# Patient Record
Sex: FEMALE | Race: WHITE | NOT HISPANIC OR LATINO | Employment: FULL TIME | URBAN - METROPOLITAN AREA
[De-identification: names, ages, dates, MRNs, and addresses within clinical notes are randomized per-mention and may not be internally consistent; named-entity substitution may affect disease eponyms.]

---

## 2017-02-15 ENCOUNTER — TELEPHONE (OUTPATIENT)
Dept: FAMILY MEDICINE | Facility: CLINIC | Age: 47
End: 2017-02-15

## 2017-02-15 NOTE — TELEPHONE ENCOUNTER
Panel Management Review      Patient has the following on her problem list:     Depression / Dysthymia review  PHQ-9 SCORE 12/22/2015 1/5/2016 12/8/2016   Total Score - - -   Total Score 12 6 4      Patient is due for:  None      Composite cancer screening  Chart review shows that this patient is due/due soon for the following Pap Smear  Summary:    Patient is due/failing the following:   PAP    Action needed:   Patient needs office visit for pap and physical.    Type of outreach:    Sent Tailored message.    Questions for provider review:    None                                                                                                                                    Isabel Sheldon MA       Chart routed to Care Team .

## 2017-04-24 ENCOUNTER — TELEPHONE (OUTPATIENT)
Dept: FAMILY MEDICINE | Facility: CLINIC | Age: 47
End: 2017-04-24

## 2017-04-24 DIAGNOSIS — M54.10 ACUTE LOW BACK PAIN WITH RADICULAR SYMPTOMS, DURATION LESS THAN 6 WEEKS: ICD-10-CM

## 2017-04-24 RX ORDER — BACLOFEN 10 MG/1
5-10 TABLET ORAL 3 TIMES DAILY PRN
Qty: 30 TABLET | Refills: 0 | Status: SHIPPED | OUTPATIENT
Start: 2017-04-24 | End: 2017-04-26

## 2017-04-24 NOTE — TELEPHONE ENCOUNTER
Pt states that she has an appt with provider on Thursday (4/27/17), she will wait to provider then.   Pt would like a refill of her baclofen - writer will refill for 30 days and ask provider to refill during appt time.   Pt verbalized understanding and had no further questions at this time.   Encounter closed.

## 2017-04-24 NOTE — TELEPHONE ENCOUNTER
No narcotics without a clinic visit as they often are not indicated for acute low back pain.     Can use ibuprofen 800 mg three times daily, we can refill baclofen (the last muscle relaxer she tried).      If she is having that much pain she should be seen.    Glory Singleton M.D.

## 2017-04-24 NOTE — TELEPHONE ENCOUNTER
Pt can get out of bed but hurts terrible. She has tried to get up 2 times today and both times she made it across the room and had to return to bed because of the pain.   This has happen in the past and was resolved with pain medication   Pt doesn't want a muscle relaxer because it made her sick she would like something for the pain.   She does state that she tried a flexeril about 1 week ago and it didn't help, but claims it didn't make her sick.    Pain is rated at 9 out of 10.   Located: lower back on right hand side.   Urination: no complaints   BM: no complaints - last BM was yesterday   Using heat and ice with no relief along with tylenol and ibuprofen.   Please advise   Thank you  Anne MACK RN

## 2017-04-24 NOTE — TELEPHONE ENCOUNTER
Reason for call:  Patient reporting a symptom    Symptom or request: Pt calling stating she cannot get out of bed today because of her back, does not know why, no known injury, please advise     Duration (how long have symptoms been present): today    Phone Number patient can be reached at:  Home number on file [phone] (home)    Best Time:  any    Can we leave a detailed message on this number:  YES    Call taken on 4/24/2017 at 10:02 AM by Isabel Oswald

## 2017-04-25 DIAGNOSIS — M54.40 ACUTE LOW BACK PAIN WITH SCIATICA, SCIATICA LATERALITY UNSPECIFIED, UNSPECIFIED BACK PAIN LATERALITY: Primary | ICD-10-CM

## 2017-04-25 NOTE — LETTER
[address]  [address]  Phone: [phone]  Fax: [phone]            4/26/17            To whom it may concern:            Bibi Song reports missing work on 4/24/17 and 4/25/17 due to back pain.        Glory Singleton M.D.

## 2017-04-25 NOTE — TELEPHONE ENCOUNTER
Reason for Call:  Other call back and Letter    Detailed comments: She has been off work Monday and Tuesday for sore back and upset stomach.  Also she was given  Baclofen.  this is up-setting her stomach.  She is wondering if she can get something else?  Please advise.    Phone Number Patient can be reached at: Home number on file [phone] (home)    Best Time: any    Can we leave a detailed message on this number? YES    Call taken on 4/25/2017 at 12:11 PM by Nicole Coelho

## 2017-04-25 NOTE — TELEPHONE ENCOUNTER
Baclofen giving her an upset stomach.  Requesting Flexeril for her back pain/spasms.  Asking for off work letter for 4/24,4/25, letter pended.  Advise.  Sierra

## 2017-04-26 RX ORDER — CYCLOBENZAPRINE HCL 10 MG
10 TABLET ORAL 3 TIMES DAILY PRN
Qty: 90 TABLET | Refills: 0 | Status: SHIPPED | OUTPATIENT
Start: 2017-04-26 | End: 2017-07-31

## 2017-04-27 ENCOUNTER — OFFICE VISIT (OUTPATIENT)
Dept: FAMILY MEDICINE | Facility: CLINIC | Age: 47
End: 2017-04-27
Payer: COMMERCIAL

## 2017-04-27 VITALS
HEART RATE: 82 BPM | RESPIRATION RATE: 18 BRPM | SYSTOLIC BLOOD PRESSURE: 107 MMHG | BODY MASS INDEX: 41.32 KG/M2 | HEIGHT: 65 IN | WEIGHT: 248 LBS | DIASTOLIC BLOOD PRESSURE: 71 MMHG

## 2017-04-27 DIAGNOSIS — R73.09 ELEVATED GLUCOSE: ICD-10-CM

## 2017-04-27 DIAGNOSIS — F41.8 OTHER SPECIFIED ANXIETY DISORDERS: ICD-10-CM

## 2017-04-27 DIAGNOSIS — F33.1 MAJOR DEPRESSIVE DISORDER, RECURRENT EPISODE, MODERATE (H): ICD-10-CM

## 2017-04-27 DIAGNOSIS — Z00.00 ROUTINE GENERAL MEDICAL EXAMINATION AT A HEALTH CARE FACILITY: Primary | ICD-10-CM

## 2017-04-27 DIAGNOSIS — E78.5 HYPERLIPIDEMIA LDL GOAL <160: ICD-10-CM

## 2017-04-27 DIAGNOSIS — K52.9 NON-SPECIFIC COLITIS: ICD-10-CM

## 2017-04-27 DIAGNOSIS — E03.9 HYPOTHYROIDISM, UNSPECIFIED TYPE: ICD-10-CM

## 2017-04-27 DIAGNOSIS — F42.9 OCD (OBSESSIVE COMPULSIVE DISORDER): ICD-10-CM

## 2017-04-27 PROBLEM — K51.919 ULCERATIVE COLITIS WITH COMPLICATION, UNSPECIFIED LOCATION (H): Status: ACTIVE | Noted: 2017-04-27

## 2017-04-27 LAB
ANION GAP SERPL CALCULATED.3IONS-SCNC: 11 MMOL/L (ref 3–14)
BUN SERPL-MCNC: 12 MG/DL (ref 7–30)
CALCIUM SERPL-MCNC: 9 MG/DL (ref 8.5–10.1)
CHLORIDE SERPL-SCNC: 107 MMOL/L (ref 94–109)
CHOLEST SERPL-MCNC: 256 MG/DL
CO2 SERPL-SCNC: 24 MMOL/L (ref 20–32)
CREAT SERPL-MCNC: 0.93 MG/DL (ref 0.52–1.04)
GFR SERPL CREATININE-BSD FRML MDRD: 64 ML/MIN/1.7M2
GLUCOSE SERPL-MCNC: 87 MG/DL (ref 70–99)
HBA1C MFR BLD: 5.5 % (ref 4.3–6)
HDLC SERPL-MCNC: 49 MG/DL
LDLC SERPL CALC-MCNC: 157 MG/DL
NONHDLC SERPL-MCNC: 207 MG/DL
POTASSIUM SERPL-SCNC: 3.6 MMOL/L (ref 3.4–5.3)
SODIUM SERPL-SCNC: 142 MMOL/L (ref 133–144)
T4 FREE SERPL-MCNC: 0.83 NG/DL (ref 0.76–1.46)
TRIGL SERPL-MCNC: 252 MG/DL
TSH SERPL DL<=0.005 MIU/L-ACNC: 6.16 MU/L (ref 0.4–4)

## 2017-04-27 PROCEDURE — 83036 HEMOGLOBIN GLYCOSYLATED A1C: CPT | Performed by: FAMILY MEDICINE

## 2017-04-27 PROCEDURE — 99396 PREV VISIT EST AGE 40-64: CPT | Performed by: FAMILY MEDICINE

## 2017-04-27 PROCEDURE — 36415 COLL VENOUS BLD VENIPUNCTURE: CPT | Performed by: FAMILY MEDICINE

## 2017-04-27 PROCEDURE — 84439 ASSAY OF FREE THYROXINE: CPT | Performed by: FAMILY MEDICINE

## 2017-04-27 PROCEDURE — 80048 BASIC METABOLIC PNL TOTAL CA: CPT | Performed by: FAMILY MEDICINE

## 2017-04-27 PROCEDURE — G0145 SCR C/V CYTO,THINLAYER,RESCR: HCPCS | Performed by: FAMILY MEDICINE

## 2017-04-27 PROCEDURE — 84443 ASSAY THYROID STIM HORMONE: CPT | Performed by: FAMILY MEDICINE

## 2017-04-27 PROCEDURE — 87624 HPV HI-RISK TYP POOLED RSLT: CPT | Performed by: FAMILY MEDICINE

## 2017-04-27 PROCEDURE — 80061 LIPID PANEL: CPT | Performed by: FAMILY MEDICINE

## 2017-04-27 RX ORDER — MESALAMINE 1000 MG/1
1000 SUPPOSITORY RECTAL AT BEDTIME
Qty: 90 SUPPOSITORY | Refills: 3 | Status: CANCELLED | OUTPATIENT
Start: 2017-04-27

## 2017-04-27 RX ORDER — SERTRALINE HYDROCHLORIDE 100 MG/1
100 TABLET, FILM COATED ORAL 3 TIMES DAILY
Qty: 1 TABLET | Refills: 0 | COMMUNITY
Start: 2017-04-27 | End: 2017-07-31

## 2017-04-27 RX ORDER — ZIPRASIDONE HYDROCHLORIDE 40 MG/1
40 CAPSULE ORAL 2 TIMES DAILY WITH MEALS
Qty: 1 CAPSULE | COMMUNITY
Start: 2017-04-27 | End: 2018-10-05

## 2017-04-27 ASSESSMENT — PATIENT HEALTH QUESTIONNAIRE - PHQ9: 5. POOR APPETITE OR OVEREATING: SEVERAL DAYS

## 2017-04-27 ASSESSMENT — ANXIETY QUESTIONNAIRES
GAD7 TOTAL SCORE: 8
1. FEELING NERVOUS, ANXIOUS, OR ON EDGE: SEVERAL DAYS
5. BEING SO RESTLESS THAT IT IS HARD TO SIT STILL: NOT AT ALL
2. NOT BEING ABLE TO STOP OR CONTROL WORRYING: MORE THAN HALF THE DAYS
6. BECOMING EASILY ANNOYED OR IRRITABLE: NOT AT ALL
7. FEELING AFRAID AS IF SOMETHING AWFUL MIGHT HAPPEN: MORE THAN HALF THE DAYS
3. WORRYING TOO MUCH ABOUT DIFFERENT THINGS: MORE THAN HALF THE DAYS

## 2017-04-27 NOTE — MR AVS SNAPSHOT
After Visit Summary   4/27/2017    Bibi Song    MRN: [id number]           Patient Information     Date Of Birth          1970        Visit Information        Provider Department      4/27/2017 9:20 AM Glory Singleton MD [facility]        Today's Diagnoses     Routine general medical examination at a health care facility    -  1    OCD (obsessive compulsive disorder)        Other specified anxiety disorders        Major depressive disorder, recurrent episode, moderate (H)        Hypothyroidism, unspecified type        Hyperlipidemia LDL goal <160        Non-specific colitis        Elevated glucose          Care Instructions      Make appointment with [facility] - see referral      Preventive Health Recommendations  Female Ages 40 to 49    Yearly exam:     See your health care provider every year in order to  1. Review health changes.   2. Discuss preventive care.    3. Review your medicines if your doctor prescribed any.      Get a Pap test every three years (unless you have an abnormal result and your provider advises testing more often).      If you get Pap tests with HPV test, you only need to test every 5 years, unless you have an abnormal result. You do not need a Pap test if your uterus was removed (hysterectomy) and you have not had cancer.      You should be tested each year for STDs (sexually transmitted diseases), if you're at risk.       Ask your doctor if you should have a mammogram.      Have a colonoscopy (test for colon cancer) if someone in your family has had colon cancer or polyps before age 50.       Have a cholesterol test every 5 years.       Have a diabetes test (fasting glucose) after age 45. If you are at risk for diabetes, you should have this test every 3 years.    Shots: Get a flu shot each year. Get a tetanus shot every 10 years.     Nutrition:     Eat at least 5 servings of fruits and vegetables each day.    Eat whole-grain bread, whole-wheat pasta  and brown rice instead of white grains and rice.    Talk to your provider about Calcium and Vitamin D.     Lifestyle    Exercise at least 150 minutes a week (an average of 30 minutes a day, 5 days a week). This will help you control your weight and prevent disease.    Limit alcohol to one drink per day.    No smoking.     Wear sunscreen to prevent skin cancer.    See your dentist every six months for an exam and cleaning.        Follow-ups after your visit        Additional Services     GASTROENTEROLOGY ADULT REF CONSULT ONLY       Preferred Location: [facility] [phone]      Please be aware that coverage of these services is subject to the terms and limitations of your health insurance plan.  Call member services at your health plan with any benefit or coverage questions.  Any procedures must be performed at a [facility] facility OR coordinated by your clinic's referral office.    Please bring the following with you to your appointment:    (1) Any X-Rays, CTs or MRIs which have been performed.  Contact the facility where they were done to arrange for  prior to your scheduled appointment.    (2) List of current medications   (3) This referral request   (4) Any documents/labs given to you for this referral                  Who to contact     If you have questions or need follow up information about today's clinic visit or your schedule please contact [facility] directly at [phone].  Normal or non-critical lab and imaging results will be communicated to you by MyChart, letter or phone within 4 business days after the clinic has received the results. If you do not hear from us within 7 days, please contact the clinic through MyChart or phone. If you have a critical or abnormal lab result, we will notify you by phone as soon as possible.  Submit refill requests through [employer] or call your pharmacy and they will forward the refill request to us. Please allow 3 business days for your refill  "to be completed.          Additional Information About Your Visit        [employer] Information     The [facility] gives you secure access to your electronic health record. If you see a primary care provider, you can also send messages to your care team and make appointments. If you have questions, please call your primary care clinic.  If you do not have a primary care provider, please call [phone] and they will assist you.        Care EveryWhere ID     This is your Care EveryWhere ID. This could be used by other organizations to access your [facility] medical records  [id number]        Your Vitals Were     Pulse Respirations Height Last Period Breastfeeding? BMI (Body Mass Index)    82 18 5' 5\" (1.651 m) 04/20/2017 No 41.27 kg/m2       Blood Pressure from Last 3 Encounters:   04/27/17 107/71   12/08/16 103/71   07/06/16 115/76    Weight from Last 3 Encounters:   04/27/17 248 lb (112.5 kg)   01/05/16 244 lb 9.6 oz (110.9 kg)   12/22/15 241 lb (109.3 kg)              We Performed the Following     Basic metabolic panel     GASTROENTEROLOGY ADULT REF CONSULT ONLY     Hemoglobin A1c     HPV High Risk Types DNA Cervical     Lipid panel reflex to direct LDL     Pap imaged thin layer screen with HPV - recommended age 30 - 65     TSH with free T4 reflex          Today's Medication Changes          These changes are accurate as of: 4/27/17  9:49 AM.  If you have any questions, ask your nurse or doctor.               These medicines have changed or have updated prescriptions.        Dose/Directions    GEODON 40 MG capsule   This may have changed:  how much to take   Generic drug:  ziprasidone   Changed by:  Glory Singleton MD        Dose:  80 mg   Take 2 capsules (80 mg) by mouth daily   Quantity:  1 capsule   Refills:  0       ZOLOFT 100 MG tablet   This may have changed:    - how much to take  - how to take this  - when to take this   Used for:  OCD (obsessive compulsive disorder), Other specified anxiety disorders, Major " depressive disorder, recurrent episode, moderate (H)   Generic drug:  sertraline   Changed by:  Glory Singleton MD        Dose:  100 mg   Take 1 tablet (100 mg) by mouth 3 times daily 2 and 1/2 tablets daily   Quantity:  1 tablet   Refills:  0                Primary Care Provider Office Phone # Fax #    Glory Singleton [phone]       [facility] [address]  [facility] [address]        Thank you!     Thank you for choosing [facility]  for your care. Our goal is always to provide you with excellent care. Hearing back from our patients is one way we can continue to improve our services. Please take a few minutes to complete the written survey that you may receive in the mail after your visit with us. Thank you!             Your Updated Medication List - Protect others around you: Learn how to safely use, store and throw away your medicines at www.disposemymeds.org.          This list is accurate as of: 4/27/17  9:49 AM.  Always use your most recent med list.                   Brand Name Dispense Instructions for use    CANASA 1000 MG Suppository   Generic drug:  mesalamine     90 suppository    Place 1 suppository rectally At Bedtime.       cyclobenzaprine 10 MG tablet    FLEXERIL    90 tablet    Take 1 tablet (10 mg) by mouth 3 times daily as needed for muscle spasms       GEODON 40 MG capsule   Generic drug:  ziprasidone     1 capsule    Take 2 capsules (80 mg) by mouth daily       levothyroxine 50 MCG tablet    SYNTHROID/LEVOTHROID    90 tablet    Take 1 tablet (50 mcg) by mouth daily       melatonin 3 MG tablet     30 tablet    Take 9 mg by mouth nightly as needed.       WELLBUTRIN  MG 24 hr tablet   Generic drug:  buPROPion      Take 300 mg by mouth every morning       ZOLOFT 100 MG tablet   Generic drug:  sertraline     1 tablet    Take 1 tablet (100 mg) by mouth 3 times daily 2 and 1/2 tablets daily

## 2017-04-27 NOTE — LETTER
[facility]  [address]  [facility] [address]  Phone: [phone]    April 27, 2017    Bibi Song  [address] [facility] [address]            To Whom It May Concern:      Bibi Song may return to work without restrictions on 4/28/2017.                Sincerely,          Glory Singleton MD

## 2017-04-27 NOTE — PROGRESS NOTES
SUBJECTIVE:     CC: Bibi Song is an 47 year old woman who presents for preventive health visit.     Healthy Habits:    Do you get at least three servings of calcium containing foods daily (dairy, green leafy vegetables, etc.)? yes    Amount of exercise or daily activities, outside of work: limited    Problems taking medications regularly No    Medication side effects: No    Have you had an eye exam in the past two years? yes    Do you see a dentist twice per year? yes    Do you have sleep apnea, excessive snoring or daytime drowsiness?no          Depression and Anxiety Follow-Up    Status since last visit: No change    Other associated symptoms:None    Complicating factors:     Significant life event: No     Current substance abuse: None  Sees a NP through [facility] for mental health    PHQ-9 SCORE 1/5/2016 12/8/2016 4/27/2017   Total Score - - -   Total Score 6 4 9     NARCISA-7 SCORE 1/5/2016 12/8/2016 4/27/2017   Total Score - - -   Total Score 8 6 8        PHQ-9  English      PHQ-9   Any Language     GAD7       Today's PHQ-2 Score:   PHQ-2 ( 1999 [employer]) 1/5/2016 3/26/2015   Q1: Little interest or pleasure in doing things 1 1   Q2: Feeling down, depressed or hopeless 1 1   PHQ-2 Score 2 2       Abuse: Current or Past(Physical, Sexual or Emotional)- No  Do you feel safe in your environment - Yes    Social History   Substance Use Topics     Smoking status: Former Smoker     Quit date: 1/1/1994     Smokeless tobacco: Never Used     Alcohol use No      Comment: Treatment in 2007     The patient does not drink >3 drinks per day nor >7 drinks per week.    Recent Labs   Lab Test  01/04/16   1033  01/23/14   0800  10/12/11   0904   CHOL  214*  222*  261*   HDL  39*  44*  44*   LDL  151*  160*  183*   TRIG  118  89  171*   CHOLHDLRATIO   --   5.0  6.0*   NHDL  175*   --    --        Reviewed orders with patient.  Reviewed health maintenance and updated orders accordingly - Yes    Mammo Decision Support:  Patient under  "age 50, mutual decision reflected in health maintenance.      Pertinent mammograms are reviewed under the imaging tab.  History of abnormal Pap smear: NO - age 30-65 PAP every 5 years with negative HPV co-testing recommended    Reviewed and updated as needed this visit by clinical staff  Tobacco  Allergies  Med Hx  Surg Hx  Fam Hx  Soc Hx        Reviewed and updated as needed this visit by Provider            ROS:  C: NEGATIVE for fever, chills, change in weight  I: NEGATIVE for worrisome rashes, moles or lesions  E: NEGATIVE for vision changes or irritation  ENT: NEGATIVE for ear, mouth and throat problems  R: NEGATIVE for significant cough or SOB  B: NEGATIVE for masses, tenderness or discharge - chronic left sided pain that has been evaluated and noted to be \"fatty\" in the past  CV: NEGATIVE for chest pain, palpitations or peripheral edema  GI: NEGATIVE for nausea, abdominal pain, heartburn, or change in bowel habits  : NEGATIVE for unusual urinary or vaginal symptoms. Periods are regular.  M: NEGATIVE for significant arthralgias or myalgia  N: NEGATIVE for weakness, dizziness or paresthesias  P: NEGATIVE for changes in mood or affect    Problem list, Medication list, Allergies, and Medical/Social/Surgical histories reviewed in [facility] and updated as appropriate.  Labs reviewed in EPIC  BP Readings from Last 3 Encounters:   04/27/17 107/71   12/08/16 103/71   07/06/16 115/76    Wt Readings from Last 3 Encounters:   04/27/17 248 lb (112.5 kg)   01/05/16 244 lb 9.6 oz (110.9 kg)   12/22/15 241 lb (109.3 kg)                  OBJECTIVE:     /71  Pulse 82  Resp 18  Ht 5' 5\" (1.651 m)  Wt 248 lb (112.5 kg)  LMP 04/20/2017  Breastfeeding? No  BMI 41.27 kg/m2  EXAM:  GENERAL: healthy, alert and no distress  EYES: Eyes grossly normal to inspection, PERRL and conjunctivae and sclerae normal  HENT: ear canals and TM's normal, nose and mouth without ulcers or lesions  NECK: no adenopathy, no asymmetry, " masses, or scars and thyroid normal to palpation  RESP: lungs clear to auscultation - no rales, rhonchi or wheezes  BREAST: normal without masses, tenderness or nipple discharge and no palpable axillary masses or adenopathy  CV: regular rate and rhythm, normal S1 S2, no S3 or S4, no murmur, click or rub, no peripheral edema and peripheral pulses strong  ABDOMEN: soft, nontender, no hepatosplenomegaly, no masses and bowel sounds normal   (female): normal female external genitalia, normal urethral meatus , vaginal mucosa pink, moist, well rugated, normal cervix, adnexae, and uterus without masses. and pap obtained  Skin around rectum is red/irritated without breakdown  Small hemorrhoidal skin tags present  MS: no gross musculoskeletal defects noted, no edema  SKIN: no suspicious lesions or rashes  NEURO: Normal strength and tone, mentation intact and speech normal  PSYCH: mentation appears normal, affect normal/bright    ASSESSMENT/PLAN:     1. Routine general medical examination at a health care facility     - Pap imaged thin layer screen with HPV - recommended age 30 - 65  - HPV High Risk Types DNA Cervical    2. OCD (obsessive compulsive disorder)   follow with her NP  - sertraline (ZOLOFT) 100 MG tablet; Take 1 tablet (100 mg) by mouth 3 times daily 2 and 1/2 tablets daily  Dispense: 1 tablet; Refill: 0    3. Other specified anxiety disorders   followed by her NP  - sertraline (ZOLOFT) 100 MG tablet; Take 1 tablet (100 mg) by mouth 3 times daily 2 and 1/2 tablets daily  Dispense: 1 tablet; Refill: 0    4. Major depressive disorder, recurrent episode, moderate (H)     - sertraline (ZOLOFT) 100 MG tablet; Take 1 tablet (100 mg) by mouth 3 times daily 2 and 1/2 tablets daily  Dispense: 1 tablet; Refill: 0    5. Hypothyroidism, unspecified type     - TSH with free T4 reflex    6. Hyperlipidemia LDL goal <160     - Lipid panel reflex to direct LDL  - Basic metabolic panel    7. Non-specific colitis   patient  "wondering if she needed follow up with GI for colitis seen on colonoscopy in 2010 - biopsy was non-specific for cause.  She denies current active symptoms  - GASTROENTEROLOGY ADULT REF CONSULT ONLY    8. Elevated glucose     - Hemoglobin A1c    COUNSELING:   Reviewed preventive health counseling, as reflected in patient instructions       Regular exercise       Healthy diet/nutrition       Colon cancer screening         reports that she quit smoking about 23 years ago. She has never used smokeless tobacco.    Estimated body mass index is 41.27 kg/(m^2) as calculated from the following:    Height as of this encounter: 5' 5\" (1.651 m).    Weight as of this encounter: 248 lb (112.5 kg).   Weight management plan: Discussed healthy diet and exercise guidelines and patient will follow up in 12 months in clinic to re-evaluate.    Counseling Resources:  ATP IV Guidelines  Pooled Cohorts Equation Calculator  Breast Cancer Risk Calculator  FRAX Risk Assessment  ICSI Preventive Guidelines  Dietary Guidelines for Americans, 2010  USDA's MyPlate  ASA Prophylaxis  Lung CA Screening    Glory Singleton MD  [facility]  "

## 2017-04-27 NOTE — PATIENT INSTRUCTIONS
Make appointment with [facility] - see referral      Preventive Health Recommendations  Female Ages 40 to 49    Yearly exam:     See your health care provider every year in order to  1. Review health changes.   2. Discuss preventive care.    3. Review your medicines if your doctor prescribed any.      Get a Pap test every three years (unless you have an abnormal result and your provider advises testing more often).      If you get Pap tests with HPV test, you only need to test every 5 years, unless you have an abnormal result. You do not need a Pap test if your uterus was removed (hysterectomy) and you have not had cancer.      You should be tested each year for STDs (sexually transmitted diseases), if you're at risk.       Ask your doctor if you should have a mammogram.      Have a colonoscopy (test for colon cancer) if someone in your family has had colon cancer or polyps before age 50.       Have a cholesterol test every 5 years.       Have a diabetes test (fasting glucose) after age 45. If you are at risk for diabetes, you should have this test every 3 years.    Shots: Get a flu shot each year. Get a tetanus shot every 10 years.     Nutrition:     Eat at least 5 servings of fruits and vegetables each day.    Eat whole-grain bread, whole-wheat pasta and brown rice instead of white grains and rice.    Talk to your provider about Calcium and Vitamin D.     Lifestyle    Exercise at least 150 minutes a week (an average of 30 minutes a day, 5 days a week). This will help you control your weight and prevent disease.    Limit alcohol to one drink per day.    No smoking.     Wear sunscreen to prevent skin cancer.    See your dentist every six months for an exam and cleaning.

## 2017-04-27 NOTE — NURSING NOTE
"Chief Complaint   Patient presents with     Physical       Initial /71  Pulse 82  Resp 18  Ht 5' 5\" (1.651 m)  Wt 248 lb (112.5 kg)  LMP 04/20/2017  Breastfeeding? No  BMI 41.27 kg/m2 Estimated body mass index is 41.27 kg/(m^2) as calculated from the following:    Height as of this encounter: 5' 5\" (1.651 m).    Weight as of this encounter: 248 lb (112.5 kg).  Medication Reconciliation: complete    "

## 2017-04-28 LAB
COPATH REPORT: NORMAL
PAP: NORMAL

## 2017-04-28 RX ORDER — LEVOTHYROXINE SODIUM 75 UG/1
75 TABLET ORAL DAILY
Qty: 90 TABLET | Refills: 1 | Status: SHIPPED | OUTPATIENT
Start: 2017-04-28 | End: 2017-08-14

## 2017-04-28 ASSESSMENT — ANXIETY QUESTIONNAIRES: GAD7 TOTAL SCORE: 8

## 2017-04-28 ASSESSMENT — PATIENT HEALTH QUESTIONNAIRE - PHQ9: SUM OF ALL RESPONSES TO PHQ QUESTIONS 1-9: 9

## 2017-05-02 LAB
FINAL DIAGNOSIS: NORMAL
HPV HR 12 DNA CVX QL NAA+PROBE: NEGATIVE
HPV16 DNA SPEC QL NAA+PROBE: NEGATIVE
HPV18 DNA SPEC QL NAA+PROBE: NEGATIVE
SPECIMEN DESCRIPTION: NORMAL

## 2017-05-18 ENCOUNTER — TELEPHONE (OUTPATIENT)
Dept: NURSING | Facility: CLINIC | Age: 47
End: 2017-05-18

## 2017-05-19 NOTE — TELEPHONE ENCOUNTER
"Call Type: Triage Call    Presenting Problem: \"I'm having anxiety and feel panicky and my  medications aren't working.\"  Triage Note:  Guideline Title: Anxiety: Panic ; Anxiety: Panic  Recommended Disposition: See Provider within 4 hours  Original Inclination: Wanted to speak with a nurse  Override Disposition: Activate   Intended Action: Patient does not know  Physician Contacted: No  History of panic attacks AND current episode NOT resolved ?  YES  Unable to stand due to faintness, dizziness, or lightheadedness ? NO  Describing symptoms of depression ? NO  Severe breathing problems ? NO  Hearing voices that no one else hears or seeing things that no one else sees ? NO  Dizziness, faintness, or lightheadedness lasting more than 15 minutes ? NO  New or worsening confusion, disorientation, or agitation ? NO  Any other cardiac signs/symptoms for more than 5 minutes, now or within last hour.  Pain is NOT associated with taking a deep breath or a productive cough, movement,  or touch to a localized area on the chest or upper body. ? NO  Any homicidal/destructive ideation, any suicidal ideation, any history of suicide  attempts, and/or any history of self destructive behavior ? NO  Any suicidal or homicidal attempt(s) or gesture(s) in progress. ? NO  Physician Instructions:  Care Advice: Should not be alone, arrange for support (family member,  friend, etc.).  Continue to follow treatment plan, including medications, until evaluated  by provider.  SYMPTOM / CONDITION MANAGEMENT  "

## 2017-06-23 ENCOUNTER — TELEPHONE (OUTPATIENT)
Dept: FAMILY MEDICINE | Facility: CLINIC | Age: 47
End: 2017-06-23

## 2017-06-23 NOTE — TELEPHONE ENCOUNTER
S-(situation): left knee pain    B-(background): cleaned the floor on hands and knees about 1 week ago.  Has been applying ice.  No redness, swelling, is able to bear wt on the left.  No numbness.    A-(assessment): knee pain    R-(recommendations): to ice and alternate with heat.  Elevate.  Can use an ace wrap to knee for added support.  No prolong standing.  Ibuprofen or tylenol for pain.  S & S of infection are gone over.  Fever, increased swelling to  Be seen. Kayla MORALES RN

## 2017-06-23 NOTE — TELEPHONE ENCOUNTER
Reason for call:  Patient reporting a symptom    Symptom or request: Pt thinks she may have pulled something in her left knee, it has been sore on the back for about a week, she did help someone with floor cleaning before it started so she thinks it could be from that, please advise.     Duration (how long have symptoms been present): one week    Have you been treated for this before? No    Additional comments:     Phone Number patient can be reached at:  Home number on file [phone] (home)    Best Time:  any    Can we leave a detailed message on this number:  YES    Call taken on 6/23/2017 at 10:36 AM by Isabel Oswald

## 2017-07-31 ENCOUNTER — OFFICE VISIT (OUTPATIENT)
Dept: FAMILY MEDICINE | Facility: CLINIC | Age: 47
End: 2017-07-31
Payer: COMMERCIAL

## 2017-07-31 VITALS
DIASTOLIC BLOOD PRESSURE: 81 MMHG | HEART RATE: 82 BPM | SYSTOLIC BLOOD PRESSURE: 118 MMHG | HEIGHT: 65 IN | BODY MASS INDEX: 41.32 KG/M2 | RESPIRATION RATE: 18 BRPM | WEIGHT: 248 LBS

## 2017-07-31 DIAGNOSIS — F42.9 OBSESSIVE-COMPULSIVE DISORDER, UNSPECIFIED TYPE: ICD-10-CM

## 2017-07-31 DIAGNOSIS — F41.8 OTHER SPECIFIED ANXIETY DISORDERS: ICD-10-CM

## 2017-07-31 DIAGNOSIS — E03.9 HYPOTHYROIDISM, UNSPECIFIED TYPE: ICD-10-CM

## 2017-07-31 DIAGNOSIS — R61 GENERALIZED HYPERHIDROSIS: ICD-10-CM

## 2017-07-31 DIAGNOSIS — F33.1 MAJOR DEPRESSIVE DISORDER, RECURRENT EPISODE, MODERATE (H): Primary | ICD-10-CM

## 2017-07-31 LAB
BASOPHILS # BLD AUTO: 0 10E9/L (ref 0–0.2)
BASOPHILS NFR BLD AUTO: 0.4 %
DIFFERENTIAL METHOD BLD: NORMAL
EOSINOPHIL # BLD AUTO: 0.5 10E9/L (ref 0–0.7)
EOSINOPHIL NFR BLD AUTO: 4.4 %
ERYTHROCYTE [DISTWIDTH] IN BLOOD BY AUTOMATED COUNT: 14.1 % (ref 10–15)
HCT VFR BLD AUTO: 39.9 % (ref 35–47)
HGB BLD-MCNC: 13.1 G/DL (ref 11.7–15.7)
LYMPHOCYTES # BLD AUTO: 2.7 10E9/L (ref 0.8–5.3)
LYMPHOCYTES NFR BLD AUTO: 25.5 %
MCH RBC QN AUTO: 29.4 PG (ref 26.5–33)
MCHC RBC AUTO-ENTMCNC: 32.8 G/DL (ref 31.5–36.5)
MCV RBC AUTO: 90 FL (ref 78–100)
MONOCYTES # BLD AUTO: 0.5 10E9/L (ref 0–1.3)
MONOCYTES NFR BLD AUTO: 4.3 %
NEUTROPHILS # BLD AUTO: 6.8 10E9/L (ref 1.6–8.3)
NEUTROPHILS NFR BLD AUTO: 65.4 %
PLATELET # BLD AUTO: 334 10E9/L (ref 150–450)
RBC # BLD AUTO: 4.46 10E12/L (ref 3.8–5.2)
TSH SERPL DL<=0.005 MIU/L-ACNC: 2.36 MU/L (ref 0.4–4)
WBC # BLD AUTO: 10.4 10E9/L (ref 4–11)

## 2017-07-31 PROCEDURE — 36415 COLL VENOUS BLD VENIPUNCTURE: CPT | Performed by: FAMILY MEDICINE

## 2017-07-31 PROCEDURE — 84443 ASSAY THYROID STIM HORMONE: CPT | Performed by: FAMILY MEDICINE

## 2017-07-31 PROCEDURE — 85025 COMPLETE CBC W/AUTO DIFF WBC: CPT | Performed by: FAMILY MEDICINE

## 2017-07-31 PROCEDURE — 99213 OFFICE O/P EST LOW 20 MIN: CPT | Performed by: FAMILY MEDICINE

## 2017-07-31 RX ORDER — SERTRALINE HYDROCHLORIDE 100 MG/1
200 TABLET, FILM COATED ORAL DAILY
Qty: 1 TABLET | Refills: 0 | COMMUNITY
Start: 2017-07-31 | End: 2018-10-05

## 2017-07-31 NOTE — NURSING NOTE
"Chief Complaint   Patient presents with     Excessive Sweating     Patient has been dealing with sweating for several years and over the summer it has worsen. Patient is questioning a medication. Patient just finished menstrual cycle.      Forms     forgot at home will drop off.       Initial /81  Pulse 82  Resp 18  Ht 5' 5\" (1.651 m)  Wt 248 lb (112.5 kg)  LMP 07/26/2017  Breastfeeding? No  BMI 41.27 kg/m2 Estimated body mass index is 41.27 kg/(m^2) as calculated from the following:    Height as of this encounter: 5' 5\" (1.651 m).    Weight as of this encounter: 248 lb (112.5 kg).  Medication Reconciliation: complete    "

## 2017-07-31 NOTE — PROGRESS NOTES
Bibi,    All of the labs were normal or acceptable.    Please contact my office if you have questions.    Glory Singleton M.D.

## 2017-07-31 NOTE — PATIENT INSTRUCTIONS
Thank you for choosing [facility].  You may be receiving a survey in the mail from [facility] regarding your visit today.  Please take a few minutes to complete and return the survey to let us know how we are doing.      Our Clinic hours are:  Mondays    7:20 am - 7 pm  Tues -  Fri  7:20 am - 5 pm    Clinic Phone: [phone]    The clinic lab opens at 7:30 am Mon - Fri and appointments are required.    [facility]. [phone]  Monday-Thursday 8 am - 7pm  Tues/Wed/Fri 8 am - 5:30 pm

## 2017-07-31 NOTE — MR AVS SNAPSHOT
After Visit Summary   7/31/2017    Bibi Song    MRN: [id number]           Patient Information     Date Of Birth          1970        Visit Information        Provider Department      7/31/2017 10:20 AM Glory Singleton MD [facility]        Today's Diagnoses     Major depressive disorder, recurrent episode, moderate (H)    -  1    Hypothyroidism, unspecified type        Obsessive-compulsive disorder, unspecified type        Other specified anxiety disorders        Generalized hyperhidrosis          Care Instructions          Thank you for choosing [facility].  You may be receiving a survey in the mail from [employer] regarding your visit today.  Please take a few minutes to complete and return the survey to let us know how we are doing.      Our Clinic hours are:  Mondays    7:20 am - 7 pm  Tues -  Fri  7:20 am - 5 pm    Clinic Phone: [phone]    The clinic lab opens at 7:30 am Mon - Fri and appointments are required.    [facility]  Ph. [phone]  Monday-Thursday 8 am - 7pm  Tues/Wed/Fri 8 am - 5:30 pm                 Follow-ups after your visit        Your next 10 appointments already scheduled     Aug 01, 2017 10:00 AM CDT   SHORT with JOBCARE NURSE NB   [facility] ([facility]    [address] [place] [id number] [phone]              Who to contact     If you have questions or need follow up information about today's clinic visit or your schedule please contact [facility] directly at [phone].  Normal or non-critical lab and imaging results will be communicated to you by MyChart, letter or phone within 4 business days after the clinic has received the results. If you do not hear from us within 7 days, please contact the clinic through MyChart or phone. If you have a critical or abnormal lab result, we will notify you by phone as soon as possible.  Submit  "refill requests through [employer] or call your pharmacy and they will forward the refill request to us. Please allow 3 business days for your refill to be completed.          Additional Information About Your Visit        [employer] Information     [employer] gives you secure access to your electronic health record. If you see a primary care provider, you can also send messages to your care team and make appointments. If you have questions, please call your primary care clinic.  If you do not have a primary care provider, please call [phone] and they will assist you.        Care EveryWhere ID     This is your Care EveryWhere ID. This could be used by other organizations to access your [facility] medical records  [id number]        Your Vitals Were     Pulse Respirations Height Last Period Breastfeeding? BMI (Body Mass Index)    82 18 5' 5\" (1.651 m) 07/26/2017 No 41.27 kg/m2       Blood Pressure from Last 3 Encounters:   07/31/17 118/81   04/27/17 107/71   12/08/16 103/71    Weight from Last 3 Encounters:   07/31/17 248 lb (112.5 kg)   04/27/17 248 lb (112.5 kg)   01/05/16 244 lb 9.6 oz (110.9 kg)              We Performed the Following     CBC with platelets differential     TSH with free T4 reflex          Today's Medication Changes          These changes are accurate as of: 7/31/17 10:54 AM.  If you have any questions, ask your nurse or doctor.               These medicines have changed or have updated prescriptions.        Dose/Directions    ZOLOFT 100 MG tablet   This may have changed:    - how much to take  - when to take this  - additional instructions   Used for:  Obsessive-compulsive disorder, unspecified type, Other specified anxiety disorders, Major depressive disorder, recurrent episode, moderate (H)   Generic drug:  sertraline   Changed by:  Glory Singleton MD        Dose:  300 mg   Take 3 tablets (300 mg) by mouth daily   Quantity:  1 tablet   Refills:  0                Primary Care Provider Office Phone " # Fax #    Glory Singleton [phone]       [facility] [address]  [facility] [address]        Equal Access to Services     DANIELLESULEMAN CYN : Hadroberto yeison choe treva Wright, waaxda luqadaha, qaybta kaalmada cara, varun herndon laYenihoa flowers. So [facility] [phone].    ATENCIÓN: Si habla español, tiene a velazquez disposición servicios gratuitos de asistencia lingüística. Llame al [phone].    We comply with applicable federal civil rights laws and [place] laws. We do not discriminate on the basis of race, color, national origin, age, disability sex, sexual orientation or gender identity.            Thank you!     Thank you for choosing [facility]  for your care. Our goal is always to provide you with excellent care. Hearing back from our patients is one way we can continue to improve our services. Please take a few minutes to complete the written survey that you may receive in the mail after your visit with us. Thank you!             Your Updated Medication List - Protect others around you: Learn how to safely use, store and throw away your medicines at www.disposemymeds.org.          This list is accurate as of: 7/31/17 10:54 AM.  Always use your most recent med list.                   Brand Name Dispense Instructions for use Diagnosis    GEODON 40 MG capsule   Generic drug:  ziprasidone     1 capsule    Take 2 capsules (80 mg) by mouth daily        levothyroxine 75 MCG tablet    SYNTHROID/LEVOTHROID    90 tablet    Take 1 tablet (75 mcg) by mouth daily    Hypothyroidism, unspecified type       melatonin 3 MG tablet     30 tablet    Take 9 mg by mouth nightly as needed.        WELLBUTRIN  MG 24 hr tablet   Generic drug:  buPROPion      Take 300 mg by mouth every morning        ZOLOFT 100 MG tablet   Generic drug:  sertraline     1 tablet    Take 3 tablets (300 mg) by mouth daily    Obsessive-compulsive disorder, unspecified type, Other specified  anxiety disorders, Major depressive disorder, recurrent episode, moderate (H)

## 2017-07-31 NOTE — PROGRESS NOTES
"  SUBJECTIVE:                                                    Bibi Song is a 47 year old female who presents to clinic today for the following health issues:      Chief Complaint   Patient presents with     Excessive Sweating     Patient has been dealing with sweating for several years and over the summer it has worsen. Patient is questioning a medication. Patient just finished menstrual cycle.      Forms     forgot at home will drop off.     Patient has noted a marked increase in general warmth/sweating this summer.  Is on high doses of Sertraline at 300 mg daily and started wellbutrin within the last year.  She sees a psych NP in [place].  Wanted me to do disability paperwork that she forgot at home, however if her disability is due to the psychiatric condition, I feel it's most appropriate for that specialist to do her paperwork as they would know the specifics of this.    She denies fever/chills.     ROS: General: No change in weight, sleep or appetite.  Daytime hyperhydrosis.    EYES: Negative for vision changes or eye problems  ENT: No problems with ears, nose or throat.  No difficulty swallowing.  RESP: No coughing, wheezing or shortness of breath  CV: No chest pains or palpitations  GI: No nausea, vomiting,  heartburn, abdominal pain, diarrhea, constipation or change in bowel habits  : No urinary frequency or dysuria, bladder or kidney problems  MUSCULOSKELETAL: No significant muscle or joint pains  NEUROLOGIC: No headaches, numbness, tingling, weakness, problems with balance or coordination  PSYCHIATRIC: No problems with anxiety, depression or mental health  HEME/IMMUNE/ALLERGY: No history of bleeding or clotting problems or anemia.  No allergies or immune system problems  ENDOCRINE: No history of thyroid disease, diabetes or other endocrine disorders  SKIN: No rashes,worrisome lesions or skin problems      /81  Pulse 82  Resp 18  Ht 5' 5\" (1.651 m)  Wt 248 lb (112.5 kg)  LMP " 07/26/2017  Breastfeeding? No  BMI 41.27 kg/m2  EXAM: GENERAL APPEARANCE: Alert, no acute distress  RESP: lungs clear to auscultation   CV: normal rate, regular rhythm, no murmur or gallop  ABDOMEN: soft, no organomegaly, masses or tenderness  LYMPHATICS: No cervical, supraclavicular or inguinal adenopathy  PSYCH: mentation appears normal., affect and mood normal    ASSESSMENT/PLAN:      ICD-10-CM    1. Major depressive disorder, recurrent episode, moderate (H) F33.1 sertraline (ZOLOFT) 100 MG tablet   2. Hypothyroidism, unspecified type E03.9 TSH with free T4 reflex   3. Obsessive-compulsive disorder, unspecified type F42.9 sertraline (ZOLOFT) 100 MG tablet   4. Other specified anxiety disorders F41.8 sertraline (ZOLOFT) 100 MG tablet   5. Generalized hyperhidrosis R61 CBC with platelets differential     Likely the hyperhydrosis relates to her SSRI and Buproprion.  15% of patients will get this on these medications.      Will r/o infection with CBC -  Normal today.    Recheck TSH.    Glory Singleton M.D.      Patient Instructions         Thank you for choosing [facility].  You may be receiving a survey in the mail from [employer] regarding your visit today.  Please take a few minutes to complete and return the survey to let us know how we are doing.      Our Clinic hours are:  Mondays    7:20 am - 7 pm  Tues -  Fri  7:20 am - 5 pm    Clinic Phone: [phone]    The clinic lab opens at 7:30 am Mon - Fri and appointments are required.    [facility]. [phone]  Monday-Thursday 8 am - 7pm  Tues/Wed/Fri 8 am - 5:30 pm

## 2017-08-01 ENCOUNTER — APPOINTMENT (OUTPATIENT)
Dept: OCCUPATIONAL MEDICINE | Facility: CLINIC | Age: 47
End: 2017-08-01

## 2017-08-01 PROCEDURE — 86580 TB INTRADERMAL TEST: CPT | Performed by: PHYSICIAN ASSISTANT

## 2017-08-01 ASSESSMENT — PATIENT HEALTH QUESTIONNAIRE - PHQ9: SUM OF ALL RESPONSES TO PHQ QUESTIONS 1-9: 16

## 2017-08-14 ENCOUNTER — APPOINTMENT (OUTPATIENT)
Dept: OCCUPATIONAL MEDICINE | Facility: CLINIC | Age: 47
End: 2017-08-14

## 2017-08-14 DIAGNOSIS — E03.9 HYPOTHYROIDISM, UNSPECIFIED TYPE: ICD-10-CM

## 2017-08-14 PROCEDURE — 86580 TB INTRADERMAL TEST: CPT | Performed by: PHYSICIAN ASSISTANT

## 2017-08-14 NOTE — TELEPHONE ENCOUNTER
Levothyroxine     Last Written Prescription Date: 04/28/2017  Last Quantity: 90, # refills: 1  Last Office Visit with G, P or [facility] prescribing provider: 07/31/2017        TSH   Date Value Ref Range Status   07/31/2017 2.36 0.40 - 4.00 mU/L Final     Jose MONTALVO (R)

## 2017-08-15 RX ORDER — LEVOTHYROXINE SODIUM 75 UG/1
75 TABLET ORAL DAILY
Qty: 90 TABLET | Refills: 1 | Status: SHIPPED | OUTPATIENT
Start: 2017-08-15 | End: 2018-05-21

## 2017-08-19 ENCOUNTER — NURSE TRIAGE (OUTPATIENT)
Dept: NURSING | Facility: CLINIC | Age: 47
End: 2017-08-19

## 2017-08-22 ENCOUNTER — APPOINTMENT (OUTPATIENT)
Dept: OCCUPATIONAL MEDICINE | Facility: CLINIC | Age: 47
End: 2017-08-22

## 2017-08-22 PROCEDURE — 86580 TB INTRADERMAL TEST: CPT | Performed by: PHYSICIAN ASSISTANT

## 2017-09-05 ENCOUNTER — TELEPHONE (OUTPATIENT)
Dept: FAMILY MEDICINE | Facility: CLINIC | Age: 47
End: 2017-09-05

## 2017-09-05 DIAGNOSIS — M54.10 ACUTE LOW BACK PAIN WITH RADICULAR SYMPTOMS, DURATION LESS THAN 6 WEEKS: ICD-10-CM

## 2017-09-05 NOTE — TELEPHONE ENCOUNTER
Reason for Call:  Medication or medication refill:    Do you use a [facility]?  Name of the pharmacy and phone number for the current request:  [facility] [phone]    Name of the medication requested: Baclofen    Other request: She is asking for a refill.  She went back to work and now her back is having spasms.  Please advise.     Can we leave a detailed message on this number? YES    Phone number patient can be reached at: Home number on file [phone] (home)    Best Time: any    Call taken on 9/5/2017 at 10:45 AM by Nicole Coelho

## 2017-09-05 NOTE — TELEPHONE ENCOUNTER
Routing refill request to provider for review/approval because:  Drug not active on patient's medication list    Last OV back pain discussed 12/2016 - she was given #30 4-24-17 and was going to discuss this at her OV 4-27-17 - do not see back pain listed at that OV.  This med was d/c'd April 2017.    Would you like her to schedule OV for refill?    baclofen      Last Written Prescription Date: 4-24-17  Last Fill Quantity: 30,  # refills: 0   Last Office Visit with [facility], [facility] or [facility] prescribing provider: 7-31-17                                Priscilla BAXTER RN

## 2017-09-06 RX ORDER — BACLOFEN 10 MG/1
5-10 TABLET ORAL 3 TIMES DAILY PRN
Qty: 30 TABLET | Refills: 0 | Status: SHIPPED | OUTPATIENT
Start: 2017-09-06 | End: 2017-10-18

## 2017-09-11 ENCOUNTER — TELEPHONE (OUTPATIENT)
Dept: FAMILY MEDICINE | Facility: CLINIC | Age: 47
End: 2017-09-11

## 2017-09-11 NOTE — LETTER
September 11, 2017      Bibi Song  [address] [address]        To Whom It May Concern,      Patient reports she was unable to work due to back pain on 9/10/17 and 9/11/17.            Sincerely,        Glory Singleton MD

## 2017-09-11 NOTE — TELEPHONE ENCOUNTER
Patient was advised she should have an appointment to discuss her symptoms.  Patient can not afford to come into clinic.  Patient reports this job more demanding and requires more lifting.  Patient is considering  quitting this job.  Patient would like provider to write letter for work today and yesterday.    Please review and advise.    Thank you  Ольга NAPOLES RN

## 2017-09-11 NOTE — TELEPHONE ENCOUNTER
Reason for Call:  Other Letter    Detailed comments: Pt calling to ask that Dr. Singleton write a note excusing her from work 09/10 and 09/11 due to back pain.  She would like to  the letter at the clinic .  I informed pt that she needs an appt with a provider so that they can examine her and she declined to make an appt, stating that she cannot afford it.      Phone Number Patient can be reached at: Home number on file [phone] (home)    Best Time: any    Can we leave a detailed message on this number? YES    Call taken on 9/11/2017 at 10:51 AM by Velma Apodaca

## 2017-09-11 NOTE — TELEPHONE ENCOUNTER
We can write a generic letter stating that patient reports she was unable to work due to back pain on the stated dates, but I cannot do lifting restrictions, or a more specific note without a clinic visit.    Glory Singleton M.D.

## 2017-09-11 NOTE — TELEPHONE ENCOUNTER
Patient would like the generic letter.  Letter pended.  Patient will  letter at the .    Ольга NAPOLES RN

## 2017-10-18 ENCOUNTER — TELEPHONE (OUTPATIENT)
Dept: FAMILY MEDICINE | Facility: CLINIC | Age: 47
End: 2017-10-18

## 2017-10-18 DIAGNOSIS — M54.10 ACUTE LOW BACK PAIN WITH RADICULAR SYMPTOMS, DURATION LESS THAN 6 WEEKS: ICD-10-CM

## 2017-10-18 RX ORDER — BACLOFEN 10 MG/1
5-10 TABLET ORAL 3 TIMES DAILY PRN
Qty: 30 TABLET | Refills: 0 | Status: SHIPPED | OUTPATIENT
Start: 2017-10-18 | End: 2017-11-16

## 2017-10-18 NOTE — TELEPHONE ENCOUNTER
Reason for Call:  Medication or medication refill:    Do you use a [facility]?  Name of the pharmacy and phone number for the current request:  [facility] [phone]    Name of the medication requested: Baclofen  Last Written Prescription Date: 9/6/2017  Last Fill Quantity: 30,  # refills: 0   Last Office Visit with [facility] or [facility] prescribing provider: 7/3/2017                                             Can we leave a detailed message on this number? YES    Phone number patient can be reached at: Home number on file [phone] (home)    Best Time: any    Call taken on 10/18/2017 at 2:27 PM by Nicole Coelho

## 2017-10-18 NOTE — TELEPHONE ENCOUNTER
Routing refill request to provider for review/approval because:  Drug not on the [facility] refill protocol     Thank you  Ольга NAPOLES RN

## 2017-11-16 ENCOUNTER — TELEPHONE (OUTPATIENT)
Dept: FAMILY MEDICINE | Facility: CLINIC | Age: 47
End: 2017-11-16

## 2017-11-16 DIAGNOSIS — M54.10 ACUTE LOW BACK PAIN WITH RADICULAR SYMPTOMS, DURATION LESS THAN 6 WEEKS: ICD-10-CM

## 2017-11-16 RX ORDER — BACLOFEN 10 MG/1
5-10 TABLET ORAL 3 TIMES DAILY PRN
Qty: 30 TABLET | Refills: 0 | Status: ON HOLD | OUTPATIENT
Start: 2017-11-16 | End: 2018-07-18

## 2017-11-16 NOTE — TELEPHONE ENCOUNTER
This message was not routed to PCP previously - RN error.    Routing to provider.  Priscilla BAXTER RN

## 2017-11-16 NOTE — TELEPHONE ENCOUNTER
Routing refill request to provider for review/approval because:  Drug not on the [facility] refill protocol     Due for OV to discuss back pain?    Priscilla BAXTER RN

## 2017-11-16 NOTE — TELEPHONE ENCOUNTER
Message left for patient to return call to clinic.  CSS - ok to deliver message below.    Priscilla BAXTER RN

## 2017-11-16 NOTE — TELEPHONE ENCOUNTER
Patient is calling wondering if this can be expedited today, as she is out of this medication.  Rachael Mercado  [facility] [place] [facility]

## 2017-11-16 NOTE — TELEPHONE ENCOUNTER
Reason for Call:  Medication or medication refill:    Do you use a [facility]?  Name of the pharmacy and phone number for the current request:  [facility] [phone]    Name of the medication requested: Baclofen  Last Written Prescription Date: 10/18/2017  Last Fill Quantity: 30,  # refills: 0   Last Office Visit with [facility], [facility] or [facility] prescribing provider: 7/31/2017                                             Other request: Pt is asking for a refill.    Can we leave a detailed message on this number? YES    Phone number patient can be reached at: Home number on file [phone] (home)    Best Time: any    Call taken on 11/16/2017 at 10:41 AM by Nicole Coelho

## 2017-11-16 NOTE — TELEPHONE ENCOUNTER
Pt called back.  She was notified of her Rx being filled and she is going to make an appt before more refills.  Nicole Coelho

## 2017-12-11 ENCOUNTER — MYC MEDICAL ADVICE (OUTPATIENT)
Dept: FAMILY MEDICINE | Facility: CLINIC | Age: 47
End: 2017-12-11

## 2018-01-08 ASSESSMENT — PATIENT HEALTH QUESTIONNAIRE - PHQ9: SUM OF ALL RESPONSES TO PHQ QUESTIONS 1-9: 0

## 2018-01-08 NOTE — TELEPHONE ENCOUNTER
Panel Management Review      Patient has the following on her problem list:     Depression / Dysthymia review    Measure:  Needs PHQ-9 score of 4 or less during index window.  Administer PHQ-9 and if score is 5 or more, send encounter to provider for next steps.    5   7 month window range: DUE    PHQ-9 SCORE 12/8/2016 4/27/2017 7/31/2017   Total Score - - -   Total Score 4 9 16       If PHQ-9 recheck is 5 or more, route to provider for next steps.    Patient is due for:  PHQ9 and DAP        Composite cancer screening  Chart review shows that this patient is due/due soon for the following None  Summary:    Patient is due/failing the following:   PHQ9    Action needed:   Patient needs to do PHQ9.    Type of outreach:    Phone, spoke to patient.  patient is feeling tired but due to thyroid and sleep concerns not depression related.     Questions for provider review:    None                                                                                                                                    Isabel Sheldon MA       Chart routed to Care Team .

## 2018-01-16 ENCOUNTER — TELEPHONE (OUTPATIENT)
Dept: FAMILY MEDICINE | Facility: CLINIC | Age: 48
End: 2018-01-16

## 2018-01-16 ENCOUNTER — TRANSFERRED RECORDS (OUTPATIENT)
Dept: HEALTH INFORMATION MANAGEMENT | Facility: CLINIC | Age: 48
End: 2018-01-16

## 2018-01-16 NOTE — TELEPHONE ENCOUNTER
Please review message below.  Fax placed on provider.s desk to review. Writer contacted the LPN and she discussed with CNP, ok to wait for Dr. Singleton to review EKG for further instructions.    Thank you  Ольга NAPOLES RN

## 2018-01-16 NOTE — TELEPHONE ENCOUNTER
"Reason for Call:  Other FYI    Detailed comments: Please review the \"abnormal EKG\"  that was faxed to you and give your recommendations for Amitriptyline for sleep. Per Jody Schoenecker CNP at [facility].    Phone Number Maegan can be reached at: Other phone number:  [phone] Pauly's nurse.    Best Time: asap    Can we leave a detailed message on this number? YES    Call taken on 1/16/2018 at 2:00 PM by Luci Arboleda      "

## 2018-01-17 NOTE — TELEPHONE ENCOUNTER
EKG compared to previous is relatively unchanged.  If provider has questions regarding med management as it pertains to an EKG, she should discuss with her supervising MD/Psychiatrist.      Glory Singleton M.D.

## 2018-05-09 ENCOUNTER — TELEPHONE (OUTPATIENT)
Dept: FAMILY MEDICINE | Facility: CLINIC | Age: 48
End: 2018-05-09

## 2018-05-09 NOTE — TELEPHONE ENCOUNTER
Panel Management Review      Patient has the following on her problem list:     Depression / Dysthymia review    Measure:  Needs PHQ-9 score of 4 or less during index window.  Administer PHQ-9 and if score is 5 or more, send encounter to provider for next steps.    5 - 7 month window range: Due in June    PHQ-9 SCORE 4/27/2017 7/31/2017 1/8/2018   Total Score - - -   Total Score 9 16 0       If PHQ-9 recheck is 5 or more, route to provider for next steps.    Patient is due for:  PHQ9      Composite cancer screening  Chart review shows that this patient is due/due soon for the following Mammogram  Summary:    Patient is due/failing the following:   MAMMOGRAM    Action needed:   Patient needs to do PHQ9. and Patient needs referral/order: mammogram    Type of outreach:    Phone, left message for patient to call back.     Questions for provider review:    None                                                                                                                                    Isabel Sheldon MA       Chart routed to Care Team .

## 2018-05-15 ENCOUNTER — DOCUMENTATION ONLY (OUTPATIENT)
Dept: LAB | Facility: CLINIC | Age: 48
End: 2018-05-15

## 2018-05-15 DIAGNOSIS — E03.9 HYPOTHYROIDISM, UNSPECIFIED TYPE: Primary | ICD-10-CM

## 2018-05-15 DIAGNOSIS — E78.5 HYPERLIPIDEMIA LDL GOAL <160: ICD-10-CM

## 2018-05-15 DIAGNOSIS — Z83.3 FAMILY HISTORY OF DIABETES MELLITUS: ICD-10-CM

## 2018-05-15 NOTE — PROGRESS NOTES
This patient has a lab only appointment for Wednesday May 16, 2018. There are no lab orders in place. Please place any lab orders she may need. Thank you.

## 2018-05-16 ENCOUNTER — RADIANT APPOINTMENT (OUTPATIENT)
Dept: MAMMOGRAPHY | Facility: CLINIC | Age: 48
End: 2018-05-16
Attending: FAMILY MEDICINE
Payer: COMMERCIAL

## 2018-05-16 DIAGNOSIS — E03.9 HYPOTHYROIDISM, UNSPECIFIED TYPE: ICD-10-CM

## 2018-05-16 DIAGNOSIS — Z83.3 FAMILY HISTORY OF DIABETES MELLITUS: ICD-10-CM

## 2018-05-16 DIAGNOSIS — E78.5 HYPERLIPIDEMIA LDL GOAL <160: ICD-10-CM

## 2018-05-16 DIAGNOSIS — Z12.31 VISIT FOR SCREENING MAMMOGRAM: ICD-10-CM

## 2018-05-16 LAB
ANION GAP SERPL CALCULATED.3IONS-SCNC: 8 MMOL/L (ref 3–14)
BUN SERPL-MCNC: 13 MG/DL (ref 7–30)
CALCIUM SERPL-MCNC: 8.9 MG/DL (ref 8.5–10.1)
CHLORIDE SERPL-SCNC: 107 MMOL/L (ref 94–109)
CHOLEST SERPL-MCNC: 233 MG/DL
CO2 SERPL-SCNC: 25 MMOL/L (ref 20–32)
CREAT SERPL-MCNC: 0.74 MG/DL (ref 0.52–1.04)
GFR SERPL CREATININE-BSD FRML MDRD: 83 ML/MIN/1.7M2
GLUCOSE SERPL-MCNC: 94 MG/DL (ref 70–99)
HDLC SERPL-MCNC: 49 MG/DL
LDLC SERPL CALC-MCNC: 162 MG/DL
NONHDLC SERPL-MCNC: 184 MG/DL
POTASSIUM SERPL-SCNC: 4.1 MMOL/L (ref 3.4–5.3)
SODIUM SERPL-SCNC: 140 MMOL/L (ref 133–144)
TRIGL SERPL-MCNC: 110 MG/DL
TSH SERPL DL<=0.005 MIU/L-ACNC: 1.76 MU/L (ref 0.4–4)

## 2018-05-16 PROCEDURE — 77067 SCR MAMMO BI INCL CAD: CPT | Mod: TC

## 2018-05-16 PROCEDURE — 80061 LIPID PANEL: CPT | Performed by: FAMILY MEDICINE

## 2018-05-16 PROCEDURE — 80048 BASIC METABOLIC PNL TOTAL CA: CPT | Performed by: FAMILY MEDICINE

## 2018-05-16 PROCEDURE — 84443 ASSAY THYROID STIM HORMONE: CPT | Performed by: FAMILY MEDICINE

## 2018-05-16 PROCEDURE — 36415 COLL VENOUS BLD VENIPUNCTURE: CPT | Performed by: FAMILY MEDICINE

## 2018-05-21 ENCOUNTER — TELEPHONE (OUTPATIENT)
Dept: FAMILY MEDICINE | Facility: CLINIC | Age: 48
End: 2018-05-21

## 2018-05-21 DIAGNOSIS — E03.9 HYPOTHYROIDISM, UNSPECIFIED TYPE: ICD-10-CM

## 2018-05-21 RX ORDER — LEVOTHYROXINE SODIUM 75 UG/1
75 TABLET ORAL DAILY
Qty: 90 TABLET | Refills: 0 | Status: ON HOLD | OUTPATIENT
Start: 2018-05-21 | End: 2018-07-18

## 2018-05-21 NOTE — TELEPHONE ENCOUNTER
Reason for Call:  Medication or medication refill:    Do you use a [facility]?  Name of the pharmacy and phone number for the current request:  [facility] [place] [phone]    Name of the medication requested: Levothyroxine - Pt called for refill to last her until she is due for yearly appt 7/31/18.  Last labs was 5/16/18.  No need to call patient back, unless there are questions or problems.    Component Results   Component Value Flag Ref Range Units Status Collected Lab   TSH 1.76  0.40 - 4.00 mU/L Sue     Levothyroxine      Last Written Prescription Date:  8/15/17  Last Fill Quantity: 90,   # refills: 1  Last Office Visit: 7/31/17  Future Office visit:       Other request:     Can we leave a detailed message on this number? YES    Phone number patient can be reached at: Cell number on file:    Telephone Information:   Mobile [phone]       Best Time: any    Call taken on 5/21/2018 at 8:09 AM by Velma Apodaca

## 2018-07-10 ENCOUNTER — OFFICE VISIT (OUTPATIENT)
Dept: FAMILY MEDICINE | Facility: CLINIC | Age: 48
End: 2018-07-10
Payer: COMMERCIAL

## 2018-07-10 VITALS
TEMPERATURE: 96.9 F | HEIGHT: 65 IN | OXYGEN SATURATION: 93 % | HEART RATE: 79 BPM | SYSTOLIC BLOOD PRESSURE: 102 MMHG | RESPIRATION RATE: 12 BRPM | WEIGHT: 236 LBS | DIASTOLIC BLOOD PRESSURE: 71 MMHG | BODY MASS INDEX: 39.32 KG/M2

## 2018-07-10 DIAGNOSIS — F32.1 MODERATE MAJOR DEPRESSION (H): ICD-10-CM

## 2018-07-10 DIAGNOSIS — K51.911 ULCERATIVE COLITIS WITH RECTAL BLEEDING, UNSPECIFIED LOCATION (H): Primary | ICD-10-CM

## 2018-07-10 PROCEDURE — 99214 OFFICE O/P EST MOD 30 MIN: CPT | Performed by: NURSE PRACTITIONER

## 2018-07-10 RX ORDER — MESALAMINE 1000 MG/1
1000 SUPPOSITORY RECTAL AT BEDTIME
Qty: 42 SUPPOSITORY | Refills: 0 | Status: SHIPPED | OUTPATIENT
Start: 2018-07-10 | End: 2018-08-29

## 2018-07-10 ASSESSMENT — PAIN SCALES - GENERAL: PAINLEVEL: MODERATE PAIN (4)

## 2018-07-10 NOTE — LETTER
My Depression Action Plan  Name: Bibi Song   Date of Birth 1970  Date: 7/10/2018    My doctor: Glory Singleton   My clinic: Formerly named [facility]  [address]  [facility] [id number] [phone]          GREEN    ZONE   Good Control    What it looks like:     Things are going generally well. You have normal up s and down s. You may even feel depressed from time to time, but bad moods usually last less than a day.   What you need to do:  1. Continue to care for yourself (see self care plan)  2. Check your depression survival kit and update it as needed  3. Follow your physician s recommendations including any medication.  4. Do not stop taking medication unless you consult with your physician first.           YELLOW         ZONE Getting Worse    What it looks like:     Depression is starting to interfere with your life.     It may be hard to get out of bed; you may be starting to isolate yourself from others.    Symptoms of depression are starting to last most all day and this has happened for several days.     You may have suicidal thoughts but they are not constant.   What you need to do:     1. Call your care team, your response to treatment will improve if you keep your care team informed of your progress. Yellow periods are signs an adjustment may need to be made.     2. Continue your self-care, even if you have to fake it!    3. Talk to someone in your support network    4. Open up your depression survival kit           RED    ZONE Medical Alert - Get Help    What it looks like:     Depression is seriously interfering with your life.     You may experience these or other symptoms: You can t get out of bed most days, can t work or engage in other necessary activities, you have trouble taking care of basic hygiene, or basic responsibilities, thoughts of suicide or death that will not go away, self-injurious behavior.     What you need to do:  1. Call your care team and  request a same-day appointment. If they are not available (weekends or after hours) call your local crisis line, emergency room or 911.            Depression Self Care Plan / Survival Kit    Self-Care for Depression  Here s the deal. Your body and mind are really not as separate as most people think.  What you do and think affects how you feel and how you feel influences what you do and think. This means if you do things that people who feel good do, it will help you feel better.  Sometimes this is all it takes.  There is also a place for medication and therapy depending on how severe your depression is, so be sure to consult with your medical provider and/ or Behavioral Health Consultant if your symptoms are worsening or not improving.     In order to better manage my stress, I will:    Exercise  Get some form of exercise, every day. This will help reduce pain and release endorphins, the  feel good  chemicals in your brain. This is almost as good as taking antidepressants!  This is not the same as joining a gym and then never going! (they count on that by the way ) It can be as simple as just going for a walk or doing some gardening, anything that will get you moving.      Hygiene   Maintain good hygiene (Get out of bed in the morning, Make your bed, Brush your teeth, Take a shower, and Get dressed like you were going to work, even if you are unemployed).  If your clothes don't fit try to get ones that do.    Diet  I will strive to eat foods that are good for me, drink plenty of water, and avoid excessive sugar, caffeine, alcohol, and other mood-altering substances.  Some foods that are helpful in depression are: complex carbohydrates, B vitamins, flaxseed, fish or fish oil, fresh fruits and vegetables.    Psychotherapy  I agree to participate in Individual Therapy (if recommended).    Medication  If prescribed medications, I agree to take them.  Missing doses can result in serious side effects.  I understand that  drinking alcohol, or other illicit drug use, may cause potential side effects.  I will not stop my medication abruptly without first discussing it with my provider.    Staying Connected With Others  I will stay in touch with my friends, family members, and my primary care provider/team.    Use your imagination  Be creative.  We all have a creative side; it doesn t matter if it s oil painting, sand castles, or mud pies! This will also kick up the endorphins.    Witness Beauty  (AKA stop and smell the roses) Take a look outside, even in mid-winter. Notice colors, textures. Watch the squirrels and birds.     Service to others  Be of service to others.  There is always someone else in need.  By helping others we can  get out of ourselves  and remember the really important things.  This also provides opportunities for practicing all the other parts of the program.    Humor  Laugh and be silly!  Adjust your TV habits for less news and crime-drama and more comedy.    Control your stress  Try breathing deep, massage therapy, biofeedback, and meditation. Find time to relax each day.     My support system    Clinic Contact:  Phone number:    Contact 1:  Phone number:    Contact 2:  Phone number:    [employer]/:  Phone number:    Therapist:  Phone number:    Local crisis center:    Phone number:    Other community support:  Phone number:

## 2018-07-10 NOTE — MR AVS SNAPSHOT
After Visit Summary   7/10/2018    Bibi Song    MRN: [id number]           Patient Information     Date Of Birth          1970        Visit Information        Provider Department      7/10/2018 1:20 PM Jacinta Nagel APRN CNP [facility]        Today's Diagnoses     Moderate major depression (H)    -  1    Ulcerative colitis with rectal bleeding, unspecified location (H)          Care Instructions    Use the suppository at bedtime for 6 weeks    If no improvement, call the clinic    Schedule the colonoscopy          Follow-ups after your visit        Additional Services     GASTROENTEROLOGY ADULT REF PROCEDURE ONLY [facility] [phone]       Last Lab Result: Creatinine (mg/dL)       Date                     Value                 05/16/2018               0.74             ----------  Body mass index is 39.27 kg/(m^2).     Needed:  No  Language:  English    Patient will be contacted to schedule procedure.     Please be aware that coverage of these services is subject to the terms and limitations of your health insurance plan.  Call member services at your health plan with any benefit or coverage questions.  Any procedures must be performed at a [facility] facility OR coordinated by your clinic's referral office.    Please bring the following with you to your appointment:    (1) Any X-Rays, CTs or MRIs which have been performed.  Contact the facility where they were done to arrange for  prior to your scheduled appointment.    (2) List of current medications   (3) This referral request   (4) Any documents/labs given to you for this referral                  Who to contact     If you have questions or need follow up information about today's clinic visit or your schedule please contact [facility] directly at [phone].  Normal or non-critical lab and imaging results will be communicated to you by MyChart, letter or phone within  "4 business days after the clinic has received the results. If you do not hear from us within 7 days, please contact the clinic through [employer] or phone. If you have a critical or abnormal lab result, we will notify you by phone as soon as possible.  Submit refill requests through [employer] or call your pharmacy and they will forward the refill request to us. Please allow 3 business days for your refill to be completed.          Additional Information About Your Visit        [employer] Information     [employer] gives you secure access to your electronic health record. If you see a primary care provider, you can also send messages to your care team and make appointments. If you have questions, please call your primary care clinic.  If you do not have a primary care provider, please call [phone] and they will assist you.        Care EveryWhere ID     This is your Care EveryWhere ID. This could be used by other organizations to access your [facility] medical records  [id number]        Your Vitals Were     Pulse Temperature Respirations Height Last Period Pulse Oximetry    79 96.9  F (36.1  C) (Tympanic) 12 5' 5\" (1.651 m) 06/18/2018 (Approximate) 93%    Breastfeeding? BMI (Body Mass Index)                No 39.27 kg/m2           Blood Pressure from Last 3 Encounters:   07/10/18 102/71   07/31/17 118/81   04/27/17 107/71    Weight from Last 3 Encounters:   07/10/18 236 lb (107 kg)   07/31/17 248 lb (112.5 kg)   04/27/17 248 lb (112.5 kg)              We Performed the Following     DEPRESSION ACTION PLAN (DAP)     GASTROENTEROLOGY ADULT REF PROCEDURE ONLY [facility] [phone]          Today's Medication Changes          These changes are accurate as of 7/10/18  1:54 PM.  If you have any questions, ask your nurse or doctor.               Start taking these medicines.        Dose/Directions    mesalamine 1000 MG Suppository   Commonly known as:  CANASA   Used for:  Ulcerative colitis with rectal bleeding, unspecified " location (H)   Started by:  Jacinta Nagel APRN CNP        Dose:  1000 mg   Place 1 suppository (1,000 mg) rectally At Bedtime   Quantity:  42 suppository   Refills:  0            Where to get your medicines      These medications were sent to [facility] - - DIANN Quiñones  [id number] [facility]  [id number] [place] [address], [place] [address]    H[facility]:  [facility] Phone:  [phone]     mesalamine 1000 MG Suppository                Primary Care Provider Office Phone # Fax #    Glory Singleton [phone] [address] [address]        Equal Access to Services     JUANCARLOS ADDISON : Hadii yeison daviso Soginette, waaxda luqadaha, qaybta kaalmada adeegyada, varun ferrera . So [facility] [phone].    ATENCIÓN: Si habla español, tiene a velazquez disposición servicios gratuitos de asistencia lingüística. [facility] [phone].    We comply with applicable federal civil rights laws and [place] laws. We do not discriminate on the basis of race, color, national origin, age, disability, sex, sexual orientation, or gender identity.            Thank you!     Thank you for choosing [facility]  for your care. Our goal is always to provide you with excellent care. Hearing back from our patients is one way we can continue to improve our services. Please take a few minutes to complete the written survey that you may receive in the mail after your visit with us. Thank you!             Your Updated Medication List - Protect others around you: Learn how to safely use, store and throw away your medicines at www.disposemymeds.org.          This list is accurate as of 7/10/18  1:54 PM.  Always use your most recent med list.                   Brand Name Dispense Instructions for use Diagnosis    baclofen 10 MG tablet    LIORESAL    30 tablet    Take 0.5-1 tablets (5-10 mg) by mouth 3 times daily as needed for muscle  spasms Due for visit    Acute low back pain with radicular symptoms, duration less than 6 weeks       GEODON 40 MG capsule   Generic drug:  ziprasidone     1 capsule    Take 2 capsules (80 mg) by mouth daily        levothyroxine 75 MCG tablet    SYNTHROID/LEVOTHROID    90 tablet    Take 1 tablet (75 mcg) by mouth daily    Hypothyroidism, unspecified type       melatonin 3 MG tablet     30 tablet    Take 9 mg by mouth nightly as needed.        mesalamine 1000 MG Suppository    CANASA    42 suppository    Place 1 suppository (1,000 mg) rectally At Bedtime    Ulcerative colitis with rectal bleeding, unspecified location (H)       WELLBUTRIN  MG 24 hr tablet   Generic drug:  buPROPion      Take 300 mg by mouth every morning        ZOLOFT 100 MG tablet   Generic drug:  sertraline     1 tablet    Take 3 tablets (300 mg) by mouth daily    Obsessive-compulsive disorder, unspecified type, Other specified anxiety disorders, Major depressive disorder, recurrent episode, moderate (H)

## 2018-07-10 NOTE — PATIENT INSTRUCTIONS
Use the suppository at bedtime for 6 weeks    If no improvement, call the clinic    Schedule the colonoscopy

## 2018-07-11 ASSESSMENT — PATIENT HEALTH QUESTIONNAIRE - PHQ9: SUM OF ALL RESPONSES TO PHQ QUESTIONS 1-9: 1

## 2018-07-17 ENCOUNTER — HOSPITAL ENCOUNTER (EMERGENCY)
Facility: CLINIC | Age: 48
Discharge: ANOTHER HOSPITAL | End: 2018-07-18
Attending: FAMILY MEDICINE | Admitting: FAMILY MEDICINE
Payer: COMMERCIAL

## 2018-07-17 ENCOUNTER — NURSE TRIAGE (OUTPATIENT)
Dept: NURSING | Facility: CLINIC | Age: 48
End: 2018-07-17

## 2018-07-17 ENCOUNTER — APPOINTMENT (OUTPATIENT)
Dept: GENERAL RADIOLOGY | Facility: CLINIC | Age: 48
End: 2018-07-17
Attending: FAMILY MEDICINE
Payer: COMMERCIAL

## 2018-07-17 ENCOUNTER — APPOINTMENT (OUTPATIENT)
Dept: CT IMAGING | Facility: CLINIC | Age: 48
End: 2018-07-17
Attending: FAMILY MEDICINE
Payer: COMMERCIAL

## 2018-07-17 DIAGNOSIS — R40.2431 GLASGOW COMA SCALE TOTAL SCORE 3-8, IN THE FIELD (EMT OR AMBULANCE) (H): ICD-10-CM

## 2018-07-17 LAB
ALBUMIN SERPL-MCNC: 3.2 G/DL (ref 3.4–5)
ALBUMIN UR-MCNC: NEGATIVE MG/DL
ALP SERPL-CCNC: 71 U/L (ref 40–150)
ALT SERPL W P-5'-P-CCNC: 24 U/L (ref 0–50)
AMPHETAMINES UR QL SCN: NEGATIVE
ANION GAP SERPL CALCULATED.3IONS-SCNC: 9 MMOL/L (ref 3–14)
APAP SERPL-MCNC: <2 MG/L (ref 10–20)
APPEARANCE UR: ABNORMAL
AST SERPL W P-5'-P-CCNC: 18 U/L (ref 0–45)
BARBITURATES UR QL: NEGATIVE
BASE DEFICIT BLDV-SCNC: 2.7 MMOL/L
BASE DEFICIT BLDV-SCNC: 4.6 MMOL/L
BASOPHILS # BLD AUTO: 0 10E9/L (ref 0–0.2)
BASOPHILS NFR BLD AUTO: 0.3 %
BENZODIAZ UR QL: POSITIVE
BILIRUB SERPL-MCNC: 0.2 MG/DL (ref 0.2–1.3)
BILIRUB UR QL STRIP: NEGATIVE
BUN SERPL-MCNC: 6 MG/DL (ref 7–30)
CALCIUM SERPL-MCNC: 7.8 MG/DL (ref 8.5–10.1)
CANNABINOIDS UR QL SCN: NEGATIVE
CHLORIDE SERPL-SCNC: 109 MMOL/L (ref 94–109)
CK SERPL-CCNC: 155 U/L (ref 30–225)
CO2 SERPL-SCNC: 22 MMOL/L (ref 20–32)
COCAINE UR QL: NEGATIVE
COLOR UR AUTO: YELLOW
CREAT SERPL-MCNC: 0.94 MG/DL (ref 0.52–1.04)
DIFFERENTIAL METHOD BLD: ABNORMAL
EOSINOPHIL # BLD AUTO: 0.7 10E9/L (ref 0–0.7)
EOSINOPHIL NFR BLD AUTO: 6.5 %
ERYTHROCYTE [DISTWIDTH] IN BLOOD BY AUTOMATED COUNT: 13.8 % (ref 10–15)
ETHANOL SERPL-MCNC: <0.01 G/DL
ETHANOL SERPL-MCNC: NORMAL G/DL
GFR SERPL CREATININE-BSD FRML MDRD: 63 ML/MIN/1.7M2
GLUCOSE SERPL-MCNC: 92 MG/DL (ref 70–99)
GLUCOSE UR STRIP-MCNC: NEGATIVE MG/DL
HCG SERPL QL: NEGATIVE
HCO3 BLDV-SCNC: 22 MMOL/L (ref 21–28)
HCO3 BLDV-SCNC: 24 MMOL/L (ref 21–28)
HCT VFR BLD AUTO: 35.2 % (ref 35–47)
HGB BLD-MCNC: 11.2 G/DL (ref 11.7–15.7)
HGB UR QL STRIP: NEGATIVE
HYALINE CASTS #/AREA URNS LPF: 1 /LPF (ref 0–2)
IMM GRANULOCYTES # BLD: 0.1 10E9/L (ref 0–0.4)
IMM GRANULOCYTES NFR BLD: 0.5 %
KETONES UR STRIP-MCNC: 5 MG/DL
LACTATE BLD-SCNC: 0.9 MMOL/L (ref 0.7–2)
LEUKOCYTE ESTERASE UR QL STRIP: NEGATIVE
LYMPHOCYTES # BLD AUTO: 2.4 10E9/L (ref 0.8–5.3)
LYMPHOCYTES NFR BLD AUTO: 21.1 %
MAGNESIUM SERPL-MCNC: 1.8 MG/DL (ref 1.6–2.3)
MCH RBC QN AUTO: 29.1 PG (ref 26.5–33)
MCHC RBC AUTO-ENTMCNC: 31.8 G/DL (ref 31.5–36.5)
MCV RBC AUTO: 91 FL (ref 78–100)
MONOCYTES # BLD AUTO: 0.5 10E9/L (ref 0–1.3)
MONOCYTES NFR BLD AUTO: 4.6 %
MUCOUS THREADS #/AREA URNS LPF: PRESENT /LPF
NEUTROPHILS # BLD AUTO: 7.5 10E9/L (ref 1.6–8.3)
NEUTROPHILS NFR BLD AUTO: 67 %
NITRATE UR QL: NEGATIVE
NRBC # BLD AUTO: 0 10*3/UL
NRBC BLD AUTO-RTO: 0 /100
OPIATES UR QL SCN: NEGATIVE
PCO2 BLDV: 49 MM HG (ref 40–50)
PCO2 BLDV: 49 MM HG (ref 40–50)
PCP UR QL SCN: NEGATIVE
PH BLDV: 7.27 PH (ref 7.32–7.43)
PH BLDV: 7.3 PH (ref 7.32–7.43)
PH UR STRIP: 5 PH (ref 5–7)
PLATELET # BLD AUTO: 320 10E9/L (ref 150–450)
PO2 BLDV: 37 MM HG (ref 25–47)
PO2 BLDV: 48 MM HG (ref 25–47)
POTASSIUM SERPL-SCNC: 3.3 MMOL/L (ref 3.4–5.3)
PROT SERPL-MCNC: 6.6 G/DL (ref 6.8–8.8)
RBC # BLD AUTO: 3.85 10E12/L (ref 3.8–5.2)
RBC #/AREA URNS AUTO: <1 /HPF (ref 0–2)
SALICYLATES SERPL-MCNC: <2 MG/DL
SODIUM SERPL-SCNC: 140 MMOL/L (ref 133–144)
SOURCE: ABNORMAL
SP GR UR STRIP: 1.01 (ref 1–1.03)
SQUAMOUS #/AREA URNS AUTO: <1 /HPF (ref 0–1)
TROPONIN I SERPL-MCNC: <0.015 UG/L (ref 0–0.04)
UROBILINOGEN UR STRIP-MCNC: 0 MG/DL (ref 0–2)
WBC # BLD AUTO: 11.2 10E9/L (ref 4–11)
WBC #/AREA URNS AUTO: 1 /HPF (ref 0–5)

## 2018-07-17 PROCEDURE — 25000128 H RX IP 250 OP 636: Performed by: FAMILY MEDICINE

## 2018-07-17 PROCEDURE — 96367 TX/PROPH/DG ADDL SEQ IV INF: CPT | Performed by: FAMILY MEDICINE

## 2018-07-17 PROCEDURE — 82550 ASSAY OF CK (CPK): CPT | Performed by: FAMILY MEDICINE

## 2018-07-17 PROCEDURE — 93005 ELECTROCARDIOGRAM TRACING: CPT | Performed by: FAMILY MEDICINE

## 2018-07-17 PROCEDURE — 31500 INSERT EMERGENCY AIRWAY: CPT | Performed by: FAMILY MEDICINE

## 2018-07-17 PROCEDURE — 82803 BLOOD GASES ANY COMBINATION: CPT | Performed by: FAMILY MEDICINE

## 2018-07-17 PROCEDURE — 25000125 ZZHC RX 250: Performed by: FAMILY MEDICINE

## 2018-07-17 PROCEDURE — 96361 HYDRATE IV INFUSION ADD-ON: CPT | Performed by: FAMILY MEDICINE

## 2018-07-17 PROCEDURE — 81001 URINALYSIS AUTO W/SCOPE: CPT | Performed by: FAMILY MEDICINE

## 2018-07-17 PROCEDURE — 80329 ANALGESICS NON-OPIOID 1 OR 2: CPT | Performed by: FAMILY MEDICINE

## 2018-07-17 PROCEDURE — 83605 ASSAY OF LACTIC ACID: CPT | Performed by: FAMILY MEDICINE

## 2018-07-17 PROCEDURE — 80320 DRUG SCREEN QUANTALCOHOLS: CPT | Performed by: FAMILY MEDICINE

## 2018-07-17 PROCEDURE — 70450 CT HEAD/BRAIN W/O DYE: CPT

## 2018-07-17 PROCEDURE — 31500 INSERT EMERGENCY AIRWAY: CPT | Mod: Z6 | Performed by: FAMILY MEDICINE

## 2018-07-17 PROCEDURE — 93005 ELECTROCARDIOGRAM TRACING: CPT | Mod: 76 | Performed by: FAMILY MEDICINE

## 2018-07-17 PROCEDURE — 40000986 XR CHEST PORT 1 VW

## 2018-07-17 PROCEDURE — 83735 ASSAY OF MAGNESIUM: CPT | Performed by: FAMILY MEDICINE

## 2018-07-17 PROCEDURE — 84703 CHORIONIC GONADOTROPIN ASSAY: CPT | Performed by: FAMILY MEDICINE

## 2018-07-17 PROCEDURE — 71260 CT THORAX DX C+: CPT

## 2018-07-17 PROCEDURE — 96366 THER/PROPH/DIAG IV INF ADDON: CPT | Performed by: FAMILY MEDICINE

## 2018-07-17 PROCEDURE — 80053 COMPREHEN METABOLIC PANEL: CPT | Performed by: FAMILY MEDICINE

## 2018-07-17 PROCEDURE — 99291 CRITICAL CARE FIRST HOUR: CPT | Mod: 25 | Performed by: FAMILY MEDICINE

## 2018-07-17 PROCEDURE — 93010 ELECTROCARDIOGRAM REPORT: CPT | Mod: 76 | Performed by: FAMILY MEDICINE

## 2018-07-17 PROCEDURE — 99285 EMERGENCY DEPT VISIT HI MDM: CPT | Mod: 25 | Performed by: FAMILY MEDICINE

## 2018-07-17 PROCEDURE — 85025 COMPLETE CBC W/AUTO DIFF WBC: CPT | Performed by: FAMILY MEDICINE

## 2018-07-17 PROCEDURE — 80307 DRUG TEST PRSMV CHEM ANLYZR: CPT | Performed by: FAMILY MEDICINE

## 2018-07-17 PROCEDURE — 84484 ASSAY OF TROPONIN QUANT: CPT | Performed by: FAMILY MEDICINE

## 2018-07-17 PROCEDURE — 93010 ELECTROCARDIOGRAM REPORT: CPT | Mod: Z6 | Performed by: FAMILY MEDICINE

## 2018-07-17 PROCEDURE — 25000132 ZZH RX MED GY IP 250 OP 250 PS 637: Performed by: FAMILY MEDICINE

## 2018-07-17 PROCEDURE — 96365 THER/PROPH/DIAG IV INF INIT: CPT | Performed by: FAMILY MEDICINE

## 2018-07-17 RX ORDER — IOPAMIDOL 755 MG/ML
91 INJECTION, SOLUTION INTRAVASCULAR ONCE
Status: COMPLETED | OUTPATIENT
Start: 2018-07-17 | End: 2018-07-17

## 2018-07-17 RX ORDER — SODIUM CHLORIDE 9 MG/ML
1000 INJECTION, SOLUTION INTRAVENOUS CONTINUOUS
Status: DISCONTINUED | OUTPATIENT
Start: 2018-07-17 | End: 2018-07-18 | Stop reason: HOSPADM

## 2018-07-17 RX ORDER — PROPOFOL 10 MG/ML
5-75 INJECTION, EMULSION INTRAVENOUS CONTINUOUS
Status: DISCONTINUED | OUTPATIENT
Start: 2018-07-17 | End: 2018-07-18 | Stop reason: HOSPADM

## 2018-07-17 RX ORDER — POTASSIUM CHLORIDE 1.5 G/1.58G
20-40 POWDER, FOR SOLUTION ORAL
Status: DISCONTINUED | OUTPATIENT
Start: 2018-07-17 | End: 2018-07-18 | Stop reason: HOSPADM

## 2018-07-17 RX ADMIN — Medication 2 G: at 23:48

## 2018-07-17 RX ADMIN — PROPOFOL 40 MCG/KG/MIN: 10 INJECTION, EMULSION INTRAVENOUS at 22:50

## 2018-07-17 RX ADMIN — PROPOFOL 60 MCG/KG/MIN: 10 INJECTION, EMULSION INTRAVENOUS at 23:20

## 2018-07-17 RX ADMIN — ACTIVATED CHARCOAL 50 G: 208 SUSPENSION ORAL at 21:59

## 2018-07-17 RX ADMIN — SODIUM CHLORIDE 100 ML: 9 INJECTION, SOLUTION INTRAVENOUS at 22:56

## 2018-07-17 RX ADMIN — SODIUM CHLORIDE 1000 ML: 9 INJECTION, SOLUTION INTRAVENOUS at 20:41

## 2018-07-17 RX ADMIN — POTASSIUM CHLORIDE 40 MEQ: 1.5 POWDER, FOR SOLUTION ORAL at 23:59

## 2018-07-17 RX ADMIN — PROPOFOL 30 MCG/KG/MIN: 10 INJECTION, EMULSION INTRAVENOUS at 22:40

## 2018-07-17 RX ADMIN — IOPAMIDOL 91 ML: 755 INJECTION, SOLUTION INTRAVENOUS at 22:56

## 2018-07-17 RX ADMIN — PROPOFOL 20 MCG/KG/MIN: 10 INJECTION, EMULSION INTRAVENOUS at 21:22

## 2018-07-17 RX ADMIN — SODIUM CHLORIDE 1000 ML: 9 INJECTION, SOLUTION INTRAVENOUS at 21:59

## 2018-07-18 ENCOUNTER — HOSPITAL ENCOUNTER (INPATIENT)
Facility: CLINIC | Age: 48
LOS: 3 days | Discharge: HOME | End: 2018-07-21
Attending: ANESTHESIOLOGY | Admitting: INTERNAL MEDICINE
Payer: COMMERCIAL

## 2018-07-18 ENCOUNTER — RESULTS ONLY (OUTPATIENT)
Dept: INTENSIVE CARE | Facility: CLINIC | Age: 48
End: 2018-07-18

## 2018-07-18 VITALS
DIASTOLIC BLOOD PRESSURE: 74 MMHG | OXYGEN SATURATION: 98 % | TEMPERATURE: 98.3 F | SYSTOLIC BLOOD PRESSURE: 109 MMHG | RESPIRATION RATE: 14 BRPM

## 2018-07-18 PROBLEM — T43.014A TCA (TRICYCLIC ANTIDEPRESSANT) OVERDOSE OF UNDETERMINED INTENT: Status: ACTIVE | Noted: 2018-07-18

## 2018-07-18 LAB
ALBUMIN SERPL-MCNC: 3 G/DL (ref 3.4–5)
ALBUMIN SERPL-MCNC: 3 G/DL (ref 3.4–5)
ALP SERPL-CCNC: 63 U/L (ref 40–150)
ALP SERPL-CCNC: 68 U/L (ref 40–150)
ALT SERPL W P-5'-P-CCNC: 20 U/L (ref 0–50)
ALT SERPL W P-5'-P-CCNC: 21 U/L (ref 0–50)
ANION GAP SERPL CALCULATED.3IONS-SCNC: 7 MMOL/L (ref 3–14)
ANION GAP SERPL CALCULATED.3IONS-SCNC: 9 MMOL/L (ref 3–14)
AST SERPL W P-5'-P-CCNC: 19 U/L (ref 0–45)
AST SERPL W P-5'-P-CCNC: 22 U/L (ref 0–45)
BASE DEFICIT BLDA-SCNC: 3.6 MMOL/L
BASE DEFICIT BLDA-SCNC: 3.6 MMOL/L
BASE DEFICIT BLDA-SCNC: 3.8 MMOL/L
BILIRUB SERPL-MCNC: 0.2 MG/DL (ref 0.2–1.3)
BILIRUB SERPL-MCNC: 0.2 MG/DL (ref 0.2–1.3)
BUN SERPL-MCNC: 3 MG/DL (ref 7–30)
BUN SERPL-MCNC: 4 MG/DL (ref 7–30)
CA-I BLD-MCNC: 4.5 MG/DL (ref 4.4–5.2)
CA-I BLD-MCNC: NORMAL MG/DL (ref 4.4–5.2)
CALCIUM SERPL-MCNC: 7.6 MG/DL (ref 8.5–10.1)
CALCIUM SERPL-MCNC: 7.6 MG/DL (ref 8.5–10.1)
CHLORIDE SERPL-SCNC: 112 MMOL/L (ref 94–109)
CHLORIDE SERPL-SCNC: 113 MMOL/L (ref 94–109)
CO2 SERPL-SCNC: 21 MMOL/L (ref 20–32)
CO2 SERPL-SCNC: 22 MMOL/L (ref 20–32)
CREAT SERPL-MCNC: 0.75 MG/DL (ref 0.52–1.04)
CREAT SERPL-MCNC: 0.78 MG/DL (ref 0.52–1.04)
ERYTHROCYTE [DISTWIDTH] IN BLOOD BY AUTOMATED COUNT: 14.4 % (ref 10–15)
ERYTHROCYTE [DISTWIDTH] IN BLOOD BY AUTOMATED COUNT: 14.4 % (ref 10–15)
GFR SERPL CREATININE-BSD FRML MDRD: 78 ML/MIN/1.7M2
GFR SERPL CREATININE-BSD FRML MDRD: 82 ML/MIN/1.7M2
GLUCOSE BLDC GLUCOMTR-MCNC: 104 MG/DL (ref 70–99)
GLUCOSE BLDC GLUCOMTR-MCNC: 107 MG/DL (ref 70–99)
GLUCOSE BLDC GLUCOMTR-MCNC: 108 MG/DL (ref 70–99)
GLUCOSE BLDC GLUCOMTR-MCNC: 122 MG/DL (ref 70–99)
GLUCOSE SERPL-MCNC: 114 MG/DL (ref 70–99)
GLUCOSE SERPL-MCNC: 114 MG/DL (ref 70–99)
GRAM STN SPEC: ABNORMAL
GRAM STN SPEC: ABNORMAL
HCO3 BLD-SCNC: 20 MMOL/L (ref 21–28)
HCO3 BLD-SCNC: 21 MMOL/L (ref 21–28)
HCO3 BLD-SCNC: 21 MMOL/L (ref 21–28)
HCT VFR BLD AUTO: 34.1 % (ref 35–47)
HCT VFR BLD AUTO: 34.1 % (ref 35–47)
HGB BLD-MCNC: 11.5 G/DL (ref 11.7–15.7)
HGB BLD-MCNC: 11.5 G/DL (ref 11.7–15.7)
INTERPRETATION ECG - MUSE: NORMAL
LACTATE BLD-SCNC: 1.5 MMOL/L (ref 0.7–2)
LACTATE BLD-SCNC: NORMAL MMOL/L (ref 0.7–2)
MAGNESIUM SERPL-MCNC: 2.2 MG/DL (ref 1.6–2.3)
MAGNESIUM SERPL-MCNC: 2.3 MG/DL (ref 1.6–2.3)
MAGNESIUM SERPL-MCNC: 2.3 MG/DL (ref 1.6–2.3)
MAGNESIUM SERPL-MCNC: 2.4 MG/DL (ref 1.6–2.3)
MAGNESIUM SERPL-MCNC: 2.5 MG/DL (ref 1.6–2.3)
MCH RBC QN AUTO: 29.9 PG (ref 26.5–33)
MCH RBC QN AUTO: 29.9 PG (ref 26.5–33)
MCHC RBC AUTO-ENTMCNC: 33.7 G/DL (ref 31.5–36.5)
MCHC RBC AUTO-ENTMCNC: 33.7 G/DL (ref 31.5–36.5)
MCV RBC AUTO: 89 FL (ref 78–100)
MCV RBC AUTO: 89 FL (ref 78–100)
MRSA DNA SPEC QL NAA+PROBE: NEGATIVE
O2/TOTAL GAS SETTING VFR VENT: 40 %
O2/TOTAL GAS SETTING VFR VENT: NORMAL %
OXYHGB MFR BLD: 97 % (ref 92–100)
OXYHGB MFR BLD: 97 % (ref 92–100)
PCO2 BLD: 32 MM HG (ref 35–45)
PCO2 BLD: 37 MM HG (ref 35–45)
PCO2 BLD: 37 MM HG (ref 35–45)
PH BLD: 7.37 PH (ref 7.35–7.45)
PH BLD: 7.37 PH (ref 7.35–7.45)
PH BLD: 7.4 PH (ref 7.35–7.45)
PHOSPHATE SERPL-MCNC: 2.2 MG/DL (ref 2.5–4.5)
PHOSPHATE SERPL-MCNC: 2.4 MG/DL (ref 2.5–4.5)
PHOSPHATE SERPL-MCNC: 2.8 MG/DL (ref 2.5–4.5)
PLATELET # BLD AUTO: 292 10E9/L (ref 150–450)
PLATELET # BLD AUTO: 292 10E9/L (ref 150–450)
PO2 BLD: 86 MM HG (ref 80–105)
PO2 BLD: 95 MM HG (ref 80–105)
PO2 BLD: 95 MM HG (ref 80–105)
POTASSIUM SERPL-SCNC: 3.8 MMOL/L (ref 3.4–5.3)
POTASSIUM SERPL-SCNC: 3.8 MMOL/L (ref 3.4–5.3)
POTASSIUM SERPL-SCNC: 3.9 MMOL/L (ref 3.4–5.3)
PROT SERPL-MCNC: 6.1 G/DL (ref 6.8–8.8)
PROT SERPL-MCNC: 6.4 G/DL (ref 6.8–8.8)
RBC # BLD AUTO: 3.85 10E12/L (ref 3.8–5.2)
RBC # BLD AUTO: 3.85 10E12/L (ref 3.8–5.2)
SODIUM SERPL-SCNC: 142 MMOL/L (ref 133–144)
SODIUM SERPL-SCNC: 142 MMOL/L (ref 133–144)
SPECIMEN SOURCE: ABNORMAL
SPECIMEN SOURCE: NORMAL
WBC # BLD AUTO: 9.2 10E9/L (ref 4–11)
WBC # BLD AUTO: 9.2 10E9/L (ref 4–11)

## 2018-07-18 PROCEDURE — 5A1945Z RESPIRATORY VENTILATION, 24-96 CONSECUTIVE HOURS: ICD-10-PCS | Performed by: INTERNAL MEDICINE

## 2018-07-18 PROCEDURE — 40000275 ZZH STATISTIC RCP TIME EA 10 MIN

## 2018-07-18 PROCEDURE — 25000128 H RX IP 250 OP 636: Performed by: STUDENT IN AN ORGANIZED HEALTH CARE EDUCATION/TRAINING PROGRAM

## 2018-07-18 PROCEDURE — 36600 WITHDRAWAL OF ARTERIAL BLOOD: CPT

## 2018-07-18 PROCEDURE — 25000132 ZZH RX MED GY IP 250 OP 250 PS 637: Performed by: ANESTHESIOLOGY

## 2018-07-18 PROCEDURE — 25000128 H RX IP 250 OP 636

## 2018-07-18 PROCEDURE — 85027 COMPLETE CBC AUTOMATED: CPT | Performed by: ANESTHESIOLOGY

## 2018-07-18 PROCEDURE — 87077 CULTURE AEROBIC IDENTIFY: CPT | Performed by: STUDENT IN AN ORGANIZED HEALTH CARE EDUCATION/TRAINING PROGRAM

## 2018-07-18 PROCEDURE — 83605 ASSAY OF LACTIC ACID: CPT | Performed by: ANESTHESIOLOGY

## 2018-07-18 PROCEDURE — 84100 ASSAY OF PHOSPHORUS: CPT | Performed by: STUDENT IN AN ORGANIZED HEALTH CARE EDUCATION/TRAINING PROGRAM

## 2018-07-18 PROCEDURE — 40000274 ZZH STATISTIC RCP CONSULT EA 30 MIN

## 2018-07-18 PROCEDURE — 87186 SC STD MICRODIL/AGAR DIL: CPT | Performed by: STUDENT IN AN ORGANIZED HEALTH CARE EDUCATION/TRAINING PROGRAM

## 2018-07-18 PROCEDURE — 82330 ASSAY OF CALCIUM: CPT | Performed by: ANESTHESIOLOGY

## 2018-07-18 PROCEDURE — 84132 ASSAY OF SERUM POTASSIUM: CPT | Performed by: STUDENT IN AN ORGANIZED HEALTH CARE EDUCATION/TRAINING PROGRAM

## 2018-07-18 PROCEDURE — 40000276 ZZH STATISTIC RCP TIME ED VENT EA 10 MIN

## 2018-07-18 PROCEDURE — 82803 BLOOD GASES ANY COMBINATION: CPT | Performed by: FAMILY MEDICINE

## 2018-07-18 PROCEDURE — 36415 COLL VENOUS BLD VENIPUNCTURE: CPT | Performed by: STUDENT IN AN ORGANIZED HEALTH CARE EDUCATION/TRAINING PROGRAM

## 2018-07-18 PROCEDURE — 87070 CULTURE OTHR SPECIMN AEROBIC: CPT | Performed by: STUDENT IN AN ORGANIZED HEALTH CARE EDUCATION/TRAINING PROGRAM

## 2018-07-18 PROCEDURE — 94002 VENT MGMT INPAT INIT DAY: CPT

## 2018-07-18 PROCEDURE — 99291 CRITICAL CARE FIRST HOUR: CPT | Mod: GC | Performed by: INTERNAL MEDICINE

## 2018-07-18 PROCEDURE — 93010 ELECTROCARDIOGRAM REPORT: CPT | Mod: 76 | Performed by: INTERNAL MEDICINE

## 2018-07-18 PROCEDURE — 40000270 ZZH STATISTIC OXYGEN  O2DAILY TECH TIME

## 2018-07-18 PROCEDURE — 40000313 ZZH STATISTIC SUCTION SPUTUM

## 2018-07-18 PROCEDURE — 83735 ASSAY OF MAGNESIUM: CPT | Performed by: ANESTHESIOLOGY

## 2018-07-18 PROCEDURE — 20000004 ZZH R&B ICU UMMC

## 2018-07-18 PROCEDURE — 84100 ASSAY OF PHOSPHORUS: CPT | Performed by: ANESTHESIOLOGY

## 2018-07-18 PROCEDURE — 83735 ASSAY OF MAGNESIUM: CPT | Performed by: STUDENT IN AN ORGANIZED HEALTH CARE EDUCATION/TRAINING PROGRAM

## 2018-07-18 PROCEDURE — 25000125 ZZHC RX 250: Performed by: STUDENT IN AN ORGANIZED HEALTH CARE EDUCATION/TRAINING PROGRAM

## 2018-07-18 PROCEDURE — 93005 ELECTROCARDIOGRAM TRACING: CPT

## 2018-07-18 PROCEDURE — 00000146 ZZHCL STATISTIC GLUCOSE BY METER IP

## 2018-07-18 PROCEDURE — 40000809 ZZH STATISTIC NO DOCUMENTATION TO SUPPORT CHARGE

## 2018-07-18 PROCEDURE — 87640 STAPH A DNA AMP PROBE: CPT | Performed by: STUDENT IN AN ORGANIZED HEALTH CARE EDUCATION/TRAINING PROGRAM

## 2018-07-18 PROCEDURE — 25000132 ZZH RX MED GY IP 250 OP 250 PS 637: Performed by: STUDENT IN AN ORGANIZED HEALTH CARE EDUCATION/TRAINING PROGRAM

## 2018-07-18 PROCEDURE — 80053 COMPREHEN METABOLIC PANEL: CPT | Performed by: ANESTHESIOLOGY

## 2018-07-18 PROCEDURE — 93010 ELECTROCARDIOGRAM REPORT: CPT | Mod: 77 | Performed by: INTERNAL MEDICINE

## 2018-07-18 PROCEDURE — 82805 BLOOD GASES W/O2 SATURATION: CPT | Performed by: ANESTHESIOLOGY

## 2018-07-18 PROCEDURE — 87205 SMEAR GRAM STAIN: CPT | Performed by: STUDENT IN AN ORGANIZED HEALTH CARE EDUCATION/TRAINING PROGRAM

## 2018-07-18 PROCEDURE — 87641 MR-STAPH DNA AMP PROBE: CPT | Performed by: STUDENT IN AN ORGANIZED HEALTH CARE EDUCATION/TRAINING PROGRAM

## 2018-07-18 PROCEDURE — 40000281 ZZH STATISTIC TRANSPORT TIME EA 15 MIN

## 2018-07-18 RX ORDER — PROPOFOL 10 MG/ML
5-75 INJECTION, EMULSION INTRAVENOUS CONTINUOUS
Status: DISCONTINUED | OUTPATIENT
Start: 2018-07-18 | End: 2018-07-19

## 2018-07-18 RX ORDER — FENTANYL CITRATE 50 UG/ML
25-50 INJECTION, SOLUTION INTRAMUSCULAR; INTRAVENOUS
Status: DISCONTINUED | OUTPATIENT
Start: 2018-07-18 | End: 2018-07-19

## 2018-07-18 RX ORDER — NALOXONE HYDROCHLORIDE 0.4 MG/ML
.1-.4 INJECTION, SOLUTION INTRAMUSCULAR; INTRAVENOUS; SUBCUTANEOUS
Status: DISCONTINUED | OUTPATIENT
Start: 2018-07-18 | End: 2018-07-21 | Stop reason: HOSPADM

## 2018-07-18 RX ORDER — POTASSIUM CHLORIDE 7.45 MG/ML
10 INJECTION INTRAVENOUS
Status: DISCONTINUED | OUTPATIENT
Start: 2018-07-18 | End: 2018-07-21 | Stop reason: HOSPADM

## 2018-07-18 RX ORDER — LEVOTHYROXINE SODIUM 75 UG/1
75 TABLET ORAL DAILY
Status: DISCONTINUED | OUTPATIENT
Start: 2018-07-18 | End: 2018-07-21 | Stop reason: HOSPADM

## 2018-07-18 RX ORDER — SODIUM CHLORIDE 9 MG/ML
INJECTION, SOLUTION INTRAVENOUS
Status: DISCONTINUED
Start: 2018-07-18 | End: 2018-07-20 | Stop reason: HOSPADM

## 2018-07-18 RX ORDER — MAGNESIUM SULFATE HEPTAHYDRATE 40 MG/ML
2 INJECTION, SOLUTION INTRAVENOUS DAILY PRN
Status: DISCONTINUED | OUTPATIENT
Start: 2018-07-18 | End: 2018-07-21 | Stop reason: HOSPADM

## 2018-07-18 RX ORDER — POTASSIUM CHLORIDE 750 MG/1
20-40 TABLET, EXTENDED RELEASE ORAL
Status: DISCONTINUED | OUTPATIENT
Start: 2018-07-18 | End: 2018-07-21 | Stop reason: HOSPADM

## 2018-07-18 RX ORDER — NYSTATIN 100000 U/G
CREAM TOPICAL 2 TIMES DAILY
Status: CANCELLED | OUTPATIENT
Start: 2018-07-18

## 2018-07-18 RX ORDER — FAMOTIDINE 40 MG/5ML
20 POWDER, FOR SUSPENSION ORAL 2 TIMES DAILY
Status: DISCONTINUED | OUTPATIENT
Start: 2018-07-18 | End: 2018-07-19

## 2018-07-18 RX ORDER — PROPOFOL 10 MG/ML
5-75 INJECTION, EMULSION INTRAVENOUS CONTINUOUS
Status: DISCONTINUED | OUTPATIENT
Start: 2018-07-18 | End: 2018-07-18

## 2018-07-18 RX ORDER — POTASSIUM CL/LIDO/0.9 % NACL 10MEQ/0.1L
10 INTRAVENOUS SOLUTION, PIGGYBACK (ML) INTRAVENOUS
Status: DISCONTINUED | OUTPATIENT
Start: 2018-07-18 | End: 2018-07-21 | Stop reason: HOSPADM

## 2018-07-18 RX ORDER — POTASSIUM CHLORIDE 1.5 G/1.58G
20-40 POWDER, FOR SOLUTION ORAL
Status: DISCONTINUED | OUTPATIENT
Start: 2018-07-18 | End: 2018-07-21 | Stop reason: HOSPADM

## 2018-07-18 RX ORDER — ACETAMINOPHEN 325 MG/1
325-650 TABLET ORAL EVERY 4 HOURS PRN
Status: DISCONTINUED | OUTPATIENT
Start: 2018-07-18 | End: 2018-07-18

## 2018-07-18 RX ORDER — MAGNESIUM SULFATE HEPTAHYDRATE 40 MG/ML
4 INJECTION, SOLUTION INTRAVENOUS EVERY 4 HOURS PRN
Status: DISCONTINUED | OUTPATIENT
Start: 2018-07-18 | End: 2018-07-21 | Stop reason: HOSPADM

## 2018-07-18 RX ORDER — PROPOFOL 10 MG/ML
INJECTION, EMULSION INTRAVENOUS
Status: COMPLETED
Start: 2018-07-18 | End: 2018-07-18

## 2018-07-18 RX ORDER — PROPOFOL 10 MG/ML
10-20 INJECTION, EMULSION INTRAVENOUS EVERY 30 MIN PRN
Status: DISCONTINUED | OUTPATIENT
Start: 2018-07-18 | End: 2018-07-19

## 2018-07-18 RX ORDER — POTASSIUM CHLORIDE 29.8 MG/ML
20 INJECTION INTRAVENOUS
Status: DISCONTINUED | OUTPATIENT
Start: 2018-07-18 | End: 2018-07-21 | Stop reason: HOSPADM

## 2018-07-18 RX ADMIN — PROPOFOL 60 MCG/KG/MIN: 10 INJECTION, EMULSION INTRAVENOUS at 03:30

## 2018-07-18 RX ADMIN — POTASSIUM CHLORIDE 20 MEQ: 1.5 POWDER, FOR SOLUTION ORAL at 06:49

## 2018-07-18 RX ADMIN — PROPOFOL 55 MCG/KG/MIN: 10 INJECTION, EMULSION INTRAVENOUS at 18:32

## 2018-07-18 RX ADMIN — POTASSIUM CHLORIDE 20 MEQ: 1.5 POWDER, FOR SOLUTION ORAL at 15:36

## 2018-07-18 RX ADMIN — SODIUM CHLORIDE, POTASSIUM CHLORIDE, SODIUM LACTATE AND CALCIUM CHLORIDE 1000 ML: 600; 310; 30; 20 INJECTION, SOLUTION INTRAVENOUS at 23:11

## 2018-07-18 RX ADMIN — PROPOFOL 10 MG: 10 INJECTION, EMULSION INTRAVENOUS at 20:14

## 2018-07-18 RX ADMIN — FAMOTIDINE 20 MG: 40 POWDER, FOR SUSPENSION ORAL at 09:38

## 2018-07-18 RX ADMIN — FAMOTIDINE 20 MG: 40 POWDER, FOR SUSPENSION ORAL at 19:54

## 2018-07-18 RX ADMIN — LEVOTHYROXINE SODIUM 75 MCG: 75 TABLET ORAL at 08:08

## 2018-07-18 RX ADMIN — ENOXAPARIN SODIUM 40 MG: 100 INJECTION SUBCUTANEOUS at 06:49

## 2018-07-18 RX ADMIN — PROPOFOL 60 MCG/KG/MIN: 10 INJECTION, EMULSION INTRAVENOUS at 23:58

## 2018-07-18 RX ADMIN — PROPOFOL 10 MG: 10 INJECTION, EMULSION INTRAVENOUS at 22:09

## 2018-07-18 RX ADMIN — MICONAZOLE NITRATE: 2 POWDER TOPICAL at 19:53

## 2018-07-18 RX ADMIN — PROPOFOL 50 MCG/KG/MIN: 10 INJECTION, EMULSION INTRAVENOUS at 09:38

## 2018-07-18 RX ADMIN — PROPOFOL 10 MG: 10 INJECTION, EMULSION INTRAVENOUS at 20:08

## 2018-07-18 RX ADMIN — PROPOFOL 60 MCG/KG/MIN: 10 INJECTION, EMULSION INTRAVENOUS at 21:21

## 2018-07-18 RX ADMIN — PROPOFOL 30 MCG/KG/MIN: 10 INJECTION, EMULSION INTRAVENOUS at 06:28

## 2018-07-18 RX ADMIN — PROPOFOL 20 MG: 10 INJECTION, EMULSION INTRAVENOUS at 23:10

## 2018-07-18 RX ADMIN — POTASSIUM PHOSPHATE, MONOBASIC AND POTASSIUM PHOSPHATE, DIBASIC 15 MMOL: 224; 236 INJECTION, SOLUTION INTRAVENOUS at 08:08

## 2018-07-18 ASSESSMENT — ACTIVITIES OF DAILY LIVING (ADL)
SWALLOWING: 0-->SWALLOWS FOODS/LIQUIDS WITHOUT DIFFICULTY
COGNITION: 0 - NO COGNITION ISSUES REPORTED
ADLS_ACUITY_SCORE: 12
BATHING: 0-->INDEPENDENT
AMBULATION: 0-->INDEPENDENT
RETIRED_EATING: 0-->INDEPENDENT
TOILETING: 0-->INDEPENDENT
ADLS_ACUITY_SCORE: 11
FALL_HISTORY_WITHIN_LAST_SIX_MONTHS: NO
DRESS: 0-->INDEPENDENT
TRANSFERRING: 0-->INDEPENDENT
ADLS_ACUITY_SCORE: 11
ADLS_ACUITY_SCORE: 11
RETIRED_COMMUNICATION: 0-->UNDERSTANDS/COMMUNICATES WITHOUT DIFFICULTY

## 2018-07-18 NOTE — H&P
Critical Care  Note      07/18/2018    Name: Bibi Song MRN#: [id number]   Age: 48 year old YOB: 1970     Hsptl Day# 0  ICU DAY #    MV DAY #             Problem List:   Active Problems:    TCA (tricyclic antidepressant) overdose of undetermined intent           Summary/Hospital Course:     47 YO female with prior psych history  Presented to [facility]  with TCA ingestion.  Per  - patient recntly had change in meds (chronic amitriptyline, clonazepam Dr Naima Castellanos) - days prior noted possible increase depression, no SI. 7pm last evening,  found her down with pills  2 separate bottles of amitriptyline with ~ 44 pills missing per ED count as well missing clonazepam.     In ED patient minimally responsive t 120 119/77  88% on RA GCS 8 but ultimately required intubation 2/2 encephalopathy and airway protection.  Work up in ED notable for utox + benzo, Cr baseline, normal CBC, EKG with qtc prolongation 487 . Etoh neg, CXR with opacities in LL most consistent with atelectasis. CT Head negative. CT PE negative for clot.  She was dosed charcoal x 1. Poison control contacted.   Patient transferred to [facility] for further cares.     In ICU patient hemodynamically appropriate on minimal vent settings. Repeat EKG with stable intervals.         Assessment and plan :     Bibi Song IS a 48 year old female admitted on 7/18/2018 for polypharmacy ingestion ? SA versus ideation.   I have personally reviewed the daily labs, imaging studies, cultures and discussed the case with referring physician and consulting physicians.     My assessment and plan by system for this patient is as follows:    Neurology/Psychiatry:   1. Toxic encephalopathy 2/2 TCA dn benzo ingestion - requiring intubation. Of unclear intent - ? SA, - patient somewhat self treated with concurrent OD of benzos   2. MDD, NARCISA   Plan  - serial EKG, no indication for bicarb at present   - follow electrolytes   - sedation to facilities  ventilation , wean propofol , goal RASS 0 to -1   - psych consult   - send extended tox - check mass spect     Cardiovascular:   1.Hemodynamics - appropriate , good markers of perfusion  2.Rhythm - NSR , intervals stable,   3. Ischemia - trop neg , no ekg changes  Plan  - follow hemodynamics  - tele  - serial ekgs     Pulmonary/Ventilator Management:   1. Acute hypoxemic Respiratory failure 2/2 encephalopathy - on vent current oxygenation/ventilation/mechanics appropriate   Plan  - continue AC, 14, 500 5 50 %, wean FIO2 , drop  (8ml/kg)  - serial CXR, ABG     GI and Nutrition :   1. No acute issues   Plan  -NPO   - continue OG tube to suction   - PPI    Renal/Fluids/Electrolytes:   1. Cr, UOP   2. Electrolyte abnormality - related to ingestion, stable   3. Acid/base status - LA flat ,   4. Volume status  Plan  -  Supportive cares   - monitor function and electrolytes as needed with replacement per ICU protocols.   - generally avoid nephrotoxic agents such as NSAID, IV contrast unless specifically required  - adjust medications as needed for renal clearance  - follow I/O's as appropriate.    Infectious Disease:   1. No acute issues   Plan  - serial monitoring    Endocrine:   1. Stress induced hyperglycemia  Plan  - ICU insulin protocol, goal sugar <180    Hematology/Oncology:   1. No acute issues   2. Mild Anemia, no signs, symptoms of active blood loss  Plan  - serial CBC as indicated      IV/Access:   1. Venous access - PIV   2. Arterial access - none   Plan  -  No cental access needed       ICU Prophylaxis:   1. DVT: LMWH /mechanical  2. VAP: HOB 30 degrees, chlorhexidine rinse  3. Stress Ulcer: PPI  4. Restraints: Nonviolent soft two point restraints required and necessary for patient safety and continued cares and good effect as patient continues to pull at necessary lines, tubes despite education and distraction. Will readdress daily.   5. Wound care  - per unit   6. Feeding - NPO   7. Family Update: at  bedside   8. Disposition - ICU        Key goals for next 24 hours:   1. Wean propofol   2. Serial monitoring - re contact poison control   3. Psych consult            Medical History:     No past medical history on file.  No past surgical history on file.  Social History     Social History     Marital status:      Spouse name: N/A     Number of children: N/A     Years of education: N/A     Occupational History     Not on file.     Social History Main Topics     Smoking status: Former Smoker     Quit date: 1/1/1994     Smokeless tobacco: Never Used     Alcohol use No      Comment: Treatment in 2007     Drug use: No     Sexual activity: Yes     Partners: Male     Birth control/ protection: Condom     Other Topics Concern     Parent/Sibling W/ Cabg, Mi Or Angioplasty Before 65f 55m? No     Social History Narrative      No Known Allergies           Key Medications:       enoxaparin  40 mg Subcutaneous Q24H     levothyroxine  75 mcg Oral Daily       propofol (DIPRIVAN) infusion 30 mcg/kg/min (07/18/18 0628)        Home Meds    Current Facility-Administered Medications on File Prior to Encounter:  [COMPLETED] 0.9% sodium chloride BOLUS   [COMPLETED] charcoal activated in water (ACTIDOSE AQUA) liquid 50 g   [COMPLETED] iopamidol (ISOVUE-370) solution 91 mL   [COMPLETED] Saline Flush   [DISCONTINUED] 0.9% sodium chloride BOLUS   [DISCONTINUED] activated charcoal-sorbitol (ACTIDOSE-SORBITOL) suspension 50 g   [DISCONTINUED] magnesium sulfate 2 g in NS intermittent infusion ([facility] or [facility] C[facility])   [DISCONTINUED] potassium chloride (KLOR-CON) Packet 20-40 mEq   [DISCONTINUED] propofol (DIPRIVAN) infusion   [DISCONTINUED] sodium chloride 0.9% infusion   [DISCONTINUED] sodium chloride 0.9% infusion     Current Outpatient Prescriptions on File Prior to Encounter:  baclofen (LIORESAL) 10 MG tablet Take 0.5-1 tablets (5-10 mg) by mouth 3 times daily as needed for muscle spasms Due for visit (Patient not taking: Reported on  7/10/2018)   buPROPion (WELLBUTRIN XL) 300 MG 24 hr tablet Take 300 mg by mouth every morning   levothyroxine (SYNTHROID/LEVOTHROID) 75 MCG tablet Take 1 tablet (75 mcg) by mouth daily   melatonin 3 MG tablet Take 9 mg by mouth nightly as needed.   mesalamine (CANASA) 1000 MG Suppository Place 1 suppository (1,000 mg) rectally At Bedtime   sertraline (ZOLOFT) 100 MG tablet Take 200 mg by mouth daily    ziprasidone (GEODON) 40 MG capsule Take 2 capsules (80 mg) by mouth daily              Physical Examination:   Temp:  [97  F (36.1  C)-98.3  F (36.8  C)] 97.1  F (36.2  C)  Heart Rate:  [] 105  Resp:  [8-24] 14  BP: (101-176)/() 103/67  FiO2 (%):  [40 %-50 %] 40 %  SpO2:  [95 %-100 %] 97 %    Intake/Output Summary (Last 24 hours) at 07/18/18 0745  Last data filed at 07/18/18 0600   Gross per 24 hour   Intake            81.25 ml   Output              750 ml   Net          -668.75 ml     Wt Readings from Last 4 Encounters:   07/18/18 104.3 kg (229 lb 15 oz)   07/10/18 107 kg (236 lb)   07/31/17 112.5 kg (248 lb)   04/27/17 112.5 kg (248 lb)     BP - Mean:  [] 78  Ventilation Mode: CMV/AC  (Continuous Mandatory Ventilation/ Assist Control)  FiO2 (%): 40 %  Rate Set (breaths/minute): 14 breaths/min  Tidal Volume Set (mL): 500 mL  PEEP (cm H2O): 5 cmH2O  Oxygen Concentration (%): 50 %  Resp: 14    Recent Labs  Lab 07/18/18  0505 07/18/18  0025   PH 7.37  7.37 7.40   PCO2 37  37 32*   PO2 95  95 86   HCO3 21  21 20*   O2PER Canceled, Test credited  40.0  --        GEN: intubated sedate    HEENT: head ncat, sclera anicteric, OP patent, trachea midline   PULM: unlabored synchronous with vent, clear anteriorly    CV/COR: RRR S1S2 no gallop,  No rub, no murmur  ABD: obese soft nontender, hypoactive bowel sounds, no mass  EXT: no edema  warm  NEURO: limited by sedation, reflexes brisk symmetric PERRL  SKIN: no obvious rash  LINES: clean, dry intact         Data:   All data and imaging reviewed     ROUTINE  ICU LABS (Last four results)  CMP  Recent Labs  Lab 07/18/18  0430 07/17/18 2030     142 140   POTASSIUM 3.9  3.9 3.3*   CHLORIDE 113*  112* 109   CO2 21  22 22   ANIONGAP 9  7 9   *  114* 92   BUN 3*  4* 6*   CR 0.78  0.75 0.94   GFRESTIMATED 78  82 63   GFRESTBLACK >90  >90 77   JANICE 7.6*  7.6* 7.8*   MAG 2.5*  2.4* 1.8   PHOS 2.4*  2.2*  --    PROTTOTAL 6.4*  6.1* 6.6*   ALBUMIN 3.0*  3.0* 3.2*   BILITOTAL 0.2  0.2 0.2   ALKPHOS 68  63 71   AST 22  19 18   ALT 21  20 24     CBC  Recent Labs  Lab 07/18/18 0430 07/17/18 2030   WBC 9.2  9.2 11.2*   RBC 3.85  3.85 3.85   HGB 11.5*  11.5* 11.2*   HCT 34.1*  34.1* 35.2   MCV 89  89 91   MCH 29.9  29.9 29.1   MCHC 33.7  33.7 31.8   RDW 14.4  14.4 13.8     292 320     INRNo lab results found in last 7 days.  Arterial Blood Gas  Recent Labs  Lab 07/18/18  0505 07/18/18  0025   PH 7.37  7.37 7.40   PCO2 37  37 32*   PO2 95  95 86   HCO3 21  21 20*   O2PER Canceled, Test credited  40.0  --        All cultures:  No results for input(s): CULT in the last 168 hours.  Recent Results (from the past 24 hour(s))   Chest  XR, 1 view portable    Narrative    CHEST ONE VIEW PORTABLE   7/17/2018 9:41 PM     HISTORY: intubation;     COMPARISON: 10/13/2010 chest x-ray      Impression    IMPRESSION: ET tube midline within 1 cm of the darshana. Right lung  clear. Multiple opacities in the left lung including discoid  atelectasis in the mid and lower left lung, medial retrocardiac  opacity consistent with additional atelectasis or infiltrate and a  medial opacity adjacent to the mediastinum likely hilar vessels. No  evidence for pleural effusion. Heart size mildly enlarged. Normal  caliber pulmonary vessels.    NATHANAEL LAROY, MD   CT Head w/o Contrast    Narrative    CT HEAD W/O CONTRAST  7/17/2018 10:53 PM     HISTORY: Stupor.     TECHNIQUE: Axial images of the head and coronal reformations without  IV contrast material. Radiation dose  for this scan was reduced using  automated exposure control, adjustment of the mA and/or kV according  to patient size, or iterative reconstruction technique.    COMPARISON: None.    FINDINGS: No intracranial hemorrhage, mass or mass effect. No acute  infarct identified. No shift of midline structures. The ventricles are  symmetric. No skull fractures.      Impression    IMPRESSION: No acute intracranial findings.    IBETH GUSMAN MD   CT Chest Pulmonary Embolism w Contrast    Narrative    CT CHEST PULMONARY EMBOLISM W CONTRAST  7/17/2018 11:08 PM     HISTORY: Dyspnea.    TECHNIQUE: Volumetric acquisition of the chest after the  administration of 91 mL Isovue -370 IV contrast. Radiation dose for  this scan was reduced using automated exposure control, adjustment of  the mA and/or kV according to patient size, or iterative  reconstruction technique.     COMPARISON: 4/26/2006.    FINDINGS: No visualized pulmonary embolism. Examination is limited by  suboptimal pulmonary artery opacification and some motion artifact.  Main pulmonary artery is slightly prominent which could be due to  pulmonary artery hypertension. Moderate-sized areas of infiltrate  and/or atelectasis with consolidation in both lower lobes, greater on  the left. ETT tip just above the darshana. This could be pulled back  slightly. Enteric tube in place. No pleural effusions. Borderline  cardiomegaly. 2.8 cm left adrenal nodule is relatively low density  measuring approximately 20 Hounsfield units. This may be a benign  adenoma but it was not present previously. Follow-up focused CT of the  adrenal glands would be helpful to reevaluate this, if there are no  other interval studies for comparison.      Impression    IMPRESSION:  1. No visualized pulmonary embolism. Examination is limited by  suboptimal opacification and some motion.  2. Infiltrate/Atelectasis with consolidation in both lower lungs.  3. Possible pulmonary artery hypertension.  4.  Indeterminate left adrenal nodule. Recommend follow-up outpatient  focused adrenal CT without and with contrast if necessary.    IBETH GUSMAN MD         Billing: This patient is critically ill: Yes. Total critical care time today 35min.

## 2018-07-18 NOTE — PLAN OF CARE
Problem: Patient Care Overview  Goal: Plan of Care/Patient Progress Review  Pt arrived via EMS from [facility] at 0320.  Came on ventilator and fully sedated.  Report from EMS stated that pt did have propofol stopped for short amount of time and was agitated, sitting straight up, thrashing.  Pt remains in SWR x 2.  VSS.  Charcoal from OGT, minimal vent settings, VSS, afebrile.  Cut propofol in half.   at bedside and updated.  Will continue to monitor/assess.

## 2018-07-18 NOTE — PLAN OF CARE
Problem: Patient Care Overview  Goal: Plan of Care/Patient Progress Review  Outcome: No Change  Neuro- Sedated. Localizes pain. Pupils 4-5, brisk. Propofol titrated according to agitation level, which increased with repositioning and cares.  Suicide precautions maintained.  CV- Sinus tachycardia throughout shift. No increase in QRS complex or elongation of QT interval. EKG's to be done [id number]. SBP's stable, some mild hypotension observed s/t Propofol bolus administration.  Pulm- Lungs increasingly congested, relieved with suction. Increased amount of thick, yellow-white sputum observed and cultured. CMV-AC Vt 450, R14, FiO2 40%, PEEP 5. Plan to extubate tomorrow per team.  GI- Hypoactive (minimal) bowel sounds heard, mucous-like pink to brown bowel movements x3 this shift. MD aware. NPO. OG for meds.   - Adequate urine output, carlin patent.   Skin- Diaphoretic, pale, intact.  Gtts-  Propofol at 55/hr  Lines- RPIV, Carlin catheter   Electrolytes- replaced per protocol.  Social- Spouse at bedside and updated.  See flow sheets for further interventions and assessments.  P: Continue to monitor pt closely, Notify MD of changes/concerns    Problem: Overdose, Ingestion/Inhalants (Adult)  Goal: Signs and Symptoms of Listed Potential Problems Will be Absent, Minimized or Managed (Overdose, Ingestion/Inhalants)  Signs and symptoms of listed potential problems will be absent, minimized or managed by discharge/transition of care (reference Overdose, Ingestion/Inhalants (Adult) CPG).   Outcome: No Change  Pt remains intubated for airway protection, sedated with propofol. Some diaphoresis, signs of medication withdrawal.

## 2018-07-18 NOTE — ED NOTES
Late Entry: Report given to [facility] unit 4C, Karla RN at 0140, Ambulance called for transfer at 0145, arrived at 0200, patient moved was ambulance cot with care transfer at 0228.

## 2018-07-18 NOTE — PROGRESS NOTES
Admitted/transferred from: [facility], [place]  Reason for admission/transfer: OD  Patient status upon admission/transfer: Intubated and sedated  Interventions:   Plan: wean sedation, EKG q 2 hours  2 RN skin assessment: completed by Karla BARRIENTOS RN and Sergio Sethi RN  Height, weight, drug calc weight: done  Patient belongings: medication bottles put in safe in room, clothing in personal belonging bag in pt room closet  MDRO education (if applicable):

## 2018-07-18 NOTE — TELEPHONE ENCOUNTER
" is not sure if patient took took too many meds of if something else is going on.    Reason for Disposition    Drug overdose suspected    Additional Information    Negative: Followed a head injury    Negative: Seeing, hearing, or feeling things that are not there (i.e., visual, auditory, or tactile hallucinations)    Negative: Shock suspected (e.g., cold/pale/clammy skin, too weak to stand, low BP, rapid pulse)    Negative: Difficulty breathing or bluish lips    Negative: [1] Loss of speech or garbled speech AND [2] new onset    Negative: [1] Numbness of the face, arm, or leg on one side of the body AND [2] new onset    Negative: [1] Weakness of the face, arm, or leg on one side of the body AND [2] new onset    Negative: [1] Difficult to awaken or acting confused (disoriented, slurred speech) AND [2] present now AND [3] new onset    Negative: [1] Difficult to awaken or acting confused (disoriented, slurred speech) AND [2] present now AND [3] diabetic    Answer Assessment - Initial Assessment Questions  1. LEVEL OF CONSCIOUSNESS: \"How is he (she, the patient) acting right now?\" (e.g., alert-oriented, confused, lethargic, stuporous, comatose)      Confused, slurred speech, pills all over the floor  2. ONSET: \"When did the confusion start?\"  (minutes, hours, days)      ?this evening  3. PATTERN \"Does this come and go, or has it been constant since it started?\"  \"Is it present now?\"      constant  4. ALCOHOL or DRUGS: \"Has he been drinking alcohol or taking any drugs?\"       ?  5. NARCOTIC MEDICATIONS: \"Has he been receiving any narcotic medications?\" (e.g., morphine, Vicodin)      ?  not sure what she has, meds not in containers  6. CAUSE: \"What do you think is causing the confusion?\"       ?OD  7. OTHER SYMPTOMS: \"Are there any other symptoms?\" (e.g., difficulty breathing, headache, fever, weakness)      Falling, weakness    Protocols used: CONFUSION - DELIRIUM-ADULT-AH      "

## 2018-07-18 NOTE — PROGRESS NOTES
"2018:      Social Work: Assessment with Discharge Plan    Patient Name:  Bibi Song  :  1970  Age:  48 year old  MRN:  [id number]  Risk/Complexity Score:     Completed assessment with:   in family longue    Presenting Information   Reason for Referral:  Discharge plan, Substance abuse concerns, Potential need for community services upon discharge, Financial issues and Mental illness  Date of Intake:  2018  Referral Source:  Nurse  Decision Maker:  Patient  Alternate Decision Maker:  Patient's spouse, Sergio Song  [facility] Directive:  Not on file, patient unconscious and unable to complete. , per  policy.  Living Situation:  House  Previous Functional Status:  Independent  Patient and family understanding of hospitalization:   tearful and has difficulty digesting possible overdose attempt  Cultural/Language/Spiritual Considerations:  White, English speaking, Methodist  Adjustment to Illness:   and children are aware of situation, patient's family such as parents do not yet know about hospitalization as  would like to wait for patient's permission to provide information. He does plan to let them know she is in the hospital should she need to stay longer and \"adverse reaction\" to medication will be only information provided. Nurses aware that only information to be given to .     Physical Health  Reason for Admission:  Medication overdose  Services Needed/Recommended:  TBD, likely CD treatment will be recommended    Mental Health/Chemical Dependency  Diagnosis:  Major Depressive Disorder, OCD, chronic back pain  Support/Services in Place:  Primary care and psychiatry- new psychiatrist at [facility]   Services Needed/Recommended:  May be interested in mental health services, already receiving care from psychiatry and closely following with PCP    Support System  Significant relationship at present time:  , children  Family " of origin is available for support:  Yes  Other support available:  Extended family and friends  Gaps in support system:  Mental health services  Patient is caregiver to:  None     Provider Information   Primary Care Physician:  Glory Singleton   [phone]   Clinic:  [address] [place] [id number]      :  n/a    Financial   Income Source:  Employed,  working 3 jobs  Financial Concerns:  Yes  Insurance:    Payor/Plan Subscriber Name Rel Member # Group #   BCBS - [employer] [place] SHEMAR GARCIA  [id number]       [id number]       Discharge Plan   Patient and family discharge goal:  Hope to go home  Provided education on discharge plan:  YES  Patient agreeable to discharge plan:  TBD- not medically stable for discharge discussion, did talk about various services such as TCU for possible discharge options  A list of Medicare Certified Facilities was provided to the patient and/or family to encourage patient choice. Patient's choices for facility are:  N/a- not medicare, not yet medically stable for discharge assessment  Will NH provide Skilled rehabilitation or complex medical:  NO  General information regarding anticipated insurance coverage and possible out of pocket cost was discussed. Patient and patient's family are aware patient may incur the cost of transportation to the facility, pending insurance payment: YES,  hopes to be able to transport home in personal car  Barriers to discharge:  Medical clearance    Discharge Recommendations   Anticipated Disposition:  TBD  Transportation Needs:  Family:    Name of Transportation Company and Phone:  TBD, dependent upon discharge status    Additional comments     Provided with food cards, parking pass, and gift certificates as  is on leave from 3 jobs to be at hospital and unable to afford food.    Bridgette Fallon, MSW, LGSW  ICU 4A, 4C, 4E   Ph: [phone]

## 2018-07-18 NOTE — IP AVS SNAPSHOT
[address] [address]    Phone:  [phone]                                       After Visit Summary   7/18/2018    Bibi Song    MRN: [id number]           After Visit Summary Signature Page     I have received my discharge instructions, and my questions have been answered. I have discussed any challenges I see with this plan with the nurse or doctor.    ..........................................................................................................................................  Patient/Patient Representative Signature      ..........................................................................................................................................  Patient Representative Print Name and Relationship to Patient    ..................................................               ................................................  Date                                            Time    ..........................................................................................................................................  Reviewed by Signature/Title    ...................................................              ..............................................  Date                                                            Time

## 2018-07-18 NOTE — PLAN OF CARE
Problem: Patient Care Overview  Goal: Plan of Care/Patient Progress Review  PT 4C: PT orders received. Per RN, pt with possible plans to extubate today so PT eval held. PT will reschedule to tomorrow, 7/19.

## 2018-07-18 NOTE — ED PROVIDER NOTES
History     Chief Complaint   Patient presents with     Drug Overdose     amitriptyline  overdose     HPI  Bibi Song is a 48 year old female who was brought in by paramedics for altered mental status.  Her  had called the clinic concerned that his wife was oversedated from her medications because this evening he noted that she was slurring her speech and had a depressed level of consciousness.  The patient arrived stuporous and was arousable to speech but could only moan and not provide any meaningful responses to assist with history taking.  Her  stated that he does not believe she would intentionally try to overdose on her medication.  However she was found at home with her pill bottles amongst her including 2 bottles of amitriptyline 25 mg.  These were dated July 16 and July 17, 2018.  When I asked him about this he said that they were not able to find the initial prescription bottle and so they got a second 1 the next day.  1 of the bottles is empty.  The other one had 16 pills in it.   says there may have been some at home on the floor that he did not bring in.  Originally there were 30 pills in each bottle and so 44 currently unaccounted for.  He denies that she has been using any alcohol and has no concerns that she may have been taking additional medications.  In the past she was taking sertraline but is no longer taking it.  He says that the only thing she is currently taking his Geodon.  He indicates she has been taking the bupropion or sertraline or baclofen.  He is not aware of any opiate use and she has not had any access to opiates that he is aware of.  She does not use alcohol.  He has not noticed that she has been more depressed recently nor has she had any known stressors.    Problem List:    Patient Active Problem List    Diagnosis Date Noted     Ulcerative colitis with complication, unspecified location (H) 04/27/2017     Priority: Medium     Hypothyroidism,  unspecified type 12/08/2016     Priority: Medium     Cervicalgia 10/10/2012     Priority: Medium     Hyperlipidemia LDL goal <160 10/31/2010     Priority: Medium     Moderate major depression (H) 08/06/2010     Priority: Medium     OCD (obsessive compulsive disorder) 08/06/2010     Priority: Medium     [facility] NP. Schoenecker, NP       Anxiety disorder 08/06/2010     Priority: Medium     Family history of diabetes mellitus 08/06/2010     Priority: Medium     Family history of thyroid disease 08/06/2010     Priority: Medium     ALCOHOL ABUSE, IN REMISSION 08/06/2010     Priority: Medium        Past Medical History:    No past medical history on file.    Past Surgical History:    No past surgical history on file.    Family History:    Family History   Problem Relation Age of Onset     Diabetes Mother      Depression Mother      OCD     Neurologic Disorder Mother      Psychotic Disorder Mother      Respiratory Mother      Thyroid Disease Mother      Gynecology Mother      hysterectomy     Cerebrovascular Disease Mother      pacemaker     HEART DISEASE Father      MI,   LATE 60'S     Hypertension Father      Prostate Cancer Father      Obesity Father      Diabetes Father      Cerebrovascular Disease Father      Depression Brother      Thyroid Disease Brother      Depression Brother      Thyroid Disease Brother      Allergies Son      Asthma Son      Thyroid Disease Maternal Grandmother      Cancer Maternal Grandfather      Psychotic Disorder Paternal Grandmother      Thyroid Disease Paternal Grandfather      Diabetes Brother      Breast Cancer Maternal Aunt        Social History:  Marital Status:   [2]  Social History   Substance Use Topics     Smoking status: Former Smoker     Quit date: 1/1/1994     Smokeless tobacco: Never Used     Alcohol use No      Comment: Treatment in 2007        Medications:      buPROPion (WELLBUTRIN XL) 300 MG 24 hr tablet   levothyroxine (SYNTHROID/LEVOTHROID) 75 MCG  tablet   sertraline (ZOLOFT) 100 MG tablet   ziprasidone (GEODON) 40 MG capsule   baclofen (LIORESAL) 10 MG tablet   melatonin 3 MG tablet   mesalamine (CANASA) 1000 MG Suppository         Review of Systems  Unobtainable from the patient.  Negative according to the .  Physical Exam   BP: 119/77  Heart Rate: 120  Temp: 98.3  F (36.8  C)  Resp: 18  SpO2: 95 %      Physical Exam  Nursing note and vitals were reviewed.  Constitutional: Stuporous, morbidly obese appearing 48-year-old exhibiting snoring respirations and labored breathing because of upper airway partial obstruction, only partially was relieved with a nasal trumpet and airway positioning.  Initial O2 sats 88% on room air.  This improved to 98% on supplemental O2 by nasal cannula and nonrebreather mask.  GCS 8.  See subscale below.  HEENT: Atraumatic and normocephalic.  Pupils dilated and reactive.  EOM could not be tested due to her stupor.  EACs clear.  TMs normal.  Oropharynx shows dry mucous membranes and is otherwise unremarkable  Neck: No signs of trauma..  Cardiovascular: Cardiac examination reveals tachycardic heart rate and regular rhythm without murmur.  Pulmonary/Chest: Breathing is labored due to partial upper airway obstruction..  Breath sounds are otherwise clear and equal bilaterally.  There are inspiratory retractions.  There is no tachypnea, rales, wheezes, or rhonchi.  Abdomen: Soft, nondistended, morbidly obese, nontender, no HSM or masses rebound or guarding.  Musculoskeletal: Extremities are warm and well-perfused and without edema, bruising, deformity.  She was examined from head to toe and there were no signs of trauma externally.  Neurological: Stuporous, GCS 8, no focal neurologic findings  Skin: Warm, dry, no rashes.  Psychiatric: Stuporous.     GCS:   Motor 4=Withdraws from pain   Verbal 1=None   Eye Opening 3=To speech   Total: 8       ED Course     ED Course     Procedures               EKG Interpretation: 1      Interpreted by Richardson Lam  Time reviewed: 20:24  Symptoms at time of EKG: none   Rhythm: sinus tachycardia  Rate: 127  Axis: normal  Ectopy: none  Conduction: normal, QRS 98 msec  ST Segments/ T Waves: diffuse T wave flattening  Q Waves: none  Comparison to prior: Compared to the EKG from January 15, 2018, sinus tachycardia and diffuse T-wave abnormalities replace normal sinus rhythm and normal T waves.    Clinical Impression: Sinus tachycardia with nonspecific T-wave abnormalities and normal QRS duration and QTC.           EKG Interpretation: 2     Interpreted by Richardson Lam  Time reviewed: 21:49  Symptoms at time of EKG: none   Rhythm: sinus tachycardia  Rate: 126  Axis: normal  Ectopy: none  Conduction: normal, QRS 96 msec. QTc 453  ST Segments/ T Waves: diffuse T wave flattening/inversion  Q Waves: none  Comparison to prior: Compared to the EKG 1 above, no change    Clinical Impression: Sinus tachycardia with nonspecific T-wave abnormalities and normal QRS duration and QTC.      Critical Care time:  was 60 minutes for this patient excluding procedures.            Results for orders placed or performed during the hospital encounter of 07/17/18 (from the past 24 hour(s))   CBC with platelets differential   Result Value Ref Range    WBC 11.2 (H) 4.0 - 11.0 10e9/L    RBC Count 3.85 3.8 - 5.2 10e12/L    Hemoglobin 11.2 (L) 11.7 - 15.7 g/dL    Hematocrit 35.2 35.0 - 47.0 %    MCV 91 78 - 100 fl    MCH 29.1 26.5 - 33.0 pg    MCHC 31.8 31.5 - 36.5 g/dL    RDW 13.8 10.0 - 15.0 %    Platelet Count 320 150 - 450 10e9/L    Diff Method Automated Method     % Neutrophils 67.0 %    % Lymphocytes 21.1 %    % Monocytes 4.6 %    % Eosinophils 6.5 %    % Basophils 0.3 %    % Immature Granulocytes 0.5 %    Nucleated RBCs 0 0 /100    Absolute Neutrophil 7.5 1.6 - 8.3 10e9/L    Absolute Lymphocytes 2.4 0.8 - 5.3 10e9/L    Absolute Monocytes 0.5 0.0 - 1.3 10e9/L    Absolute Eosinophils 0.7 0.0 - 0.7 10e9/L    Absolute  Basophils 0.0 0.0 - 0.2 10e9/L    Abs Immature Granulocytes 0.1 0 - 0.4 10e9/L    Absolute Nucleated RBC 0.0    Comprehensive metabolic panel   Result Value Ref Range    Sodium 140 133 - 144 mmol/L    Potassium 3.3 (L) 3.4 - 5.3 mmol/L    Chloride 109 94 - 109 mmol/L    Carbon Dioxide 22 20 - 32 mmol/L    Anion Gap 9 3 - 14 mmol/L    Glucose 92 70 - 99 mg/dL    Urea Nitrogen 6 (L) 7 - 30 mg/dL    Creatinine 0.94 0.52 - 1.04 mg/dL    GFR Estimate 63 >60 mL/min/1.7m2    GFR Estimate If Black 77 >60 mL/min/1.7m2    Calcium 7.8 (L) 8.5 - 10.1 mg/dL    Bilirubin Total 0.2 0.2 - 1.3 mg/dL    Albumin 3.2 (L) 3.4 - 5.0 g/dL    Protein Total 6.6 (L) 6.8 - 8.8 g/dL    Alkaline Phosphatase 71 40 - 150 U/L    ALT 24 0 - 50 U/L    AST 18 0 - 45 U/L   Alcohol ethyl   Result Value Ref Range    Ethanol g/dL <0.01 <0.01 g/dL   HCG qualitative Blood   Result Value Ref Range    HCG Qualitative Serum Negative NEG^Negative   Salicylate level   Result Value Ref Range    Salicylate Level <2 mg/dL   Acetaminophen level   Result Value Ref Range    Acetaminophen Level <2 mg/L   Alcohol ethyl   Result Value Ref Range    Ethanol g/dL Canceled, Test credited <0.01 g/dL   CK total   Result Value Ref Range    CK Total 155 30 - 225 U/L   Magnesium   Result Value Ref Range    Magnesium 1.8 1.6 - 2.3 mg/dL   Troponin I   Result Value Ref Range    Troponin I ES <0.015 0.000 - 0.045 ug/L   Blood gas venous   Result Value Ref Range    Ph Venous 7.30 (L) 7.32 - 7.43 pH    PCO2 Venous 49 40 - 50 mm Hg    PO2 Venous 37 25 - 47 mm Hg    Bicarbonate Venous 24 21 - 28 mmol/L    Base Deficit Venous 2.7 mmol/L   UA reflex to Microscopic and Culture   Result Value Ref Range    Color Urine Yellow     Appearance Urine Slightly Cloudy     Glucose Urine Negative NEG^Negative mg/dL    Bilirubin Urine Negative NEG^Negative    Ketones Urine 5 (A) NEG^Negative mg/dL    Specific Gravity Urine 1.010 1.003 - 1.035    Blood Urine Negative NEG^Negative    pH Urine 5.0 5.0 -  7.0 pH    Protein Albumin Urine Negative NEG^Negative mg/dL    Urobilinogen mg/dL 0.0 0.0 - 2.0 mg/dL    Nitrite Urine Negative NEG^Negative    Leukocyte Esterase Urine Negative NEG^Negative    Source Midstream Urine     RBC Urine <1 0 - 2 /HPF    WBC Urine 1 0 - 5 /HPF    Squamous Epithelial /HPF Urine <1 0 - 1 /HPF    Mucous Urine Present (A) NEG^Negative /LPF    Hyaline Casts 1 0 - 2 /LPF   Drug abuse screen 77 urine ([place])   Result Value Ref Range    Amphetamine Qual Urine Negative NEG^Negative    Barbiturates Qual Urine Negative NEG^Negative    Benzodiazepine Qual Urine Positive (A) NEG^Negative    Cannabinoids Qual Urine Negative NEG^Negative    Cocaine Qual Urine Negative NEG^Negative    Opiates Qualitative Urine Negative NEG^Negative    PCP Qual Urine Negative NEG^Negative   Chest  XR, 1 view portable    Narrative    CHEST ONE VIEW PORTABLE   7/17/2018 9:41 PM     HISTORY: intubation;     COMPARISON: 10/13/2010 chest x-ray      Impression    IMPRESSION: ET tube midline within 1 cm of the darshana. Right lung  clear. Multiple opacities in the left lung including discoid  atelectasis in the mid and lower left lung, medial retrocardiac  opacity consistent with additional atelectasis or infiltrate and a  medial opacity adjacent to the mediastinum likely hilar vessels. No  evidence for pleural effusion. Heart size mildly enlarged. Normal  caliber pulmonary vessels.    NATHANAEL GOMEZ MD   Lactic acid whole blood   Result Value Ref Range    Lactic Acid 0.9 0.7 - 2.0 mmol/L   Blood gas venous   Result Value Ref Range    Ph Venous 7.27 (L) 7.32 - 7.43 pH    PCO2 Venous 49 40 - 50 mm Hg    PO2 Venous 48 (H) 25 - 47 mm Hg    Bicarbonate Venous 22 21 - 28 mmol/L    Base Deficit Venous 4.6 mmol/L   CT Head w/o Contrast    Narrative    CT HEAD W/O CONTRAST  7/17/2018 10:53 PM     HISTORY: Stupor.     TECHNIQUE: Axial images of the head and coronal reformations without  IV contrast material. Radiation dose for this scan  was reduced using  automated exposure control, adjustment of the mA and/or kV according  to patient size, or iterative reconstruction technique.    COMPARISON: None.    FINDINGS: No intracranial hemorrhage, mass or mass effect. No acute  infarct identified. No shift of midline structures. The ventricles are  symmetric. No skull fractures.      Impression    IMPRESSION: No acute intracranial findings.    IBETH GUSMAN MD   CT Chest Pulmonary Embolism w Contrast    Narrative    CT CHEST PULMONARY EMBOLISM W CONTRAST  7/17/2018 11:08 PM     HISTORY: Dyspnea.    TECHNIQUE: Volumetric acquisition of the chest after the  administration of 91 mL Isovue -370 IV contrast. Radiation dose for  this scan was reduced using automated exposure control, adjustment of  the mA and/or kV according to patient size, or iterative  reconstruction technique.     COMPARISON: 4/26/2006.    FINDINGS: No visualized pulmonary embolism. Examination is limited by  suboptimal pulmonary artery opacification and some motion artifact.  Main pulmonary artery is slightly prominent which could be due to  pulmonary artery hypertension. Moderate-sized areas of infiltrate  and/or atelectasis with consolidation in both lower lobes, greater on  the left. ETT tip just above the darshana. This could be pulled back  slightly. Enteric tube in place. No pleural effusions. Borderline  cardiomegaly. 2.8 cm left adrenal nodule is relatively low density  measuring approximately 20 Hounsfield units. This may be a benign  adenoma but it was not present previously. Follow-up focused CT of the  adrenal glands would be helpful to reevaluate this, if there are no  other interval studies for comparison.      Impression    IMPRESSION:  1. No visualized pulmonary embolism. Examination is limited by  suboptimal opacification and some motion.  2. Infiltrate/Atelectasis with consolidation in both lower lungs.  3. Possible pulmonary artery hypertension.  4. Indeterminate left  "adrenal nodule. Recommend follow-up outpatient  focused adrenal CT without and with contrast if necessary.    IBETH GUSMAN MD   Blood gas arterial   Result Value Ref Range    pH Arterial 7.40 7.35 - 7.45 pH    pCO2 Arterial 32 (L) 35 - 45 mm Hg    pO2 Arterial 86 80 - 105 mm Hg    Bicarbonate Arterial 20 (L) 21 - 28 mmol/L    Base Deficit Art 3.8 mmol/L       Medications   0.9% sodium chloride BOLUS (0 mLs Intravenous Stopped 7/17/18 2156)     Followed by   sodium chloride 0.9% infusion (1,000 mLs Intravenous New Bag 7/17/18 2159)   propofol (DIPRIVAN) infusion (60 mcg/kg/min × 107 kg Intravenous New Bag 7/17/18 2320)   potassium chloride (KLOR-CON) Packet 20-40 mEq (40 mEq Oral or Feeding Tube Given 7/17/18 2359)   magnesium sulfate 2 g in NS intermittent infusion ([facility]ar[facility] or [facility] Cmpd) (0 g Intravenous Stopped 7/18/18 0050)   0.9% sodium chloride BOLUS (0 mLs Intravenous Stopped 7/17/18 2321)     Followed by   sodium chloride 0.9% infusion (not administered)   charcoal activated in water (ACTIDOSE AQUA) liquid 50 g (50 g Oral Given 7/17/18 2159)   iopamidol (ISOVUE-370) solution 91 mL (91 mLs Intravenous Given 7/17/18 2256)   Saline Flush (100 mLs Intravenous Given 7/17/18 2256)       The patient was seen on arrival with depressed level of consciousness.  I did not feel she was adequately protecting her airway with her inspiratory retractions and snoring respirations.  Given the unknown ingestion, intubation was necessary for airway protection and airway support.    [facility] Procedure Note          Intubation:     Performed by: Richardson Lam    The procedure was performed in an emergent situation.  Risks and benefits: risks, benefits and alternatives were discussed.  Patient identity confirmed: verbally with patient and arm band  Time out: Immediately prior to procedure a \"time out\" was called to verify the correct patient, procedure, equipment, support staff and site/side marked as " required.    Indication: Acute respiratory failure. and Airway protection.    Intubation method: Glidescope  Patient status: RSI  Preoxygenation: Mask, Nasal cannula  Pretreatment medications: None  Sedatives: Etomidate  Paralytic: rocuronium  Laryngoscope size: Glidescope S4  Tube size: 7.5 cuffed with cuff inflated after placement  Number of attempts: 1  Cords visualized: yes  Post-procedure assessment: Breath sounds equal bilaterally with chest rise and absent over the epigastrium, Chest x-ray interpreted by me demonstrating endotracheal tube in appropriate position and CO2 detector.  ETT to teeth: 23 cm  Tube secured with: ETT nava    Patient tolerated the procedure well with no immediate complications.  Complications:  None    Postintubation, the patient was sedated with propofol she had persistent sinus tach tachycardia in the 120s but normal blood pressure and normal oxygenation following intubation.  Chest x-ray showed the endotracheal tube 1 cm above the darshana.  This was pulled back by 1 cm after the chest CT was performed.    Assessments & Plan (with Medical Decision Making)     48-year-old female presents stuporous/comatose of uncertain etiology.  She was found in the setting of multiple pill bottles.  Her  insisted she would not of taken an overdose but there are at least 40 missing pills of 25 mg amitriptyline, potentially vagal overdose if she took this.  During her time in the emergency department her EKGs remained stable with sinus tachycardia but no QRS widening and no QT prolongation or arrhythmias.  Her blood pressures also remained stable.  She was intubated for respiratory failure and airway protection and placed on a propofol drip following intubation.  She remained stable during her time in the emergency department.  I reviewed her case with Dr. Maher, intensive care medicine at [facility] who agreed to accept her in transfer.  I kept her  updated on her clinical status.  He is  agreeable to the plan for transfer.  I think it is most likely that the patient took a little bit too much of her Klonopin and her amitriptyline, but at this point we do not know and she will require ongoing monitoring in the ICU.  The patient had atelectasis versus infiltrate in the lungs on chest x-ray and on CT.  She has no fever, normal lactate, normal white count.  I have low suspicion for pneumonia.  Given her morbid obesity and hypoventilation on arrival, I suspect this is atelectasis and I chose not to treat her with antibiotics.  I discussed this with the intensivist who is in agreement with this decision.  I did discuss her case with poison control.  They recommended initiating bicarb if QRS was greater than 120 ms.  It never reached this level.  They also recommended getting her potassium above 4 and magnesium above 2 in case there was a significant tricyclic overdose.  She was given oral potassium and IV magnesium for this purpose.  Given the unknown time of ingestion she also received 1 dose of activated charcoal via the orogastric tube on arrival.  Oral potassium was given 2 hours later when pharmacy felt it would not be interfered with by the charcoal.    I have reviewed the nursing notes.    I have reviewed the findings, diagnosis, plan and need for follow up with the patient.       New Prescriptions    No medications on file       Final diagnoses:   Francisco coma scale total score 3-8, in the field (EMT or ambulance) (H)       7/17/2018   [facility] EMERGENCY DEPARTMENT     Richardson Lam MD  07/18/18 0054       Richardson Lam MD  07/18/18 0055

## 2018-07-18 NOTE — ED NOTES
Spouse (Sergio) is emergency contact.  He will be back around 2330.  Call if anything is needed.

## 2018-07-18 NOTE — PHARMACY-ADMISSION MEDICATION HISTORY
Admission medication history interview status for the 7/18/2018 admission is complete. See Epic admission navigator for allergy information, pharmacy, prior to admission medications and immunization status.     Medication history interview sources:  , Pharmacy ([facility]) [[phone]], Bottles    Changes made to PTA medication list (reason)  Added: amitriptyline. clonazepam  Deleted: baclofen (one time fill in 11/2017), levothyroxine (d/c this spring 2018), bupropion (taper started and appears to be finished in 2/2018)  Changed: none    Additional medication history information (including reliability of information, actions taken by pharmacist):    The patient is intubated and the  provided me with baseline information    It is unclear the amount and last doses of all medications    Sertraline: 30 day supply filled on 6/13/18 with pills left in bottle    Amitriptyline: 30 day supply (#30) filled 7/16/18 and 30 day supply (#30) filled 7/17/18 due to lost prescription. There is one empty bottle and one bottle that has 16/30 pills left. The  said that their couch is green and the pills are green, therefore he does not know how many she actually took. The total could be up to 44 pills.     Ziprasidone: Filled for a 90 day supply (#180) capsules on 5/8/18 and there seems to be an appropriate amount of capsules still left in the bottle     Clonazepam:  Filled for #45 on 7/16/18 and there were no pills left in the bottle    The  endorsed that levothyroxine was discontinued this spring - last fill was 5/21/18 and there are pills left over in the bottle     Unclear if the patient still had baclofen left from the fill on 11/2017 --> the bottle was empty and the  had also brought this in.     The  speaks about many changes that have occurred since a change in psychiatrist     The prescription for the Canasa suppositories was filled 7/11/18 but unclear if the patient had started  these    The  thought the amitriptyline was to replace the melatonin, but unclear if the patient still taking this medication as it was not a formal prescription.      Prior to Admission medications    Medication Sig Last Dose Taking? Auth Provider   AMITRIPTYLINE HCL PO Take 25 mg by mouth At Bedtime 7/17/2018 at Unknown time Yes Unknown, Entered By History   CLONAZEPAM PO Take 1 mg by mouth 2 times daily as needed for anxiety Unknown at Unknown time Yes Unknown, Entered By History   melatonin 3 MG tablet Take 9 mg by mouth nightly as needed. Unknown at Unknown time  Glory Singleton MD   mesalamine (CANASA) 1000 MG Suppository Place 1 suppository (1,000 mg) rectally At Bedtime Unknown at Unknown time Yes Jacinta Nagel APRN CNP   sertraline (ZOLOFT) 100 MG tablet Take 200 mg by mouth daily  Unknown at Unknown time Yes Glory Singleton MD   ziprasidone (GEODON) 40 MG capsule Take 2 capsules (80 mg) by mouth daily Unknown at Unknown time Yes Glory Singleton MD       Medication history completed by: Paty Aburto, PharmD Student

## 2018-07-18 NOTE — IP AVS SNAPSHOT
MRN:[id number]                      After Visit Summary   7/18/2018    Bibi Song    MRN: [id number]           Thank you!     Thank you for choosing [facility] for your care. Our goal is always to provide you with excellent care. Hearing back from our patients is one way we can continue to improve our services. Please take a few minutes to complete the written survey that you may receive in the mail after you visit with us. Thank you!        Patient Information     Date Of Birth          1970        Designated Caregiver       Most Recent Value    Caregiver    Will someone help with your care after discharge? yes    Name of designated caregiver Sergio     Phone number of caregiver [phone]    Caregiver address same as pt      About your hospital stay     You were admitted on:  July 18, 2018 You last received care in the:  [address] [facility]    You were discharged on:  July 21, 2018        Reason for your hospital stay       Medical stabilization status post unintentional overdose.                  Who to Call     For medical emergencies, please call 911.  For non-urgent questions about your medical care, please call your primary care provider or clinic, [phone]          Attending Provider     Provider Specialty    Erica Maher MD Anesthesiology    Critical access hospital, Arun Bridges MD Internal Medicine       Primary Care Provider Office Phone # Fax #    Glory Singleton [phone]      After Care Instructions     Activity       Your activity upon discharge: activity as tolerated            Diet       Follow this diet upon discharge: Orders Placed This Encounter      Regular Diet Adult            Discharge Instructions       After discharge, do NOT further take any amitriptyline or klonopin until seen by your family physician or new psychiatrist.                  Follow-up Appointments     Follow Up and recommended labs and tests       Please follow up  with your family physician next week for hospital follow up and to get referral to new psychiatrist.                  Pending Results     Date and Time Order Name Status Description    7/21/2018 0121 Blood culture Preliminary     7/21/2018 0121 Blood culture Preliminary     7/20/2018 1614 EKG 12-lead, tracing only Preliminary     7/19/2018 0047 Blood culture Preliminary     7/19/2018 0047 Blood culture Preliminary     7/18/2018 1535 EKG 12-lead, complete Preliminary             Statement of Approval     Ordered          07/21/18 1449  I have reviewed and agree with all the recommendations and orders detailed in this document.  EFFECTIVE NOW     Approved and electronically signed by:  Steve Jackson PA-C             Admission Information     Date & Time Provider Department Dept. Phone    7/18/2018 Arun Stephenson MD [address] [facility] [phone]      Your Vitals Were     Blood Pressure Temperature Respirations Weight Pulse Oximetry BMI (Body Mass Index)    106/58 (BP Location: Left arm) 98.2  F (36.8  C) (Oral) 16 107 kg (235 lb 14.3 oz) 96% 39.25 kg/m2      [employer] - [email or web address] Information     [employer] gives you secure access to your electronic health record. If you see a primary care provider, you can also send messages to your care team and make appointments. If you have questions, please call your primary care clinic.  If you do not have a primary care provider, please call [phone] and they will assist you.        Care EveryWhere ID     This is your Care EveryWhere ID. This could be used by other organizations to access your [facility] medical records  [id number]        Equal Access to Services     [facility] CYN : Hadii yeison daviso Soginette, waaxda luqadaha, qaybta kaalmada suryayada, varun flowers. So [facility] [phone].    ATENCIÓN: Si habla español, tiene a velazquez disposición servicios gratuitos de asistencia lingüística. Llame al [phone].    We comply with applicable  federal civil rights laws and [place] laws. We do not discriminate on the basis of race, color, national origin, age, disability, sex, sexual orientation, or gender identity.               Review of your medicines      CONTINUE these medicines which have NOT CHANGED        Dose / Directions    GEODON 40 MG capsule   Generic drug:  ziprasidone        Dose:  80 mg   Take 2 capsules (80 mg) by mouth daily   Quantity:  1 capsule   Refills:  0       melatonin 3 MG tablet        Dose:  9 mg   Take 9 mg by mouth nightly as needed.   Quantity:  30 tablet   Refills:  0       mesalamine 1000 MG Suppository   Commonly known as:  CANASA   Used for:  Ulcerative colitis with rectal bleeding, unspecified location (H)        Dose:  1000 mg   Place 1 suppository (1,000 mg) rectally At Bedtime   Quantity:  42 suppository   Refills:  0       ZOLOFT 100 MG tablet   Used for:  Obsessive-compulsive disorder, unspecified type, Other specified anxiety disorders, Major depressive disorder, recurrent episode, moderate (H)   Generic drug:  sertraline        Dose:  200 mg   Take 200 mg by mouth daily   Quantity:  1 tablet   Refills:  0         STOP taking     AMITRIPTYLINE HCL PO           CLONAZEPAM PO                    Protect others around you: Learn how to safely use, store and throw away your medicines at www.disposemymeds.org.             Medication List: This is a list of all your medications and when to take them. Check marks below indicate your daily home schedule. Keep this list as a reference.      Medications           Morning Afternoon Evening Bedtime As Needed    GEODON 40 MG capsule   Take 2 capsules (80 mg) by mouth daily   Last time this was given:  80 mg on 7/21/2018  8:31 AM   Generic drug:  ziprasidone                                melatonin 3 MG tablet   Take 9 mg by mouth nightly as needed.                                mesalamine 1000 MG Suppository   Commonly known as:  CANASA   Place 1 suppository (1,000 mg)  rectally At Bedtime                                ZOLOFT 100 MG tablet   Take 200 mg by mouth daily   Last time this was given:  200 mg on 7/21/2018  8:30 AM   Generic drug:  sertraline

## 2018-07-19 ENCOUNTER — APPOINTMENT (OUTPATIENT)
Dept: GENERAL RADIOLOGY | Facility: CLINIC | Age: 48
End: 2018-07-19
Attending: ANESTHESIOLOGY
Payer: COMMERCIAL

## 2018-07-19 LAB
ANION GAP SERPL CALCULATED.3IONS-SCNC: 10 MMOL/L (ref 3–14)
BASE DEFICIT BLDA-SCNC: 1 MMOL/L
BASE DEFICIT BLDV-SCNC: 0.3 MMOL/L
BASE DEFICIT BLDV-SCNC: 0.9 MMOL/L
BUN SERPL-MCNC: 3 MG/DL (ref 7–30)
CALCIUM SERPL-MCNC: 7.6 MG/DL (ref 8.5–10.1)
CHLORIDE SERPL-SCNC: 111 MMOL/L (ref 94–109)
CO2 SERPL-SCNC: 21 MMOL/L (ref 20–32)
CREAT SERPL-MCNC: 0.79 MG/DL (ref 0.52–1.04)
ERYTHROCYTE [DISTWIDTH] IN BLOOD BY AUTOMATED COUNT: 15.2 % (ref 10–15)
GFR SERPL CREATININE-BSD FRML MDRD: 77 ML/MIN/1.7M2
GLUCOSE BLDC GLUCOMTR-MCNC: 111 MG/DL (ref 70–99)
GLUCOSE BLDC GLUCOMTR-MCNC: 86 MG/DL (ref 70–99)
GLUCOSE SERPL-MCNC: 102 MG/DL (ref 70–99)
HCO3 BLD-SCNC: 23 MMOL/L (ref 21–28)
HCO3 BLDV-SCNC: 24 MMOL/L (ref 21–28)
HCO3 BLDV-SCNC: 25 MMOL/L (ref 21–28)
HCT VFR BLD AUTO: 33.2 % (ref 35–47)
HGB BLD-MCNC: 10.9 G/DL (ref 11.7–15.7)
INTERPRETATION ECG - MUSE: NORMAL
LACTATE BLD-SCNC: 1.2 MMOL/L (ref 0.7–2)
MAGNESIUM SERPL-MCNC: 2.1 MG/DL (ref 1.6–2.3)
MCH RBC QN AUTO: 29.5 PG (ref 26.5–33)
MCHC RBC AUTO-ENTMCNC: 32.8 G/DL (ref 31.5–36.5)
MCV RBC AUTO: 90 FL (ref 78–100)
O2/TOTAL GAS SETTING VFR VENT: 35 %
O2/TOTAL GAS SETTING VFR VENT: 35 %
O2/TOTAL GAS SETTING VFR VENT: NORMAL %
PCO2 BLD: 35 MM HG (ref 35–45)
PCO2 BLDV: 40 MM HG (ref 40–50)
PCO2 BLDV: 42 MM HG (ref 40–50)
PH BLD: 7.43 PH (ref 7.35–7.45)
PH BLDV: 7.38 PH (ref 7.32–7.43)
PH BLDV: 7.39 PH (ref 7.32–7.43)
PHOSPHATE SERPL-MCNC: 2.9 MG/DL (ref 2.5–4.5)
PLATELET # BLD AUTO: 271 10E9/L (ref 150–450)
PO2 BLD: 68 MM HG (ref 80–105)
PO2 BLDV: 38 MM HG (ref 25–47)
PO2 BLDV: 39 MM HG (ref 25–47)
POTASSIUM SERPL-SCNC: 4.3 MMOL/L (ref 3.4–5.3)
RBC # BLD AUTO: 3.7 10E12/L (ref 3.8–5.2)
SODIUM SERPL-SCNC: 142 MMOL/L (ref 133–144)
SPECIMEN SOURCE: NORMAL
WBC # BLD AUTO: 12.3 10E9/L (ref 4–11)
WET PREP SPEC: NORMAL

## 2018-07-19 PROCEDURE — 87210 SMEAR WET MOUNT SALINE/INK: CPT | Performed by: STUDENT IN AN ORGANIZED HEALTH CARE EDUCATION/TRAINING PROGRAM

## 2018-07-19 PROCEDURE — 25000125 ZZHC RX 250: Performed by: STUDENT IN AN ORGANIZED HEALTH CARE EDUCATION/TRAINING PROGRAM

## 2018-07-19 PROCEDURE — 00000146 ZZHCL STATISTIC GLUCOSE BY METER IP

## 2018-07-19 PROCEDURE — 71045 X-RAY EXAM CHEST 1 VIEW: CPT

## 2018-07-19 PROCEDURE — 83605 ASSAY OF LACTIC ACID: CPT | Performed by: STUDENT IN AN ORGANIZED HEALTH CARE EDUCATION/TRAINING PROGRAM

## 2018-07-19 PROCEDURE — 87040 BLOOD CULTURE FOR BACTERIA: CPT | Performed by: ANESTHESIOLOGY

## 2018-07-19 PROCEDURE — 94003 VENT MGMT INPAT SUBQ DAY: CPT

## 2018-07-19 PROCEDURE — 25000128 H RX IP 250 OP 636: Performed by: STUDENT IN AN ORGANIZED HEALTH CARE EDUCATION/TRAINING PROGRAM

## 2018-07-19 PROCEDURE — 40000275 ZZH STATISTIC RCP TIME EA 10 MIN

## 2018-07-19 PROCEDURE — 36415 COLL VENOUS BLD VENIPUNCTURE: CPT | Performed by: STUDENT IN AN ORGANIZED HEALTH CARE EDUCATION/TRAINING PROGRAM

## 2018-07-19 PROCEDURE — 93005 ELECTROCARDIOGRAM TRACING: CPT

## 2018-07-19 PROCEDURE — 87493 C DIFF AMPLIFIED PROBE: CPT | Performed by: STUDENT IN AN ORGANIZED HEALTH CARE EDUCATION/TRAINING PROGRAM

## 2018-07-19 PROCEDURE — 83735 ASSAY OF MAGNESIUM: CPT | Performed by: STUDENT IN AN ORGANIZED HEALTH CARE EDUCATION/TRAINING PROGRAM

## 2018-07-19 PROCEDURE — 84100 ASSAY OF PHOSPHORUS: CPT | Performed by: STUDENT IN AN ORGANIZED HEALTH CARE EDUCATION/TRAINING PROGRAM

## 2018-07-19 PROCEDURE — 25000132 ZZH RX MED GY IP 250 OP 250 PS 637: Performed by: ANESTHESIOLOGY

## 2018-07-19 PROCEDURE — 82803 BLOOD GASES ANY COMBINATION: CPT | Performed by: STUDENT IN AN ORGANIZED HEALTH CARE EDUCATION/TRAINING PROGRAM

## 2018-07-19 PROCEDURE — 20000004 ZZH R&B ICU UMMC

## 2018-07-19 PROCEDURE — 99233 SBSQ HOSP IP/OBS HIGH 50: CPT | Mod: GC | Performed by: INTERNAL MEDICINE

## 2018-07-19 PROCEDURE — 25000132 ZZH RX MED GY IP 250 OP 250 PS 637: Performed by: STUDENT IN AN ORGANIZED HEALTH CARE EDUCATION/TRAINING PROGRAM

## 2018-07-19 PROCEDURE — 87491 CHLMYD TRACH DNA AMP PROBE: CPT | Performed by: STUDENT IN AN ORGANIZED HEALTH CARE EDUCATION/TRAINING PROGRAM

## 2018-07-19 PROCEDURE — 85027 COMPLETE CBC AUTOMATED: CPT | Performed by: STUDENT IN AN ORGANIZED HEALTH CARE EDUCATION/TRAINING PROGRAM

## 2018-07-19 PROCEDURE — 80048 BASIC METABOLIC PNL TOTAL CA: CPT | Performed by: STUDENT IN AN ORGANIZED HEALTH CARE EDUCATION/TRAINING PROGRAM

## 2018-07-19 PROCEDURE — 25000128 H RX IP 250 OP 636: Performed by: ANESTHESIOLOGY

## 2018-07-19 PROCEDURE — 36600 WITHDRAWAL OF ARTERIAL BLOOD: CPT

## 2018-07-19 PROCEDURE — 36415 COLL VENOUS BLD VENIPUNCTURE: CPT | Performed by: ANESTHESIOLOGY

## 2018-07-19 PROCEDURE — 40000556 ZZH STATISTIC PERIPHERAL IV START W US GUIDANCE

## 2018-07-19 PROCEDURE — 87591 N.GONORRHOEAE DNA AMP PROB: CPT | Performed by: STUDENT IN AN ORGANIZED HEALTH CARE EDUCATION/TRAINING PROGRAM

## 2018-07-19 PROCEDURE — 93010 ELECTROCARDIOGRAM REPORT: CPT | Performed by: INTERNAL MEDICINE

## 2018-07-19 PROCEDURE — 87040 BLOOD CULTURE FOR BACTERIA: CPT | Performed by: STUDENT IN AN ORGANIZED HEALTH CARE EDUCATION/TRAINING PROGRAM

## 2018-07-19 RX ORDER — ONDANSETRON 2 MG/ML
4 INJECTION INTRAMUSCULAR; INTRAVENOUS EVERY 6 HOURS PRN
Status: DISCONTINUED | OUTPATIENT
Start: 2018-07-19 | End: 2018-07-20

## 2018-07-19 RX ORDER — ACETAMINOPHEN 325 MG/1
325-650 TABLET ORAL EVERY 4 HOURS PRN
Status: DISCONTINUED | OUTPATIENT
Start: 2018-07-19 | End: 2018-07-21 | Stop reason: HOSPADM

## 2018-07-19 RX ADMIN — PROPOFOL 20 MG: 10 INJECTION, EMULSION INTRAVENOUS at 11:15

## 2018-07-19 RX ADMIN — PROPOFOL 40 MCG/KG/MIN: 10 INJECTION, EMULSION INTRAVENOUS at 09:48

## 2018-07-19 RX ADMIN — Medication 10 MEQ: at 01:33

## 2018-07-19 RX ADMIN — PROPOFOL 15 MG: 10 INJECTION, EMULSION INTRAVENOUS at 07:45

## 2018-07-19 RX ADMIN — ACETAMINOPHEN 650 MG: 325 TABLET, FILM COATED ORAL at 01:01

## 2018-07-19 RX ADMIN — MICONAZOLE NITRATE: 2 POWDER TOPICAL at 20:04

## 2018-07-19 RX ADMIN — ONDANSETRON 4 MG: 2 INJECTION INTRAMUSCULAR; INTRAVENOUS at 21:44

## 2018-07-19 RX ADMIN — SODIUM CHLORIDE, POTASSIUM CHLORIDE, SODIUM LACTATE AND CALCIUM CHLORIDE 1000 ML: 600; 310; 30; 20 INJECTION, SOLUTION INTRAVENOUS at 05:20

## 2018-07-19 RX ADMIN — DEXMEDETOMIDINE HYDROCHLORIDE 0.2 MCG/KG/HR: 100 INJECTION, SOLUTION INTRAVENOUS at 14:29

## 2018-07-19 RX ADMIN — FAMOTIDINE 20 MG: 40 POWDER, FOR SUSPENSION ORAL at 07:58

## 2018-07-19 RX ADMIN — PROPOFOL 60 MCG/KG/MIN: 10 INJECTION, EMULSION INTRAVENOUS at 02:46

## 2018-07-19 RX ADMIN — VANCOMYCIN HYDROCHLORIDE 2000 MG: 1 INJECTION, POWDER, LYOPHILIZED, FOR SOLUTION INTRAVENOUS at 11:33

## 2018-07-19 RX ADMIN — ENOXAPARIN SODIUM 40 MG: 100 INJECTION SUBCUTANEOUS at 05:22

## 2018-07-19 RX ADMIN — PROPOFOL 15 MG: 10 INJECTION, EMULSION INTRAVENOUS at 08:50

## 2018-07-19 RX ADMIN — PROPOFOL 15 MG: 10 INJECTION, EMULSION INTRAVENOUS at 11:57

## 2018-07-19 RX ADMIN — PROPOFOL 50 MCG/KG/MIN: 10 INJECTION, EMULSION INTRAVENOUS at 13:03

## 2018-07-19 RX ADMIN — VANCOMYCIN HYDROCHLORIDE 2500 MG: 10 INJECTION, POWDER, LYOPHILIZED, FOR SOLUTION INTRAVENOUS at 01:33

## 2018-07-19 RX ADMIN — LEVOTHYROXINE SODIUM 75 MCG: 75 TABLET ORAL at 07:57

## 2018-07-19 RX ADMIN — PROPOFOL 20 MG: 10 INJECTION, EMULSION INTRAVENOUS at 02:52

## 2018-07-19 RX ADMIN — PROPOFOL 45 MCG/KG/MIN: 10 INJECTION, EMULSION INTRAVENOUS at 05:20

## 2018-07-19 RX ADMIN — Medication 10 MEQ: at 00:11

## 2018-07-19 RX ADMIN — MICONAZOLE NITRATE: 2 POWDER TOPICAL at 07:58

## 2018-07-19 ASSESSMENT — ACTIVITIES OF DAILY LIVING (ADL)
ADLS_ACUITY_SCORE: 11
ADLS_ACUITY_SCORE: 13
ADLS_ACUITY_SCORE: 11
ADLS_ACUITY_SCORE: 11
ADLS_ACUITY_SCORE: 13
ADLS_ACUITY_SCORE: 13

## 2018-07-19 NOTE — PROGRESS NOTES
Pt extubated at 1445 and placed on a 8 liter oxy plus mask. BS: good aeration bilateral. Strong productive cough, moderate amount of clear blood tinged secretions and a small amount of black charcoal. Please see flowsheets for further information.

## 2018-07-19 NOTE — PLAN OF CARE
Problem: Patient Care Overview  Goal: Plan of Care/Patient Progress Review  Patient intubated and sedated on propofol at beginning of shift. Propofol gtt discontinued and patient extubated to oxymask at 1445. Patient sounded very congested with weak cough and very lethargic post extubation; MD notified. Patient remains on oxymask at 6LPM. Patient continues to be lethargic, making incomprehensible words. Patient able to follow commands. HRs 90s-110s and SBPs 100-140s. Tmax today was 100.8F. Patient continues to be NPO; swallow evaluation unable to be performed due to mentation status. Patient had 1 gray stool today. Patient having good urine output today. Patient having a moderated amount of vaginal discharge with orders for wet prep/chlamydia/gonorrhea test once available from lab.   Plan: Continue to monitor respiratory/neurologic status and follow plan of care.

## 2018-07-19 NOTE — PHARMACY-VANCOMYCIN DOSING SERVICE
Pharmacy Vancomycin Initial Note  Date of Service 2018  Patient's  1970  48 year old, female    Indication: GPCs in sputum    Current estimated CrCl = Estimated Creatinine Clearance: 107.5 mL/min (based on Cr of 0.75).    Creatinine for last 3 days  2018:  8:30 PM Creatinine 0.94 mg/dL  2018:  4:30 AM Creatinine 0.75 mg/dL;  4:30 AM Creatinine 0.78 mg/dL    Recent Vancomycin Level(s) for last 3 days  No results found for requested labs within last 72 hours.      Vancomycin IV Administrations (past 72 hours)      No vancomycin orders with administrations in past 72 hours.                Nephrotoxins and other renal medications (Future)    Start     Dose/Rate Route Frequency Ordered Stop    18 1200  vancomycin (VANCOCIN) 2,000 mg in sodium chloride 0.9 % 500 mL intermittent infusion      20 mg/kg × 104.3 kg  over 2 Hours Intravenous EVERY 12 HOURS 18 2333      18 2345  vancomycin (VANCOCIN) 2,500 mg in sodium chloride 0.9 % 500 mL intermittent infusion      25 mg/kg × 104.3 kg  over 2 Hours Intravenous ONCE 18 2333            Contrast Orders - past 72 hours     None                Plan:  1.  Start vancomycin  2500 mg IV x1 then vancomycin 2000mg IV q12h  2.  Goal Trough Level: 15-20 mg/L   3.  Pharmacy will check trough levels as appropriate in 1-3 Days.    4. Serum creatinine levels will be ordered daily for the first week of therapy and at least twice weekly for subsequent weeks.    5. [place] method utilized to dose vancomycin therapy: Method 2    Hortensia Chino, FarooqD

## 2018-07-19 NOTE — PLAN OF CARE
Problem: Patient Care Overview  Goal: Plan of Care/Patient Progress Review  Outcome: No Change  D:TCA (tricyclic antidepressant) overdose of undetermined intent    I/A: Neuros unchanged, Propofol weaned as tolerated with PRN doses administered for breakthrough agitation/restlesness. BUE restraints continued d/2 pulling at ETT and lines. Agitation worse with repositioning/suctioning. T-max 101.6 Axillary and WBC trending up. Sputum cultures + for gm + cocci; Vanco started and LR bolus x2L administered. BC and lactic collected; pending. K replaced; recheck 4.3. Mag/Phos WDL. EKGs improving overnight; tele started at ST and now SR w/ HR 80-90. OG clamped; NPO. Pederson with low UOP (see I/O- LR bolus x2). Creamy vaginal discharge noted. LS clear/dim; moderate amount of white/yellow sputum. T/R q2hrs; pt frequently moving around in bed.     P: Continue to wean sedation, pressure support at 0800 (5/5), and probable extubation later this morning. Continue to monitor temperature, UOP, and VS. Notifiy MD with any pertinent changes.         Problem: Overdose, Ingestion/Inhalants (Adult)  Intervention: Promote Normothermia/Aggressively Treat Hyperthermia  Tmax 101.6 (axillary); Tylenol administered and cold packs applied.       Problem: Mood Alteration Comorbidity  Intervention: Monitor/Manage Signs of Depression   Sergio updated on plan of care.

## 2018-07-19 NOTE — PLAN OF CARE
Problem: Patient Care Overview  Goal: Plan of Care/Patient Progress Review  OT-4C: Cancel. Pt in process of extubating this PM. Will reschedule.

## 2018-07-19 NOTE — PROGRESS NOTES
Critical Care Attending Note    The patient was seen and examined by me with .   The case was discussed at length. Pertinent vitals, lab results and imaging were reviewed by me. Agree with the resident's note from today as reflects our joint assessment and plan. 55 yo prior psych history admit with TCA OD requiring intubation for airway protection.  No events overnight, agitated with lightening of sedation. Failed SBT yesterday, this AM improved. Following commands with lightening of sedation. Good cough. Low grade temp overnight, vanco started as GPC in sputum.  Will work towards extubation if able to handle secretions.  New vaginal discharge - will send micro.      Donald Rod MD  30 min CCT

## 2018-07-19 NOTE — PROGRESS NOTES
MICU Progress Note  Bibi Song MRN: [id number]  1970  Date of Admission:7/18/2018     Assessment/Plan   Changes for 7/19:  - Extubate  - Stop propofol  - Start precedex  - Start vancomycin    Neuro/Psych:  #Toxic encephalopathy 2/2 TCA and benzodiazepine ingestion of unknown intent: Stable. Encephalopathy could be associated with medication overdose or from withdrawal from psych meds.  #MDD, NARCISA: Unsure effects of immediately stopping psych meds. Could be contributing to encephalopathy.   - Stop propofol gtt  - Start precedex gtt  - Consider re-starting psych meds  - Psych consult  - EKG qday  - Monitor electrolytes  - suicidal risk assessment when oriented    Resp:  #Acute Hypoxemic Respiratory Failure 2/2 toxic encephalopathy vs aspiration pneumonia: Pressure support trial with favorable outcome (saturating well, ABG with good oxygenation and normal CO2, non-labored breathing). Possible aspiration pneumonia, but has strong cough reflex.  #c/f step pneumonia: sputum production, gram stain with gram pos cocci, culture pending. CXR 7/19 with slightly increased left bibasilar/retrocardiac and medial right basilar opacities; likely atelectasis, but possibly could represent pneumonia.  - Extubate today 7/19  - Start Vancomycin (7/18- )    CV: Hemodynamically stable, sinus rhythm with stable EKGs, no signs of cardiac ischemia (trop neg, no EKG changes)  - EKG qday    GI:  #Ulcerative colitis: colonoscopy in 2010 with signs of ulcerative colitis. Per chart review, seen by primary care provider and was prescribed mesalamine suppositories 7/10. Discussed hx with her  who reports that patient has been experiencing constipation and bloody stools.   - Hold mesalamine supp    :   #c/f vaginal infection: has been having profuse vaginal secretions.   - wet prep, GC, chlamydia     Renal: Improved UOP after administration of LR, creatinine and electrolytes wnl.     ID: Fevers overnight, elevated WBC 7/19 to 12.3  (from 9.2). Sputum production, gram pos cocci, cultures pending. Vaginal secretions concerning for infection. No erythema or edema at access sites.  - Sputum culture pending  - Blood cultures pending  - Vancomycin    Endo: Glucoses range:  over past 24h.   - q4 glucose checks    Heme/Onc:  #Normocytic amenia: Hgb stable from previous. No signs of active bleeding.  - Monitor CBC    FEN: NPO  Ppx:  - DVT: enoxaparin, SCDs  - Ulcer: H2 block stopped after extubation   Code: Full    Dispo: Monitor in ICU overnight.     Patient seen and plan above discussed with attending physician, Dr. Rod.    Maryse Baker, MS4    Resident/Fellow Attestation   I, Shaila Sears, was present with the medical student who participated in the service and in the documentation of the note.  I have verified the history and personally performed the physical exam and medical decision making.  I agree with the assessment and plan of care as documented in the note.      Shaila Sears MD  PGY3  Date of Service (when I saw the patient): 07/19/18     Subjective/Interval Events   Spiked fevers overnight, blood cx sent.  Low UOP, 2L LR given with appropriate response.  Profuse vaginal discharge, white/pink      Objective   Vital Signs: Reviewed    Physical Exam:  0700  Neuro: Intubated, sedated, does not open eyes to voice or noxious stimuli, does not follow commands. Withdraws the pain in 4 extremities. Pupils 3-4mm, round, reactive bilaterally. Cough reflex in tact. Biceps, patellar, achilles reflexes 2+.  CV: RRR, normal S1, S2. No murmurs.  Resp: ETT in place. Coarse breath sounds, expiratory rhonchi.  Abd: Soft, non-distended, does not grimace to pain.   Skin: cool, not diaphoretic.  Extremities: Radial and pedal pulses palpable. No LE edema.    1400 after extubation  Neuro: Unable to identify location, month, year. Attempts to vocalize, slurred speech, tearful, depressed affect. Takes deep breaths. Tires with continuous  coughing, but able to produce strong cough.    Labs/Imaging/Meds: Reviewed

## 2018-07-19 NOTE — PLAN OF CARE
Problem: Patient Care Overview  Goal: Plan of Care/Patient Progress Review  PT 4C: Pt with plans to extubate so PT eval held this morning, unable to return after pt extubated, PT will be rescheduled for tomorrow 7/20

## 2018-07-20 ENCOUNTER — APPOINTMENT (OUTPATIENT)
Dept: SPEECH THERAPY | Facility: CLINIC | Age: 48
End: 2018-07-20
Attending: ANESTHESIOLOGY
Payer: COMMERCIAL

## 2018-07-20 ENCOUNTER — APPOINTMENT (OUTPATIENT)
Dept: OCCUPATIONAL THERAPY | Facility: CLINIC | Age: 48
End: 2018-07-20
Attending: ANESTHESIOLOGY
Payer: COMMERCIAL

## 2018-07-20 LAB
ANION GAP SERPL CALCULATED.3IONS-SCNC: 10 MMOL/L (ref 3–14)
BUN SERPL-MCNC: 5 MG/DL (ref 7–30)
C DIFF TOX B STL QL: NEGATIVE
C TRACH DNA SPEC QL NAA+PROBE: NEGATIVE
CALCIUM SERPL-MCNC: 7.9 MG/DL (ref 8.5–10.1)
CHLORIDE SERPL-SCNC: 109 MMOL/L (ref 94–109)
CO2 SERPL-SCNC: 21 MMOL/L (ref 20–32)
CREAT SERPL-MCNC: 0.68 MG/DL (ref 0.52–1.04)
ERYTHROCYTE [DISTWIDTH] IN BLOOD BY AUTOMATED COUNT: 14.8 % (ref 10–15)
GFR SERPL CREATININE-BSD FRML MDRD: >90 ML/MIN/1.7M2
GLUCOSE BLDC GLUCOMTR-MCNC: 81 MG/DL (ref 70–99)
GLUCOSE SERPL-MCNC: 98 MG/DL (ref 70–99)
HCT VFR BLD AUTO: 31.3 % (ref 35–47)
HGB BLD-MCNC: 10.3 G/DL (ref 11.7–15.7)
LACTATE BLD-SCNC: 1 MMOL/L (ref 0.4–1.9)
MAGNESIUM SERPL-MCNC: 2 MG/DL (ref 1.6–2.3)
MCH RBC QN AUTO: 29.6 PG (ref 26.5–33)
MCHC RBC AUTO-ENTMCNC: 32.9 G/DL (ref 31.5–36.5)
MCV RBC AUTO: 90 FL (ref 78–100)
N GONORRHOEA DNA SPEC QL NAA+PROBE: NEGATIVE
PHOSPHATE SERPL-MCNC: 2.9 MG/DL (ref 2.5–4.5)
PLATELET # BLD AUTO: 233 10E9/L (ref 150–450)
POTASSIUM SERPL-SCNC: 3.6 MMOL/L (ref 3.4–5.3)
RBC # BLD AUTO: 3.48 10E12/L (ref 3.8–5.2)
SODIUM SERPL-SCNC: 140 MMOL/L (ref 133–144)
SPECIMEN SOURCE: NORMAL
WBC # BLD AUTO: 13.6 10E9/L (ref 4–11)

## 2018-07-20 PROCEDURE — 25000128 H RX IP 250 OP 636: Performed by: ANESTHESIOLOGY

## 2018-07-20 PROCEDURE — 25000128 H RX IP 250 OP 636: Performed by: PHYSICIAN ASSISTANT

## 2018-07-20 PROCEDURE — 80048 BASIC METABOLIC PNL TOTAL CA: CPT | Performed by: STUDENT IN AN ORGANIZED HEALTH CARE EDUCATION/TRAINING PROGRAM

## 2018-07-20 PROCEDURE — 25000125 ZZHC RX 250: Performed by: PHYSICIAN ASSISTANT

## 2018-07-20 PROCEDURE — 85027 COMPLETE CBC AUTOMATED: CPT | Performed by: STUDENT IN AN ORGANIZED HEALTH CARE EDUCATION/TRAINING PROGRAM

## 2018-07-20 PROCEDURE — 99233 SBSQ HOSP IP/OBS HIGH 50: CPT | Mod: GC | Performed by: INTERNAL MEDICINE

## 2018-07-20 PROCEDURE — 83735 ASSAY OF MAGNESIUM: CPT | Performed by: ANESTHESIOLOGY

## 2018-07-20 PROCEDURE — 25000132 ZZH RX MED GY IP 250 OP 250 PS 637: Performed by: PHYSICIAN ASSISTANT

## 2018-07-20 PROCEDURE — 00000146 ZZHCL STATISTIC GLUCOSE BY METER IP

## 2018-07-20 PROCEDURE — 36415 COLL VENOUS BLD VENIPUNCTURE: CPT | Performed by: STUDENT IN AN ORGANIZED HEALTH CARE EDUCATION/TRAINING PROGRAM

## 2018-07-20 PROCEDURE — 93010 ELECTROCARDIOGRAM REPORT: CPT | Performed by: INTERNAL MEDICINE

## 2018-07-20 PROCEDURE — 97535 SELF CARE MNGMENT TRAINING: CPT | Mod: GO

## 2018-07-20 PROCEDURE — 99222 1ST HOSP IP/OBS MODERATE 55: CPT | Performed by: PSYCHIATRY & NEUROLOGY

## 2018-07-20 PROCEDURE — 25000128 H RX IP 250 OP 636: Performed by: STUDENT IN AN ORGANIZED HEALTH CARE EDUCATION/TRAINING PROGRAM

## 2018-07-20 PROCEDURE — 40000225 ZZH STATISTIC SLP WARD VISIT: Performed by: SPEECH-LANGUAGE PATHOLOGIST

## 2018-07-20 PROCEDURE — 83735 ASSAY OF MAGNESIUM: CPT | Performed by: STUDENT IN AN ORGANIZED HEALTH CARE EDUCATION/TRAINING PROGRAM

## 2018-07-20 PROCEDURE — 83605 ASSAY OF LACTIC ACID: CPT | Performed by: ANESTHESIOLOGY

## 2018-07-20 PROCEDURE — 84100 ASSAY OF PHOSPHORUS: CPT | Performed by: STUDENT IN AN ORGANIZED HEALTH CARE EDUCATION/TRAINING PROGRAM

## 2018-07-20 PROCEDURE — 92610 EVALUATE SWALLOWING FUNCTION: CPT | Mod: GN | Performed by: SPEECH-LANGUAGE PATHOLOGIST

## 2018-07-20 PROCEDURE — 12000006 ZZH R&B IMCU INTERMEDIATE UMMC

## 2018-07-20 PROCEDURE — 40000133 ZZH STATISTIC OT WARD VISIT

## 2018-07-20 PROCEDURE — 93005 ELECTROCARDIOGRAM TRACING: CPT

## 2018-07-20 PROCEDURE — 97165 OT EVAL LOW COMPLEX 30 MIN: CPT | Mod: GO

## 2018-07-20 RX ORDER — SERTRALINE HYDROCHLORIDE 100 MG/1
200 TABLET, FILM COATED ORAL DAILY
Status: DISCONTINUED | OUTPATIENT
Start: 2018-07-20 | End: 2018-07-21 | Stop reason: HOSPADM

## 2018-07-20 RX ORDER — ONDANSETRON 4 MG/1
4 TABLET, ORALLY DISINTEGRATING ORAL EVERY 6 HOURS PRN
Status: DISCONTINUED | OUTPATIENT
Start: 2018-07-20 | End: 2018-07-21 | Stop reason: HOSPADM

## 2018-07-20 RX ORDER — SODIUM CHLORIDE, SODIUM LACTATE, POTASSIUM CHLORIDE, CALCIUM CHLORIDE 600; 310; 30; 20 MG/100ML; MG/100ML; MG/100ML; MG/100ML
INJECTION, SOLUTION INTRAVENOUS CONTINUOUS
Status: DISCONTINUED | OUTPATIENT
Start: 2018-07-20 | End: 2018-07-20

## 2018-07-20 RX ORDER — LORAZEPAM 0.5 MG/1
0.5 TABLET ORAL EVERY 4 HOURS PRN
Status: DISCONTINUED | OUTPATIENT
Start: 2018-07-20 | End: 2018-07-21 | Stop reason: HOSPADM

## 2018-07-20 RX ORDER — ZIPRASIDONE HYDROCHLORIDE 40 MG/1
80 CAPSULE ORAL DAILY
Status: DISCONTINUED | OUTPATIENT
Start: 2018-07-20 | End: 2018-07-21 | Stop reason: HOSPADM

## 2018-07-20 RX ADMIN — VANCOMYCIN HYDROCHLORIDE 2000 MG: 1 INJECTION, POWDER, LYOPHILIZED, FOR SOLUTION INTRAVENOUS at 01:19

## 2018-07-20 RX ADMIN — SERTRALINE HYDROCHLORIDE 200 MG: 100 TABLET ORAL at 17:18

## 2018-07-20 RX ADMIN — SODIUM CHLORIDE 1000 ML: 9 INJECTION, SOLUTION INTRAVENOUS at 08:27

## 2018-07-20 RX ADMIN — SODIUM CHLORIDE, POTASSIUM CHLORIDE, SODIUM LACTATE AND CALCIUM CHLORIDE: 600; 310; 30; 20 INJECTION, SOLUTION INTRAVENOUS at 15:44

## 2018-07-20 RX ADMIN — LORAZEPAM 0.5 MG: 0.5 TABLET ORAL at 17:18

## 2018-07-20 RX ADMIN — MICONAZOLE NITRATE: 2 POWDER TOPICAL at 08:00

## 2018-07-20 RX ADMIN — DEXTROSE AND SODIUM CHLORIDE: 5; 900 INJECTION, SOLUTION INTRAVENOUS at 23:56

## 2018-07-20 RX ADMIN — ENOXAPARIN SODIUM 40 MG: 100 INJECTION SUBCUTANEOUS at 06:26

## 2018-07-20 RX ADMIN — ZIPRASIDONE HCL 80 MG: 40 CAPSULE ORAL at 17:18

## 2018-07-20 RX ADMIN — Medication 10 MEQ: at 06:26

## 2018-07-20 RX ADMIN — ONDANSETRON 4 MG: 4 TABLET, ORALLY DISINTEGRATING ORAL at 15:45

## 2018-07-20 RX ADMIN — PANTOPRAZOLE SODIUM 40 MG: 40 INJECTION, POWDER, FOR SOLUTION INTRAVENOUS at 23:57

## 2018-07-20 RX ADMIN — MICONAZOLE NITRATE: 2 POWDER TOPICAL at 21:41

## 2018-07-20 ASSESSMENT — ACTIVITIES OF DAILY LIVING (ADL)
ADLS_ACUITY_SCORE: 13

## 2018-07-20 NOTE — PLAN OF CARE
Problem: Patient Care Overview  Goal: Plan of Care/Patient Progress Review  Outcome: Improving  D:  Hx depression/anxiety, admitted for amitriptyline/clonazepam overdose    I/A:   Neuro:  Alert but confused overnight.  At times disoriented to person/time/situation.  Pupils equal/reactive.  No sedation.     Resp:  Sats >92 on 6-8 L oxyplus.  Lung sounds clear/diminished.  Good productive cough.   Cardio: Sinus tach in evening, sinus rhythm in 90s this morning.  Blood pressures stable, MAPs high 60s-80s.  Tmax 101 overnight.   GI:  Numerous loose stools overnight, c diff culture negative.  Pt has history of ulcerative colitis.     : Good urine output via carlin catheter, /hr.  Bloody vaginal discharge, pt states she does still menstruate.   Skin:  Bottom reddened from frequent bowel movements.  Pt independently positioning.   Notes:  First 10 mEq of 20 mEq K+ replacement hung.  Home meds in med safe sent to security.    P:  Consider transfer to floor, hang second bag of K+        Problem: Mood Alteration Comorbidity  Goal: Mood Alteration Comorbidity  Patient comorbidity will be monitored for signs and symptoms of Mood Alteration condition.  Problems will be absent, minimized or managed by discharge/transition of care.   Outcome: No Change  Pt tearful at times overnight, stated she was hospitalized for dehydration, denied intentional overdose.  Calm environment promoted.

## 2018-07-20 NOTE — PROGRESS NOTES
Critical Care Attending Note     The patient was seen and examined by me with Dr. Sears. The case was discussed at length. Pertinent vitals, lab results and imaging were reviewed by me. Agree with the resident's note from today as reflects our joint assessment and plan. 57 yo F ulcerative colitis, prior psych history admit with TCA OD requiring intubation for airway protection.  No events overnight, agitated with lightening of sedation. Failed SBT initially but able to extubated 7/19.  GPC in sputum cx with normal sylvia. Exam:  Still confused overnight,mild nausea. Resp status now weaned from 8L to 4L oxyplus. Noted vaginal discharge - micro, seems to be menstruating. . PLAN: continue orient, follow mental status, swallow eval, Psych eval . DC abx as normal sylvia on cultures. Ok to transfer to floor. Holding mesalamine for time being. Will obtain sitter given unknown context though  adamantly denies.     Donald Rod MD  L3

## 2018-07-20 NOTE — CONSULTS
"Consult Date:  07/20/2018      PSYCHIATRIC CONSULTATION       IDENTIFICATION:  Ms. Bibi Song is a 48-year-old  white female who was admitted to our Intensive Care Unit after an overdose on Klonopin and amitriptyline.  There is a possibility that she also developed an aspiration pneumonia.  I am asked to evaluate her medication management by Dr. Maher.      Prior to interviewing this patient, I had an opportunity to discuss the case with the Medicine and Intensive Care Unit treatment team and also review the electronic medical record, in particular a psychological evaluation completed by Dr. Rouse in 2011.  Although that evaluation was completed several years ago, it is still very helpful.  It suggests that this patient has had obsessive-compulsive disorder since childhood.  She also has suffered from depression and alcohol use disorder.  The patient has had 1 brief experience at [facility] and has not completed a CD program.  Throughout her life, her biggest issue seems to be her obsessive-compulsive disorder which has been treated with Zoloft 200 and various doses of Geodon.  The patient was on Wellbutrin augmentation.  This was recently discontinued, and she was started on low-dose amitriptyline.  The psychological evaluation also suggests that the patient's  has had some anger issues and that the patient's family has a good deal of problems with OCD and chemical dependency.  The patient's mother and brother apparently had depression.  There were multiple family members with alcoholism.      On my interview, the patient reports that she did not attempt to kill herself but rather, \"When I get started, I just can't stop.  I'm an addictive personality, and I was obsessive.\"  This explanation is somewhat difficult to understand given the fact that she has been on Klonopin for a very long time and this is her first overdose.  When I asked the patient what was going on in her life around the time of " this overdose, she replies nothing special.  She suggests that she is getting along well with her  and family, yet when I talked to her about symptoms of depression, she denies them while she is crying and appearing extremely dysphoric.  I also note that she was quite confused on bedside mental status testing.  She did not know what hospital we were in.  She did not know the month.  She could not name the President or do the Presidents prior to Obama.  She was unable to spell the word world backwards given 2 attempts.  So she apparently continues to have quite a bit of encephalopathy.      According to the treatment team, the patient's  is minimizing this issue.  He reports that she just got confused and took too many pills.  Once again, this seems somewhat unlikely, few people would overdose on a tricyclic antidepressant simply because they were confused or obsessive.      Her treatment regimen is somewhat unusual.  The patient tells me she has never been treated with fluoxetine, fluvoxamine, or clomipramine.  She reports that Geodon is for her OCD, which may well be the case.  She has been on Klonopin for a long period of time and tells me that she just cannot stop taking them because she is addicted to them, implying that she often abuses them, though this is somewhat unclear.  Given her tendency to take too much medication, it seems a tricyclic antidepressant is a poor choice and, in the future, I would recommend she not be on a benzodiazepine or a tricyclic antidepressant.  I talked to the patient about going to Psychiatry for medication adjustment.  She reports that would probably be a good idea.  Later on in the interview, she suddenly tells me she just wants to go home.  When I discussed going to psych a second time for medication adjustment, she again agreed with that idea.  I also note that this patient has been drinking alcohol and using variable doses of Klonopin, so she continues to be at  risk for both potential withdrawal syndromes.  I did discuss this with the treatment team, who will give her thiamine and start an alcohol withdrawal protocol.  At the present time, it is difficult to know if her confusion is secondary to some form of withdrawal versus having taken a large number of long-acting benzodiazepine.  She may well still simply be under the influence of Klonopin.  It appears she took 45 Klonopin and 45 of the amitriptyline.  That would give her a total amitriptyline dose of 1125.  Although that is a potentially fatal dose, generally people do relatively well with 1000 mg and, in this case, the patient did not develop any cardiac abnormalities.  The concern with tricyclic overdose is for heart block which did not develop in this patient.      PAST MEDICAL HISTORY:  The patient reports she has thyroid disease and a history of chronic back pain.      ALLERGIES:  SHE HAS NO KNOWN ALLERGIES.      FAMILY HISTORY:  According to the records, the mother and brother have depression.  Mother and 3 brothers have anxiety.  There is a cousin with an eating disorder.  Father and mother and 2 brothers struggle with alcohol dependency.      SOCIAL HISTORY:  The patient is  and has a son and daughter.  She grew up in [place].  Her parents got  when she was in her 20s.  She had 4 brothers and reports her childhood as chaotic.  She has 1 DWI at age 19.  She tends to drink beer and reports that when she starts drinking, she simply cannot stop.      REVIEW OF SYSTEMS:  The patient reports she does have a headache.  She denied problems with vision or hearing.  She denied chest pain or shortness of breath, abdominal pain, diarrhea or constipation.  She does have a urinary catheter in place.  She denied problems with muscles, skin or joints.  She reports thyroid disease.      MENTAL STATUS EXAMINATION:  On my interview, the patient was pleasant and cooperative, but tended to minimize symptoms.   Her mood was depressed.  Her affect was often very tearful.  Her speech was generally coherent and goal oriented with associations tight.  Content of thought was reportedly without psychosis or suicidal ideation.  However, I was not sure that she was a very reliable historian.  Recent memory was quite impaired.  She answered I do not know or cannot remember to multiple questions.  Her long-term memory was more difficult to assess, but likely somewhat impaired, as was fund of knowledge and concentration.  Use of language was within normal limits.  On basic bedside testing, she knew she was in a hospital, but thought it was [facility].  She did not know the month.  She could not name the President.  When I reminded her that it was Trump, she was unable to name Presidents prior to Obama.  She was completely unable to spell the word world backwards given 2 attempts.  Insight and judgment are quite guarded.  Muscle strength and tone appear to be at baseline.  Recent vital signs include a temperature of 98.7, heart rate of 90, respirations of 22 with 95% oxygen saturation, and a blood pressure of 107/74.      ASSESSMENT:  Currently, delirium likely secondary to benzodiazepine overdose.  Also, obsessive-compulsive disorder and major depressive disorder.      RECOMMENDATIONS:  For now, I would not restart medications.  Continue to monitor for clearing of mental status.  When the patient is medically stable, I would recommend transfer to Psychiatry for medication adjustment and safety.  I would not put this patient back on a benzodiazepine or a tricyclic antidepressant and would recommend a chemical dependency evaluation.  When an inpatient, one could consider treating OCD with more standard treatment such as high-dose fluoxetine.         AMANDA DIAZ MD             D: 2018   T: 2018   MT: DT      Name:     ANA GARCIA   MRN:      [id number]        Account:       [id number]   :      1970            Consult Date:  07/20/2018      Document: [id number]

## 2018-07-20 NOTE — PROGRESS NOTES
MICU Progress Note  Bibi Song MRN: [id number]  1970  Date of Admission:7/18/2018     Assessment/Plan   Bibi Song is a 49yo female with history of MDD, NARCISA, and OCD who presented from [facility] ED after amitriptyline and clonazepam overdose of unknown intent with slurred speech and depressed level of consciousness. She came to MICU sedated and intubated. She was extubated on 7/20 and overall is improving.    Changes for 7/20:  - transfer to medicine, Gold 9   - MEd team to consider Psych consult, discuss resuming PTA meds  - dc vancomycin  - Speech eval    Neuro/Psych:  #Toxic encephalopathy 2/2 TCA and benzodiazepine ingestion of unknown intent: Improved. Encephalopathy could be associated with medication overdose or from withdrawal from psych meds. Potential concern for withdrawal (namely benzos and TCA).  #MDD, NARCISA: Unsure effects of immediately stopping psych meds. Could be contributing to encephalopathy.   - Precedex gtt off  - Med team to consider Psych consult; appreciate recs on resuming PTA meds  - Bedside attendant placed   - Monitor electrolytes  - Suicidal risk assessment when oriented    Resp:  #Acute Hypoxemic Respiratory Failure 2/2 toxic encephalopathy vs aspiration pneumonia, resolved: Pressure support trial with favorable outcome (saturating well, ABG with good oxygenation and normal CO2, non-labored breathing), extubated 7/19. Possible aspiration pneumonia, but has strong cough reflex.  #c/f step pneumonia: sputum production, gram stain with gram pos cocci, culture pending. CXR 7/19 with slightly increased left bibasilar/retrocardiac and medial right basilar opacities; likely atelectasis, but possibly could represent pneumonia. However, pneumonia unlikely as sputum gram stain represented normal oral sylvia.  - SLP bedside swallow study  - discontinue Vancomycin (7/18- )    CV: Hemodynamically stable, sinus rhythm with stable EKGs, no signs of cardiac ischemia (trop neg, no EKG  "changes)    GI:  #Ulcerative colitis: colonoscopy in 2010 with signs of ulcerative colitis. Per chart review, seen by primary care provider and was prescribed mesalamine suppositories 7/10. Discussed hx with her  who reports that patient has been experiencing constipation and bloody stools. Most recently with watery stools, C.diff 7/19 negative.  - Hold mesalamine supp  - Speech eval    :   #c/f vaginal infection: has been having profuse vaginal secretions. Wet prep 7/19 negative.  - GC, chlamydia pending    Renal: Improved UOP after administration of LR, creatinine and electrolytes wnl.     ID: Fevers overnight, elevated WBC 7/19 to 12.3 (from 9.2). Sputum production, gram pos cocci, cultures pending. Vaginal secretions concerning for infection. No erythema or edema at access sites.  - Sputum culture pending  - Blood cultures pending  - discontinue Vancomycin    Endo: Glucoses <120 over past 24h.   - q4 glucose checks    Heme/Onc:  #Normocytic amenia: Hgb stable from previous. No signs of active bleeding.  - Monitor CBC    Ppx:  - DVT: discontinue enoxaparin, SCDs  - Ulcer: H2 block stopped after extubation   Code: Full  Diet: SLP evaluation, currently NPO  Dispo: Stable to transfer to [facility]    Patient seen and plan above discussed with attending physician, Dr. Rod.    Maryse Baker, MS4    Resident/Fellow Attestation   I, Shaila Sears, was present with the medical student who participated in the service and in the documentation of the note.  I have verified the history and personally performed the physical exam and medical decision making.  I agree with the assessment and plan of care as documented in the note.      Shaila Sears MD  PGY3  Date of Service (when I saw the patient): 07/20/18       Subjective/Interval Events   Nausea overnight, given zofran.  Loose stools, c.diff negative.     Objective   Vital Signs: Reviewed    Physical Exam:  Neuro: Tired, mood is \"okay\", " "flattened affect. Opens eyes to verbal stimuli and participates in conversation. Identifies location as \"[place]\", year as \"1986\" and unable to identify month. When asked about reason for hospitalization, she says \"I'm sick\". Follows commands (squeeze fingers, wiggles toes). Pupils 3-4mm, round, reactive. Biceps, brachioradialis, patellar, achilles reflexes 2+.   CV: RRR, normal S1, S2. No murmurs.  Resp: Oxymask in place. Bilaterally clear, no wheezes, rhonchi.  Abd: Soft, non-distended. Tenderness with deep palpation in LLQ.  Skin: cool, not diaphoretic. Ecchymoses on extremities.   Extremities: Radial and pedal pulses palpable. No LE edema.    Labs/Imaging/Meds:   BMP 7/20: stable  CBC 7/20: WBC increasing to 13.6 (prev 12.3), normocytic anemia stable, platelets stable.    "

## 2018-07-20 NOTE — PLAN OF CARE
Problem: Patient Care Overview  Goal: Plan of Care/Patient Progress Review  Outcome: Improving  D: No acute events during shift.  I/A: A&Ox2-3. Lethargic most of shift. Pt. Knows self, situation, but gets the year wrong. When asked if patient tried to kill herself, she stated no. She states that she took the pills because she is compulsive and then she started to cry. When asked if she felt safe at home, she said yes. Speech cleared pt. for clear liquids. Starting maintenance because patient has no appetite, has had no fluid intake (pt. Refused). She c/o some nausea (Zofran ordered). Sitter in room for suicide precautions. Patient menstruating.   P: Ween off oxygen, transfer to floor when bed available.

## 2018-07-20 NOTE — PLAN OF CARE
Problem: Patient Care Overview  Goal: Plan of Care/Patient Progress Review  Discharge Planner OT   Patient plan for discharge: Pt would like to return home.   Current status: Evaluation completed and treatment initiated. Pt supine inclined in bed upon arrival. Therapist educated pt/ on OT role and discussed POC/Goals for therapy. Pt required Min A and vc's for transfer supine<->seated EOB. Pt required Min-Mod A and vc's for transfer sit<->standing pivot transfer to bedside recliner chair. Pt engaged in self cares with setup, vc's and Min A. Pt slightly confused. Pt oriented to self and and year. Pt tolerated activity with VSS.   Barriers to return to prior living situation: Decreased strength/endurance, Decreased independence with functional transfers/ADLs, Impaired cognition.   Recommendations for discharge: TCU   Rationale for recommendations: Pt would benefit from continued therapy to address barriers above and to maximize functional independence and safety.        Entered by: Gamaliel Jeronimo 07/20/2018 3:49 PM

## 2018-07-20 NOTE — PLAN OF CARE
Problem: Patient Care Overview  Goal: Plan of Care/Patient Progress Review  Discharge Planner SLP   Patient plan for discharge: Unknown   Current status: Pt seen bedside for swallow evaluation per MD orders.  Pt presents with mild oropharyngeal dysphagia characterized by weakness likely related to recent intubation.  Pt tolerated trials of thin liquids and purees without overt s/s of aspiration.  Laryngeal elevation reduced with pt c/o items of increased viscosity remaining in throat with pt noted to take multiple swallows to clear.  Solid texture trials deferred due to weakness.  Recommend advance diet to clear liquids when alert and upright.  Pt will need to be upright and should take small, single sips.  ST to follow to assess diet tolerance, educate pt/caregivers, and trial advanced textures.    Barriers to return to prior living situation: Below baseline   Recommendations for discharge: TBD   Rationale for recommendations: Pending progress       Entered by: Sana Young 07/20/2018 11:10 AM

## 2018-07-20 NOTE — PROGRESS NOTES
07/20/18 1048   General Information   Onset Date 07/18/18   Start of Care Date 07/20/18   Referring Physician Erica Maher MD    Patient Profile Review/OT: Additional Occupational Profile Info See Profile for full history and prior level of function   Patient/Family Goals Statement Goals not stated at the time of evaluation.     Swallowing Evaluation Bedside swallow evaluation   Behaviorial Observations Confused   Mode of current nutrition NPO   Respiratory Status Intubated on (date);Extubated on (date);O2 Supply  (7/18/2018-7/19/2018)   Type of O2 supply High flow face mask   Comments 49 YO female with prior psych history  Presented to [facility]  with TCA ingestion.  Per  - patient recntly had change in meds (chronic amitriptyline, clonazepam Dr Naima Castellanos) - days prior noted possible increase depression, no SI. 7pm last evening,  found her down with pills  2 separate bottles of amitriptyline with ~ 44 pills missing per ED count as well missing clonazepam   Clinical Swallow Evaluation   Oral Musculature generally intact   Structural Abnormalities none present   Dentition present and adequate   Mucosal Quality adequate;dry   Mandibular Strength and Mobility intact   Oral Labial Strength and Mobility WFL   Lingual Strength and Mobility WFL   Laryngeal Function Cough;Throat clear;Swallow;Voicing initiated;Dry swallow palpated   Oral Musculature Comments WFL   Additional Documentation Yes   Clinical Swallow Eval: Thin Liquid Texture Trial   Mode of Presentation, Thin Liquids spoon;straw;fed by clinician   Volume of Liquid or Food Presented ice chips x3, 2 oz water    Oral Phase of Swallow WFL   Pharyngeal Phase of Swallow reduction in laryngeal movement   Diagnostic Statement No overt s/s of aspiration.  Pt reported persistent throat pain.     Clinical Swallow Eval: Puree Solid Texture Trial   Mode of Presentation, Puree spoon;fed by clinician   Volume of Puree Presented 1 tsp bites x2   Oral  Phase, Puree WFL   Pharyngeal Phase, Puree feeling of something stuck in throat;reduction in laryngeal movement;repeated swallows   Diagnostic Statement No overt s/s of aspiration.     Swallow Compensations   Swallow Compensations Pacing;Reduce amounts   Esophageal Phase of Swallow   Patient reports or presents with symptoms of esophageal dysphagia No   General Therapy Interventions   Planned Therapy Interventions Dysphagia Treatment   Dysphagia treatment Modified diet education;Instruction of safe swallow strategies;Compensatory strategies for swallowing   Swallow Eval: Clinical Impressions   Skilled Criteria for Therapy Intervention Skilled criteria met.  Treatment indicated.   Functional Assessment Scale (FAS) 5   Treatment Diagnosis Mild oropharyngeal dysphagia    Diet texture recommendations Clear liquid;Thin liquids   Recommended Feeding/Eating Techniques small sips/bites;maintain upright posture during/after eating for 30 mins   Demonstrates Need for Referral to Another Service dietitian;occupational therapy;physical therapy   Therapy Frequency 5 times/wk   Predicted Duration of Therapy Intervention (days/wks) 2 weeks   Anticipated Discharge Disposition inpatient rehabilitation facility   Risks and Benefits of Treatment have been explained. Yes   Patient, family and/or staff in agreement with Plan of Care Yes   Clinical Impression Comments Pt seen bedside for swallow evaluation per MD orders.  Pt presents with mild oropharyngeal dysphagia characterized by weakness likely related to recent intubation.  Pt tolerated trials of thin liquids and purees without overt s/s of aspiration.  Laryngeal elevation reduced with pt c/o items of increased viscosity remaining in throat with pt noted to take multiple swallows to clear.  Solid texture trials deferred due to weakness.  Recommend advance diet to clear liquids when alert and upright.  Pt will need to be upright and should take small, single sips.  ST to follow to  assess diet tolerance, educate pt/caregivers, and trial advanced textures.     Total Evaluation Time   Total Evaluation Time (Minutes) 13

## 2018-07-20 NOTE — PROGRESS NOTES
" 07/20/18 1400   Quick Adds   Type of Visit Initial Occupational Therapy Evaluation   Living Environment   Lives With child(shan), adult;spouse  (2 dogs )   Living Arrangements house   Home Accessibility bed and bath are not on the first floor;stairs (2 railings present);stairs within home;tub/shower is not walk in   Number of Stairs to Enter Home 1   Number of Stairs Within Home 8  (8 x 2. split level home.)   Stair Railings at Home inside, present at both sides   Transportation Available car   Self-Care   Dominant Hand left   Usual Activity Tolerance good   Current Activity Tolerance fair   Regular Exercise yes   Activity/Exercise Type walking  (4000 steps)   Exercise Amount/Frequency 20 mins;daily   Equipment Currently Used at Home none   Functional Level Prior   Ambulation 0-->independent   Transferring 0-->independent   Toileting 0-->independent   Bathing 0-->independent   Dressing 0-->independent   Eating 0-->independent   Communication 0-->understands/communicates without difficulty   Swallowing 0-->swallows foods/liquids without difficulty   Cognition 0 - no cognition issues reported   Fall history within last six months no   General Information   Onset of Illness/Injury or Date of Surgery - Date 07/18/18   Referring Physician Craig Landers MD   Patient/Family Goals Statement Pt would like to return home independently.    Additional Occupational Profile Info/Pertinent History of Current Problem Per chart, \"Bibi Song is a 47yo female with history of MDD, NARCISA, and OCD who presented from [facility] ED after amitriptyline and clonazepam overdose of unknown intent with slurred speech and depressed level of consciousness. She came to MICU sedated and intubated. She was extubated on 7/20 and overall is improving.\"   Precautions/Limitations fall precautions   Weight-Bearing Status - LUE full weight-bearing   Weight-Bearing Status - RUE full weight-bearing   Weight-Bearing Status - LLE full weight-bearing "   Weight-Bearing Status - RLE full weight-bearing   Cognitive Status Examination   Orientation person   Level of Consciousness alert;confused   Able to Follow Commands moderate impairment   Visual Perception   Visual Perception No deficits were identified;Wears glasses   Visual Perception Comments Pt able to read clock at 10ft without difficulty. Pt able to read paper at 1 ft.    Sensory Examination   Sensory Comments Pt able to feel light touch and pressure throughout BUE's and BLE's. Pt reports no changes in sensation.    Pain Assessment   Patient Currently in Pain No   Integumentary/Edema   Integumentary/Edema no deficits were identifed   Range of Motion (ROM)   ROM Comment BUE AROM WFL.    Strength   Strength Comments BUE strength grossly 4/5 MMT. Pt demonstates a mild deficit in  strength.    Mobility   Bed Mobility Comments Pt required Min A and vc's for transfer supine<->seated EOB.    Transfer Skills   Transfer Comments Min-Mod A and vc's for transfer sit<->standing pivot transfer to bedside chair.    Activities of Daily Living Analysis   Impairments Contributing to Impaired Activities of Daily Living balance impaired;cognition impaired;fear and anxiety;flexibility decreased;strength decreased   General Therapy Interventions   Planned Therapy Interventions ADL retraining;IADL retraining;bed mobility training;cognition;ROM;strengthening;stretching;transfer training;home program guidelines;progressive activity/exercise   Clinical Impression   Criteria for Skilled Therapeutic Interventions Met yes, treatment indicated   OT Diagnosis Decreased independence with functional transfers and ADLs.    Influenced by the following impairments Decreased strength, and impaired cognition.    Assessment of Occupational Performance 3-5 Performance Deficits   Identified Performance Deficits Decreased independence with functional transfers and ADLS.    Clinical Decision Making (Complexity) Low complexity   Therapy Frequency  "5 times/wk   Predicted Duration of Therapy Intervention (days/wks) 7/26/2018   Anticipated Discharge Disposition Transitional Care Facility   Risks and Benefits of Treatment have been explained. Yes   Patient, Family & other staff in agreement with plan of care Yes   [facility] TM \"6 Clicks\"   2016, Trustees of [facility], under license to [employer].  All rights reserved.   6 Clicks Short Forms Daily Activity Inpatient Short Form   [facility]  \"6 Clicks\" Daily Activity Inpatient Short Form   1. Putting on and taking off regular lower body clothing? 2 - A Lot   2. Bathing (including washing, rinsing, drying)? 2 - A Lot   3. Toileting, which includes using toilet, bedpan or urinal? 2 - A Lot   4. Putting on and taking off regular upper body clothing? 3 - A Little   5. Taking care of personal grooming such as brushing teeth? 3 - A Little   6. Eating meals? 3 - A Little   Daily Activity Raw Score (Score out of 24.Lower scores equate to lower levels of function) 15   Total Evaluation Time   Total Evaluation Time (Minutes) 10     "

## 2018-07-20 NOTE — PLAN OF CARE
Brief medicine note:  - Circumstances of admission noted after reviewing multiple notes including H&P, initial psych consult note and last progress note by Dr. Meier-Pass today. Gold 9 now primary team and sticky note updated. Currently pt adamantly does not want to be admitted to inpatient psychiatric unit when medically stabilized. Desires to go home as she denies overdose was a SA and currently denies SI. In interim, will reconsult psychiatry for second opinion now that pt awake and alert. Continue suicide precautions and bedside attendant in interim. Also d/w pt's RN and will start maintenance IVFs, prn Zofran and clear diet (based on today's speech eval). Next formal medicine progress note tomorrow. D/w attending Dr. Stephenson. Please feel free to call with questions.     ADDENDUM:  D/w psychiatrist after writing above note and pt does not need to go to inpatient psychiatric unit since she denies overdose was a SA and currently not suicidal. Per psych rec's will restart PTA Geodon and sertraline and if medically stable into tomorrow am, can most likely be discharged.       Sebastian Jackson PA-C  Internal Medicine Hospitalist   [facility]  [phone]

## 2018-07-21 ENCOUNTER — APPOINTMENT (OUTPATIENT)
Dept: SPEECH THERAPY | Facility: CLINIC | Age: 48
End: 2018-07-21
Attending: ANESTHESIOLOGY
Payer: COMMERCIAL

## 2018-07-21 ENCOUNTER — APPOINTMENT (OUTPATIENT)
Dept: PHYSICAL THERAPY | Facility: CLINIC | Age: 48
End: 2018-07-21
Attending: ANESTHESIOLOGY
Payer: COMMERCIAL

## 2018-07-21 ENCOUNTER — APPOINTMENT (OUTPATIENT)
Dept: OCCUPATIONAL THERAPY | Facility: CLINIC | Age: 48
End: 2018-07-21
Attending: ANESTHESIOLOGY
Payer: COMMERCIAL

## 2018-07-21 VITALS
RESPIRATION RATE: 16 BRPM | DIASTOLIC BLOOD PRESSURE: 58 MMHG | BODY MASS INDEX: 39.25 KG/M2 | WEIGHT: 235.89 LBS | OXYGEN SATURATION: 96 % | TEMPERATURE: 98.2 F | SYSTOLIC BLOOD PRESSURE: 106 MMHG

## 2018-07-21 LAB
ANION GAP SERPL CALCULATED.3IONS-SCNC: 10 MMOL/L (ref 3–14)
BACTERIA SPEC CULT: ABNORMAL
BACTERIA SPEC CULT: ABNORMAL
BUN SERPL-MCNC: 3 MG/DL (ref 7–30)
CALCIUM SERPL-MCNC: 7.9 MG/DL (ref 8.5–10.1)
CHLORIDE SERPL-SCNC: 108 MMOL/L (ref 94–109)
CO2 SERPL-SCNC: 21 MMOL/L (ref 20–32)
CREAT SERPL-MCNC: 0.6 MG/DL (ref 0.52–1.04)
ERYTHROCYTE [DISTWIDTH] IN BLOOD BY AUTOMATED COUNT: 14.6 % (ref 10–15)
GFR SERPL CREATININE-BSD FRML MDRD: >90 ML/MIN/1.7M2
GLUCOSE BLDC GLUCOMTR-MCNC: 112 MG/DL (ref 70–99)
GLUCOSE BLDC GLUCOMTR-MCNC: 81 MG/DL (ref 70–99)
GLUCOSE BLDC GLUCOMTR-MCNC: 92 MG/DL (ref 70–99)
GLUCOSE SERPL-MCNC: 112 MG/DL (ref 70–99)
HCT VFR BLD AUTO: 31.5 % (ref 35–47)
HGB BLD-MCNC: 10.2 G/DL (ref 11.7–15.7)
HGB BLD-MCNC: 10.5 G/DL (ref 11.7–15.7)
MCH RBC QN AUTO: 29.2 PG (ref 26.5–33)
MCHC RBC AUTO-ENTMCNC: 32.4 G/DL (ref 31.5–36.5)
MCV RBC AUTO: 90 FL (ref 78–100)
PLATELET # BLD AUTO: 260 10E9/L (ref 150–450)
POTASSIUM SERPL-SCNC: 3.4 MMOL/L (ref 3.4–5.3)
RBC # BLD AUTO: 3.49 10E12/L (ref 3.8–5.2)
SODIUM SERPL-SCNC: 139 MMOL/L (ref 133–144)
SPECIMEN SOURCE: ABNORMAL
WBC # BLD AUTO: 10.2 10E9/L (ref 4–11)

## 2018-07-21 PROCEDURE — 40000556 ZZH STATISTIC PERIPHERAL IV START W US GUIDANCE

## 2018-07-21 PROCEDURE — 25000125 ZZHC RX 250: Performed by: PHYSICIAN ASSISTANT

## 2018-07-21 PROCEDURE — 99207 ZZC APP CREDIT; MD BILLING SHARED VISIT: CPT | Performed by: PHYSICIAN ASSISTANT

## 2018-07-21 PROCEDURE — 36415 COLL VENOUS BLD VENIPUNCTURE: CPT | Performed by: HOSPITALIST

## 2018-07-21 PROCEDURE — 97535 SELF CARE MNGMENT TRAINING: CPT | Mod: GO

## 2018-07-21 PROCEDURE — 80048 BASIC METABOLIC PNL TOTAL CA: CPT | Performed by: STUDENT IN AN ORGANIZED HEALTH CARE EDUCATION/TRAINING PROGRAM

## 2018-07-21 PROCEDURE — 25000132 ZZH RX MED GY IP 250 OP 250 PS 637: Performed by: STUDENT IN AN ORGANIZED HEALTH CARE EDUCATION/TRAINING PROGRAM

## 2018-07-21 PROCEDURE — 99239 HOSP IP/OBS DSCHRG MGMT >30: CPT | Performed by: INTERNAL MEDICINE

## 2018-07-21 PROCEDURE — 92526 ORAL FUNCTION THERAPY: CPT | Mod: GN

## 2018-07-21 PROCEDURE — 36415 COLL VENOUS BLD VENIPUNCTURE: CPT | Performed by: PHYSICIAN ASSISTANT

## 2018-07-21 PROCEDURE — 85018 HEMOGLOBIN: CPT | Performed by: PHYSICIAN ASSISTANT

## 2018-07-21 PROCEDURE — 00000146 ZZHCL STATISTIC GLUCOSE BY METER IP

## 2018-07-21 PROCEDURE — 40000133 ZZH STATISTIC OT WARD VISIT

## 2018-07-21 PROCEDURE — 40000225 ZZH STATISTIC SLP WARD VISIT

## 2018-07-21 PROCEDURE — 25000132 ZZH RX MED GY IP 250 OP 250 PS 637: Performed by: HOSPITALIST

## 2018-07-21 PROCEDURE — 25000128 H RX IP 250 OP 636: Performed by: PHYSICIAN ASSISTANT

## 2018-07-21 PROCEDURE — 25000132 ZZH RX MED GY IP 250 OP 250 PS 637: Performed by: PHYSICIAN ASSISTANT

## 2018-07-21 PROCEDURE — 97161 PT EVAL LOW COMPLEX 20 MIN: CPT | Mod: GP

## 2018-07-21 PROCEDURE — 36415 COLL VENOUS BLD VENIPUNCTURE: CPT | Performed by: STUDENT IN AN ORGANIZED HEALTH CARE EDUCATION/TRAINING PROGRAM

## 2018-07-21 PROCEDURE — 87040 BLOOD CULTURE FOR BACTERIA: CPT | Performed by: HOSPITALIST

## 2018-07-21 PROCEDURE — 85027 COMPLETE CBC AUTOMATED: CPT | Performed by: STUDENT IN AN ORGANIZED HEALTH CARE EDUCATION/TRAINING PROGRAM

## 2018-07-21 PROCEDURE — 40000193 ZZH STATISTIC PT WARD VISIT

## 2018-07-21 PROCEDURE — 25000128 H RX IP 250 OP 636: Performed by: HOSPITALIST

## 2018-07-21 PROCEDURE — 97530 THERAPEUTIC ACTIVITIES: CPT | Mod: GP

## 2018-07-21 RX ORDER — LOPERAMIDE HCL 2 MG
2 CAPSULE ORAL 4 TIMES DAILY PRN
Status: DISCONTINUED | OUTPATIENT
Start: 2018-07-21 | End: 2018-07-21 | Stop reason: HOSPADM

## 2018-07-21 RX ORDER — BENZONATATE 100 MG/1
100 CAPSULE ORAL 3 TIMES DAILY PRN
Status: DISCONTINUED | OUTPATIENT
Start: 2018-07-21 | End: 2018-07-21 | Stop reason: HOSPADM

## 2018-07-21 RX ADMIN — THIAMINE HYDROCHLORIDE 500 MG: 100 INJECTION, SOLUTION INTRAMUSCULAR; INTRAVENOUS at 00:58

## 2018-07-21 RX ADMIN — BENZONATATE 100 MG: 100 CAPSULE, LIQUID FILLED ORAL at 02:14

## 2018-07-21 RX ADMIN — ACETAMINOPHEN 650 MG: 325 TABLET, FILM COATED ORAL at 08:31

## 2018-07-21 RX ADMIN — BENZONATATE 100 MG: 100 CAPSULE, LIQUID FILLED ORAL at 08:30

## 2018-07-21 RX ADMIN — LORAZEPAM 0.5 MG: 0.5 TABLET ORAL at 06:11

## 2018-07-21 RX ADMIN — LEVOTHYROXINE SODIUM 75 MCG: 75 TABLET ORAL at 08:32

## 2018-07-21 RX ADMIN — SERTRALINE HYDROCHLORIDE 200 MG: 100 TABLET ORAL at 08:30

## 2018-07-21 RX ADMIN — THIAMINE HYDROCHLORIDE 500 MG: 100 INJECTION, SOLUTION INTRAMUSCULAR; INTRAVENOUS at 08:30

## 2018-07-21 RX ADMIN — ZIPRASIDONE HCL 80 MG: 40 CAPSULE ORAL at 08:31

## 2018-07-21 RX ADMIN — MICONAZOLE NITRATE: 2 POWDER TOPICAL at 10:23

## 2018-07-21 RX ADMIN — PANTOPRAZOLE SODIUM 40 MG: 40 INJECTION, POWDER, FOR SOLUTION INTRAVENOUS at 08:32

## 2018-07-21 RX ADMIN — ONDANSETRON 4 MG: 4 TABLET, ORALLY DISINTEGRATING ORAL at 00:17

## 2018-07-21 RX ADMIN — PROCHLORPERAZINE EDISYLATE 5 MG: 5 INJECTION INTRAMUSCULAR; INTRAVENOUS at 02:14

## 2018-07-21 RX ADMIN — POTASSIUM CHLORIDE 20 MEQ: 750 TABLET, EXTENDED RELEASE ORAL at 11:52

## 2018-07-21 ASSESSMENT — PAIN DESCRIPTION - DESCRIPTORS: DESCRIPTORS: SORE

## 2018-07-21 ASSESSMENT — ACTIVITIES OF DAILY LIVING (ADL)
ADLS_ACUITY_SCORE: 13
ADLS_ACUITY_SCORE: 14

## 2018-07-21 NOTE — PLAN OF CARE
"Problem: Overdose, Ingestion/Inhalants (Adult)  Goal: Signs and Symptoms of Listed Potential Problems Will be Absent, Minimized or Managed (Overdose, Ingestion/Inhalants)  Signs and symptoms of listed potential problems will be absent, minimized or managed by discharge/transition of care (reference Overdose, Ingestion/Inhalants (Adult) CPG).   Outcome: No Change  No acute changes from 3850-5619. Patient transferred to  with sitter for suicide precautions. Neurologically patient is lethargic, slowly starting to reorient to surroundings, oriented x4 except unable to state accurate name of hospital- just knows \"hospital\" and unable to state accurate date- but knows month and year. Slow to respond. No other focal neuro deficits. PERRLA. Resting between cares. Satting 88-93% on RA to 2LPM with sleep prn. Appears to have underlying RACIEL? LS CTA b/l. CV- one episode of tachycardia to 140's- MD aware and EKG ordered- down to ST by the time the 12-lead was completed. Ativan for anxiety- probably panic attack. Afebrile. +2 pulses. GI/- carlin removed, voided at 2000. [place]op. Loose stools. Cdiff negative. Clear liquid diet. No concerns at this time. Discharge within 1-2 days dispo pending.       "

## 2018-07-21 NOTE — PROGRESS NOTES
Brief Medicine Note    Contacted by RN regarding patient with loose black stool. No BRB noted.  VSS. Hx of UC- currently holding mesalamine. Patient received Charcoal at OSH 7/18. Will place patient on BID PPI, continue NPO for now, ensure 2 large bore IV's. Continue to closely monitor. Will check Hgb.   - Discussed with bedside RN  - Check hgb  - 2 large bore IV's  - BG 81. Change to D5 0.45% NS. Give thiamine before Dextrose containing fluids.     /62 (BP Location: Right arm)  Temp 99.1  F (37.3  C) (Oral)  Resp 22  Wt 105.1 kg (231 lb 11.3 oz)  SpO2 91%  BMI 38.56 kg/m2    Tsering Roberts PA-C  Internal Medicine Hospitalist Service  [facility]  Pager: [phone]

## 2018-07-21 NOTE — PLAN OF CARE
"Problem: Patient Care Overview  Goal: Plan of Care/Patient Progress Review  Outcome: No Change  Temp:  [98.3  F (36.8  C)-100.1  F (37.8  C)] 98.5  F (36.9  C)  Heart Rate:  [] 99  Resp:  [10-34] 22  BP: (105-126)/(52-90) 123/76  SpO2:  [88 %-96 %] 93 %  Shift 6893-0080    Neuro: Disoriented to place (knew it was [facility] but not that it is a hospital), time, and situation intermittently. Oriented to self and that she \"took too many pills\" that brought her here. Arouses to voice. Slept on/off after 2am.   Cardiac: SR/ST. 100.1 temp overnight.  Respiratory:  2L, desaturates to 87-88% on RA.   GI/: several watery/black stools, hgb check 10.5. Due to urine and stool combining in BSC, unable to get accurate measurement of urine, bladder scan for 75cc.  Diet/appetite: NPO. MIV D5NS@100/hr. BG increased from 81 to 92 with change of MIV. Nauseated, zofran and compazine given with some relief per pt.   Activity: assist x1 to BSC.   Pain: denies.   Skin: bruising to inner thighs and hip.       Continue with POC. Notify primary team with changes.      "

## 2018-07-21 NOTE — PROVIDER NOTIFICATION
Notified cross cover:    Pt restless/anxious this morning. Wants to leave room and does not know where she is. RN inquired about plan for this pt regarding delirium vs. mssa protocol.     Prn ativan given and day team to discuss plan with day RN regarding any changes.

## 2018-07-21 NOTE — PROVIDER NOTIFICATION
Discussed with cross cover [place], PA:    -black watery stool. PPI ordered, remain NPO.   -BG 81, NPO status, cleared by speech. IV fluids changed, remain NPO.   -PRN ativan for anxiety, inquired about MSSA. None ordered at this time continue with ativan for anxiety,watch for signs of withdrawal.

## 2018-07-21 NOTE — PROGRESS NOTES
DISCHARGE                         No discharge date for patient encounter.  ----------------------------------------------------------------------------  Discharged to: Home  Via: private transportation  Accompanied by: Family  Discharge Instructions: diet, activity, medications, follow up appointments, when to call the MD, aftercare instructions.  Prescriptions: None  Follow Up Appointments: arranged; information given  Belongings: All sent with pt  IV: d/c'd  Telemetry: d/c'd  Pt exhibits understanding of above discharge instructions; all questions answered.    Discharge Paperwork: Signed, copied, and sent home with patient.

## 2018-07-21 NOTE — PROGRESS NOTES
07/21/18 1100   Quick Adds   Type of Visit Initial PT Evaluation   Living Environment   Lives With child(shan), adult;spouse   Living Arrangements house   Home Accessibility stairs to enter home;stairs within home   Number of Stairs to Enter Home 1   Number of Stairs Within Home 8  (split level home, 8 steps up or down)   Stair Railings at Home inside, present at both sides   Transportation Available car;family or friend will provide   Living Environment Comment Patient lives with her  and 20 y/o son. Patient works full-time at [employer],  also works full-time.   Self-Care   Dominant Hand left   Usual Activity Tolerance good   Current Activity Tolerance moderate   Regular Exercise yes   Activity/Exercise Type walking   Exercise Amount/Frequency 20 mins;daily   Equipment Currently Used at Home none   Functional Level Prior   Ambulation 0-->independent   Transferring 0-->independent   Toileting 0-->independent   Bathing 0-->independent   Dressing 0-->independent   Eating 0-->independent   Communication 0-->understands/communicates without difficulty   Swallowing 0-->swallows foods/liquids without difficulty   Cognition 0 - no cognition issues reported   Fall history within last six months no   Which of the above functional risks had a recent onset or change? ambulation;transferring   Prior Functional Level Comment IND with all functional mobility and ADLs   General Information   Onset of Illness/Injury or Date of Surgery - Date 07/18/18   Referring Physician Craig Landers MD   Patient/Family Goals Statement to return home   Pertinent History of Current Problem (include personal factors and/or comorbidities that impact the POC) Bibi Song is a 47yo female with history of MDD, NARCISA, and OCD who presented from [facility] ED after amitriptyline and clonazepam overdose of unknown intent with slurred speech and depressed level of consciousness. She came to MICU sedated and intubated. She was extubated on  "7/20 and overall is improving.   Precautions/Limitations fall precautions   General Observations Patient greeted sitting up in chair, agreeable to PT, RN ok'd session.   General Info Comments Activity: up with assist   Cognitive Status Examination   Orientation orientation to person, place and time   Level of Consciousness alert;confused   Follows Commands and Answers Questions 100% of the time   Pain Assessment   Patient Currently in Pain No   Posture    Posture Protracted shoulders   Range of Motion (ROM)   ROM Comment BLE WFL   Strength   Strength Comments BLE WFL   Bed Mobility   Bed Mobility Comments N/A   Transfer Skills   Transfer Comments sit<>stand SBA   Gait   Gait Comments ambulates with SBA, no AD, demonstrates mild path deviations and occasional uneven gait pattern   Balance   Balance Comments mild balance impairments during gait   Clinical Impression   Criteria for Skilled Therapeutic Intervention yes, treatment indicated   PT Diagnosis impaired functional mobility   Influenced by the following impairments deconditioning, balance impairments, cognition   Functional limitations due to impairments safe and independent gait, transfers and stair navigation   Clinical Presentation Stable/Uncomplicated   Clinical Presentation Rationale clinical judgement   Clinical Decision Making (Complexity) Low complexity   Therapy Frequency` (1x treatment)   Predicted Duration of Therapy Intervention (days/wks) 1x treatment   Anticipated Discharge Disposition Home with Assist  (vs inpatient psych)   Risk & Benefits of therapy have been explained Yes   Patient, Family & other staff in agreement with plan of care Yes   [facility] TM \"6 Clicks\"   2016, Trustees of [facility], under license to [employer].  All rights reserved.   6 Clicks Short Forms Basic Mobility Inpatient Short Form   [facility]  \"6 Clicks\" V.2 Basic Mobility Inpatient Short Form   1. Turning from your back to your side " while in a flat bed without using bedrails? 4 - None   2. Moving from lying on your back to sitting on the side of a flat bed without using bedrails? 4 - None   3. Moving to and from a bed to a chair (including a wheelchair)? 4 - None   4. Standing up from a chair using your arms (e.g., wheelchair, or bedside chair)? 4 - None   5. To walk in hospital room? 4 - None   6. Climbing 3-5 steps with a railing? 4 - None   Basic Mobility Raw Score (Score out of 24.Lower scores equate to lower levels of function) 24   Total Evaluation Time   Total Evaluation Time (Minutes) 5

## 2018-07-21 NOTE — PROGRESS NOTES
7/21/2018: Social Work Services Progress Note    Hospital Day: 4  Date of Initial Social Work Evaluation:  7/18/18  Collaborated with:  Patient's     Data:  Patient moved last night from ICU to 6th floor with some confusion. Nursing note 7/20 shows possible discharge this weekend.    Patient would like to discharge home.  OT recommends TCU. No TCU referrals have been made.    Intervention:    Provided  with additional meal cards. Assessment complete 7/18, and  available to take patient home in his vehicle.  wanting what is best for wife and willing to discuss additional options.    Assessment:  D/C Planning Home vs. TCU    Plan:    Anticipated Disposition:  Home or TCU    Barriers to d/c plan:  Patient/ decision on discharge    Follow Up:  SW following    PADMA Ponce, LAUREEN  ICU 4A, 4C, 4E   Ph: [phone]

## 2018-07-21 NOTE — PLAN OF CARE
Problem: Patient Care Overview  Goal: Plan of Care/Patient Progress Review  Discharge Planner SLP   Patient plan for discharge: not discussed  Current status: The patient was seen for dysphagia f/u this AM. She is appropriate for advancement to a regular texture diet and thin liquids. Due to AMS, recommend caregivers ensure she sit fully upright, take small bites/sips, and alternate consistencies to promote safe swallowing. SLP to f/u for diet tolerance with anticipation that she will reach goals in the next session.  Barriers to return to prior living situation: AMS, no swallowing barriers anticipated  Recommendations for discharge: defer to OT/PT, medical team  Rationale for recommendations: expect patient to reach SLP goals prior to hospital discharge       Entered by: Juany Guzman 07/21/2018 11:06 AM

## 2018-07-21 NOTE — PLAN OF CARE
Problem: Patient Care Overview  Goal: Plan of Care/Patient Progress Review    6B / Discharge Planner PT   Patient plan for discharge: Hopeful to discharge home  Current status: PT evaluation completed, patient ambulated hallways and navigated steps with SBA, mild balance impairments but likely due to first time walking in a few days. Patient is alert &oriented x4 during session, however seems to have some cognition impairments and/or delayed processing, did not recall working with occupational therapist yesterday. Notified OT who plans to see patient to further evaluate cognitive status.  Barriers to return to prior living situation: medical clearance, potential inpatient psych needs, cognition  Recommendations for discharge: From a functional mobility standpoint patient would be safe to d/c home with assist from family, may need inpatient psych per medical team.  Rationale for recommendations: Patient is mobilizing well, anticipate activity tolerance and functional mobility will continue to improve as medical status/AMS improves. Patient has support at home from family.       Entered by: Kendrick Hale 07/21/2018 12:41 PM       Physical Therapy Discharge Summary    Reason for therapy discharge:    Discharge to home    Progress towards therapy goal(s). See goals on Care Plan in [facility] electronic health record for goal details.  Goals partially met, but adequate for discharge. Barriers to achieving goals: discharge from facility    Therapy recommendation(s):    No further physical therapy recommended at this time. Anticipate patient's safety and functional independence will progress with increased mobility

## 2018-07-21 NOTE — DISCHARGE SUMMARY
[facility]    Internal Medicine Discharge Summary- Gold Service    Date of Admission:  7/18/2018  Date of Discharge:  7/21/2018  Discharging Provider: Sebastian Jackson PA-C; Dr. Arun Stephenson MD  Discharge Team: Gold 9    Discharge Diagnoses   S/p unintentional overdose  MDD  NARCISA  Chronic ulcerative colitis      Follow-ups Needed After Discharge   Follow up with your family physician next week for hospital follow up and to get referral to new psychiatrist.     Hospital Course   Bibi Song is a 47yo female with history of UC, MDD, and OCD admitted to [facility] from OSH ED after unintentional amitriptyline and clonazepam overdose with slurred speech and depressed level of consciousness necessitating intubation.     S/p unintentional overdose:  Pt states that due to her OCD she unintentially and impulsively took a large amount of amitriptyline and clonazepam. Found by  down and pt was brought initially to OSH ED where she intubated 2/2 encephalopathy and airway protection. Given one dose of charcoal and transferred to [facility] for further cares. After transfer, pt's clinical course was relatively unremarkable. She was successfully extubated and her encephalopathy resolved. Psychiatry consulted and felt pt was not holdable as pt denied any SI or HI. Psychiatry recommended to restart both Geodon and sertraline but to indefinitely hold amitriptyline and clonazapam, and to follow up with her PCP for referral to another psychiatrist.     MDD, OCD: Per psych consult, continue PTA Geodon and sertraline, but hold indefinitely amitriptyline and Klonopin until seen by new psychiatrist via new referral by your PCP. At time of discharge pt was oriented in all spheres, denied SI, HI, and contracted for safety.     Chronic ulcerative colitis: Pt had blackish stools after over dose, however, these were deemed 2/2 receiving charcoal at OSH as her Hgb remained stable. Resume PTA sulfasalazine  suppositories as prescribed after discharge.       # Discharge Pain Plan:   - Patient currently has NO PAIN and is not being prescribed pain medications on discharge.    Consultations This Hospital Stay   PHYSICAL THERAPY ADULT IP CONSULT  OCCUPATIONAL THERAPY ADULT IP CONSULT  MEDICATION HISTORY IP PHARMACY CONSULT  PHARMACY TO DOSE VANCO  VASCULAR ACCESS CARE ADULT IP CONSULT  SWALLOW EVAL SPEECH PATH AT BEDSIDE IP CONSULT  PSYCHIATRY IP CONSULT  PSYCHIATRY IP CONSULT  VASCULAR ACCESS CARE ADULT IP CONSULT     Code Status   Full Code    Time Spent on this Encounter   I, Steve Jackson, personally saw the patient today and spent greater than 30 minutes discharging this patient.     Sebastian Jackson PA-C  Internal Medicine Hospitalist   [facility]  [phone]   ______________________________________________________________________    Physical Exam   Vital Signs: Temp: 98.2  F (36.8  C) Temp src: Oral BP: 106/58 (sitting up in a chair)   Heart Rate: 97 Resp: 16 SpO2: 96 % O2 Device: None (Room air) Oxygen Delivery: 2 LPM  Weight: 235 lbs 14.28 oz  GEN: In NAD  HEENT: NCAT; PERRL; sclerae non-icteric  LUNGS: CTAB  CV: RRR  ABD: +BSs; SNTND  EXT: No BLE edema  SKIN: No acute rashes noted on exposed areas.  NEURO: AAOx3; CNs grossly intact; No acute focal deficits noted.      Significant Results and Procedures     CMP  Recent Labs  Lab 07/21/18  0658 07/20/18  0426 07/19/18  0427 07/18/18  2152 07/18/18  1534  07/18/18  0430 07/17/18  2030    140 142  --   --   --  142  142 140   POTASSIUM 3.4 3.6 4.3 3.9 3.8  < > 3.9  3.9 3.3*   CHLORIDE 108 109 111*  --   --   --  113*  112* 109   CO2 21 21 21  --   --   --  21  22 22   ANIONGAP 10 10 10  --   --   --  9  7 9   * 98 102*  --   --   --  114*  114* 92   BUN 3* 5* 3*  --   --   --  3*  4* 6*   CR 0.60 0.68 0.79  --   --   --  0.78  0.75 0.94   GFRESTIMATED >90 >90 77  --   --   --  78  82 63   GFRESTBLACK >90 >90 >90  --   --    --  >90  >90 77   JANICE 7.9* 7.9* 7.6*  --   --   --  7.6*  7.6* 7.8*   MAG  --  2.0 2.1 2.2 2.3  < > 2.5*  2.4* 1.8   PHOS  --  2.9 2.9 2.8  --   --  2.4*  2.2*  --    PROTTOTAL  --   --   --   --   --   --  6.4*  6.1* 6.6*   ALBUMIN  --   --   --   --   --   --  3.0*  3.0* 3.2*   BILITOTAL  --   --   --   --   --   --  0.2  0.2 0.2   ALKPHOS  --   --   --   --   --   --  68  63 71   AST  --   --   --   --   --   --  22  19 18   ALT  --   --   --   --   --   --  21  20 24   < > = values in this interval not displayed.     CBC  Recent Labs  Lab 07/21/18  0658 07/21/18  0048 07/20/18  0426 07/19/18  0427 07/18/18  0430   WBC 10.2  --  13.6* 12.3* 9.2  9.2   RBC 3.49*  --  3.48* 3.70* 3.85  3.85   HGB 10.2* 10.5* 10.3* 10.9* 11.5*  11.5*   HCT 31.5*  --  31.3* 33.2* 34.1*  34.1*   MCV 90  --  90 90 89  89   MCH 29.2  --  29.6 29.5 29.9  29.9   MCHC 32.4  --  32.9 32.8 33.7  33.7   RDW 14.6  --  14.8 15.2* 14.4  14.4     --  233 271 292  292       Arterial Blood Gas  Recent Labs  Lab 07/19/18  1625 07/19/18  0952 07/19/18  0920 07/18/18  0505 07/18/18  0025   PH  --   --  7.43 7.37  7.37 7.40   PCO2  --   --  35 37  37 32*   PO2  --   --  68* 95  95 86   HCO3  --   --  23 21  21 20*   O2PER 8L 35 35 Canceled, Test credited  40.0  --         Pending Results   These results will be followed up by PCP  Unresulted Labs Ordered in the Past 30 Days of this Admission     Date and Time Order Name Status Description    7/21/2018 0121 Blood culture Preliminary     7/21/2018 0121 Blood culture Preliminary     7/19/2018 0047 Blood culture Preliminary     7/19/2018 0047 Blood culture Preliminary              Primary Care Physician   Glory Singleton    Discharge Disposition   Discharged to home  Condition at discharge: Stable    Discharge Orders     Reason for your hospital stay   Medical stabilization status post unintentional overdose.     Follow Up and recommended labs and tests   Please follow up  with your family physician next week for hospital follow up and to get referral to new psychiatrist.     Activity   Your activity upon discharge: activity as tolerated     Discharge Instructions   After discharge, do NOT further take any amitriptyline or klonopin until seen by your family physician or new psychiatrist.     Full Code     Diet   Follow this diet upon discharge: Orders Placed This Encounter     Regular Diet Adult       Discharge Medications   Current Discharge Medication List      CONTINUE these medications which have NOT CHANGED    Details   melatonin 3 MG tablet Take 9 mg by mouth nightly as needed.  Qty: 30 tablet, Refills: 0      mesalamine (CANASA) 1000 MG Suppository Place 1 suppository (1,000 mg) rectally At Bedtime  Qty: 42 suppository, Refills: 0    Associated Diagnoses: Ulcerative colitis with rectal bleeding, unspecified location (H)      sertraline (ZOLOFT) 100 MG tablet Take 200 mg by mouth daily   Qty: 1 tablet, Refills: 0    Associated Diagnoses: Obsessive-compulsive disorder, unspecified type; Other specified anxiety disorders; Major depressive disorder, recurrent episode, moderate (H)      ziprasidone (GEODON) 40 MG capsule Take 2 capsules (80 mg) by mouth daily  Qty: 1 capsule         STOP taking these medications       AMITRIPTYLINE HCL PO Comments:   Reason for Stopping:         CLONAZEPAM PO Comments:   Reason for Stopping:         levothyroxine (SYNTHROID/LEVOTHROID) 75 MCG tablet Comments:   Reason for Stopping:             Allergies   No Known Allergies

## 2018-07-21 NOTE — PROVIDER NOTIFICATION
Pt having frequent cough and feeling nauseated (not due for prn zofran). Pt also has 100.1 temp.    -tessalon and compazine to be ordered.  -blood cultures for the morning.

## 2018-07-21 NOTE — PROVIDER NOTIFICATION
Notified TYREE Jackson regarding pt's frequent watery black BMs this morning. Obtained order for imodium.

## 2018-07-21 NOTE — PLAN OF CARE
Problem: Patient Care Overview  Goal: Plan of Care/Patient Progress Review  Discharge Planner OT   Patient plan for discharge: pt and  want to go home   Current status: Pt SBA for transfers and mobility in room. Pt scored 17/30 on MoCA indicating moderate cognitive impairments. Pt scored with deficits in executive function, attention, language, and delayed recall. Pt likely to have safety concerns with medication management, managing finances, driving, etc. Pt and  perseverating on discharging directly home and both appear to have decreased insight to deficits.   Barriers to return to prior living situation: cognitive impairment, mental health   Recommendations for discharge: IP Psych per medical team; follow-up with OP OT and supervision for med management, cooking, finances. Pt would benefit from further follow-up before returning to drive.   Rationale for recommendations: OP OT to address moderate cognitive impairments leading to decreased independence/safety with ADLs.        Entered by: Nerissa Coelho 07/21/2018 1:47 PM

## 2018-07-21 NOTE — PROGRESS NOTES
Transfer  Transferred from: 4C  Via:bed  Reason for transfer:Pt appropriate for 6B- improved/worsened patient condition  Family: Aware of transfer,  Sergio at bedside  Belongings: Received with pt, set of heart earrings, necklace, and fit bit given to Sergio to take home.   Chart: Received with pt  Medications: Meds received from old unit with pt  2 RN Skin Assessment Completed By: Yvonne RN and Autumn Rn  Report received from: Melani MCKEE  Pt status: disoriented x3, lethargic. VSS on 2L.

## 2018-07-22 NOTE — PLAN OF CARE
Problem: Patient Care Overview  Goal: Plan of Care/Patient Progress Review  Speech Language Therapy Discharge Summary    Reason for therapy discharge:    Discharged to home.    Progress towards therapy goal(s). See goals on Care Plan in [facility] electronic health record for goal details.  Goals partially met.  Barriers to achieving goals:   discharge from facility.    Therapy recommendation(s):    The patient was on a regular texture diet and thin liquids at the time of discharge. Do not anticipate ongoing SLP needs for swallowing safety.

## 2018-07-23 ENCOUNTER — CARE COORDINATION (OUTPATIENT)
Dept: CARE COORDINATION | Facility: CLINIC | Age: 48
End: 2018-07-23

## 2018-07-23 LAB — INTERPRETATION ECG - MUSE: NORMAL

## 2018-07-24 NOTE — PROGRESS NOTES
Patient was called three times and no answer so post 24 hr DC follow up calls will be closed out, message was left with contact number for department seen by or following up with.

## 2018-07-25 ENCOUNTER — TELEPHONE (OUTPATIENT)
Dept: FAMILY MEDICINE | Facility: CLINIC | Age: 48
End: 2018-07-25

## 2018-07-25 DIAGNOSIS — F42.9 OBSESSIVE-COMPULSIVE DISORDER, UNSPECIFIED TYPE: ICD-10-CM

## 2018-07-25 DIAGNOSIS — F32.1 MODERATE MAJOR DEPRESSION (H): ICD-10-CM

## 2018-07-25 DIAGNOSIS — F41.1 GENERALIZED ANXIETY DISORDER: Primary | ICD-10-CM

## 2018-07-25 LAB
BACTERIA SPEC CULT: NO GROWTH
BACTERIA SPEC CULT: NO GROWTH
Lab: NORMAL
Lab: NORMAL
SPECIMEN SOURCE: NORMAL
SPECIMEN SOURCE: NORMAL

## 2018-07-25 NOTE — TELEPHONE ENCOUNTER
Reason for Call: Request for an order or referral:    Order or referral being requested: referral    Date needed: as soon as possible    Has the patient been seen by the PCP for this problem? YES    Additional comments: pt calling wondering if she can get a referral and recommendations for psychiatry. She wanted to see Esme, but she is no longer with [facility]. She would like to be called when this is done.    Phone number Patient can be reached at:  Home number on file [phone] (home)    Best Time:  any    Can we leave a detailed message on this number?  YES    Call taken on 7/25/2018 at 10:26 AM by Maryjo Huggins

## 2018-07-25 NOTE — TELEPHONE ENCOUNTER
Notify patient that referral is placed. Someone should contact her with further information.    Glory Singleton M.D.

## 2018-07-26 ENCOUNTER — NURSE TRIAGE (OUTPATIENT)
Dept: NURSING | Facility: CLINIC | Age: 48
End: 2018-07-26

## 2018-07-27 NOTE — TELEPHONE ENCOUNTER
Per , patient was hospitalized and intubated for 2-3 days.  She was discharged on 7-21-18.    She is still complaining of a sore throat.  They are looking for suggestions for comfort measures.    She is coughing a little, not hard    Protocol and care advice reviewed  Caller states understanding of the recommended home care.  Advised to call back if further questions or concerns      Additional Information    [1] Sore throat is the only symptom AND [2] sore throat present < 48 hours  (all triage questions negative)    Protocols used: SORE THROAT-ADULT-

## 2018-07-30 NOTE — TELEPHONE ENCOUNTER
"Per patient ok below to leave a detailed message, I did leave her a message saying\" the referral you requested has been done and Dr Singleton referred you to :   Psychiatry       Adult Psychiatry Locations Other: [facility] [phone]     Left her the phone number to call if she hasn't heard from them , and also our number to call back if questions,   Marily Castro RNC    "

## 2018-08-06 ENCOUNTER — TELEPHONE (OUTPATIENT)
Dept: FAMILY MEDICINE | Facility: CLINIC | Age: 48
End: 2018-08-06

## 2018-08-06 NOTE — TELEPHONE ENCOUNTER
Patient needs to be reevaluated in the clinic.  She has been hospitalized since past visit with me.  Would like her to be seen by her primary provider. Jacinta Nagel RNC, NP

## 2018-08-06 NOTE — TELEPHONE ENCOUNTER
Reason for Call:  Irritable bowel issue    Detailed comments: patient is calling and was seen approx a month ago for IBS. She was given a suppository for this, but was also told their was an oral pill. Patient would like to try the oral pill now. Please call. She uses [facility] in [place].    Phone Number Patient can be reached at: Home number on file [phone] (home)    Best Time: any    Can we leave a detailed message on this number? YES   Rachael Mercado  Clinic Station  [facility]      Call taken on 8/6/2018 at 10:57 AM by Rachael Mercado

## 2018-08-06 NOTE — TELEPHONE ENCOUNTER
S-(situation): patient reports she continues to have symptoms.      B-(background): Patient has IBS    A-(assessment): patient reports she continue to have diarrhea. Patient she tried the suppository and that did not help. Patient would like to try the oral medication that was discussed at the last visit.    R-(recommendations): Will send to provider to review and advise.    Thank you  Ольга NAPOLES RN

## 2018-08-07 NOTE — TELEPHONE ENCOUNTER
Patient called back and scheduled appointment for 8/30 which is patients earliest convince  Lakesha Walters on 8/7/2018 at 4:48 PM

## 2018-08-07 NOTE — TELEPHONE ENCOUNTER
Left message for patient to call back at [phone].     Needs OV with Dr. Singleton.    Aubrie FISHER RN

## 2018-08-09 ENCOUNTER — TELEPHONE (OUTPATIENT)
Dept: FAMILY MEDICINE | Facility: CLINIC | Age: 48
End: 2018-08-09

## 2018-08-09 NOTE — TELEPHONE ENCOUNTER
Reason for Call:  questions    Detailed comments: patients  is calling and stating he needs to talk to Any F his wifes counselor. He would not given me a reason, except to say it is personal.    Phone Number Patient can be reached at: Other phone number:  Work number [phone] or cell is [phone].    Best Time: any    Can we leave a detailed message on this number? YES   Rachael Mercado  Clinic Station  [facility]      Call taken on 8/9/2018 at 12:01 PM by Rachael Mercado

## 2018-08-15 DIAGNOSIS — E03.9 HYPOTHYROIDISM, UNSPECIFIED TYPE: ICD-10-CM

## 2018-08-15 RX ORDER — LEVOTHYROXINE SODIUM 75 UG/1
75 TABLET ORAL DAILY
Qty: 90 TABLET | Refills: 1 | Status: SHIPPED | OUTPATIENT
Start: 2018-08-15 | End: 2019-01-28

## 2018-08-15 NOTE — TELEPHONE ENCOUNTER
Reason for Call:  Medication or medication refill:    Do you use a [facility]?  Name of the pharmacy and phone number for the current request:  [facility] [place] [phone]    Name of the medication requested: Levothyroxine - Pt asking for refill to get her through until her appt with MD on 8/30.  No need to call patient back, unless there are questions or problems.      Levothyroxine  Last Written Prescription Date:  5/21/18  Last Fill Quantity: 90,  # refills: 0   Last office visit: 7/10/2018 with prescribing provider:  7/10/18   Future Office Visit:   Next 5 appointments (look out 90 days)     Aug 30, 2018 10:40 AM CDT   SHORT with Glory Singleton MD   [facility] (Formerly [facility])    [id number] [facility] [address] [facility] [id number]   [phone]                 Other request:     Can we leave a detailed message on this number? YES    Phone number patient can be reached at: Home number on file [phone] (home)    Best Time: any    Call taken on 8/15/2018 at 9:55 AM by Velma Apodaca

## 2018-08-15 NOTE — TELEPHONE ENCOUNTER
"Requested Prescriptions   Pending Prescriptions Disp Refills     levothyroxine (SYNTHROID/LEVOTHROID) 75 MCG tablet 30 tablet 0     Sig: Take 1 tablet (75 mcg) by mouth daily    Thyroid Protocol Passed    8/15/2018 10:12 AM       Passed - Patient is 12 years or older       Passed - Recent (12 mo) or future (30 days) visit within the authorizing provider's specialty    Patient had office visit in the last 12 months or has a visit in the next 30 days with authorizing provider or within the authorizing provider's specialty.  See \"Patient Info\" tab in inbasket, or \"Choose Columns\" in Meds & Orders section of the refill encounter.           Passed - Normal TSH on file in past 12 months    Recent Labs   Lab Test  05/16/18   0948   TSH  1.76             Passed - No active pregnancy on record    If patient is pregnant or has had a positive pregnancy test, please check TSH.         Passed - No positive pregnancy test in past 12 months    If patient is pregnant or has had a positive pregnancy test, please check TSH.          Routing refill request to provider for review/approval because:  Drug not active on patient's medication list- for some reason it was discontinued when she had been in the hospital.   Marily Castro RNC          "

## 2018-08-21 ENCOUNTER — TELEPHONE (OUTPATIENT)
Dept: FAMILY MEDICINE | Facility: CLINIC | Age: 48
End: 2018-08-21

## 2018-08-21 NOTE — TELEPHONE ENCOUNTER
Reason for call:  Patient reporting a symptom    Symptom or request: chest congestion that is making her winded, cough    Duration (how long have symptoms been present): after hospital discharge patient got a cold and then got better and now turned in last week    Have you been treated for this before? No    Additional comments: use to get chronic bronchitis. Please advise what she can take over the counter to help until she see ariana on the 30th    Phone Number patient can be reached at:  Home number on file [phone] (home)    Best Time:  any    Can we leave a detailed message on this number:  YES    Call taken on 8/21/2018 at 3:42 PM by Lakesha Walters

## 2018-08-21 NOTE — TELEPHONE ENCOUNTER
"S-(situation): Chest congestion    B-(background): symptoms have been progressively getting worse since hospital stay.    A-(assessment): 7/17/2018 patient was in hospital on a ventilator. She reports since then she has developed a sore throat and a cold. Now, within the past week it has been progressively getting worse to the point where she is getting winded even walking up the stairs. She states \"it takes awhile to catch my breath\". She is reporting \"a tight feeling in her lungs and when she drinks a lot of water it seems to loosen up\". Patient reports the cough is here and there but not productive, \"not a bad cough\". Patient denies feeling dizzy, weak, n/v, palpitations.  Mucinex, did not help. Robitussin seemed to help.  Warm shower makes it harder to breathe, no asthma known.     R-(recommendations): OV tomorrow with Dorothy Nagel.    Aubrie FISHER RN    "

## 2018-08-22 ENCOUNTER — OFFICE VISIT (OUTPATIENT)
Dept: FAMILY MEDICINE | Facility: CLINIC | Age: 48
End: 2018-08-22
Payer: COMMERCIAL

## 2018-08-22 VITALS
RESPIRATION RATE: 24 BRPM | DIASTOLIC BLOOD PRESSURE: 70 MMHG | HEART RATE: 89 BPM | BODY MASS INDEX: 36.49 KG/M2 | TEMPERATURE: 98.6 F | HEIGHT: 65 IN | OXYGEN SATURATION: 95 % | WEIGHT: 219 LBS | SYSTOLIC BLOOD PRESSURE: 112 MMHG

## 2018-08-22 DIAGNOSIS — J20.9 ACUTE BRONCHITIS WITH SYMPTOMS > 10 DAYS: Primary | ICD-10-CM

## 2018-08-22 PROCEDURE — 99213 OFFICE O/P EST LOW 20 MIN: CPT | Performed by: NURSE PRACTITIONER

## 2018-08-22 RX ORDER — PREDNISONE 20 MG/1
40 TABLET ORAL DAILY
Qty: 10 TABLET | Refills: 0 | Status: SHIPPED | OUTPATIENT
Start: 2018-08-22 | End: 2018-08-25

## 2018-08-22 RX ORDER — AZITHROMYCIN 250 MG/1
TABLET, FILM COATED ORAL
Qty: 6 TABLET | Refills: 0 | Status: SHIPPED | OUTPATIENT
Start: 2018-08-22 | End: 2018-08-25

## 2018-08-22 NOTE — MR AVS SNAPSHOT
After Visit Summary   8/22/2018    Bibi Song    MRN: [id number]           Patient Information     Date Of Birth          1970        Visit Information        Provider Department      8/22/2018 12:00 PM Janice Storey APRN [facility]        Today's Diagnoses     Acute bronchitis with symptoms > 10 days    -  1      Care Instructions                       Acute Bronchitis                  What is acute bronchitis?   Bronchitis is an infection of the air passages--that is, the tubes that connect the windpipe to the lungs. It causes swelling and irritation of the airways. With acute bronchitis you usually have a cough that produces phlegm and pain behind the breastbone when you breathe deeply or cough.   How does it occur?   Bronchitis often occurs with viral infections of the respiratory tract, such as colds and flu. Bronchitis may also be caused by bacterial infections. It may occur with childhood illnesses such as measles and whooping cough.   Attacks are most frequent during the winter or when the level of air pollution is high.   Infants, young children, older adults, smokers, and people with heart disease or lung disease (including asthma and allergies) are most likely to get acute bronchitis.   What are the symptoms?   Symptoms may include:   a deep cough that produces yellowish or greenish phlegm   pain behind the breastbone when you breathe deeply or cough   wheezing   feeling short of breath   fever   chills   headache   sore muscles.   How is it diagnosed?   Your healthcare provider will ask about your symptoms and examine you. You may have tests, such as:   a test of phlegm to look for bacteria   chest X-ray   blood tests.   How is it treated?   Acute bronchitis often does not require medical treatment. Resting at home and drinking plenty of fluids to keep the mucus loose may be all you need to do to get better in a few days. If your symptoms are severe  or you have other health problems (such as heart or lung disease or diabetes), you may need to take antibiotics.   How long will the effects last?   Most of the time acute bronchitis clears up in a few days. Your cough may slowly get better in 1 to 2 weeks.   It may take you longer to recover if:   You are a smoker.   You live in an area where air pollution is a problem.   You have a heart or lung disease.   You have any other continuing health problems.   How can I take care of myself?   You can help yourself by:   following the full treatment your healthcare provider recommends   using a vaporizer, humidifier, or steam from hot water to add moisture to the air   drinking plenty of liquids   taking cough medicine if recommended by your healthcare provider   resting in bed   taking aspirin or acetaminophen to reduce fever and relieve headache and muscle pain (Check with your healthcare provider before you give any medicine that contains aspirin or salicylates to a child or teen. This includes medicines like baby aspirin, some cold medicines, and Pepto Bismol. Children and teens who take aspirin are at risk for a serious illness called Reye's syndrome.)   eating healthy meals.   Call your healthcare provider if:   You have trouble breathing.   You have a fever of 101.5?F (38.6?C) or higher.   You cough up blood.   Your symptoms are getting worse instead of better.   You don't start to feel better after 3 days of treatment.   You have any symptoms that concern you.   How can I help prevent acute bronchitis?   To reduce your risk of getting a respiratory infection:   Do not smoke.   Wash your hands often, especially when you are around people with colds (upper respiratory infections).   If you have asthma or allergies, keep your symptoms under good control.   Get regular exercise.   Eat healthy foods.       Published by [employer].  This content is reviewed periodically and is subject to change as new health  information becomes available. The information is intended to inform and educate and is not a replacement for medical evaluation, advice, diagnosis or treatment by a healthcare professional.   Developed by [employer].   ? 2010 [facility] and/or its affiliates. All Rights Reserved.   Copyright   Clinical Reference Systems 2011              Thank you for choosing [facility].  You may be receiving a survey in the mail from Dil[employer] Aval[employer] regarding your visit today.  Please take a few minutes to complete and return the survey to let us know how we are doing.      Our Clinic hours are:  Mondays    7:20 am - 7 pm  Tues - Fri  7:20 am - 5 pm    Clinic Phone: [phone]    The clinic lab opens at 7:30 am Mon - Fri and appointments are required.    [facility]  Ph. [phone]  Monday  8 am - 7pm  Tues - Fri 8 am - 5:30 pm                 Follow-ups after your visit        Follow-up notes from your care team     Return if symptoms worsen or fail to improve.      Who to contact     If you have questions or need follow up information about today's clinic visit or your schedule please contact [facility] directly at [phone].  Normal or non-critical lab and imaging results will be communicated to you by MyChart, letter or phone within 4 business days after the clinic has received the results. If you do not hear from us within 7 days, please contact the clinic through [employer] or phone. If you have a critical or abnormal lab result, we will notify you by phone as soon as possible.  Submit refill requests through [employer] or call your pharmacy and they will forward the refill request to us. Please allow 3 business days for your refill to be completed.          Additional Information About Your Visit        [employer] Information     [employer] gives you secure access to your electronic health record. If you see a primary care provider, you can also send messages to your care team and make  "appointments. If you have questions, please call your primary care clinic.  If you do not have a primary care provider, please call [phone] and they will assist you.        Care EveryWhere ID     This is your Care EveryWhere ID. This could be used by other organizations to access your [facility] medical records  [id number]        Your Vitals Were     Pulse Temperature Respirations Height Last Period Pulse Oximetry    89 98.6  F (37  C) (Tympanic) 24 5' 5\" (1.651 m) 08/18/2018 (Approximate) 95%    BMI (Body Mass Index)                   36.44 kg/m2            Blood Pressure from Last 3 Encounters:   08/22/18 112/70   07/21/18 106/58   07/18/18 109/74    Weight from Last 3 Encounters:   08/22/18 219 lb (99.3 kg)   07/21/18 235 lb 14.3 oz (107 kg)   07/10/18 236 lb (107 kg)              Today, you had the following     No orders found for display         Today's Medication Changes          These changes are accurate as of 8/22/18 12:04 PM.  If you have any questions, ask your nurse or doctor.               Start taking these medicines.        Dose/Directions    azithromycin 250 MG tablet   Commonly known as:  ZITHROMAX   Used for:  Acute bronchitis with symptoms > 10 days   Started by:  Janice Storey APRN CNP        Two tablets first day, then one tablet daily for four days.   Quantity:  6 tablet   Refills:  0       predniSONE 20 MG tablet   Commonly known as:  DELTASONE   Used for:  Acute bronchitis with symptoms > 10 days   Started by:  Janice Storey APRN CNP        Dose:  40 mg   Take 2 tablets (40 mg) by mouth daily for 5 days   Quantity:  10 tablet   Refills:  0            Where to get your medicines      These medications were sent to [facility] - - [facility] [id number] [facility]  [id number] [place] [id number]    Hours:  [facility] Phone:  [phone]     azithromycin 250 MG tablet    predniSONE 20 MG tablet                Primary Care " Provider Office Phone # Fax #    Glory Singleton [phone] [address] [facility] [address]        Equal Access to Services     JUANCARLOS ADDISON : Brittany yeison choe treva Wright, wajorgeda luqdirk, qaybta karossanada cara, varun cedenomaegan flowers. So [facility] [phone].    ATENCIÓN: Si habla español, tiene a velazquez disposición servicios gratuitos de asistencia lingüística. Llame al [phone].    We comply with applicable federal civil rights laws and [place] laws. We do not discriminate on the basis of race, color, national origin, age, disability, sex, sexual orientation, or gender identity.            Thank you!     Thank you for choosing [facility]  for your care. Our goal is always to provide you with excellent care. Hearing back from our patients is one way we can continue to improve our services. Please take a few minutes to complete the written survey that you may receive in the mail after your visit with us. Thank you!             Your Updated Medication List - Protect others around you: Learn how to safely use, store and throw away your medicines at www.disposemymeds.org.          This list is accurate as of 8/22/18 12:04 PM.  Always use your most recent med list.                   Brand Name Dispense Instructions for use Diagnosis    azithromycin 250 MG tablet    ZITHROMAX    6 tablet    Two tablets first day, then one tablet daily for four days.    Acute bronchitis with symptoms > 10 days       CALCIUM 1200 PO           GEODON 40 MG capsule   Generic drug:  ziprasidone     1 capsule    Take 2 capsules (80 mg) by mouth daily        IBUPROFEN PO      Take 600 mg by mouth every 6 hours as needed for moderate pain        levothyroxine 75 MCG tablet    SYNTHROID/LEVOTHROID    90 tablet    Take 1 tablet (75 mcg) by mouth daily    Hypothyroidism, unspecified type       MAGNESIUM PO           melatonin 3 MG tablet     30 tablet    Take 9 mg by mouth  nightly as needed.        mesalamine 1000 MG Suppository    CANASA    42 suppository    Place 1 suppository (1,000 mg) rectally At Bedtime    Ulcerative colitis with rectal bleeding, unspecified location (H)       predniSONE 20 MG tablet    DELTASONE    10 tablet    Take 2 tablets (40 mg) by mouth daily for 5 days    Acute bronchitis with symptoms > 10 days       vitamin B complex with vitamin C Tabs tablet      Take 1 tablet by mouth daily        ZOLOFT 100 MG tablet   Generic drug:  sertraline     1 tablet    Take 200 mg by mouth daily    Obsessive-compulsive disorder, unspecified type, Other specified anxiety disorders, Major depressive disorder, recurrent episode, moderate (H)

## 2018-08-22 NOTE — PROGRESS NOTES
SUBJECTIVE:   Bibi Song is a 48 year old female who presents to clinic today for the following health issues:      ENT Symptoms             Symptoms: cc Present Absent Comment   Fever/Chills   x    Fatigue   x    Muscle Aches   x    Eye Irritation   x    Sneezing   x    Nasal Beau/Drg   x    Sinus Pressure/Pain   x    Loss of smell   x    Dental pain  x     Sore Throat   x    Swollen Glands   x    Ear Pain/Fullness   x    Cough  x     Wheeze x x     Chest Pain   x    Shortness of breath  x     Rash   x    Other         Symptom duration:  was in the hospital and had a tube down her throat the end of July it was sore for awhile then it turned into a cough and the wheezing started 1.5 weeks ago.   Symptom severity:  mod   Treatments tried:  Mucinex   Contacts:  son.             Problem list and histories reviewed & adjusted, as indicated.  Additional history: states she's gotten bronchitis a lot in the past and this is very similar to that.  She feels wheezy and short of breath on occasion. No chest pain or difficulty breathing.    Patient Active Problem List   Diagnosis     Moderate major depression (H)     OCD (obsessive compulsive disorder)     Anxiety disorder     Family history of diabetes mellitus     Family history of thyroid disease     ALCOHOL ABUSE, IN REMISSION     Hyperlipidemia LDL goal <160     Cervicalgia     Hypothyroidism, unspecified type     Ulcerative colitis with complication, unspecified location (H)     TCA (tricyclic antidepressant) overdose of undetermined intent     History reviewed. No pertinent surgical history.    Social History   Substance Use Topics     Smoking status: Former Smoker     Quit date: 1/1/1994     Smokeless tobacco: Never Used     Alcohol use No      Comment: Treatment in 2007     Family History   Problem Relation Age of Onset     Diabetes Mother      Depression Mother      OCD     Neurologic Disorder Mother      Psychotic Disorder Mother      Respiratory Mother       Thyroid Disease Mother      Gynecology Mother      hysterectomy     Cerebrovascular Disease Mother      pacemaker     HEART DISEASE Father      MI,   LATE 60'S     Hypertension Father      Prostate Cancer Father      Obesity Father      Diabetes Father      Cerebrovascular Disease Father      Depression Brother      Thyroid Disease Brother      Depression Brother      Thyroid Disease Brother      Allergies Son      Asthma Son      Thyroid Disease Maternal Grandmother      Cancer Maternal Grandfather      Psychotic Disorder Paternal Grandmother      Thyroid Disease Paternal Grandfather      Diabetes Brother      Breast Cancer Maternal Aunt          Current Outpatient Prescriptions   Medication Sig Dispense Refill     azithromycin (ZITHROMAX) 250 MG tablet Two tablets first day, then one tablet daily for four days. 6 tablet 0     Calcium Carbonate-Vit D-Min (CALCIUM 1200 PO)        IBUPROFEN PO Take 600 mg by mouth every 6 hours as needed for moderate pain       levothyroxine (SYNTHROID/LEVOTHROID) 75 MCG tablet Take 1 tablet (75 mcg) by mouth daily 90 tablet 1     MAGNESIUM PO        melatonin 3 MG tablet Take 9 mg by mouth nightly as needed. 30 tablet 0     mesalamine (CANASA) 1000 MG Suppository Place 1 suppository (1,000 mg) rectally At Bedtime 42 suppository 0     predniSONE (DELTASONE) 20 MG tablet Take 2 tablets (40 mg) by mouth daily for 5 days 10 tablet 0     sertraline (ZOLOFT) 100 MG tablet Take 200 mg by mouth daily  1 tablet 0     vitamin B complex with vitamin C (VITAMIN  B COMPLEX) TABS tablet Take 1 tablet by mouth daily       ziprasidone (GEODON) 40 MG capsule Take 2 capsules (80 mg) by mouth daily 1 capsule      No Known Allergies    Reviewed and updated as needed this visit by clinical staff  Tobacco  Allergies  Meds  Med Hx  Surg Hx  Fam Hx  Soc Hx      Reviewed and updated as needed this visit by Provider          ROS: 10 point ROS neg other than the symptoms noted above in the  "HPI.    OBJECTIVE:     /70 (BP Location: Right arm, Patient Position: Chair, Cuff Size: Adult Regular)  Pulse 89  Temp 98.6  F (37  C) (Tympanic)  Resp 24  Ht 5' 5\" (1.651 m)  Wt 219 lb (99.3 kg)  LMP 08/18/2018 (Approximate)  SpO2 95%  BMI 36.44 kg/m2  Body mass index is 36.44 kg/(m^2).  GENERAL: healthy, alert and no distress  HENT: ear canals and TM's normal, pharynx with mild erythema  NECK: no adenopathy, no asymmetry  RESP: lungs clear to auscultation - prolonged expiratory phase, occasional wheeze  CV: regular rate and rhythm, normal S1 S2, no S3 or S4, no murmur  MS: no gross musculoskeletal defects noted      Diagnostic Test Results:  No results found for this or any previous visit (from the past 24 hour(s)).    ASSESSMENT/PLAN:             1. Acute bronchitis with symptoms > 10 days    - azithromycin (ZITHROMAX) 250 MG tablet; Two tablets first day, then one tablet daily for four days.  Dispense: 6 tablet; Refill: 0  - predniSONE (DELTASONE) 20 MG tablet; Take 2 tablets (40 mg) by mouth daily for 5 days  Dispense: 10 tablet; Refill: 0  Discussed how to take the medication(s), expected outcomes, potential side effects.    See Patient Instructions  Follow up if symptoms persist or worsen and as needed.    Patient Instructions                      Acute Bronchitis                  What is acute bronchitis?   Bronchitis is an infection of the air passages--that is, the tubes that connect the windpipe to the lungs. It causes swelling and irritation of the airways. With acute bronchitis you usually have a cough that produces phlegm and pain behind the breastbone when you breathe deeply or cough.   How does it occur?   Bronchitis often occurs with viral infections of the respiratory tract, such as colds and flu. Bronchitis may also be caused by bacterial infections. It may occur with childhood illnesses such as measles and whooping cough.   Attacks are most frequent during the winter or when the " level of air pollution is high.   Infants, young children, older adults, smokers, and people with heart disease or lung disease (including asthma and allergies) are most likely to get acute bronchitis.   What are the symptoms?   Symptoms may include:   a deep cough that produces yellowish or greenish phlegm   pain behind the breastbone when you breathe deeply or cough   wheezing   feeling short of breath   fever   chills   headache   sore muscles.   How is it diagnosed?   Your healthcare provider will ask about your symptoms and examine you. You may have tests, such as:   a test of phlegm to look for bacteria   chest X-ray   blood tests.   How is it treated?   Acute bronchitis often does not require medical treatment. Resting at home and drinking plenty of fluids to keep the mucus loose may be all you need to do to get better in a few days. If your symptoms are severe or you have other health problems (such as heart or lung disease or diabetes), you may need to take antibiotics.   How long will the effects last?   Most of the time acute bronchitis clears up in a few days. Your cough may slowly get better in 1 to 2 weeks.   It may take you longer to recover if:   You are a smoker.   You live in an area where air pollution is a problem.   You have a heart or lung disease.   You have any other continuing health problems.   How can I take care of myself?   You can help yourself by:   following the full treatment your healthcare provider recommends   using a vaporizer, humidifier, or steam from hot water to add moisture to the air   drinking plenty of liquids   taking cough medicine if recommended by your healthcare provider   resting in bed   taking aspirin or acetaminophen to reduce fever and relieve headache and muscle pain (Check with your healthcare provider before you give any medicine that contains aspirin or salicylates to a child or teen. This includes medicines like baby aspirin, some cold medicines, and Pepto  Bismol. Children and teens who take aspirin are at risk for a serious illness called Reye's syndrome.)   eating healthy meals.   Call your healthcare provider if:   You have trouble breathing.   You have a fever of 101.5?F (38.6?C) or higher.   You cough up blood.   Your symptoms are getting worse instead of better.   You don't start to feel better after 3 days of treatment.   You have any symptoms that concern you.   How can I help prevent acute bronchitis?   To reduce your risk of getting a respiratory infection:   Do not smoke.   Wash your hands often, especially when you are around people with colds (upper respiratory infections).   If you have asthma or allergies, keep your symptoms under good control.   Get regular exercise.   Eat healthy foods.       Published by [employer].  This content is reviewed periodically and is subject to change as new health information becomes available. The information is intended to inform and educate and is not a replacement for medical evaluation, advice, diagnosis or treatment by a healthcare professional.   Developed by [employer].   ? 2010 [employer] and/or its affiliates. All Rights Reserved.   Copyright   Clinical Reference Systems 2011              Thank you for choosing [facility].  You may be receiving a survey in the mail from [employer] regarding your visit today.  Please take a few minutes to complete and return the survey to let us know how we are doing.      Our Clinic hours are:  Mondays    7:20 am - 7 pm  Tues -  Fri  7:20 am - 5 pm    Clinic Phone: [phone]    The clinic lab opens at 7:30 am Mon - Fri and appointments are required.    [facility]  Ph. [phone]  Monday  8 am - 7pm  Tues - Fri 8 am - 5:30 pm             SUSANNAH Cintron CNP  [facility]

## 2018-08-22 NOTE — PATIENT INSTRUCTIONS
Acute Bronchitis                  What is acute bronchitis?   Bronchitis is an infection of the air passages--that is, the tubes that connect the windpipe to the lungs. It causes swelling and irritation of the airways. With acute bronchitis you usually have a cough that produces phlegm and pain behind the breastbone when you breathe deeply or cough.   How does it occur?   Bronchitis often occurs with viral infections of the respiratory tract, such as colds and flu. Bronchitis may also be caused by bacterial infections. It may occur with childhood illnesses such as measles and whooping cough.   Attacks are most frequent during the winter or when the level of air pollution is high.   Infants, young children, older adults, smokers, and people with heart disease or lung disease (including asthma and allergies) are most likely to get acute bronchitis.   What are the symptoms?   Symptoms may include:   a deep cough that produces yellowish or greenish phlegm   pain behind the breastbone when you breathe deeply or cough   wheezing   feeling short of breath   fever   chills   headache   sore muscles.   How is it diagnosed?   Your healthcare provider will ask about your symptoms and examine you. You may have tests, such as:   a test of phlegm to look for bacteria   chest X-ray   blood tests.   How is it treated?   Acute bronchitis often does not require medical treatment. Resting at home and drinking plenty of fluids to keep the mucus loose may be all you need to do to get better in a few days. If your symptoms are severe or you have other health problems (such as heart or lung disease or diabetes), you may need to take antibiotics.   How long will the effects last?   Most of the time acute bronchitis clears up in a few days. Your cough may slowly get better in 1 to 2 weeks.   It may take you longer to recover if:   You are a smoker.   You live in an area where air pollution is a problem.   You have a heart  or lung disease.   You have any other continuing health problems.   How can I take care of myself?   You can help yourself by:   following the full treatment your healthcare provider recommends   using a vaporizer, humidifier, or steam from hot water to add moisture to the air   drinking plenty of liquids   taking cough medicine if recommended by your healthcare provider   resting in bed   taking aspirin or acetaminophen to reduce fever and relieve headache and muscle pain (Check with your healthcare provider before you give any medicine that contains aspirin or salicylates to a child or teen. This includes medicines like baby aspirin, some cold medicines, and Pepto Bismol. Children and teens who take aspirin are at risk for a serious illness called Reye's syndrome.)   eating healthy meals.   Call your healthcare provider if:   You have trouble breathing.   You have a fever of 101.5?F (38.6?C) or higher.   You cough up blood.   Your symptoms are getting worse instead of better.   You don't start to feel better after 3 days of treatment.   You have any symptoms that concern you.   How can I help prevent acute bronchitis?   To reduce your risk of getting a respiratory infection:   Do not smoke.   Wash your hands often, especially when you are around people with colds (upper respiratory infections).   If you have asthma or allergies, keep your symptoms under good control.   Get regular exercise.   Eat healthy foods.       Published by [employer].  This content is reviewed periodically and is subject to change as new health information becomes available. The information is intended to inform and educate and is not a replacement for medical evaluation, advice, diagnosis or treatment by a healthcare professional.   Developed by [employer].   ? 2010 [facility] and/or its affiliates. All Rights Reserved.   Copyright   [employer] 2011              Thank you for choosing [facility].  You may be  receiving a survey in the mail from [employer] regarding your visit today.  Please take a few minutes to complete and return the survey to let us know how we are doing.      Our Clinic hours are:  Mondays    7:20 am - 7 pm  Tues -  Fri  7:20 am - 5 pm    Clinic Phone: [phone]    The clinic lab opens at 7:30 am Mon - Fri and appointments are required.    [facility]  Ph. [phone]  Monday  8 am - 7pm  Tues - Fri 8 am - 5:30 pm

## 2018-08-23 ENCOUNTER — TELEPHONE (OUTPATIENT)
Dept: FAMILY MEDICINE | Facility: CLINIC | Age: 48
End: 2018-08-23

## 2018-08-23 DIAGNOSIS — J20.9 ACUTE BRONCHITIS WITH SYMPTOMS > 10 DAYS: Primary | ICD-10-CM

## 2018-08-23 RX ORDER — ALBUTEROL SULFATE 90 UG/1
2 AEROSOL, METERED RESPIRATORY (INHALATION) 4 TIMES DAILY
Qty: 1 INHALER | Refills: 1 | Status: SHIPPED | OUTPATIENT
Start: 2018-08-23 | End: 2019-01-31

## 2018-08-23 NOTE — TELEPHONE ENCOUNTER
Dr. Singleton    Patient saw Janice Storey yesterday and dx with bronchitis. Started on Prednisone and Abx. Non productive cough.  No fever. Non smoker. Patient is able to complete sentences. But when asked if she can climb a flight of stairs patient replies yes but I am alittle short of breath.  Patient would like MDI.  I have pended albuterol for your approval. Kayla MORALES RN

## 2018-08-23 NOTE — TELEPHONE ENCOUNTER
Albuterol ordered - should use 4x/day until feeling better and then can wean.    Glory Singleton M.D.

## 2018-08-23 NOTE — TELEPHONE ENCOUNTER
Reason for Call:  Medication or medication refill:    Do you use a [facility]?  Name of the pharmacy and phone number for the current request:  [facility]    Name of the medication requested: Inhaler    Other request: Pt was seen yesterday by Janice Storey and given prednisone and antibiotic for bronchitis. She is still having a hard time breathing when exerting herself and would like to try an inhaler.She is still feeling about the same. Please let her know if this can be ordered.    Can we leave a detailed message on this number?     Phone number patient can be reached at: Home number on file [phone] (home)    Best Time: any    Call taken on 8/23/2018 at 8:07 AM by Janice Silva

## 2018-08-25 ENCOUNTER — NURSE TRIAGE (OUTPATIENT)
Dept: NURSING | Facility: CLINIC | Age: 48
End: 2018-08-25

## 2018-08-25 ENCOUNTER — HOSPITAL ENCOUNTER (EMERGENCY)
Facility: CLINIC | Age: 48
Discharge: HOME | End: 2018-08-25
Attending: EMERGENCY MEDICINE | Admitting: EMERGENCY MEDICINE
Payer: COMMERCIAL

## 2018-08-25 ENCOUNTER — APPOINTMENT (OUTPATIENT)
Dept: GENERAL RADIOLOGY | Facility: CLINIC | Age: 48
End: 2018-08-25
Attending: EMERGENCY MEDICINE
Payer: COMMERCIAL

## 2018-08-25 VITALS
TEMPERATURE: 97.8 F | OXYGEN SATURATION: 90 % | RESPIRATION RATE: 18 BRPM | WEIGHT: 217 LBS | BODY MASS INDEX: 36.11 KG/M2 | SYSTOLIC BLOOD PRESSURE: 130 MMHG | HEART RATE: 72 BPM | DIASTOLIC BLOOD PRESSURE: 72 MMHG

## 2018-08-25 DIAGNOSIS — R06.2 WHEEZING: ICD-10-CM

## 2018-08-25 DIAGNOSIS — R05.9 COUGH: ICD-10-CM

## 2018-08-25 PROCEDURE — 25000125 ZZHC RX 250: Performed by: EMERGENCY MEDICINE

## 2018-08-25 PROCEDURE — 71046 X-RAY EXAM CHEST 2 VIEWS: CPT

## 2018-08-25 PROCEDURE — 99284 EMERGENCY DEPT VISIT MOD MDM: CPT | Mod: Z6 | Performed by: EMERGENCY MEDICINE

## 2018-08-25 PROCEDURE — 99284 EMERGENCY DEPT VISIT MOD MDM: CPT | Mod: 25

## 2018-08-25 PROCEDURE — 94640 AIRWAY INHALATION TREATMENT: CPT

## 2018-08-25 RX ORDER — CLONAZEPAM 1 MG/1
1 TABLET ORAL
COMMUNITY
Start: 2018-06-12 | End: 2018-08-27

## 2018-08-25 RX ORDER — DOXYCYCLINE 100 MG/1
100 CAPSULE ORAL 2 TIMES DAILY
Qty: 14 CAPSULE | Refills: 0 | Status: SHIPPED | OUTPATIENT
Start: 2018-08-25 | End: 2019-01-31

## 2018-08-25 RX ORDER — IPRATROPIUM BROMIDE AND ALBUTEROL SULFATE 2.5; .5 MG/3ML; MG/3ML
3 SOLUTION RESPIRATORY (INHALATION) ONCE
Status: COMPLETED | OUTPATIENT
Start: 2018-08-25 | End: 2018-08-25

## 2018-08-25 RX ORDER — PREDNISONE 20 MG/1
40 TABLET ORAL DAILY
Qty: 10 TABLET | Refills: 0 | Status: SHIPPED | OUTPATIENT
Start: 2018-08-25 | End: 2018-08-29

## 2018-08-25 RX ADMIN — IPRATROPIUM BROMIDE AND ALBUTEROL SULFATE 3 ML: .5; 2.5 SOLUTION RESPIRATORY (INHALATION) at 13:13

## 2018-08-25 ASSESSMENT — ENCOUNTER SYMPTOMS
DIFFICULTY URINATING: 0
WHEEZING: 1
HEADACHES: 0
FEVER: 0
ARTHRALGIAS: 0
SHORTNESS OF BREATH: 1
COUGH: 1
COLOR CHANGE: 0
NECK STIFFNESS: 0
ABDOMINAL PAIN: 0
EYE REDNESS: 0
CONFUSION: 0

## 2018-08-25 NOTE — ED PROVIDER NOTES
History     Chief Complaint   Patient presents with     Wheezing     was intubated 1 month ago after accidental overdose, cold and sore throat after discharge     HPI  Bibi Song is a 48 year old female significant past medical history for TCA overdose requiring intubation July 2018, OCD, anxiety disorder, depression, alcohol abuse in remission-presents with recent complaint of cough and audible wheezing.  Has been using albuterol 2 puffs 4 times daily.  Denies fever or chills.  No hemoptysis.  Intubation was uncomplicated with no indication for aspiration.  Patient did have some lingering throat irritation for a few weeks after intubation.  At the time of discharge she was advised to stop amitriptyline and clonazepam.  Patient states that she had some leftover at home so she resumed them.  Denies taking any excessive amount of medication beyond what is prescribed.  She has had no recent vomiting or concerns for aspiration.  Patient is a non-smoker.  Does not live with a smoker have secondhand smoke exposure.  Employment does not put her on harm's way for airway pollutants.    Problem List:    Patient Active Problem List    Diagnosis Date Noted     TCA (tricyclic antidepressant) overdose of undetermined intent 07/18/2018     Priority: Medium     Ulcerative colitis with complication, unspecified location (H) 04/27/2017     Priority: Medium     Hypothyroidism, unspecified type 12/08/2016     Priority: Medium     Cervicalgia 10/10/2012     Priority: Medium     Hyperlipidemia LDL goal <160 10/31/2010     Priority: Medium     Moderate major depression (H) 08/06/2010     Priority: Medium     OCD (obsessive compulsive disorder) 08/06/2010     Priority: Medium     [facility] IVONNE. Schoenecker, NP       Anxiety disorder 08/06/2010     Priority: Medium     Family history of diabetes mellitus 08/06/2010     Priority: Medium     Family history of thyroid disease 08/06/2010     Priority: Medium     ALCOHOL  ABUSE, IN REMISSION 08/06/2010     Priority: Medium        Past Medical History:    No past medical history on file.    Past Surgical History:    No past surgical history on file.    Family History:    Family History   Problem Relation Age of Onset     Diabetes Mother      Depression Mother      OCD     Neurologic Disorder Mother      Psychotic Disorder Mother      Respiratory Mother      Thyroid Disease Mother      Gynecology Mother      hysterectomy     Cerebrovascular Disease Mother      pacemaker     HEART DISEASE Father      MI,   LATE 60'S     Hypertension Father      Prostate Cancer Father      Obesity Father      Diabetes Father      Cerebrovascular Disease Father      Depression Brother      Thyroid Disease Brother      Depression Brother      Thyroid Disease Brother      Allergies Son      Asthma Son      Thyroid Disease Maternal Grandmother      Cancer Maternal Grandfather      Psychotic Disorder Paternal Grandmother      Thyroid Disease Paternal Grandfather      Diabetes Brother      Breast Cancer Maternal Aunt        Social History:  Marital Status:   [2]  Social History   Substance Use Topics     Smoking status: Former Smoker     Quit date: 1/1/1994     Smokeless tobacco: Never Used     Alcohol use No      Comment: Treatment in 2007        Medications:      amitriptyline (ELAVIL) 25 MG tablet   Calcium Carbonate-Vit D-Min (CALCIUM 1200 PO)   clonazePAM (KLONOPIN) 1 MG tablet   IBUPROFEN PO   levothyroxine (SYNTHROID/LEVOTHROID) 75 MCG tablet   MAGNESIUM PO   melatonin 3 MG tablet   mesalamine (CANASA) 1000 MG Suppository   sertraline (ZOLOFT) 100 MG tablet   vitamin B complex with vitamin C (VITAMIN  B COMPLEX) TABS tablet   ziprasidone (GEODON) 40 MG capsule   albuterol (PROAIR HFA/PROVENTIL HFA/VENTOLIN HFA) 108 (90 Base) MCG/ACT inhaler         Review of Systems   Constitutional: Negative for fever.   HENT: Negative for congestion.    Eyes: Negative for redness.   Respiratory: Positive for  cough, shortness of breath and wheezing.    Cardiovascular: Negative for chest pain.   Gastrointestinal: Negative for abdominal pain.   Genitourinary: Negative for difficulty urinating.   Musculoskeletal: Negative for arthralgias and neck stiffness.   Skin: Negative for color change.   Neurological: Negative for headaches.   Psychiatric/Behavioral: Negative for confusion.       Physical Exam   BP: 150/82  Pulse: 72  Temp: 97.8  F (36.6  C)  Resp: 18  Weight: 98.4 kg (217 lb)  SpO2: 95 %      Physical Exam   Constitutional: No distress.   HENT:   Head: Atraumatic.   Mouth/Throat: Oropharynx is clear and moist. No oropharyngeal exudate.   Eyes: Conjunctivae are normal. Pupils are equal, round, and reactive to light. No scleral icterus.   Neck: Neck supple. No JVD present.   Cardiovascular: Normal rate, normal heart sounds and intact distal pulses.    No murmur heard.  Pulmonary/Chest: Effort normal. No respiratory distress. She has wheezes. She has no rales. She exhibits no tenderness.   Abdominal: Soft. Bowel sounds are normal. There is no tenderness.   Musculoskeletal: She exhibits no edema or tenderness.   Lymphadenopathy:     She has no cervical adenopathy.   Skin: Skin is warm. No rash noted. She is not diaphoretic.   Psychiatric: She has a normal mood and affect. Thought content normal.   Nursing note and vitals reviewed.      ED Course     ED Course     Procedures              Medications   ipratropium - albuterol 0.5 mg/2.5 mg/3 mL (DUONEB) neb solution 3 mL (not administered)       Assessments & Plan (with Medical Decision Making)  1:15 PM  48-year-old female presents with a one-week history for cough and audible wheezing.  No associated fever or chills.  Non-smoker.  No secondhand smoke exposure.  No history of reactive airway disease.  Patient was intubated mid July after accidental overdose on tricyclic antidepressants with required airway protection.  Required short course of ventilatory support when she  was transferred to the [facility].  Intubation was uncomplicated and there was no concern for possible aspiration.  Since discharge home patient has had intermittent sore throat complaints from tube irritation.  Symptom is improving.  She has had no recent emesis.  Has not had any severe GERD symptoms.  Examination noted vital signs stable with patient currently afebrile, normal pulse normal respiratory rate and oxygen saturation 95%.  Her lungs revealed audible expiratory wheezing which was present in all lung fields on auscultation.  She had occasional cough with few scattered rhonchi.  Did not hear any consolidation.  Plan: DuoNeb treatment and two-view chest x-ray..  1:37 PM  Chest x-ray personally view shows no infiltrate or acute process.  Significant improvement after a DuoNeb treatment.  Patient was educated by RT on proper use of spacing device for her MDI.  Discharge home on doxycycline 100 mg twice daily ×7 days, prednisone 40 g daily ×5 days and albuterol MDI with spacer 2 puffs 4 times daily.     I have reviewed the nursing notes.    I have reviewed the findings, diagnosis, plan and need for follow up with the patient.      New Prescriptions    No medications on file       Final diagnoses:   Wheezing   Cough-recent intubation and mechanical ventilation       8/25/2018   [facility] EMERGENCY DEPARTMENT     Edwardo Paz,   08/25/18 [id number]

## 2018-08-25 NOTE — DISCHARGE INSTRUCTIONS
Please stop use of all amitriptyline and clonazepam as recommended by physicians at care if you when you are hospitalized at the [facility] in July  Doxycycline 100 mg twice daily for 7 days  Prednisone 40 mg daily ×5 days  Albuterol with spacing device 2 puffs 4 times daily

## 2018-08-25 NOTE — ED AVS SNAPSHOT
[facility] Emergency Department    [address] [address]    Phone:  [phone]    Fax:  [phone]                                       Bibi Song   MRN: [id number]    Department:  [facility] Emergency Department   Date of Visit:  8/25/2018           Patient Information     Date Of Birth          1970        Your diagnoses for this visit were:     Wheezing     Cough-recent intubation and mechanical ventilation        You were seen by Edwardo Paz DO.      Follow-up Information     Follow up with Glory Singleton MD.    Specialty:  Family Practice    Why:  As needed, If symptoms worsen    Contact information:    [address]  [facility] [address]  [phone]          Discharge Instructions       Please stop use of all amitriptyline and clonazepam as recommended by physicians at care if you when you are hospitalized at the [facility] in July  Doxycycline 100 mg twice daily for 7 days  Prednisone 40 mg daily ×5 days  Albuterol with spacing device 2 puffs 4 times daily    24 Hour Appointment Hotline       To make an appointment at any [facility] clinic, call [phone] ([phone]). If you don't have a family doctor or clinic, we will help you find one. [facility] clinics are conveniently located to serve the needs of you and your family.             Review of your medicines      START taking        Dose / Directions Last dose taken    doxycycline 100 MG capsule   Commonly known as:  VIBRAMYCIN   Dose:  100 mg   Quantity:  14 capsule        Take 1 capsule (100 mg) by mouth 2 times daily for 7 days   Refills:  0        predniSONE 20 MG tablet   Commonly known as:  DELTASONE   Dose:  40 mg   Quantity:  10 tablet        Take 2 tablets (40 mg) by mouth daily for 5 days   Refills:  0          Our records show that you are taking the medicines listed below. If these are incorrect, please call your family doctor or clinic.        Dose /  Directions Last dose taken    albuterol 108 (90 Base) MCG/ACT inhaler   Commonly known as:  PROAIR HFA/PROVENTIL HFA/VENTOLIN HFA   Dose:  2 puff   Quantity:  1 Inhaler        Inhale 2 puffs into the lungs 4 times daily   Refills:  1        amitriptyline 25 MG tablet   Commonly known as:  ELAVIL   Dose:  25 mg        Take 25 mg by mouth   Refills:  0        CALCIUM 1200 PO        Refills:  0        clonazePAM 1 MG tablet   Commonly known as:  klonoPIN   Dose:  1 mg        Take 1 mg by mouth   Refills:  0        GEODON 40 MG capsule   Dose:  40 mg   Quantity:  1 capsule   Generic drug:  ziprasidone        Take 40 mg by mouth 2 times daily (with meals)   Refills:  0        IBUPROFEN PO   Dose:  600 mg        Take 600 mg by mouth every 6 hours as needed for moderate pain   Refills:  0        levothyroxine 75 MCG tablet   Commonly known as:  SYNTHROID/LEVOTHROID   Dose:  75 mcg   Quantity:  90 tablet        Take 1 tablet (75 mcg) by mouth daily   Refills:  1        MAGNESIUM PO        Refills:  0        melatonin 3 MG tablet   Dose:  9 mg   Quantity:  30 tablet        Take 9 mg by mouth nightly as needed.   Refills:  0        mesalamine 1000 MG Suppository   Commonly known as:  CANASA   Dose:  1000 mg   Quantity:  42 suppository        Place 1 suppository (1,000 mg) rectally At Bedtime   Refills:  0        vitamin B complex with vitamin C Tabs tablet   Dose:  1 tablet        Take 1 tablet by mouth daily   Refills:  0        ZOLOFT 100 MG tablet   Dose:  200 mg   Quantity:  1 tablet   Generic drug:  sertraline        Take 200 mg by mouth daily   Refills:  0                Prescriptions were sent or printed at these locations (2 Prescriptions)                   [facility] [address] [address]    Telephone:  [phone]   Fax:  [phone]   Hours:                  E-Prescribed (2 of 2)         doxycycline (VIBRAMYCIN) 100 MG capsule                predniSONE (DELTASONE) 20 MG tablet                Procedures and tests performed during your visit     XR Chest 2 Views      Orders Needing Specimen Collection     None      Pending Results     No orders found from 8/23/2018 to 8/26/2018.            Pending Culture Results     No orders found from 8/23/2018 to 8/26/2018.            Pending Results Instructions     If you had any lab results that were not finalized at the time of your Discharge, you can call the ED Lab Result RN at [phone]. You will be contacted by this team for any positive Lab results or changes in treatment. The nurses are available 7 days a week from 10A to 6:30P.  You can leave a message 24 hours per day and they will return your call.        Test Results From Your Hospital Stay        8/25/2018  1:39 PM      Narrative     CHEST TWO VIEWS  8/25/2018 1:29 PM     COMPARISON: Frontal chest x-ray 7/19/2018.    HISTORY: Recent intubation/ventilatory support after dental ingestion  with Francisco Coma Scale.  Now experiencing some expiratory wheezing  and cough; no fever.    FINDINGS: The cardiac silhouette, pulmonary vasculature, lungs and  pleural spaces are within normal limits.        Impression     IMPRESSION: Clear lungs.    BERNICE MELENDEZ MD                Thank you for choosing [facility]       Thank you for choosing [facility] for your care. Our goal is always to provide you with excellent care. Hearing back from our patients is one way we can continue to improve our services. Please take a few minutes to complete the written survey that you may receive in the mail after you visit with us. Thank you!        [employer] Information     [facility] gives you secure access to your electronic health record. If you see a primary care provider, you can also send messages to your care team and make appointments. If you have questions, please call your primary care clinic.  If you do not have a primary care provider, please call [phone] and they will  assist you.        Care EveryWhere ID     This is your Care EveryWhere ID. This could be used by other organizations to access your [facility] medical records  [id number]        Equal Access to Services     JUANCARLOS ADDISON : Brittany Wright, reena kumar, silvia marroquin, varun flowers. So [facility] [phone].    ATENCIÓN: Si habla español, tiene a velazquez disposición servicios gratuitos de asistencia lingüística. Llame al [phone].    We comply with applicable federal civil rights laws and [place] laws. We do not discriminate on the basis of race, color, national origin, age, disability, sex, sexual orientation, or gender identity.            After Visit Summary       This is your record. Keep this with you and show to your community pharmacist(s) and doctor(s) at your next visit.

## 2018-08-25 NOTE — TELEPHONE ENCOUNTER
Bibi called to say she is supposed to work later today.  She has been on prednisone and azithromycin since Wednesday, 8/22/2018 and and albuterol inhaler was prescribed on 8/23/2018. She said her breathing is labored and she is wheezing.  She said her symptoms are worse than they were when she was seen on 8/22/2018. She can't walk and talk at the same time. She can speak in full sentences at rest though she gets short of breath. I instructed she be seen in and ER.  She will go to [place] [facility].    Reason for Disposition    [1] MODERATE difficulty breathing (e.g., speaks in phrases, SOB even at rest, pulse 100-120) AND [2] NEW-onset or WORSE than normal    Additional Information    Negative: [1] Breathing stopped AND [2] hasn't returned    Negative: Choking on something    Negative: Severe difficulty breathing (e.g., struggling for each breath, speaks in single words)    Negative: Bluish lips, tongue, or face now    Negative: Difficult to awaken or acting confused  (e.g., disoriented, slurred speech)    Negative: Passed out (i.e., lost consciousness, collapsed and was not responding)    Negative: Wheezing started suddenly after medicine, an allergic food or bee sting    Negative: Stridor    Negative: Slow, shallow and weak breathing    Negative: Sounds like a life-threatening emergency to the triager    Protocols used: BREATHING DIFFICULTY-ADULT-  Nicole HUGO RN [facility]

## 2018-08-25 NOTE — ED AVS SNAPSHOT
[facility] Emergency Department    [address] [address]    Phone:  [phone]    Fax:  [phone]                                       Bibi Song   MRN: [id number]    Department:  [facility] Emergency Department   Date of Visit:  8/25/2018           After Visit Summary Signature Page     I have received my discharge instructions, and my questions have been answered. I have discussed any challenges I see with this plan with the nurse or doctor.    ..........................................................................................................................................  Patient/Patient Representative Signature      ..........................................................................................................................................  Patient Representative Print Name and Relationship to Patient    ..................................................               ................................................  Date                                            Time    ..........................................................................................................................................  Reviewed by Signature/Title    ...................................................              ..............................................  Date                                                            Time          22EPIC Rev 08/18

## 2018-08-26 ENCOUNTER — NURSE TRIAGE (OUTPATIENT)
Dept: NURSING | Facility: CLINIC | Age: 48
End: 2018-08-26

## 2018-08-26 NOTE — TELEPHONE ENCOUNTER
"Patient was in the ER yesterday due to wheezing and difficulty breathing.  They gave her an antibiotic, prednisone, and did a neb treatment there, \"the neb worked right away.\"  She has an inhaler, but none of this is helping.  She still feels short of breath, especially when walking.  She is wondering if she can get nebs so that she can do them at home?    Per FNA Protocol, advised patient to go to the ER.  She states she \"can't afford to go back.\"  Offered to transfer her to scheduling to make a clinic appointment.  She is agreeable to this.  Advised patient to rest today.  Strongly advised her to go to the ER if it becomes more difficult to breathe.      Protocol and care advice reviewed  Caller states understanding of the recommended disposition  Advised to call back if further questions or concerns      Reason for Disposition    [1] Severe wheezing or coughing AND [2] doesn't have neb or inhaler available    Additional Information    Negative: [1] Breathing stopped AND [2] hasn't returned    Negative: Choking on something    Negative: Severe difficulty breathing (e.g., struggling for each breath, speaks in single words)    Negative: Bluish lips, tongue, or face now    Negative: Difficult to awaken or acting confused  (e.g., disoriented, slurred speech)    Negative: Passed out (i.e., lost consciousness, collapsed and was not responding)    Negative: Wheezing started suddenly after medicine, an allergic food or bee sting    Negative: Stridor    Negative: Slow, shallow and weak breathing    Negative: Sounds like a life-threatening emergency to the triager    [1] Wheezing (high pitched whistling sound) AND [2] previous asthma attacks or use of asthma medicines    Negative: Severe difficulty breathing (e.g., struggling for each breath, speak in single words, pulse > 120)    Negative: Bluish lips, tongue, or face now    Negative: Difficult to awaken or acting confused  (e.g., disoriented, slurred speech)    Negative: " Passed out (i.e., lost consciousness, collapsed and was not responding)    Negative: Wheezing started suddenly after medicine, an allergic food, or bee sting    Negative: Sounds like a life-threatening emergency to the triager    Negative: [1] SEVERE asthma attack (e.g., very SOB at rest, speaks in single words, loud wheezes) AND [2] not resolved after 2 nebulizer or inhaler treatments    Negative: Peak flow rate less than 50% of baseline level (RED zone)    Protocols used: ASTHMA ATTACK-ADULT-AH, BREATHING DIFFICULTY-ADULT-AH

## 2018-08-27 ENCOUNTER — TELEPHONE (OUTPATIENT)
Dept: FAMILY MEDICINE | Facility: CLINIC | Age: 48
End: 2018-08-27

## 2018-08-27 ENCOUNTER — OFFICE VISIT (OUTPATIENT)
Dept: FAMILY MEDICINE | Facility: CLINIC | Age: 48
End: 2018-08-27
Payer: COMMERCIAL

## 2018-08-27 VITALS
HEART RATE: 86 BPM | HEIGHT: 65 IN | OXYGEN SATURATION: 95 % | TEMPERATURE: 98.1 F | BODY MASS INDEX: 36.32 KG/M2 | WEIGHT: 218 LBS | RESPIRATION RATE: 24 BRPM | SYSTOLIC BLOOD PRESSURE: 144 MMHG | DIASTOLIC BLOOD PRESSURE: 82 MMHG

## 2018-08-27 DIAGNOSIS — F42.9 OBSESSIVE-COMPULSIVE DISORDER, UNSPECIFIED TYPE: ICD-10-CM

## 2018-08-27 DIAGNOSIS — F32.1 MODERATE MAJOR DEPRESSION (H): ICD-10-CM

## 2018-08-27 DIAGNOSIS — J98.01 ACUTE BRONCHOSPASM: Primary | ICD-10-CM

## 2018-08-27 DIAGNOSIS — T43.014D: ICD-10-CM

## 2018-08-27 PROCEDURE — 99214 OFFICE O/P EST MOD 30 MIN: CPT | Performed by: FAMILY MEDICINE

## 2018-08-27 RX ORDER — ALBUTEROL SULFATE 0.83 MG/ML
2.5 SOLUTION RESPIRATORY (INHALATION) EVERY 4 HOURS PRN
Qty: 25 VIAL | Refills: 0 | Status: SHIPPED | OUTPATIENT
Start: 2018-08-27 | End: 2018-09-26

## 2018-08-27 ASSESSMENT — PAIN SCALES - GENERAL: PAINLEVEL: NO PAIN (0)

## 2018-08-27 NOTE — MR AVS SNAPSHOT
After Visit Summary   8/27/2018    Bibi Song    MRN: [id number]           Patient Information     Date Of Birth          1970        Visit Information        Provider Department      8/27/2018 2:40 PM Glory Singleton MD [facility]        Today's Diagnoses     Acute bronchospasm    -  1      Care Instructions          Thank you for choosing [facility].  You may be receiving a survey in the mail from [facility] regarding your visit today.  Please take a few minutes to complete and return the survey to let us know how we are doing.      Our Clinic hours are:  Mondays    7:20 am - 7 pm  Tues -  Fri  7:20 am - 5 pm    Clinic Phone: [phone]    The clinic lab opens at 7:30 am Mon - Fri and appointments are required.    [facility]  Ph. [phone]  Monday  8 am - 7pm  Tues - Fri 8 am - 5:30 pm                 Follow-ups after your visit        Your next 10 appointments already scheduled     Aug 30, 2018  9:00 AM CDT   SHORT with Glory Singleton MD   [facility]    [address] [facility] [address]   [phone]              Who to contact     If you have questions or need follow up information about today's clinic visit or your schedule please contact [facility] directly at [phone].  Normal or non-critical lab and imaging results will be communicated to you by MyChart, letter or phone within 4 business days after the clinic has received the results. If you do not hear from us within 7 days, please contact the clinic through MyChart or phone. If you have a critical or abnormal lab result, we will notify you by phone as soon as possible.  Submit refill requests through [employer] or call your pharmacy and they will forward the refill request to us. Please allow 3 business days for your refill to be completed.          Additional Information About Your Visit       "  [employer] Information     [employer] gives you secure access to your electronic health record. If you see a primary care provider, you can also send messages to your care team and make appointments. If you have questions, please call your primary care clinic.  If you do not have a primary care provider, please call [phone] and they will assist you.        Care EveryWhere ID     This is your Care EveryWhere ID. This could be used by other organizations to access your [place] medical records  [id number]        Your Vitals Were     Pulse Temperature Respirations Height Last Period Pulse Oximetry    86 98.1  F (36.7  C) (Tympanic) 24 5' 5\" (1.651 m) 08/18/2018 (Approximate) 95%    BMI (Body Mass Index)                   36.28 kg/m2            Blood Pressure from Last 3 Encounters:   08/27/18 144/82   08/25/18 130/72   08/22/18 112/70    Weight from Last 3 Encounters:   08/27/18 218 lb (98.9 kg)   08/25/18 217 lb (98.4 kg)   08/22/18 219 lb (99.3 kg)              Today, you had the following     No orders found for display         Today's Medication Changes          These changes are accurate as of 8/27/18  3:11 PM.  If you have any questions, ask your nurse or doctor.               These medicines have changed or have updated prescriptions.        Dose/Directions    * albuterol 108 (90 Base) MCG/ACT inhaler   Commonly known as:  PROAIR HFA/PROVENTIL HFA/VENTOLIN HFA   This may have changed:  Another medication with the same name was added. Make sure you understand how and when to take each.   Used for:  Acute bronchitis with symptoms > 10 days   Changed by:  Glory Singleton MD        Dose:  2 puff   Inhale 2 puffs into the lungs 4 times daily   Quantity:  1 Inhaler   Refills:  1       * albuterol (2.5 MG/3ML) 0.083% neb solution   This may have changed:  You were already taking a medication with the same name, and this prescription was added. Make sure you understand how and when to take each.   Used for:  Acute " bronchospasm   Changed by:  Glory Singleton MD        Dose:  2.5 mg   Take 1 vial (2.5 mg) by nebulization every 4 hours as needed for shortness of breath / dyspnea or wheezing   Quantity:  25 vial   Refills:  0       * Notice:  This list has 2 medication(s) that are the same as other medications prescribed for you. Read the directions carefully, and ask your doctor or other care provider to review them with you.      Stop taking these medicines if you haven't already. Please contact your care team if you have questions.     amitriptyline 25 MG tablet   Commonly known as:  ELAVIL   Stopped by:  Glory Singleton MD           clonazePAM 1 MG tablet   Commonly known as:  klonoPIN   Stopped by:  Glory Singleton MD                Where to get your medicines      These medications were sent to [facility] [id number] [place] [id number]    Hours:  [facility] Phone:  [phone]     albuterol (2.5 MG/3ML) 0.083% neb solution                Primary Care Provider Office Phone # Fax #    Glory Singleton [phone] [address] [facility] [address]        Equal Access to Services     SULEMAN ADDISON AH: Hadii yeison choe hadasho Soomaali, waaxda luqadaha, qaybta kaalmada adeegyada, varun flowers. So [facility] [phone].    ATENCIÓN: Si habla español, tiene a velazquez disposición servicios gratuitos de asistencia lingüística. Llame al [phone].    We comply with applicable federal civil rights laws and [place] laws. We do not discriminate on the basis of race, color, national origin, age, disability, sex, sexual orientation, or gender identity.            Thank you!     Thank you for choosing [facility]  for your care. Our goal is always to provide you with excellent care. Hearing back from our patients is one way we can continue to improve our services. Please take a  few minutes to complete the written survey that you may receive in the mail after your visit with us. Thank you!             Your Updated Medication List - Protect others around you: Learn how to safely use, store and throw away your medicines at www.disposemymeds.org.          This list is accurate as of 8/27/18  3:11 PM.  Always use your most recent med list.                   Brand Name Dispense Instructions for use Diagnosis    * albuterol 108 (90 Base) MCG/ACT inhaler    PROAIR HFA/PROVENTIL HFA/VENTOLIN HFA    1 Inhaler    Inhale 2 puffs into the lungs 4 times daily    Acute bronchitis with symptoms > 10 days       * albuterol (2.5 MG/3ML) 0.083% neb solution     25 vial    Take 1 vial (2.5 mg) by nebulization every 4 hours as needed for shortness of breath / dyspnea or wheezing    Acute bronchospasm       CALCIUM 1200 PO           doxycycline 100 MG capsule    VIBRAMYCIN    14 capsule    Take 1 capsule (100 mg) by mouth 2 times daily for 7 days        GEODON 40 MG capsule   Generic drug:  ziprasidone     1 capsule    Take 40 mg by mouth 2 times daily (with meals)        IBUPROFEN PO      Take 600 mg by mouth every 6 hours as needed for moderate pain        levothyroxine 75 MCG tablet    SYNTHROID/LEVOTHROID    90 tablet    Take 1 tablet (75 mcg) by mouth daily    Hypothyroidism, unspecified type       MAGNESIUM PO           melatonin 3 MG tablet     30 tablet    Take 9 mg by mouth nightly as needed.        mesalamine 1000 MG Suppository    CANASA    42 suppository    Place 1 suppository (1,000 mg) rectally At Bedtime    Ulcerative colitis with rectal bleeding, unspecified location (H)       predniSONE 20 MG tablet    DELTASONE    10 tablet    Take 2 tablets (40 mg) by mouth daily for 5 days        vitamin B complex with vitamin C Tabs tablet      Take 1 tablet by mouth daily        ZOLOFT 100 MG tablet   Generic drug:  sertraline     1 tablet    Take 200 mg by mouth daily    Obsessive-compulsive disorder,  unspecified type, Other specified anxiety disorders, Major depressive disorder, recurrent episode, moderate (H)       * Notice:  This list has 2 medication(s) that are the same as other medications prescribed for you. Read the directions carefully, and ask your doctor or other care provider to review them with you.

## 2018-08-27 NOTE — TELEPHONE ENCOUNTER
Reason for Call:  Other call back    Detailed comments: Pt was in the hospital the end of July and she had tube put down her throat. Pt got a cold afterwards but it seemed to clear up. Now a week and a half ago she started having trouble breathing. She went to the emergency room Saturday and they gave her a nebulizer which helped a lot. She left with an inhaler prednisone and antibiotics. She doesn't feel that they are helping. Pt gets winded easily.    Phone Number Patient can be reached at: Home number on file [phone] (home)    Best Time: any    Can we leave a detailed message on this number?     Call taken on 8/27/2018 at 9:02 AM by Janice Silva

## 2018-08-27 NOTE — TELEPHONE ENCOUNTER
SOB with activity.  Was in the ER on 8/25/18 and given Prednisone, ABX, and inhaler.  Scheduled appt this afternoon with .  Breathing ok if not active.  KpavelRN

## 2018-08-27 NOTE — PATIENT INSTRUCTIONS
Thank you for choosing [facility].  You may be receiving a survey in the mail from [facility] regarding your visit today.  Please take a few minutes to complete and return the survey to let us know how we are doing.      Our Clinic hours are:  Mondays    7:20 am - 7 pm  Tues - Fri  7:20 am - 5 pm    Clinic Phone: [phone]    The clinic lab opens at 7:30 am Mon - Fri and appointments are required.    [facility]. [phone]  Monday  8 am - 7pm  Tues - Fri 8 am - 5:30 pm

## 2018-08-27 NOTE — PROGRESS NOTES
"  SUBJECTIVE:   Bibi Song is a 48 year old female who presents to clinic today for the following health issues:      ED/UC Followup:    Facility:  [facility]  Date of visit: 8/25/18  Reason for visit: HPI  Bibi Song is a 48 year old female significant past medical history for TCA overdose requiring intubation July 2018, OCD, anxiety disorder, depression, alcohol abuse in remission-presents with recent complaint of cough and audible wheezing.  Has been using albuterol 2 puffs 4 times daily.  Denies fever or chills.  No hemoptysis.  Intubation was uncomplicated with no indication for aspiration.  Patient did have some lingering throat irritation for a few weeks after intubation.  At the time of discharge she was advised to stop amitriptyline and clonazepam.  Patient states that she had some leftover at home so she resumed them.  Denies taking any excessive amount of medication beyond what is prescribed.  She has had no recent vomiting or concerns for aspiration.  Patient is a non-smoker.  Does not live with a smoker have secondhand smoke exposure.  Employment does not put her on harm's way for airway pollutants.  Current Status: Still having a hard time catching her breath. Patient states \"it feels tight in there\" Inhaler is not helping.      Patient had no problems with asthma or breathing problems prior to her hospitalization.  Patient had an accidental overdose (she denies suicide attempt) and was intubated and in the ICU. She doesn't really recall much of the situation.  She was taking amitriptyline, geodon, clonazepam and overdosed.  She has not yet found a new psychiatric provider.  She is only on geodon and sertraline at this point.  The amitriptyline and clonazepam were discontinued.    Was in the ER 2 days ago with the shortness of breath.  CHEST X RAY normal.  She was given prednisone, doxycycline and albuterol. She found that the nebulizer worked well for her but she doesn't get relief from the " "albuterol MDI.      /82  Pulse 86  Temp 98.1  F (36.7  C) (Tympanic)  Resp 24  Ht 5' 5\" (1.651 m)  Wt 218 lb (98.9 kg)  LMP 08/18/2018 (Approximate)  SpO2 95%  BMI 36.28 kg/m2  EXAM: GENERAL APPEARANCE: Alert, no acute distress.  Smells strongly of perfume.    HENT: Ears and TMs normal, oral mucosa and posterior oropharynx normal  RESP: lungs clear to auscultation   CV: normal rate, regular rhythm, no murmur or gallop  ABDOMEN: soft, no organomegaly, masses or tenderness  PSYCH: mentation appears normal., affect and mood normal    ASSESSMENT/PLAN:      ICD-10-CM    1. Acute bronchospasm J98.01 albuterol (2.5 MG/3ML) 0.083% neb solution   2. Tricyclic antidepressant overdose of undetermined intent, subsequent encounter T43.014D    3. Obsessive-compulsive disorder, unspecified type F42.9    4. Moderate major depression (H) F32.1      Patient is instructed to not use perfume (smells heavily of this today).    Continue the doxycycline and prednisone.  Nebulizer given to use prn for the wheezing.   With the recent normal chest x ray I will not repeat this. We are over a month out from intubation, so doubt this is residual trauma from that.     Given the number for the mental health referral again, she is going to call for an appointment.  Denied needing any refills at this time.    Glory Singleton M.D.      Patient Instructions         Thank you for choosing [facility].  You may be receiving a survey in the mail from [facility] regarding your visit today.  Please take a few minutes to complete and return the survey to let us know how we are doing.      Our Clinic hours are:  Mondays    7:20 am - 7 pm  Tues -  Fri  7:20 am - 5 pm    Clinic Phone: [phone]    The clinic lab opens at 7:30 am Mon - Fri and appointments are required.    [facility]. [phone]  Monday  8 am - 7pm  Tues - Fri 8 am - 5:30 pm                   "

## 2018-08-28 ENCOUNTER — TELEPHONE (OUTPATIENT)
Dept: FAMILY MEDICINE | Facility: CLINIC | Age: 48
End: 2018-08-28

## 2018-08-28 NOTE — TELEPHONE ENCOUNTER
"I took the call, patient is wheezing audibly and gasping for breath, She says she has used nebs 4 times today and inhaler , with no relief.   She says her adult son is there with her.     Advised her to present to the ED immed.   \"ok, but what will they do? \"  Advised they need to assess the situation today, and if needed help her with her breathing.   She was recently intubated in the hospital.     \"oh, ok, \"  Says she will have her son bring her to the ER now.   Marily Castro RNC      "

## 2018-08-28 NOTE — TELEPHONE ENCOUNTER
Reason for call:  Patient reporting a symptom    Symptom or request: Pt was hospitalized 3 weeks ago and saw Dr. Singleton yesterday and pt is wheezing and very SOB.  Transferred to Clinic RN.      Duration (how long have symptoms been present): ongoing    Have you been treated for this before? Yes    Additional comments:     Phone Number patient can be reached at:  Cell number on file:    Telephone Information:   Mobile [phone]       Best Time:  any    Can we leave a detailed message on this number:  YES    Call taken on 8/28/2018 at 4:04 PM by Velma Apodaca

## 2018-08-29 ENCOUNTER — HOSPITAL ENCOUNTER (EMERGENCY)
Facility: CLINIC | Age: 48
Discharge: HOME | End: 2018-08-29
Attending: EMERGENCY MEDICINE | Admitting: EMERGENCY MEDICINE
Payer: COMMERCIAL

## 2018-08-29 ENCOUNTER — APPOINTMENT (OUTPATIENT)
Dept: CT IMAGING | Facility: CLINIC | Age: 48
End: 2018-08-29
Attending: EMERGENCY MEDICINE
Payer: COMMERCIAL

## 2018-08-29 VITALS
TEMPERATURE: 98.1 F | SYSTOLIC BLOOD PRESSURE: 131 MMHG | WEIGHT: 215 LBS | DIASTOLIC BLOOD PRESSURE: 79 MMHG | HEIGHT: 66 IN | RESPIRATION RATE: 20 BRPM | BODY MASS INDEX: 34.55 KG/M2 | OXYGEN SATURATION: 91 %

## 2018-08-29 DIAGNOSIS — J38.6 SUBGLOTTIC STENOSIS: ICD-10-CM

## 2018-08-29 LAB
ANION GAP SERPL CALCULATED.3IONS-SCNC: 9 MMOL/L (ref 3–14)
BASOPHILS # BLD AUTO: 0 10E9/L (ref 0–0.2)
BASOPHILS NFR BLD AUTO: 0.1 %
BUN SERPL-MCNC: 11 MG/DL (ref 7–30)
CALCIUM SERPL-MCNC: 8.8 MG/DL (ref 8.5–10.1)
CHLORIDE SERPL-SCNC: 105 MMOL/L (ref 94–109)
CO2 SERPL-SCNC: 27 MMOL/L (ref 20–32)
CREAT SERPL-MCNC: 0.71 MG/DL (ref 0.52–1.04)
DIFFERENTIAL METHOD BLD: ABNORMAL
EOSINOPHIL # BLD AUTO: 0 10E9/L (ref 0–0.7)
EOSINOPHIL NFR BLD AUTO: 0.1 %
ERYTHROCYTE [DISTWIDTH] IN BLOOD BY AUTOMATED COUNT: 14.6 % (ref 10–15)
GFR SERPL CREATININE-BSD FRML MDRD: 87 ML/MIN/1.7M2
GLUCOSE SERPL-MCNC: 145 MG/DL (ref 70–99)
HCT VFR BLD AUTO: 35.4 % (ref 35–47)
HGB BLD-MCNC: 11.4 G/DL (ref 11.7–15.7)
IMM GRANULOCYTES # BLD: 0.4 10E9/L (ref 0–0.4)
IMM GRANULOCYTES NFR BLD: 2.4 %
LYMPHOCYTES # BLD AUTO: 1.7 10E9/L (ref 0.8–5.3)
LYMPHOCYTES NFR BLD AUTO: 11.5 %
MCH RBC QN AUTO: 29.2 PG (ref 26.5–33)
MCHC RBC AUTO-ENTMCNC: 32.2 G/DL (ref 31.5–36.5)
MCV RBC AUTO: 91 FL (ref 78–100)
MONOCYTES # BLD AUTO: 0.2 10E9/L (ref 0–1.3)
MONOCYTES NFR BLD AUTO: 1.3 %
NEUTROPHILS # BLD AUTO: 12.5 10E9/L (ref 1.6–8.3)
NEUTROPHILS NFR BLD AUTO: 84.6 %
NRBC # BLD AUTO: 0 10*3/UL
NRBC BLD AUTO-RTO: 0 /100
PLATELET # BLD AUTO: 420 10E9/L (ref 150–450)
POTASSIUM SERPL-SCNC: 3.5 MMOL/L (ref 3.4–5.3)
RADIOLOGIST FLAGS: ABNORMAL
RBC # BLD AUTO: 3.9 10E12/L (ref 3.8–5.2)
SODIUM SERPL-SCNC: 141 MMOL/L (ref 133–144)
WBC # BLD AUTO: 14.7 10E9/L (ref 4–11)

## 2018-08-29 PROCEDURE — 70491 CT SOFT TISSUE NECK W/DYE: CPT

## 2018-08-29 PROCEDURE — 99284 EMERGENCY DEPT VISIT MOD MDM: CPT | Mod: 25

## 2018-08-29 PROCEDURE — 80048 BASIC METABOLIC PNL TOTAL CA: CPT | Performed by: EMERGENCY MEDICINE

## 2018-08-29 PROCEDURE — 85025 COMPLETE CBC W/AUTO DIFF WBC: CPT | Performed by: EMERGENCY MEDICINE

## 2018-08-29 PROCEDURE — 25000128 H RX IP 250 OP 636: Performed by: EMERGENCY MEDICINE

## 2018-08-29 PROCEDURE — 25000125 ZZHC RX 250: Performed by: EMERGENCY MEDICINE

## 2018-08-29 PROCEDURE — 99284 EMERGENCY DEPT VISIT MOD MDM: CPT | Mod: Z6 | Performed by: EMERGENCY MEDICINE

## 2018-08-29 RX ORDER — IOPAMIDOL 755 MG/ML
80 INJECTION, SOLUTION INTRAVASCULAR ONCE
Status: COMPLETED | OUTPATIENT
Start: 2018-08-29 | End: 2018-08-29

## 2018-08-29 RX ORDER — PREDNISONE 20 MG/1
TABLET ORAL
Qty: 20 TABLET | Refills: 0 | Status: SHIPPED | OUTPATIENT
Start: 2018-08-29 | End: 2018-09-06

## 2018-08-29 RX ORDER — PREDNISONE 20 MG/1
60 TABLET ORAL ONCE
Status: DISCONTINUED | OUTPATIENT
Start: 2018-08-29 | End: 2018-08-29

## 2018-08-29 RX ORDER — PREDNISONE 20 MG/1
20 TABLET ORAL ONCE
Status: COMPLETED | OUTPATIENT
Start: 2018-08-29 | End: 2018-08-29

## 2018-08-29 RX ADMIN — IOPAMIDOL 160 ML: 755 INJECTION, SOLUTION INTRAVENOUS at 11:15

## 2018-08-29 RX ADMIN — SODIUM CHLORIDE 80 ML: 9 INJECTION, SOLUTION INTRAVENOUS at 11:16

## 2018-08-29 RX ADMIN — PREDNISONE 20 MG: 20 TABLET ORAL at 12:34

## 2018-08-29 NOTE — ED AVS SNAPSHOT
[facility] Emergency Department    [address]    [facility] [address]    Phone:  [phone]    Fax:  [phone]                                       Bibi Song   MRN: [id number]    Department:  [facility] Emergency Department   Date of Visit:  8/29/2018           Patient Information     Date Of Birth          1970        Your diagnoses for this visit were:     Subglottic stenosis        You were seen by Jose Weinstein MD.      Follow-up Information     Follow up with Dusty Pearson MD.    Specialty:  Otolaryngology    Contact information:    [facility] [address]  [phone]          Discharge Instructions       Return to the emergency department if your symptoms are getting worse.  Try taking a higher dose of prednisone to see if this helps.  He will be contacted by Vielka, who is a nurses at the ENT clinic later today to schedule a follow-up appointment tomorrow to be rechecked by an ENT surgeon.  If you do not hear from Vielka over the next 2 hours her phone number is [phone].    24 Hour Appointment Hotline       To make an appointment at any [facility], call [phone] ([phone]). If you don't have a family doctor or clinic, we will help you find one. [place] clinics are conveniently located to serve the needs of you and your family.             Review of your medicines      CONTINUE these medicines which may have CHANGED, or have new prescriptions. If we are uncertain of the size of tablets/capsules you have at home, strength may be listed as something that might have changed.        Dose / Directions Last dose taken    predniSONE 20 MG tablet   Commonly known as:  DELTASONE   What changed:    - how much to take  - how to take this  - when to take this  - additional instructions   Quantity:  20 tablet        Take 3 tabs (60 mg) by mouth daily x 3 days, 2 tabs (40 mg) daily x 3 days, 1 tab (20 mg) daily x 3 days, then 1/2  tab (10 mg) x 3 days.   Refills:  0          Our records show that you are taking the medicines listed below. If these are incorrect, please call your family doctor or clinic.        Dose / Directions Last dose taken    * albuterol 108 (90 Base) MCG/ACT inhaler   Commonly known as:  PROAIR HFA/PROVENTIL HFA/VENTOLIN HFA   Dose:  2 puff   Quantity:  1 Inhaler        Inhale 2 puffs into the lungs 4 times daily   Refills:  1        * albuterol (2.5 MG/3ML) 0.083% neb solution   Dose:  2.5 mg   Quantity:  25 vial        Take 1 vial (2.5 mg) by nebulization every 4 hours as needed for shortness of breath / dyspnea or wheezing   Refills:  0        CALCIUM 1200 PO        Refills:  0        doxycycline 100 MG capsule   Commonly known as:  VIBRAMYCIN   Dose:  100 mg   Quantity:  14 capsule        Take 1 capsule (100 mg) by mouth 2 times daily for 7 days   Refills:  0        GEODON 40 MG capsule   Dose:  40 mg   Quantity:  1 capsule   Generic drug:  ziprasidone        Take 40 mg by mouth 2 times daily (with meals)   Refills:  0        IBUPROFEN PO   Dose:  600 mg        Take 600 mg by mouth every 6 hours as needed for moderate pain   Refills:  0        levothyroxine 75 MCG tablet   Commonly known as:  SYNTHROID/LEVOTHROID   Dose:  75 mcg   Quantity:  90 tablet        Take 1 tablet (75 mcg) by mouth daily   Refills:  1        MAGNESIUM PO   Dose:  1 tablet        Take 1 tablet by mouth daily   Refills:  0        melatonin 3 MG tablet   Dose:  9 mg   Quantity:  30 tablet        Take 9 mg by mouth nightly as needed.   Refills:  0        vitamin B complex with vitamin C Tabs tablet   Dose:  1 tablet        Take 1 tablet by mouth daily   Refills:  0        ZOLOFT 100 MG tablet   Dose:  200 mg   Quantity:  1 tablet   Generic drug:  sertraline        Take 200 mg by mouth daily   Refills:  0        * Notice:  This list has 2 medication(s) that are the same as other medications prescribed for you. Read the directions carefully, and  ask your doctor or other care provider to review them with you.            Prescriptions were sent or printed at these locations (1 Prescription)                   [facility] - - [place]ones - [id number]   [address] [place] [address]    Telephone:  [phone]   Fax:  [phone]   Hours:  CLARA Watts Est[place]                E-Prescribed (1 of 1)         predniSONE (DELTASONE) 20 MG tablet                Procedures and tests performed during your visit     Basic metabolic panel    CBC with platelets differential    Peripheral IV catheter    Soft tissue neck CT w contrast      Orders Needing Specimen Collection     None      Pending Results     Date and Time Order Name Status Description    8/29/2018 1057 Soft tissue neck CT w contrast Preliminary             Pending Culture Results     No orders found from 8/27/2018 to 8/30/2018.            Pending Results Instructions     If you had any lab results that were not finalized at the time of your Discharge, you can call the ED Lab Result RN at [phone]. You will be contacted by this team for any positive Lab results or changes in treatment. The nurses are available 7 days a week from 10A to 6:30P.  You can leave a message 24 hours per day and they will return your call.        Test Results From Your Hospital Stay        8/29/2018 11:48 AM      Component Results     Component Value Ref Range & Units Status    Sodium 141 133 - 144 mmol/L Final    Potassium 3.5 3.4 - 5.3 mmol/L Final    Chloride 105 94 - 109 mmol/L Final    Carbon Dioxide 27 20 - 32 mmol/L Final    Anion Gap 9 3 - 14 mmol/L Final    Glucose 145 (H) 70 - 99 mg/dL Final    Urea Nitrogen 11 7 - 30 mg/dL Final    Creatinine 0.71 0.52 - 1.04 mg/dL Final    GFR Estimate 87 >60 mL/min/1.7m2 Final    Non  GFR Calc    GFR Estimate If Black >90 >60 mL/min/1.7m2 Final    African American GFR Calc    Calcium 8.8 8.5 - 10.1 mg/dL Final          8/29/2018 11:25 AM      Component Results     Component Value Ref Range & Units Status    WBC 14.7 (H) 4.0 - 11.0 10e9/L Final    RBC Count 3.90 3.8 - 5.2 10e12/L Final    Hemoglobin 11.4 (L) 11.7 - 15.7 g/dL Final    Hematocrit 35.4 35.0 - 47.0 % Final    MCV 91 78 - 100 fl Final    MCH 29.2 26.5 - 33.0 pg Final    MCHC 32.2 31.5 - 36.5 g/dL Final    RDW 14.6 10.0 - 15.0 % Final    Platelet Count 420 150 - 450 10e9/L Final    Diff Method Automated Method  Final    % Neutrophils 84.6 % Final    % Lymphocytes 11.5 % Final    % Monocytes 1.3 % Final    % Eosinophils 0.1 % Final    % Basophils 0.1 % Final    % Immature Granulocytes 2.4 % Final    Nucleated RBCs 0 0 /100 Final    Absolute Neutrophil 12.5 (H) 1.6 - 8.3 10e9/L Final    Absolute Lymphocytes 1.7 0.8 - 5.3 10e9/L Final    Absolute Monocytes 0.2 0.0 - 1.3 10e9/L Final    Absolute Eosinophils 0.0 0.0 - 0.7 10e9/L Final    Absolute Basophils 0.0 0.0 - 0.2 10e9/L Final    Abs Immature Granulocytes 0.4 0 - 0.4 10e9/L Final    Absolute Nucleated RBC 0.0  Final         8/29/2018 12:02 PM      Component Results     Component    Radiologist flags (Panic)    Airway narrowing      Narrative     CT SCAN OF THE NECK WITH CONTRAST August 29, 2018 11:42 AM     HISTORY: Inspiratory stridor, recently intubated.     TECHNIQUE: Axial CT images of the neck following administration of  intravenous contrast with reformations. Radiation dose for this scan  was reduced using automated exposure control, adjustment of the mA  and/or kV according to patient size, or iterative reconstruction  technique.     COMPARISON: None.     FINDINGS:   Visualized sinuses, nasopharynx and orbits: Normal.     Tongue, oral cavity and oropharynx: Normal.     Hypopharynx: Normal.     Larynx and trachea: There is moderate narrowing of the subglottic  trachea over a 2.5 cm segment. The immediately subglottic trachea  measures approximately 14 x 15 mm (AP by TR), the stenotic trachea  measures 9 x 9 mm,  and the upper thoracic trachea inferior to the  level of stenosis measures 16 x 18 mm. There is circumferential soft  tissue thickening of the subglottic trachea at the area of stenosis  (sagittal series 4 image 36).    Thyroid: Normal.     Submandibular glands: Normal.     Parotid glands: Normal.       Lymph nodes: Normal.     Vasculature: Normal.     Upper mediastinum and lungs: Normal     Bones: Normal.         Impression     IMPRESSION: Circumferential thickening of the subglottic trachea with  moderate stenosis, likely acquired related to recent intubation.     [Critical Result: Airway narrowing]    Finding was identified on 8/29/2018 11:47 AM.     Dr. Weinstein was contacted by me on 8/29/2018 11:59 AM and verbalized  understanding of the critical result.                 Thank you for choosing [facility]       Thank you for choosing [facility] for your care. Our goal is always to provide you with excellent care. Hearing back from our patients is one way we can continue to improve our services. Please take a few minutes to complete the written survey that you may receive in the mail after you visit with us. Thank you!        [employer] Information     [employer] gives you secure access to your electronic health record. If you see a primary care provider, you can also send messages to your care team and make appointments. If you have questions, please call your primary care clinic.  If you do not have a primary care provider, please call [phone] and they will assist you.        Care EveryWhere ID     This is your Care EveryWhere ID. This could be used by other organizations to access your [facility] medical records  [id number]        Equal Access to Services     JUANCARLOS ADDISON : Hadii yeison Wright, wajorgeda stacy, qaybta janinaalmada varun marroquin. So [facility] [phone].    ATENCIÓN: Si habla español, tiene a velazquez disposición servicios gratuitos de asistencia lingüística. Llame  al [phone].    We comply with applicable federal civil rights laws and [place] laws. We do not discriminate on the basis of race, color, national origin, age, disability, sex, sexual orientation, or gender identity.            After Visit Summary       This is your record. Keep this with you and show to your community pharmacist(s) and doctor(s) at your next visit.

## 2018-08-29 NOTE — TELEPHONE ENCOUNTER
Patient is calling again with the same issue today. Please advise.  Rachael Mercado  [facility]  Flex

## 2018-08-29 NOTE — DISCHARGE INSTRUCTIONS
Return to the emergency department if your symptoms are getting worse.  Try taking a higher dose of prednisone to see if this helps.  He will be contacted by Vielka, who is a nurses at the ENT clinic later today to schedule a follow-up appointment tomorrow to be rechecked by an ENT surgeon.  If you do not hear from Vielka over the next 2 hours her phone number is [phone].

## 2018-08-29 NOTE — ED NOTES
Pt was intubated recently and was seen here Saturday for increased shortness of breath - patient today c/o continued shortness of breath and noticeable stridor with breathing. LS are clear, denies any chest pain or injury. No overt distress - states she has OCD and the sound of her breathing is 'driving me crazy' .

## 2018-08-29 NOTE — ED AVS SNAPSHOT
[facility] Emergency Department    [address] [address]    Phone:  [phone]    Fax:  [phone]                                       Bibi Song   MRN: [id number]    Department:  [facility] Emergency Department   Date of Visit:  8/29/2018           After Visit Summary Signature Page     I have received my discharge instructions, and my questions have been answered. I have discussed any challenges I see with this plan with the nurse or doctor.    ..........................................................................................................................................  Patient/Patient Representative Signature      ..........................................................................................................................................  Patient Representative Print Name and Relationship to Patient    ..................................................               ................................................  Date                                            Time    ..........................................................................................................................................  Reviewed by Signature/Title    ...................................................              ..............................................  Date                                                            Time          22EPIC Rev 08/18

## 2018-08-29 NOTE — TELEPHONE ENCOUNTER
"Patient calling back, \"I called yesterday. I am having financial difficulties, so I didn't go in \"   Patient is gasping and wheezing, sounds worse than yesterday.   Just did her neb, with no relief at all.   Recently had been in the hospital and was intubated.     Reassured her that the ER will see her, and she will be cared for and to ask to speak with a  about help for medical care finances when she is there,   Advised she can make a payment plan or may even qualify for some assistance with her care,   Encouraged her to be seen and take care of herslf.   She is speaking in shorter sentences, and gasping for air at times,   Advised her to call 911 as she says she doesn't have another adult there right now, \"oh, I can call my son. \"    Encouraged her to seek emergency care immed.   Marily Castro RNC    "

## 2018-08-29 NOTE — ED PROVIDER NOTES
History     Chief Complaint   Patient presents with     Shortness of Breath     labored breathing, soa     HPI  Bibi Song is a 48 year old female who presents for shortness of breath and increased respiratory sounds.  Patient says that she has had about a week of increased noisy breathing, feel short of breath with activity, especially with going up stairs.  No fevers, chills, chest pain, cough.  Denies sore throat or runny nose.  No abdominal pain, nausea, vomiting.  She was recently seen for an accidental overdose of amitriptyline and was intubated approximately 1 month ago, those records are reviewed.  She was seen in the emergency department for this 5 days ago and was discharged with prescriptions for 40 mg of prednisone daily and a seven-day course of doxycycline.  She says this has not helped    Problem List:    Patient Active Problem List    Diagnosis Date Noted     TCA (tricyclic antidepressant) overdose of undetermined intent 07/18/2018     Priority: Medium     Ulcerative colitis with complication, unspecified location (H) 04/27/2017     Priority: Medium     Hypothyroidism, unspecified type 12/08/2016     Priority: Medium     Cervicalgia 10/10/2012     Priority: Medium     Hyperlipidemia LDL goal <160 10/31/2010     Priority: Medium     Moderate major depression (H) 08/06/2010     Priority: Medium     OCD (obsessive compulsive disorder) 08/06/2010     Priority: Medium     [facility] - Psych NP. Schoenecker, NP       Anxiety disorder 08/06/2010     Priority: Medium     Family history of diabetes mellitus 08/06/2010     Priority: Medium     Family history of thyroid disease 08/06/2010     Priority: Medium     ALCOHOL ABUSE, IN REMISSION 08/06/2010     Priority: Medium        Past Medical History:    No past medical history on file.    Past Surgical History:    No past surgical history on file.    Family History:    Family History   Problem Relation Age of Onset     Diabetes Mother       "Depression Mother      OCD     Neurologic Disorder Mother      Psychotic Disorder Mother      Respiratory Mother      Thyroid Disease Mother      Gynecology Mother      hysterectomy     Cerebrovascular Disease Mother      pacemaker     HEART DISEASE Father      MI,   LATE 60'S     Hypertension Father      Prostate Cancer Father      Obesity Father      Diabetes Father      Cerebrovascular Disease Father      Depression Brother      Thyroid Disease Brother      Depression Brother      Thyroid Disease Brother      Allergies Son      Asthma Son      Thyroid Disease Maternal Grandmother      Cancer Maternal Grandfather      Psychotic Disorder Paternal Grandmother      Thyroid Disease Paternal Grandfather      Diabetes Brother      Breast Cancer Maternal Aunt        Social History:  Marital Status:   [2]  Social History   Substance Use Topics     Smoking status: Former Smoker     Quit date: 1/1/1994     Smokeless tobacco: Never Used     Alcohol use No      Comment: Treatment in 2007        Medications:      albuterol (2.5 MG/3ML) 0.083% neb solution   albuterol (PROAIR HFA/PROVENTIL HFA/VENTOLIN HFA) 108 (90 Base) MCG/ACT inhaler   Calcium Carbonate-Vit D-Min (CALCIUM 1200 PO)   doxycycline (VIBRAMYCIN) 100 MG capsule   IBUPROFEN PO   levothyroxine (SYNTHROID/LEVOTHROID) 75 MCG tablet   MAGNESIUM PO   melatonin 3 MG tablet   predniSONE (DELTASONE) 20 MG tablet   sertraline (ZOLOFT) 100 MG tablet   vitamin B complex with vitamin C (VITAMIN  B COMPLEX) TABS tablet   ziprasidone (GEODON) 40 MG capsule         Review of Systems  Pertinent positives and negatives listed in the HPI, all other systems reviewed and are negative.    Physical Exam   BP: 131/79  Heart Rate: 97  Temp: 98.1  F (36.7  C)  Resp: 20  Height: 166.4 cm (5' 5.5\")  Weight: 97.5 kg (215 lb)  SpO2: 97 %      Physical Exam   Constitutional: She is oriented to person, place, and time. She appears well-developed and well-nourished. She appears distressed. "   HENT:   Head: Normocephalic and atraumatic.   Right Ear: External ear normal.   Left Ear: External ear normal.   Nose: Nose normal.   Eyes: Conjunctivae are normal. No scleral icterus.   Neck: Normal range of motion.   Cardiovascular: Normal rate and regular rhythm.    Pulmonary/Chest: Effort normal. Stridor (inspiratory) present. No respiratory distress.   Abdominal: Soft. She exhibits no distension.   Neurological: She is alert and oriented to person, place, and time.   Skin: Skin is warm and dry. She is not diaphoretic.   Psychiatric: She has a normal mood and affect. Her behavior is normal.   Nursing note and vitals reviewed.      ED Course     ED Course     Procedures               Critical Care time:  none               Results for orders placed or performed during the hospital encounter of 08/29/18 (from the past 24 hour(s))   Basic metabolic panel   Result Value Ref Range    Sodium 141 133 - 144 mmol/L    Potassium 3.5 3.4 - 5.3 mmol/L    Chloride 105 94 - 109 mmol/L    Carbon Dioxide 27 20 - 32 mmol/L    Anion Gap 9 3 - 14 mmol/L    Glucose 145 (H) 70 - 99 mg/dL    Urea Nitrogen 11 7 - 30 mg/dL    Creatinine 0.71 0.52 - 1.04 mg/dL    GFR Estimate 87 >60 mL/min/1.7m2    GFR Estimate If Black >90 >60 mL/min/1.7m2    Calcium 8.8 8.5 - 10.1 mg/dL   CBC with platelets differential   Result Value Ref Range    WBC 14.7 (H) 4.0 - 11.0 10e9/L    RBC Count 3.90 3.8 - 5.2 10e12/L    Hemoglobin 11.4 (L) 11.7 - 15.7 g/dL    Hematocrit 35.4 35.0 - 47.0 %    MCV 91 78 - 100 fl    MCH 29.2 26.5 - 33.0 pg    MCHC 32.2 31.5 - 36.5 g/dL    RDW 14.6 10.0 - 15.0 %    Platelet Count 420 150 - 450 10e9/L    Diff Method Automated Method     % Neutrophils 84.6 %    % Lymphocytes 11.5 %    % Monocytes 1.3 %    % Eosinophils 0.1 %    % Basophils 0.1 %    % Immature Granulocytes 2.4 %    Nucleated RBCs 0 0 /100    Absolute Neutrophil 12.5 (H) 1.6 - 8.3 10e9/L    Absolute Lymphocytes 1.7 0.8 - 5.3 10e9/L    Absolute Monocytes 0.2 0.0  - 1.3 10e9/L    Absolute Eosinophils 0.0 0.0 - 0.7 10e9/L    Absolute Basophils 0.0 0.0 - 0.2 10e9/L    Abs Immature Granulocytes 0.4 0 - 0.4 10e9/L    Absolute Nucleated RBC 0.0    Soft tissue neck CT w contrast   Result Value Ref Range    Radiologist flags Airway narrowing (AA)     Narrative    CT SCAN OF THE NECK WITH CONTRAST August 29, 2018 11:42 AM     HISTORY: Inspiratory stridor, recently intubated.     TECHNIQUE: Axial CT images of the neck following administration of  intravenous contrast with reformations. Radiation dose for this scan  was reduced using automated exposure control, adjustment of the mA  and/or kV according to patient size, or iterative reconstruction  technique.     COMPARISON: None.     FINDINGS:   Visualized sinuses, nasopharynx and orbits: Normal.     Tongue, oral cavity and oropharynx: Normal.     Hypopharynx: Normal.     Larynx and trachea: There is moderate narrowing of the subglottic  trachea over a 2.5 cm segment. The immediately subglottic trachea  measures approximately 14 x 15 mm (AP by TR), the stenotic trachea  measures 9 x 9 mm, and the upper thoracic trachea inferior to the  level of stenosis measures 16 x 18 mm. There is circumferential soft  tissue thickening of the subglottic trachea at the area of stenosis  (sagittal series 4 image 36).    Thyroid: Normal.     Submandibular glands: Normal.     Parotid glands: Normal.       Lymph nodes: Normal.     Vasculature: Normal.     Upper mediastinum and lungs: Normal     Bones: Normal.       Impression    IMPRESSION: Circumferential thickening of the subglottic trachea with  moderate stenosis, likely acquired related to recent intubation.     [Critical Result: Airway narrowing]    Finding was identified on 8/29/2018 11:47 AM.     Dr. Weinstein was contacted by me on 8/29/2018 11:59 AM and verbalized  understanding of the critical result.        Medications   iopamidol (ISOVUE-370) solution 80 mL (160 mLs Intravenous Given 8/29/18  1115)   sodium chloride 0.9 % bag 500mL for CT scan flush use (80 mLs Intravenous Given 8/29/18 1116)   predniSONE (DELTASONE) tablet 20 mg (20 mg Oral Given 8/29/18 1234)       Assessments & Plan (with Medical Decision Making)   48-year-old female who presents for difficulty breathing and noisy breathing.  Temperature is 36.7 C, heart rate 97, SPO2 is 97% on room air.  She has inspiratory stridor on exam, is breathing comfortably and is able to speak in full sentences.  Lungs are clear to auscultation without signs of wheezing.  This is concerning for stenosis secondary to the recent intubation.  There is no sign infectious symptoms less likely epiglottitis.  CT of the neck is obtained, images reviewed independently, I also discussed the images with the reading radiologist.  She does have narrowing of the subglottic trachea concerning for acquired stenosis secondary to the recent intubation.  I did discuss the case with the on-call ENT surgeon at [facility].  She recommended increasing the prednisone since that has not helped yet to 60 mg daily and they have arranged for her to follow-up in clinic tomorrow for recheck.  She is safe to discharge home and is comfortable with this plan.  She is told to return if worse.  The patient is in agreement.    I have reviewed the nursing notes.    I have reviewed the findings, diagnosis, plan and need for follow up with the patient.       New Prescriptions    PREDNISONE (DELTASONE) 20 MG TABLET    Take 3 tabs (60 mg) by mouth daily x 3 days, 2 tabs (40 mg) daily x 3 days, 1 tab (20 mg) daily x 3 days, then 1/2 tab (10 mg) x 3 days.       Final diagnoses:   Subglottic stenosis       8/29/2018   [facility] [place] EMERGENCY DEPARTMENT     Jose Weinstein MD  08/29/18 1115

## 2018-08-30 ENCOUNTER — OFFICE VISIT (OUTPATIENT)
Dept: OTOLARYNGOLOGY | Facility: CLINIC | Age: 48
End: 2018-08-30
Payer: COMMERCIAL

## 2018-08-30 ENCOUNTER — TELEPHONE (OUTPATIENT)
Dept: OTOLARYNGOLOGY | Facility: CLINIC | Age: 48
End: 2018-08-30

## 2018-08-30 ENCOUNTER — PRE VISIT (OUTPATIENT)
Dept: OTOLARYNGOLOGY | Facility: CLINIC | Age: 48
End: 2018-08-30

## 2018-08-30 VITALS — BODY MASS INDEX: 34.55 KG/M2 | HEIGHT: 66 IN | WEIGHT: 215 LBS

## 2018-08-30 DIAGNOSIS — R49.0 DYSPHONIA: Primary | ICD-10-CM

## 2018-08-30 DIAGNOSIS — J39.8 TRACHEAL STENOSIS: ICD-10-CM

## 2018-08-30 NOTE — PROGRESS NOTES
HISTORY OF PRESENT ILLNESS: Bibi Song is a 48 year old female with a history of airway difficulties.  She had an accidental overdose of medications in mid July.  She was admitted from 7/18/ 2018-7/21/2018.  During that time she had intubation for at least 2-3 days.  On discharge she was feeling well.  In mid August she developed some shortness of breath.  She was started on albuterol.  She had no problems with asthma or breathing prior to her hospitalization.  She was seen in the emergency room in mid August, she had shortness of breath and was given prednisone. doxycycline and albuterol.  The nebulizer worked well for her but she did get relief from the albuterol.  She was recently placed on prednisone 60 mg a day which  and this has helped her breathe comfortably.  She still has audible inspiratory stridor but there is good airflow.  There is a concern about possible stenosis versus paradoxic vocal fold motion and she is here for this today.  She has no difficulty swallowing, no difficulty talking and she feels well in general.  She does have exercise limitation but is comfortable sleeping.    Last 2 Scores for Patient-Answered VHI Questionnaire  VHI Total Score 8/30/2018   VHI Total Score 13       Last 2 Scores for Patient-Answered CSI Questionnaire  CSI Total Score 8/30/2018   CSI Total Score 16         Last 2 Scores for Patient-Answered EAT Questionnaire  EAT Total Score 8/30/2018   EAT Total Score 2           PAST MEDICAL HISTORY:   Past Medical History:   Diagnosis Date     PONV (postoperative nausea and vomiting)        PAST SURGICAL HISTORY: No past surgical history on file.    FAMILY HISTORY:   Family History   Problem Relation Age of Onset     Diabetes Mother      Depression Mother      OCD     Neurologic Disorder Mother      Psychotic Disorder Mother      Respiratory Mother      Thyroid Disease Mother      Gynecology Mother      hysterectomy     Cerebrovascular Disease Mother      pacemaker      HEART DISEASE Father      MI,   LATE 60'S     Hypertension Father      Prostate Cancer Father      Obesity Father      Diabetes Father      Cerebrovascular Disease Father      Depression Brother      Thyroid Disease Brother      Depression Brother      Thyroid Disease Brother      Allergies Son      Asthma Son      Thyroid Disease Maternal Grandmother      Cancer Maternal Grandfather      Psychotic Disorder Paternal Grandmother      Thyroid Disease Paternal Grandfather      Diabetes Brother      Breast Cancer Maternal Aunt        SOCIAL HISTORY:   Social History   Substance Use Topics     Smoking status: Former Smoker     Quit date: 1/1/1994     Smokeless tobacco: Never Used     Alcohol use No      Comment: Treatment in 2007       REVIEW OF SYSTEMS: Ten point review of systems was performed and is negative except for:   [facility] ROS 8/30/2018   Constitutional Weight gain, Unexplained fatigue, Problems with sleep   Neurology Dizzy spells   Psychology Frequently feeling anxious   Cardiopulmonary Breathing problems, Wheezing   Musculoskeletal Sore or stiff joints, Neck pain   Hematologic Easy bruising   Endocrine Thirst   Other Problems with anesthesia in surgery        ALLERGIES: Review of patient's allergies indicates no known allergies.    MEDICATIONS:   No current outpatient prescriptions on file.         PHYSICAL EXAMINATION:  She  is awake, alert and in no apparent distress.    Her tympanic membranes are clear and intact bilaterally. External auditory canals are clear.  Nasal exam shows a mild septal deviation without obstruction.  Examination of the oral cavity shows no suspicious lesions.  There is good mouth opening.  There is symmetric movement of the tongue and soft palate.    The oropharynx is clear.  Her neck is supple without significant adenopathy.  Pulse is regular.  Upper airway is clear.  Cranial nerves II-XII are grossly intact.       PROCEDURE: A flexible laryngoscopy  was performed.  Informed consent  was obtained and a time out was performed. 3% lidocaine and 0.25% phenylephrine was sprayed into the nasal cavity and allowed 3 to 5 minutes for effect. The scope was passed through the right sided nostril. Examination showed the vocal folds to be mobile and meet in the midline.  No nodules, polyps or ulcerations are seen.  There is mild inflammation or erythema of the supraglottic or glottic larynx.  With phonation there is mildcontraction of the supraglottic larynx.  The immediate subglottic area is clear.  I did have her tilt forward and I was able to see further down the trachea.  Approximately 3-4 rings down there was  a ring of granulation tissue with some mucus around the trachea at the likely spot of where the endotracheal tube balloon was.  There appeared to be an adequate airway and there is good airflow although airway turbulence due to the acute inflammation is seen.  The hypopharynx is otherwise clear as is the subglottis.     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  I am going to have her continue her prednisone, change her antibiotic to Augmentin and I will schedule her for next Friday for a direct laryngoscopy with balloon dilatation, mitomycin application and steroid injection.  She is comfortable with this plan as she is comfortable with light activity and at rest.  She also notes that she is better today than she was the day before.  I did advise her to call us should her symptoms deteriorate and we could do her on a more emergent basis.  I will get a flow volume loop as she leaves today.

## 2018-08-30 NOTE — NURSING NOTE
"Chief Complaint   Patient presents with     Consult     subglottic stenosis     Height 1.664 m (5' 5.5\"), weight 97.5 kg (215 lb), last menstrual period 08/18/2018, not currently breastfeeding.    Bacilio Hernandez    "

## 2018-08-30 NOTE — LETTER
8/30/2018       RE: Bibi Song  [address] [address]     Dear Colleague,    Thank you for referring your patient, Bibi Song, to the [facility] EAR NOSE AND THROAT at [facility]. Please see a copy of my visit note below.    HISTORY OF PRESENT ILLNESS: Bibi Song is a 48 year old female with a history of airway difficulties.  She had an accidental overdose of medications in mid July.  She was admitted from 7172.  There are that during that time she had intubation for at least 2-3 days.  On discharge she was feeling well.  In mid August she developed some shortness of breath.  She was was started on albuterol.  She had no problems with asthma or breathing prior to her hospitalization.  She was seen in the emergency room in mid August she had shortness of breath and was given prednisone doxycycline and albuterol.  The nebulizer worked well for her but she did get relief from the albuterol.  She was recently placed on prednisone 60 mg a day which is started and this has helped her breathe comfortably.  She still has audible inspiratory stridor but there is good airflow.  There is a concern about possible stenosis versus paradoxic vocal fold motion and she is here for this today.  She has no difficulty swallowing no difficulty talking and she feels well in general.  She does have exercise limitation but is comfortable sleeping.    Last 2 Scores for Patient-Answered VHI Questionnaire  VHI Total Score 8/30/2018   VHI Total Score 13       Last 2 Scores for Patient-Answered CSI Questionnaire  CSI Total Score 8/30/2018   CSI Total Score 16         Last 2 Scores for Patient-Answered EAT Questionnaire  EAT Total Score 8/30/2018   EAT Total Score 2           PAST MEDICAL HISTORY: No past medical history on file.    PAST SURGICAL HISTORY: No past surgical history on file.    FAMILY HISTORY:   Family History   Problem Relation Age of Onset     Diabetes Mother       Depression Mother      OCD     Neurologic Disorder Mother      Psychotic Disorder Mother      Respiratory Mother      Thyroid Disease Mother      Gynecology Mother      hysterectomy     Cerebrovascular Disease Mother      pacemaker     HEART DISEASE Father      MI,   LATE 60'S     Hypertension Father      Prostate Cancer Father      Obesity Father      Diabetes Father      Cerebrovascular Disease Father      Depression Brother      Thyroid Disease Brother      Depression Brother      Thyroid Disease Brother      Allergies Son      Asthma Son      Thyroid Disease Maternal Grandmother      Cancer Maternal Grandfather      Psychotic Disorder Paternal Grandmother      Thyroid Disease Paternal Grandfather      Diabetes Brother      Breast Cancer Maternal Aunt        SOCIAL HISTORY:   Social History   Substance Use Topics     Smoking status: Former Smoker     Quit date: 1/1/1994     Smokeless tobacco: Never Used     Alcohol use No      Comment: Treatment in 2007       REVIEW OF SYSTEMS: Ten point review of systems was performed and is negative except for:   [facility] ROS 8/30/2018   Constitutional Weight gain, Unexplained fatigue, Problems with sleep   Neurology Dizzy spells   Psychology Frequently feeling anxious   Cardiopulmonary Breathing problems, Wheezing   Musculoskeletal Sore or stiff joints, Neck pain   Hematologic Easy bruising   Endocrine Thirst   Other Problems with anesthesia in surgery        ALLERGIES: Review of patient's allergies indicates no known allergies.    MEDICATIONS:   Current Outpatient Prescriptions   Medication Sig Dispense Refill     albuterol (2.5 MG/3ML) 0.083% neb solution Take 1 vial (2.5 mg) by nebulization every 4 hours as needed for shortness of breath / dyspnea or wheezing 25 vial 0     albuterol (PROAIR HFA/PROVENTIL HFA/VENTOLIN HFA) 108 (90 Base) MCG/ACT inhaler Inhale 2 puffs into the lungs 4 times daily 1 Inhaler 1     amoxicillin-clavulanate (AUGMENTIN) 875-125 MG per tablet  Take 1 tablet by mouth every 12 hours for 14 days 28 tablet 0     Calcium Carbonate-Vit D-Min (CALCIUM 1200 PO)        doxycycline (VIBRAMYCIN) 100 MG capsule Take 1 capsule (100 mg) by mouth 2 times daily for 7 days 14 capsule 0     IBUPROFEN PO Take 600 mg by mouth every 6 hours as needed for moderate pain       levothyroxine (SYNTHROID/LEVOTHROID) 75 MCG tablet Take 1 tablet (75 mcg) by mouth daily 90 tablet 1     MAGNESIUM PO Take 1 tablet by mouth daily        melatonin 3 MG tablet Take 9 mg by mouth nightly as needed. 30 tablet 0     predniSONE (DELTASONE) 20 MG tablet Take 3 tabs (60 mg) by mouth daily x 3 days, 2 tabs (40 mg) daily x 3 days, 1 tab (20 mg) daily x 3 days, then 1/2 tab (10 mg) x 3 days. 20 tablet 0     sertraline (ZOLOFT) 100 MG tablet Take 200 mg by mouth daily  1 tablet 0     vitamin B complex with vitamin C (VITAMIN  B COMPLEX) TABS tablet Take 1 tablet by mouth daily       ziprasidone (GEODON) 40 MG capsule Take 40 mg by mouth 2 times daily (with meals)  1 capsule          PHYSICAL EXAMINATION:  She  is awake, alert and in no apparent distress.    Her tympanic membranes are clear and intact bilaterally. External auditory canals are clear.  Nasal exam shows a mild septal deviation without obstruction.  Examination of the oral cavity shows no suspicious lesions.  There is good mouth opening.  There is symmetric movement of the tongue and soft palate.    The oropharynx is clear.  Her neck is supple without significant adenopathy.  Pulse is regular.  Upper airway is clear.  Cranial nerves II-XII are grossly intact.       PROCEDURE: A flexible laryngoscopy  was performed.  Informed consent was obtained and a time out was performed. 3% lidocaine and 0.25% phenylephrine was sprayed into the nasal cavity and allowed 3 to 5 minutes for effect. The scope was passed through the right sided nostril. Examination showed the vocal folds to be mobile and meet in the midline.  No nodules, polyps or  ulcerations are seen.  There is mild inflammation or erythema of the supraglottic or glottic larynx.  With phonation there is mildcontraction of the supraglottic larynx.  The immediate subglottic area is clear.  I did have her tilt forward and I was able to see further down the trachea.  Approximately 3-4 rings down there was granulation a ring of granulation tissue with some mucus around the trachea at the likely spot of where the endotracheal tube balloon was.  There appeared to be an adequate airway and there is good airflow although airway turbulence due to the acute inflammation is seen.  The hypopharynx is otherwise clear as is the subglottis.     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  I am going to have her continue her prednisone to change her antibiotic to Augmentin and I will schedule her for next Friday for a direct laryngoscopy with balloon dilatation mitomycin application and steroid injection.  She is comfortable with this plan as she is comfortable with light activity and at rest.  She also notes that she is better today than she was the day before.  I did advise her to call us should her symptoms deteriorate and we could do her on a more emergent basis.  I will get a flow volume loop as she leaves today.              Again, thank you for allowing me to participate in the care of your patient.      Sincerely,    Dusty Pearson MD

## 2018-08-30 NOTE — TELEPHONE ENCOUNTER
[facility] Call Center    Phone Message    May a detailed message be left on voicemail: yes    Reason for Call: Other: Patients  Sergio is calling to ask questions about what today's visit will be about?  They are concerned the hospital gave them no information and mentioned surgery and they are worried about surprise surgery and work schedules.  They would like to talk to someone today about the visit prior to coming in, please call Sergio to follow up.      Action Taken: Message routed to:  [facility] ([facility] ENT

## 2018-08-30 NOTE — TELEPHONE ENCOUNTER
FUTURE VISIT INFORMATION      FUTURE VISIT INFORMATION:    Date: 08/30/2018    Time: 2:30    Location: [facility]  REFERRAL INFORMATION:    Referring provider:  HORACIO ALVARADO    Referring providers clinic:  [facility]    Reason for visit/diagnosis  subglottic stenosis/PVM     RECORDS REQUESTED FROM:       Clinic name Comments Records Status Imaging Status   [facility] OFFICE VISIT: 08/29/2018  IMAGE: CT NECK 08/29/2018 INTERNAL YES                                   RECORDS STATUS

## 2018-08-30 NOTE — MR AVS SNAPSHOT
After Visit Summary   2018    Bibi Song    MRN: [id number]           Patient Information     Date Of Birth          1970        Visit Information        Provider Department      2018 2:30 PM Dusty Pearson MD [facility] Ear Nose and Throat        Today's Diagnoses     Dysphonia    -  1    Tracheal stenosis          Care Instructions    Plan of care:  Schedule pulmonary function test  Your surgery is scheduled next Friday, : you will be called with a time  Pre-op reminders:  1. Complete pre-op H+P within 30 days of surgery (recommend pre-op appointment 2 weeks prior to surgery date).   2.  needed on day of surgery.   3.Discuss all medications, including blood-thinning medications with PCP at pre-op appointment (Coumadin, Plavix, Aspirin, Ibuprofen/Motrin, Vitamin E and Fish Oil), which may need to be discontinued 7 days prior to surgery date.   4. Nothing to eat or drink after midnight the night before surgery.   5. Take shower or bath the morning of surgery and remove deodorant, cologne, scented lotion, makeup, nail polish and jewelry.     Clinic contact information:  1. To schedule an appointment or to speak to someone regarding your medical care, please call [phone], option #1  2. VielkaRN: [phone]  3. Surgery scheduling:      Lizette Grider: [phone]      Vanitayobany Kurtzter: [phone]  4. Fax: [phone]  5. [phone]            Follow-ups after your visit        Your next 10 appointments already scheduled     Sep 07, 2018   Procedure with Dusty Pearson MD   [facility] [place], Same Day Surgery (--)    [address] [id number] [phone]              Future tests that were ordered for you today     Open Future Orders        Priority Expected Expires Ordered    General PFT Lab (Please always keep checked) Routine  2019    Pulmonary Function Test Routine  2019            Who to contact   "   Please call your clinic at [phone] to:    Ask questions about your health    Make or cancel appointments    Discuss your medicines    Learn about your test results    Speak to your doctor            Additional Information About Your Visit        [employer] Information     [employer] gives you secure access to your electronic health record. If you see a primary care provider, you can also send messages to your care team and make appointments. If you have questions, please call your primary care clinic.  If you do not have a primary care provider, please call [phone] and they will assist you.      [employer] is an electronic gateway that provides easy, online access to your medical records. With [employer], you can request a clinic appointment, read your test results, renew a prescription or communicate with your care team.     To access your existing account, please contact your [facility] or call [phone] for assistance.        Care EveryWhere ID     This is your Care EveryWhere ID. This could be used by other organizations to access your [facility] medical records  [id number]        Your Vitals Were     Height Last Period BMI (Body Mass Index)             1.664 m (5' 5.5\") 08/18/2018 (Approximate) 35.23 kg/m2          Blood Pressure from Last 3 Encounters:   08/29/18 131/79   08/27/18 144/82   08/25/18 130/72    Weight from Last 3 Encounters:   08/30/18 97.5 kg (215 lb)   08/29/18 97.5 kg (215 lb)   08/27/18 98.9 kg (218 lb)              We Performed the Following     IMAGESTREAM RECORDING ORDER     Zandra-Operative Worksheet (ENT Adult Default Surgery Request)          Today's Medication Changes          These changes are accurate as of 8/30/18  4:39 PM.  If you have any questions, ask your nurse or doctor.               Start taking these medicines.        Dose/Directions    amoxicillin-clavulanate 875-125 MG per tablet   Commonly known as:  AUGMENTIN   Used for:  Tracheal " stenosis   Started by:  Dusty Pearson MD        Dose:  1 tablet   Take 1 tablet by mouth every 12 hours for 14 days   Quantity:  28 tablet   Refills:  0            Where to get your medicines      These medications were sent to [facility] - - DIANN Quiñones - [id number]  [address] [place] [address]    Hours:  CLARA Quiñones [facility] Phone:  [phone]     amoxicillin-clavulanate 875-125 MG per tablet                Primary Care Provider Office Phone # Fax #    Glory Singleton [phone] [address]  [facility] [address]        Equal Access to Services     SULEMAN ADDISON : Hadii yeison choe hadasho Soomaali, waaxda luqadaha, qaybta kaalmada adeegyada, varun ferrera . So [facility] [phone].    ATENCIÓN: Si habla español, tiene a velazquez disposición servicios gratuitos de asistencia lingüística. Llame al [phone].    We comply with applicable federal civil rights laws and [place] laws. We do not discriminate on the basis of race, color, national origin, age, disability, sex, sexual orientation, or gender identity.            Thank you!     Thank you for choosing [facility] EAR NOSE AND THROAT  for your care. Our goal is always to provide you with excellent care. Hearing back from our patients is one way we can continue to improve our services. Please take a few minutes to complete the written survey that you may receive in the mail after your visit with us. Thank you!             Your Updated Medication List - Protect others around you: Learn how to safely use, store and throw away your medicines at www.disposemymeds.org.          This list is accurate as of 8/30/18  4:39 PM.  Always use your most recent med list.                   Brand Name Dispense Instructions for use Diagnosis    * albuterol 108 (90 Base) MCG/ACT inhaler    PROAIR HFA/PROVENTIL HFA/VENTOLIN HFA    1 Inhaler    Inhale 2 puffs into the  lungs 4 times daily    Acute bronchitis with symptoms > 10 days       * albuterol (2.5 MG/3ML) 0.083% neb solution     25 vial    Take 1 vial (2.5 mg) by nebulization every 4 hours as needed for shortness of breath / dyspnea or wheezing    Acute bronchospasm       amoxicillin-clavulanate 875-125 MG per tablet    AUGMENTIN    28 tablet    Take 1 tablet by mouth every 12 hours for 14 days    Tracheal stenosis       CALCIUM 1200 PO           doxycycline 100 MG capsule    VIBRAMYCIN    14 capsule    Take 1 capsule (100 mg) by mouth 2 times daily for 7 days        GEODON 40 MG capsule   Generic drug:  ziprasidone     1 capsule    Take 40 mg by mouth 2 times daily (with meals)        IBUPROFEN PO      Take 600 mg by mouth every 6 hours as needed for moderate pain        levothyroxine 75 MCG tablet    SYNTHROID/LEVOTHROID    90 tablet    Take 1 tablet (75 mcg) by mouth daily    Hypothyroidism, unspecified type       MAGNESIUM PO      Take 1 tablet by mouth daily        melatonin 3 MG tablet     30 tablet    Take 9 mg by mouth nightly as needed.        predniSONE 20 MG tablet    DELTASONE    20 tablet    Take 3 tabs (60 mg) by mouth daily x 3 days, 2 tabs (40 mg) daily x 3 days, 1 tab (20 mg) daily x 3 days, then 1/2 tab (10 mg) x 3 days.        vitamin B complex with vitamin C Tabs tablet      Take 1 tablet by mouth daily        ZOLOFT 100 MG tablet   Generic drug:  sertraline     1 tablet    Take 200 mg by mouth daily    Obsessive-compulsive disorder, unspecified type, Other specified anxiety disorders, Major depressive disorder, recurrent episode, moderate (H)       * Notice:  This list has 2 medication(s) that are the same as other medications prescribed for you. Read the directions carefully, and ask your doctor or other care provider to review them with you.

## 2018-08-30 NOTE — PATIENT INSTRUCTIONS
Plan of care:  Schedule pulmonary function test  Your surgery is scheduled next Friday, : you will be called with a time  Pre-op reminders:  1. Complete pre-op H+P within 30 days of surgery (recommend pre-op appointment 2 weeks prior to surgery date).   2.  needed on day of surgery.   3.Discuss all medications, including blood-thinning medications with PCP at pre-op appointment (Coumadin, Plavix, Aspirin, Ibuprofen/Motrin, Vitamin E and Fish Oil), which may need to be discontinued 7 days prior to surgery date.   4. Nothing to eat or drink after midnight the night before surgery.   5. Take shower or bath the morning of surgery and remove deodorant, cologne, scented lotion, makeup, nail polish and jewelry.     Clinic contact information:  1. To schedule an appointment or to speak to someone regarding your medical care, please call [phone], option #1  2. RYLEE Vora: [phone]  3. Surgery scheduling:      Lizette Grider: [phone]      Vanita Rome: [phone]  4. Fax: [phone]  5. [phone]

## 2018-08-30 NOTE — NURSING NOTE
Relevant Diagnosis: Subglottic stenosis  Teaching Topic: Surgery: balloon dilation  Person(s) involved in teaching:  Patient and spouse     Teaching Concerns Addressed:  Pre op teaching included the need for an H&P, NPO status pre op, hospital routines, expected recovery, activity  restrictions, antimicrobial scrub, s/s of infection, pain control methods and the importance of follow up appointments.  The patient voiced an understanding of all instructions and will call with questions.     Motivation Level:  Asks Questions:   Yes  Eager to Learn:   Yes  Cooperative:   Yes  Receptive (willing/able to accept information):   Yes     Patient  demonstrates understanding of the following:  Reason for the appointment, diagnosis and treatment plan:   Yes  Knowledge of proper use of medications and conditions for which they are ordered (with special attention to potential side effects or drug interactions):   Yes  Which situations necessitate calling provider and whom to contact:   Yes        Proper use and care of  (medical equip, care aids, etc.):   NA  Nutritional needs and diet plan:   Yes  Pain management techniques:   Yes  Patient instructed on hand hygiene:  Yes  How and/when to access community resources:   NA     Infection Prevention:  Patient   demonstrates understanding of the following:  Surgical procedure site care taught   Signs and symptoms of infection taught Yes  Wound care taught NA     Instructional Materials Used/Given: Pre op booklet.

## 2018-08-30 NOTE — LETTER
[facility] EAR NOSE AND THROAT  [address] [facility] [address]          August 30, 2018    RE:  Bibi Song                                                                                                                                                       [address] [facility] [address]            To whom it may concern:    Please excuse Bibi from work until 9/10/2018.       Sincerely,        Dusty Pearson MD

## 2018-08-30 NOTE — LETTER
8/30/2018      RE: Bibi Song  [address] [facility] [facility] [address]       HISTORY OF PRESENT ILLNESS: Bibi Song is a 48 year old female with a history of airway difficulties.  She had an accidental overdose of medications in mid July.  She was admitted from [id number].  There are that during that time she had intubation for at least 2-3 days.  On discharge she was feeling well.  In mid August she developed some shortness of breath.  She was was started on albuterol.  She had no problems with asthma or breathing prior to her hospitalization.  She was seen in the emergency room in mid August she had shortness of breath and was given prednisone doxycycline and albuterol.  The nebulizer worked well for her but she did get relief from the albuterol.  She was recently placed on prednisone 60 mg a day which is started and this has helped her breathe comfortably.  She still has audible inspiratory stridor but there is good airflow.  There is a concern about possible stenosis versus paradoxic vocal fold motion and she is here for this today.  She has no difficulty swallowing no difficulty talking and she feels well in general.  She does have exercise limitation but is comfortable sleeping.    Last 2 Scores for Patient-Answered VHI Questionnaire  VHI Total Score 8/30/2018   VHI Total Score 13       Last 2 Scores for Patient-Answered CSI Questionnaire  CSI Total Score 8/30/2018   CSI Total Score 16         Last 2 Scores for Patient-Answered EAT Questionnaire  EAT Total Score 8/30/2018   EAT Total Score 2           PAST MEDICAL HISTORY: No past medical history on file.    PAST SURGICAL HISTORY: No past surgical history on file.    FAMILY HISTORY:   Family History   Problem Relation Age of Onset     Diabetes Mother      Depression Mother      OCD     Neurologic Disorder Mother      Psychotic Disorder Mother      Respiratory Mother      Thyroid Disease Mother      Gynecology Mother      hysterectomy      Cerebrovascular Disease Mother      pacemaker     HEART DISEASE Father      MI,   LATE 60'S     Hypertension Father      Prostate Cancer Father      Obesity Father      Diabetes Father      Cerebrovascular Disease Father      Depression Brother      Thyroid Disease Brother      Depression Brother      Thyroid Disease Brother      Allergies Son      Asthma Son      Thyroid Disease Maternal Grandmother      Cancer Maternal Grandfather      Psychotic Disorder Paternal Grandmother      Thyroid Disease Paternal Grandfather      Diabetes Brother      Breast Cancer Maternal Aunt        SOCIAL HISTORY:   Social History   Substance Use Topics     Smoking status: Former Smoker     Quit date: 1/1/1994     Smokeless tobacco: Never Used     Alcohol use No      Comment: Treatment in 2007       REVIEW OF SYSTEMS: Ten point review of systems was performed and is negative except for:   [facility] ROS 8/30/2018   Constitutional Weight gain, Unexplained fatigue, Problems with sleep   Neurology Dizzy spells   Psychology Frequently feeling anxious   Cardiopulmonary Breathing problems, Wheezing   Musculoskeletal Sore or stiff joints, Neck pain   Hematologic Easy bruising   Endocrine Thirst   Other Problems with anesthesia in surgery        ALLERGIES: Review of patient's allergies indicates no known allergies.    MEDICATIONS:   Current Outpatient Prescriptions   Medication Sig Dispense Refill     albuterol (2.5 MG/3ML) 0.083% neb solution Take 1 vial (2.5 mg) by nebulization every 4 hours as needed for shortness of breath / dyspnea or wheezing 25 vial 0     albuterol (PROAIR HFA/PROVENTIL HFA/VENTOLIN HFA) 108 (90 Base) MCG/ACT inhaler Inhale 2 puffs into the lungs 4 times daily 1 Inhaler 1     amoxicillin-clavulanate (AUGMENTIN) 875-125 MG per tablet Take 1 tablet by mouth every 12 hours for 14 days 28 tablet 0     Calcium Carbonate-Vit D-Min (CALCIUM 1200 PO)        doxycycline (VIBRAMYCIN) 100 MG capsule Take 1 capsule (100 mg) by mouth 2  times daily for 7 days 14 capsule 0     IBUPROFEN PO Take 600 mg by mouth every 6 hours as needed for moderate pain       levothyroxine (SYNTHROID/LEVOTHROID) 75 MCG tablet Take 1 tablet (75 mcg) by mouth daily 90 tablet 1     MAGNESIUM PO Take 1 tablet by mouth daily        melatonin 3 MG tablet Take 9 mg by mouth nightly as needed. 30 tablet 0     predniSONE (DELTASONE) 20 MG tablet Take 3 tabs (60 mg) by mouth daily x 3 days, 2 tabs (40 mg) daily x 3 days, 1 tab (20 mg) daily x 3 days, then 1/2 tab (10 mg) x 3 days. 20 tablet 0     sertraline (ZOLOFT) 100 MG tablet Take 200 mg by mouth daily  1 tablet 0     vitamin B complex with vitamin C (VITAMIN  B COMPLEX) TABS tablet Take 1 tablet by mouth daily       ziprasidone (GEODON) 40 MG capsule Take 40 mg by mouth 2 times daily (with meals)  1 capsule          PHYSICAL EXAMINATION:  She  is awake, alert and in no apparent distress.    Her tympanic membranes are clear and intact bilaterally. External auditory canals are clear.  Nasal exam shows a mild septal deviation without obstruction.  Examination of the oral cavity shows no suspicious lesions.  There is good mouth opening.  There is symmetric movement of the tongue and soft palate.    The oropharynx is clear.  Her neck is supple without significant adenopathy.  Pulse is regular.  Upper airway is clear.  Cranial nerves II-XII are grossly intact.       PROCEDURE: A flexible laryngoscopy  was performed.  Informed consent was obtained and a time out was performed. 3% lidocaine and 0.25% phenylephrine was sprayed into the nasal cavity and allowed 3 to 5 minutes for effect. The scope was passed through the right sided nostril. Examination showed the vocal folds to be mobile and meet in the midline.  No nodules, polyps or ulcerations are seen.  There is mild inflammation or erythema of the supraglottic or glottic larynx.  With phonation there is mildcontraction of the supraglottic larynx.  The immediate subglottic area  is clear.  I did have her tilt forward and I was able to see further down the trachea.  Approximately 3-4 rings down there was granulation a ring of granulation tissue with some mucus around the trachea at the likely spot of where the endotracheal tube balloon was.  There appeared to be an adequate airway and there is good airflow although airway turbulence due to the acute inflammation is seen.  The hypopharynx is otherwise clear as is the subglottis.     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  I am going to have her continue her prednisone to change her antibiotic to Augmentin and I will schedule her for next Friday for a direct laryngoscopy with balloon dilatation mitomycin application and steroid injection.  She is comfortable with this plan as she is comfortable with light activity and at rest.  She also notes that she is better today than she was the day before.  I did advise her to call us should her symptoms deteriorate and we could do her on a more emergent basis.  I will get a flow volume loop as she leaves today.            Dusty Pearson MD

## 2018-09-05 ENCOUNTER — HOSPITAL ENCOUNTER (OUTPATIENT)
Dept: RESPIRATORY THERAPY | Facility: CLINIC | Age: 48
Discharge: HOME | End: 2018-09-05
Attending: INTERNAL MEDICINE | Admitting: INTERNAL MEDICINE
Payer: COMMERCIAL

## 2018-09-05 DIAGNOSIS — J39.8 TRACHEAL STENOSIS: ICD-10-CM

## 2018-09-05 PROCEDURE — 94726 PLETHYSMOGRAPHY LUNG VOLUMES: CPT | Mod: 26 | Performed by: INTERNAL MEDICINE

## 2018-09-05 PROCEDURE — 94060 EVALUATION OF WHEEZING: CPT

## 2018-09-05 PROCEDURE — 94729 DIFFUSING CAPACITY: CPT | Mod: 26 | Performed by: INTERNAL MEDICINE

## 2018-09-05 PROCEDURE — 94726 PLETHYSMOGRAPHY LUNG VOLUMES: CPT

## 2018-09-05 PROCEDURE — 94060 EVALUATION OF WHEEZING: CPT | Mod: 26 | Performed by: INTERNAL MEDICINE

## 2018-09-05 PROCEDURE — 94729 DIFFUSING CAPACITY: CPT

## 2018-09-05 PROCEDURE — 25000125 ZZHC RX 250: Performed by: OTOLARYNGOLOGY

## 2018-09-05 RX ORDER — ALBUTEROL SULFATE 0.83 MG/ML
2.5 SOLUTION RESPIRATORY (INHALATION) ONCE
Status: COMPLETED | OUTPATIENT
Start: 2018-09-05 | End: 2018-09-05

## 2018-09-05 RX ADMIN — ALBUTEROL SULFATE 2.5 MG: 2.5 SOLUTION RESPIRATORY (INHALATION) at 12:35

## 2018-09-06 ENCOUNTER — ANESTHESIA EVENT (OUTPATIENT)
Dept: SURGERY | Facility: CLINIC | Age: 48
End: 2018-09-06
Payer: COMMERCIAL

## 2018-09-06 ENCOUNTER — OFFICE VISIT (OUTPATIENT)
Dept: FAMILY MEDICINE | Facility: CLINIC | Age: 48
End: 2018-09-06
Payer: COMMERCIAL

## 2018-09-06 VITALS
RESPIRATION RATE: 28 BRPM | HEART RATE: 115 BPM | OXYGEN SATURATION: 93 % | DIASTOLIC BLOOD PRESSURE: 69 MMHG | HEIGHT: 66 IN | SYSTOLIC BLOOD PRESSURE: 130 MMHG | TEMPERATURE: 96.4 F

## 2018-09-06 DIAGNOSIS — F32.1 MODERATE MAJOR DEPRESSION (H): ICD-10-CM

## 2018-09-06 DIAGNOSIS — E03.9 HYPOTHYROIDISM, UNSPECIFIED TYPE: ICD-10-CM

## 2018-09-06 DIAGNOSIS — J39.8 TRACHEAL STENOSIS: ICD-10-CM

## 2018-09-06 DIAGNOSIS — Z01.818 PREOP GENERAL PHYSICAL EXAM: Primary | ICD-10-CM

## 2018-09-06 LAB
DLCOCOR-%PRED-PRE: 105 %
DLCOCOR-PRE: 24.25 ML/MIN/MMHG
DLCOUNC-%PRED-PRE: 98 %
DLCOUNC-PRE: 22.62 ML/MIN/MMHG
DLCOUNC-PRED: 23.02 ML/MIN/MMHG
ERV-%PRED-PRE: 172 %
ERV-PRE: 0.95 L
ERV-PRED: 0.55 L
EXPTIME-PRE: 7.43 SEC
FEF2575-%PRED-POST: 61 %
FEF2575-%PRED-PRE: 55 %
FEF2575-POST: 1.71 L/SEC
FEF2575-PRE: 1.52 L/SEC
FEF2575-PRED: 2.76 L/SEC
FEFMAX-%PRED-PRE: 30 %
FEFMAX-PRE: 2.08 L/SEC
FEFMAX-PRED: 6.85 L/SEC
FEV1-%PRED-PRE: 64 %
FEV1-PRE: 1.83 L
FEV1FEV6-PRE: 47 %
FEV1FEV6-PRED: 82 %
FEV1FVC-PRE: 46 %
FEV1FVC-PRED: 80 %
FEV1SVC-PRE: 44 %
FEV1SVC-PRED: 79 %
FIFMAX-PRE: 1.04 L/SEC
FRCPLETH-%PRED-PRE: 125 %
FRCPLETH-PRE: 3.45 L
FRCPLETH-PRED: 2.74 L
FVC-%PRED-PRE: 112 %
FVC-PRE: 3.99 L
FVC-PRED: 3.54 L
IC-%PRED-PRE: 104 %
IC-PRE: 3.15 L
IC-PRED: 3.02 L
RVPLETH-%PRED-PRE: 137 %
RVPLETH-PRE: 2.47 L
RVPLETH-PRED: 1.79 L
TLCPLETH-%PRED-PRE: 130 %
TLCPLETH-PRE: 6.6 L
TLCPLETH-PRED: 5.07 L
VA-%PRED-PRE: 100 %
VA-PRE: 5.22 L
VC-%PRED-PRE: 115 %
VC-PRE: 4.13 L
VC-PRED: 3.57 L

## 2018-09-06 PROCEDURE — 99214 OFFICE O/P EST MOD 30 MIN: CPT | Performed by: FAMILY MEDICINE

## 2018-09-06 ASSESSMENT — PAIN SCALES - GENERAL: PAINLEVEL: NO PAIN (0)

## 2018-09-06 NOTE — PATIENT INSTRUCTIONS
Thank you for choosing [facility].  You may be receiving a survey in the mail from [facility] regarding your visit today.  Please take a few minutes to complete and return the survey to let us know how we are doing.      Our Clinic hours are:  Mondays    7:20 am - 7 pm  Tues - Fri  7:20 am - 5 pm    Clinic Phone: [phone]    The clinic lab opens at 7:30 am Mon - Fri and appointments are required.    [facility]. [phone]  Monday  8 am - 7pm  Tues - Fri 8 am - 5:30 pm         Before Your Surgery      Call your surgeon if there is any change in your health. This includes signs of a cold or flu (such as a sore throat, runny nose, cough, rash or fever).    Do not smoke, drink alcohol or take over the counter medicine (unless your surgeon or primary care doctor tells you to) for the 24 hours before and after surgery.    If you take prescribed drugs: Follow your doctor s orders about which medicines to take and which to stop until after surgery.    Eating and drinking prior to surgery: follow the instructions from your surgeon    Take a shower or bath the night before surgery. Use the soap your surgeon gave you to gently clean your skin. If you do not have soap from your surgeon, use your regular soap. Do not shave or scrub the surgery site.  Wear clean pajamas and have clean sheets on your bed.

## 2018-09-06 NOTE — MR AVS SNAPSHOT
After Visit Summary   9/6/2018    Bibi Song    MRN: [id number]           Patient Information     Date Of Birth          1970        Visit Information        Provider Department      9/6/2018 1:40 PM Glory Singleton MD [facility]        Today's Diagnoses     Preop general physical exam    -  1    Tracheal stenosis        Hypothyroidism, unspecified type        Moderate major depression (H)          Care Instructions          Thank you for choosing [facility].  You may be receiving a survey in the mail from [facility] [employer] regarding your visit today.  Please take a few minutes to complete and return the survey to let us know how we are doing.      Our Clinic hours are:  Mondays    7:20 am - 7 pm  Tues -  Fri  7:20 am - 5 pm    Clinic Phone: [phone]    The clinic lab opens at 7:30 am Mon - Fri and appointments are required.    [facility]. [phone]  Monday  8 am - 7pm  Tues - Fri 8 am - 5:30 pm         Before Your Surgery      Call your surgeon if there is any change in your health. This includes signs of a cold or flu (such as a sore throat, runny nose, cough, rash or fever).    Do not smoke, drink alcohol or take over the counter medicine (unless your surgeon or primary care doctor tells you to) for the 24 hours before and after surgery.    If you take prescribed drugs: Follow your doctor s orders about which medicines to take and which to stop until after surgery.    Eating and drinking prior to surgery: follow the instructions from your surgeon    Take a shower or bath the night before surgery. Use the soap your surgeon gave you to gently clean your skin. If you do not have soap from your surgeon, use your regular soap. Do not shave or scrub the surgery site.  Wear clean pajamas and have clean sheets on your bed.           Follow-ups after your visit        Your next 10 appointments already scheduled     Sep 07, 2018   Procedure with  "Dusty Pearson MD   [facility], [place], Same Day Surgery (--)    [address]  [place] [address] [phone]              Who to contact     If you have questions or need follow up information about today's clinic visit or your schedule please contact [facility] directly at [phone].  Normal or non-critical lab and imaging results will be communicated to you by [employer], letter or phone within 4 business days after the clinic has received the results. If you do not hear from us within 7 days, please contact the clinic through [employer] or phone. If you have a critical or abnormal lab result, we will notify you by phone as soon as possible.  Submit refill requests through [employer] or call your pharmacy and they will forward the refill request to us. Please allow 3 business days for your refill to be completed.          Additional Information About Your Visit        PaySimple[employer] Information     [employer] gives you secure access to your electronic health record. If you see a primary care provider, you can also send messages to your care team and make appointments. If you have questions, please call your primary care clinic.  If you do not have a primary care provider, please call [phone] and they will assist you.        Care EveryWhere ID     This is your Care EveryWhere ID. This could be used by other organizations to access your [place] medical records  [id number]        Your Vitals Were     Pulse Temperature Respirations Height Last Period Pulse Oximetry    115 96.4  F (35.8  C) (Tympanic) 28 5' 5.5\" (1.664 m) 08/18/2018 (Approximate) 93%    Breastfeeding?                   No            Blood Pressure from Last 3 Encounters:   09/06/18 130/69   08/29/18 131/79   08/27/18 144/82    Weight from Last 3 Encounters:   08/30/18 215 lb (97.5 kg)   08/29/18 215 lb (97.5 kg)   08/27/18 218 lb (98.9 kg)              Today, you had the following     No orders found for display       Primary " Care Provider Office Phone # Fax #    Glory Singleton [phone]       [address] [address] [facility] [address]        Equal Access to Services     DANIELLESULEMAN CYN : Hadroberto yeison choe ryano Soginette, waaxda luqadaha, qaybta kaalmada agada, varun cedenomaegan kristie. So [facility] [phone].    ATENCIÓN: Si habla español, tiene a velazquez disposición servicios gratuitos de asistencia lingüística. Llame al [phone].    We comply with applicable federal civil rights laws and [place] laws. We do not discriminate on the basis of race, color, national origin, age, disability, sex, sexual orientation, or gender identity.            Thank you!     Thank you for choosing [facility]  for your care. Our goal is always to provide you with excellent care. Hearing back from our patients is one way we can continue to improve our services. Please take a few minutes to complete the written survey that you may receive in the mail after your visit with us. Thank you!             Your Updated Medication List - Protect others around you: Learn how to safely use, store and throw away your medicines at www.disposemymeds.org.          This list is accurate as of 9/6/18  2:12 PM.  Always use your most recent med list.                   Brand Name Dispense Instructions for use Diagnosis    * albuterol 108 (90 Base) MCG/ACT inhaler    PROAIR HFA/PROVENTIL HFA/VENTOLIN HFA    1 Inhaler    Inhale 2 puffs into the lungs 4 times daily    Acute bronchitis with symptoms > 10 days       * albuterol (2.5 MG/3ML) 0.083% neb solution     25 vial    Take 1 vial (2.5 mg) by nebulization every 4 hours as needed for shortness of breath / dyspnea or wheezing    Acute bronchospasm       amoxicillin-clavulanate 875-125 MG per tablet    AUGMENTIN    28 tablet    Take 1 tablet by mouth every 12 hours for 14 days    Tracheal stenosis       CALCIUM 1200 PO           GEODON 40 MG capsule   Generic drug:  ziprasidone      1 capsule    Take 40 mg by mouth 2 times daily (with meals)        IBUPROFEN PO      Take 600 mg by mouth every 6 hours as needed for moderate pain        levothyroxine 75 MCG tablet    SYNTHROID/LEVOTHROID    90 tablet    Take 1 tablet (75 mcg) by mouth daily    Hypothyroidism, unspecified type       MAGNESIUM PO      Take 1 tablet by mouth daily        melatonin 3 MG tablet     30 tablet    Take 9 mg by mouth nightly as needed.        vitamin B complex with vitamin C Tabs tablet      Take 1 tablet by mouth daily        ZOLOFT 100 MG tablet   Generic drug:  sertraline     1 tablet    Take 200 mg by mouth daily    Obsessive-compulsive disorder, unspecified type, Other specified anxiety disorders, Major depressive disorder, recurrent episode, moderate (H)       * Notice:  This list has 2 medication(s) that are the same as other medications prescribed for you. Read the directions carefully, and ask your doctor or other care provider to review them with you.

## 2018-09-06 NOTE — PROGRESS NOTES
[facility]  [address] [facility] [address]  [phone]  Dept: [phone]    PRE-OP EVALUATION:  Today's date: 2018    Bibi Song (: 1970) presents for pre-operative evaluation assessment as requested by Dr. Pearson.  She requires evaluation and anesthesia risk assessment prior to undergoing surgery/procedure for treatment of LARYNGOSCOPY .    Proposed Surgery/ Procedure: Teleoscopic Direct Laryngoscopy with Balloon Dilation, Mitomycin Application and Steriod Injection. INJECT STEROID   Date of Surgery/ Procedure: 18  Time of Surgery/ Procedure: 10:05 AM  Hospital/Surgical Facility: [facility] [phone]  Primary Physician: Glory Singleton  Type of Anesthesia Anticipated: General    Patient has a Health Care Directive or Living Will:  YES living will    1. NO - Do you have a history of heart attack, stroke, stent, bypass or surgery on an artery in the head, neck, heart or legs?  2. NO - Do you ever have any pain or discomfort in your chest?  3. NO - Do you have a history of  Heart Failure?  4. YES - Are you troubled by shortness of breath when: walking on the level, up a slight hill or at night?  5. NO - Do you currently have a cold, bronchitis or other respiratory infection?  6. YES - Do you have a cough, shortness of breath or wheezing?  7. NO - Do you sometimes get pains in the calves of your legs when you walk?  8. YES - Do you or anyone in your family have previous history of blood clots?mother  9. NO - Do you or does anyone in your family have a serious bleeding problem such as prolonged bleeding following surgeries or cuts?  10. NO - Have you ever had problems with anemia or been told to take iron pills?  11. NO - Have you had any abnormal blood loss such as black, tarry or bloody stools, or abnormal vaginal bleeding?  12. NO - Have you ever had a blood transfusion?  13. YES - Have you or any of your relatives ever had problems with  anesthesia? Will get nauseated  14. NO - Do you have sleep apnea, excessive snoring or daytime drowsiness?  15. NO - Do you have any prosthetic heart valves?  16. NO - Do you have prosthetic joints?  17. NO - Is there any chance that you may be pregnant?      HPI:     HPI related to upcoming procedure: patient had an accidental overdose and was hospitalized 7/18/2018-7/21/2018 and intubated 2-3 days at that time.  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  She is going to have a balloon dilatation, mitomycin application and steroid injection tomorrow.        See problem list for active medical problems.  Problems all longstanding and stable, except as noted/documented.  See ROS for pertinent symptoms related to these conditions.                                                                                                                                                          .    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 Schoenecker, NP       Anxiety disorder 08/06/2010     Priority: Medium     Family history of diabetes mellitus 08/06/2010     Priority: Medium     Family history of thyroid disease 08/06/2010     Priority: Medium     ALCOHOL ABUSE, IN REMISSION 08/06/2010     Priority: Medium      History  reviewed. No pertinent past medical history.  History reviewed. No pertinent surgical history.  Current Outpatient Prescriptions   Medication Sig Dispense Refill     albuterol (2.5 MG/3ML) 0.083% neb solution Take 1 vial (2.5 mg) by nebulization every 4 hours as needed for shortness of breath / dyspnea or wheezing 25 vial 0     albuterol (PROAIR HFA/PROVENTIL HFA/VENTOLIN HFA) 108 (90 Base) MCG/ACT inhaler Inhale 2 puffs into the lungs 4 times daily 1 Inhaler 1     amoxicillin-clavulanate (AUGMENTIN) 875-125 MG per tablet Take 1 tablet by mouth every 12 hours for 14 days 28 tablet 0     Calcium Carbonate-Vit D-Min (CALCIUM 1200 PO)        IBUPROFEN PO Take 600 mg by mouth every 6 hours as needed for moderate pain       levothyroxine (SYNTHROID/LEVOTHROID) 75 MCG tablet Take 1 tablet (75 mcg) by mouth daily 90 tablet 1     MAGNESIUM PO Take 1 tablet by mouth daily        melatonin 3 MG tablet Take 9 mg by mouth nightly as needed. 30 tablet 0     sertraline (ZOLOFT) 100 MG tablet Take 200 mg by mouth daily  1 tablet 0     vitamin B complex with vitamin C (VITAMIN  B COMPLEX) TABS tablet Take 1 tablet by mouth daily       ziprasidone (GEODON) 40 MG capsule Take 40 mg by mouth 2 times daily (with meals)  1 capsule      OTC products: None, except as noted above    No Known Allergies   Latex Allergy: NO    Social History   Substance Use Topics     Smoking status: Former Smoker     Quit date: 1/1/1994     Smokeless tobacco: Never Used     Alcohol use No      Comment: Treatment in 2007     History   Drug Use No       REVIEW OF SYSTEMS:   CONSTITUTIONAL: NEGATIVE for fever, chills, change in weight  INTEGUMENTARY/SKIN: no rashes  ENT/MOUTH: NEGATIVE for ear, mouth and throat problems  RESP:short of breath with activity due to the stenosis.  Okay as long as she's not active.   CV: NEGATIVE for chest pain, palpitations or peripheral edema    EXAM:   /69  Pulse 115  Temp 96.4  F (35.8  C) (Tympanic)  Resp 28  Ht 5'  "5.5\" (1.664 m)  LMP 08/18/2018 (Approximate)  SpO2 93%  Breastfeeding? No  GENERAL APPEARANCE: healthy, alert and no distress  HENT: ear canals and TM's normal and nose and mouth without ulcers or lesions  RESP: lungs clear to auscultation - no rales, rhonchi or wheezes and transmitted sounds throughout due to stridor of the upper airway  CV: regular rate and rhythm, normal S1 S2, no S3 or S4 and no murmur, click or rub   ABDOMEN: soft, nontender, no HSM or masses and bowel sounds normal  NEURO: Normal strength and tone, sensory exam grossly normal, mentation intact and speech normal    DIAGNOSTICS:   No labs or EKG required for low risk surgery (cataract, skin procedure, breast biopsy, etc)  Recent EKGs on file.     Recent labs available in [employer].   Recent Labs   Lab Test  08/29/18   1110  07/21/18   0658   04/27/17   0955   HGB  11.4*  10.2*   < >   --    PLT  420  260   < >   --    NA  141  139   < >  142   POTASSIUM  3.5  3.4   < >  3.6   CR  0.71  0.60   < >  0.93   A1C   --    --    --   5.5    < > = values in this interval not displayed.        IMPRESSION:   Reason for surgery/procedure: tracheal stenosis after intubation  Diagnosis/reason for consult: preoperative evaluation and medical risk assessment    The proposed surgical procedure is considered LOW risk.    REVISED CARDIAC RISK INDEX  The patient has the following serious cardiovascular risks for perioperative complications such as (MI, PE, VFib and 3  AV Block):  No serious cardiac risks  INTERPRETATION: 0 risks: Class I (very low risk - 0.4% complication rate)    The patient has the following additional risks for perioperative complications:  No identified additional risks      ICD-10-CM    1. Preop general physical exam Z01.818    2. Tracheal stenosis J39.8    3. Hypothyroidism, unspecified type E03.9    4. Moderate major depression (H) F32.1        RECOMMENDATIONS:         --Patient is to take all scheduled medications on the day of surgery " EXCEPT for modifications listed below.    APPROVAL GIVEN to proceed with proposed procedure, without further diagnostic evaluation       Signed Electronically by: Glory Singleton MD    Copy of this evaluation report is provided to requesting physician.    [facility] Preop Guidelines    Revised Cardiac Risk Index

## 2018-09-07 ENCOUNTER — HOSPITAL ENCOUNTER (OUTPATIENT)
Facility: CLINIC | Age: 48
Discharge: HOME | End: 2018-09-07
Attending: OTOLARYNGOLOGY | Admitting: OTOLARYNGOLOGY
Payer: COMMERCIAL

## 2018-09-07 ENCOUNTER — ANESTHESIA (OUTPATIENT)
Dept: SURGERY | Facility: CLINIC | Age: 48
End: 2018-09-07
Payer: COMMERCIAL

## 2018-09-07 ENCOUNTER — SURGERY (OUTPATIENT)
Age: 48
End: 2018-09-07

## 2018-09-07 VITALS
HEIGHT: 66 IN | TEMPERATURE: 98.4 F | DIASTOLIC BLOOD PRESSURE: 79 MMHG | RESPIRATION RATE: 16 BRPM | HEART RATE: 82 BPM | SYSTOLIC BLOOD PRESSURE: 125 MMHG | BODY MASS INDEX: 35.11 KG/M2 | WEIGHT: 218.48 LBS | OXYGEN SATURATION: 95 %

## 2018-09-07 DIAGNOSIS — J39.8 TRACHEAL STENOSIS: Primary | ICD-10-CM

## 2018-09-07 LAB
GLUCOSE BLDC GLUCOMTR-MCNC: 79 MG/DL (ref 70–99)
HCG UR QL: NEGATIVE

## 2018-09-07 PROCEDURE — 40000170 ZZH STATISTIC PRE-PROCEDURE ASSESSMENT II: Performed by: OTOLARYNGOLOGY

## 2018-09-07 PROCEDURE — 27210794 ZZH OR GENERAL SUPPLY STERILE: Performed by: OTOLARYNGOLOGY

## 2018-09-07 PROCEDURE — 71000014 ZZH RECOVERY PHASE 1 LEVEL 2 FIRST HR: Performed by: OTOLARYNGOLOGY

## 2018-09-07 PROCEDURE — 25000125 ZZHC RX 250: Performed by: NURSE ANESTHETIST, CERTIFIED REGISTERED

## 2018-09-07 PROCEDURE — 25000128 H RX IP 250 OP 636: Performed by: NURSE ANESTHETIST, CERTIFIED REGISTERED

## 2018-09-07 PROCEDURE — 71000027 ZZH RECOVERY PHASE 2 EACH 15 MINS: Performed by: OTOLARYNGOLOGY

## 2018-09-07 PROCEDURE — 25000125 ZZHC RX 250: Performed by: ANESTHESIOLOGY

## 2018-09-07 PROCEDURE — 25000128 H RX IP 250 OP 636: Performed by: ANESTHESIOLOGY

## 2018-09-07 PROCEDURE — 36000057 ZZH SURGERY LEVEL 3 1ST 30 MIN - UMMC: Performed by: OTOLARYNGOLOGY

## 2018-09-07 PROCEDURE — 25000128 H RX IP 250 OP 636: Performed by: OTOLARYNGOLOGY

## 2018-09-07 PROCEDURE — C1725 CATH, TRANSLUMIN NON-LASER: HCPCS | Performed by: OTOLARYNGOLOGY

## 2018-09-07 PROCEDURE — 36000059 ZZH SURGERY LEVEL 3 EA 15 ADDTL MIN UMMC: Performed by: OTOLARYNGOLOGY

## 2018-09-07 PROCEDURE — 37000009 ZZH ANESTHESIA TECHNICAL FEE, EACH ADDTL 15 MIN: Performed by: OTOLARYNGOLOGY

## 2018-09-07 PROCEDURE — 81025 URINE PREGNANCY TEST: CPT | Performed by: OTOLARYNGOLOGY

## 2018-09-07 PROCEDURE — 37000008 ZZH ANESTHESIA TECHNICAL FEE, 1ST 30 MIN: Performed by: OTOLARYNGOLOGY

## 2018-09-07 PROCEDURE — 25000125 ZZHC RX 250: Performed by: OTOLARYNGOLOGY

## 2018-09-07 PROCEDURE — 82962 GLUCOSE BLOOD TEST: CPT

## 2018-09-07 RX ORDER — FENTANYL CITRATE 50 UG/ML
INJECTION, SOLUTION INTRAMUSCULAR; INTRAVENOUS PRN
Status: DISCONTINUED | OUTPATIENT
Start: 2018-09-07 | End: 2018-09-07

## 2018-09-07 RX ORDER — PREDNISONE 20 MG/1
TABLET ORAL
Refills: 0 | COMMUNITY
Start: 2018-08-29 | End: 2018-12-05

## 2018-09-07 RX ORDER — NALOXONE HYDROCHLORIDE 0.4 MG/ML
.1-.4 INJECTION, SOLUTION INTRAMUSCULAR; INTRAVENOUS; SUBCUTANEOUS
Status: DISCONTINUED | OUTPATIENT
Start: 2018-09-07 | End: 2018-09-07 | Stop reason: HOSPADM

## 2018-09-07 RX ORDER — FENTANYL CITRATE 50 UG/ML
25-50 INJECTION, SOLUTION INTRAMUSCULAR; INTRAVENOUS
Status: DISCONTINUED | OUTPATIENT
Start: 2018-09-07 | End: 2018-09-07 | Stop reason: HOSPADM

## 2018-09-07 RX ORDER — ALBUTEROL SULFATE 0.83 MG/ML
2.5 SOLUTION RESPIRATORY (INHALATION)
Status: DISCONTINUED | OUTPATIENT
Start: 2018-09-07 | End: 2018-09-07 | Stop reason: HOSPADM

## 2018-09-07 RX ORDER — SODIUM CHLORIDE, SODIUM LACTATE, POTASSIUM CHLORIDE, CALCIUM CHLORIDE 600; 310; 30; 20 MG/100ML; MG/100ML; MG/100ML; MG/100ML
INJECTION, SOLUTION INTRAVENOUS CONTINUOUS
Status: DISCONTINUED | OUTPATIENT
Start: 2018-09-07 | End: 2018-09-07 | Stop reason: HOSPADM

## 2018-09-07 RX ORDER — HYDROMORPHONE HYDROCHLORIDE 1 MG/ML
.3-.5 INJECTION, SOLUTION INTRAMUSCULAR; INTRAVENOUS; SUBCUTANEOUS EVERY 10 MIN PRN
Status: DISCONTINUED | OUTPATIENT
Start: 2018-09-07 | End: 2018-09-07 | Stop reason: HOSPADM

## 2018-09-07 RX ORDER — PROPOFOL 10 MG/ML
INJECTION, EMULSION INTRAVENOUS CONTINUOUS PRN
Status: DISCONTINUED | OUTPATIENT
Start: 2018-09-07 | End: 2018-09-07

## 2018-09-07 RX ORDER — PREDNISONE 10 MG/1
TABLET ORAL
Qty: 7 TABLET | Refills: 0 | Status: SHIPPED | OUTPATIENT
Start: 2018-09-07 | End: 2018-12-05

## 2018-09-07 RX ORDER — LIDOCAINE 40 MG/G
CREAM TOPICAL
Status: DISCONTINUED | OUTPATIENT
Start: 2018-09-07 | End: 2018-09-07 | Stop reason: HOSPADM

## 2018-09-07 RX ORDER — ONDANSETRON 2 MG/ML
INJECTION INTRAMUSCULAR; INTRAVENOUS PRN
Status: DISCONTINUED | OUTPATIENT
Start: 2018-09-07 | End: 2018-09-07

## 2018-09-07 RX ORDER — DEXAMETHASONE SODIUM PHOSPHATE 4 MG/ML
INJECTION, SOLUTION INTRA-ARTICULAR; INTRALESIONAL; INTRAMUSCULAR; INTRAVENOUS; SOFT TISSUE PRN
Status: DISCONTINUED | OUTPATIENT
Start: 2018-09-07 | End: 2018-09-07

## 2018-09-07 RX ORDER — FENTANYL CITRATE 50 UG/ML
25-50 INJECTION, SOLUTION INTRAMUSCULAR; INTRAVENOUS EVERY 5 MIN PRN
Status: DISCONTINUED | OUTPATIENT
Start: 2018-09-07 | End: 2018-09-07 | Stop reason: HOSPADM

## 2018-09-07 RX ORDER — TRIAMCINOLONE ACETONIDE 40 MG/ML
INJECTION, SUSPENSION INTRA-ARTICULAR; INTRAMUSCULAR PRN
Status: DISCONTINUED | OUTPATIENT
Start: 2018-09-07 | End: 2018-09-07 | Stop reason: HOSPADM

## 2018-09-07 RX ORDER — PROPOFOL 10 MG/ML
INJECTION, EMULSION INTRAVENOUS PRN
Status: DISCONTINUED | OUTPATIENT
Start: 2018-09-07 | End: 2018-09-07

## 2018-09-07 RX ORDER — CEFAZOLIN SODIUM 2 G/100ML
2 INJECTION, SOLUTION INTRAVENOUS
Status: COMPLETED | OUTPATIENT
Start: 2018-09-07 | End: 2018-09-07

## 2018-09-07 RX ORDER — ONDANSETRON 4 MG/1
4 TABLET, ORALLY DISINTEGRATING ORAL EVERY 30 MIN PRN
Status: DISCONTINUED | OUTPATIENT
Start: 2018-09-07 | End: 2018-09-07 | Stop reason: HOSPADM

## 2018-09-07 RX ORDER — LIDOCAINE HYDROCHLORIDE 20 MG/ML
INJECTION, SOLUTION INFILTRATION; PERINEURAL PRN
Status: DISCONTINUED | OUTPATIENT
Start: 2018-09-07 | End: 2018-09-07

## 2018-09-07 RX ORDER — OXYCODONE HYDROCHLORIDE 5 MG/1
5-10 TABLET ORAL
Qty: 8 TABLET | Refills: 0 | Status: ON HOLD | OUTPATIENT
Start: 2018-09-07 | End: 2018-10-05

## 2018-09-07 RX ORDER — ONDANSETRON 2 MG/ML
4 INJECTION INTRAMUSCULAR; INTRAVENOUS EVERY 30 MIN PRN
Status: DISCONTINUED | OUTPATIENT
Start: 2018-09-07 | End: 2018-09-07 | Stop reason: HOSPADM

## 2018-09-07 RX ADMIN — PROPOFOL 50 MG: 10 INJECTION, EMULSION INTRAVENOUS at 08:41

## 2018-09-07 RX ADMIN — MIDAZOLAM 1 MG: 1 INJECTION INTRAMUSCULAR; INTRAVENOUS at 07:44

## 2018-09-07 RX ADMIN — CEFAZOLIN SODIUM 2 G: 2 INJECTION, SOLUTION INTRAVENOUS at 07:45

## 2018-09-07 RX ADMIN — FENTANYL CITRATE 50 MCG: 50 INJECTION, SOLUTION INTRAMUSCULAR; INTRAVENOUS at 08:14

## 2018-09-07 RX ADMIN — FENTANYL CITRATE 100 MCG: 50 INJECTION, SOLUTION INTRAMUSCULAR; INTRAVENOUS at 07:53

## 2018-09-07 RX ADMIN — PROPOFOL 150 MG: 10 INJECTION, EMULSION INTRAVENOUS at 07:53

## 2018-09-07 RX ADMIN — ROCURONIUM BROMIDE 10 MG: 10 INJECTION INTRAVENOUS at 08:48

## 2018-09-07 RX ADMIN — ROCURONIUM BROMIDE 50 MG: 10 INJECTION INTRAVENOUS at 07:53

## 2018-09-07 RX ADMIN — PROPOFOL 150 MCG/KG/MIN: 10 INJECTION, EMULSION INTRAVENOUS at 07:53

## 2018-09-07 RX ADMIN — DEXAMETHASONE SODIUM PHOSPHATE 10 MG: 4 INJECTION, SOLUTION INTRA-ARTICULAR; INTRALESIONAL; INTRAMUSCULAR; INTRAVENOUS; SOFT TISSUE at 08:11

## 2018-09-07 RX ADMIN — ALBUTEROL SULFATE 2.5 MG: 2.5 SOLUTION RESPIRATORY (INHALATION) at 11:37

## 2018-09-07 RX ADMIN — ROCURONIUM BROMIDE 10 MG: 10 INJECTION INTRAVENOUS at 08:25

## 2018-09-07 RX ADMIN — PROPOFOL 50 MG: 10 INJECTION, EMULSION INTRAVENOUS at 08:20

## 2018-09-07 RX ADMIN — PROPOFOL 50 MG: 10 INJECTION, EMULSION INTRAVENOUS at 08:38

## 2018-09-07 RX ADMIN — PROPOFOL 30 MG: 10 INJECTION, EMULSION INTRAVENOUS at 08:45

## 2018-09-07 RX ADMIN — COCAINE HYDROCHLORIDE 4 ML: 40 SOLUTION TOPICAL at 08:22

## 2018-09-07 RX ADMIN — MITOMYCIN 2 ML: 40 INJECTION, POWDER, LYOPHILIZED, FOR SOLUTION INTRAVENOUS at 08:51

## 2018-09-07 RX ADMIN — ONDANSETRON 4 MG: 2 INJECTION INTRAMUSCULAR; INTRAVENOUS at 09:00

## 2018-09-07 RX ADMIN — SODIUM CHLORIDE, POTASSIUM CHLORIDE, SODIUM LACTATE AND CALCIUM CHLORIDE: 600; 310; 30; 20 INJECTION, SOLUTION INTRAVENOUS at 07:39

## 2018-09-07 RX ADMIN — PROPOFOL 50 MG: 10 INJECTION, EMULSION INTRAVENOUS at 08:03

## 2018-09-07 RX ADMIN — LIDOCAINE HYDROCHLORIDE 100 MG: 20 INJECTION, SOLUTION INFILTRATION; PERINEURAL at 07:53

## 2018-09-07 RX ADMIN — SUGAMMADEX 200 MG: 100 INJECTION, SOLUTION INTRAVENOUS at 08:52

## 2018-09-07 RX ADMIN — MIDAZOLAM 1 MG: 1 INJECTION INTRAMUSCULAR; INTRAVENOUS at 07:39

## 2018-09-07 RX ADMIN — Medication: at 11:04

## 2018-09-07 RX ADMIN — TRIAMCINOLONE ACETONIDE 40 MG: 40 INJECTION, SUSPENSION INTRA-ARTICULAR; INTRAMUSCULAR at 08:50

## 2018-09-07 RX ADMIN — REMIFENTANIL HYDROCHLORIDE 0.1 MCG/KG/MIN: 1 INJECTION, POWDER, LYOPHILIZED, FOR SOLUTION INTRAVENOUS at 08:43

## 2018-09-07 RX ADMIN — FENTANYL CITRATE 50 MCG: 50 INJECTION, SOLUTION INTRAMUSCULAR; INTRAVENOUS at 08:29

## 2018-09-07 RX ADMIN — FENTANYL CITRATE 50 MCG: 50 INJECTION, SOLUTION INTRAMUSCULAR; INTRAVENOUS at 08:11

## 2018-09-07 ASSESSMENT — PAIN DESCRIPTION - DESCRIPTORS: DESCRIPTORS: ACHING

## 2018-09-07 NOTE — BRIEF OP NOTE
[facility], [place]    Brief Operative Note    Pre-operative diagnosis: Subglottic Stenosis   Post-operative diagnosis * No post-op diagnosis entered *  Procedure: Procedure(s):  Teleoscopic Direct Laryngoscopy with Balloon Dilation, Mitomycin Application and Steriod Injection  - Wound Class: II-Clean Contaminated   - Wound Class: I-Clean  Surgeon: Surgeon(s) and Role:     * Dusty Pearson MD - Primary     * Valentin Day MD - Resident - Assisting  Anesthesia: General   Estimated blood loss: 5 cc  Drains: None  Specimens: * No specimens in log *  Findings:   Tracheal stenosis, anterior thick 13 mm, posterior thick 6mm, anterior below 36 mm, posterior below 32 mm..  Complications: None.  Implants: None.

## 2018-09-07 NOTE — OR NURSING
Patient has a ring on his 4th finger Left hand that is yellow metal with clear stones. It is too tight, and pt states that she gained a lot of weight over the years and it has not been removable. Dr. Winters is aware and will attempt to remove it in the OR. Pt promises to get is resized in the future.

## 2018-09-07 NOTE — OR NURSING
Dr. Winters of anesthesia at bedside in PACU to see patient. Patient okayed to transfer to phase 2 per Dr. Winters. He will place sign out when able.

## 2018-09-07 NOTE — ANESTHESIA CARE TRANSFER NOTE
Patient: Bibi Song    Procedure(s):  Teleoscopic Direct Laryngoscopy with Balloon Dilation, Mitomycin Application and Steriod Injection  - Wound Class: II-Clean Contaminated   - Wound Class: I-Clean    Diagnosis: Subglottic Stenosis   Diagnosis Additional Information: No value filed.    Anesthesia Type:   General     Note:  Airway :Face Mask  Patient transferred to:PACU  Comments: Pt recovering from her general anesthetic in the PACU. Pt VSS upon arrival to the PACU. Pt has no c/o pain/N/V. Pt care report given to receiving RN.       Vitals: (Last set prior to Anesthesia Care Transfer)    CRNA VITALS  9/7/2018 0837 - 9/7/2018 0911      9/7/2018             Pulse: 86    SpO2: 100 %                Electronically Signed By: SUSANNAH Muñoz CRNA  September 7, 2018  9:11 AM

## 2018-09-07 NOTE — DISCHARGE INSTRUCTIONS
[facility]  Same-Day Surgery   Adult Discharge Orders & Instructions     For 24 hours after surgery    1. Get plenty of rest.  A responsible adult must stay with you for at least 24 hours after you leave the hospital.   2. Do not drive or use heavy equipment.  If you have weakness or tingling, don't drive or use heavy equipment until this feeling goes away.  3. Do not drink alcohol.  4. Avoid strenuous or risky activities.  Ask for help when climbing stairs.   5. You may feel lightheaded.  IF so, sit for a few minutes before standing.  Have someone help you get up.   6. If you have nausea (feel sick to your stomach): Drink only clear liquids such as apple juice, ginger ale, broth or 7-Up.  Rest may also help.  Be sure to drink enough fluids.  Move to a regular diet as you feel able.  7. You may have a slight fever. Call the doctor if your fever is over 100 F (37.7 C) (taken under the tongue) or lasts longer than 24 hours.  8. You may have a dry mouth, a sore throat, muscle aches or trouble sleeping.  These should go away after 24 hours.  9. Do not make important or legal decisions.   Call your doctor for any of the followin.  Signs of infection (fever, growing tenderness at the surgery site, a large amount of drainage or bleeding, severe pain, foul-smelling drainage, redness, swelling).    2. It has been over 8 to 10 hours since surgery and you are still not able to urinate (pass water).    3.  Headache for over 24 hours.      To contact a doctor, call Dr. Pearson's office at [phone]--ENT/Otolaryngology surgery or:    x   [phone] and ask for the resident on call for   ENT/Otolaryngology (answered 24 hours a day)  x   Emergency Department:    [facility]: [phone]       (TTY for hearing impaired: [phone])

## 2018-09-07 NOTE — OP NOTE
DATE OF SERVICE September 7, 2018        STAFF SURGEON:  Dusty Pearson MD       RESIDENT SURGEON: Valentin Day MD      PREOPERATIVE DIAGNOSES:  Subglottic stenosis.       POSTOPERATIVE DIAGNOSIS:  Subglottic stenosis.       PROCEDURES PERFORMED:   1.  Micro direct, Telescopic Laryngoscopy and Bronchoscopy.   2.  Endoscopic-guided dilation of the subglottis.   3.  Mitomycin application.   4.  Endoscopic steroid injection into the subglottis.       COMPLICATIONS:  None.       ESTIMATED BLOOD LOSS:  5 mL.       SPECIMENS: Subglottic stenosis - None.       ANESTHESIA:  Jet ventilation.     OPERATIVE FINDINGS:  Subglottic stenosis.  Anteriorly it was 13 mm thick and was situated 36 mm below the vocal cords.  Posteriorly it was 6 mm thick and 32 mm below the vocal cords.  There was a Good view with the Dedo-Hermelinda laryngoscope.  There was no difficulty with mask ventilation. A 18 mm x 2 cm balloon was utilized to dilate the stenotic segment after mucosal incisions were made. The remainder of the trachea appeared normal. The patient required a fairly high dose of propofol and was hypertensive at times during the case according to the anesthesia team. Once during the procedure a #6 endotracheal tube was used to supplement the jet ventilation. She has no early post operative complications.             INDICATIONS FOR PROCEDURE: Bibi Song is a 48 year old female with a history of  subglottic stenosis. She developed airway symptoms after a recent intubation.  On a recent clinic visit a recurrence of the stenosis was noted and She  returns to the operating room for a telescopic laryngoscopy and dilation of the stenosis.        DESCRIPTION OF PROCEDURE: After properly identifying the patient and obtaining signed informed consent, the patient was brought to the operating room and laid in supine position on the operating table.  General anesthesia was induced and the patient was ventilated through masking.  The  bed was rotated 90 degrees. A shoulder roll and folded blanket were placed to properly position the patient. Two thermoplastic  tooth guards were placed over the upper dentition and a single thermoplastic  tooth guard over the lower dentition. The patient was draped in the usual clean fashion.  The  jet ventilation system was set up and titrated to an adequate pressure.  A timeout was carried out immediately prior to the procedure to confirm the identity of the patient and correctness of the procedure.      The Dedo- Hermelinda laryngoscope was then used to gain access to the larynx. The laryngoscope  was put into suspension with the Soceaniq suspension device and jet ventilation begun. Jet ventilation was confirmed to show an adequate chest rise. Topical lidocaine was applied to the area. The larynx was topically anesthetized with 4% lidocaine. The larynx and trachea  twere examined with a rigid endoscope with the magnified image displayed on a video monitor. The telescope was used for the remainer of the procedure. The circumferential scar was measured and findings are noted above. Pictures were taken of the stenosis and distal trachea.       Incisions were then made in the  scar band  at the 12, 9, and 3 o'clock  positions.  The balloon was then utilized to dilate the stenotic segment. After 2 dilations, the balloon was removed and the patient resumed jet ventilation. Mitomycin, at a concentration of 1 mg / cc, was applied to the  incisional areas for a total of 4 minutes exposure  while the patient was being ventilated.  The mitomycin was then removed and 3 sequential swabs of saline were used to cleanse the area.  An injection of 0.75 mL of 40 mg/mL of Kenalog was then injected into the area around the tracheal stenosis for a total dose of 30 mg. Post dilation photodocumentation was obtained. The laryngoscope and tooth guards were removed.  Mask ventilation was established. The patient was then turned over to  Anesthesia for emergence and was masked until awake.       Dr. Dusty Pearson was present for the entire case.      Valentin Day MD  Otolaryngology-Head & Neck Surgery PGY-1    ADDENDUM:  I was present with the resident during the entire operation from opening to closure.  I have edited Dr. Day's note to reflect my perspective on operative findings and flow.

## 2018-09-07 NOTE — ANESTHESIA PREPROCEDURE EVALUATION
Anesthesia Evaluation     .             ROS/MED HX    ENT/Pulmonary: Comment: Tracheal stenosis      Neurologic:  - neg neurologic ROS     Cardiovascular:  - neg cardiovascular ROS       METS/Exercise Tolerance:     Hematologic:  - neg hematologic  ROS       Musculoskeletal:  - neg musculoskeletal ROS       GI/Hepatic: Comment: Ulcerative colitis         Renal/Genitourinary:         Endo:     (+) thyroid problem .      Psychiatric:         Infectious Disease:         Malignancy:         Other:                     Physical Exam  Normal systems: pulmonary and dental    Airway   Mallampati: II  TM distance: >3 FB  Neck ROM: full    Dental     Cardiovascular   Rhythm and rate: regular and normal      Pulmonary                     Anesthesia Plan      History & Physical Review  History and physical reviewed and following examination; no interval change.    ASA Status:  3 .        Plan for General with Intravenous induction. Maintenance will be TIVA.    PONV prophylaxis:  Ondansetron (or other 5HT-3) and Dexamethasone or Solumedrol       Postoperative Care      Consents  Anesthetic plan, risks, benefits and alternatives discussed with:  Patient..                          .

## 2018-09-07 NOTE — IP AVS SNAPSHOT
MRN:[id number]                      After Visit Summary   9/7/2018    Bibi Song    MRN: [id number]           Thank you!     Thank you for choosing [facility] for your care. Our goal is always to provide you with excellent care. Hearing back from our patients is one way we can continue to improve our services. Please take a few minutes to complete the written survey that you may receive in the mail after you visit with us. Thank you!        Patient Information     Date Of Birth          1970        About your hospital stay     You were admitted on:  September 7, 2018 You last received care in the:  Post Anesthesia Care Unit [facility]    You were discharged on:  September 7, 2018       Who to Call     For medical emergencies, please call 911.  For non-urgent questions about your medical care, please call your primary care provider or clinic, [phone]  For questions related to your surgery, please call your surgery clinic        Attending Provider     Provider Dusty Mehta MD Otolaryngology       Primary Care Provider Office Phone # Fax #    Glory Singleton [phone]      After Care Instructions     Diet Instructions       Resume pre procedure diet            Discharge Instructions       Follow up in ENT clinic with Dr. Pearson as previously scheduled. Please call the clinic with questions/concerns: [phone].    Otolaryngology/ENT Clinic:  [facility]  [address] [place] [address]                  Further instructions from your care team       [facility]  Same-Day Surgery   Adult Discharge Orders & Instructions     For 24 hours after surgery    1. Get plenty of rest.  A responsible adult must stay with you for at least 24 hours after you leave the hospital.   2. Do not drive or use heavy equipment.  If you have weakness or  tingling, don't drive or use heavy equipment until this feeling goes away.  3. Do not drink alcohol.  4. Avoid strenuous or risky activities.  Ask for help when climbing stairs.   5. You may feel lightheaded.  IF so, sit for a few minutes before standing.  Have someone help you get up.   6. If you have nausea (feel sick to your stomach): Drink only clear liquids such as apple juice, ginger ale, broth or 7-Up.  Rest may also help.  Be sure to drink enough fluids.  Move to a regular diet as you feel able.  7. You may have a slight fever. Call the doctor if your fever is over 100 F (37.7 C) (taken under the tongue) or lasts longer than 24 hours.  8. You may have a dry mouth, a sore throat, muscle aches or trouble sleeping.  These should go away after 24 hours.  9. Do not make important or legal decisions.   Call your doctor for any of the followin.  Signs of infection (fever, growing tenderness at the surgery site, a large amount of drainage or bleeding, severe pain, foul-smelling drainage, redness, swelling).    2. It has been over 8 to 10 hours since surgery and you are still not able to urinate (pass water).    3.  Headache for over 24 hours.      To contact a doctor, call Dr. Pearson's office at [phone]--ENT/Otolaryngology surgery or:    x   [phone] and ask for the resident on call for   ENT/Otolaryngology (answered 24 hours a day)  x   Emergency Department:    [facility]: [phone]       (TTY for hearing impaired: [phone])              Additional Information     If you use hormonal birth control (such as the pill, patch, ring or implants): You'll need a second form of birth control for 7 days (condoms, a diaphragm or contraceptive foam). While in the hospital, you received a medicine called Bridion. Your normal birth control will not work as well for a week after taking this medicine.          Pending Results     No orders found from 2018 to 2018.            Admission  "Information     Date & Time Provider Department Dept. Phone    9/7/2018 Dusty Pearson MD Post Anesthesia Care Unit [facility] [phone]      Your Vitals Were     Blood Pressure Temperature Respirations Height Weight Last Period    125/76 97.9  F (36.6  C) (Oral) 18 1.664 m (5' 5.5\") 99.1 kg (218 lb 7.6 oz) 08/18/2018 (Approximate)    Pulse Oximetry BMI (Body Mass Index)                93% 35.8 kg/m2          MyChart Information     [employer] gives you secure access to your electronic health record. If you see a primary care provider, you can also send messages to your care team and make appointments. If you have questions, please call your primary care clinic.  If you do not have a primary care provider, please call [phone] and they will assist you.        Care EveryWhere ID     This is your Care EveryWhere ID. This could be used by other organizations to access your [facility] medical records  [id number]        Equal Access to Services     JUANCARLOS ADDISON AH: Hadii yeison daviso Soginette, waaxda luqadaha, qaybta kaalmada adeegyacatalina, varun flowers. So [facility] [phone].    ATENCIÓN: Si habla español, tiene a velazquez disposición servicios gratuitos de asistencia lingüística. Llame al [phone].    We comply with applicable federal civil rights laws and [place] laws. We do not discriminate on the basis of race, color, national origin, age, disability, sex, sexual orientation, or gender identity.               Review of your medicines      START taking        Dose / Directions    omeprazole 20 MG CR capsule   Commonly known as:  priLOSEC   Used for:  Tracheal stenosis        Dose:  20 mg   Take 1 capsule (20 mg) by mouth daily   Quantity:  30 capsule   Refills:  0       oxyCODONE IR 5 MG tablet   Commonly known as:  ROXICODONE   Used for:  Tracheal stenosis        Dose:  5-10 mg   Take 1-2 tablets (5-10 mg) by mouth every 3 hours as needed for pain or other (Moderate to " Severe)   Quantity:  8 tablet   Refills:  0         CONTINUE these medicines which may have CHANGED, or have new prescriptions. If we are uncertain of the size of tablets/capsules you have at home, strength may be listed as something that might have changed.        Dose / Directions    * predniSONE 20 MG tablet   Commonly known as:  DELTASONE   This may have changed:  Another medication with the same name was added. Make sure you understand how and when to take each.        Take 3 tabs (60 mg) by mouth daily x 3 days, 2 tabs (40 mg) daily x 3 days, 1 tab (20 mg) daily x 3 days, then 1/2 tab (10 mg) x 3 days.   Refills:  0       * predniSONE 10 MG tablet   Commonly known as:  DELTASONE   This may have changed:  You were already taking a medication with the same name, and this prescription was added. Make sure you understand how and when to take each.   Used for:  Tracheal stenosis        Take 20 mg (2 pills) every day for 2 days, then 10 mg (1 pill) every day for 3 days.   Quantity:  7 tablet   Refills:  0       * Notice:  This list has 2 medication(s) that are the same as other medications prescribed for you. Read the directions carefully, and ask your doctor or other care provider to review them with you.      CONTINUE these medicines which have NOT CHANGED        Dose / Directions    * albuterol 108 (90 Base) MCG/ACT inhaler   Commonly known as:  PROAIR HFA/PROVENTIL HFA/VENTOLIN HFA   Used for:  Acute bronchitis with symptoms > 10 days        Dose:  2 puff   Inhale 2 puffs into the lungs 4 times daily   Quantity:  1 Inhaler   Refills:  1       * albuterol (2.5 MG/3ML) 0.083% neb solution   Used for:  Acute bronchospasm        Dose:  2.5 mg   Take 1 vial (2.5 mg) by nebulization every 4 hours as needed for shortness of breath / dyspnea or wheezing   Quantity:  25 vial   Refills:  0       amoxicillin-clavulanate 875-125 MG per tablet   Commonly known as:  AUGMENTIN   Used for:  Tracheal stenosis        Dose:  1  tablet   Take 1 tablet by mouth every 12 hours for 14 days   Quantity:  28 tablet   Refills:  0       CALCIUM 1200 PO        Refills:  0       GEODON 40 MG capsule   Generic drug:  ziprasidone        Dose:  40 mg   Take 40 mg by mouth 2 times daily (with meals)   Quantity:  1 capsule   Refills:  0       IBUPROFEN PO        Dose:  600 mg   Take 600 mg by mouth every 6 hours as needed for moderate pain   Refills:  0       levothyroxine 75 MCG tablet   Commonly known as:  SYNTHROID/LEVOTHROID   Used for:  Hypothyroidism, unspecified type        Dose:  75 mcg   Take 1 tablet (75 mcg) by mouth daily   Quantity:  90 tablet   Refills:  1       MAGNESIUM PO        Dose:  1 tablet   Take 1 tablet by mouth daily   Refills:  0       melatonin 3 MG tablet        Dose:  9 mg   Take 9 mg by mouth nightly as needed.   Quantity:  30 tablet   Refills:  0       vitamin B complex with vitamin C Tabs tablet        Dose:  1 tablet   Take 1 tablet by mouth daily   Refills:  0       ZOLOFT 100 MG tablet   Used for:  Obsessive-compulsive disorder, unspecified type, Other specified anxiety disorders, Major depressive disorder, recurrent episode, moderate (H)   Generic drug:  sertraline        Dose:  200 mg   Take 200 mg by mouth daily   Quantity:  1 tablet   Refills:  0       * Notice:  This list has 2 medication(s) that are the same as other medications prescribed for you. Read the directions carefully, and ask your doctor or other care provider to review them with you.         Where to get your medicines      These medications were sent to [facility] [place], [place] - [address] [address]     Phone:  [phone]     omeprazole 20 MG CR capsule    predniSONE 10 MG tablet         Some of these will need a paper prescription and others can be bought over the counter. Ask your nurse if you have questions.     Bring a paper prescription for each of these medications     oxyCODONE  IR 5 MG tablet                Protect others around you: Learn how to safely use, store and throw away your medicines at www.disposemymeds.org.        Information about OPIOIDS     PRESCRIPTION OPIOIDS: WHAT YOU NEED TO KNOW   We gave you an opioid (narcotic) pain medicine. It is important to manage your pain, but opioids are not always the best choice. You should first try all the other options your care team gave you. Take this medicine for as short a time (and as few doses) as possible.    Some activities can increase your pain, such as bandage changes or therapy sessions. It may help to take your pain medicine 30 to 60 minutes before these activities. Reduce your stress by getting enough sleep, working on hobbies you enjoy and practicing relaxation or meditation. Talk to your care team about ways to manage your pain beyond prescription opioids.    These medicines have risks:    DO NOT drive when on new or higher doses of pain medicine. These medicines can affect your alertness and reaction times, and you could be arrested for driving under the influence (DUI). If you need to use opioids long-term, talk to your care team about driving.    DO NOT operate heavy machinery    DO NOT do any other dangerous activities while taking these medicines.    DO NOT drink any alcohol while taking these medicines.     If the opioid prescribed includes acetaminophen, DO NOT take with any other medicines that contain acetaminophen. Read all labels carefully. Look for the word  acetaminophen  or  Tylenol.  Ask your pharmacist if you have questions or are unsure.    You can get addicted to pain medicines, especially if you have a history of addiction (chemical, alcohol or substance dependence). Talk to your care team about ways to reduce this risk.    All opioids tend to cause constipation. Drink plenty of water and eat foods that have a lot of fiber, such as fruits, vegetables, prune juice, apple juice and high-fiber cereal. Take a  laxative (Miralax, milk of magnesia, Colace, Senna) if you don t move your bowels at least every other day. Other side effects include upset stomach, sleepiness, dizziness, throwing up, tolerance (needing more of the medicine to have the same effect), physical dependence and slowed breathing.    Store your pills in a secure place, locked if possible. We will not replace any lost or stolen medicine. If you don t finish your medicine, please throw away (dispose) as directed by your pharmacist. The [place] Pollution Control Agency has more information about safe disposal: https://www.pca.[facility].[place].us/living-green/managing-unwanted-medications             Medication List: This is a list of all your medications and when to take them. Check marks below indicate your daily home schedule. Keep this list as a reference.      Medications           Morning Afternoon Evening Bedtime As Needed    * albuterol 108 (90 Base) MCG/ACT inhaler   Commonly known as:  PROAIR HFA/PROVENTIL HFA/VENTOLIN HFA   Inhale 2 puffs into the lungs 4 times daily                                * albuterol (2.5 MG/3ML) 0.083% neb solution   Take 1 vial (2.5 mg) by nebulization every 4 hours as needed for shortness of breath / dyspnea or wheezing                                amoxicillin-clavulanate 875-125 MG per tablet   Commonly known as:  AUGMENTIN   Take 1 tablet by mouth every 12 hours for 14 days                                CALCIUM 1200 PO                                GEODON 40 MG capsule   Take 40 mg by mouth 2 times daily (with meals)   Generic drug:  ziprasidone                                IBUPROFEN PO   Take 600 mg by mouth every 6 hours as needed for moderate pain                                levothyroxine 75 MCG tablet   Commonly known as:  SYNTHROID/LEVOTHROID   Take 1 tablet (75 mcg) by mouth daily                                MAGNESIUM PO   Take 1 tablet by mouth daily                                melatonin 3 MG tablet    Take 9 mg by mouth nightly as needed.                                omeprazole 20 MG CR capsule   Commonly known as:  priLOSEC   Take 1 capsule (20 mg) by mouth daily                                oxyCODONE IR 5 MG tablet   Commonly known as:  ROXICODONE   Take 1-2 tablets (5-10 mg) by mouth every 3 hours as needed for pain or other (Moderate to Severe)                                * predniSONE 20 MG tablet   Commonly known as:  DELTASONE   Take 3 tabs (60 mg) by mouth daily x 3 days, 2 tabs (40 mg) daily x 3 days, 1 tab (20 mg) daily x 3 days, then 1/2 tab (10 mg) x 3 days.                                * predniSONE 10 MG tablet   Commonly known as:  DELTASONE   Take 20 mg (2 pills) every day for 2 days, then 10 mg (1 pill) every day for 3 days.                                vitamin B complex with vitamin C Tabs tablet   Take 1 tablet by mouth daily                                ZOLOFT 100 MG tablet   Take 200 mg by mouth daily   Generic drug:  sertraline                                * Notice:  This list has 4 medication(s) that are the same as other medications prescribed for you. Read the directions carefully, and ask your doctor or other care provider to review them with you.

## 2018-09-07 NOTE — ANESTHESIA POSTPROCEDURE EVALUATION
Patient: Bibi Song    Procedure(s):  Teleoscopic Direct Laryngoscopy with Balloon Dilation, Mitomycin Application and Steriod Injection  - Wound Class: II-Clean Contaminated   - Wound Class: I-Clean    Diagnosis:Subglottic Stenosis   Diagnosis Additional Information: No value filed.    Anesthesia Type:  General    Note:  Anesthesia Post Evaluation    Patient location during evaluation: PACU  Patient participation: Able to fully participate in evaluation  Level of consciousness: awake and alert  Pain management: adequate  Airway patency: patent  Cardiovascular status: blood pressure returned to baseline and hemodynamically stable  Respiratory status: room air  Hydration status: acceptable  PONV: none     Anesthetic complications: None    Comments: Maintaining saturations at greater than 91% on room air. When she uses the IS she improves to 97% on RA. Bilateral breath sounds.         Last vitals:  Vitals:    09/07/18 1145 09/07/18 1200 09/07/18 1215   BP: 114/82 118/74 123/73   Pulse:      Resp: 16 16 16   Temp:  36.7  C (98  F)    SpO2: 100% 93% 96%         Electronically Signed By: Randy Winters MD  September 7, 2018  12:37 PM

## 2018-09-07 NOTE — IP AVS SNAPSHOT
Post Anesthesia Care Unit [address] [address]    Phone:  [phone]                                       After Visit Summary   9/7/2018    Bibi Song    MRN: [id number]           After Visit Summary Signature Page     I have received my discharge instructions, and my questions have been answered. I have discussed any challenges I see with this plan with the nurse or doctor.    ..........................................................................................................................................  Patient/Patient Representative Signature      ..........................................................................................................................................  Patient Representative Print Name and Relationship to Patient    ..................................................               ................................................  Date                                            Time    ..........................................................................................................................................  Reviewed by Signature/Title    ...................................................              ..............................................  Date                                                            Time          22EPIC Rev 08/18

## 2018-09-23 ENCOUNTER — NURSE TRIAGE (OUTPATIENT)
Dept: NURSING | Facility: CLINIC | Age: 48
End: 2018-09-23

## 2018-09-23 NOTE — TELEPHONE ENCOUNTER
I told her she should talk with her primary HCP about whether or not it's hormone related or if there are treatments available for heavy sweating.  She is experiencing that while at work.  She will call the clinic when she can, tomorrow. She works tomorrow.  Shira Galvez RN-[facility]

## 2018-09-25 ENCOUNTER — TELEPHONE (OUTPATIENT)
Dept: FAMILY MEDICINE | Facility: CLINIC | Age: 48
End: 2018-09-25

## 2018-09-25 NOTE — TELEPHONE ENCOUNTER
Pt having hot flashes and feels it is due to menopause.  Offered appt to discuss plan.  Pt has a lot of medical bills and asking for alternatives.  Recommended trying OTC med.  Pt will try and check with her Pharmacist if ok to take with her her current meds.  No appt @ this time.KPaShukri

## 2018-09-25 NOTE — TELEPHONE ENCOUNTER
Reason for call:  Patient reporting a symptom    Symptom or request: sweating comes on all of a sudden 24/7, pt is asking if there is something she can take to stop these    Duration (how long have symptoms been present): couple of months but seems to be getting worse    Have you been treated for this before? No    Additional comments: pt suspects it menopause    Phone Number patient can be reached at:  Home number on file [phone] (home)    Best Time:  any    Can we leave a detailed message on this number:  YES    Call taken on 9/25/2018 at 9:38 AM by Lakesha Walters

## 2018-09-26 ENCOUNTER — TELEPHONE (OUTPATIENT)
Dept: FAMILY MEDICINE | Facility: CLINIC | Age: 48
End: 2018-09-26

## 2018-09-26 DIAGNOSIS — J98.01 ACUTE BRONCHOSPASM: ICD-10-CM

## 2018-09-26 RX ORDER — ALBUTEROL SULFATE 0.83 MG/ML
2.5 SOLUTION RESPIRATORY (INHALATION) EVERY 4 HOURS PRN
Qty: 1 BOX | Refills: 0 | Status: SHIPPED | OUTPATIENT
Start: 2018-09-26 | End: 2019-01-31

## 2018-09-26 NOTE — TELEPHONE ENCOUNTER
Last Written Prescription Date:  Albuterol Neb Solution  8/27/2018  Last Fill Quantity: 25,  # refills: 0   Last office visit: 9/6/2018 with prescribing provider:  Mani   Future Office Visit:      Patient is down to 4 vials.  Rachael Mercado  Clinic [facility]  [facility]

## 2018-09-26 NOTE — TELEPHONE ENCOUNTER
LM to cll clinic/RN with update on breathing.  Albuterol neb refill.  Med given for Bronchospasms on 8/27/18.  Pt had Laryngoscopy done on 9/7/18.  Is pt getting better?  Dylan

## 2018-09-27 ENCOUNTER — HOSPITAL ENCOUNTER (EMERGENCY)
Facility: CLINIC | Age: 48
Discharge: HOME | End: 2018-09-27
Attending: FAMILY MEDICINE | Admitting: FAMILY MEDICINE
Payer: COMMERCIAL

## 2018-09-27 VITALS
OXYGEN SATURATION: 94 % | TEMPERATURE: 98.1 F | HEART RATE: 79 BPM | SYSTOLIC BLOOD PRESSURE: 111 MMHG | DIASTOLIC BLOOD PRESSURE: 62 MMHG | RESPIRATION RATE: 33 BRPM

## 2018-09-27 DIAGNOSIS — J38.6 SUBGLOTTIC STENOSIS: ICD-10-CM

## 2018-09-27 PROCEDURE — 99283 EMERGENCY DEPT VISIT LOW MDM: CPT | Mod: Z6 | Performed by: FAMILY MEDICINE

## 2018-09-27 PROCEDURE — 99283 EMERGENCY DEPT VISIT LOW MDM: CPT

## 2018-09-27 PROCEDURE — 25000131 ZZH RX MED GY IP 250 OP 636 PS 637: Performed by: FAMILY MEDICINE

## 2018-09-27 RX ORDER — DEXAMETHASONE 4 MG/1
6 TABLET ORAL EVERY 6 HOURS
Qty: 5 TABLET | Refills: 0 | Status: ON HOLD | OUTPATIENT
Start: 2018-09-27 | End: 2018-10-05

## 2018-09-27 RX ADMIN — DEXAMETHASONE 6 MG: 2 TABLET ORAL at 11:08

## 2018-09-27 ASSESSMENT — ENCOUNTER SYMPTOMS
DIAPHORESIS: 0
CONSTIPATION: 0
VOMITING: 0
WHEEZING: 0
CHILLS: 0
COUGH: 0
PALPITATIONS: 0
SHORTNESS OF BREATH: 1
HEADACHES: 0
DYSURIA: 0
FREQUENCY: 0
SORE THROAT: 0
NAUSEA: 0
ABDOMINAL PAIN: 0
STRIDOR: 1
FEVER: 0
SINUS PRESSURE: 0
DIARRHEA: 0
BLOOD IN STOOL: 0

## 2018-09-27 NOTE — ED PROVIDER NOTES
History     Chief Complaint   Patient presents with     Shortness of Breath     recently intubated and has a history of stridor. had larynx dialated on 09/07/2018     HPI  Bibi Song is a 48 year old female who presents with a history of intubation for respiratory failure after accidental sleeping pill overdose, and secondary subglottic stenosis, s/p endoscopic dilation of the subglottic region 9/7/2018 - for history of stridor and progressive dyspnea.  Now with stridor on exertion recurrent. increased this am with exertion, but not occurring at rest.     Recent mild URI symptoms for the last 2 weeks with minimal cough and congestion.  She attributes this to some allergy-like symptoms.  No history of asthma.    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 Schoenecker, NP       Anxiety disorder 08/06/2010     Priority: Medium     Family history of diabetes mellitus 08/06/2010     Priority: Medium     Family history of thyroid disease 08/06/2010     Priority: Medium     ALCOHOL ABUSE, IN REMISSION 08/06/2010     Priority: Medium        Past Medical History:    Past Medical History:   Diagnosis Date     PONV (postoperative nausea and vomiting)        Past Surgical History:    Past Surgical History:   Procedure Laterality Date     INJECT STEROID (LOCATION) N/A 9/7/2018    Procedure: INJECT  STEROID (LOCATION);;  Surgeon: Dusty Pearson MD;  Location: [facility] OR     LARYNGOSCOPY N/A 9/7/2018    Procedure: LARYNGOSCOPY;  Teleoscopic Direct Laryngoscopy with Balloon Dilation, Mitomycin Application and Steriod Injection ;  Surgeon: Dusty Pearson MD;  Location: [facility] OR       Family History:    Family History   Problem Relation Age of Onset     Diabetes Mother      Depression Mother      OCD     Neurologic Disorder Mother      Psychotic Disorder Mother      Respiratory Mother      Thyroid Disease Mother      Gynecology Mother      hysterectomy     Cerebrovascular Disease Mother      pacemaker     HEART DISEASE Father      MI,   LATE 60'S     Hypertension Father      Prostate Cancer Father      Obesity Father      Diabetes Father      Cerebrovascular Disease Father      Depression Brother      Thyroid Disease Brother      Depression Brother      Thyroid Disease Brother      Allergies Son      Asthma Son      Thyroid Disease Maternal Grandmother      Cancer Maternal Grandfather      Psychotic Disorder Paternal Grandmother      Thyroid Disease Paternal Grandfather      Diabetes Brother      Breast Cancer Maternal Aunt        Social History:  Marital Status:   [2]  Social History   Substance Use Topics     Smoking status: Former Smoker     Quit date: 1/1/1994     Smokeless tobacco: Never Used     Alcohol use No      Comment: Treatment in 2007        Medications:      albuterol (2.5 MG/3ML) 0.083% neb solution   albuterol (PROAIR HFA/PROVENTIL HFA/VENTOLIN HFA) 108 (90 Base) MCG/ACT inhaler   Calcium Carbonate-Vit D-Min (CALCIUM 1200 PO)   IBUPROFEN PO   levothyroxine (SYNTHROID/LEVOTHROID) 75 MCG tablet   MAGNESIUM PO   melatonin 3 MG tablet   omeprazole (PRILOSEC) 20 MG CR capsule   oxyCODONE IR (ROXICODONE) 5 MG tablet   predniSONE (DELTASONE) 10 MG tablet   predniSONE (DELTASONE) 20 MG tablet   sertraline (ZOLOFT) 100 MG tablet   vitamin B complex with vitamin C (VITAMIN  B COMPLEX)  TABS tablet   ziprasidone (GEODON) 40 MG capsule         Review of Systems   Constitutional: Negative for chills, diaphoresis and fever.   HENT: Negative for ear pain, sinus pressure and sore throat.    Eyes: Negative for visual disturbance.   Respiratory: Positive for shortness of breath and stridor. Negative for cough and wheezing.    Cardiovascular: Negative for chest pain and palpitations.   Gastrointestinal: Negative for abdominal pain, blood in stool, constipation, diarrhea, nausea and vomiting.   Genitourinary: Negative for dysuria, frequency and urgency.   Skin: Negative for rash.   Neurological: Negative for headaches.   All other systems reviewed and are negative.         Physical Exam   BP: 120/82  Pulse: 79  Temp: 98.1  F (36.7  C)  Resp: 24  SpO2: 96 %      Physical Exam   Constitutional: No distress.   HENT:   Mouth/Throat: Oropharynx is clear and moist.   Eyes: Conjunctivae are normal.   Neck: Neck supple.   Cardiovascular: Normal rate and regular rhythm.  Exam reveals no gallop and no friction rub.    No murmur heard.  Pulmonary/Chest: Effort normal and breath sounds normal. No respiratory distress. She has no wheezes. She has no rales.   Abdominal: She exhibits no distension. There is no tenderness. There is no rebound and no guarding.   Musculoskeletal: She exhibits no edema.   Neurological: She is alert. She exhibits normal muscle tone.   Skin: No rash noted. She is not diaphoretic.       ED Course     ED Course     Procedures               Critical Care time:  none               No results found for this or any previous visit (from the past 24 hour(s)).    Medications - No data to display    Assessments & Plan (with Medical Decision Making)     MDM: Bibi Song is a 48 year old female who is been referred to the emergency department by her ENT clinic when she called them and told them that her subglottic stenosis stridor had returned despite recent dilation over the last several days and it  occurred with exertion.  She presented here and there was no stridor or wheezing at rest.  She is in no respiratory distress.  Her airway appears to be clear by exam and she is not hypoxic with normal vital signs.  I did discuss with Dr. Karimi on-call for ENT and agrees that outpatient management and initiating Decadron at 6 mg every 6 hours x4 doses would be appropriate right now especially given that her recent URI symptoms related likely to allergies have possibly exacerbated her stenosis.   The nasal laryngoscope I would normally used to evaluate her cords was not available.  Alternatively she could be transferred to the [facility] emergency department for scoping there by ENT but this point she appears stable.  She is given precautions that if her symptoms worsen then would recommend that she present to the emergency department for reexam by ENT.  However at this point outpatient management until she follows up on Tuesday with them.  I have asked her to set up that appointment.  Dr. Karimi said that she could be worked in on that day.  Until that time avoid overexertion.    I have reviewed the nursing notes.    I have reviewed the findings, diagnosis, plan and need for follow up with the patient.       New Prescriptions    No medications on file       Final diagnoses:   Subglottic stenosis - You were given decadron 6 mg orally here. take 6 mg every 6 hours for 3 doses.  start flonase nasal spray.  I spoke to Dr. Karimi in ENT - see DR. Pearson or Dr. Karimi in ENT clinic tuesday - call to schedule.  if worse, go to ED at [facility] and they will scope there.       9/27/2018   [facility] EMERGENCY DEPARTMENT     Brown Zimmerman MD  09/27/18 1460

## 2018-09-27 NOTE — ED AVS SNAPSHOT
[facility] Emergency Department    [address] [facility] [address]    Phone:  [phone]    Fax:  [phone]                                       Bibi Song   MRN: [id number]    Department:  [facility] Emergency Department   Date of Visit:  9/27/2018           After Visit Summary Signature Page     I have received my discharge instructions, and my questions have been answered. I have discussed any challenges I see with this plan with the nurse or doctor.    ..........................................................................................................................................  Patient/Patient Representative Signature      ..........................................................................................................................................  Patient Representative Print Name and Relationship to Patient    ..................................................               ................................................  Date                                   Time    ..........................................................................................................................................  Reviewed by Signature/Title    ...................................................              ..............................................  Date                                               Time          22EPIC Rev 08/18

## 2018-09-27 NOTE — ED AVS SNAPSHOT
[facility] Emergency Department    [facility] [address]    Phone:  [phone]    Fax:  [phone]                                       Bibi Song   MRN: [id number]    Department:  [facility] Emergency Department   Date of Visit:  9/27/2018           Patient Information     Date Of Birth          1970        Your diagnoses for this visit were:     Subglottic stenosis You were given decadron 6 mg orally here. take 6 mg every 6 hours for 3 doses.  start flonase nasal spray.  I spoke to Dr. Karimi in ENT - see DR. Pearson or Dr. Karimi in ENT clinic tuesday - call to schedule.  if worse, go to ED at [facility] and they will scope there.       You were seen by Brown Zimmerman MD.      Follow-up Information     Follow up with Glory Singleton MD.    Specialty:  Family Practice    Why:  As needed    Contact information:    [address] [facility] [address]  [phone]          Follow up with [facility] Emergency Department.    Specialty:  EMERGENCY MEDICINE    Why:  As needed, If symptoms worsen    Contact information:    [address] [place] [id number] [phone]    Additional information:    The medical center is located at   [address] [place] [address]. (between [address] in [place], [place] miles north   of [place]).        Follow up with ent, [facility] In 5 days.        Discharge Instructions         ICD-10-CM    1. Subglottic stenosis J38.6     You were given decadron 6 mg orally here. take 6 mg every 6 hours for 3 doses.  start flonase nasal spray.  I spoke to Dr. Karimi in ENT - see DR. Pearson or Dr. Karimi in ENT clinic tuesday - call to schedule.  if worse, go to ED at [facility] and they will scope there.         Your next 10 appointments already scheduled     Oct 16, 2018  8:15 AM CDT   (Arrive by 8:00 AM)   Post-Op with Dusty Pearson MD   [facility] ([facility])    [address] [place] [address]  [facility] [address] [phone]              24 Hour Appointment Hotline       To make an appointment at any [facility]), call [phone]). If you don't have a family doctor or clinic, we will help you find one. [facility] clinics are conveniently located to serve the needs of you and your family.             Review of your medicines      START taking        Dose / Directions Last dose taken    dexamethasone 4 MG tablet   Commonly known as:  DECADRON   Dose:  6 mg   Quantity:  5 tablet        Take 1.5 tablets (6 mg) by mouth every 6 hours for 3 doses   Refills:  0          Our records show that you are taking the medicines listed below. If these are incorrect, please call your family doctor or clinic.        Dose / Directions Last dose taken    * albuterol 108 (90 Base) MCG/ACT inhaler   Commonly known as:  PROAIR HFA/PROVENTIL HFA/VENTOLIN HFA   Dose:  2 puff   Quantity:  1 Inhaler        Inhale 2 puffs into the lungs 4 times daily   Refills:  1        * albuterol (2.5 MG/3ML) 0.083% neb solution   Dose:  2.5 mg   Quantity:  1 Box        Take 1 vial (2.5 mg) by nebulization every 4 hours as needed for shortness of breath / dyspnea or wheezing   Refills:  0        CALCIUM 1200 PO        Refills:  0        GEODON 40 MG capsule   Dose:  40 mg   Quantity:  1 capsule   Generic drug:  ziprasidone        Take 40 mg by mouth 2 times daily (with meals)   Refills:  0        IBUPROFEN PO   Dose:  600 mg        Take 600 mg by mouth every 6 hours as needed for moderate pain   Refills:  0        levothyroxine 75 MCG tablet   Commonly known as:  SYNTHROID/LEVOTHROID   Dose:  75 mcg   Quantity:  90 tablet        Take 1 tablet (75 mcg) by mouth daily   Refills:  1        MAGNESIUM PO   Dose:  1 tablet        Take 1 tablet by mouth daily   Refills:  0        melatonin 3 MG tablet   Dose:  9 mg   Quantity:  30 tablet        Take 9 mg by mouth nightly as needed.   Refills:  0        omeprazole 20  MG CR capsule   Commonly known as:  priLOSEC   Dose:  20 mg   Quantity:  30 capsule        Take 1 capsule (20 mg) by mouth daily   Refills:  0        oxyCODONE IR 5 MG tablet   Commonly known as:  ROXICODONE   Dose:  5-10 mg   Quantity:  8 tablet        Take 1-2 tablets (5-10 mg) by mouth every 3 hours as needed for pain or other (Moderate to Severe)   Refills:  0        * predniSONE 20 MG tablet   Commonly known as:  DELTASONE        Take 3 tabs (60 mg) by mouth daily x 3 days, 2 tabs (40 mg) daily x 3 days, 1 tab (20 mg) daily x 3 days, then 1/2 tab (10 mg) x 3 days.   Refills:  0        * predniSONE 10 MG tablet   Commonly known as:  DELTASONE   Quantity:  7 tablet        Take 20 mg (2 pills) every day for 2 days, then 10 mg (1 pill) every day for 3 days.   Refills:  0        vitamin B complex with vitamin C Tabs tablet   Dose:  1 tablet        Take 1 tablet by mouth daily   Refills:  0        ZOLOFT 100 MG tablet   Dose:  200 mg   Quantity:  1 tablet   Generic drug:  sertraline        Take 200 mg by mouth daily   Refills:  0        * Notice:  This list has 4 medication(s) that are the same as other medications prescribed for you. Read the directions carefully, and ask your doctor or other care provider to review them with you.            Prescriptions were sent or printed at these locations (1 Prescription)                   [facility] - [facility] [id number] [address]    Telephone:  [phone]   Fax:  [phone]   Hours:  CLARA Quiñones [facility]                E-Prescribed (1 of 1)         dexamethasone (DECADRON) 4 MG tablet                Orders Needing Specimen Collection     None      Pending Results     No orders found from 9/25/2018 to 9/28/2018.            Pending Culture Results     No orders found from 9/25/2018 to 9/28/2018.            Pending Results Instructions     If you had any lab results that were not finalized  at the time of your Discharge, you can call the ED Lab Result RN at [phone]. You will be contacted by this team for any positive Lab results or changes in treatment. The nurses are available 7 days a week from 10A to 6:30P.  You can leave a message 24 hours per day and they will return your call.        Test Results From Your Hospital Stay               Thank you for choosing [facility]       Thank you for choosing [facility] for your care. Our goal is always to provide you with excellent care. Hearing back from our patients is one way we can continue to improve our services. Please take a few minutes to complete the written survey that you may receive in the mail after you visit with us. Thank you!        [facility] Information     [facility] gives you secure access to your electronic health record. If you see a primary care provider, you can also send messages to your care team and make appointments. If you have questions, please call your primary care clinic.  If you do not have a primary care provider, please call [phone] and they will assist you.        Care EveryWhere ID     This is your Care EveryWhere ID. This could be used by other organizations to access your [facility] medical records  [id number]        Equal Access to Services     JUANCARLOS ADDISON : Brittany Wright, reena kumar, varun bartholomew . So [facility] [phone].    ATENCIÓN: Si habla español, tiene a velazquez disposición servicios gratuitos de asistencia lingüística. Drew al [phone].    We comply with applicable federal civil rights laws and [place] laws. We do not discriminate on the basis of race, color, national origin, age, disability, sex, sexual orientation, or gender identity.            After Visit Summary       This is your record. Keep this with you and show to your community pharmacist(s) and doctor(s) at your next visit.

## 2018-09-27 NOTE — DISCHARGE INSTRUCTIONS
ICD-10-CM    1. Subglottic stenosis J38.6     You were given decadron 6 mg orally here. take 6 mg every 6 hours for 3 doses.  start flonase nasal spray.  I spoke to Dr. Karimi in ENT - see DR. Pearson or Dr. Karimi in ENT clinic tuesday - call to schedule.  if worse, go to ED at [facility] and they will scope there.

## 2018-09-27 NOTE — ED NOTES
Pt was intubated in July for accidental overdose of sleeping medications.   Pt developed stridor shortly after being exubated.  Pt had procedure on 9/7/18 to dilate trachea due to continued stridor sounds.   Pt presents today with stridor,  denies difficulty swallowing, eating, drinking and talking.  Pt drive self to ER.   Pt called Dr. Pearson's office at the [facility] and was told to come to Er

## 2018-10-02 ENCOUNTER — OFFICE VISIT (OUTPATIENT)
Dept: OTOLARYNGOLOGY | Facility: CLINIC | Age: 48
End: 2018-10-02
Payer: COMMERCIAL

## 2018-10-02 ENCOUNTER — TELEPHONE (OUTPATIENT)
Dept: OTOLARYNGOLOGY | Facility: CLINIC | Age: 48
End: 2018-10-02

## 2018-10-02 VITALS — HEIGHT: 66 IN | WEIGHT: 218 LBS | BODY MASS INDEX: 35.03 KG/M2

## 2018-10-02 DIAGNOSIS — J39.8 TRACHEAL STENOSIS: ICD-10-CM

## 2018-10-02 DIAGNOSIS — R06.89 TROUBLE BREATHING: Primary | ICD-10-CM

## 2018-10-02 RX ORDER — PREDNISONE 20 MG/1
TABLET ORAL
Qty: 10 TABLET | Refills: 0 | Status: SHIPPED | OUTPATIENT
Start: 2018-10-02 | End: 2018-12-03

## 2018-10-02 NOTE — NURSING NOTE
I provided patient the annabel-op worksheet  ENT Adult Default Surgery Request.   I encouraged patient to review the check list and highlighted the following points:  1) Complete History and Physical within 30 days of surgery, either with PAC clinic or family clinic.   If completed outside of  Physicians, please have provider send all of your results to the hospital before the surgery.  Patient will schedule H&P with her primary care provider.  2) Same-day surgery, must arrange for an adult to take you home and stay with you for the first 24 hours after surgery.  3) Discuss with primary care provider what medicines are safe before surgery.   Example, Coumadin, Plavix, Aspirin, Ibuprofen/Motrin, herbal products, Vitamin E and Fish oil may possibly be discontinued 7 days prior to surgery date.  4) Stop drinking alcohol at least 24 hours before surgery.  5) Quit or at least cut down smoking as it higher your risks for infection after surgery. No chewing tobacco for at least 8 hours before surgery.  6) Take a bath or shower the night before and morning of surgery.   Use antiseptic soap.  Patient declined antiseptic soap as she stated she has a bottle at home.  7) No food or drink after midnight. Follow your surgeon s orders for eating and drinking.    Please following surgeon s instructions as if you don t, your surgery could be canceled.    I provided patient my contact information and encouraged patient to call with any questions or concerns.       Radiology Follow-up

## 2018-10-02 NOTE — LETTER
10/2/2018       RE: Bibi Song  [address] [facility] [id number]     Dear Colleague,    Thank you for referring your patient, Bibi Song, to the [facility] EAR NOSE AND THROAT at [facility]. Please see a copy of my visit note below.      HISTORY OF PRESENT ILLNESS: Bibi Song is a 48 year old female with a history of   airway difficulties.  She had an accidental overdose of medications in mid July.  She was admitted from 7/18/ 2018-7/21/2018.  During that time she had intubation for at least 2-3 days.  On discharge she was feeling well.  In mid August she developed some shortness of breath.  She was started on albuterol.  She had no problems with asthma or breathing prior to her hospitalization.  She was seen in the emergency room in mid August, she had shortness of breath and was given prednisone. doxycycline and albuterol.  The nebulizer worked well for her but she did get relief from the albuterol.  I met her for the first time on 8/30/2018.  At that time     she had been recently placed on prednisone 60 mg a day which  and this had helped her breathe comfortably.  She still had audible inspiratory stridor but there was good airflow.  There  was a concern about possible stenosis versus paradoxic vocal fold motion.  On exam she had a tracheal stenosis which was symptomatic.  On 9/7/2018 she underwent a direct microlaryngoscopy with dilatation of the tracheal stenosis, mitomycin application and steroid injection.      Findings at the time of surgery included: Subglottic stenosis.  Anteriorly it was 13 mm thick and was situated 36 mm below the vocal cords.  Posteriorly it was 6 mm thick and 32 mm below the vocal cords.  There was a Good view with the Dedo-Hermelinda laryngoscope.  There was no difficulty with mask ventilation. A 18 mm x 2 cm balloon was utilized to dilate the stenotic segment after mucosal incisions were made. The remainder of the trachea  appeared normal. The patient required a fairly high dose of propofol and was hypertensive at times during the case according to the anesthesia team. Once during the procedure a #6 endotracheal tube was used to supplement the jet ventilation. She has no early post operative complications.     She did well early after surgery but over the last week was seen in the emergency room with difficulty breathing there is a concern about a return of the upper tracheal stenosis she is given Decadron for a short period of time which did improve her breathing.  She notes good airflow but still has airway turbulence.  She comes back today with airway turbulence but able to get enough air with light activity.  She has been off the deck Decadron for a few days now    Last 2 Scores for Patient-Answered VHI Questionnaire  VHI Total Score 8/30/2018   VHI Total Score 13       Last 2 Scores for Patient-Answered CSI Questionnaire  CSI Total Score 8/30/2018 10/2/2018   CSI Total Score 16 10         Last 2 Scores for Patient-Answered EAT Questionnaire  EAT Total Score 8/30/2018   EAT Total Score 2           PAST MEDICAL HISTORY:   Past Medical History:   Diagnosis Date     Anxiety      Chemical dependency (H)      Depressive disorder      Hoarseness      PONV (postoperative nausea and vomiting)      Problem, psychiatric      Sleep apnea        PAST SURGICAL HISTORY:   Past Surgical History:   Procedure Laterality Date     INJECT STEROID (LOCATION) N/A 9/7/2018    Procedure: INJECT STEROID (LOCATION);;  Surgeon: Dusty Pearson MD;  Location: [facility] OR     LARYNGOSCOPY N/A 9/7/2018    Procedure: LARYNGOSCOPY;  Teleoscopic Direct Laryngoscopy with Balloon Dilation, Mitomycin Application and Steriod Injection ;  Surgeon: Dsuty Pearson MD;  Location: [facility] OR       FAMILY HISTORY:   Family History   Problem Relation Age of Onset     Diabetes Mother      Depression Mother      OCD     Neurologic Disorder Mother      Psychotic  Disorder Mother      Respiratory Mother      Thyroid Disease Mother      Gynecology Mother      hysterectomy     Cerebrovascular Disease Mother      pacemaker     Substance Abuse Mother      Mental Illness Mother      HEART DISEASE Mother      HEART DISEASE Father      MI,   LATE 60'S     Hypertension Father      Prostate Cancer Father      Obesity Father      Diabetes Father      Cerebrovascular Disease Father      Substance Abuse Father      Depression Father      Depression Brother      Thyroid Disease Brother      Depression Brother      Thyroid Disease Brother      Allergies Son      Asthma Son      Thyroid Disease Maternal Grandmother      Cancer Maternal Grandfather      Psychotic Disorder Paternal Grandmother      Thyroid Disease Paternal Grandfather      Diabetes Brother      Breast Cancer Maternal Aunt      Substance Abuse Brother      Mental Illness Brother      Depression Brother      Diabetes Brother      Mental Illness Son      Asthma Son      Depression Son      Stomach Problem Son        SOCIAL HISTORY:   Social History   Substance Use Topics     Smoking status: Former Smoker     Years: 5.00     Start date: 11/11/1988     Quit date: 1/1/1994     Smokeless tobacco: Never Used     Alcohol use No      Comment: Treatment in 2007       REVIEW OF SYSTEMS: Ten point review of systems was performed and is negative except for:   [facility] ROS 10/2/2018   Constitutional Weight gain   Neurology -   Psychology Frequently feeling anxious   Ears, Nose, Throat Nasal congestion or drainage, Hoarseness   Cardiopulmonary Cough, Breathing problems   Gastrointestinal/Genitourinary Heartburn/indigestion   Musculoskeletal -   Hematologic -   Endocrine Frequent urination   Other Problems with anesthesia in surgery        ALLERGIES: Review of patient's allergies indicates no known allergies.    MEDICATIONS:   Current Outpatient Prescriptions   Medication Sig Dispense Refill     albuterol (2.5 MG/3ML) 0.083% neb solution Take 1  vial (2.5 mg) by nebulization every 4 hours as needed for shortness of breath / dyspnea or wheezing 1 Box 0     albuterol (PROAIR HFA/PROVENTIL HFA/VENTOLIN HFA) 108 (90 Base) MCG/ACT inhaler Inhale 2 puffs into the lungs 4 times daily 1 Inhaler 1     Calcium Carbonate-Vit D-Min (CALCIUM 1200 PO)        IBUPROFEN PO Take 600 mg by mouth every 6 hours as needed for moderate pain       levothyroxine (SYNTHROID/LEVOTHROID) 75 MCG tablet Take 1 tablet (75 mcg) by mouth daily 90 tablet 1     MAGNESIUM PO Take 1 tablet by mouth daily        melatonin 3 MG tablet Take 9 mg by mouth nightly as needed. 30 tablet 0     omeprazole (PRILOSEC) 20 MG CR capsule Take 1 capsule (20 mg) by mouth daily 30 capsule 0     oxyCODONE IR (ROXICODONE) 5 MG tablet Take 1-2 tablets (5-10 mg) by mouth every 3 hours as needed for pain or other (Moderate to Severe) 8 tablet 0     predniSONE (DELTASONE) 10 MG tablet Take 20 mg (2 pills) every day for 2 days, then 10 mg (1 pill) every day for 3 days. 7 tablet 0     predniSONE (DELTASONE) 20 MG tablet Take 3 tabs (60 mg) by mouth daily x 3 days, 2 tabs (40 mg) daily x 3 days, 1 tab (20 mg) daily x 3 days, then 1/2 tab (10 mg) x 3 days.  0     sertraline (ZOLOFT) 100 MG tablet Take 200 mg by mouth daily  1 tablet 0     vitamin B complex with vitamin C (VITAMIN  B COMPLEX) TABS tablet Take 1 tablet by mouth daily       ziprasidone (GEODON) 40 MG capsule Take 40 mg by mouth 2 times daily (with meals)  1 capsule      dexamethasone (DECADRON) 4 MG tablet Take 1.5 tablets (6 mg) by mouth every 6 hours for 3 doses 5 tablet 0         PHYSICAL EXAMINATION:  She  is awake, alert and in no apparent distress.    Her tympanic membranes are clear and intact bilaterally. External auditory canals are clear.  Nasal exam shows a mild septal deviation without obstruction.  Examination of the oral cavity shows no suspicious lesions.  There is good mouth opening.  There is symmetric movement of the tongue and soft  palate.    The oropharynx is clear.  Her neck is supple without significant adenopathy.  Pulse is regular.  Upper airway is clear.  Cranial nerves II-XII are grossly intact.        PROCEDURE: A flexible laryngoscopy  was performed.  Informed consent was obtained and a time out was performed. 3% lidocaine and 0.25% phenylephrine was sprayed into the nasal cavity and allowed 3 to 5 minutes for effect. The scope was passed through the right sided nostril. Examination showed the vocal folds to be mobile and meet in the midline.  No nodules, polyps or ulcerations are seen.  There is mild inflammation or erythema of the supraglottic or glottic larynx.  With phonation there is mildcontraction of the supraglottic larynx.  The immediate subglottic area is clear.  I did have her tilt forward and I was able to see further down the trachea.  Approximately 3-4 rings down there was  a ring of granulation tissue with some mucus around the trachea at the likely spot of where the endotracheal tube balloon was.  There appeared to be an adequate airway and there is good airflow although airway turbulence due to the acute inflammation is seen.  The hypopharynx is otherwise clear as is the subglottis.           IMPRESSION/PLAN: Recurrence of tracheal stenosis.  It is less inflamed today and she is able to move air but there is upper airway turbulence.  We will schedule her for direct laryngoscopy with balloon dilatation, mitomycin application and steroid injection.  She is comfortable with waiting till Friday.  I advised her if we need to repeat this frequently we may consider referral for tracheal resection.              Again, thank you for allowing me to participate in the care of your patient.      Sincerely,    Dusty Pearson MD

## 2018-10-02 NOTE — LETTER
Re:Bibi Song  1970    Provider: Dr. Pearson    To whom it may concern:  Please excuse Bibi from work on the following days related to upcoming surgery:  October 04, 2018 and October 05, 2018.    Dusty Pearson MD

## 2018-10-02 NOTE — PROGRESS NOTES
HISTORY OF PRESENT ILLNESS: Bibi Song is a 48 year old female with a history of   airway difficulties.  She had an accidental overdose of medications in mid July.  She was admitted from 7/18/ 2018-7/21/2018.  During that time she had intubation for at least 2-3 days.  On discharge she was feeling well.  In mid August she developed some shortness of breath.  She was started on albuterol.  She had no problems with asthma or breathing prior to her hospitalization.  She was seen in the emergency room in mid August, she had shortness of breath and was given prednisone. doxycycline and albuterol.  The nebulizer worked well for her but she did get relief from the albuterol.  I met her for the first time on 8/30/2018.  At that time    she had been recently placed on prednisone 60 mg a day which  and this had helped her breathe comfortably.  She still had audible inspiratory stridor but there was good airflow.  There was a concern about possible stenosis versus paradoxic vocal fold motion.  On exam she had a tracheal stenosis which was symptomatic.  On 9/7/2018 she underwent a direct microlaryngoscopy with dilatation of the tracheal stenosis, mitomycin application and steroid injection.      Findings at the time of surgery included: Subglottic stenosis.  Anteriorly it was 13 mm thick and was situated 36 mm below the vocal cords.  Posteriorly it was 6 mm thick and 32 mm below the vocal cords.  There was a Good view with the Dedo-Hermelinda laryngoscope.  There was no difficulty with mask ventilation. A 18 mm x 2 cm balloon was utilized to dilate the stenotic segment after mucosal incisions were made. The remainder of the trachea appeared normal. The patient required a fairly high dose of propofol and was hypertensive at times during the case according to the anesthesia team. Once during the procedure a #6 endotracheal tube was used to supplement the jet ventilation. She has no early post operative complications.     She  did well early after surgery but over the last week was seen in the emergency room with difficulty breathing there is a concern about a return of the upper tracheal stenosis she is given Decadron for a short period of time which did improve her breathing.  She notes good airflow but still has airway turbulence.  She comes back today with airway turbulence but able to get enough air with light activity.  She has been off the deck Decadron for a few days now    Last 2 Scores for Patient-Answered VHI Questionnaire  VHI Total Score 8/30/2018   VHI Total Score 13       Last 2 Scores for Patient-Answered CSI Questionnaire  CSI Total Score 8/30/2018 10/2/2018   CSI Total Score 16 10         Last 2 Scores for Patient-Answered EAT Questionnaire  EAT Total Score 8/30/2018   EAT Total Score 2           PAST MEDICAL HISTORY:   Past Medical History:   Diagnosis Date     Anxiety      Chemical dependency (H)      Depressive disorder      Hoarseness      PONV (postoperative nausea and vomiting)      Problem, psychiatric      Sleep apnea        PAST SURGICAL HISTORY:   Past Surgical History:   Procedure Laterality Date     INJECT STEROID (LOCATION) N/A 9/7/2018    Procedure: INJECT STEROID (LOCATION);;  Surgeon: Dusty Pearson MD;  Location: [facility] OR     LARYNGOSCOPY N/A 9/7/2018    Procedure: LARYNGOSCOPY;  Teleoscopic Direct Laryngoscopy with Balloon Dilation, Mitomycin Application and Steriod Injection ;  Surgeon: Dusty Pearson MD;  Location: [facility] OR       FAMILY HISTORY:   Family History   Problem Relation Age of Onset     Diabetes Mother      Depression Mother      OCD     Neurologic Disorder Mother      Psychotic Disorder Mother      Respiratory Mother      Thyroid Disease Mother      Gynecology Mother      hysterectomy     Cerebrovascular Disease Mother      pacemaker     Substance Abuse Mother      Mental Illness Mother      HEART DISEASE Mother      HEART DISEASE Father      MI,   LATE 60'S      Hypertension Father      Prostate Cancer Father      Obesity Father      Diabetes Father      Cerebrovascular Disease Father      Substance Abuse Father      Depression Father      Depression Brother      Thyroid Disease Brother      Depression Brother      Thyroid Disease Brother      Allergies Son      Asthma Son      Thyroid Disease Maternal Grandmother      Cancer Maternal Grandfather      Psychotic Disorder Paternal Grandmother      Thyroid Disease Paternal Grandfather      Diabetes Brother      Breast Cancer Maternal Aunt      Substance Abuse Brother      Mental Illness Brother      Depression Brother      Diabetes Brother      Mental Illness Son      Asthma Son      Depression Son      Stomach Problem Son        SOCIAL HISTORY:   Social History   Substance Use Topics     Smoking status: Former Smoker     Years: 5.00     Start date: 11/11/1988     Quit date: 1/1/1994     Smokeless tobacco: Never Used     Alcohol use No      Comment: Treatment in 2007       REVIEW OF SYSTEMS: Ten point review of systems was performed and is negative except for:   [facility] ROS 10/2/2018   Constitutional Weight gain   Neurology -   Psychology Frequently feeling anxious   Ears, Nose, Throat Nasal congestion or drainage, Hoarseness   Cardiopulmonary Cough, Breathing problems   Gastrointestinal/Genitourinary Heartburn/indigestion   Musculoskeletal -   Hematologic -   Endocrine Frequent urination   Other Problems with anesthesia in surgery        ALLERGIES: Review of patient's allergies indicates no known allergies.    MEDICATIONS:   Current Outpatient Prescriptions   Medication Sig Dispense Refill     albuterol (2.5 MG/3ML) 0.083% neb solution Take 1 vial (2.5 mg) by nebulization every 4 hours as needed for shortness of breath / dyspnea or wheezing 1 Box 0     albuterol (PROAIR HFA/PROVENTIL HFA/VENTOLIN HFA) 108 (90 Base) MCG/ACT inhaler Inhale 2 puffs into the lungs 4 times daily 1 Inhaler 1     Calcium Carbonate-Vit D-Min (CALCIUM  1200 PO)        IBUPROFEN PO Take 600 mg by mouth every 6 hours as needed for moderate pain       levothyroxine (SYNTHROID/LEVOTHROID) 75 MCG tablet Take 1 tablet (75 mcg) by mouth daily 90 tablet 1     MAGNESIUM PO Take 1 tablet by mouth daily        melatonin 3 MG tablet Take 9 mg by mouth nightly as needed. 30 tablet 0     omeprazole (PRILOSEC) 20 MG CR capsule Take 1 capsule (20 mg) by mouth daily 30 capsule 0     oxyCODONE IR (ROXICODONE) 5 MG tablet Take 1-2 tablets (5-10 mg) by mouth every 3 hours as needed for pain or other (Moderate to Severe) 8 tablet 0     predniSONE (DELTASONE) 10 MG tablet Take 20 mg (2 pills) every day for 2 days, then 10 mg (1 pill) every day for 3 days. 7 tablet 0     predniSONE (DELTASONE) 20 MG tablet Take 3 tabs (60 mg) by mouth daily x 3 days, 2 tabs (40 mg) daily x 3 days, 1 tab (20 mg) daily x 3 days, then 1/2 tab (10 mg) x 3 days.  0     sertraline (ZOLOFT) 100 MG tablet Take 200 mg by mouth daily  1 tablet 0     vitamin B complex with vitamin C (VITAMIN  B COMPLEX) TABS tablet Take 1 tablet by mouth daily       ziprasidone (GEODON) 40 MG capsule Take 40 mg by mouth 2 times daily (with meals)  1 capsule      dexamethasone (DECADRON) 4 MG tablet Take 1.5 tablets (6 mg) by mouth every 6 hours for 3 doses 5 tablet 0         PHYSICAL EXAMINATION:  She  is awake, alert and in no apparent distress.    Her tympanic membranes are clear and intact bilaterally. External auditory canals are clear.  Nasal exam shows a mild septal deviation without obstruction.  Examination of the oral cavity shows no suspicious lesions.  There is good mouth opening.  There is symmetric movement of the tongue and soft palate.    The oropharynx is clear.  Her neck is supple without significant adenopathy.  Pulse is regular.  Upper airway is clear.  Cranial nerves II-XII are grossly intact.        PROCEDURE: A flexible laryngoscopy  was performed.  Informed consent was obtained and a time out was performed.  3% lidocaine and 0.25% phenylephrine was sprayed into the nasal cavity and allowed 3 to 5 minutes for effect. The scope was passed through the right sided nostril. Examination showed the vocal folds to be mobile and meet in the midline.  No nodules, polyps or ulcerations are seen.  There is mild inflammation or erythema of the supraglottic or glottic larynx.  With phonation there is mildcontraction of the supraglottic larynx.  The immediate subglottic area is clear.  I did have her tilt forward and I was able to see further down the trachea.  Approximately 3-4 rings down there was  a ring of granulation tissue with some mucus around the trachea at the likely spot of where the endotracheal tube balloon was.  There appeared to be an adequate airway and there is good airflow although airway turbulence due to the acute inflammation is seen.  The hypopharynx is otherwise clear as is the subglottis.           IMPRESSION/PLAN: Recurrence of tracheal stenosis.  It is less inflamed today and she is able to move air but there is upper airway turbulence.  We will schedule her for direct laryngoscopy with balloon dilatation, mitomycin application and steroid injection.  She is comfortable with waiting till Friday.  I advised her if we need to repeat this frequently we may consider referral for tracheal resection.

## 2018-10-02 NOTE — TELEPHONE ENCOUNTER
Patient is scheduled for surgery with Dr. Pearson       Spoke or left message with: Patient    Date of Surgery: 10/5/18    [facility]     Informed patient they will need an adult  Yes    Pre-op with surgeon (if applicable): N/A    H&P: Scheduled: Previous H&P 9/6/18    Additional imaging/appointments: N/A    Surgery packet: Given to patient while in ENT Clinic.    Additional comments: N/A

## 2018-10-02 NOTE — PATIENT INSTRUCTIONS
I provided patient the annabel-op worksheet  ENT Adult Default Surgery Request.     I encouraged patient to review the check list and highlighted the following points:  1) Complete History and Physical within 30 days of surgery, either with PAC clinic or family clinic.   If completed outside of  [facility], please have provider send all of your results to the hospital before the surgery.  Please schedule an H&P with primary care provider.  2) Same-day surgery, must arrange for an adult to take you home and stay with you for the first 24 hours after surgery.  3) Discuss with primary care provider what medicines are safe before surgery.   Example, Coumadin, Plavix, Aspirin, Ibuprofen/Motrin, herbal products, Vitamin E and Fish oil may possibly be discontinued 7 days prior to surgery date.  4) Stop drinking alcohol at least 24 hours before surgery.  5) Quit or at least cut down smoking as it higher your risks for infection after surgery. No chewing tobacco for at least 8 hours before surgery.  6) Take a bath or shower the night before and morning of surgery.   Use antiseptic soap.  7) No food or drink after midnight. Follow your surgeon s orders for eating and drinking.    Please following surgeon s instructions as if you don t, your surgery could be canceled.    Please take prednisone 20 mg as directed by Dr. Pearson.    Thank you for choosing [facility].  If you have any questions or concerns after your appointment, please call [phone].    Press option #1 for scheduling related needs.    Press option #3 for Nurse advice.

## 2018-10-02 NOTE — MR AVS SNAPSHOT
After Visit Summary   10/2/2018    Bibi Song    MRN: [id number]           Patient Information     Date Of Birth          1970        Visit Information        Provider Department      10/2/2018 11:30 AM Dusty Pearson MD [facility] Ear Nose and Throat        Today's Diagnoses     Trouble breathing    -  1    Subglottic stenosis          Care Instructions    I provided patient the annabel-op worksheet  ENT Adult Default Surgery Request.   I encouraged patient to review the check list and highlighted the following points:  1) Complete History and Physical within 30 days of surgery, either with PAC clinic or family clinic.   If completed outside of  Physicians, please have provider send all of your results to the hospital before the surgery.  Please schedule an H&P with primary care provider.  2) Same-day surgery, must arrange for an adult to take you home and stay with you for the first 24 hours after surgery.  3) Discuss with primary care provider what medicines are safe before surgery.   Example, Coumadin, Plavix, Aspirin, Ibuprofen/Motrin, herbal products, Vitamin E and Fish oil may possibly be discontinued 7 days prior to surgery date.  4) Stop drinking alcohol at least 24 hours before surgery.  5) Quit or at least cut down smoking as it higher your risks for infection after surgery. No chewing tobacco for at least 8 hours before surgery.  6) Take a bath or shower the night before and morning of surgery.   Use antiseptic soap.  7) No food or drink after midnight. Follow your surgeon s orders for eating and drinking.    Please following surgeon s instructions as if you don t, your surgery could be canceled.            Follow-ups after your visit        Your next 10 appointments already scheduled     Oct 05, 2018   Procedure with Dusty Pearson MD   [facility], [place], Same Day Surgery (--)    [address] [address]   [phone]            Oct 16, 2018  8:15 AM  "CDT   (Arrive by 8:00 AM)   Post-Op with Dusty Pearson MD   [facility] Ear Nose and Throat ([facility] [place])    [address] [address] [phone]              Who to contact     Please call your clinic at [phone] to:    Ask questions about your health    Make or cancel appointments    Discuss your medicines    Learn about your test results    Speak to your doctor            Additional Information About Your Visit        [employer] Information     [employer] gives you secure access to your electronic health record. If you see a primary care provider, you can also send messages to your care team and make appointments. If you have questions, please call your primary care clinic.  If you do not have a primary care provider, please call [phone] and they will assist you.      [employer] is an electronic gateway that provides easy, online access to your medical records. With [employer], you can request a clinic appointment, read your test results, renew a prescription or communicate with your care team.     To access your existing account, please contact your [facility] or call [phone] for assistance.        Care EveryWhere ID     This is your Care EveryWhere ID. This could be used by other organizations to access your [facility] medical records  [id number]        Your Vitals Were     Height BMI (Body Mass Index)                1.664 m (5' 5.5\") 35.73 kg/m2           Blood Pressure from Last 3 Encounters:   09/27/18 111/62   09/07/18 125/79   09/06/18 130/69    Weight from Last 3 Encounters:   10/02/18 98.9 kg (218 lb)   09/07/18 99.1 kg (218 lb 7.6 oz)   08/30/18 97.5 kg (215 lb)              We Performed the Following     IMAGESTREAM RECORDING ORDER     Zandra-Operative Worksheet (ENT Adult Default Surgery Request)          Today's Medication Changes          These changes are accurate as of 10/2/18 11:41 AM.  If you " have any questions, ask your nurse or doctor.               These medicines have changed or have updated prescriptions.        Dose/Directions    * predniSONE 20 MG tablet   Commonly known as:  DELTASONE   This may have changed:  Another medication with the same name was added. Make sure you understand how and when to take each.   Changed by:  Dusty Pearson MD        Take 3 tabs (60 mg) by mouth daily x 3 days, 2 tabs (40 mg) daily x 3 days, 1 tab (20 mg) daily x 3 days, then 1/2 tab (10 mg) x 3 days.   Refills:  0       * predniSONE 10 MG tablet   Commonly known as:  DELTASONE   This may have changed:  Another medication with the same name was added. Make sure you understand how and when to take each.   Used for:  Tracheal stenosis   Changed by:  Dusty Pearson MD        Take 20 mg (2 pills) every day for 2 days, then 10 mg (1 pill) every day for 3 days.   Quantity:  7 tablet   Refills:  0       * predniSONE 20 MG tablet   Commonly known as:  DELTASONE   This may have changed:  You were already taking a medication with the same name, and this prescription was added. Make sure you understand how and when to take each.   Used for:  Subglottic stenosis   Changed by:  Dusty Pearson MD        Take 1 to 2 tablets per day as needed for breathing.   Quantity:  10 tablet   Refills:  0       * Notice:  This list has 3 medication(s) that are the same as other medications prescribed for you. Read the directions carefully, and ask your doctor or other care provider to review them with you.         Where to get your medicines      These medications were sent to [facility] - - [facility] [id number] [place] [address]    Hours:  [facility] Phone:  [phone]     predniSONE 20 MG tablet                Primary Care Provider Office Phone # Fax #    Glory Singleton [phone] [address]  [facility] [address]        Equal Access to Services     JUANCARLOS ADDISON : Hadii yeison choe treva Wright, waaxda luqadaha, qaybta janinarossnaacatalina londonximenacatalina, varun tellez hayhoa proctormanishahosea flowers. So [facility] [phone].    ATENCIÓN: Si habla español, tiene a velazquez disposición servicios gratuitos de asistencia lingüística. Llame al [phone].    We comply with applicable federal civil rights laws and [place] laws. We do not discriminate on the basis of race, color, national origin, age, disability, sex, sexual orientation, or gender identity.            Thank you!     Thank you for choosing [facility] EAR NOSE AND THROAT  for your care. Our goal is always to provide you with excellent care. Hearing back from our patients is one way we can continue to improve our services. Please take a few minutes to complete the written survey that you may receive in the mail after your visit with us. Thank you!             Your Updated Medication List - Protect others around you: Learn how to safely use, store and throw away your medicines at www.disposemymeds.org.          This list is accurate as of 10/2/18 11:41 AM.  Always use your most recent med list.                   Brand Name Dispense Instructions for use Diagnosis    * albuterol 108 (90 Base) MCG/ACT inhaler    PROAIR HFA/PROVENTIL HFA/VENTOLIN HFA    1 Inhaler    Inhale 2 puffs into the lungs 4 times daily    Acute bronchitis with symptoms > 10 days       * albuterol (2.5 MG/3ML) 0.083% neb solution     1 Box    Take 1 vial (2.5 mg) by nebulization every 4 hours as needed for shortness of breath / dyspnea or wheezing    Acute bronchospasm       CALCIUM 1200 PO           dexamethasone 4 MG tablet    DECADRON    5 tablet    Take 1.5 tablets (6 mg) by mouth every 6 hours for 3 doses        GEODON 40 MG capsule   Generic drug:  ziprasidone     1 capsule    Take 40 mg by mouth 2 times daily (with meals)        IBUPROFEN PO      Take 600 mg by mouth every 6 hours as needed for  moderate pain        levothyroxine 75 MCG tablet    SYNTHROID/LEVOTHROID    90 tablet    Take 1 tablet (75 mcg) by mouth daily    Hypothyroidism, unspecified type       MAGNESIUM PO      Take 1 tablet by mouth daily        melatonin 3 MG tablet     30 tablet    Take 9 mg by mouth nightly as needed.        omeprazole 20 MG CR capsule    priLOSEC    30 capsule    Take 1 capsule (20 mg) by mouth daily    Tracheal stenosis       oxyCODONE IR 5 MG tablet    ROXICODONE    8 tablet    Take 1-2 tablets (5-10 mg) by mouth every 3 hours as needed for pain or other (Moderate to Severe)    Tracheal stenosis       * predniSONE 20 MG tablet    DELTASONE     Take 3 tabs (60 mg) by mouth daily x 3 days, 2 tabs (40 mg) daily x 3 days, 1 tab (20 mg) daily x 3 days, then 1/2 tab (10 mg) x 3 days.        * predniSONE 10 MG tablet    DELTASONE    7 tablet    Take 20 mg (2 pills) every day for 2 days, then 10 mg (1 pill) every day for 3 days.    Tracheal stenosis       * predniSONE 20 MG tablet    DELTASONE    10 tablet    Take 1 to 2 tablets per day as needed for breathing.    Subglottic stenosis       vitamin B complex with vitamin C Tabs tablet      Take 1 tablet by mouth daily        ZOLOFT 100 MG tablet   Generic drug:  sertraline     1 tablet    Take 200 mg by mouth daily    Obsessive-compulsive disorder, unspecified type, Other specified anxiety disorders, Major depressive disorder, recurrent episode, moderate (H)       * Notice:  This list has 5 medication(s) that are the same as other medications prescribed for you. Read the directions carefully, and ask your doctor or other care provider to review them with you.

## 2018-10-02 NOTE — NURSING NOTE
"Chief Complaint   Patient presents with     RECHECK     breathing troubles     Height 1.664 m (5' 5.5\"), weight 98.9 kg (218 lb), not currently breastfeeding.    Bacilio Hernandez    "

## 2018-10-03 ENCOUNTER — OFFICE VISIT (OUTPATIENT)
Dept: FAMILY MEDICINE | Facility: CLINIC | Age: 48
End: 2018-10-03
Payer: COMMERCIAL

## 2018-10-03 VITALS
HEIGHT: 66 IN | SYSTOLIC BLOOD PRESSURE: 122 MMHG | TEMPERATURE: 98.3 F | HEART RATE: 80 BPM | DIASTOLIC BLOOD PRESSURE: 82 MMHG | OXYGEN SATURATION: 96 % | RESPIRATION RATE: 18 BRPM | BODY MASS INDEX: 35.36 KG/M2 | WEIGHT: 220 LBS

## 2018-10-03 DIAGNOSIS — J39.8 TRACHEAL STENOSIS: ICD-10-CM

## 2018-10-03 DIAGNOSIS — E78.5 HYPERLIPIDEMIA LDL GOAL <160: ICD-10-CM

## 2018-10-03 DIAGNOSIS — E03.9 HYPOTHYROIDISM, UNSPECIFIED TYPE: ICD-10-CM

## 2018-10-03 DIAGNOSIS — F42.9 OBSESSIVE-COMPULSIVE DISORDER, UNSPECIFIED TYPE: ICD-10-CM

## 2018-10-03 DIAGNOSIS — F41.1 GENERALIZED ANXIETY DISORDER: ICD-10-CM

## 2018-10-03 DIAGNOSIS — F32.1 MODERATE MAJOR DEPRESSION (H): ICD-10-CM

## 2018-10-03 DIAGNOSIS — K51.919 ULCERATIVE COLITIS WITH COMPLICATION, UNSPECIFIED LOCATION (H): ICD-10-CM

## 2018-10-03 DIAGNOSIS — M54.2 CERVICALGIA: ICD-10-CM

## 2018-10-03 DIAGNOSIS — T43.014D: ICD-10-CM

## 2018-10-03 DIAGNOSIS — Z83.3 FAMILY HISTORY OF DIABETES MELLITUS: ICD-10-CM

## 2018-10-03 DIAGNOSIS — Z83.49 FAMILY HISTORY OF THYROID DISEASE: ICD-10-CM

## 2018-10-03 DIAGNOSIS — Z01.818 PREOP GENERAL PHYSICAL EXAM: Primary | ICD-10-CM

## 2018-10-03 PROCEDURE — 99214 OFFICE O/P EST MOD 30 MIN: CPT | Performed by: FAMILY MEDICINE

## 2018-10-03 NOTE — PROGRESS NOTES
[facility]  [address]  [facility] [address]  [phone]  Dept: [phone]    PRE-OP EVALUATION:  Today's date: 10/3/2018    Bibi Song (: 1970) presents for pre-operative evaluation assessment as requested by Dusty Barclay MD .  She requires evaluation and anesthesia risk assessment prior to undergoing surgery/procedure for treatment of Direct Laryngoscopy with Teleoscope, Balloon Dilation, Application of Mitomycin and Steroid Injection.    Proposed Surgery/ Procedure: Direct Laryngoscopy with Teleoscope, Balloon Dilation, Application of Mitomycin and Steroid Injection  Date of Surgery/ Procedure: 10/5/18  Time of Surgery/ Procedure: 7:30 am   Hospital/Surgical Facility: [facility]     Primary Physician: Glory Singleton  Type of Anesthesia Anticipated: General    Patient has a Health Care Directive or Living Will:  NO    1. NO - Do you have a history of heart attack, stroke, stent, bypass or surgery on an artery in the head, neck, heart or legs?  2. NO - Do you ever have any pain or discomfort in your chest?  3. NO - Do you have a history of  Heart Failure?  4. NO - Are you troubled by shortness of breath when: walking on the level, up a slight hill or at night?  5. NO - Do you currently have a cold, bronchitis or other respiratory infection?  6. NO - Do you have a cough, shortness of breath or wheezing?  7. NO - Do you sometimes get pains in the calves of your legs when you walk?  8. NO - Do you or anyone in your family have previous history of blood clots?  9. NO - Do you or does anyone in your family have a serious bleeding problem such as prolonged bleeding following surgeries or cuts?  10. NO - Have you ever had problems with anemia or been told to take iron pills?  11. NO - Have you had any abnormal blood loss such as black, tarry or bloody stools, or abnormal vaginal bleeding?  12. NO - Have you ever had a blood transfusion?  13. NO - Have you or  any of your relatives ever had problems with anesthesia?  14. NO - Do you have sleep apnea, excessive snoring or daytime drowsiness?  15. NO - Do you have any prosthetic heart valves?  16. NO - Do you have prosthetic joints?  17. NO - Is there any chance that you may be pregnant?      HPI:     HPI related to upcoming procedure:       She is scheduled for tracheal dilatation.  She has had this done once prior without complication. The surgeon is requesting consultation regarding anesthesia and surgical risk for this patient with respect to current and past medical conditions.      MEDICAL HISTORY:     Patient Active Problem List    Diagnosis Date Noted     Tracheal stenosis 08/31/2018     Priority: Medium     TCA (tricyclic antidepressant) overdose of undetermined intent 07/18/2018     Priority: Medium     Ulcerative colitis with complication, unspecified location (H) 04/27/2017     Priority: Medium     Hypothyroidism, unspecified type 12/08/2016     Priority: Medium     Cervicalgia 10/10/2012     Priority: Medium     Hyperlipidemia LDL goal <160 10/31/2010     Priority: Medium     Moderate major depression (H) 08/06/2010     Priority: Medium     OCD (obsessive compulsive disorder) 08/06/2010     Priority: Medium     [place] [facility] - Psych NP. Schoenecker, NP       Anxiety disorder 08/06/2010     Priority: Medium     Family history of diabetes mellitus 08/06/2010     Priority: Medium     Family history of thyroid disease 08/06/2010     Priority: Medium     ALCOHOL ABUSE, IN REMISSION 08/06/2010     Priority: Medium      Past Medical History:   Diagnosis Date     Anxiety      Chemical dependency (H)      Depressive disorder      Hoarseness      PONV (postoperative nausea and vomiting)      Problem, psychiatric      Sleep apnea      Past Surgical History:   Procedure Laterality Date     INJECT STEROID (LOCATION) N/A 9/7/2018    Procedure: INJECT STEROID (LOCATION);;  Surgeon: Dusty Pearson MD;  Location:  [facility] OR     LARYNGOSCOPY N/A 9/7/2018    Procedure: LARYNGOSCOPY;  Teleoscopic Direct Laryngoscopy with Balloon Dilation, Mitomycin Application and Steriod Injection ;  Surgeon: Dusty Pearson MD;  Location: [facility] OR     Current Outpatient Prescriptions   Medication Sig Dispense Refill     albuterol (2.5 MG/3ML) 0.083% neb solution Take 1 vial (2.5 mg) by nebulization every 4 hours as needed for shortness of breath / dyspnea or wheezing 1 Box 0     albuterol (PROAIR HFA/PROVENTIL HFA/VENTOLIN HFA) 108 (90 Base) MCG/ACT inhaler Inhale 2 puffs into the lungs 4 times daily 1 Inhaler 1     Black Cohosh-SoyIsoflav-Magnol (ESTROVEN MENOPAUSE RELIEF PO)        Calcium Carbonate-Vit D-Min (CALCIUM 1200 PO)        levothyroxine (SYNTHROID/LEVOTHROID) 75 MCG tablet Take 1 tablet (75 mcg) by mouth daily 90 tablet 1     melatonin 3 MG tablet Take 9 mg by mouth nightly as needed. 30 tablet 0     predniSONE (DELTASONE) 10 MG tablet Take 20 mg (2 pills) every day for 2 days, then 10 mg (1 pill) every day for 3 days. 7 tablet 0     sertraline (ZOLOFT) 100 MG tablet Take 200 mg by mouth daily  1 tablet 0     vitamin B complex with vitamin C (VITAMIN  B COMPLEX) TABS tablet Take 1 tablet by mouth daily       ziprasidone (GEODON) 40 MG capsule Take 40 mg by mouth 2 times daily (with meals)  1 capsule      dexamethasone (DECADRON) 4 MG tablet Take 1.5 tablets (6 mg) by mouth every 6 hours for 3 doses 5 tablet 0     IBUPROFEN PO Take 600 mg by mouth every 6 hours as needed for moderate pain       MAGNESIUM PO Take 1 tablet by mouth daily        omeprazole (PRILOSEC) 20 MG CR capsule Take 1 capsule (20 mg) by mouth daily (Patient not taking: Reported on 10/3/2018) 30 capsule 0     oxyCODONE IR (ROXICODONE) 5 MG tablet Take 1-2 tablets (5-10 mg) by mouth every 3 hours as needed for pain or other (Moderate to Severe) (Patient not taking: Reported on 10/3/2018) 8 tablet 0     predniSONE (DELTASONE) 20 MG tablet Take 1 to 2 tablets  "per day as needed for breathing. (Patient not taking: Reported on 10/3/2018) 10 tablet 0     predniSONE (DELTASONE) 20 MG tablet Take 3 tabs (60 mg) by mouth daily x 3 days, 2 tabs (40 mg) daily x 3 days, 1 tab (20 mg) daily x 3 days, then 1/2 tab (10 mg) x 3 days.  0     OTC products: None, except as noted above    No Known Allergies   Latex Allergy: NO    Social History   Substance Use Topics     Smoking status: Former Smoker     Years: 5.00     Start date: 11/11/1988     Quit date: 1/1/1994     Smokeless tobacco: Never Used     Alcohol use No      Comment: Treatment in 2007     History   Drug Use No       REVIEW OF SYSTEMS:   Constitutional, neuro, ENT, endocrine, pulmonary, cardiac, gastrointestinal, genitourinary, musculoskeletal, integument and psychiatric systems are negative, except as otherwise noted.    EXAM:   /82  Pulse 80  Temp 98.3  F (36.8  C)  Resp 18  Ht 5' 5.5\" (1.664 m)  Wt 220 lb (99.8 kg)  LMP 09/12/2018  SpO2 96%  BMI 36.05 kg/m2    GENERAL APPEARANCE: healthy, alert and no distress     EYES: EOMI, PERRL     HENT: ear canals and TM's normal and nose and mouth without ulcers or lesions     NECK: no adenopathy, no asymmetry, masses, or scars and thyroid normal to palpation     RESP: Marked inspiratory stridor, lungs clear to auscultation - no rales, rhonchi or wheezes     CV: regular rates and rhythm, normal S1 S2, no S3 or S4 and no murmur, click or rub     ABDOMEN:  soft, nontender, no HSM or masses and bowel sounds normal     MS: extremities normal- no gross deformities noted, no evidence of inflammation in joints, FROM in all extremities.     SKIN: no suspicious lesions or rashes     NEURO: Normal strength and tone, sensory exam grossly normal, mentation intact and speech normal     PSYCH: mentation appears normal. and affect normal/bright     LYMPHATICS: No cervical adenopathy    DIAGNOSTICS:   No labs or EKG required for low risk surgery (cataract, skin procedure, breast " biopsy, etc)    Recent Labs   Lab Test  08/29/18   1110  07/21/18   0658   04/27/17   0955   HGB  11.4*  10.2*   < >   --    PLT  420  260   < >   --    NA  141  139   < >  142   POTASSIUM  3.5  3.4   < >  3.6   CR  0.71  0.60   < >  0.93   A1C   --    --    --   5.5    < > = values in this interval not displayed.        IMPRESSION:   Reason for surgery/procedure: tracheal stenosis  Diagnosis/reason for consult:    Preop general physical exam  Tracheal stenosis  Moderate major depression (H)  Obsessive-compulsive disorder, unspecified type  Generalized anxiety disorder  Family history of diabetes mellitus  Family history of thyroid disease  Hyperlipidemia LDL goal <160  Cervicalgia  Hypothyroidism, unspecified type  Ulcerative colitis with complication, unspecified location (H)  Tricyclic antidepressant overdose of undetermined intent, subsequent encounter      The proposed surgical procedure is considered LOW risk.    REVISED CARDIAC RISK INDEX  The patient has the following serious cardiovascular risks for perioperative complications such as (MI, PE, VFib and 3  AV Block):  No serious cardiac risks  INTERPRETATION: 0 risks: Class I (very low risk - 0.4% complication rate)    The patient has the following additional risks for perioperative complications:  No identified additional risks      ICD-10-CM    1. Preop general physical exam Z01.818        RECOMMENDATIONS:         --Patient is to take all scheduled medications on the day of surgery EXCEPT for modifications listed below.    APPROVAL GIVEN to proceed with proposed procedure, without further diagnostic evaluation       Signed Electronically by: Sergio Chung MD    Copy of this evaluation report is provided to requesting physician.    [facility] Preop Guidelines    Revised Cardiac Risk Index

## 2018-10-03 NOTE — MR AVS SNAPSHOT
After Visit Summary   10/3/2018    Bibi Song    MRN: [id number]           Patient Information     Date Of Birth          1970        Visit Information        Provider Department      10/3/2018 10:40 AM Sergio Chung MD [facility]        Today's Diagnoses     Preop general physical exam    -  1    Tracheal stenosis        Moderate major depression (H)        Obsessive-compulsive disorder, unspecified type        Generalized anxiety disorder        Family history of diabetes mellitus        Family history of thyroid disease        Hyperlipidemia LDL goal <160        Cervicalgia        Hypothyroidism, unspecified type        Ulcerative colitis with complication, unspecified location (H)        Tricyclic antidepressant overdose of undetermined intent, subsequent encounter          Care Instructions      Before Your Surgery      Call your surgeon if there is any change in your health. This includes signs of a cold or flu (such as a sore throat, runny nose, cough, rash or fever).    Do not smoke, drink alcohol or take over the counter medicine (unless your surgeon or primary care doctor tells you to) for the 24 hours before and after surgery.    If you take prescribed drugs: Follow your doctor s orders about which medicines to take and which to stop until after surgery.    Eating and drinking prior to surgery: follow the instructions from your surgeon    Take a shower or bath the night before surgery. Use the soap your surgeon gave you to gently clean your skin. If you do not have soap from your surgeon, use your regular soap. Do not shave or scrub the surgery site.  Wear clean pajamas and have clean sheets on your bed.           Follow-ups after your visit        Your next 10 appointments already scheduled     Oct 05, 2018   Procedure with Dusty Pearson MD   [facility] [place], Same Day Surgery (--)    [address] [address] [phone]            Oct  "16, 2018  8:15 AM CDT   (Arrive by 8:00 AM)   Post-Op with Dusty Pearson MD   [facility] ([facility])    [address] [address] [phone]              Who to contact     If you have questions or need follow up information about today's clinic visit or your schedule please contact [facility] directly at [phone].  Normal or non-critical lab and imaging results will be communicated to you by [employer], letter or phone within 4 business days after the clinic has received the results. If you do not hear from us within 7 days, please contact the clinic through [employer] or phone. If you have a critical or abnormal lab result, we will notify you by phone as soon as possible.  Submit refill requests through [employer] or call your pharmacy and they will forward the refill request to us. Please allow 3 business days for your refill to be completed.          Additional Information About Your Visit        [employer] Information     [employer] gives you secure access to your electronic health record. If you see a primary care provider, you can also send messages to your care team and make appointments. If you have questions, please call your primary care clinic.  If you do not have a primary care provider, please call [phone] and they will assist you.        Care EveryWhere ID     This is your Care EveryWhere ID. This could be used by other organizations to access your [place] medical records  [id number]        Your Vitals Were     Pulse Temperature Respirations Height Last Period Pulse Oximetry    80 98.3  F (36.8  C) 18 5' 5.5\" (1.664 m) 09/12/2018 96%    BMI (Body Mass Index)                   36.05 kg/m2            Blood Pressure from Last 3 Encounters:   10/03/18 122/82   09/27/18 111/62   09/07/18 125/79    Weight from Last 3 Encounters:   10/03/18 220 lb (99.8 kg)   10/02/18 218 lb (98.9 kg)   09/07/18 218 " lb 7.6 oz (99.1 kg)              Today, you had the following     No orders found for display       Primary Care Provider Office Phone # Fax #    Glory Singleton [phone] [id number]  [facility] [address]        Equal Access to Services     JUANCARLOS ADDISON : Hadii aad ku hadasho Soomaali, waaxda luqadaha, qaybta kaalmada adeegyada, waxay idiin hayaan adeeg katrina laYenihennyn ah. So [facility] [phone].    ATENCIÓN: Si habla español, tiene a velazquez disposición servicios gratuitos de asistencia lingüística. Llame al [phone].    We comply with applicable federal civil rights laws and [place] laws. We do not discriminate on the basis of race, color, national origin, age, disability, sex, sexual orientation, or gender identity.            Thank you!     Thank you for choosing [facility][note 3]  for your care. Our goal is always to provide you with excellent care. Hearing back from our patients is one way we can continue to improve our services. Please take a few minutes to complete the written survey that you may receive in the mail after your visit with us. Thank you!             Your Updated Medication List - Protect others around you: Learn how to safely use, store and throw away your medicines at www.disposemymeds.org.          This list is accurate as of 10/3/18 11:01 AM.  Always use your most recent med list.                   Brand Name Dispense Instructions for use Diagnosis    * albuterol 108 (90 Base) MCG/ACT inhaler    PROAIR HFA/PROVENTIL HFA/VENTOLIN HFA    1 Inhaler    Inhale 2 puffs into the lungs 4 times daily    Acute bronchitis with symptoms > 10 days       * albuterol (2.5 MG/3ML) 0.083% neb solution     1 Box    Take 1 vial (2.5 mg) by nebulization every 4 hours as needed for shortness of breath / dyspnea or wheezing    Acute bronchospasm       CALCIUM 1200 PO           dexamethasone 4 MG tablet    DECADRON    5 tablet    Take 1.5 tablets (6 mg) by mouth every 6  hours for 3 doses        ESTROVEN MENOPAUSE RELIEF PO           GEODON 40 MG capsule   Generic drug:  ziprasidone     1 capsule    Take 40 mg by mouth 2 times daily (with meals)        IBUPROFEN PO      Take 600 mg by mouth every 6 hours as needed for moderate pain        levothyroxine 75 MCG tablet    SYNTHROID/LEVOTHROID    90 tablet    Take 1 tablet (75 mcg) by mouth daily    Hypothyroidism, unspecified type       MAGNESIUM PO      Take 1 tablet by mouth daily        melatonin 3 MG tablet     30 tablet    Take 9 mg by mouth nightly as needed.        omeprazole 20 MG CR capsule    priLOSEC    30 capsule    Take 1 capsule (20 mg) by mouth daily    Tracheal stenosis       oxyCODONE IR 5 MG tablet    ROXICODONE    8 tablet    Take 1-2 tablets (5-10 mg) by mouth every 3 hours as needed for pain or other (Moderate to Severe)    Tracheal stenosis       * predniSONE 20 MG tablet    DELTASONE     Take 3 tabs (60 mg) by mouth daily x 3 days, 2 tabs (40 mg) daily x 3 days, 1 tab (20 mg) daily x 3 days, then 1/2 tab (10 mg) x 3 days.        * predniSONE 10 MG tablet    DELTASONE    7 tablet    Take 20 mg (2 pills) every day for 2 days, then 10 mg (1 pill) every day for 3 days.    Tracheal stenosis       * predniSONE 20 MG tablet    DELTASONE    10 tablet    Take 1 to 2 tablets per day as needed for breathing.    Tracheal stenosis       vitamin B complex with vitamin C Tabs tablet      Take 1 tablet by mouth daily        ZOLOFT 100 MG tablet   Generic drug:  sertraline     1 tablet    Take 200 mg by mouth daily    Obsessive-compulsive disorder, unspecified type, Other specified anxiety disorders, Major depressive disorder, recurrent episode, moderate (H)       * Notice:  This list has 5 medication(s) that are the same as other medications prescribed for you. Read the directions carefully, and ask your doctor or other care provider to review them with you.

## 2018-10-04 ENCOUNTER — ANESTHESIA EVENT (OUTPATIENT)
Dept: SURGERY | Facility: CLINIC | Age: 48
End: 2018-10-04
Payer: COMMERCIAL

## 2018-10-04 NOTE — ANESTHESIA PREPROCEDURE EVALUATION
Anesthesia Evaluation     . Pt has had prior anesthetic.     History of anesthetic complications   - PONV        ROS/MED HX    ENT/Pulmonary: Comment: Tracheal stenosis      Neurologic:  - neg neurologic ROS     Cardiovascular:  - neg cardiovascular ROS       METS/Exercise Tolerance:     Hematologic:  - neg hematologic  ROS       Musculoskeletal:  - neg musculoskeletal ROS       GI/Hepatic: Comment: Ulcerative colitis     (+) Inflammatory bowel disease,       Renal/Genitourinary:         Endo:     (+) thyroid problem hypothyroidism, .      Psychiatric:     (+) psychiatric history depression and anxiety      Infectious Disease:         Malignancy:         Other:                   Procedure: Procedure(s):  Direct Laryngoscopy with Teleoscope, Balloon Dilation, Application of Mitomycin and Steroid Injection  - Wound Class:    - Wound Class:     HPI: Bibi Song is a 48 year old female who presents for the above procedure.     PMHx/PSHx:  Past Medical History:   Diagnosis Date     Anxiety      Chemical dependency (H)      Depressive disorder      Hoarseness      PONV (postoperative nausea and vomiting)      Problem, psychiatric      Sleep apnea        Past Surgical History:   Procedure Laterality Date     INJECT STEROID (LOCATION) N/A 9/7/2018    Procedure: INJECT STEROID (LOCATION);;  Surgeon: Dusty Pearson MD;  Location: [facility] OR     LARYNGOSCOPY N/A 9/7/2018    Procedure: LARYNGOSCOPY;  Teleoscopic Direct Laryngoscopy with Balloon Dilation, Mitomycin Application and Steriod Injection ;  Surgeon: Dusty Pearson MD;  Location: [facility] OR         No current facility-administered medications on file prior to encounter.   Current Outpatient Prescriptions on File Prior to Encounter:  albuterol (2.5 MG/3ML) 0.083% neb solution Take 1 vial (2.5 mg) by nebulization every 4 hours as needed for shortness of breath / dyspnea or wheezing   albuterol (PROAIR HFA/PROVENTIL HFA/VENTOLIN HFA) 108 (90 Base)  MCG/ACT inhaler Inhale 2 puffs into the lungs 4 times daily   Calcium Carbonate-Vit D-Min (CALCIUM 1200 PO)    dexamethasone (DECADRON) 4 MG tablet Take 1.5 tablets (6 mg) by mouth every 6 hours for 3 doses   IBUPROFEN PO Take 600 mg by mouth every 6 hours as needed for moderate pain   levothyroxine (SYNTHROID/LEVOTHROID) 75 MCG tablet Take 1 tablet (75 mcg) by mouth daily   MAGNESIUM PO Take 1 tablet by mouth daily    melatonin 3 MG tablet Take 9 mg by mouth nightly as needed.   omeprazole (PRILOSEC) 20 MG CR capsule Take 1 capsule (20 mg) by mouth daily (Patient not taking: Reported on 10/3/2018)   oxyCODONE IR (ROXICODONE) 5 MG tablet Take 1-2 tablets (5-10 mg) by mouth every 3 hours as needed for pain or other (Moderate to Severe) (Patient not taking: Reported on 10/3/2018)   predniSONE (DELTASONE) 10 MG tablet Take 20 mg (2 pills) every day for 2 days, then 10 mg (1 pill) every day for 3 days.   predniSONE (DELTASONE) 20 MG tablet Take 1 to 2 tablets per day as needed for breathing. (Patient not taking: Reported on 10/3/2018)   predniSONE (DELTASONE) 20 MG tablet Take 3 tabs (60 mg) by mouth daily x 3 days, 2 tabs (40 mg) daily x 3 days, 1 tab (20 mg) daily x 3 days, then 1/2 tab (10 mg) x 3 days.   sertraline (ZOLOFT) 100 MG tablet Take 200 mg by mouth daily    vitamin B complex with vitamin C (VITAMIN  B COMPLEX) TABS tablet Take 1 tablet by mouth daily   ziprasidone (GEODON) 40 MG capsule Take 40 mg by mouth 2 times daily (with meals)        Social Hx:   Social History   Substance Use Topics     Smoking status: Former Smoker     Years: 5.00     Start date: 11/11/1988     Quit date: 1/1/1994     Smokeless tobacco: Never Used     Alcohol use No      Comment: Treatment in 2007       Allergies: No Known Allergies      NPO Status: Per ASA Guidelines    Labs:    Blood Bank:  No results found for: ABO, RH, AS  BMP:  Recent Labs   Lab Test  08/29/18   1110   NA  141   POTASSIUM  3.5   CHLORIDE  105   CO2  27   BUN   11   CR  0.71   GLC  145*   JANICE  8.8     CBC:   Recent Labs   Lab Test  08/29/18   1110   WBC  14.7*   RBC  3.90   HGB  11.4*   HCT  35.4   MCV  91   MCH  29.2   MCHC  32.2   RDW  14.6   PLT  420     Coags:  No results for input(s): INR, PTT, FIBR in the last 57766 hours.        Physical Exam  Normal systems: pulmonary and dental    Airway   Mallampati: II  TM distance: >3 FB  Neck ROM: full    Dental     Cardiovascular   Rhythm and rate: regular and normal      Pulmonary                         Anesthesia Plan      History & Physical Review  History and physical reviewed and following examination; no interval change.    ASA Status:  3 .    NPO Status:  > 8 hours    Plan for General (Jet ventilation vs ETT) with Intravenous induction. Maintenance will be TIVA.    PONV prophylaxis:  Ondansetron (or other 5HT-3) and Dexamethasone or Solumedrol  Additional equipment: 2nd IV      Postoperative Care  Postoperative pain management:  IV analgesics.      Consents  Anesthetic plan, risks, benefits and alternatives discussed with:  Patient..          Radha Rosado MD CA-2              Agree with anesthetic plan as outlined above    Chapincito Cano MD  Staff Anesthesiologist  [phone]

## 2018-10-05 ENCOUNTER — HOSPITAL ENCOUNTER (OUTPATIENT)
Facility: CLINIC | Age: 48
Discharge: HOME | End: 2018-10-05
Attending: OTOLARYNGOLOGY | Admitting: OTOLARYNGOLOGY
Payer: COMMERCIAL

## 2018-10-05 ENCOUNTER — SURGERY (OUTPATIENT)
Age: 48
End: 2018-10-05

## 2018-10-05 ENCOUNTER — TELEPHONE (OUTPATIENT)
Dept: FAMILY MEDICINE | Facility: CLINIC | Age: 48
End: 2018-10-05

## 2018-10-05 ENCOUNTER — ANESTHESIA (OUTPATIENT)
Dept: SURGERY | Facility: CLINIC | Age: 48
End: 2018-10-05
Payer: COMMERCIAL

## 2018-10-05 VITALS
SYSTOLIC BLOOD PRESSURE: 140 MMHG | BODY MASS INDEX: 36.95 KG/M2 | WEIGHT: 221.78 LBS | OXYGEN SATURATION: 94 % | DIASTOLIC BLOOD PRESSURE: 82 MMHG | TEMPERATURE: 98.4 F | HEIGHT: 65 IN | RESPIRATION RATE: 20 BRPM

## 2018-10-05 DIAGNOSIS — J39.8 TRACHEAL STENOSIS: Primary | ICD-10-CM

## 2018-10-05 DIAGNOSIS — F33.1 MAJOR DEPRESSIVE DISORDER, RECURRENT EPISODE, MODERATE (H): ICD-10-CM

## 2018-10-05 DIAGNOSIS — F41.8 OTHER SPECIFIED ANXIETY DISORDERS: ICD-10-CM

## 2018-10-05 DIAGNOSIS — F42.9 OBSESSIVE-COMPULSIVE DISORDER, UNSPECIFIED TYPE: ICD-10-CM

## 2018-10-05 LAB
GLUCOSE BLDC GLUCOMTR-MCNC: 86 MG/DL (ref 70–99)
HCG UR QL: NEGATIVE

## 2018-10-05 PROCEDURE — C1725 CATH, TRANSLUMIN NON-LASER: HCPCS | Performed by: OTOLARYNGOLOGY

## 2018-10-05 PROCEDURE — 25000125 ZZHC RX 250: Performed by: STUDENT IN AN ORGANIZED HEALTH CARE EDUCATION/TRAINING PROGRAM

## 2018-10-05 PROCEDURE — 25000128 H RX IP 250 OP 636: Performed by: OTOLARYNGOLOGY

## 2018-10-05 PROCEDURE — 71000014 ZZH RECOVERY PHASE 1 LEVEL 2 FIRST HR: Performed by: OTOLARYNGOLOGY

## 2018-10-05 PROCEDURE — 71000027 ZZH RECOVERY PHASE 2 EACH 15 MINS: Performed by: OTOLARYNGOLOGY

## 2018-10-05 PROCEDURE — 25000128 H RX IP 250 OP 636: Performed by: STUDENT IN AN ORGANIZED HEALTH CARE EDUCATION/TRAINING PROGRAM

## 2018-10-05 PROCEDURE — 37000009 ZZH ANESTHESIA TECHNICAL FEE, EACH ADDTL 15 MIN: Performed by: OTOLARYNGOLOGY

## 2018-10-05 PROCEDURE — 36000057 ZZH SURGERY LEVEL 3 1ST 30 MIN - UMMC: Performed by: OTOLARYNGOLOGY

## 2018-10-05 PROCEDURE — 82962 GLUCOSE BLOOD TEST: CPT

## 2018-10-05 PROCEDURE — 36000059 ZZH SURGERY LEVEL 3 EA 15 ADDTL MIN UMMC: Performed by: OTOLARYNGOLOGY

## 2018-10-05 PROCEDURE — 40000170 ZZH STATISTIC PRE-PROCEDURE ASSESSMENT II: Performed by: OTOLARYNGOLOGY

## 2018-10-05 PROCEDURE — 81025 URINE PREGNANCY TEST: CPT | Performed by: ANESTHESIOLOGY

## 2018-10-05 PROCEDURE — 25000125 ZZHC RX 250: Performed by: OTOLARYNGOLOGY

## 2018-10-05 PROCEDURE — 25000128 H RX IP 250 OP 636: Performed by: ANESTHESIOLOGY

## 2018-10-05 PROCEDURE — 27210794 ZZH OR GENERAL SUPPLY STERILE: Performed by: OTOLARYNGOLOGY

## 2018-10-05 PROCEDURE — 37000008 ZZH ANESTHESIA TECHNICAL FEE, 1ST 30 MIN: Performed by: OTOLARYNGOLOGY

## 2018-10-05 RX ORDER — FENTANYL CITRATE 50 UG/ML
25-50 INJECTION, SOLUTION INTRAMUSCULAR; INTRAVENOUS
Status: DISCONTINUED | OUTPATIENT
Start: 2018-10-05 | End: 2018-10-05 | Stop reason: HOSPADM

## 2018-10-05 RX ORDER — ONDANSETRON 4 MG/1
4 TABLET, ORALLY DISINTEGRATING ORAL EVERY 30 MIN PRN
Status: DISCONTINUED | OUTPATIENT
Start: 2018-10-05 | End: 2018-10-05 | Stop reason: HOSPADM

## 2018-10-05 RX ORDER — DEXAMETHASONE SODIUM PHOSPHATE 4 MG/ML
INJECTION, SOLUTION INTRA-ARTICULAR; INTRALESIONAL; INTRAMUSCULAR; INTRAVENOUS; SOFT TISSUE PRN
Status: DISCONTINUED | OUTPATIENT
Start: 2018-10-05 | End: 2018-10-05

## 2018-10-05 RX ORDER — SODIUM CHLORIDE, SODIUM LACTATE, POTASSIUM CHLORIDE, CALCIUM CHLORIDE 600; 310; 30; 20 MG/100ML; MG/100ML; MG/100ML; MG/100ML
INJECTION, SOLUTION INTRAVENOUS CONTINUOUS
Status: DISCONTINUED | OUTPATIENT
Start: 2018-10-05 | End: 2018-10-05 | Stop reason: HOSPADM

## 2018-10-05 RX ORDER — FENTANYL CITRATE 50 UG/ML
INJECTION, SOLUTION INTRAMUSCULAR; INTRAVENOUS PRN
Status: DISCONTINUED | OUTPATIENT
Start: 2018-10-05 | End: 2018-10-05

## 2018-10-05 RX ORDER — SERTRALINE HYDROCHLORIDE 100 MG/1
200 TABLET, FILM COATED ORAL DAILY
Qty: 60 TABLET | Refills: 0 | Status: SHIPPED | OUTPATIENT
Start: 2018-10-05

## 2018-10-05 RX ORDER — NALOXONE HYDROCHLORIDE 0.4 MG/ML
.1-.4 INJECTION, SOLUTION INTRAMUSCULAR; INTRAVENOUS; SUBCUTANEOUS
Status: DISCONTINUED | OUTPATIENT
Start: 2018-10-05 | End: 2018-10-05 | Stop reason: HOSPADM

## 2018-10-05 RX ORDER — SODIUM CHLORIDE, SODIUM LACTATE, POTASSIUM CHLORIDE, CALCIUM CHLORIDE 600; 310; 30; 20 MG/100ML; MG/100ML; MG/100ML; MG/100ML
INJECTION, SOLUTION INTRAVENOUS CONTINUOUS PRN
Status: DISCONTINUED | OUTPATIENT
Start: 2018-10-05 | End: 2018-10-05

## 2018-10-05 RX ORDER — OXYMETAZOLINE HYDROCHLORIDE 0.05 G/100ML
SPRAY NASAL PRN
Status: DISCONTINUED | OUTPATIENT
Start: 2018-10-05 | End: 2018-10-05 | Stop reason: HOSPADM

## 2018-10-05 RX ORDER — TRIAMCINOLONE ACETONIDE 40 MG/ML
INJECTION, SUSPENSION INTRA-ARTICULAR; INTRAMUSCULAR PRN
Status: DISCONTINUED | OUTPATIENT
Start: 2018-10-05 | End: 2018-10-05 | Stop reason: HOSPADM

## 2018-10-05 RX ORDER — ONDANSETRON 2 MG/ML
INJECTION INTRAMUSCULAR; INTRAVENOUS PRN
Status: DISCONTINUED | OUTPATIENT
Start: 2018-10-05 | End: 2018-10-05

## 2018-10-05 RX ORDER — LIDOCAINE HYDROCHLORIDE 40 MG/ML
SOLUTION TOPICAL PRN
Status: DISCONTINUED | OUTPATIENT
Start: 2018-10-05 | End: 2018-10-05 | Stop reason: HOSPADM

## 2018-10-05 RX ORDER — PROPOFOL 10 MG/ML
INJECTION, EMULSION INTRAVENOUS PRN
Status: DISCONTINUED | OUTPATIENT
Start: 2018-10-05 | End: 2018-10-05

## 2018-10-05 RX ORDER — ESMOLOL HYDROCHLORIDE 10 MG/ML
INJECTION INTRAVENOUS PRN
Status: DISCONTINUED | OUTPATIENT
Start: 2018-10-05 | End: 2018-10-05

## 2018-10-05 RX ORDER — ONDANSETRON 2 MG/ML
4 INJECTION INTRAMUSCULAR; INTRAVENOUS EVERY 30 MIN PRN
Status: DISCONTINUED | OUTPATIENT
Start: 2018-10-05 | End: 2018-10-05 | Stop reason: HOSPADM

## 2018-10-05 RX ORDER — LIDOCAINE HYDROCHLORIDE 20 MG/ML
INJECTION, SOLUTION INFILTRATION; PERINEURAL PRN
Status: DISCONTINUED | OUTPATIENT
Start: 2018-10-05 | End: 2018-10-05

## 2018-10-05 RX ORDER — ZIPRASIDONE HYDROCHLORIDE 40 MG/1
40 CAPSULE ORAL 2 TIMES DAILY WITH MEALS
Qty: 60 CAPSULE | Refills: 0 | Status: SHIPPED | OUTPATIENT
Start: 2018-10-05 | End: 2020-06-03 | Stop reason: DRUGHIGH

## 2018-10-05 RX ORDER — LIDOCAINE 40 MG/G
CREAM TOPICAL
Status: DISCONTINUED | OUTPATIENT
Start: 2018-10-05 | End: 2018-10-05 | Stop reason: HOSPADM

## 2018-10-05 RX ORDER — CEFAZOLIN SODIUM 2 G/100ML
2 INJECTION, SOLUTION INTRAVENOUS
Status: COMPLETED | OUTPATIENT
Start: 2018-10-05 | End: 2018-10-05

## 2018-10-05 RX ORDER — HYDROCODONE BITARTRATE AND ACETAMINOPHEN 5; 325 MG/1; MG/1
1 TABLET ORAL EVERY 4 HOURS PRN
Qty: 5 TABLET | Refills: 0 | Status: SHIPPED | OUTPATIENT
Start: 2018-10-05 | End: 2018-12-06

## 2018-10-05 RX ORDER — PROPOFOL 10 MG/ML
INJECTION, EMULSION INTRAVENOUS CONTINUOUS PRN
Status: DISCONTINUED | OUTPATIENT
Start: 2018-10-05 | End: 2018-10-05

## 2018-10-05 RX ADMIN — FENTANYL CITRATE 25 MCG: 50 INJECTION INTRAMUSCULAR; INTRAVENOUS at 08:43

## 2018-10-05 RX ADMIN — DEXAMETHASONE SODIUM PHOSPHATE 10 MG: 4 INJECTION, SOLUTION INTRA-ARTICULAR; INTRALESIONAL; INTRAMUSCULAR; INTRAVENOUS; SOFT TISSUE at 07:45

## 2018-10-05 RX ADMIN — SODIUM CHLORIDE, POTASSIUM CHLORIDE, SODIUM LACTATE AND CALCIUM CHLORIDE: 600; 310; 30; 20 INJECTION, SOLUTION INTRAVENOUS at 07:23

## 2018-10-05 RX ADMIN — MITOMYCIN 0.1 ML: 40 INJECTION, POWDER, LYOPHILIZED, FOR SOLUTION INTRAVENOUS at 08:09

## 2018-10-05 RX ADMIN — LIDOCAINE HYDROCHLORIDE 10 ML: 40 SOLUTION TOPICAL at 08:18

## 2018-10-05 RX ADMIN — ROCURONIUM BROMIDE 60 MG: 10 INJECTION INTRAVENOUS at 07:30

## 2018-10-05 RX ADMIN — FENTANYL CITRATE 50 MCG: 50 INJECTION, SOLUTION INTRAMUSCULAR; INTRAVENOUS at 07:30

## 2018-10-05 RX ADMIN — PROPOFOL 50 MG: 10 INJECTION, EMULSION INTRAVENOUS at 07:48

## 2018-10-05 RX ADMIN — ONDANSETRON 4 MG: 2 INJECTION INTRAMUSCULAR; INTRAVENOUS at 08:06

## 2018-10-05 RX ADMIN — CEFAZOLIN SODIUM 2 G: 2 INJECTION, SOLUTION INTRAVENOUS at 07:44

## 2018-10-05 RX ADMIN — SUGAMMADEX 200 MG: 100 INJECTION, SOLUTION INTRAVENOUS at 08:15

## 2018-10-05 RX ADMIN — PROPOFOL 200 MG: 10 INJECTION, EMULSION INTRAVENOUS at 07:30

## 2018-10-05 RX ADMIN — MIDAZOLAM 1 MG: 1 INJECTION INTRAMUSCULAR; INTRAVENOUS at 07:23

## 2018-10-05 RX ADMIN — PROPOFOL 200 MCG/KG/MIN: 10 INJECTION, EMULSION INTRAVENOUS at 07:35

## 2018-10-05 RX ADMIN — TRIAMCINOLONE ACETONIDE 0.6 ML: 40 INJECTION, SUSPENSION INTRA-ARTICULAR; INTRAMUSCULAR at 08:14

## 2018-10-05 RX ADMIN — FENTANYL CITRATE 50 MCG: 50 INJECTION, SOLUTION INTRAMUSCULAR; INTRAVENOUS at 07:57

## 2018-10-05 RX ADMIN — OXYMETAZOLINE HYDROCHLORIDE 15 ML: 5 SPRAY NASAL at 07:55

## 2018-10-05 RX ADMIN — REMIFENTANIL HYDROCHLORIDE 0.1 MCG/KG/MIN: 1 INJECTION, POWDER, LYOPHILIZED, FOR SOLUTION INTRAVENOUS at 07:38

## 2018-10-05 RX ADMIN — LIDOCAINE HYDROCHLORIDE 100 MG: 20 INJECTION, SOLUTION INFILTRATION; PERINEURAL at 07:30

## 2018-10-05 RX ADMIN — ESMOLOL HYDROCHLORIDE 10 MG: 10 INJECTION, SOLUTION INTRAVENOUS at 08:00

## 2018-10-05 RX ADMIN — PROPOFOL 50 MG: 10 INJECTION, EMULSION INTRAVENOUS at 07:47

## 2018-10-05 NOTE — IP AVS SNAPSHOT
Post Anesthesia Care Unit [address] [address]    Phone:  [phone]                                       After Visit Summary   10/5/2018    Bibi Song    MRN: [id number]           After Visit Summary Signature Page     I have received my discharge instructions, and my questions have been answered. I have discussed any challenges I see with this plan with the nurse or doctor.    ..........................................................................................................................................  Patient/Patient Representative Signature      ..........................................................................................................................................  Patient Representative Print Name and Relationship to Patient    ..................................................               ................................................  Date                                   Time    ..........................................................................................................................................  Reviewed by Signature/Title    ...................................................              ..............................................  Date                                               Time          22EPIC Rev 08/18

## 2018-10-05 NOTE — TELEPHONE ENCOUNTER
"Requested Prescriptions   Pending Prescriptions Disp Refills     sertraline (ZOLOFT) 100 MG tablet 1 tablet 0     Sig: Take 2 tablets (200 mg) by mouth daily    SSRIs Protocol Passed    10/5/2018 10:55 AM       Passed - PHQ-9 score less than 5 in past 6 months    Please review last PHQ-9 score.          Passed - Patient is age 18 or older       Passed - No active pregnancy on record       Passed - No positive pregnancy test in last 12 months       Passed - Recent (6 mo) or future (30 days) visit within the authorizing provider's specialty    Patient had office visit in the last 6 months or has a visit in the next 30 days with authorizing provider or within the authorizing provider's specialty.  See \"Patient Info\" tab in inbasket, or \"Choose Columns\" in Meds & Orders section of the refill encounter.            ziprasidone (GEODON) 40 MG capsule 1 capsule      Sig: Take 1 capsule (40 mg) by mouth 2 times daily (with meals)    Antipsychotic Medications Failed    10/5/2018 10:55 AM       Failed - Blood pressure under 140/90 in past 12 months    BP Readings from Last 3 Encounters:   10/05/18 140/82   10/03/18 122/82   09/27/18 111/62                Passed - Patient is 12 years of age or older       Passed - Lipid panel on file within the past 12 months    Recent Labs   Lab Test  05/16/18   0948   01/23/14   0800   CHOL  233*   < >  222*   TRIG  110   < >  89   HDL  49*   < >  44*   LDL  162*   < >  160*   NHDL  184*   < >   --    VLDL   --    --   18   CHOLHDLRATIO   --    --   5.0    < > = values in this interval not displayed.              Passed - CBC on file in past 12 months    Recent Labs   Lab Test  08/29/18   1110   WBC  14.7*   RBC  3.90   HGB  11.4*   HCT  35.4   PLT  420       For G[facility] ONLY: [id number] = WBC, [id number] = RBC         Passed - Heart Rate on file within past 12 months    Pulse Readings from Last 3 Encounters:   10/03/18 80   09/27/18 79   09/07/18 82              Passed - A1c or Glucose on file in " "past 12 months    Recent Labs   Lab Test  08/29/18   1110   04/27/17   0955   GLC  145*   < >  87   A1C   --    --   5.5    < > = values in this interval not displayed.       Please review patients last 3 weights. If a weight gain of >10 lbs exists, you may refill the prescription once after instructing the patient to schedule an appointment within the next 30 days.    Wt Readings from Last 3 Encounters:   10/05/18 221 lb 12.5 oz (100.6 kg)   10/03/18 220 lb (99.8 kg)   10/02/18 218 lb (98.9 kg)            Passed - Patient is not pregnant       Passed - No positve pregnancy test on file in past 12 months       Passed - Recent (6 mo) or future (30 days) visit within the authorizing provider's specialty    Patient had office visit in the last 6 months or has a visit in the next 30 days with authorizing provider or within the authorizing provider's specialty.  See \"Patient Info\" tab in inbasket, or \"Choose Columns\" in Meds & Orders section of the refill encounter.              "

## 2018-10-05 NOTE — IP AVS SNAPSHOT
MRN:[id number]                      After Visit Summary   10/5/2018    Bibi Song    MRN: [id number]           Thank you!     Thank you for choosing [facility] for your care. Our goal is always to provide you with excellent care. Hearing back from our patients is one way we can continue to improve our services. Please take a few minutes to complete the written survey that you may receive in the mail after you visit with us. Thank you!        Patient Information     Date Of Birth          1970        About your hospital stay     You were admitted on:  October 5, 2018 You last received care in the:  Post Anesthesia Care Unit [facility]    You were discharged on:  October 5, 2018       Who to Call     For medical emergencies, please call 911.  For non-urgent questions about your medical care, please call your primary care provider or clinic, [phone]  For questions related to your surgery, please call your surgery clinic        Attending Provider     Provider Specialty    Dusty Pearson MD Otolaryngology       Primary Care Provider Office Phone # Fax #    Glory Singleton [phone]      After Care Instructions     Diet Instructions       Resume pre procedure diet            Discharge Instructions       Patient to follow up with surgeon in 7-14 days            Discharge Instructions - Lifting restrictions       Lifting Restrictions 10 pounds for 2 weeks            No Alcohol       For 24 hours following procedure            No driving or operating machinery       until the day after procedure                  Your next 10 appointments already scheduled     Oct 16, 2018  8:15 AM CDT   (Arrive by 8:00 AM)   Post-Op with Dusty Pearson MD   [facility] ([facility])    [address] [address] [phone]              Additional Information     If you use hormonal birth  "control (such as the pill, patch, ring or implants): You'll need a second form of birth control for 7 days (condoms, a diaphragm or contraceptive foam). While in the hospital, you received a medicine called Bridion. Your normal birth control will not work as well for a week after taking this medicine.          Pending Results     No orders found from 10/3/2018 to 10/6/2018.            Admission Information     Date & Time Provider Department Dept. Phone    10/5/2018 Dusty Pearson MD Post Anesthesia Care Unit [facility] [phone]      Your Vitals Were     Blood Pressure Temperature Respirations Height Weight Last Period    148/84 98.8  F (37.1  C) (Oral) 22 1.651 m (5' 5\") 100.6 kg (221 lb 12.5 oz) 09/12/2018    Pulse Oximetry BMI (Body Mass Index)                94% 36.91 kg/m2          MyChart Information     [facility] gives you secure access to your electronic health record. If you see a primary care provider, you can also send messages to your care team and make appointments. If you have questions, please call your primary care clinic.  If you do not have a primary care provider, please call [phone] and they will assist you.        Care EveryWhere ID     This is your Care EveryWhere ID. This could be used by other organizations to access your [place] medical records  [id number]        Equal Access to Services     JUANCARLOS ADDISON : Brittany Wright, wajorgeda stacy, qaybta kaalvarun norton. So [facility] [phone].    ATENCIÓN: Si habla español, tiene a velazquez disposición servicios gratuitos de asistencia lingüística. Llame al [phone].    We comply with applicable federal civil rights laws and [place] laws. We do not discriminate on the basis of race, color, national origin, age, disability, sex, sexual orientation, or gender identity.               Review of your medicines      START taking        Dose / Directions    " HYDROcodone-acetaminophen 5-325 MG per tablet   Commonly known as:  NORCO   Used for:  Tracheal stenosis        Dose:  1 tablet   Take 1 tablet by mouth every 4 hours as needed (Moderate to Severe Pain)   Quantity:  5 tablet   Refills:  0       ranitidine 150 MG tablet   Commonly known as:  ZANTAC   Used for:  Tracheal stenosis        Dose:  150 mg   Take 1 tablet (150 mg) by mouth 2 times daily   Quantity:  60 tablet   Refills:  1         CONTINUE these medicines which have NOT CHANGED        Dose / Directions    * albuterol 108 (90 Base) MCG/ACT inhaler   Commonly known as:  PROAIR HFA/PROVENTIL HFA/VENTOLIN HFA   Used for:  Acute bronchitis with symptoms > 10 days        Dose:  2 puff   Inhale 2 puffs into the lungs 4 times daily   Quantity:  1 Inhaler   Refills:  1       * albuterol (2.5 MG/3ML) 0.083% neb solution   Used for:  Acute bronchospasm        Dose:  2.5 mg   Take 1 vial (2.5 mg) by nebulization every 4 hours as needed for shortness of breath / dyspnea or wheezing   Quantity:  1 Box   Refills:  0       CALCIUM 1200 PO        Refills:  0       ESTROVEN MENOPAUSE RELIEF PO        Refills:  0       GEODON 40 MG capsule   Generic drug:  ziprasidone        Dose:  40 mg   Take 40 mg by mouth 2 times daily (with meals)   Quantity:  1 capsule   Refills:  0       IBUPROFEN PO        Dose:  600 mg   Take 600 mg by mouth every 6 hours as needed for moderate pain   Refills:  0       levothyroxine 75 MCG tablet   Commonly known as:  SYNTHROID/LEVOTHROID   Used for:  Hypothyroidism, unspecified type        Dose:  75 mcg   Take 1 tablet (75 mcg) by mouth daily   Quantity:  90 tablet   Refills:  1       MAGNESIUM PO        Dose:  1 tablet   Take 1 tablet by mouth daily   Refills:  0       melatonin 3 MG tablet        Dose:  9 mg   Take 9 mg by mouth nightly as needed.   Quantity:  30 tablet   Refills:  0       omeprazole 20 MG CR capsule   Commonly known as:  priLOSEC   Used for:  Tracheal stenosis        Dose:  20 mg    Take 1 capsule (20 mg) by mouth daily   Quantity:  30 capsule   Refills:  0       * predniSONE 20 MG tablet   Commonly known as:  DELTASONE        Take 3 tabs (60 mg) by mouth daily x 3 days, 2 tabs (40 mg) daily x 3 days, 1 tab (20 mg) daily x 3 days, then 1/2 tab (10 mg) x 3 days.   Refills:  0       * predniSONE 10 MG tablet   Commonly known as:  DELTASONE   Used for:  Tracheal stenosis        Take 20 mg (2 pills) every day for 2 days, then 10 mg (1 pill) every day for 3 days.   Quantity:  7 tablet   Refills:  0       * predniSONE 20 MG tablet   Commonly known as:  DELTASONE   Used for:  Tracheal stenosis        Take 1 to 2 tablets per day as needed for breathing.   Quantity:  10 tablet   Refills:  0       vitamin B complex with vitamin C Tabs tablet        Dose:  1 tablet   Take 1 tablet by mouth daily   Refills:  0       ZOLOFT 100 MG tablet   Used for:  Obsessive-compulsive disorder, unspecified type, Other specified anxiety disorders, Major depressive disorder, recurrent episode, moderate (H)   Generic drug:  sertraline        Dose:  200 mg   Take 200 mg by mouth daily   Quantity:  1 tablet   Refills:  0       * Notice:  This list has 5 medication(s) that are the same as other medications prescribed for you. Read the directions carefully, and ask your doctor or other care provider to review them with you.         Where to get your medicines      These medications were sent to [facility] [address] [address]     Phone:  [phone]     ranitidine 150 MG tablet         Some of these will need a paper prescription and others can be bought over the counter. Ask your nurse if you have questions.     Bring a paper prescription for each of these medications     HYDROcodone-acetaminophen 5-325 MG per tablet                Protect others around you: Learn how to safely use, store and throw away your medicines at www.disposemymeds.org.         Information about OPIOIDS     PRESCRIPTION OPIOIDS: WHAT YOU NEED TO KNOW   We gave you an opioid (narcotic) pain medicine. It is important to manage your pain, but opioids are not always the best choice. You should first try all the other options your care team gave you. Take this medicine for as short a time (and as few doses) as possible.    Some activities can increase your pain, such as bandage changes or therapy sessions. It may help to take your pain medicine 30 to 60 minutes before these activities. Reduce your stress by getting enough sleep, working on hobbies you enjoy and practicing relaxation or meditation. Talk to your care team about ways to manage your pain beyond prescription opioids.    These medicines have risks:    DO NOT drive when on new or higher doses of pain medicine. These medicines can affect your alertness and reaction times, and you could be arrested for driving under the influence (DUI). If you need to use opioids long-term, talk to your care team about driving.    DO NOT operate heavy machinery    DO NOT do any other dangerous activities while taking these medicines.    DO NOT drink any alcohol while taking these medicines.     If the opioid prescribed includes acetaminophen, DO NOT take with any other medicines that contain acetaminophen. Read all labels carefully. Look for the word  acetaminophen  or  Tylenol.  Ask your pharmacist if you have questions or are unsure.    You can get addicted to pain medicines, especially if you have a history of addiction (chemical, alcohol or substance dependence). Talk to your care team about ways to reduce this risk.    All opioids tend to cause constipation. Drink plenty of water and eat foods that have a lot of fiber, such as fruits, vegetables, prune juice, apple juice and high-fiber cereal. Take a laxative (Miralax, milk of magnesia, Colace, Senna) if you don t move your bowels at least every other day. Other side effects include upset  stomach, sleepiness, dizziness, throwing up, tolerance (needing more of the medicine to have the same effect), physical dependence and slowed breathing.    Store your pills in a secure place, locked if possible. We will not replace any lost or stolen medicine. If you don t finish your medicine, please throw away (dispose) as directed by your pharmacist. The [place] Pollution [employer] has more information about safe disposal: https://www.pca.[facility].[place].us/living-green/managing-unwanted-medications             Medication List: This is a list of all your medications and when to take them. Check marks below indicate your daily home schedule. Keep this list as a reference.      Medications           Morning Afternoon Evening Bedtime As Needed    * albuterol 108 (90 Base) MCG/ACT inhaler   Commonly known as:  PROAIR HFA/PROVENTIL HFA/VENTOLIN HFA   Inhale 2 puffs into the lungs 4 times daily                                * albuterol (2.5 MG/3ML) 0.083% neb solution   Take 1 vial (2.5 mg) by nebulization every 4 hours as needed for shortness of breath / dyspnea or wheezing                                CALCIUM 1200 PO                                ESTROVEN MENOPAUSE RELIEF PO                                GEODON 40 MG capsule   Take 40 mg by mouth 2 times daily (with meals)   Generic drug:  ziprasidone                                HYDROcodone-acetaminophen 5-325 MG per tablet   Commonly known as:  NORCO   Take 1 tablet by mouth every 4 hours as needed (Moderate to Severe Pain)                                IBUPROFEN PO   Take 600 mg by mouth every 6 hours as needed for moderate pain                                levothyroxine 75 MCG tablet   Commonly known as:  SYNTHROID/LEVOTHROID   Take 1 tablet (75 mcg) by mouth daily                                MAGNESIUM PO   Take 1 tablet by mouth daily                                melatonin 3 MG tablet   Take 9 mg by mouth nightly as needed.                                 omeprazole 20 MG CR capsule   Commonly known as:  priLOSEC   Take 1 capsule (20 mg) by mouth daily                                * predniSONE 20 MG tablet   Commonly known as:  DELTASONE   Take 3 tabs (60 mg) by mouth daily x 3 days, 2 tabs (40 mg) daily x 3 days, 1 tab (20 mg) daily x 3 days, then 1/2 tab (10 mg) x 3 days.                                * predniSONE 10 MG tablet   Commonly known as:  DELTASONE   Take 20 mg (2 pills) every day for 2 days, then 10 mg (1 pill) every day for 3 days.                                * predniSONE 20 MG tablet   Commonly known as:  DELTASONE   Take 1 to 2 tablets per day as needed for breathing.                                ranitidine 150 MG tablet   Commonly known as:  ZANTAC   Take 1 tablet (150 mg) by mouth 2 times daily                                vitamin B complex with vitamin C Tabs tablet   Take 1 tablet by mouth daily                                ZOLOFT 100 MG tablet   Take 200 mg by mouth daily   Generic drug:  sertraline                                * Notice:  This list has 5 medication(s) that are the same as other medications prescribed for you. Read the directions carefully, and ask your doctor or other care provider to review them with you.

## 2018-10-05 NOTE — TELEPHONE ENCOUNTER
Reason for Call:  Medication or medication refill:    Do you use a [facility]?  Name of the pharmacy and phone number for the current request:  [facility] [phone]    Name of the medication requested: ziprasidone (GEODON) 40 MG capsule   sertraline (ZOLOFT) 100 MG tablet    Other request: pt calling asking if you can give her a refill of these two medications. She isn't able to get in with the psychiatrist until 11-8-18 and she won't have enough medication.    Can we leave a detailed message on this number? YES    Phone number patient can be reached at: Home number on file [phone] (home)    Best Time: any    Call taken on 10/5/2018 at 10:53 AM by Maryjo Huggins

## 2018-10-05 NOTE — ANESTHESIA CARE TRANSFER NOTE
Patient: Bibi Song    Procedure(s):  Direct Laryngoscopy with Teleoscope, Balloon Dilation, Application of Mitomycin and Steroid Injection  - Wound Class: II-Clean Contaminated   - Wound Class: I-Clean    Diagnosis: Tracheal Stenosis   Diagnosis Additional Information: No value filed.    Anesthesia Type:   General     Note:  Airway :Room Air  Patient transferred to:PACU  Comments: Vss, sats 100% on RM, No adverse anesthesia events.    Handoff Report: Identifed the Patient, Identified the Reponsible Provider, Reviewed the pertinent medical history, Discussed the surgical course, Reviewed Intra-OP anesthesia mangement and issues during anesthesia, Set expectations for post-procedure period and Allowed opportunity for questions and acknowledgement of understanding      Vitals: (Last set prior to Anesthesia Care Transfer)    CRNA VITALS  10/5/2018 0754 - 10/5/2018 0833      10/5/2018             Resp Rate (observed): 14                Electronically Signed By: Radha Rosado MD  October 5, 2018  8:33 AM

## 2018-10-05 NOTE — OP NOTE
DATE OF SERVICE: October 5, 2018        STAFF SURGEON:  Dusty Pearson MD       RESIDENT SURGEON: Kishore Posada MD      PREOPERATIVE DIAGNOSES: Tracheal stenosis.       POSTOPERATIVE DIAGNOSIS: Tracheal stenosis.       PROCEDURES PERFORMED:   1.  Microdirect telescopic laryngoscopy and bronchoscopy.   2.  Endoscopic-guided dilation of the trachea.   3.  Mitomycin application.   4.  Endoscopic steroid injection into the trachea.       COMPLICATIONS:  None.       ESTIMATED BLOOD LOSS:  5 mL.       SPECIMENS: None.       ANESTHESIA:  Jet ventilation.      OPERATIVE FINDINGS: Tracheal stenosis.  Anteriorly it was 16 mm thick and was situated 32 mm below the vocal cords.  Posteriorly it was 16 mm thick and 20 mm below the vocal cords.  There was a Good view with the Dedo-Hermelinda laryngoscope.  There was no difficulty with mask ventilation. A 18 mm x 2 cm balloon was utilized to dilate the stenotic segment after mucosal incisions were made. The remainder of the trachea appeared normal. The patient required a high dose of propofol according to the anesthesia team. She tolerated the procedure well.         INDICATIONS FOR PROCEDURE: Bibi Song is a 48 year old female with a history of tracheal stenosis. She developed airway symptoms after a recent intubation.  She was last dilated on September 7, 2018. On a recent clinic visit a recurrence of the stenosis was noted and she reported increased difficulty breathing with airway turbulence.  She returns to the operating room for a telescopic laryngoscopy and dilation of the stenosis.         DESCRIPTION OF PROCEDURE: After properly identifying the patient and obtaining signed informed consent, the patient was brought to the operating room and laid in supine position on the operating table.  General anesthesia was induced and the patient was ventilated through masking.  The bed was rotated 90 degrees. A shoulder roll and folded blanket were placed to properly  position the patient. Two thermoplastic tooth guards were placed over the upper dentition and a single thermoplastic  tooth guard over the lower dentition. The patient was draped in the usual clean fashion.  The  jet ventilation system was set up and titrated to an adequate pressure.  A timeout was carried out immediately prior to the procedure to confirm the identity of the patient and correctness of the procedure.       The Dedo- Hermelinda laryngoscope was then used to gain access to the larynx. The laryngoscope  was put into suspension with the Lewy suspension device and jet ventilation begun. Jet ventilation was confirmed to show an adequate chest rise. Topical lidocaine was applied to the area. The larynx was topically anesthetized with 4% lidocaine. The larynx and trachea were examined with a rigid endoscope with the magnified image displayed on a video monitor. The telescope was used for the remainer of the procedure. The circumferential scar was measured and findings are noted above. Pictures were taken of the stenosis and distal trachea.         Incisions were then made in the  scar band  at the 12, 9, and 3 o'clock  positions.  An additional incision was made posteriorly at the 6 o'clock position. The balloon was then utilized to dilate the stenotic segment. After 2 dilations, the balloon was removed and the patient resumed jet ventilation. Mitomycin, at a concentration of 1 mg / cc, was applied to the incisional areas for a total of 4 minutes exposure  while the patient was being ventilated.  The mitomycin was then removed and 3 sequential swabs of saline were used to cleanse the area.  An injection of 0.75 mL of 40 mg/mL of Kenalog was then injected into the area around the tracheal stenosis for a total dose of 30 mg. Post dilation photodocumentation was obtained. The laryngoscope and tooth guards were removed.  Mask ventilation was established. The patient was then turned over to Anesthesia for emergence  and was masked until awake.       Dr. Dusty Pearson was present for the entire case.    Kishore Posada MD  PGY-5, Otolaryngology

## 2018-10-05 NOTE — BRIEF OP NOTE
[facility], [place]    Brief Operative Note    Pre-operative diagnosis: Tracheal Stenosis   Post-operative diagnosis * No post-op diagnosis entered *  Procedure: Procedure(s):  Direct Laryngoscopy with Teleoscope, Balloon Dilation, Application of Mitomycin and Steroid Injection  - Wound Class: II-Clean Contaminated   - Wound Class: I-Clean  Surgeon: Surgeon(s) and Role:     * Dusty Pearson MD - Primary     * Kishore Posada MD - Resident - Assisting  Anesthesia: General   Estimated blood loss: Minimal  Drains: None  Specimens: * No specimens in log *  Findings:   Distal tracheal stenosis incised, dilated, mitomycin applied, and steroids injected. See operative report for details.  Complications: None.  Implants: None.

## 2018-10-05 NOTE — TELEPHONE ENCOUNTER
I understand that they are not addictive, but I still feel she needs a psychiatrist.     Refilled x 1 month.    Glory Singleton M.D.

## 2018-10-05 NOTE — ANESTHESIA POSTPROCEDURE EVALUATION
Patient: Bibi Song    Procedure(s):  Direct Laryngoscopy with Teleoscope, Balloon Dilation, Application of Mitomycin and Steroid Injection  - Wound Class: II-Clean Contaminated   - Wound Class: I-Clean    Diagnosis:Tracheal Stenosis   Diagnosis Additional Information: No value filed.    Anesthesia Type:  General    Note:  Anesthesia Post Evaluation    Patient location during evaluation: PACU  Patient participation: Able to fully participate in evaluation  Level of consciousness: awake and alert  Pain management: adequate  Airway patency: patent  Cardiovascular status: acceptable  Respiratory status: acceptable  Hydration status: acceptable  PONV: none             Last vitals:  Vitals:    10/05/18 0900 10/05/18 0915 10/05/18 0923   BP: 148/84 141/89 140/84   Resp: 22 25 22   Temp: 37.1  C (98.8  F)  36.8  C (98.3  F)   SpO2: 94% 94% 93%         Electronically Signed By: Chapincito Cano MD  October 5, 2018  9:54 AM

## 2018-10-05 NOTE — TELEPHONE ENCOUNTER
"Routing refill request to provider for review/approval because:  Medication is reported/historical - Geodon (needs dx associated) and sertraline was prescribed for ONE tablet 7/2017 and 4/2017.    Patient reports [facility] is no longer practicing at the facility she has moved to a different practice.  LOV with [facility] 6/2018.    She will be seeing a new psych with [facility] 11-8-18 however she will be out of medication before then.  Are you ok refilling 30 days of these?    She would also like PCP to know, \"please tell her that these are not addictive.  I have had no problems with them and that I have been on them for years.\"      Priscilla BAXTER RN    "

## 2018-10-16 ENCOUNTER — OFFICE VISIT (OUTPATIENT)
Dept: OTOLARYNGOLOGY | Facility: CLINIC | Age: 48
End: 2018-10-16
Payer: COMMERCIAL

## 2018-10-16 DIAGNOSIS — J39.8 TRACHEAL STENOSIS: Primary | ICD-10-CM

## 2018-10-16 NOTE — PROGRESS NOTES
HISTORY OF PRESENT ILLNESS: Bibi Song is a 48 year old female with a history of  airway difficulties.  She had an accidental overdose of medications in mid July.  She was admitted from 7/18/ 2018-7/21/2018.  During that time she had intubation for at least 2-3 days.  On discharge she was feeling well.  In mid August she developed some shortness of breath.  She was started on albuterol.  She had no problems with asthma or breathing prior to her hospitalization.  She was seen in the emergency room in mid August, she had shortness of breath and was given prednisone. doxycycline and albuterol.  The nebulizer worked well for her but she did get relief from the albuterol.  I met her for the first time on 8/30/2018.  At that time    she had been recently placed on prednisone 60 mg a day which  and this had helped her breathe comfortably.  She still had audible inspiratory stridor but there was good airflow.  There was a concern about possible stenosis versus paradoxic vocal fold motion.  On exam she had a tracheal stenosis which was symptomatic.  On 9/7/2018 she underwent a direct microlaryngoscopy with dilatation of the tracheal stenosis, mitomycin application and steroid injection.  She initially did well for the first 2 weeks but then gradually developed some difficulty breathing.  She returned to clinic on 10/2/2018 with upper airway turbulence.  She was able to get adequate air with light activity.  Examination showed a recurrence of the tracheal stenosis which was felt to be less inflamed.    On October 5, 2018 she returned to the operating room.  A tracheal stenosis was seen.  Anteriorly it was 16 mm thick and was situated 32 mm below the vocal cords.  Posteriorly it was 16 mm thick and 20 mm below the vocal cords.  There was a Good view with the Dedo-Hermelinda laryngoscope.  There was no difficulty with mask ventilation. A 18 mm x 2 cm balloon was utilized to dilate the stenotic segment after mucosal  incisions were made. The remainder of the trachea appeared normal. The patient required a high dose of propofol according to the anesthesia team.  Postoperatively she had an improved airway.  She has mild upper airway turbulence with deep inspiration but has much improved exercise tolerance and is happy with her current breathing status.        Last 2 Scores for Patient-Answered VHI Questionnaire  VHI Total Score 8/30/2018   VHI Total Score 13       Last 2 Scores for Patient-Answered CSI Questionnaire  CSI Total Score 8/30/2018 10/2/2018   CSI Total Score 16 10         Last 2 Scores for Patient-Answered EAT Questionnaire  EAT Total Score 8/30/2018   EAT Total Score 2           PAST MEDICAL HISTORY:   Past Medical History:   Diagnosis Date     Anxiety      Chemical dependency (H)      Depressive disorder      Hoarseness      PONV (postoperative nausea and vomiting)      Problem, psychiatric      Sleep apnea        PAST SURGICAL HISTORY:   Past Surgical History:   Procedure Laterality Date     INJECT STEROID (LOCATION) N/A 9/7/2018    Procedure: INJECT STEROID (LOCATION);;  Surgeon: Dusty Pearson MD;  Location: [facility]     INJECT STEROID (LOCATION) N/A 10/5/2018    Procedure: INJECT STEROID (LOCATION);;  Surgeon: Dusty Pearson MD;  Location: [facility]     LARYNGOSCOPY N/A 9/7/2018    Procedure: LARYNGOSCOPY;  Teleoscopic Direct Laryngoscopy with Balloon Dilation, Mitomycin Application and Steriod Injection ;  Surgeon: Dusty Pearson MD;  Location: [facility]     LARYNGOSCOPY, ESOPHAGOSCOPY WITH DILATION, COMBINED N/A 10/5/2018    Procedure: COMBINED LARYNGOSCOPY, ESOPHAGOSCOPY WITH DILATION;  Direct Laryngoscopy with Teleoscope, Balloon Dilation, Application of Mitomycin and Steroid Injection ;  Surgeon: Dusty Pearson MD;  Location: [facility] OR       FAMILY HISTORY:   Family History   Problem Relation Age of Onset     Diabetes Mother      Depression Mother      OCD     Neurologic  Disorder Mother      Psychotic Disorder Mother      Respiratory Mother      Thyroid Disease Mother      Gynecology Mother      hysterectomy     Cerebrovascular Disease Mother      pacemaker     Substance Abuse Mother      Mental Illness Mother      HEART DISEASE Mother      HEART DISEASE Father      MI,   LATE 60'S     Hypertension Father      Prostate Cancer Father      Obesity Father      Diabetes Father      Cerebrovascular Disease Father      Substance Abuse Father      Depression Father      Depression Brother      Thyroid Disease Brother      Depression Brother      Thyroid Disease Brother      Allergies Son      Asthma Son      Thyroid Disease Maternal Grandmother      Cancer Maternal Grandfather      Psychotic Disorder Paternal Grandmother      Thyroid Disease Paternal Grandfather      Diabetes Brother      Breast Cancer Maternal Aunt      Substance Abuse Brother      Mental Illness Brother      Depression Brother      Diabetes Brother      Mental Illness Son      Asthma Son      Depression Son      Stomach Problem Son        SOCIAL HISTORY:   Social History   Substance Use Topics     Smoking status: Former Smoker     Years: 5.00     Start date: 11/11/1988     Quit date: 1/1/1994     Smokeless tobacco: Never Used     Alcohol use No      Comment: Treatment in 2007       REVIEW OF SYSTEMS: Ten point review of systems was performed and is negative except for:   [facility] ROS 10/2/2018   Constitutional Weight gain   Neurology -   Psychology Frequently feeling anxious   Ears, Nose, Throat Nasal congestion or drainage, Hoarseness   Cardiopulmonary Cough, Breathing problems   Gastrointestinal/Genitourinary Heartburn/indigestion   Musculoskeletal -   Hematologic -   Endocrine Frequent urination   Other Problems with anesthesia in surgery        ALLERGIES: Review of patient's allergies indicates no known allergies.    MEDICATIONS:   Current Outpatient Prescriptions   Medication Sig Dispense Refill     albuterol (2.5  MG/3ML) 0.083% neb solution Take 1 vial (2.5 mg) by nebulization every 4 hours as needed for shortness of breath / dyspnea or wheezing 1 Box 0     albuterol (PROAIR HFA/PROVENTIL HFA/VENTOLIN HFA) 108 (90 Base) MCG/ACT inhaler Inhale 2 puffs into the lungs 4 times daily 1 Inhaler 1     Black Cohosh-SoyIsoflav-Magnol (ESTROVEN MENOPAUSE RELIEF PO)        Calcium Carbonate-Vit D-Min (CALCIUM 1200 PO)        HYDROcodone-acetaminophen (NORCO) 5-325 MG per tablet Take 1 tablet by mouth every 4 hours as needed (Moderate to Severe Pain) 5 tablet 0     IBUPROFEN PO Take 600 mg by mouth every 6 hours as needed for moderate pain       levothyroxine (SYNTHROID/LEVOTHROID) 75 MCG tablet Take 1 tablet (75 mcg) by mouth daily 90 tablet 1     MAGNESIUM PO Take 1 tablet by mouth daily        melatonin 3 MG tablet Take 9 mg by mouth nightly as needed. 30 tablet 0     predniSONE (DELTASONE) 10 MG tablet Take 20 mg (2 pills) every day for 2 days, then 10 mg (1 pill) every day for 3 days. 7 tablet 0     predniSONE (DELTASONE) 20 MG tablet Take 1 to 2 tablets per day as needed for breathing. (Patient not taking: Reported on 10/3/2018) 10 tablet 0     predniSONE (DELTASONE) 20 MG tablet Take 3 tabs (60 mg) by mouth daily x 3 days, 2 tabs (40 mg) daily x 3 days, 1 tab (20 mg) daily x 3 days, then 1/2 tab (10 mg) x 3 days.  0     ranitidine (ZANTAC) 150 MG tablet Take 1 tablet (150 mg) by mouth 2 times daily 60 tablet 1     sertraline (ZOLOFT) 100 MG tablet Take 2 tablets (200 mg) by mouth daily 60 tablet 0     vitamin B complex with vitamin C (VITAMIN  B COMPLEX) TABS tablet Take 1 tablet by mouth daily       ziprasidone (GEODON) 40 MG capsule Take 1 capsule (40 mg) by mouth 2 times daily (with meals) 60 capsule 0         PHYSICAL EXAMINATION:  She  is awake, alert and in no apparent distress.    Her tympanic membranes are clear and intact bilaterally. External auditory canals are clear.  Nasal exam shows a mild septal deviation without  obstruction.  Examination of the oral cavity shows no suspicious lesions.  There is good mouth opening.  There is symmetric movement of the tongue and soft palate.    The oropharynx is clear.  Her neck is supple without significant adenopathy.  Pulse is regular.  Upper airway is clear.  Cranial nerves II-XII are grossly intact.         PROCEDURE: A flexible laryngoscopy  was performed.  Informed consent was obtained and a time out was performed. 3% lidocaine and 0.25% phenylephrine was sprayed into the nasal cavity and allowed 3 to 5 minutes for effect. The scope was passed through the right sided nostril. Examination showed the vocal folds to be mobile and meet in the midline.  No nodules, polyps or ulcerations are seen.  There is mild inflammation or erythema of the supraglottic or glottic larynx.  With phonation there is mildcontraction of the supraglottic larynx.  The immediate subglottic area is clear.  I did have her tilt forward and I was able to see further down the trachea.  Approximately 3-4 rings down there was  a slight deviation of the trachea but it was widely patent without granulation tissue..  There appeared to be an adequate airway and there is good airflow although airway turbulence due to the acute inflammation is seen.  The hypopharynx is otherwise clear as is the subglottis.       10/16/2018 Exam      IMPRESSION/PLAN: She is improved early following a second dilatation.  There is some upper airway turbulence but she feels that she has excellent airflow.  Our plan is to have her use Flovent 1 puff twice a day for the next few months and I will see her in 2 months for follow-up of her tracheal stenosis.

## 2018-10-16 NOTE — MR AVS SNAPSHOT
After Visit Summary   10/16/2018    Bibi Song    MRN: [id number]           Patient Information     Date Of Birth          1970        Visit Information        Provider Department      10/16/2018 8:15 AM Dusty Pearson MD [facility]        Today's Diagnoses     Tracheal stenosis    -  1       Follow-ups after your visit        Follow-up notes from your care team     Return in about 2 months (around 12/16/2018).      Who to contact     Please call your clinic at [phone] to:    Ask questions about your health    Make or cancel appointments    Discuss your medicines    Learn about your test results    Speak to your doctor            Additional Information About Your Visit        Unbouncehart Information     Beamr gives you secure access to your electronic health record. If you see a primary care provider, you can also send messages to your care team and make appointments. If you have questions, please call your primary care clinic.  If you do not have a primary care provider, please call [phone] and they will assist you.      Beamr is an electronic gateway that provides easy, online access to your medical records. With Beamr, you can request a clinic appointment, read your test results, renew a prescription or communicate with your care team.     To access your existing account, please contact your [facility] or call [phone] for assistance.        Care EveryWhere ID     This is your Care EveryWhere ID. This could be used by other organizations to access your [facility] medical records  [id number]         Blood Pressure from Last 3 Encounters:   10/05/18 140/82   10/03/18 122/82   09/27/18 111/62    Weight from Last 3 Encounters:   10/05/18 100.6 kg (221 lb 12.5 oz)   10/03/18 99.8 kg (220 lb)   10/02/18 98.9 kg (218 lb)              We Performed the Following     IMAGESTREAM RECORDING ORDER     LARYNGOSCOPY FLEX  FIBEROPTIC, DIAGNOSTIC          Today's Medication Changes          These changes are accurate as of 10/16/18 11:43 AM.  If you have any questions, ask your nurse or doctor.               Start taking these medicines.        Dose/Directions    fluticasone 50 MCG/BLIST Aepb   Commonly known as:  FLOVENT DISKUS   Used for:  Tracheal stenosis        Dose:  1 puff   Inhale 1 puff into the lungs 2 times daily   Quantity:  1 Inhaler   Refills:  3            Where to get your medicines      These medications were sent to [facility] - - [facility] [id number] [facility]  [id number] [place] [id number]    Hours:  CLARA Quiñones [facility] Phone:  [phone]     fluticasone 50 MCG/BLIST Aepb                Primary Care Provider Office Phone # Fax #    Glory Singleton [phone] [address] [facility] [address]        Equal Access to Services     [facility]: Hadii yeison choe hadasho Soomaali, waaxda luqadaha, qaybta kaalmada adeegyada, waxay amandain hayhennyn surya ferrera . So [facility] [phone].    ATENCIÓN: Si habla español, tiene a velazquez disposición servicios gratuitos de asistencia lingüística. Llame al [phone].    We comply with applicable federal civil rights laws and [place] laws. We do not discriminate on the basis of race, color, national origin, age, disability, sex, sexual orientation, or gender identity.            Thank you!     Thank you for choosing [facility]  for your care. Our goal is always to provide you with excellent care. Hearing back from our patients is one way we can continue to improve our services. Please take a few minutes to complete the written survey that you may receive in the mail after your visit with us. Thank you!             Your Updated Medication List - Protect others around you: Learn how to safely use, store and throw away your medicines at www.disposemymeds.org.          This list is  accurate as of 10/16/18 11:43 AM.  Always use your most recent med list.                   Brand Name Dispense Instructions for use Diagnosis    * albuterol 108 (90 Base) MCG/ACT inhaler    PROAIR HFA/PROVENTIL HFA/VENTOLIN HFA    1 Inhaler    Inhale 2 puffs into the lungs 4 times daily    Acute bronchitis with symptoms > 10 days       * albuterol (2.5 MG/3ML) 0.083% neb solution     1 Box    Take 1 vial (2.5 mg) by nebulization every 4 hours as needed for shortness of breath / dyspnea or wheezing    Acute bronchospasm       CALCIUM 1200 PO           ESTROVEN MENOPAUSE RELIEF PO           fluticasone 50 MCG/BLIST Aepb    FLOVENT DISKUS    1 Inhaler    Inhale 1 puff into the lungs 2 times daily    Tracheal stenosis       HYDROcodone-acetaminophen 5-325 MG per tablet    NORCO    5 tablet    Take 1 tablet by mouth every 4 hours as needed (Moderate to Severe Pain)    Tracheal stenosis       IBUPROFEN PO      Take 600 mg by mouth every 6 hours as needed for moderate pain        levothyroxine 75 MCG tablet    SYNTHROID/LEVOTHROID    90 tablet    Take 1 tablet (75 mcg) by mouth daily    Hypothyroidism, unspecified type       MAGNESIUM PO      Take 1 tablet by mouth daily        melatonin 3 MG tablet     30 tablet    Take 9 mg by mouth nightly as needed.        * predniSONE 20 MG tablet    DELTASONE     Take 3 tabs (60 mg) by mouth daily x 3 days, 2 tabs (40 mg) daily x 3 days, 1 tab (20 mg) daily x 3 days, then 1/2 tab (10 mg) x 3 days.        * predniSONE 10 MG tablet    DELTASONE    7 tablet    Take 20 mg (2 pills) every day for 2 days, then 10 mg (1 pill) every day for 3 days.    Tracheal stenosis       * predniSONE 20 MG tablet    DELTASONE    10 tablet    Take 1 to 2 tablets per day as needed for breathing.    Tracheal stenosis       ranitidine 150 MG tablet    ZANTAC    60 tablet    Take 1 tablet (150 mg) by mouth 2 times daily    Tracheal stenosis       sertraline 100 MG tablet    ZOLOFT    60 tablet    Take 2  tablets (200 mg) by mouth daily    Obsessive-compulsive disorder, unspecified type, Other specified anxiety disorders, Major depressive disorder, recurrent episode, moderate (H)       vitamin B complex with vitamin C Tabs tablet      Take 1 tablet by mouth daily        ziprasidone 40 MG capsule    GEODON    60 capsule    Take 1 capsule (40 mg) by mouth 2 times daily (with meals)    Obsessive-compulsive disorder, unspecified type       * Notice:  This list has 5 medication(s) that are the same as other medications prescribed for you. Read the directions carefully, and ask your doctor or other care provider to review them with you.

## 2018-10-16 NOTE — LETTER
10/16/2018       RE: Bibi Song  [address] [facility] [address]     Dear Colleague,    Thank you for referring your patient, Bibi Song, to the [facility] EAR NOSE AND THROAT at [facility]. Please see a copy of my visit note below.      HISTORY OF PRESENT ILLNESS: Bibi Song is a 48 year old female with a history of  airway difficulties.  She had an accidental overdose of medications in mid July.  She was admitted from 7/18/ 2018-7/21/2018.  During that time she had intubation for at least 2-3 days.  On discharge she was feeling well.  In mid August she developed some shortness of breath.  She was started on albuterol.  She had no problems with asthma or breathing prior to her hospitalization.  She was seen in the emergency room in mid August, she had shortness of breath and was given prednisone. doxycycline and albuterol.  The nebulizer worked well for her but she did get relief from the albuterol.  I met her for the first time on 8/30/2018.  At that time    she had been recently placed on prednisone 60 mg a day which  and this had helped her breathe comfortably.  She still had audible inspiratory stridor but there was good airflow.  There was a concern about possible stenosis versus paradoxic vocal fold motion.  On exam she had a tracheal stenosis which was symptomatic.  On 9/7/2018 she underwent a direct microlaryngoscopy with dilatation of the tracheal stenosis, mitomycin application and steroid injection.  She initially did well for the first 2 weeks but then gradually developed some difficulty breathing.  She returned to clinic on 10/2/2018 with upper airway turbulence.  She was able to get adequate air with light activity.  Examination showed a recurrence of the tracheal stenosis which was felt to be less inflamed.    On October 5, 2018 she returned to the operating room.  A tracheal stenosis was seen.  Anteriorly it was 16 mm thick and was situated  32 mm below the vocal cords.  Posteriorly it was 16 mm thick and 20 mm below the vocal cords.  There was a Good view with the Dedo-Hermelinda laryngoscope.  There was no difficulty with mask ventilation. A 18 mm x 2 cm balloon was utilized to dilate the stenotic segment after mucosal incisions were made. The remainder of the trachea appeared normal. The patient required a high dose of propofol according to the anesthesia team.  Postoperatively she had an improved airway.  She has mild upper airway turbulence with deep inspiration but has much improved exercise tolerance and is happy with her current breathing status.        Last 2 Scores for Patient-Answered VHI Questionnaire  VHI Total Score 8/30/2018   VHI Total Score 13       Last 2 Scores for Patient-Answered CSI Questionnaire  CSI Total Score 8/30/2018 10/2/2018   CSI Total Score 16 10         Last 2 Scores for Patient-Answered EAT Questionnaire  EAT Total Score 8/30/2018   EAT Total Score 2           PAST MEDICAL HISTORY:   Past Medical History:   Diagnosis Date     Anxiety      Chemical dependency (H)      Depressive disorder      Hoarseness      PONV (postoperative nausea and vomiting)      Problem, psychiatric      Sleep apnea        PAST SURGICAL HISTORY:   Past Surgical History:   Procedure Laterality Date     INJECT STEROID (LOCATION) N/A 9/7/2018    Procedure: INJECT STEROID (LOCATION);;  Surgeon: Dusty Pearson MD;  [facility]     INJECT STEROID (LOCATION) N/A 10/5/2018    Procedure: INJECT STEROID (LOCATION);;  Surgeon: Dusty Pearson MD;  [facility]     LARYNGOSCOPY N/A 9/7/2018    Procedure: LARYNGOSCOPY;  Teleoscopic Direct Laryngoscopy with Balloon Dilation, Mitomycin Application and Steriod Injection ;  Surgeon: Dusty Pearson MD;  [facility]     LARYNGOSCOPY, ESOPHAGOSCOPY WITH DILATION, COMBINED N/A 10/5/2018    Procedure: COMBINED LARYNGOSCOPY, ESOPHAGOSCOPY WITH DILATION;  Direct Laryngoscopy  with Teleoscope, Balloon Dilation, Application of Mitomycin and Steroid Injection ;  Surgeon: Dusty Pearson MD;  Location: [facility] OR       FAMILY HISTORY:   Family History   Problem Relation Age of Onset     Diabetes Mother      Depression Mother      OCD     Neurologic Disorder Mother      Psychotic Disorder Mother      Respiratory Mother      Thyroid Disease Mother      Gynecology Mother      hysterectomy     Cerebrovascular Disease Mother      pacemaker     Substance Abuse Mother      Mental Illness Mother      HEART DISEASE Mother      HEART DISEASE Father      MI,   LATE 60'S     Hypertension Father      Prostate Cancer Father      Obesity Father      Diabetes Father      Cerebrovascular Disease Father      Substance Abuse Father      Depression Father      Depression Brother      Thyroid Disease Brother      Depression Brother      Thyroid Disease Brother      Allergies Son      Asthma Son      Thyroid Disease Maternal Grandmother      Cancer Maternal Grandfather      Psychotic Disorder Paternal Grandmother      Thyroid Disease Paternal Grandfather      Diabetes Brother      Breast Cancer Maternal Aunt      Substance Abuse Brother      Mental Illness Brother      Depression Brother      Diabetes Brother      Mental Illness Son      Asthma Son      Depression Son      Stomach Problem Son        SOCIAL HISTORY:   Social History   Substance Use Topics     Smoking status: Former Smoker     Years: 5.00     Start date: 11/11/1988     Quit date: 1/1/1994     Smokeless tobacco: Never Used     Alcohol use No      Comment: Treatment in 2007       REVIEW OF SYSTEMS: Ten point review of systems was performed and is negative except for:   [facility] ROS 10/2/2018   Constitutional Weight gain   Neurology -   Psychology Frequently feeling anxious   Ears, Nose, Throat Nasal congestion or drainage, Hoarseness   Cardiopulmonary Cough, Breathing problems   Gastrointestinal/Genitourinary Heartburn/indigestion    Musculoskeletal -   Hematologic -   Endocrine Frequent urination   Other Problems with anesthesia in surgery        ALLERGIES: Review of patient's allergies indicates no known allergies.    MEDICATIONS:   Current Outpatient Prescriptions   Medication Sig Dispense Refill     albuterol (2.5 MG/3ML) 0.083% neb solution Take 1 vial (2.5 mg) by nebulization every 4 hours as needed for shortness of breath / dyspnea or wheezing 1 Box 0     albuterol (PROAIR HFA/PROVENTIL HFA/VENTOLIN HFA) 108 (90 Base) MCG/ACT inhaler Inhale 2 puffs into the lungs 4 times daily 1 Inhaler 1     Black Cohosh-SoyIsoflav-Magnol (ESTROVEN MENOPAUSE RELIEF PO)        Calcium Carbonate-Vit D-Min (CALCIUM 1200 PO)        HYDROcodone-acetaminophen (NORCO) 5-325 MG per tablet Take 1 tablet by mouth every 4 hours as needed (Moderate to Severe Pain) 5 tablet 0     IBUPROFEN PO Take 600 mg by mouth every 6 hours as needed for moderate pain       levothyroxine (SYNTHROID/LEVOTHROID) 75 MCG tablet Take 1 tablet (75 mcg) by mouth daily 90 tablet 1     MAGNESIUM PO Take 1 tablet by mouth daily        melatonin 3 MG tablet Take 9 mg by mouth nightly as needed. 30 tablet 0     predniSONE (DELTASONE) 10 MG tablet Take 20 mg (2 pills) every day for 2 days, then 10 mg (1 pill) every day for 3 days. 7 tablet 0     predniSONE (DELTASONE) 20 MG tablet Take 1 to 2 tablets per day as needed for breathing. (Patient not taking: Reported on 10/3/2018) 10 tablet 0     predniSONE (DELTASONE) 20 MG tablet Take 3 tabs (60 mg) by mouth daily x 3 days, 2 tabs (40 mg) daily x 3 days, 1 tab (20 mg) daily x 3 days, then 1/2 tab (10 mg) x 3 days.  0     ranitidine (ZANTAC) 150 MG tablet Take 1 tablet (150 mg) by mouth 2 times daily 60 tablet 1     sertraline (ZOLOFT) 100 MG tablet Take 2 tablets (200 mg) by mouth daily 60 tablet 0     vitamin B complex with vitamin C (VITAMIN  B COMPLEX) TABS tablet Take 1 tablet by mouth daily       ziprasidone (GEODON) 40 MG capsule Take 1  capsule (40 mg) by mouth 2 times daily (with meals) 60 capsule 0         PHYSICAL EXAMINATION:  She  is awake, alert and in no apparent distress.    Her tympanic membranes are clear and intact bilaterally. External auditory canals are clear.  Nasal exam shows a mild septal deviation without obstruction.  Examination of the oral cavity shows no suspicious lesions.  There is good mouth opening.  There is symmetric movement of the tongue and soft palate.    The oropharynx is clear.  Her neck is supple without significant adenopathy.  Pulse is regular.  Upper airway is clear.  Cranial nerves II-XII are grossly intact.         PROCEDURE: A flexible laryngoscopy  was performed.  Informed consent was obtained and a time out was performed. 3% lidocaine and 0.25% phenylephrine was sprayed into the nasal cavity and allowed 3 to 5 minutes for effect. The scope was passed through the right sided nostril. Examination showed the vocal folds to be mobile and meet in the midline.  No nodules, polyps or ulcerations are seen.  There is mild inflammation or erythema of the supraglottic or glottic larynx.  With phonation there is mildcontraction of the supraglottic larynx.  The immediate subglottic area is clear.  I did have her tilt forward and I was able to see further down the trachea.  Approximately 3-4 rings down there was  a slight deviation of the trachea but it was widely patent without granulation tissue..  There appeared to be an adequate airway and there is good airflow although airway turbulence due to the acute inflammation is seen.  The hypopharynx is otherwise clear as is the subglottis.       10/16/2018 Exam      IMPRESSION/PLAN: She is improved early following a second dilatation.  There is some upper airway turbulence but she feels that she has excellent airflow.  Our plan is to have her use Flovent 1 puff twice a day for the next few months and I will see her in 2 months for follow-up of her tracheal  stenosis.    Again, thank you for allowing me to participate in the care of your patient.      Sincerely,    Dusty Pearson MD

## 2018-11-08 ENCOUNTER — TELEPHONE (OUTPATIENT)
Dept: FAMILY MEDICINE | Facility: CLINIC | Age: 48
End: 2018-11-08

## 2018-11-08 DIAGNOSIS — J20.9 ACUTE BRONCHITIS WITH SYMPTOMS > 10 DAYS: ICD-10-CM

## 2018-11-08 RX ORDER — ALBUTEROL SULFATE 90 UG/1
2 AEROSOL, METERED RESPIRATORY (INHALATION) 4 TIMES DAILY
Qty: 1 INHALER | Refills: 1 | Status: CANCELLED | OUTPATIENT
Start: 2018-11-08

## 2018-11-08 NOTE — TELEPHONE ENCOUNTER
Left message for patient to return call to clinic.  This was prescribed for bronchitis.  Is she still experiencing these type of symptoms?  Priscilla BAXTER RN

## 2018-11-08 NOTE — TELEPHONE ENCOUNTER
"Requested Prescriptions   Pending Prescriptions Disp Refills     albuterol (PROAIR HFA/PROVENTIL HFA/VENTOLIN HFA) 108 (90 Base) MCG/ACT inhaler  Last Written Prescription Date:  8/23/2018  Last Fill Quantity: 1 inhaler,  # refills: 1   Last office visit: 10/3/2018 with provider:  Sheldon   Future Office Visit:     1 Inhaler 1     Sig: Inhale 2 puffs into the lungs 4 times daily    Asthma Maintenance Inhalers - Anticholinergics Passed    11/8/2018 10:15 AM       Passed - Patient is age 12 years or older       Passed - Recent (12 mo) or future (30 days) visit within the authorizing provider's specialty    Patient had office visit in the last 12 months or has a visit in the next 30 days with authorizing provider or within the authorizing provider's specialty.  See \"Patient Info\" tab in inbasket, or \"Choose Columns\" in Meds & Orders section of the refill encounter.                "

## 2018-11-27 DIAGNOSIS — J38.6 SUBGLOTTIC STENOSIS: Primary | ICD-10-CM

## 2018-11-27 NOTE — TELEPHONE ENCOUNTER
[facility] Call Center    Phone Message    May a detailed message be left on voicemail: yes    Reason for Call: Other: Pt need Dr. Pearson to writea notes stating she needs to sit own at work do to the fact that she is getting easily winded. Please follow up with pt to discuss     Action Taken: Message routed to:  [facility] ENT

## 2018-11-27 NOTE — LETTER
November 27, 2018      Bibi Song  [address] [facility] [address]        To Whom It May Concern:    Bibi Song was seen in our clinic. She may return to work with the following: limited to light duty - needs to stay as  - to limit walking on or about 11-28-18.      Sincerely,        Dusty Pearson MD

## 2018-12-01 NOTE — TELEPHONE ENCOUNTER
[facility] Call Center    Phone Message    May a detailed message be left on voicemail: yes    Reason for Call: Medication Refill Request    Has the patient contacted the pharmacy for the refill? Yes   Name of medication being requested: predniSONE (DELTASONE) 20 MG tablet  Provider who prescribed the medication: Dr Pearson  Pharmacy: [place] [facility], [address], [place] [address], Ph # [phone]  Date medication is needed: asap - Pt is out - Pt is having more difficulty breathing even when using inhauler - so wants to have this Rx again as well to help her       Action Taken: Message routed to:  [facility] ENT

## 2018-12-01 NOTE — TELEPHONE ENCOUNTER
[facility] Call [facility]    Phone Message    May a detailed message be left on voicemail: yes    Reason for Call: Other: sending this refill request to clinic and not med refills team as Pt hasn't  had this medicine this month and had used it before for similar issues and it helped her - said she is having breathing difficulty even with using inhauler - when she walks around she gets winded reallly easily - wants to know if Dr Pearson will put her on Prednisone asap again - said she has Appt on Tuesday but would like to start this on Monday if possible - Please return her call to discuss - Thanks     Action Taken: Message routed to:  [facility] ENT

## 2018-12-03 ENCOUNTER — CARE COORDINATION (OUTPATIENT)
Dept: OTOLARYNGOLOGY | Facility: CLINIC | Age: 48
End: 2018-12-03

## 2018-12-03 DIAGNOSIS — J39.8 TRACHEAL STENOSIS: ICD-10-CM

## 2018-12-03 RX ORDER — PREDNISONE 20 MG/1
TABLET ORAL
Qty: 10 TABLET | Refills: 1 | Status: SHIPPED | OUTPATIENT
Start: 2018-12-03 | End: 2018-12-05

## 2018-12-03 NOTE — PROGRESS NOTES
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 Patient has appt with Dr Pearson 12/4 (tomorrow). Patient states she is \"OK\" but audible breathing noted.   Prednisone refilled.  "

## 2018-12-04 ENCOUNTER — OFFICE VISIT (OUTPATIENT)
Dept: OTOLARYNGOLOGY | Facility: CLINIC | Age: 48
End: 2018-12-04
Payer: COMMERCIAL

## 2018-12-04 VITALS
DIASTOLIC BLOOD PRESSURE: 80 MMHG | SYSTOLIC BLOOD PRESSURE: 140 MMHG | HEIGHT: 65 IN | WEIGHT: 221 LBS | BODY MASS INDEX: 36.82 KG/M2

## 2018-12-04 DIAGNOSIS — J39.8 TRACHEAL STENOSIS: Primary | ICD-10-CM

## 2018-12-04 NOTE — NURSING NOTE
1. Patient is scheduled for surgery TBD  2. Patient to schedule a pre-op physical with their primary MD within 30 days of the procedure.   3. Patient to avoid blood thinning medications 1 week prior to surgery (Ibuprofen, Aleve, Aspirin, etc.)   4. Patient to review contents within the surgical packet & use the antiseptic scrub as directed.   5. Patient to call the ENT clinic with further questions or concerns: [phone].

## 2018-12-04 NOTE — LETTER
12/4/2018       RE: Bibi Song  [address] [facility] [address]     Dear Colleague,    Thank you for referring your patient, Bibi Song, to the [facility] EAR NOSE AND THROAT at [facility]. Please see a copy of my visit note below.      HISTORY OF PRESENT ILLNESS: Bibi Song is a 48 year old female with a history of airway difficulties.  She had an accidental overdose of medications in mid July.  She was admitted from 7/18/ 2018-7/21/2018.  During that time she had intubation for at least 2-3 days.  On discharge she was feeling well.  In mid August she developed some shortness of breath.  She was started on albuterol.  She had no problems with asthma or breathing prior to her hospitalization.  She was seen in the emergency room in mid August, she had shortness of breath and was given prednisone. doxycycline and albuterol.  The nebulizer worked well for her but she did get relief from the albuterol.  I met her for the first time on 8/30/2018.  At that time    she had been recently placed on prednisone 60 mg a day which  and this had helped her breathe comfortably.  She still had audible inspiratory stridor but there was good airflow.  There was a concern about possible stenosis versus paradoxic vocal fold motion.  On exam she had a tracheal stenosis which was symptomatic.  On 9/7/2018 she underwent a direct microlaryngoscopy with dilatation of the tracheal stenosis, mitomycin application and steroid injection.  She initially did well for the first 2 weeks but then gradually developed some difficulty breathing.  She returned to clinic on 10/2/2018 with upper airway turbulence.  She was able to get adequate air with light activity.  Examination showed a recurrence of the tracheal stenosis which was felt to be less inflamed.     On October 5, 2018 she returned to the operating room.  A tracheal stenosis was seen.  Anteriorly it was 16 mm thick and was situated  32 mm below the vocal cords.  Posteriorly it was 16 mm thick and 20 mm below the vocal cords.  There was a Good view with the Dedo-Hermelinda laryngoscope.  There was no difficulty with mask ventilation. A 18 mm x 2 cm balloon was utilized to dilate the stenotic segment after mucosal incisions were made. The remainder of the trachea appeared normal. The patient required a high dose of propofol according to the anesthesia team.      Postoperatively she had an improved airway.     Over the last week or 2 she has had some increasing airway turbulence and does have decreased exercise tolerance.  She is comfortable at rest but with deep inspiration does have airway turbulence.  He started a Medrol Dosepak yesterday.    Last 2 Scores for Patient-Answered VHI Questionnaire  VHI Total Score 8/30/2018   VHI Total Score 13       Last 2 Scores for Patient-Answered CSI Questionnaire  CSI Total Score 10/2/2018 12/4/2018   CSI Total Score 10 2         Last 2 Scores for Patient-Answered EAT Questionnaire  EAT Total Score 8/30/2018   EAT Total Score 2           PAST MEDICAL HISTORY:   Past Medical History:   Diagnosis Date     Anxiety      Chemical dependency (H)      Depressive disorder      Hoarseness      PONV (postoperative nausea and vomiting)      Problem, psychiatric      Sleep apnea        PAST SURGICAL HISTORY:   Past Surgical History:   Procedure Laterality Date     INJECT STEROID (LOCATION) N/A 9/7/2018    Procedure: INJECT STEROID (LOCATION);;  Surgeon: Dusty Pearson MD;  [facility]     INJECT STEROID (LOCATION) N/A 10/5/2018    Procedure: INJECT STEROID (LOCATION);;  Surgeon: Dusty Pearson MD;  [facility]     LARYNGOSCOPY N/A 9/7/2018    Procedure: LARYNGOSCOPY;  Teleoscopic Direct Laryngoscopy with Balloon Dilation, Mitomycin Application and Steriod Injection ;  Surgeon: Dusty Pearson MD;  [facility]     LARYNGOSCOPY, ESOPHAGOSCOPY WITH DILATION, COMBINED N/A  10/5/2018    Procedure: COMBINED LARYNGOSCOPY, ESOPHAGOSCOPY WITH DILATION;  Direct Laryngoscopy with Teleoscope, Balloon Dilation, Application of Mitomycin and Steroid Injection ;  Surgeon: Dusty Pearson MD;  Location:  OR       FAMILY HISTORY:   Family History   Problem Relation Age of Onset     Diabetes Mother      Depression Mother      OCD     Neurologic Disorder Mother      Psychotic Disorder Mother      Respiratory Mother      Thyroid Disease Mother      Gynecology Mother      hysterectomy     Cerebrovascular Disease Mother      pacemaker     Substance Abuse Mother      Mental Illness Mother      Heart Disease Mother      Heart Disease Father      MI,   LATE 60'S     Hypertension Father      Prostate Cancer Father      Obesity Father      Diabetes Father      Cerebrovascular Disease Father      Substance Abuse Father      Depression Father      Depression Brother      Thyroid Disease Brother      Depression Brother      Thyroid Disease Brother      Allergies Son      Asthma Son      Thyroid Disease Maternal Grandmother      Cancer Maternal Grandfather      Psychotic Disorder Paternal Grandmother      Thyroid Disease Paternal Grandfather      Diabetes Brother      Breast Cancer Maternal Aunt      Substance Abuse Brother      Mental Illness Brother      Depression Brother      Diabetes Brother      Mental Illness Son      Asthma Son      Depression Son      Stomach Problem Son        SOCIAL HISTORY:   Social History   Substance Use Topics     Smoking status: Former Smoker     Years: 5.00     Start date: 11/11/1988     Quit date: 1/1/1994     Smokeless tobacco: Never Used     Alcohol use No      Comment: Treatment in 2007       REVIEW OF SYSTEMS: Ten point review of systems was performed and is negative except for:   [facility] ROS 12/4/2018   Constitutional Weight gain   Neurology -   Psychology Frequently feeling anxious   Ears, Nose, Throat Hoarseness   Cardiopulmonary Cough, Wheezing    Gastrointestinal/Genitourinary Heartburn/indigestion   Musculoskeletal Sore or stiff joints   Hematologic -   Endocrine -   Other -        ALLERGIES: Review of patient's allergies indicates no known allergies.    MEDICATIONS:   Current Outpatient Prescriptions   Medication Sig Dispense Refill     albuterol (2.5 MG/3ML) 0.083% neb solution Take 1 vial (2.5 mg) by nebulization every 4 hours as needed for shortness of breath / dyspnea or wheezing 1 Box 0     albuterol (PROAIR HFA/PROVENTIL HFA/VENTOLIN HFA) 108 (90 Base) MCG/ACT inhaler Inhale 2 puffs into the lungs 4 times daily 1 Inhaler 1     Black Cohosh-SoyIsoflav-Magnol (ESTROVEN MENOPAUSE RELIEF PO)        Calcium Carbonate-Vit D-Min (CALCIUM 1200 PO)        fluticasone (FLOVENT DISKUS) 50 MCG/BLIST AEPB Inhale 1 puff into the lungs 2 times daily 1 Inhaler 3     HYDROcodone-acetaminophen (NORCO) 5-325 MG per tablet Take 1 tablet by mouth every 4 hours as needed (Moderate to Severe Pain) (Patient not taking: Reported on 10/16/2018) 5 tablet 0     IBUPROFEN PO Take 600 mg by mouth every 6 hours as needed for moderate pain       levothyroxine (SYNTHROID/LEVOTHROID) 75 MCG tablet Take 1 tablet (75 mcg) by mouth daily 90 tablet 1     MAGNESIUM PO Take 1 tablet by mouth daily        melatonin 3 MG tablet Take 9 mg by mouth nightly as needed. 30 tablet 0     predniSONE (DELTASONE) 10 MG tablet Take 20 mg (2 pills) every day for 2 days, then 10 mg (1 pill) every day for 3 days. (Patient not taking: Reported on 10/16/2018) 7 tablet 0     predniSONE (DELTASONE) 20 MG tablet Take 1 to 2 tablets per day as needed for breathing. 10 tablet 1     predniSONE (DELTASONE) 20 MG tablet Take 3 tabs (60 mg) by mouth daily x 3 days, 2 tabs (40 mg) daily x 3 days, 1 tab (20 mg) daily x 3 days, then 1/2 tab (10 mg) x 3 days.  0     ranitidine (ZANTAC) 150 MG tablet Take 1 tablet (150 mg) by mouth 2 times daily 60 tablet 1     sertraline (ZOLOFT) 100 MG tablet Take 2 tablets (200 mg)  by mouth daily 60 tablet 0     vitamin B complex with vitamin C (VITAMIN  B COMPLEX) TABS tablet Take 1 tablet by mouth daily       ziprasidone (GEODON) 40 MG capsule Take 1 capsule (40 mg) by mouth 2 times daily (with meals) 60 capsule 0         PHYSICAL EXAMINATION:  She  is awake, alert and in no apparent distress.    Her tympanic membranes are clear and intact bilaterally. External auditory canals are clear.  Nasal exam shows a mild septal deviation without obstruction.  Examination of the oral cavity shows no suspicious lesions.  There is good mouth opening.  There is symmetric movement of the tongue and soft palate.    The oropharynx is clear.  Her neck is supple without significant adenopathy.  Pulse is regular.  Upper airway demonstrates airway turbulence with deep inspiration.  Cranial nerves II-XII are grossly intact.         PROCEDURE: A flexible laryngoscopy  was performed.  Informed consent was obtained and a time out was performed. 3% lidocaine and 0.25% phenylephrine was sprayed into the nasal cavity and allowed 3 to 5 minutes for effect. The scope was passed through the right sided nostril. Examination showed the vocal folds to be mobile and meet in the midline.  No nodules, polyps or ulcerations are seen.  There is mild inflammation or erythema of the supraglottic or glottic larynx.  With phonation there is mildcontraction of the supraglottic larynx.  The immediate subglottic area is clear.  I did have her tilt forward and I was able to see further down the trachea.  Approximately 3-4 rings down there was  a narrowing of the trachea.   There appeared to be an adequate airway and there is good airflow although airway turbulence due to the acute inflammation is seen.  The hypopharynx is otherwise clear as is the subglottis.     12/4/2018 Exam      IMPRESSION/PLAN: Subjective worsening of her airway.  I cannot see a definitive area of severe stenosis but this may be beyond the reach of our scope.  She  is however having increased symptoms and we will schedule her for a telescopic laryngoscopy with balloon dilatation mitomycin application and steroid injection.  We will try and do it this week.  Given the frequent recurrence of the stenosis I will be considering a referral to evaluate for a tracheal resection.    Again, thank you for allowing me to participate in the care of your patient.      Sincerely,    Dusty Pearson MD

## 2018-12-04 NOTE — PROGRESS NOTES
HISTORY OF PRESENT ILLNESS: Bibi Song is a 48 year old female with a history of airway difficulties.  She had an accidental overdose of medications in mid July.  She was admitted from 7/18/ 2018-7/21/2018.  During that time she had intubation for at least 2-3 days.  On discharge she was feeling well.  In mid August she developed some shortness of breath.  She was started on albuterol.  She had no problems with asthma or breathing prior to her hospitalization.  She was seen in the emergency room in mid August, she had shortness of breath and was given prednisone. doxycycline and albuterol.  The nebulizer worked well for her but she did get relief from the albuterol.  I met her for the first time on 8/30/2018.  At that time    she had been recently placed on prednisone 60 mg a day which  and this had helped her breathe comfortably.  She still had audible inspiratory stridor but there was good airflow.  There was a concern about possible stenosis versus paradoxic vocal fold motion.  On exam she had a tracheal stenosis which was symptomatic.  On 9/7/2018 she underwent a direct microlaryngoscopy with dilatation of the tracheal stenosis, mitomycin application and steroid injection.  She initially did well for the first 2 weeks but then gradually developed some difficulty breathing.  She returned to clinic on 10/2/2018 with upper airway turbulence.  She was able to get adequate air with light activity.  Examination showed a recurrence of the tracheal stenosis which was felt to be less inflamed.     On October 5, 2018 she returned to the operating room.  A tracheal stenosis was seen.  Anteriorly it was 16 mm thick and was situated 32 mm below the vocal cords.  Posteriorly it was 16 mm thick and 20 mm below the vocal cords.  There was a Good view with the Dedo-Hermelinda laryngoscope.  There was no difficulty with mask ventilation. A 18 mm x 2 cm balloon was utilized to dilate the stenotic segment after mucosal  incisions were made. The remainder of the trachea appeared normal. The patient required a high dose of propofol according to the anesthesia team.      Postoperatively she had an improved airway.     Over the last week or 2 she has had some increasing airway turbulence and does have decreased exercise tolerance.  She is comfortable at rest but with deep inspiration does have airway turbulence.  He started a Medrol Dosepak yesterday.    Last 2 Scores for Patient-Answered VHI Questionnaire  VHI Total Score 8/30/2018   VHI Total Score 13       Last 2 Scores for Patient-Answered CSI Questionnaire  CSI Total Score 10/2/2018 12/4/2018   CSI Total Score 10 2         Last 2 Scores for Patient-Answered EAT Questionnaire  EAT Total Score 8/30/2018   EAT Total Score 2           PAST MEDICAL HISTORY:   Past Medical History:   Diagnosis Date     Anxiety      Chemical dependency (H)      Depressive disorder      Hoarseness      PONV (postoperative nausea and vomiting)      Problem, psychiatric      Sleep apnea        PAST SURGICAL HISTORY:   Past Surgical History:   Procedure Laterality Date     INJECT STEROID (LOCATION) N/A 9/7/2018    Procedure: INJECT STEROID (LOCATION);;  Surgeon: Dusty Pearson MD;  Location: [facility]     INJECT STEROID (LOCATION) N/A 10/5/2018    Procedure: INJECT STEROID (LOCATION);;  Surgeon: Dusty Pearson MD;  Location: [facility]     LARYNGOSCOPY N/A 9/7/2018    Procedure: LARYNGOSCOPY;  Teleoscopic Direct Laryngoscopy with Balloon Dilation, Mitomycin Application and Steriod Injection ;  Surgeon: Dusty Pearson MD;  Location: [facility]     LARYNGOSCOPY, ESOPHAGOSCOPY WITH DILATION, COMBINED N/A 10/5/2018    Procedure: COMBINED LARYNGOSCOPY, ESOPHAGOSCOPY WITH DILATION;  Direct Laryngoscopy with Teleoscope, Balloon Dilation, Application of Mitomycin and Steroid Injection ;  Surgeon: Dusty Pearson MD;  Location: [facility] OR       FAMILY HISTORY:   Family History   Problem  Relation Age of Onset     Diabetes Mother      Depression Mother      OCD     Neurologic Disorder Mother      Psychotic Disorder Mother      Respiratory Mother      Thyroid Disease Mother      Gynecology Mother      hysterectomy     Cerebrovascular Disease Mother      pacemaker     Substance Abuse Mother      Mental Illness Mother      Heart Disease Mother      Heart Disease Father      MI,   LATE 60'S     Hypertension Father      Prostate Cancer Father      Obesity Father      Diabetes Father      Cerebrovascular Disease Father      Substance Abuse Father      Depression Father      Depression Brother      Thyroid Disease Brother      Depression Brother      Thyroid Disease Brother      Allergies Son      Asthma Son      Thyroid Disease Maternal Grandmother      Cancer Maternal Grandfather      Psychotic Disorder Paternal Grandmother      Thyroid Disease Paternal Grandfather      Diabetes Brother      Breast Cancer Maternal Aunt      Substance Abuse Brother      Mental Illness Brother      Depression Brother      Diabetes Brother      Mental Illness Son      Asthma Son      Depression Son      Stomach Problem Son        SOCIAL HISTORY:   Social History   Substance Use Topics     Smoking status: Former Smoker     Years: 5.00     Start date: 11/11/1988     Quit date: 1/1/1994     Smokeless tobacco: Never Used     Alcohol use No      Comment: Treatment in 2007       REVIEW OF SYSTEMS: Ten point review of systems was performed and is negative except for:   [facility] ROS 12/4/2018   Constitutional Weight gain   Neurology -   Psychology Frequently feeling anxious   Ears, Nose, Throat Hoarseness   Cardiopulmonary Cough, Wheezing   Gastrointestinal/Genitourinary Heartburn/indigestion   Musculoskeletal Sore or stiff joints   Hematologic -   Endocrine -   Other -        ALLERGIES: Review of patient's allergies indicates no known allergies.    MEDICATIONS:   Current Outpatient Prescriptions   Medication Sig Dispense Refill      albuterol (2.5 MG/3ML) 0.083% neb solution Take 1 vial (2.5 mg) by nebulization every 4 hours as needed for shortness of breath / dyspnea or wheezing 1 Box 0     albuterol (PROAIR HFA/PROVENTIL HFA/VENTOLIN HFA) 108 (90 Base) MCG/ACT inhaler Inhale 2 puffs into the lungs 4 times daily 1 Inhaler 1     Black Cohosh-SoyIsoflav-Magnol (ESTROVEN MENOPAUSE RELIEF PO)        Calcium Carbonate-Vit D-Min (CALCIUM 1200 PO)        fluticasone (FLOVENT DISKUS) 50 MCG/BLIST AEPB Inhale 1 puff into the lungs 2 times daily 1 Inhaler 3     HYDROcodone-acetaminophen (NORCO) 5-325 MG per tablet Take 1 tablet by mouth every 4 hours as needed (Moderate to Severe Pain) (Patient not taking: Reported on 10/16/2018) 5 tablet 0     IBUPROFEN PO Take 600 mg by mouth every 6 hours as needed for moderate pain       levothyroxine (SYNTHROID/LEVOTHROID) 75 MCG tablet Take 1 tablet (75 mcg) by mouth daily 90 tablet 1     MAGNESIUM PO Take 1 tablet by mouth daily        melatonin 3 MG tablet Take 9 mg by mouth nightly as needed. 30 tablet 0     predniSONE (DELTASONE) 10 MG tablet Take 20 mg (2 pills) every day for 2 days, then 10 mg (1 pill) every day for 3 days. (Patient not taking: Reported on 10/16/2018) 7 tablet 0     predniSONE (DELTASONE) 20 MG tablet Take 1 to 2 tablets per day as needed for breathing. 10 tablet 1     predniSONE (DELTASONE) 20 MG tablet Take 3 tabs (60 mg) by mouth daily x 3 days, 2 tabs (40 mg) daily x 3 days, 1 tab (20 mg) daily x 3 days, then 1/2 tab (10 mg) x 3 days.  0     ranitidine (ZANTAC) 150 MG tablet Take 1 tablet (150 mg) by mouth 2 times daily 60 tablet 1     sertraline (ZOLOFT) 100 MG tablet Take 2 tablets (200 mg) by mouth daily 60 tablet 0     vitamin B complex with vitamin C (VITAMIN  B COMPLEX) TABS tablet Take 1 tablet by mouth daily       ziprasidone (GEODON) 40 MG capsule Take 1 capsule (40 mg) by mouth 2 times daily (with meals) 60 capsule 0         PHYSICAL EXAMINATION:  She  is awake, alert and in no  apparent distress.    Her tympanic membranes are clear and intact bilaterally. External auditory canals are clear.  Nasal exam shows a mild septal deviation without obstruction.  Examination of the oral cavity shows no suspicious lesions.  There is good mouth opening.  There is symmetric movement of the tongue and soft palate.    The oropharynx is clear.  Her neck is supple without significant adenopathy.  Pulse is regular.  Upper airway demonstrates airway turbulence with deep inspiration.  Cranial nerves II-XII are grossly intact.         PROCEDURE: A flexible laryngoscopy  was performed.  Informed consent was obtained and a time out was performed. 3% lidocaine and 0.25% phenylephrine was sprayed into the nasal cavity and allowed 3 to 5 minutes for effect. The scope was passed through the right sided nostril. Examination showed the vocal folds to be mobile and meet in the midline.  No nodules, polyps or ulcerations are seen.  There is mild inflammation or erythema of the supraglottic or glottic larynx.  With phonation there is mildcontraction of the supraglottic larynx.  The immediate subglottic area is clear.  I did have her tilt forward and I was able to see further down the trachea.  Approximately 3-4 rings down there was  a narrowing of the trachea.   There appeared to be an adequate airway and there is good airflow although airway turbulence due to the acute inflammation is seen.  The hypopharynx is otherwise clear as is the subglottis.     12/4/2018 Exam      IMPRESSION/PLAN: Subjective worsening of her airway.  I cannot see a definitive area of severe stenosis but this may be beyond the reach of our scope.  She is however having increased symptoms and we will schedule her for a telescopic laryngoscopy with balloon dilatation mitomycin application and steroid injection.  We will try and do it this week.  Given the frequent recurrence of the stenosis I will be considering a referral to evaluate for a tracheal  resection.

## 2018-12-04 NOTE — MR AVS SNAPSHOT
After Visit Summary   12/4/2018    Bibi Song    MRN: [id number]           Patient Information     Date Of Birth          1970        Visit Information        Provider Department      12/4/2018 11:45 AM Dusty Pearson MD [facility] Ear Nose [facility] Th[facility]        Today's Diagnoses     Tracheal stenosis    -  1       Follow-ups after your visit        Follow-up notes from your care team     Return in about 3 weeks (around 12/25/2018).      Your next 10 appointments already scheduled     Dec 05, 2018  2:20 PM CST   Pre-Op physical with SUSANNAH Black CNP   [facility]    [address] [facility] [id number] [phone]            Dec 06, 2018   Procedure with Dusty Pearson MD   [facility] ([facility])    [address] [place] [address]  5th [facility] [address] [phone]           Located in the Clinics and Surgery Center at [address] [place] [address].   parking is very convenient and highly recommended.  is a $6 flat rate fee.  Both  and self parkers should enter the main arrival plaza from [facility]; parking attendants will direct you based on your parking preference.              Who to contact     Please call your clinic at [phone] to:    Ask questions about your health    Make or cancel appointments    Discuss your medicines    Learn about your test results    Speak to your doctor            Additional Information About Your Visit        [facility] Information     [email or web address] gives you secure access to your electronic health record. If you see a primary care provider, you can also send messages to your care team and make appointments. If you have questions, please call your primary care clinic.  If you do not have a primary care provider, please call [phone] and they will assist you.      [employer] is an  "electronic gateway that provides easy, online access to your medical records. With [employer], you can request a clinic appointment, read your test results, renew a prescription or communicate with your care team.     To access your existing account, please contact your [facility] or call [phone] for assistance.        Care EveryWhere ID     This is your Care EveryWhere ID. This could be used by other organizations to access your [facility] medical records  [id number]        Your Vitals Were     Height BMI (Body Mass Index)                1.651 m (5' 5\") 36.78 kg/m2           Blood Pressure from Last 3 Encounters:   12/04/18 140/80   10/05/18 140/82   10/03/18 122/82    Weight from Last 3 Encounters:   12/04/18 100.2 kg (221 lb)   10/05/18 100.6 kg (221 lb 12.5 oz)   10/03/18 99.8 kg (220 lb)              We Performed the Following     IMAGESTREAM RECORDING ORDER     LARYNGOSCOPY FLEX FIBEROPTIC, DIAGNOSTIC     Zandra-Operative Worksheet (Head & Neck)        Primary Care Provider Office Phone # Fax #    Glory Singleton [phone] [address] [address]        Equal Access to Services     JUANCARLOS ADIDSON : Hadii yeison ku hadasho Soomaali, waaxda luqadaha, qaybta kaalmada adeegyada, varun goodn surya flowers. So [facility] [phone].    ATENCIÓN: Si habla español, tiene a velazquez disposición servicios gratuitos de asistencia lingüística. Llame al [phone].    We comply with applicable federal civil rights laws and [place] laws. We do not discriminate on the basis of race, color, national origin, age, disability, sex, sexual orientation, or gender identity.            Thank you!     Thank you for choosing [facility]  for your care. Our goal is always to provide you with excellent care. Hearing back from our patients is one way we can continue to improve our services. Please take a few minutes to complete the written " survey that you may receive in the mail after your visit with us. Thank you!             Your Updated Medication List - Protect others around you: Learn how to safely use, store and throw away your medicines at www.disposemymeds.org.          This list is accurate as of 12/4/18 11:59 PM.  Always use your most recent med list.                   Brand Name Dispense Instructions for use Diagnosis    * albuterol 108 (90 Base) MCG/ACT inhaler    PROAIR HFA/PROVENTIL HFA/VENTOLIN HFA    1 Inhaler    Inhale 2 puffs into the lungs 4 times daily    Acute bronchitis with symptoms > 10 days       * albuterol (2.5 MG/3ML) 0.083% neb solution    PROVENTIL    1 Box    Take 1 vial (2.5 mg) by nebulization every 4 hours as needed for shortness of breath / dyspnea or wheezing    Acute bronchospasm       CALCIUM 1200 PO           ESTROVEN MENOPAUSE RELIEF PO           fluticasone 50 MCG/BLIST inhaler    FLOVENT DISKUS    1 Inhaler    Inhale 1 puff into the lungs 2 times daily    Tracheal stenosis       HYDROcodone-acetaminophen 5-325 MG tablet    NORCO    5 tablet    Take 1 tablet by mouth every 4 hours as needed (Moderate to Severe Pain)    Tracheal stenosis       IBUPROFEN PO      Take 600 mg by mouth every 6 hours as needed for moderate pain        levothyroxine 75 MCG tablet    SYNTHROID/LEVOTHROID    90 tablet    Take 1 tablet (75 mcg) by mouth daily    Hypothyroidism, unspecified type       MAGNESIUM PO      Take 1 tablet by mouth daily        melatonin 3 MG tablet     30 tablet    Take 9 mg by mouth nightly as needed.        * predniSONE 20 MG tablet    DELTASONE     Take 3 tabs (60 mg) by mouth daily x 3 days, 2 tabs (40 mg) daily x 3 days, 1 tab (20 mg) daily x 3 days, then 1/2 tab (10 mg) x 3 days.        * predniSONE 10 MG tablet    DELTASONE    7 tablet    Take 20 mg (2 pills) every day for 2 days, then 10 mg (1 pill) every day for 3 days.    Tracheal stenosis       * predniSONE 20 MG tablet    DELTASONE    10 tablet     Take 1 to 2 tablets per day as needed for breathing.    Tracheal stenosis       ranitidine 150 MG tablet    ZANTAC    60 tablet    Take 1 tablet (150 mg) by mouth 2 times daily    Tracheal stenosis       sertraline 100 MG tablet    ZOLOFT    60 tablet    Take 2 tablets (200 mg) by mouth daily    Obsessive-compulsive disorder, unspecified type, Other specified anxiety disorders, Major depressive disorder, recurrent episode, moderate (H)       vitamin B complex with vitamin C tablet      Take 1 tablet by mouth daily        ziprasidone 40 MG capsule    GEODON    60 capsule    Take 1 capsule (40 mg) by mouth 2 times daily (with meals)    Obsessive-compulsive disorder, unspecified type       * Notice:  This list has 5 medication(s) that are the same as other medications prescribed for you. Read the directions carefully, and ask your doctor or other care provider to review them with you.

## 2018-12-05 ENCOUNTER — OFFICE VISIT (OUTPATIENT)
Dept: FAMILY MEDICINE | Facility: CLINIC | Age: 48
End: 2018-12-05
Payer: COMMERCIAL

## 2018-12-05 ENCOUNTER — ANESTHESIA EVENT (OUTPATIENT)
Dept: SURGERY | Facility: AMBULATORY SURGERY CENTER | Age: 48
End: 2018-12-05

## 2018-12-05 VITALS
HEIGHT: 65 IN | SYSTOLIC BLOOD PRESSURE: 134 MMHG | OXYGEN SATURATION: 94 % | DIASTOLIC BLOOD PRESSURE: 86 MMHG | TEMPERATURE: 98.1 F | BODY MASS INDEX: 36.82 KG/M2 | WEIGHT: 221 LBS | RESPIRATION RATE: 22 BRPM | HEART RATE: 88 BPM

## 2018-12-05 DIAGNOSIS — Z01.818 PREOP GENERAL PHYSICAL EXAM: Primary | ICD-10-CM

## 2018-12-05 DIAGNOSIS — E03.9 HYPOTHYROIDISM, UNSPECIFIED TYPE: ICD-10-CM

## 2018-12-05 DIAGNOSIS — F32.1 MODERATE MAJOR DEPRESSION (H): ICD-10-CM

## 2018-12-05 DIAGNOSIS — J39.8 TRACHEAL STENOSIS: ICD-10-CM

## 2018-12-05 PROCEDURE — 99214 OFFICE O/P EST MOD 30 MIN: CPT | Performed by: FAMILY MEDICINE

## 2018-12-05 RX ORDER — PREDNISONE 20 MG/1
TABLET ORAL
Qty: 10 TABLET | Refills: 0 | Status: SHIPPED | OUTPATIENT
Start: 2018-12-05 | End: 2019-01-04

## 2018-12-05 ASSESSMENT — PAIN SCALES - GENERAL: PAINLEVEL: NO PAIN (0)

## 2018-12-05 NOTE — PATIENT INSTRUCTIONS
Thank you for choosing [facility].  You may be receiving a survey in the mail from Dilan Avalos regarding your visit today.  Please take a few minutes to complete and return the survey to let us know how we are doing.      Our Clinic hours are:  Mondays    7:20 am - 7 pm  Tues - Fri  7:20 am - 5 pm    Clinic Phone: [phone]    The clinic lab opens at 7:30 am Mon - Fri and appointments are required.    [facility]. [phone]  Monday  8 am - 7pm  Tues - Fri 8 am - 5:30 pm         Before Your Surgery      Call your surgeon if there is any change in your health. This includes signs of a cold or flu (such as a sore throat, runny nose, cough, rash or fever).    Do not smoke, drink alcohol or take over the counter medicine (unless your surgeon or primary care doctor tells you to) for the 24 hours before and after surgery.    If you take prescribed drugs: Follow your doctor s orders about which medicines to take and which to stop until after surgery.    Eating and drinking prior to surgery: follow the instructions from your surgeon    Take a shower or bath the night before surgery. Use the soap your surgeon gave you to gently clean your skin. If you do not have soap from your surgeon, use your regular soap. Do not shave or scrub the surgery site.  Wear clean pajamas and have clean sheets on your bed.

## 2018-12-05 NOTE — MR AVS SNAPSHOT
After Visit Summary   12/5/2018    Bibi Song    MRN: [id number]           Patient Information     Date Of Birth          1970        Visit Information        Provider Department      12/5/2018 2:20 PM Glory Singleton MD [facility]        Today's Diagnoses     Preop general physical exam    -  1    Tracheal stenosis        Hypothyroidism, unspecified type        Moderate major depression (H)          Care Instructions          Thank you for choosing [facility].  You may be receiving a survey in the mail from [employer] regarding your visit today.  Please take a few minutes to complete and return the survey to let us know how we are doing.      Our Clinic hours are:  Mondays    7:20 am - 7 pm  Tues -  Fri  7:20 am - 5 pm    Clinic Phone: [phone]    The clinic lab opens at 7:30 am Mon - Fri and appointments are required.    [facility]. [phone]  Monday  8 am - 7pm  Tues - Fri 8 am - 5:30 pm         Before Your Surgery      Call your surgeon if there is any change in your health. This includes signs of a cold or flu (such as a sore throat, runny nose, cough, rash or fever).    Do not smoke, drink alcohol or take over the counter medicine (unless your surgeon or primary care doctor tells you to) for the 24 hours before and after surgery.    If you take prescribed drugs: Follow your doctor s orders about which medicines to take and which to stop until after surgery.    Eating and drinking prior to surgery: follow the instructions from your surgeon    Take a shower or bath the night before surgery. Use the soap your surgeon gave you to gently clean your skin. If you do not have soap from your surgeon, use your regular soap. Do not shave or scrub the surgery site.  Wear clean pajamas and have clean sheets on your bed.           Follow-ups after your visit        Your next 10 appointments already scheduled     Dec 06, 2018   Procedure with  Dusty Pearson MD   [facility] ([facility])    [address] [place] [address]  5th [facility] [address] [phone]           Located in the Clinics and Surgery Center at [address] [place] [address].   parking is very convenient and highly recommended.  is a $6 flat rate fee.  Both  and self parkers should enter the main arrival plaza from [facility]; parking attendants will direct you based on your parking preference.            Dec 27, 2018  2:45 PM CST   (Arrive by 2:30 PM)   Return Visit with Dusty Pearson MD   [facility] ([facility])    [address] [place] [address]  4th [facility] [address] [phone]           This is a multi-disciplinary care team visit as patients with your type of problem are usually seen by a team of an MD and a Speech-Language Pathologist (who is a specialist in disorders of the voice, throat, and breathing).  Please plan about 2 hours for your visit, which will likely include Laryngeal Function Studies, a Voice/Swallow/Breathing Evaluation, and an Endoscopic Laryngeal Examination to provide a comprehensive evaluation.  Please check with your insurance company to ensure you are covered for these services. - It is important to know that if you are evaluated and/or treated by both a physician and a speech pathologist during your visit, your billing will reflect the input that you receive from both providers as separate professionals. Although most insurance plans do cover these services, we encourage you to contact your insurance company prior to your visit to determine whether there are any coverage limitations that might affect you financially. - Billing/procedure codes that are frequently associated with visits to our clinic include (but are not limited to) the ones listed below. Most patients will not need all of these items,  but it may be useful to ask your insurance company's patient . 53377: Flexible fiberoptic laryngoscopy, 07679: Laryngoscopy; flexible or rigid fiberoptic, with stroboscopy, 44728: Flexible endoscopic evaluation of swallowing, 41287: Laryngeal function aerodynamic evaluation, 06412: Evaluation of Voice and Resonance, 97590: Speech pathology treatment for voice, speech, communication, 21762: Speech pathology treatment for swallowing/oral function for feeding - If you have any concerns or questions, or if you would prefer not to have a speech pathologist involved in your visit, please contact our Clinic Coordinator at [phone], at least 24 hours prior to your appointment.              Who to contact     If you have questions or need follow up information about today's clinic visit or your schedule please contact [facility] directly at [phone].  Normal or non-critical lab and imaging results will be communicated to you by [employer], letter or phone within 4 business days after the clinic has received the results. If you do not hear from us within 7 days, please contact the clinic through [employer] or phone. If you have a critical or abnormal lab result, we will notify you by phone as soon as possible.  Submit refill requests through [employer] or call your pharmacy and they will forward the refill request to us. Please allow 3 business days for your refill to be completed.          Additional Information About Your Visit        [employer] Information     [employer] gives you secure access to your electronic health record. If you see a primary care provider, you can also send messages to your care team and make appointments. If you have questions, please call your primary care clinic.  If you do not have a primary care provider, please call [phone] and they will assist you.        Care EveryWhere ID     This is your Care EveryWhere ID. This could be used by other organizations  "to access your [facility] medical records  [id number]        Your Vitals Were     Pulse Temperature Respirations Height Pulse Oximetry Breastfeeding?    88 98.1  F (36.7  C) (Tympanic) 22 5' 5\" (1.651 m) 94% No    BMI (Body Mass Index)                   36.78 kg/m2            Blood Pressure from Last 3 Encounters:   12/05/18 134/86   12/04/18 140/80   10/05/18 140/82    Weight from Last 3 Encounters:   12/05/18 221 lb (100.2 kg)   12/04/18 221 lb (100.2 kg)   10/05/18 221 lb 12.5 oz (100.6 kg)              Today, you had the following     No orders found for display         Today's Medication Changes          These changes are accurate as of 12/5/18  3:01 PM.  If you have any questions, ask your nurse or doctor.               These medicines have changed or have updated prescriptions.        Dose/Directions    predniSONE 20 MG tablet   Commonly known as:  DELTASONE   This may have changed:  Another medication with the same name was removed. Continue taking this medication, and follow the directions you see here.   Used for:  Subglottic stenosis   Changed by:  Glory Singleton MD        Take 1-2 tablets by mouth as needed for breathing   Quantity:  10 tablet   Refills:  0                Primary Care Provider Office Phone # Fax #    Glory Singleton [phone] [address] [facility] [address]        Equal Access to Services     [facility] AH: Hadii yeison choe hadasho Soomaali, waaxda luqadaha, qaybta kaalmada adeegyada, varun ferrera . So [facility] [phone].    ATENCIÓN: Si habla español, tiene a velazquez disposición servicios gratuitos de asistencia lingüística. Llame al [phone].    We comply with applicable federal civil rights laws and [place] laws. We do not discriminate on the basis of race, color, national origin, age, disability, sex, sexual orientation, or gender identity.            Thank you!     Thank you for choosing [facility]  for " your care. Our goal is always to provide you with excellent care. Hearing back from our patients is one way we can continue to improve our services. Please take a few minutes to complete the written survey that you may receive in the mail after your visit with us. Thank you!             Your Updated Medication List - Protect others around you: Learn how to safely use, store and throw away your medicines at www.disposemymeds.org.          This list is accurate as of 12/5/18  3:01 PM.  Always use your most recent med list.                   Brand Name Dispense Instructions for use Diagnosis    * albuterol 108 (90 Base) MCG/ACT inhaler    PROAIR HFA/PROVENTIL HFA/VENTOLIN HFA    1 Inhaler    Inhale 2 puffs into the lungs 4 times daily    Acute bronchitis with symptoms > 10 days       * albuterol (2.5 MG/3ML) 0.083% neb solution    PROVENTIL    1 Box    Take 1 vial (2.5 mg) by nebulization every 4 hours as needed for shortness of breath / dyspnea or wheezing    Acute bronchospasm       CALCIUM 1200 PO           ESTROVEN MENOPAUSE RELIEF PO           fluticasone 50 MCG/BLIST inhaler    FLOVENT DISKUS    1 Inhaler    Inhale 1 puff into the lungs 2 times daily    Tracheal stenosis       HYDROcodone-acetaminophen 5-325 MG tablet    NORCO    5 tablet    Take 1 tablet by mouth every 4 hours as needed (Moderate to Severe Pain)    Tracheal stenosis       IBUPROFEN PO      Take 600 mg by mouth every 6 hours as needed for moderate pain        levothyroxine 75 MCG tablet    SYNTHROID/LEVOTHROID    90 tablet    Take 1 tablet (75 mcg) by mouth daily    Hypothyroidism, unspecified type       MAGNESIUM PO      Take 1 tablet by mouth daily        melatonin 3 MG tablet     30 tablet    Take 9 mg by mouth nightly as needed.        predniSONE 20 MG tablet    DELTASONE    10 tablet    Take 1-2 tablets by mouth as needed for breathing    Subglottic stenosis       ranitidine 150 MG tablet    ZANTAC    60 tablet    Take 1 tablet (150 mg) by  mouth 2 times daily    Tracheal stenosis       sertraline 100 MG tablet    ZOLOFT    60 tablet    Take 2 tablets (200 mg) by mouth daily    Obsessive-compulsive disorder, unspecified type, Other specified anxiety disorders, Major depressive disorder, recurrent episode, moderate (H)       vitamin B complex with vitamin C tablet      Take 1 tablet by mouth daily        WELLBUTRIN PO           ziprasidone 40 MG capsule    GEODON    60 capsule    Take 1 capsule (40 mg) by mouth 2 times daily (with meals)    Obsessive-compulsive disorder, unspecified type       * Notice:  This list has 2 medication(s) that are the same as other medications prescribed for you. Read the directions carefully, and ask your doctor or other care provider to review them with you.

## 2018-12-05 NOTE — PROGRESS NOTES
[facility]  [address]  [facility] [address]  [phone]  Dept: [phone]    PRE-OP EVALUATION:  Today's date: 2018    Bibi Song (: 1970) presents for pre-operative evaluation assessment as requested by Dr. Pearson.  She requires evaluation and anesthesia risk assessment prior to undergoing surgery/procedure for treatment of throat .    Proposed Surgery/ Procedure: Direct Laryngoscopy Balloon Dilation, Mitomycin Application, Steroid Injection  Date of Surgery/ Procedure: 18  Time of Surgery/ Procedure: 7:15 AM  Hospital/Surgical Facility: [facility]  Fax number for surgical facility: [phone]  Primary Physician: Glory Singleton  Type of Anesthesia Anticipated: General    Patient has a Health Care Directive or Living Will:  NO    1. NO - Do you have a history of heart attack, stroke, stent, bypass or surgery on an artery in the head, neck, heart or legs?  2. NO - Do you ever have any pain or discomfort in your chest?  3. NO - Do you have a history of  Heart Failure?  4. NO - Are you troubled by shortness of breath when: walking on the level, up a slight hill or at night?  5. NO - Do you currently have a cold, bronchitis or other respiratory infection?  6. NO - Do you have a cough, shortness of breath or wheezing?  7. NO - Do you sometimes get pains in the calves of your legs when you walk?  8. YES- Do you or anyone in your family have previous history of blood clots?mom and dad  9. YES - Do you or does anyone in your family have a serious bleeding problem such as prolonged bleeding following surgeries or cuts? Parents  10. NO - Have you ever had problems with anemia or been told to take iron pills?  11. NO - Have you had any abnormal blood loss such as black, tarry or bloody stools, or abnormal vaginal bleeding?  12. NO - Have you ever had a blood transfusion?  13. NO - Have you or any of your relatives ever had problems with anesthesia?  14. NO - Do you  have sleep apnea, excessive snoring or daytime drowsiness?  15. NO - Do you have any prosthetic heart valves?  16. NO - Do you have prosthetic joints?  17. NO - Is there any chance that you may be pregnant?      HPI:     HPI related to upcoming procedure: 49 yo female who had an accidental right eye in July.  Admitted and intubated 7/18-7/21. Was intubated 2-3 days.  After that she developed shortness of breath and stridor.  9/7/2018 had direct microlaryngoscopy with dilation of tracheal stenosis, mitomycin application and steroid injection.  On 10/5/2018 she returned to the OR with tracheal stenosis.  Balloon was used to dilate the stenotic segment.  Has again developed airway turbulence and decreased exercise tolerance over the past 2 weeks.  She is breathing comfortably but is audibly turbulent.  Started medrol dosepak 2 days ago.         See problem list for active medical problems.  Problems all longstanding and stable, except as noted/documented.  See ROS for pertinent symptoms related to these conditions.                                                                                                                                                          .    MEDICAL HISTORY:     Patient Active Problem List    Diagnosis Date Noted     Tracheal stenosis 08/31/2018     Priority: Medium     TCA (tricyclic antidepressant) overdose of undetermined intent 07/18/2018     Priority: Medium     Ulcerative colitis with complication, unspecified location (H) 04/27/2017     Priority: Medium     Hypothyroidism, unspecified type 12/08/2016     Priority: Medium     Cervicalgia 10/10/2012     Priority: Medium     Hyperlipidemia LDL goal <160 10/31/2010     Priority: Medium     Moderate major depression (H) 08/06/2010     Priority: Medium     OCD (obsessive compulsive disorder) 08/06/2010     Priority: Medium     [facility] - Psych NP. Schoenecker, NP       Anxiety disorder 08/06/2010     Priority: Medium     Family  history of diabetes mellitus 08/06/2010     Priority: Medium     Family history of thyroid disease 08/06/2010     Priority: Medium     ALCOHOL ABUSE, IN REMISSION 08/06/2010     Priority: Medium      Past Medical History:   Diagnosis Date     Anxiety      Chemical dependency (H)      Depressive disorder      Hoarseness      PONV (postoperative nausea and vomiting)      Problem, psychiatric      Sleep apnea      Past Surgical History:   Procedure Laterality Date     INJECT STEROID (LOCATION) N/A 9/7/2018    Procedure: INJECT STEROID (LOCATION);;  Surgeon: Dusty Pearson MD;  Location: [facility] OR     INJECT STEROID (LOCATION) N/A 10/5/2018    Procedure: INJECT STEROID (LOCATION);;  Surgeon: Dusty Pearson MD;  Location: [facility] OR     LARYNGOSCOPY N/A 9/7/2018    Procedure: LARYNGOSCOPY;  Teleoscopic Direct Laryngoscopy with Balloon Dilation, Mitomycin Application and Steriod Injection ;  Surgeon: Dusty Pearson MD;  Location: [facility] OR     LARYNGOSCOPY, ESOPHAGOSCOPY WITH DILATION, COMBINED N/A 10/5/2018    Procedure: COMBINED LARYNGOSCOPY, ESOPHAGOSCOPY WITH DILATION;  Direct Laryngoscopy with Teleoscope, Balloon Dilation, Application of Mitomycin and Steroid Injection ;  Surgeon: Dusty Pearson MD;  Location: [facility] OR     Current Outpatient Prescriptions   Medication Sig Dispense Refill     albuterol (2.5 MG/3ML) 0.083% neb solution Take 1 vial (2.5 mg) by nebulization every 4 hours as needed for shortness of breath / dyspnea or wheezing 1 Box 0     albuterol (PROAIR HFA/PROVENTIL HFA/VENTOLIN HFA) 108 (90 Base) MCG/ACT inhaler Inhale 2 puffs into the lungs 4 times daily 1 Inhaler 1     Black Cohosh-SoyIsoflav-Magnol (ESTROVEN MENOPAUSE RELIEF PO)        BuPROPion HCl (WELLBUTRIN PO)        Calcium Carbonate-Vit D-Min (CALCIUM 1200 PO)        fluticasone (FLOVENT DISKUS) 50 MCG/BLIST AEPB Inhale 1 puff into the lungs 2 times daily 1 Inhaler 3     HYDROcodone-acetaminophen (NORCO)  "5-325 MG per tablet Take 1 tablet by mouth every 4 hours as needed (Moderate to Severe Pain) 5 tablet 0     IBUPROFEN PO Take 600 mg by mouth every 6 hours as needed for moderate pain       levothyroxine (SYNTHROID/LEVOTHROID) 75 MCG tablet Take 1 tablet (75 mcg) by mouth daily 90 tablet 1     MAGNESIUM PO Take 1 tablet by mouth daily        melatonin 3 MG tablet Take 9 mg by mouth nightly as needed. 30 tablet 0     predniSONE (DELTASONE) 20 MG tablet Take 1-2 tablets by mouth as needed for breathing 10 tablet 0     ranitidine (ZANTAC) 150 MG tablet Take 1 tablet (150 mg) by mouth 2 times daily 60 tablet 1     sertraline (ZOLOFT) 100 MG tablet Take 2 tablets (200 mg) by mouth daily 60 tablet 0     vitamin B complex with vitamin C (VITAMIN  B COMPLEX) TABS tablet Take 1 tablet by mouth daily       ziprasidone (GEODON) 40 MG capsule Take 1 capsule (40 mg) by mouth 2 times daily (with meals) 60 capsule 0     OTC products: None, except as noted above    No Known Allergies   Latex Allergy: NO    Social History   Substance Use Topics     Smoking status: Former Smoker     Years: 5.00     Start date: 11/11/1988     Quit date: 1/1/1994     Smokeless tobacco: Never Used     Alcohol use No      Comment: Treatment in 2007     History   Drug Use No       REVIEW OF SYSTEMS:   CONSTITUTIONAL: NEGATIVE for fever, chills, change in weight  ENT/MOUTH: see HPI  RESP: NEGATIVE for significant cough or SOB  CV: NEGATIVE for chest pain, palpitations or peripheral edema  : no urinary symptoms, normal bowel movements  ROS otherwise negative    EXAM:   /86  Pulse 88  Temp 98.1  F (36.7  C) (Tympanic)  Resp 22  Ht 5' 5\" (1.651 m)  Wt 221 lb (100.2 kg)  SpO2 94%  Breastfeeding? No  BMI 36.78 kg/m2  GENERAL APPEARANCE: healthy, alert and no distress  HENT: audible upper airway turbulence, otherwise unremarkable.   RESP: lungs clear to auscultation - no rales, rhonchi or wheezes  CV: regular rate and rhythm, normal S1 S2, no S3 " or S4 and no murmur, click or rub   ABDOMEN: soft, nontender, no HSM or masses and bowel sounds normal  NEURO: Normal strength and tone, sensory exam grossly normal, mentation intact and speech normal    DIAGNOSTICS:   No labs or EKG required for low risk surgery (cataract, skin procedure, breast biopsy, etc)    Recent Labs   Lab Test  08/29/18   1110  07/21/18   0658   04/27/17   0955   HGB  11.4*  10.2*   < >   --    PLT  420  260   < >   --    NA  141  139   < >  142   POTASSIUM  3.5  3.4   < >  3.6   CR  0.71  0.60   < >  0.93   A1C   --    --    --   5.5    < > = values in this interval not displayed.        IMPRESSION:   Reason for surgery/procedure: upper airway stenosis/tracheal stenosis  Diagnosis/reason for consult: preoperative evaluation and medical risk assessment    The proposed surgical procedure is considered LOW risk.    REVISED CARDIAC RISK INDEX  The patient has the following serious cardiovascular risks for perioperative complications such as (MI, PE, VFib and 3  AV Block):  No serious cardiac risks  INTERPRETATION: 0 risks: Class I (very low risk - 0.4% complication rate)    The patient has the following additional risks for perioperative complications:  No identified additional risks      ICD-10-CM    1. Preop general physical exam Z01.818    2. Tracheal stenosis J39.8    3. Hypothyroidism, unspecified type E03.9    4. Moderate major depression (H) F32.1        RECOMMENDATIONS:         --Patient is to take all scheduled medications on the day of surgery EXCEPT for modifications listed below.    APPROVAL GIVEN to proceed with proposed procedure, without further diagnostic evaluation       Signed Electronically by: Glory Singleton MD    Copy of this evaluation report is provided to requesting physician.    [facility] Preop Guidelines    Revised Cardiac Risk Index

## 2018-12-06 ENCOUNTER — SURGERY (OUTPATIENT)
Age: 48
End: 2018-12-06
Payer: COMMERCIAL

## 2018-12-06 ENCOUNTER — HOSPITAL ENCOUNTER (OUTPATIENT)
Facility: AMBULATORY SURGERY CENTER | Age: 48
End: 2018-12-06
Attending: OTOLARYNGOLOGY
Payer: COMMERCIAL

## 2018-12-06 ENCOUNTER — ANESTHESIA (OUTPATIENT)
Dept: SURGERY | Facility: AMBULATORY SURGERY CENTER | Age: 48
End: 2018-12-06

## 2018-12-06 VITALS
DIASTOLIC BLOOD PRESSURE: 82 MMHG | WEIGHT: 230 LBS | OXYGEN SATURATION: 95 % | HEIGHT: 65 IN | BODY MASS INDEX: 38.32 KG/M2 | SYSTOLIC BLOOD PRESSURE: 138 MMHG | RESPIRATION RATE: 16 BRPM | TEMPERATURE: 97.5 F

## 2018-12-06 DIAGNOSIS — J39.8 TRACHEAL STENOSIS: ICD-10-CM

## 2018-12-06 LAB
HCG UR QL: NEGATIVE
INTERNAL QC OK POCT: YES

## 2018-12-06 RX ORDER — LIDOCAINE HYDROCHLORIDE 20 MG/ML
INJECTION, SOLUTION INFILTRATION; PERINEURAL PRN
Status: DISCONTINUED | OUTPATIENT
Start: 2018-12-06 | End: 2018-12-06

## 2018-12-06 RX ORDER — SODIUM CHLORIDE, SODIUM LACTATE, POTASSIUM CHLORIDE, CALCIUM CHLORIDE 600; 310; 30; 20 MG/100ML; MG/100ML; MG/100ML; MG/100ML
INJECTION, SOLUTION INTRAVENOUS CONTINUOUS
Status: DISCONTINUED | OUTPATIENT
Start: 2018-12-06 | End: 2018-12-06 | Stop reason: HOSPADM

## 2018-12-06 RX ORDER — DEXAMETHASONE SODIUM PHOSPHATE 10 MG/ML
INJECTION, SOLUTION INTRAMUSCULAR; INTRAVENOUS PRN
Status: DISCONTINUED | OUTPATIENT
Start: 2018-12-06 | End: 2018-12-06

## 2018-12-06 RX ORDER — PROPOFOL 10 MG/ML
INJECTION, EMULSION INTRAVENOUS CONTINUOUS PRN
Status: DISCONTINUED | OUTPATIENT
Start: 2018-12-06 | End: 2018-12-06

## 2018-12-06 RX ORDER — ONDANSETRON 2 MG/ML
INJECTION INTRAMUSCULAR; INTRAVENOUS PRN
Status: DISCONTINUED | OUTPATIENT
Start: 2018-12-06 | End: 2018-12-06

## 2018-12-06 RX ORDER — ONDANSETRON 4 MG/1
4 TABLET, ORALLY DISINTEGRATING ORAL EVERY 30 MIN PRN
Status: DISCONTINUED | OUTPATIENT
Start: 2018-12-06 | End: 2018-12-07 | Stop reason: HOSPADM

## 2018-12-06 RX ORDER — LIDOCAINE 40 MG/G
CREAM TOPICAL
Status: DISCONTINUED | OUTPATIENT
Start: 2018-12-06 | End: 2018-12-06 | Stop reason: HOSPADM

## 2018-12-06 RX ORDER — NALOXONE HYDROCHLORIDE 0.4 MG/ML
.1-.4 INJECTION, SOLUTION INTRAMUSCULAR; INTRAVENOUS; SUBCUTANEOUS
Status: DISCONTINUED | OUTPATIENT
Start: 2018-12-06 | End: 2018-12-07 | Stop reason: HOSPADM

## 2018-12-06 RX ORDER — HYDROMORPHONE HYDROCHLORIDE 1 MG/ML
.3-.5 INJECTION, SOLUTION INTRAMUSCULAR; INTRAVENOUS; SUBCUTANEOUS EVERY 10 MIN PRN
Status: DISCONTINUED | OUTPATIENT
Start: 2018-12-06 | End: 2018-12-07 | Stop reason: HOSPADM

## 2018-12-06 RX ORDER — MEPERIDINE HYDROCHLORIDE 25 MG/ML
12.5 INJECTION INTRAMUSCULAR; INTRAVENOUS; SUBCUTANEOUS
Status: DISCONTINUED | OUTPATIENT
Start: 2018-12-06 | End: 2018-12-07 | Stop reason: HOSPADM

## 2018-12-06 RX ORDER — GABAPENTIN 300 MG/1
300 CAPSULE ORAL ONCE
Status: COMPLETED | OUTPATIENT
Start: 2018-12-06 | End: 2018-12-06

## 2018-12-06 RX ORDER — LIDOCAINE HYDROCHLORIDE 40 MG/ML
SOLUTION TOPICAL PRN
Status: DISCONTINUED | OUTPATIENT
Start: 2018-12-06 | End: 2018-12-06 | Stop reason: HOSPADM

## 2018-12-06 RX ORDER — CEPHALEXIN 500 MG/1
500 CAPSULE ORAL 2 TIMES DAILY
Qty: 10 CAPSULE | Refills: 0 | Status: SHIPPED | OUTPATIENT
Start: 2018-12-06 | End: 2019-01-31

## 2018-12-06 RX ORDER — OXYCODONE HYDROCHLORIDE 5 MG/1
5 TABLET ORAL EVERY 4 HOURS PRN
Status: DISCONTINUED | OUTPATIENT
Start: 2018-12-06 | End: 2018-12-07 | Stop reason: HOSPADM

## 2018-12-06 RX ORDER — ACETAMINOPHEN 325 MG/1
975 TABLET ORAL ONCE
Status: COMPLETED | OUTPATIENT
Start: 2018-12-06 | End: 2018-12-06

## 2018-12-06 RX ORDER — ACETAMINOPHEN 325 MG/1
650 TABLET ORAL
Status: DISCONTINUED | OUTPATIENT
Start: 2018-12-06 | End: 2018-12-07 | Stop reason: HOSPADM

## 2018-12-06 RX ORDER — TRIAMCINOLONE ACETONIDE 40 MG/ML
INJECTION, SUSPENSION INTRA-ARTICULAR; INTRAMUSCULAR PRN
Status: DISCONTINUED | OUTPATIENT
Start: 2018-12-06 | End: 2018-12-06 | Stop reason: HOSPADM

## 2018-12-06 RX ORDER — ONDANSETRON 2 MG/ML
4 INJECTION INTRAMUSCULAR; INTRAVENOUS EVERY 30 MIN PRN
Status: DISCONTINUED | OUTPATIENT
Start: 2018-12-06 | End: 2018-12-07 | Stop reason: HOSPADM

## 2018-12-06 RX ORDER — HYDROCODONE BITARTRATE AND ACETAMINOPHEN 5; 325 MG/1; MG/1
1 TABLET ORAL
Status: DISCONTINUED | OUTPATIENT
Start: 2018-12-06 | End: 2018-12-07 | Stop reason: HOSPADM

## 2018-12-06 RX ORDER — CEFAZOLIN SODIUM 2 G/50ML
2 SOLUTION INTRAVENOUS
Status: COMPLETED | OUTPATIENT
Start: 2018-12-06 | End: 2018-12-06

## 2018-12-06 RX ORDER — FENTANYL CITRATE 50 UG/ML
INJECTION, SOLUTION INTRAMUSCULAR; INTRAVENOUS PRN
Status: DISCONTINUED | OUTPATIENT
Start: 2018-12-06 | End: 2018-12-06

## 2018-12-06 RX ORDER — PROPOFOL 10 MG/ML
INJECTION, EMULSION INTRAVENOUS PRN
Status: DISCONTINUED | OUTPATIENT
Start: 2018-12-06 | End: 2018-12-06

## 2018-12-06 RX ORDER — FENTANYL CITRATE 50 UG/ML
25-50 INJECTION, SOLUTION INTRAMUSCULAR; INTRAVENOUS
Status: DISCONTINUED | OUTPATIENT
Start: 2018-12-06 | End: 2018-12-06 | Stop reason: HOSPADM

## 2018-12-06 RX ORDER — OMEGA-3 FATTY ACIDS/FISH OIL 300-1000MG
200 CAPSULE ORAL EVERY 6 HOURS PRN
Qty: 120 CAPSULE | Refills: 0
Start: 2018-12-06 | End: 2019-01-31

## 2018-12-06 RX ORDER — SODIUM CHLORIDE, SODIUM LACTATE, POTASSIUM CHLORIDE, CALCIUM CHLORIDE 600; 310; 30; 20 MG/100ML; MG/100ML; MG/100ML; MG/100ML
INJECTION, SOLUTION INTRAVENOUS CONTINUOUS
Status: DISCONTINUED | OUTPATIENT
Start: 2018-12-06 | End: 2018-12-07 | Stop reason: HOSPADM

## 2018-12-06 RX ADMIN — Medication 0.3 ML: at 06:32

## 2018-12-06 RX ADMIN — FENTANYL CITRATE 50 MCG: 50 INJECTION, SOLUTION INTRAMUSCULAR; INTRAVENOUS at 08:37

## 2018-12-06 RX ADMIN — PROPOFOL 150 MCG/KG/MIN: 10 INJECTION, EMULSION INTRAVENOUS at 07:19

## 2018-12-06 RX ADMIN — CEFAZOLIN SODIUM 2 G: 2 SOLUTION INTRAVENOUS at 07:24

## 2018-12-06 RX ADMIN — PROPOFOL 100 MG: 10 INJECTION, EMULSION INTRAVENOUS at 08:08

## 2018-12-06 RX ADMIN — Medication 50 MG: at 07:20

## 2018-12-06 RX ADMIN — FENTANYL CITRATE 50 MCG: 50 INJECTION, SOLUTION INTRAMUSCULAR; INTRAVENOUS at 08:26

## 2018-12-06 RX ADMIN — DEXAMETHASONE SODIUM PHOSPHATE 10 MG: 10 INJECTION, SOLUTION INTRAMUSCULAR; INTRAVENOUS at 07:30

## 2018-12-06 RX ADMIN — ACETAMINOPHEN 975 MG: 325 TABLET ORAL at 06:22

## 2018-12-06 RX ADMIN — PROPOFOL 150 MG: 10 INJECTION, EMULSION INTRAVENOUS at 07:18

## 2018-12-06 RX ADMIN — LIDOCAINE HYDROCHLORIDE 100 MG: 20 INJECTION, SOLUTION INFILTRATION; PERINEURAL at 07:18

## 2018-12-06 RX ADMIN — FENTANYL CITRATE 50 MCG: 50 INJECTION, SOLUTION INTRAMUSCULAR; INTRAVENOUS at 07:18

## 2018-12-06 RX ADMIN — SODIUM CHLORIDE, SODIUM LACTATE, POTASSIUM CHLORIDE, CALCIUM CHLORIDE: 600; 310; 30; 20 INJECTION, SOLUTION INTRAVENOUS at 06:22

## 2018-12-06 RX ADMIN — OXYCODONE HYDROCHLORIDE 5 MG: 5 TABLET ORAL at 08:25

## 2018-12-06 RX ADMIN — TRIAMCINOLONE ACETONIDE 1.4 ML: 40 INJECTION, SUSPENSION INTRA-ARTICULAR; INTRAMUSCULAR at 08:05

## 2018-12-06 RX ADMIN — ONDANSETRON 4 MG: 2 INJECTION INTRAMUSCULAR; INTRAVENOUS at 09:44

## 2018-12-06 RX ADMIN — ONDANSETRON 4 MG: 2 INJECTION INTRAMUSCULAR; INTRAVENOUS at 07:30

## 2018-12-06 RX ADMIN — PROPOFOL 50 MG: 10 INJECTION, EMULSION INTRAVENOUS at 07:38

## 2018-12-06 RX ADMIN — LIDOCAINE HYDROCHLORIDE 4 ML: 40 SOLUTION TOPICAL at 07:39

## 2018-12-06 RX ADMIN — FENTANYL CITRATE 50 MCG: 50 INJECTION, SOLUTION INTRAMUSCULAR; INTRAVENOUS at 07:38

## 2018-12-06 RX ADMIN — GABAPENTIN 300 MG: 300 CAPSULE ORAL at 06:23

## 2018-12-06 NOTE — ANESTHESIA CARE TRANSFER NOTE
Patient: Bibi Song    Procedure(s):  Direct Laryngoscopy, Balloon Dilation, Mitomycin Application, Steroid Injection  Steroid Injection    Diagnosis: Subglottic Stenosis  Diagnosis Additional Information: No value filed.    Anesthesia Type:   General     Note:  Airway :Face Mask  Patient transferred to:PACU  Comments: Report to RN    142/82, 126, 97.8, 12, 98%Handoff Report: Identifed the Patient, Identified the Reponsible Provider, Reviewed the pertinent medical history, Discussed the surgical course, Reviewed Intra-OP anesthesia mangement and issues during anesthesia, Set expectations for post-procedure period and Allowed opportunity for questions and acknowledgement of understanding      Vitals: (Last set prior to Anesthesia Care Transfer)    CRNA VITALS  12/6/2018 0747 - 12/6/2018 0821      12/6/2018             Pulse: 138    SpO2: (!)  89 %    Resp Rate (set): 10                Electronically Signed By: SUSANNAH English CRNA  December 6, 2018  8:21 AM

## 2018-12-06 NOTE — IP AVS SNAPSHOT
MRN:[id number]                      After Visit Summary   12/6/2018    Bibi Song    MRN: [id number]           Thank you!     Thank you for choosing [facility] for your care. Our goal is always to provide you with excellent care. Hearing back from our patients is one way we can continue to improve our services. Please take a few minutes to complete the written survey that you may receive in the mail after you visit with us. Thank you!        Patient Information     Date Of Birth          1970        About your hospital stay     You were admitted on:  December 6, 2018 You last received care in the:  [facility]    You were discharged on:  December 6, 2018       Who to Call     For medical emergencies, please call 911.  For non-urgent questions about your medical care, please call your primary care provider or clinic, [phone]  For questions related to your surgery, please call your surgery clinic        Attending Provider     Provider Specialty    Dusty Pearson MD Otolaryngology       Primary Care Provider Office Phone # Fax #    Glory Singleton [phone]      After Care Instructions     Diet Instructions       Anti reflux diet as listed above            No driving or operating machinery       until the day after procedure                  Your next 10 appointments already scheduled     Dec 27, 2018  2:45 PM CST   (Arrive by 2:30 PM)   Return Visit with Dusty Pearson MD   [facility] ([facility])    [address] [place] [id number] [phone]           This is a multi-disciplinary care team visit as patients with your type of problem are usually seen by a team of an MD and a Speech-Language Pathologist (who is a specialist in disorders of the voice, throat, and breathing).  Please plan about 2 hours for your visit, which will likely include  Laryngeal Function Studies, a Voice/Swallow/Breathing Evaluation, and an Endoscopic Laryngeal Examination to provide a comprehensive evaluation.  Please check with your insurance company to ensure you are covered for these services. - It is important to know that if you are evaluated and/or treated by both a physician and a speech pathologist during your visit, your billing will reflect the input that you receive from both providers as separate professionals. Although most insurance plans do cover these services, we encourage you to contact your insurance company prior to your visit to determine whether there are any coverage limitations that might affect you financially. - Billing/procedure codes that are frequently associated with visits to our clinic include (but are not limited to) the ones listed below. Most patients will not need all of these items, but it may be useful to ask your insurance company's patient . 58730: Flexible fiberoptic laryngoscopy, 70056: Laryngoscopy; flexible or rigid fiberoptic, with stroboscopy, 62242: Flexible endoscopic evaluation of swallowing, 35687: Laryngeal function aerodynamic evaluation, 90747: Evaluation of Voice and Resonance, 46639: Speech pathology treatment for voice, speech, communication, 53188: Speech pathology treatment for swallowing/oral function for feeding - If you have any concerns or questions, or if you would prefer not to have a speech pathologist involved in your visit, please contact our Clinic Coordinator at [phone], at least 24 hours prior to your appointment.              Further instructions from your care team       Arrange follow-up appointment in 1-4 weeks.  No heavy lifting, avoid dry and or aman air.     Follow reflux precautions as below.   GERD (Gastro Esophageal Reflux Disorder)   Other names : reflux , Laryngopharyngeal Reflux Disorder (LPRD)     Symptoms   -dry, choking sensation, especially during the night    -voice quality that is worst in the morning   -raw, burning sensation in the throat   -pain in throat, neck, or running from back of chin along neck   -frequent coughing or desire to clear throat   -rough, gritty voice quality   -decline in voice quality or comfort with continued voice use   -a sour taste in your mouth upon waking up       Interestingly, the majority of the patients in the [facility] who have laryngeal symptoms of GERD do not have any stomach discomfort or sensation of heartburn.     Cause   Acid from the stomach refluxes back up through the esophagus and spills over onto the larynx. This irritates the vocal folds (also called vocal cords; see the explanation of this terminology) and creates inflammation, which causes the vocal folds to vibrate unevenly. Coughing and throat clearing from the irritation can make the inflammation worse. The resulting voice disorder is often related to the poor vibratory quality of the inflamed vocal folds and the muscle tension created by effortful attempts at compensation.     Treatment   Anti-reflux medication and dietary precautions are the first line of defense. Functional voice therapy is useful to teach techniques for reducing effortful compensation and instruct the individual in improved vocal hygiene.     At the [facility], we do not hand out a list of foods and beverages that must be avoided. Rather, we educate about types of foods and beverages known to cause reflux and encourage the individual to systematically investigate which foods stimulate their own reflux. Also, the individual is encouraged to manage reflux under the care of a Gastroenterologist (gastrointestinal specialist).     Interested readers are encouraged to look at the website of the [facility] at [facility] for a more in depth discussion of GERD and the voice (see our Links page).       Lifestyle changes that may help reduce symptoms of GERD/LPRD    -eat smaller meals more frequently throughout the day, rather than three large meals   -elevate the head of your bed 2-3 inches (don't just use extra pillows for your head)   -avoid clothes that fit tightly around the waist   -avoid lying down within 2-3 hours of eating (don't eat dinner late at night)       Types of foods known to trigger increased stomach acid   -spicy food (such as chili or jalapeño peppers, Nain or Szechuan spices)   -acidic foods such as tomato products or citrus products   -greasy foods   -caffeine   -alcohol   -carbonation   -roughage, such as popcorn and peanuts, or raw vegetables   -dairy products   -strong mint such as peppermint candies   -chocolate     Reading this list might make you think you can only eat bread and oatmeal for the rest of your life. Fortunately, most individuals are not triggered by everything on this list. For example, for every person who is lactose intolerant and has problems with dairy products, there may be someone else whose digestive system is calmed by milk. Also, in our experience, few persons are triggered by peppermints or chocolate. Therefore, we recommend that you experiment with your own dietary habits, changing one food class at a time. Also, if you have recently started taking anti-reflux medications, you may want to wait for several months, to see how the medication works without any change in your dietary habits.      [facility]  Home Care Following Anesthesia  For 24 hours after surgery:  1. Get plenty of rest.  A responsible adult must stay with you for at least 24 hours after you leave the surgery center.  2. Do not drive or use heavy equipment.  If you have weakness or tingling, don't drive or use heavy equipment until this feeling goes away.   3. Do not drink alcohol.   4. Avoid strenuous or risky activities.  Ask for help when climbing stairs.  5. You may feel lightheaded.  IF so, sit for a few minutes  before standing.  Have someone help you get up.   6. If you have nausea (feel sick to your stomach): Drink only clear liquids such as apple juice, ginger ale, broth or 7-Up.  Rest may also help.  Be sure to drink enough fluids.  Move to a regular diet as you feel able.   7. You may have a slight fever.  Call the doctor if your fever is over 100 F (37.7 C) (taken under the tongue) or lasts longer than 24 hours.  8. You may have a dry mouth, a sore throat, muscle aches or trouble sleeping. These should go away after 24 hours.  9. Do not make important or legal decisions.   If you use hormonal birth control (such as the pill, patch, ring or implants):  You will need a second form of birth control for 7 days (condoms, a diaphragm or contraceptive foam).  While in the surgery center, you received a medicine called Sugammadex.  Hormonal birth control (such as the pill, patch, ring or implants) will not work as well for a week after taking this medicine.    Tips for taking pain medications  To get the best pain relief possible, remember these points:    Take pain medications as directed, before pain becomes severe.    Pain medication can upset your stomach: taking it with food may help.    Constipation is a common side effect of pain medication. Drink plenty of  fluids.    Eat foods high in fiber. Take a stool softener if recommended by your doctor or pharmacist.    Do not drink alcohol, drive or operate machinery while taking pain medications.    Ask about other ways to control pain, such as with heat, ice or relaxation.    Tylenol/Acetaminophen Consumption: 975 mg tylenol taken at 06:15AM, OK to take more at 12:15PM.  To help encourage the safe use of acetaminophen, the makers of TYLENOL  have lowered the maximum daily dose for single-ingredient Extra Strength TYLENOL  (acetaminophen) products sold in the [place] from 8 pills per day (4,000 mg) to 6 pills per day (3,000 mg). The dosing interval has also changed from 2 pills  "every 4-6 hours to 2 pills every 6 hours.    If you feel your pain relief is insufficient, you may take Tylenol/Acetaminophen in addition to your narcotic pain medication.     Be careful not to exceed 3,000 mg of Tylenol/Acetaminophen in a 24 hour period from all sources.    If you are taking extra strength Tylenol/acetaminophen (500 mg), the maximum dose is 6 tablets in 24 hours.    If you are taking regular strength acetaminophen (325 mg), the maximum dose is 9 tablets in 24 hours.    Call a doctor for any of the followin. Signs of infection (fever, growing tenderness at the surgery site, a large amount of drainage or bleeding, severe pain, foul-smelling drainage, redness, swelling).  2. It has been over 8 to 10 hours since surgery and you are still not able to urinate (pass water).  3. Headache for over 24 hours.  Your doctor is:  Dr. Dusty Pearson, ENT Otolaryngology: [phone]    Or dial [phone] and ask for the resident on call for:  ENT Otolaryngology  For emergency care, call the:  [facility]:  [phone] (TTY for hearing impaired: [phone])    Additional Information     If you use hormonal birth control (such as the pill, patch, ring or implants): You'll need a second form of birth control for 7 days (condoms, a diaphragm or contraceptive foam). While in the hospital, you received a medicine called Bridion. Your normal birth control will not work as well for a week after taking this medicine.          Pending Results     No orders found from 2018 to 2018.            Admission Information     Date & Time Provider Department Dept. Phone    2018 Dusty Pearson MD [facility] [phone]      Your Vitals Were     Blood Pressure Temperature Respirations Height Weight Last Period    133/92 99.2  F (37.3  C) (Temporal) 17 1.651 m (5' 5\") 104.3 kg (230 lb) 2018    Pulse Oximetry BMI (Body Mass Index)                95% " 38.27 kg/m2          [employer] Information     [employer] gives you secure access to your electronic health record. If you see a primary care provider, you can also send messages to your care team and make appointments. If you have questions, please call your primary care clinic.  If you do not have a primary care provider, please call [phone] and they will assist you.      [employer] is an electronic gateway that provides easy, online access to your medical records. With [employer], you can request a clinic appointment, read your test results, renew a prescription or communicate with your care team.     To access your existing account, please contact your [facility] or call [phone] for assistance.        Care EveryWhere ID     This is your Care EveryWhere ID. This could be used by other organizations to access your [facility] medical records  [id number]        Equal Access to Services     JUANCARLOS ADDISON : Brittany Wright, reena kumar, silvia marroquin, varun flowers. So [facility] [phone].    ATENCIÓN: Si habla español, tiene a velazquez disposición servicios gratuitos de asistencia lingüística. Drew al [phone].    We comply with applicable federal civil rights laws and [place] laws. We do not discriminate on the basis of race, color, national origin, age, disability, sex, sexual orientation, or gender identity.               Review of your medicines      START taking        Dose / Directions    acetaminophen-codeine 300-30 MG tablet   Commonly known as:  TYLENOL #3   Used for:  Tracheal stenosis        Dose:  1 tablet   Take 1 tablet by mouth every 6 hours as needed for severe pain   Quantity:  10 tablet   Refills:  0       cephALEXin 500 MG capsule   Commonly known as:  KEFLEX   Used for:  Tracheal stenosis        Dose:  500 mg   Take 1 capsule (500 mg) by mouth 2 times daily for 5 days   Quantity:  10 capsule   Refills:  0          CONTINUE these medicines which may have CHANGED, or have new prescriptions. If we are uncertain of the size of tablets/capsules you have at home, strength may be listed as something that might have changed.        Dose / Directions    ibuprofen 200 MG capsule   Commonly known as:  ADVIL/MOTRIN   This may have changed:    - medication strength  - how much to take  - reasons to take this   Used for:  Tracheal stenosis        Dose:  200 mg   Take 1 capsule (200 mg) by mouth every 6 hours as needed for pain   Quantity:  120 capsule   Refills:  0         CONTINUE these medicines which have NOT CHANGED        Dose / Directions    * albuterol 108 (90 Base) MCG/ACT inhaler   Commonly known as:  PROAIR HFA/PROVENTIL HFA/VENTOLIN HFA   Used for:  Acute bronchitis with symptoms > 10 days        Dose:  2 puff   Inhale 2 puffs into the lungs 4 times daily   Quantity:  1 Inhaler   Refills:  1       * albuterol (2.5 MG/3ML) 0.083% neb solution   Commonly known as:  PROVENTIL   Used for:  Acute bronchospasm        Dose:  2.5 mg   Take 1 vial (2.5 mg) by nebulization every 4 hours as needed for shortness of breath / dyspnea or wheezing   Quantity:  1 Box   Refills:  0       CALCIUM 1200 PO        Refills:  0       ESTROVEN MENOPAUSE RELIEF PO        Refills:  0       fluticasone 50 MCG/BLIST inhaler   Commonly known as:  FLOVENT DISKUS   Used for:  Tracheal stenosis        Dose:  1 puff   Inhale 1 puff into the lungs 2 times daily   Quantity:  1 Inhaler   Refills:  3       levothyroxine 75 MCG tablet   Commonly known as:  SYNTHROID/LEVOTHROID   Used for:  Hypothyroidism, unspecified type        Dose:  75 mcg   Take 1 tablet (75 mcg) by mouth daily   Quantity:  90 tablet   Refills:  1       MAGNESIUM PO        Dose:  1 tablet   Take 1 tablet by mouth daily   Refills:  0       melatonin 3 MG tablet        Dose:  9 mg   Take 9 mg by mouth nightly as needed.   Quantity:  30 tablet   Refills:  0       predniSONE 20 MG tablet   Commonly  known as:  DELTASONE   Used for:  Subglottic stenosis        Take 1-2 tablets by mouth as needed for breathing   Quantity:  10 tablet   Refills:  0       ranitidine 150 MG tablet   Commonly known as:  ZANTAC   Used for:  Tracheal stenosis        Dose:  150 mg   Take 1 tablet (150 mg) by mouth 2 times daily   Quantity:  60 tablet   Refills:  1       sertraline 100 MG tablet   Commonly known as:  ZOLOFT   Used for:  Obsessive-compulsive disorder, unspecified type, Other specified anxiety disorders, Major depressive disorder, recurrent episode, moderate (H)        Dose:  200 mg   Take 2 tablets (200 mg) by mouth daily   Quantity:  60 tablet   Refills:  0       vitamin B complex with vitamin C tablet        Dose:  1 tablet   Take 1 tablet by mouth daily   Refills:  0       WELLBUTRIN PO        Refills:  0       ziprasidone 40 MG capsule   Commonly known as:  GEODON   Used for:  Obsessive-compulsive disorder, unspecified type        Dose:  40 mg   Take 1 capsule (40 mg) by mouth 2 times daily (with meals)   Quantity:  60 capsule   Refills:  0       * Notice:  This list has 2 medication(s) that are the same as other medications prescribed for you. Read the directions carefully, and ask your doctor or other care provider to review them with you.         Where to get your medicines      These medications were sent to [address] [address]    Hours:  JESSICA PHONE NUMBER [phone] Phone:  [phone]     cephALEXin 500 MG capsule    ranitidine 150 MG tablet         Some of these will need a paper prescription and others can be bought over the counter. Ask your nurse if you have questions.     Bring a paper prescription for each of these medications     acetaminophen-codeine 300-30 MG tablet       You don't need a prescription for these medications     ibuprofen 200 MG capsule                Protect others  around you: Learn how to safely use, store and throw away your medicines at www.disposemymeds.org.        ANTIBIOTIC INSTRUCTION     You've Been Prescribed an Antibiotic - Now What?  Your healthcare team thinks that you or your loved one might have an infection. Some infections can be treated with antibiotics, which are powerful, life-saving drugs. Like all medications, antibiotics have side effects and should only be used when necessary. There are some important things you should know about your antibiotic treatment.      Your healthcare team may run tests before you start taking an antibiotic.    Your team may take samples (e.g., from your blood, urine or other areas) to run tests to look for bacteria. These test can be important to determine if you need an antibiotic at all and, if you do, which antibiotic will work best.      Within a few days, your healthcare team might change or even stop your antibiotic.    Your team may start you on an antibiotic while they are working to find out what is making you sick.    Your team might change your antibiotic because test results show that a different antibiotic would be better to treat your infection.    In some cases, once your team has more information, they learn that you do not need an antibiotic at all. They may find out that you don't have an infection, or that the antibiotic you're taking won't work against your infection. For example, an infection caused by a virus can't be treated with antibiotics. Staying on an antibiotic when you don't need it is more likely to be harmful than helpful.      You may experience side effects from your antibiotic.    Like all medications, antibiotics have side effects. Some of these can be serious.    Let you healthcare team know if you have any known allergies when you are admitted to the hospital.    One significant side effect of nearly all antibiotics is the risk of severe and sometimes deadly diarrhea caused by Clostridium  difficile (C. Difficile). This occurs when a person takes antibiotics because some good germs are destroyed. Antibiotic use allows C. diificile to take over, putting patients at high risk for this serious infection.    As a patient or caregiver, it is important to understand your or your loved one's antibiotic treatment. It is especially important for caregivers to speak up when patients can't speak for themselves. Here are some important questions to ask your healthcare team.    What infection is this antibiotic treating and how do you know I have that infection?    What side effects might occur from this antibiotic?    How long will I need to take this antibiotic?    Is it safe to take this antibiotic with other medications or supplements (e.g., vitamins) that I am taking?     Are there any special directions I need to know about taking this antibiotic? For example, should I take it with food?    How will I be monitored to know whether my infection is responding to the antibiotic?    What tests may help to make sure the right antibiotic is prescribed for me?      Information provided by:  www.cdc.gov/getsmart  U.S. Department of Health and Human Services  Centers for disease Control and Prevention  National Center for Emerging and Zoonotic Infectious Diseases  Division of Healthcare Quality Promotion        Information about OPIOIDS     PRESCRIPTION OPIOIDS: WHAT YOU NEED TO KNOW   We gave you an opioid (narcotic) pain medicine. It is important to manage your pain, but opioids are not always the best choice. You should first try all the other options your care team gave you. Take this medicine for as short a time (and as few doses) as possible.    Some activities can increase your pain, such as bandage changes or therapy sessions. It may help to take your pain medicine 30 to 60 minutes before these activities. Reduce your stress by getting enough sleep, working on hobbies you enjoy and practicing relaxation or  meditation. Talk to your care team about ways to manage your pain beyond prescription opioids.    These medicines have risks:    DO NOT drive when on new or higher doses of pain medicine. These medicines can affect your alertness and reaction times, and you could be arrested for driving under the influence (DUI). If you need to use opioids long-term, talk to your care team about driving.    DO NOT operate heavy machinery    DO NOT do any other dangerous activities while taking these medicines.    DO NOT drink any alcohol while taking these medicines.     If the opioid prescribed includes acetaminophen, DO NOT take with any other medicines that contain acetaminophen. Read all labels carefully. Look for the word  acetaminophen  or  Tylenol.  Ask your pharmacist if you have questions or are unsure.    You can get addicted to pain medicines, especially if you have a history of addiction (chemical, alcohol or substance dependence). Talk to your care team about ways to reduce this risk.    All opioids tend to cause constipation. Drink plenty of water and eat foods that have a lot of fiber, such as fruits, vegetables, prune juice, apple juice and high-fiber cereal. Take a laxative (Miralax, milk of magnesia, Colace, Senna) if you don t move your bowels at least every other day. Other side effects include upset stomach, sleepiness, dizziness, throwing up, tolerance (needing more of the medicine to have the same effect), physical dependence and slowed breathing.    Store your pills in a secure place, locked if possible. We will not replace any lost or stolen medicine. If you don t finish your medicine, please throw away (dispose) as directed by your pharmacist. The [place] Pollution Control Agency has more information about safe disposal: https://www.pca.[facility].[place].us/living-green/managing-unwanted-medications             Medication List: This is a list of all your medications and when to take them. Check marks below  indicate your daily home schedule. Keep this list as a reference.      Medications           Morning Afternoon Evening Bedtime As Needed    acetaminophen-codeine 300-30 MG tablet   Commonly known as:  TYLENOL #3   Take 1 tablet by mouth every 6 hours as needed for severe pain                                * albuterol 108 (90 Base) MCG/ACT inhaler   Commonly known as:  PROAIR HFA/PROVENTIL HFA/VENTOLIN HFA   Inhale 2 puffs into the lungs 4 times daily                                * albuterol (2.5 MG/3ML) 0.083% neb solution   Commonly known as:  PROVENTIL   Take 1 vial (2.5 mg) by nebulization every 4 hours as needed for shortness of breath / dyspnea or wheezing                                CALCIUM 1200 PO                                cephALEXin 500 MG capsule   Commonly known as:  KEFLEX   Take 1 capsule (500 mg) by mouth 2 times daily for 5 days                                ESTROVEN MENOPAUSE RELIEF PO                                fluticasone 50 MCG/BLIST inhaler   Commonly known as:  FLOVENT DISKUS   Inhale 1 puff into the lungs 2 times daily                                ibuprofen 200 MG capsule   Commonly known as:  ADVIL/MOTRIN   Take 1 capsule (200 mg) by mouth every 6 hours as needed for pain                                levothyroxine 75 MCG tablet   Commonly known as:  SYNTHROID/LEVOTHROID   Take 1 tablet (75 mcg) by mouth daily                                MAGNESIUM PO   Take 1 tablet by mouth daily                                melatonin 3 MG tablet   Take 9 mg by mouth nightly as needed.                                predniSONE 20 MG tablet   Commonly known as:  DELTASONE   Take 1-2 tablets by mouth as needed for breathing                                ranitidine 150 MG tablet   Commonly known as:  ZANTAC   Take 1 tablet (150 mg) by mouth 2 times daily                                sertraline 100 MG tablet   Commonly known as:  ZOLOFT   Take 2 tablets (200 mg) by mouth daily                                 vitamin B complex with vitamin C tablet   Take 1 tablet by mouth daily                                WELLBUTRIN PO                                ziprasidone 40 MG capsule   Commonly known as:  GEODON   Take 1 capsule (40 mg) by mouth 2 times daily (with meals)                                * Notice:  This list has 2 medication(s) that are the same as other medications prescribed for you. Read the directions carefully, and ask your doctor or other care provider to review them with you.

## 2018-12-06 NOTE — OP NOTE
DATE OF SERVICE: 12/06/18        STAFF SURGEON:  Dusty Pearson MD       RESIDENT SURGEON: Heike Acuna MD      PREOPERATIVE DIAGNOSES: Tracheal stenosis.       POSTOPERATIVE DIAGNOSIS: Tracheal stenosis.       PROCEDURES PERFORMED:   1.  Microdirect telescopic laryngoscopy and bronchoscopy.   2.  Endoscopic-guided dilation of the trachea.   3.  Mitomycin application.   4.  Endoscopic steroid injection into the trachea.       COMPLICATIONS:  None.       ESTIMATED BLOOD LOSS:  5 mL.       SPECIMENS: None.       ANESTHESIA:  Jet ventilation.       OPERATIVE FINDINGS: Tracheal stenosis.  Anteriorly it was 10 mm thick and was situated 45 mm below the vocal cords.  Posteriorly it was 16 mm thick and 40 mm below the vocal cords, just over an arterial pulse.  There was a Good view with the Dedo-Hermelinda laryngoscope.  There was no difficulty with mask ventilation. A 18 mm x 4 cm balloon was utilized to dilate the stenotic segment after mucosal incisions were made. The remainder of the trachea appeared normal.         INDICATIONS FOR PROCEDURE: Bibi Song is a 48 year old female with a history of tracheal stenosis. She developed airway symptoms after a recent intubation.  She was last dilated initially on September 7, 2018 and more recently on 10/5/2018 . On a recent clinic visit a recurrence of the stenosis was noted and she reported increased difficulty breathing with airway turbulence.  She returns to the operating room for a telescopic laryngoscopy and dilation of the stenosis.          DESCRIPTION OF PROCEDURE: After properly identifying the patient and obtaining signed informed consent, the patient was brought to the operating room and laid in supine position on the operating table.  General anesthesia was induced and the patient was ventilated through masking.  The bed was rotated 90 degrees. A shoulder roll and folded blanket were placed to properly position the patient. Two thermoplastic tooth  guards were placed over the upper dentition and a single thermoplastic  tooth guard over the lower dentition. The patient was draped in the usual clean fashion.  The  jet ventilation system was set up and titrated to an adequate pressure.  A timeout was carried out immediately prior to the procedure to confirm the identity of the patient and correctness of the procedure.      The Dedo- Hermelinda laryngoscope was then used to gain access to the larynx. The laryngoscope  was put into suspension with the Lewy suspension device and jet ventilation begun. Jet ventilation was confirmed to show an adequate chest rise. Topical lidocaine was applied to the area. The larynx was topically anesthetized with 4% lidocaine. The larynx and trachea were examined with a rigid endoscope with the magnified image displayed on a video monitor. The telescope was used for the remainer of the procedure. The circumferential scar was measured and findings are noted above. Pictures were taken of the stenosis and distal trachea.           Incisions were then made in the  scar band  at the 12, 9, and 3 o'clock  positions.  An additional incision was made posteriorly at the 6 o'clock position. The balloon was then utilized to dilate the stenotic segment. After 2 dilations, the balloon was removed and the patient resumed jet ventilation. Mitomycin, at a concentration of 1 mg / cc, was applied to the incisional areas for a total of 4 minutes exposure  while the patient was being ventilated.  The mitomycin was then removed and 3 sequential swabs of saline were used to cleanse the area.  An injection of 1.4 mL of 40 mg/mL of Kenalog was then injected into the area around the tracheal stenosis for a total dose of 56 mg. We then proceeded to use the balloon again to dilate the segment for a third time. Topical cocaine was applied to the dilated area. Post dilation photodocumentation was obtained. The laryngoscope and tooth guards were removed.  Mask  ventilation was established. The patient was then turned over to Anesthesia for emergence and was masked until awake.       Dr. Dusty Pearson was present for the entire case.     Heike Acuna MD  PGY-3, [place]

## 2018-12-06 NOTE — DISCHARGE INSTRUCTIONS
Arrange follow-up appointment in 1-4 weeks.  No heavy lifting, avoid dry and or aman air.     Follow reflux precautions as below.   GERD (Gastro Esophageal Reflux Disorder)   Other names : reflux , Laryngopharyngeal Reflux Disorder (LPRD)     Symptoms   -dry, choking sensation, especially during the night   -voice quality that is worst in the morning   -raw, burning sensation in the throat   -pain in throat, neck, or running from back of chin along neck   -frequent coughing or desire to clear throat   -rough, gritty voice quality   -decline in voice quality or comfort with continued voice use   -a sour taste in your mouth upon waking up       Interestingly, the majority of the patients in the [facility] who have laryngeal symptoms of GERD do not have any stomach discomfort or sensation of heartburn.     Cause   Acid from the stomach refluxes back up through the esophagus and spills over onto the larynx. This irritates the vocal folds (also called vocal cords; see the explanation of this terminology) and creates inflammation, which causes the vocal folds to vibrate unevenly. Coughing and throat clearing from the irritation can make the inflammation worse. The resulting voice disorder is often related to the poor vibratory quality of the inflamed vocal folds and the muscle tension created by effortful attempts at compensation.     Treatment   Anti-reflux medication and dietary precautions are the first line of defense. Functional voice therapy is useful to teach techniques for reducing effortful compensation and instruct the individual in improved vocal hygiene.     At the [facility], we do not hand out a list of foods and beverages that must be avoided. Rather, we educate about types of foods and beverages known to cause reflux and encourage the individual to systematically investigate which foods stimulate their own reflux. Also, the individual is encouraged to manage reflux under the care of a  Gastroenterologist (gastrointestinal specialist).     Interested readers are encouraged to look at the website of the [facility] at [facility] for a more in depth discussion of GERD and the voice (see our Links page).       Lifestyle changes that may help reduce symptoms of GERD/LPRD   -eat smaller meals more frequently throughout the day, rather than three large meals   -elevate the head of your bed 2-3 inches (don't just use extra pillows for your head)   -avoid clothes that fit tightly around the waist   -avoid lying down within 2-3 hours of eating (don't eat dinner late at night)       Types of foods known to trigger increased stomach acid   -spicy food (such as chili or jalapeño peppers, Romanian or Szechuan spices)   -acidic foods such as tomato products or citrus products   -greasy foods   -caffeine   -alcohol   -carbonation   -roughage, such as popcorn and peanuts, or raw vegetables   -dairy products   -strong mint such as peppermint candies   -chocolate     Reading this list might make you think you can only eat bread and oatmeal for the rest of your life. Fortunately, most individuals are not triggered by everything on this list. For example, for every person who is lactose intolerant and has problems with dairy products, there may be someone else whose digestive system is calmed by milk. Also, in our experience, few persons are triggered by peppermints or chocolate. Therefore, we recommend that you experiment with your own dietary habits, changing one food class at a time. Also, if you have recently started taking anti-reflux medications, you may want to wait for several months, to see how the medication works without any change in your dietary habits.      [facility]  Home Care Following Anesthesia  For 24 hours after surgery:  1. Get plenty of rest.  A responsible adult must stay with you for at least 24 hours after you leave the surgery  center.  2. Do not drive or use heavy equipment.  If you have weakness or tingling, don't drive or use heavy equipment until this feeling goes away.   3. Do not drink alcohol.   4. Avoid strenuous or risky activities.  Ask for help when climbing stairs.  5. You may feel lightheaded.  IF so, sit for a few minutes before standing.  Have someone help you get up.   6. If you have nausea (feel sick to your stomach): Drink only clear liquids such as apple juice, ginger ale, broth or 7-Up.  Rest may also help.  Be sure to drink enough fluids.  Move to a regular diet as you feel able.   7. You may have a slight fever.  Call the doctor if your fever is over 100 F (37.7 C) (taken under the tongue) or lasts longer than 24 hours.  8. You may have a dry mouth, a sore throat, muscle aches or trouble sleeping. These should go away after 24 hours.  9. Do not make important or legal decisions.   If you use hormonal birth control (such as the pill, patch, ring or implants):  You will need a second form of birth control for 7 days (condoms, a diaphragm or contraceptive foam).  While in the surgery center, you received a medicine called Sugammadex.  Hormonal birth control (such as the pill, patch, ring or implants) will not work as well for a week after taking this medicine.    Tips for taking pain medications  To get the best pain relief possible, remember these points:    Take pain medications as directed, before pain becomes severe.    Pain medication can upset your stomach: taking it with food may help.    Constipation is a common side effect of pain medication. Drink plenty of  fluids.    Eat foods high in fiber. Take a stool softener if recommended by your doctor or pharmacist.    Do not drink alcohol, drive or operate machinery while taking pain medications.    Ask about other ways to control pain, such as with heat, ice or relaxation.    Tylenol/Acetaminophen Consumption: 975 mg tylenol taken at 06:15AM, OK to take more at  12:15PM.  To help encourage the safe use of acetaminophen, the makers of TYLENOL  have lowered the maximum daily dose for single-ingredient Extra Strength TYLENOL  (acetaminophen) products sold in the [place] from 8 pills per day (4,000 mg) to 6 pills per day (3,000 mg). The dosing interval has also changed from 2 pills every 4-6 hours to 2 pills every 6 hours.    If you feel your pain relief is insufficient, you may take Tylenol/Acetaminophen in addition to your narcotic pain medication.     Be careful not to exceed 3,000 mg of Tylenol/Acetaminophen in a 24 hour period from all sources.    If you are taking extra strength Tylenol/acetaminophen (500 mg), the maximum dose is 6 tablets in 24 hours.    If you are taking regular strength acetaminophen (325 mg), the maximum dose is 9 tablets in 24 hours.    Call a doctor for any of the followin. Signs of infection (fever, growing tenderness at the surgery site, a large amount of drainage or bleeding, severe pain, foul-smelling drainage, redness, swelling).  2. It has been over 8 to 10 hours since surgery and you are still not able to urinate (pass water).  3. Headache for over 24 hours.  Your doctor is:  Dr. Dusty Pearson, ENT Otolaryngology: [phone]    Or dial [phone] and ask for the resident on call for:  ENT Otolaryngology  For emergency care, call the:  [place] [facility]:  [phone] (TTY for hearing impaired: [phone])

## 2018-12-06 NOTE — IP AVS SNAPSHOT
[facility]    [address] [place] [id number]    Phone:  [phone]    Fax:  [phone]                                       After Visit Summary   12/6/2018    Bibi Song    MRN: [id number]           After Visit Summary Signature Page     I have received my discharge instructions, and my questions have been answered. I have discussed any challenges I see with this plan with the nurse or doctor.    ..........................................................................................................................................  Patient/Patient Representative Signature      ..........................................................................................................................................  Patient Representative Print Name and Relationship to Patient    ..................................................               ................................................  Date                                   Time    ..........................................................................................................................................  Reviewed by Signature/Title    ...................................................              ..............................................  Date                                               Time          22EPIC Rev 08/18

## 2018-12-06 NOTE — ANESTHESIA PREPROCEDURE EVALUATION
Anesthesia Pre-Procedure Evaluation    Patient: Bibi Song   MRN:     [id number] Gender:   female   Age:    48 year old :      1970        Preoperative Diagnosis: Subglottic Stenosis   Procedure(s):  Direct Laryngoscopy Balloon Dilation, Mitomycin Application  Steroid Injection     Past Medical History:   Diagnosis Date     Anxiety      Chemical dependency (H)      Depressive disorder      Hoarseness      PONV (postoperative nausea and vomiting)      Problem, psychiatric      Sleep apnea       Past Surgical History:   Procedure Laterality Date     INJECT STEROID (LOCATION) N/A 2018    Procedure: INJECT STEROID (LOCATION);;  Surgeon: Dusty Pearson MD;  Location: [facility] OR     INJECT STEROID (LOCATION) N/A 10/5/2018    Procedure: INJECT STEROID (LOCATION);;  Surgeon: Dusty Pearson MD;  Location: [facility] OR     LARYNGOSCOPY N/A 2018    Procedure: LARYNGOSCOPY;  Teleoscopic Direct Laryngoscopy with Balloon Dilation, Mitomycin Application and Steriod Injection ;  Surgeon: Dusty Pearson MD;  Location: [facility] OR     LARYNGOSCOPY, ESOPHAGOSCOPY WITH DILATION, COMBINED N/A 10/5/2018    Procedure: COMBINED LARYNGOSCOPY, ESOPHAGOSCOPY WITH DILATION;  Direct Laryngoscopy with Teleoscope, Balloon Dilation, Application of Mitomycin and Steroid Injection ;  Surgeon: Dusty Pearson MD;  Location: [facility] OR          Anesthesia Evaluation     . Pt has had prior anesthetic.     History of anesthetic complications   - PONV        ROS/MED HX    ENT/Pulmonary: Comment: Tracheal stenosis    (+)sleep apnea, , . .    Neurologic:  - neg neurologic ROS     Cardiovascular:  - neg cardiovascular ROS   (+) ----. : . . . :. . Previous cardiac testing date:results:date: results:ECG reviewed date: results:NSR date: results:          METS/Exercise Tolerance:  >4 METS   Hematologic:  - neg hematologic  ROS       Musculoskeletal:  - neg musculoskeletal ROS       GI/Hepatic: Comment: Ulcerative  colitis     (+) Inflammatory bowel disease,       Renal/Genitourinary:         Endo:     (+) thyroid problem hypothyroidism, .      Psychiatric:     (+) psychiatric history depression and anxiety      Infectious Disease:         Malignancy:         Other:                         PHYSICAL EXAM:   Mental Status/Neuro: A/A/O   Airway: Facies: Feasible  Mallampati: II  Mouth/Opening: Full  TM distance: > 6 cm  Neck ROM: Full   Respiratory: Auscultation: CTAB     Resp. Rate: Normal     Resp. Effort: Normal      CV: Rhythm: Regular  Rate: Age appropriate  Heart: Normal Sounds   Comments:      Dental: Normal                  Lab Results   Component Value Date    WBC 14.7 (H) 08/29/2018    HGB 11.4 (L) 08/29/2018    HCT 35.4 08/29/2018     08/29/2018    CRP 31.8 (H) 12/27/2011    SED 17 12/27/2011     08/29/2018    POTASSIUM 3.5 08/29/2018    CHLORIDE 105 08/29/2018    CO2 27 08/29/2018    BUN 11 08/29/2018    CR 0.71 08/29/2018     (H) 08/29/2018    JANICE 8.8 08/29/2018    PHOS 2.9 07/20/2018    MAG 2.0 07/20/2018    ALBUMIN 3.0 (L) 07/18/2018    ALBUMIN 3.0 (L) 07/18/2018    PROTTOTAL 6.1 (L) 07/18/2018    PROTTOTAL 6.4 (L) 07/18/2018    ALT 20 07/18/2018    ALT 21 07/18/2018    AST 19 07/18/2018    AST 22 07/18/2018    ALKPHOS 63 07/18/2018    ALKPHOS 68 07/18/2018    BILITOTAL 0.2 07/18/2018    BILITOTAL 0.2 07/18/2018    LIPASE 106 08/03/2011    PTT 26 07/29/2004    INR 0.91 07/29/2004    TSH 1.76 05/16/2018    T4 0.83 04/27/2017    HCG Negative 12/06/2018    HCGS Negative 07/17/2018       Preop Vitals  BP Readings from Last 3 Encounters:   12/06/18 (!) 167/92   12/05/18 134/86   12/04/18 140/80    Pulse Readings from Last 3 Encounters:   12/05/18 88   10/03/18 80   09/27/18 79      Resp Readings from Last 3 Encounters:   12/06/18 20   12/05/18 22   10/05/18 20    SpO2 Readings from Last 3 Encounters:   12/06/18 95%   12/05/18 94%   10/05/18 94%      Temp Readings from Last 1 Encounters:   12/06/18  "36.9  C (98.4  F) (Oral)    Ht Readings from Last 1 Encounters:   12/06/18 1.651 m (5' 5\")      Wt Readings from Last 1 Encounters:   12/06/18 104.3 kg (230 lb)    Estimated body mass index is 38.27 kg/(m^2) as calculated from the following:    Height as of this encounter: 1.651 m (5' 5\").    Weight as of this encounter: 104.3 kg (230 lb).     LDA:            Assessment:   ASA SCORE: 3    NPO Status: > 6 hours since completed Solid Foods   Documentation: H&P complete; Preop Testing complete; Consents complete   Proceeding: Proceed without further delay  Tobacco Use:  NO Active use of Tobacco/UNKNOWN Tobacco use status     Plan:   Anes. Type:  General   Pre-Induction: Midazolam IV; Acetaminophen PO; Gabapentin PO   Induction:  IV (Standard); Propofol   Airway: Oral ETT   Access/Monitoring: PIV   Maintenance: Propofol; Efrain; IV   Emergence: Procedure Site   Logistics: Same Day Surgery     Postop Pain/Sedation Strategy:  Standard-Options: Opioids PRN     PONV Management:  Adult Risk Factors: Female, H/o PONV or Motion Sickness, Non-Smoker, Postop Opioids  Prevention: Propofol Infusion; Ondansetron; Dexamethasone     CONSENT: Direct conversation   Plan and risks discussed with: Patient   Blood Products: Consent Deferred (Minimal Blood Loss)                         Nam Cheek DO  "

## 2018-12-06 NOTE — ANESTHESIA POSTPROCEDURE EVALUATION
Anesthesia POST Procedure Evaluation    Patient: Bibi Song   MRN:     [id number] Gender:   female   Age:    48 year old :      1970        Preoperative Diagnosis: Subglottic Stenosis   Procedure(s):  Direct Laryngoscopy, Balloon Dilation, Mitomycin Application, Steroid Injection  Steroid Injection   Postop Comments: No value filed.       Anesthesia Type:  General    Reportable Event: NO     PAIN: Uncomplicated   Sign Out status: Comfortable, Well controlled pain     PONV: No PONV   Sign Out status:  No Nausea or Vomiting     Neuro/Psych: Uneventful perioperative course   Sign Out Status: Preoperative baseline; Age appropriate mentation     Airway/Resp.: Uneventful perioperative course   Sign Out Status: Non labored breathing, age appropriate RR; Resp. Status within EXPECTED Parameters     CV: Uneventful perioperative course   Sign Out status: Appropriate BP and perfusion indices; Appropriate HR/Rhythm     Disposition:   Sign Out in:  PACU  Disposition:  Phase II; Home  Recovery Course: Uneventful  Follow-Up: Not required           Last Anesthesia Record Vitals:  CRNA VITALS  2018 0747 - 2018 0847      2018             Pulse: 138    SpO2: (!)  89 %    Resp Rate (set): 10          Last PACU/Preop Vitals:  Vitals:    18 0846 18 0900 18 0915   BP: (!) 133/92 134/87 138/82   Resp: 17 16 16   Temp: 37.3  C (99.2  F) 36.4  C (97.6  F) 36.4  C (97.5  F)   SpO2: 95% 91% 95%         Electronically Signed By: Nam Cheek DO, 2018, 10:29 AM

## 2018-12-06 NOTE — BRIEF OP NOTE
[facility] Brief Operative Note    Pre-operative diagnosis: Tracheal stenosis   Post-operative diagnosis Tracheal stenosis   Procedure: Procedure(s):  Direct Laryngoscopy, Balloon Dilation, Mitomycin Application, Steroid Injection  Steroid Injection   Surgeon(s): Surgeon(s) and Role:     * Dusty Pearson MD - Primary     * Heike Mattson MD- Resident surgeon     Estimated blood loss: 5 ml    Specimens: None   Findings: Tracheal stenosis.  Anteriorly it was 10 mm thick and was situated 45 mm below the vocal cords.  Posteriorly it was 16 mm thick and 40 mm below the vocal cords, just over an arterial pulse.  There was a Good view with the Dedo-Hermelinda laryngoscope.  There was no difficulty with mask ventilation. A 18 mm x 4 cm balloon was utilized to dilate the stenotic segment after mucosal incisions were made. The remainder of the trachea appeared normal.

## 2018-12-27 ENCOUNTER — OFFICE VISIT (OUTPATIENT)
Dept: OTOLARYNGOLOGY | Facility: CLINIC | Age: 48
End: 2018-12-27
Payer: COMMERCIAL

## 2018-12-27 VITALS
SYSTOLIC BLOOD PRESSURE: 138 MMHG | BODY MASS INDEX: 38.32 KG/M2 | DIASTOLIC BLOOD PRESSURE: 82 MMHG | WEIGHT: 230 LBS | HEIGHT: 65 IN

## 2018-12-27 DIAGNOSIS — J39.8 TRACHEAL STENOSIS: Primary | ICD-10-CM

## 2018-12-27 ASSESSMENT — MIFFLIN-ST. JEOR: SCORE: 1664.15

## 2018-12-27 NOTE — LETTER
12/27/2018       RE: Bibi Song  [address] [facility] [id number]     Dear Colleague,    Thank you for referring your patient, Bibi Song, to the [facility] EAR NOSE AND THROAT at [facility]. Please see a copy of my visit note below.        HISTORY OF PRESENT ILLNESS: Bibi Song is a 48 year old female with a history of  She had an accidental overdose of medications in mid July.  She was admitted from 7/18/ 2018-7/21/2018.  During that time she had intubation for at least 2-3 days.  On discharge she was feeling well.  In mid August she developed some shortness of breath.  She was started on albuterol.  She had no problems with asthma or breathing prior to her hospitalization.  She was seen in the emergency room in mid August, she had shortness of breath and was given prednisone. doxycycline and albuterol.  The nebulizer worked well for her but she did get relief from the albuterol.  I met her for the first time on 8/30/2018.  At that time    she had been recently placed on prednisone 60 mg a day which  and this had helped her breathe comfortably.  She still had audible inspiratory stridor but there was good airflow.  There was a concern about possible stenosis versus paradoxic vocal fold motion.  On exam she had a tracheal stenosis which was symptomatic.  On 9/7/2018 she underwent a direct microlaryngoscopy with dilatation of the tracheal stenosis, mitomycin application and steroid injection.  She initially did well for the first 2 weeks but then gradually developed some difficulty breathing.  She returned to clinic on 10/2/2018 with upper airway turbulence.  She was able to get adequate air with light activity.  Examination showed a recurrence of the tracheal stenosis which was felt to be less inflamed.     On October 5, 2018 she returned to the operating room.  A tracheal stenosis was seen.  Anteriorly it was 16 mm thick and was situated 32 mm below the  vocal cords.  Posteriorly it was 16 mm thick and 20 mm below the vocal cords.  There was a Good view with the Dedo-Hermelinda laryngoscope.  There was no difficulty with mask ventilation. A 18 mm x 2 cm balloon was utilized to dilate the stenotic segment after mucosal incisions were made. The remainder of the trachea appeared normal. The patient required a high dose of propofol according to the anesthesia team.       Postoperatively she had an improved airway.      Over the last week or 2 she has had some increasing airway turbulence and does have decreased exercise tolerance.  She is comfortable at rest but with deep inspiration does have airway turbulence.   On December 6, 2018 she returned to the operating room.  She had a micro-direct telescopic laryngoscopy with balloon dilatation mitomycin application and steroid injection.  Findings at the time of surgery were: Tracheal stenosis.  Anteriorly it was 10 mm thick and was situated 45 mm below the vocal cords.  Posteriorly it was 16 mm thick and 40 mm below the vocal cords, just over an arterial pulse.  There was a Good view with the Dedo-Hermelinda laryngoscope.  There was no difficulty with mask ventilation. A 18 mm x 4 cm balloon was utilized to dilate the stenotic segment after mucosal incisions were made. The remainder of the trachea appeared normal.       It was felt after surgery that she would have a early recurrence of the stenosis and it is recommended that she have a tracheal resection.  I did discuss this with  of the thoracic surgery service as well as Dr. SINGLETARY of the otolaryngology service.  She is here with some improvement in her breathing but still not back to a level at which we would like.            Last 2 Scores for Patient-Answered VHI Questionnaire  VHI Total Score 8/30/2018   VHI Total Score 13       Last 2 Scores for Patient-Answered CSI Questionnaire  CSI Total Score 12/4/2018 12/27/2018   CSI Total Score 2 12         Last 2 Scores  for Patient-Answered EAT Questionnaire  EAT Total Score 8/30/2018   EAT Total Score 2           PAST MEDICAL HISTORY:   Past Medical History:   Diagnosis Date     Anxiety      Chemical dependency (H)      Depressive disorder      Hoarseness      PONV (postoperative nausea and vomiting)      Problem, psychiatric      Sleep apnea        PAST SURGICAL HISTORY:   Past Surgical History:   Procedure Laterality Date     INJECT STEROID (LOCATION) N/A 9/7/2018    Procedure: INJECT STEROID (LOCATION);;  Surgeon: Dusty Pearson MD;  [facility]     INJECT STEROID (LOCATION) N/A 10/5/2018    Procedure: INJECT STEROID (LOCATION);;  Surgeon: Dusty Pearson MD;  [facility]     INJECT STEROID (LOCATION) N/A 12/6/2018    Procedure: Steroid Injection;  Surgeon: Dusty Pearson MD;  [facility]     LARYNGOSCOPY N/A 9/7/2018    Procedure: LARYNGOSCOPY;  Teleoscopic Direct Laryngoscopy with Balloon Dilation, Mitomycin Application and Steriod Injection ;  Surgeon: Dusty Pearson MD;  [facility]     LARYNGOSCOPY, ESOPHAGOSCOPY WITH DILATION, COMBINED N/A 10/5/2018    Procedure: COMBINED LARYNGOSCOPY, ESOPHAGOSCOPY WITH DILATION;  Direct Laryngoscopy with Teleoscope, Balloon Dilation, Application of Mitomycin and Steroid Injection ;  Surgeon: Dusty Pearson MD;  [facility]     LARYNGOSCOPY, ESOPHAGOSCOPY WITH DILATION, COMBINED N/A 12/6/2018    Procedure: Direct Laryngoscopy, Balloon Dilation, Mitomycin Application, Steroid Injection;  Surgeon: Dusty Pearson MD;  [facility]       FAMILY HISTORY:   Family History   Problem Relation Age of Onset     Diabetes Mother      Depression Mother         OCD     Neurologic Disorder Mother      Psychotic Disorder Mother      Respiratory Mother      Thyroid Disease Mother      Gynecology Mother         hysterectomy     Cerebrovascular Disease Mother         pacemaker     Substance Abuse Mother       Mental Illness Mother      Heart Disease Mother      Heart Disease Father         MI,   LATE 60'S     Hypertension Father      Prostate Cancer Father      Obesity Father      Diabetes Father      Cerebrovascular Disease Father      Substance Abuse Father      Depression Father      Depression Brother      Thyroid Disease Brother      Depression Brother      Thyroid Disease Brother      Allergies Son      Asthma Son      Thyroid Disease Maternal Grandmother      Cancer Maternal Grandfather      Psychotic Disorder Paternal Grandmother      Thyroid Disease Paternal Grandfather      Diabetes Brother      Breast Cancer Maternal Aunt      Substance Abuse Brother      Mental Illness Brother      Depression Brother      Diabetes Brother      Mental Illness Son      Asthma Son      Depression Son      Stomach Problem Son        SOCIAL HISTORY:   Social History     Tobacco Use     Smoking status: Former Smoker     Years: 5.00     Start date: 1988     Last attempt to quit: 1994     Years since quittin.0     Smokeless tobacco: Never Used   Substance Use Topics     Alcohol use: No     Comment: Treatment in        REVIEW OF SYSTEMS: Ten point review of systems was performed and is negative except for:   [facility] 2018   Constitutional Weight gain   Neurology -   Psychology Frequently feeling anxious   Ears, Nose, Throat Nasal congestion or drainage, Sore throat   Cardiopulmonary Cough, Breathing problems, Wheezing   Gastrointestinal/Genitourinary Heartburn/indigestion   Musculoskeletal Neck pain   Hematologic -   Endocrine -   Other -        ALLERGIES: Patient has no known allergies.    MEDICATIONS:   Current Outpatient Medications   Medication Sig Dispense Refill     acetaminophen-codeine (TYLENOL #3) 300-30 MG tablet Take 1 tablet by mouth every 6 hours as needed for severe pain 10 tablet 0     albuterol (2.5 MG/3ML) 0.083% neb solution Take 1 vial (2.5 mg) by nebulization every 4 hours as needed for  shortness of breath / dyspnea or wheezing 1 Box 0     albuterol (PROAIR HFA/PROVENTIL HFA/VENTOLIN HFA) 108 (90 Base) MCG/ACT inhaler Inhale 2 puffs into the lungs 4 times daily 1 Inhaler 1     Black Cohosh-SoyIsoflav-Magnol (ESTROVEN MENOPAUSE RELIEF PO)        BuPROPion HCl (WELLBUTRIN PO)        Calcium Carbonate-Vit D-Min (CALCIUM 1200 PO)        fluticasone (FLOVENT DISKUS) 50 MCG/BLIST AEPB Inhale 1 puff into the lungs 2 times daily 1 Inhaler 3     ibuprofen (ADVIL/MOTRIN) 200 MG capsule Take 1 capsule (200 mg) by mouth every 6 hours as needed for pain 120 capsule 0     levothyroxine (SYNTHROID/LEVOTHROID) 75 MCG tablet Take 1 tablet (75 mcg) by mouth daily 90 tablet 1     MAGNESIUM PO Take 1 tablet by mouth daily        melatonin 3 MG tablet Take 9 mg by mouth nightly as needed. 30 tablet 0     predniSONE (DELTASONE) 20 MG tablet Take 1-2 tablets by mouth as needed for breathing 10 tablet 0     ranitidine (ZANTAC) 150 MG tablet Take 1 tablet (150 mg) by mouth 2 times daily 60 tablet 1     sertraline (ZOLOFT) 100 MG tablet Take 2 tablets (200 mg) by mouth daily 60 tablet 0     vitamin B complex with vitamin C (VITAMIN  B COMPLEX) TABS tablet Take 1 tablet by mouth daily       ziprasidone (GEODON) 40 MG capsule Take 1 capsule (40 mg) by mouth 2 times daily (with meals) 60 capsule 0       PHYSICAL EXAMINATION:  She  is awake, alert and in no apparent distress.    Her tympanic membranes are clear and intact bilaterally. External auditory canals are clear.  Nasal exam shows a mild septal deviation without obstruction.  Examination of the oral cavity shows no suspicious lesions.  There is good mouth opening.  There is symmetric movement of the tongue and soft palate.    The oropharynx is clear.  Her neck is supple without significant adenopathy.  Pulse is regular.  Upper airway demonstrates airway turbulence with deep inspiration.  Cranial nerves II-XII are grossly intact.         PROCEDURE: A flexible laryngoscopy   was performed.  Informed consent was obtained and a time out was performed. 3% lidocaine and 0.25% phenylephrine was sprayed into the nasal cavity and allowed 3 to 5 minutes for effect. The scope was passed through the right sided nostril. Examination showed the vocal folds to be mobile and meet in the midline.  No nodules, polyps or ulcerations are seen.  There is mild inflammation or erythema of the supraglottic or glottic larynx.  With phonation there is mildcontraction of the supraglottic larynx.  The immediate subglottic area is clear.  I did have her tilt forward and I was able to see further down the trachea.  Approximately 3-4 rings down there was  a narrowing of the trachea.   There appeared to be an adequate airway and there is good airflow although airway turbulence due to the acute inflammation is seen.  The hypopharynx is otherwise clear as is the subglottis.         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 I have discussed her case with Dr. Estrella in Thoracic surgery and Dr Beaulieu and will be referring her to them for evaluation for possible resection of the stenotic area.      Again, thank you for allowing me to participate in the care of your patient.      Sincerely,    Dusty Pearson MD

## 2018-12-27 NOTE — PROGRESS NOTES
HISTORY OF PRESENT ILLNESS: Bibi Song is a 48 year old female with a history of  She had an accidental overdose of medications in mid July.  She was admitted from 7/18/ 2018-7/21/2018.  During that time she had intubation for at least 2-3 days.  On discharge she was feeling well.  In mid August she developed some shortness of breath.  She was started on albuterol.  She had no problems with asthma or breathing prior to her hospitalization.  She was seen in the emergency room in mid August, she had shortness of breath and was given prednisone. doxycycline and albuterol.  The nebulizer worked well for her but she did get relief from the albuterol.  I met her for the first time on 8/30/2018.  At that time    she had been recently placed on prednisone 60 mg a day which  and this had helped her breathe comfortably.  She still had audible inspiratory stridor but there was good airflow.  There was a concern about possible stenosis versus paradoxic vocal fold motion.  On exam she had a tracheal stenosis which was symptomatic.  On 9/7/2018 she underwent a direct microlaryngoscopy with dilatation of the tracheal stenosis, mitomycin application and steroid injection.  She initially did well for the first 2 weeks but then gradually developed some difficulty breathing.  She returned to clinic on 10/2/2018 with upper airway turbulence.  She was able to get adequate air with light activity.  Examination showed a recurrence of the tracheal stenosis which was felt to be less inflamed.     On October 5, 2018 she returned to the operating room.  A tracheal stenosis was seen.  Anteriorly it was 16 mm thick and was situated 32 mm below the vocal cords.  Posteriorly it was 16 mm thick and 20 mm below the vocal cords.  There was a Good view with the Dedo-Hermelinda laryngoscope.  There was no difficulty with mask ventilation. A 18 mm x 2 cm balloon was utilized to dilate the stenotic segment after mucosal incisions were made. The  remainder of the trachea appeared normal. The patient required a high dose of propofol according to the anesthesia team.       Postoperatively she had an improved airway.      Over the last week or 2 she has had some increasing airway turbulence and does have decreased exercise tolerance.  She is comfortable at rest but with deep inspiration does have airway turbulence.   On December 6, 2018 she returned to the operating room.  She had a micro-direct telescopic laryngoscopy with balloon dilatation mitomycin application and steroid injection.  Findings at the time of surgery were: Tracheal stenosis.  Anteriorly it was 10 mm thick and was situated 45 mm below the vocal cords.  Posteriorly it was 16 mm thick and 40 mm below the vocal cords, just over an arterial pulse.  There was a Good view with the Dedo-Hermelinda laryngoscope.  There was no difficulty with mask ventilation. A 18 mm x 4 cm balloon was utilized to dilate the stenotic segment after mucosal incisions were made. The remainder of the trachea appeared normal.       It was felt after surgery that she would have a early recurrence of the stenosis and it is recommended that she have a tracheal resection.  I did discuss this with  of the thoracic surgery service as well as Dr. SINGLETARY of the otolaryngology service.  She is here with some improvement in her breathing but still not back to a level at which we would like.            Last 2 Scores for Patient-Answered VHI Questionnaire  VHI Total Score 8/30/2018   VHI Total Score 13       Last 2 Scores for Patient-Answered CSI Questionnaire  CSI Total Score 12/4/2018 12/27/2018   CSI Total Score 2 12         Last 2 Scores for Patient-Answered EAT Questionnaire  EAT Total Score 8/30/2018   EAT Total Score 2           PAST MEDICAL HISTORY:   Past Medical History:   Diagnosis Date     Anxiety      Chemical dependency (H)      Depressive disorder      Hoarseness      PONV (postoperative nausea and vomiting)       Problem, psychiatric      Sleep apnea        PAST SURGICAL HISTORY:   Past Surgical History:   Procedure Laterality Date     INJECT STEROID (LOCATION) N/A 9/7/2018    Procedure: INJECT STEROID (LOCATION);;  Surgeon: Dusty Pearson MD;  [facility]     INJECT STEROID (LOCATION) N/A 10/5/2018    Procedure: INJECT STEROID (LOCATION);;  Surgeon: Dusty Pearson MD;  [facility]     INJECT STEROID (LOCATION) N/A 12/6/2018    Procedure: Steroid Injection;  Surgeon: Dusty Pearson MD;  [facility]     LARYNGOSCOPY N/A 9/7/2018    Procedure: LARYNGOSCOPY;  Teleoscopic Direct Laryngoscopy with Balloon Dilation, Mitomycin Application and Steriod Injection ;  Surgeon: Dusty Pearson MD;  [facility]     LARYNGOSCOPY, ESOPHAGOSCOPY WITH DILATION, COMBINED N/A 10/5/2018    Procedure: COMBINED LARYNGOSCOPY, ESOPHAGOSCOPY WITH DILATION;  Direct Laryngoscopy with Teleoscope, Balloon Dilation, Application of Mitomycin and Steroid Injection ;  Surgeon: Dusty Pearson MD;  [facility]     LARYNGOSCOPY, ESOPHAGOSCOPY WITH DILATION, COMBINED N/A 12/6/2018    Procedure: Direct Laryngoscopy, Balloon Dilation, Mitomycin Application, Steroid Injection;  Surgeon: Dusty Pearson MD;  [facility]       FAMILY HISTORY:   Family History   Problem Relation Age of Onset     Diabetes Mother      Depression Mother         OCD     Neurologic Disorder Mother      Psychotic Disorder Mother      Respiratory Mother      Thyroid Disease Mother      Gynecology Mother         hysterectomy     Cerebrovascular Disease Mother         pacemaker     Substance Abuse Mother      Mental Illness Mother      Heart Disease Mother      Heart Disease Father         MI,   LATE 60'S     Hypertension Father      Prostate Cancer Father      Obesity Father      Diabetes Father      Cerebrovascular Disease Father      Substance Abuse Father      Depression Father      Depression  Brother      Thyroid Disease Brother      Depression Brother      Thyroid Disease Brother      Allergies Son      Asthma Son      Thyroid Disease Maternal Grandmother      Cancer Maternal Grandfather      Psychotic Disorder Paternal Grandmother      Thyroid Disease Paternal Grandfather      Diabetes Brother      Breast Cancer Maternal Aunt      Substance Abuse Brother      Mental Illness Brother      Depression Brother      Diabetes Brother      Mental Illness Son      Asthma Son      Depression Son      Stomach Problem Son        SOCIAL HISTORY:   Social History     Tobacco Use     Smoking status: Former Smoker     Years: 5.00     Start date: 1988     Last attempt to quit: 1994     Years since quittin.0     Smokeless tobacco: Never Used   Substance Use Topics     Alcohol use: No     Comment: Treatment in        REVIEW OF SYSTEMS: Ten point review of systems was performed and is negative except for:   [facility] ROS 2018   Constitutional Weight gain   Neurology -   Psychology Frequently feeling anxious   Ears, Nose, Throat Nasal congestion or drainage, Sore throat   Cardiopulmonary Cough, Breathing problems, Wheezing   Gastrointestinal/Genitourinary Heartburn/indigestion   Musculoskeletal Neck pain   Hematologic -   Endocrine -   Other -        ALLERGIES: Patient has no known allergies.    MEDICATIONS:   Current Outpatient Medications   Medication Sig Dispense Refill     acetaminophen-codeine (TYLENOL #3) 300-30 MG tablet Take 1 tablet by mouth every 6 hours as needed for severe pain 10 tablet 0     albuterol (2.5 MG/3ML) 0.083% neb solution Take 1 vial (2.5 mg) by nebulization every 4 hours as needed for shortness of breath / dyspnea or wheezing 1 Box 0     albuterol (PROAIR HFA/PROVENTIL HFA/VENTOLIN HFA) 108 (90 Base) MCG/ACT inhaler Inhale 2 puffs into the lungs 4 times daily 1 Inhaler 1     Black Cohosh-SoyIsoflav-Magnol (ESTROVEN MENOPAUSE RELIEF PO)        BuPROPion HCl (WELLBUTRIN PO)         Calcium Carbonate-Vit D-Min (CALCIUM 1200 PO)        fluticasone (FLOVENT DISKUS) 50 MCG/BLIST AEPB Inhale 1 puff into the lungs 2 times daily 1 Inhaler 3     ibuprofen (ADVIL/MOTRIN) 200 MG capsule Take 1 capsule (200 mg) by mouth every 6 hours as needed for pain 120 capsule 0     levothyroxine (SYNTHROID/LEVOTHROID) 75 MCG tablet Take 1 tablet (75 mcg) by mouth daily 90 tablet 1     MAGNESIUM PO Take 1 tablet by mouth daily        melatonin 3 MG tablet Take 9 mg by mouth nightly as needed. 30 tablet 0     predniSONE (DELTASONE) 20 MG tablet Take 1-2 tablets by mouth as needed for breathing 10 tablet 0     ranitidine (ZANTAC) 150 MG tablet Take 1 tablet (150 mg) by mouth 2 times daily 60 tablet 1     sertraline (ZOLOFT) 100 MG tablet Take 2 tablets (200 mg) by mouth daily 60 tablet 0     vitamin B complex with vitamin C (VITAMIN  B COMPLEX) TABS tablet Take 1 tablet by mouth daily       ziprasidone (GEODON) 40 MG capsule Take 1 capsule (40 mg) by mouth 2 times daily (with meals) 60 capsule 0       PHYSICAL EXAMINATION:  She  is awake, alert and in no apparent distress.    Her tympanic membranes are clear and intact bilaterally. External auditory canals are clear.  Nasal exam shows a mild septal deviation without obstruction.  Examination of the oral cavity shows no suspicious lesions.  There is good mouth opening.  There is symmetric movement of the tongue and soft palate.    The oropharynx is clear.  Her neck is supple without significant adenopathy.  Pulse is regular.  Upper airway demonstrates airway turbulence with deep inspiration.  Cranial nerves II-XII are grossly intact.         PROCEDURE: A flexible laryngoscopy  was performed.  Informed consent was obtained and a time out was performed. 3% lidocaine and 0.25% phenylephrine was sprayed into the nasal cavity and allowed 3 to 5 minutes for effect. The scope was passed through the right sided nostril. Examination showed the vocal folds to be mobile and  meet in the midline.  No nodules, polyps or ulcerations are seen.  There is mild inflammation or erythema of the supraglottic or glottic larynx.  With phonation there is mildcontraction of the supraglottic larynx.  The immediate subglottic area is clear.  I did have her tilt forward and I was able to see further down the trachea.  Approximately 3-4 rings down there was  a narrowing of the trachea.   There appeared to be an adequate airway and there is good airflow although airway turbulence due to the acute inflammation is seen.  The hypopharynx is otherwise clear as is the subglottis.         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 I have discussed her case with Dr. Estrella in Thoracic surgery and Dr Bealuieu and will be referring her to them for evaluation for possible resection of the stenotic area.

## 2018-12-28 ENCOUNTER — TELEPHONE (OUTPATIENT)
Dept: PULMONOLOGY | Facility: CLINIC | Age: 48
End: 2018-12-28

## 2018-12-28 DIAGNOSIS — J39.8 TRACHEAL STENOSIS: Primary | ICD-10-CM

## 2018-12-28 NOTE — TELEPHONE ENCOUNTER
Bibi's case was discussed at pulmonary nodule conference today.  Dr. Reyes would like to see Bibi to evaluate other options before tracheal resection is scheduled.  Bibi was called and a message was left on her cell phone with this information  She will be called by scheduling to arrange with PFTS before her appt

## 2019-01-03 ENCOUNTER — HOSPITAL ENCOUNTER (OUTPATIENT)
Dept: RESPIRATORY THERAPY | Facility: CLINIC | Age: 49
Discharge: HOME | End: 2019-01-03
Attending: FAMILY MEDICINE | Admitting: FAMILY MEDICINE
Payer: COMMERCIAL

## 2019-01-03 ENCOUNTER — TELEPHONE (OUTPATIENT)
Dept: PULMONOLOGY | Facility: CLINIC | Age: 49
End: 2019-01-03

## 2019-01-03 DIAGNOSIS — J39.8 TRACHEAL STENOSIS: ICD-10-CM

## 2019-01-03 PROCEDURE — 94010 BREATHING CAPACITY TEST: CPT | Mod: 26 | Performed by: INTERNAL MEDICINE

## 2019-01-03 PROCEDURE — 94729 DIFFUSING CAPACITY: CPT | Mod: 26 | Performed by: INTERNAL MEDICINE

## 2019-01-03 PROCEDURE — 94010 BREATHING CAPACITY TEST: CPT

## 2019-01-03 PROCEDURE — 94726 PLETHYSMOGRAPHY LUNG VOLUMES: CPT | Mod: 26 | Performed by: INTERNAL MEDICINE

## 2019-01-03 PROCEDURE — 94726 PLETHYSMOGRAPHY LUNG VOLUMES: CPT

## 2019-01-03 PROCEDURE — 94729 DIFFUSING CAPACITY: CPT

## 2019-01-03 NOTE — TELEPHONE ENCOUNTER
ONCOLOGY INTAKE: Records Information      APPT INFORMATION:  Referring provider:  Lili  Referring provider s clinic:  Otolaryngology   Reason for visit/diagnosis:  Tracheal Stenosis    Were the records received with the referral (via [employer])? No, this is an internal referral    Has patient been seen for any external appt for this diagnosis (enter clinic/location)? No.  All necessary testing completed prior to appt.  In chart.

## 2019-01-04 DIAGNOSIS — J38.6 SUBGLOTTIC STENOSIS: ICD-10-CM

## 2019-01-04 RX ORDER — PREDNISONE 20 MG/1
TABLET ORAL
Qty: 10 TABLET | Refills: 1 | Status: SHIPPED | OUTPATIENT
Start: 2019-01-04 | End: 2019-01-10

## 2019-01-09 LAB
DLCOUNC-%PRED-PRE: 100 %
DLCOUNC-PRE: 23.16 ML/MIN/MMHG
DLCOUNC-PRED: 22.98 ML/MIN/MMHG
ERV-%PRED-PRE: 123 %
ERV-PRE: 0.59 L
ERV-PRED: 0.48 L
EXPTIME-PRE: 7.75 SEC
FEF2575-%PRED-PRE: 72 %
FEF2575-PRE: 2 L/SEC
FEF2575-PRED: 2.74 L/SEC
FEFMAX-%PRED-PRE: 37 %
FEFMAX-PRE: 2.57 L/SEC
FEFMAX-PRED: 6.83 L/SEC
FEV1-%PRED-PRE: 82 %
FEV1-PRE: 2.32 L
FEV1FEV6-PRE: 59 %
FEV1FEV6-PRED: 82 %
FEV1FVC-PRE: 58 %
FEV1FVC-PRED: 80 %
FEV1SVC-PRE: 58 %
FEV1SVC-PRED: 79 %
FIFMAX-PRE: 1.78 L/SEC
FRCPLETH-%PRED-PRE: 118 %
FRCPLETH-PRE: 3.25 L
FRCPLETH-PRED: 2.74 L
FVC-%PRED-PRE: 112 %
FVC-PRE: 3.97 L
FVC-PRED: 3.53 L
IC-%PRED-PRE: 109 %
IC-PRE: 3.39 L
IC-PRED: 3.09 L
RVPLETH-%PRED-PRE: 145 %
RVPLETH-PRE: 2.61 L
RVPLETH-PRED: 1.79 L
TLCPLETH-%PRED-PRE: 131 %
TLCPLETH-PRE: 6.64 L
TLCPLETH-PRED: 5.07 L
VA-%PRED-PRE: 107 %
VA-PRE: 5.59 L
VC-%PRED-PRE: 113 %
VC-PRE: 4.03 L
VC-PRED: 3.57 L

## 2019-01-10 ENCOUNTER — TELEPHONE (OUTPATIENT)
Dept: SURGERY | Facility: CLINIC | Age: 49
End: 2019-01-10

## 2019-01-10 DIAGNOSIS — J39.8 TRACHEAL STENOSIS: Primary | ICD-10-CM

## 2019-01-10 DIAGNOSIS — J38.6 SUBGLOTTIC STENOSIS: ICD-10-CM

## 2019-01-10 RX ORDER — PREDNISONE 20 MG/1
TABLET ORAL
Qty: 10 TABLET | Refills: 1 | Status: SHIPPED | OUTPATIENT
Start: 2019-01-10 | End: 2019-01-31

## 2019-01-10 NOTE — TELEPHONE ENCOUNTER
Call placed to Bibi in response to her initial call with questions about her upcoming appointment with Dr. Reyes. There was no answer. Message left with call back information.

## 2019-01-21 NOTE — PROGRESS NOTES
January 21, 2019        Dear Dr. Pearson:    I had the pleasure of meeting Bibi Song in consultation today at the [facility] at your request.     History of Present Illness:   Ms. Song is a 48 year old patient of Dr. Pearson's who has a recurrent tracheal stenosis for which Dr Pearson is recommending tracheal resection.      She had an accidental overdose of medications in mid July.  She was admitted from 7/18/ 2018-7/21/2018, and was intubated for at least 2-3 days.  On discharge she was feeling well, but within a month developed shortness of breath.  She was treated for asthma despite not carrying this diagnosis, without effect. She was referred to Dr. Pearson who treated her with prednisone 60 mg a day which helped, but she still had audible inspiratory stridor.      Dr Pearson was concerned about a tracheal stenosis, and on 9/7/2018 she underwent a direct microlaryngoscopy with dilatation of the tracheal stenosis, mitomycin application and steroid injection.  She initially did well for the first 2 weeks but then gradually developed some difficulty breathing.  She returned to the OR on October 5, 2018 and again a tracheal stenosis was seen.  Anteriorly it was 16 mm thick and was situated 32 mm below the vocal cords.  Posteriorly it was 16 mm thick and 20 mm below the vocal cords.  There was a Good view with the Dedo-Hermelinda laryngoscope.  There was no difficulty with mask ventilation. A 18 mm x 2 cm balloon was utilized to dilate the stenotic segment after mucosal incisions were made. The remainder of the trachea appeared normal. The patient required a high dose of propofol according to the anesthesia team.     She again did well initially, but then worsened again with decreased exercise tolerance. On December 6, 2018 she returned to the operating room.  She had a micro-direct telescopic laryngoscopy with balloon dilatation mitomycin application and steroid injection.   Findings at the time of surgery were a tracheal stenosis. anteriorly that was 10 mm thick and was situated 45 mm below the vocal cords.  Posteriorly it was 16 mm thick and 40 mm below the vocal cords, just over an arterial pulse.  There was no difficulty with mask ventilation. A 18 mm x 4 cm balloon was utilized to dilate the stenotic segment after mucosal incisions were made. The remainder of the trachea appeared normal.        The stenosis has again recurred, and Dr Pearson feels she may do best with tracheal resection.  She was scheduled to see Dr. Estrella today, but for some reason his clinic canceled and rescheduled her with Pulmonary Medicine.      She reports that her breathing has become a little bit worse again.  It is not bad enough to the point where she needs to have dilation again, but is getting there.      MEDICATIONS:     Current Outpatient Medications   Medication Sig Dispense Refill     acetaminophen-codeine (TYLENOL #3) 300-30 MG tablet Take 1 tablet by mouth every 6 hours as needed for severe pain 10 tablet 0     albuterol (2.5 MG/3ML) 0.083% neb solution Take 1 vial (2.5 mg) by nebulization every 4 hours as needed for shortness of breath / dyspnea or wheezing 1 Box 0     albuterol (PROAIR HFA/PROVENTIL HFA/VENTOLIN HFA) 108 (90 Base) MCG/ACT inhaler Inhale 2 puffs into the lungs 4 times daily 1 Inhaler 1     Black Cohosh-SoyIsoflav-Magnol (ESTROVEN MENOPAUSE RELIEF PO)        BuPROPion HCl (WELLBUTRIN PO)        Calcium Carbonate-Vit D-Min (CALCIUM 1200 PO)        fluticasone (FLOVENT DISKUS) 50 MCG/BLIST AEPB Inhale 1 puff into the lungs 2 times daily 1 Inhaler 3     gabapentin (NEURONTIN) 300 MG capsule   0     ibuprofen (ADVIL/MOTRIN) 200 MG capsule Take 1 capsule (200 mg) by mouth every 6 hours as needed for pain 120 capsule 0     levothyroxine (SYNTHROID/LEVOTHROID) 75 MCG tablet Take 1 tablet (75 mcg) by mouth daily 90 tablet 1     MAGNESIUM PO Take 1 tablet by mouth daily        melatonin  3 MG tablet Take 9 mg by mouth nightly as needed. 30 tablet 0     predniSONE (DELTASONE) 20 MG tablet Take 1-2 tablets by mouth as needed for breathing. 10 tablet 1     ranitidine (ZANTAC) 150 MG tablet Take 1 tablet (150 mg) by mouth 2 times daily 60 tablet 1     sertraline (ZOLOFT) 100 MG tablet Take 2 tablets (200 mg) by mouth daily 60 tablet 0     vitamin B complex with vitamin C (VITAMIN  B COMPLEX) TABS tablet Take 1 tablet by mouth daily       ziprasidone (GEODON) 40 MG capsule Take 1 capsule (40 mg) by mouth 2 times daily (with meals) 60 capsule 0       ALLERGIES:  No Known Allergies    HABITS/SOCIAL HISTORY:    Ms. Song works at [employer] in [facility].  Her  is in the education system in [facility] as well.  They are .     Social History     Socioeconomic History     Marital status:      Spouse name: None     Number of children: None     Years of education: None     Highest education level: None   Social Needs     Financial resource strain: None     Food insecurity - worry: None     Food insecurity - inability: None     Transportation needs - medical: None     Transportation needs - non-medical: None   Occupational History     None   Tobacco Use     Smoking status: Former Smoker     Years: 5.00     Start date: 1988     Last attempt to quit: 1994     Years since quittin.0     Smokeless tobacco: Never Used   Substance and Sexual Activity     Alcohol use: No     Comment: Treatment in      Drug use: No     Sexual activity: Yes     Partners: Male     Birth control/protection: Condom   Other Topics Concern     Parent/sibling w/ CABG, MI or angioplasty before 65F 55M? No   Social History Narrative     None         PAST MEDICAL HISTORY:   Past Medical History:   Diagnosis Date     Anxiety      Chemical dependency (H)      Depressive disorder      Hoarseness      PONV (postoperative nausea and vomiting)      Problem, psychiatric      Sleep apnea         FAMILY  HISTORY:    Family History   Problem Relation Age of Onset     Diabetes Mother      Depression Mother         OCD     Neurologic Disorder Mother      Psychotic Disorder Mother      Respiratory Mother      Thyroid Disease Mother      Gynecology Mother         hysterectomy     Cerebrovascular Disease Mother         pacemaker     Substance Abuse Mother      Mental Illness Mother      Heart Disease Mother      Heart Disease Father         MI,   LATE 60'S     Hypertension Father      Prostate Cancer Father      Obesity Father      Diabetes Father      Cerebrovascular Disease Father      Substance Abuse Father      Depression Father      Depression Brother      Thyroid Disease Brother      Depression Brother      Thyroid Disease Brother      Allergies Son      Asthma Son      Thyroid Disease Maternal Grandmother      Cancer Maternal Grandfather      Psychotic Disorder Paternal Grandmother      Thyroid Disease Paternal Grandfather      Diabetes Brother      Breast Cancer Maternal Aunt      Substance Abuse Brother      Mental Illness Brother      Depression Brother      Diabetes Brother      Mental Illness Son      Asthma Son      Depression Son      Stomach Problem Son        REVIEW OF SYSTEMS:    A 10 point Review of Systems was performed and pertinent positives are noted in the HPI; remaining positives are:  Patient Supplied Answers to Review of Systems   ENT ROS 1/23/2019   Constitutional Weight gain   Neurology -   Psychology Frequently feeling depressed or sad   Ears, Nose, Throat Nasal congestion or drainage   Cardiopulmonary Breathing problems, Wheezing   Gastrointestinal/Genitourinary Heartburn/indigestion   Musculoskeletal Sore or stiff joints   Hematologic -   Endocrine -   Other Problems with anesthesia in surgery       PHYSICAL EXAMINATION:    Constitutional:  The patient was well-groomed and in no acute distress. She was accompanied by her .    Skin:  Warm and pink.   Neurologic:  Alert and oriented  x3. Cranial nerves III-XII within normal limits. Voice quality is normal, but she has loud breathing.    Psychiatric:  The patient's affect was calm, cooperative, and appropriate.    Respiratory: Breathing comfortably but there is audible sterter. Not stridorous.  She is able to count to about 15 to 20 at a slow pace before she runs out of breath.    Eyes:  Pupils were equal and reactive. Extraocular movements are intact.   Head:  Normocephalic and atraumatic. No lesions or scars.    Ears:  External auditory canals and tympanic membranes were clear.    Nose:  Sinuses were nontender. Anterior rhinoscopy revealed midline septum and absence of purulence or polyps.   Oral cavity/Oropharynx:  Normal tongue, floor of mouth, buccal mucosa, and palate. No lesions or masses on inspection or palpation. No abnormal lymph tissue in the oropharynx. The pterygoid region is nontender.    Hypopharynx/Larynx:  Mirror exam of the larynx deferred.   Neck:   The parotid is soft without masses.  Supple with normal laryngeal and tracheal landmarks.  The tissue is a little bit full with adipose tissue, but the landmarks are palpable.    Lymphatic:  There is no palpable lymphadenopathy or other masses in the neck or parotid.      FIBEROPTIC ENDOSCOPY:  Due to the need to evaluate the larynx, fiberoptic laryngoscopy was indicated. The nose was topically decongested and anesthetized. The fiberoptic laryngoscope was passed under endoscopic vision. The turbinates were normal.  The inferior and middle meati were clear bilaterally without purulence, masses, or polyps.  The nasopharynx was clear.  The eustachian tubes were clear. The soft palate appeared normal with good mobility.  The cords were fully mobile.  I did not evaluate the subglottis.       IMPRESSION AND PLAN:   Ms. Song is a delightful 48-year-old woman with a tracheal stenosis that does not respond durably to dilations.  Her mother did have a need for esophageal dilations, but I  "see no other family history or past medical history that would explain the stenosis.     I agree with Dr. Pearson that she should be considered for a tracheal resection.  She was scheduled to see Dr. Estrella today, but for some reason the appointment was canceled.  I definitely need to have him see her so that she can meet him and so we can plan surgery together.      I think that she will have an upper tracheal resection.  I think that the resection would be inferior to the cricoid. The patient would benefit from an upper tracheal resection.  I would like to do this with Dr. Estrella and he needs to see the patient so that he can assess her and we can do the surgery together.  I expect that I will need to do some suprahyoid release and therefore I will ask speech pathologist to work with the patient as well.      We will definitely need to have him evaluated by the PAC service preparatory to the surgery.  We talked quite a bit about the options.  I explained that there could be some swallowing dysfunction as a result of a suprahyoid release.  I also explained to them that the major risk is that we injure one or both of the recurrent laryngeal nerves, which could result in vocal cord paralysis.  We talked about how to manage the airway postoperatively including an immediate extubation versus extended endotracheal intubation versus a tracheotomy.  I would prefer the first, but obviously we will need to discuss this among many other things with Dr. Estrella's team.     Once we will put \"all our ducks in a row\" as her  puts it, we will plan for a surgical date.  I believe that she will likely need another dilation before we can get the resection scheduled.     Thank you very much for the opportunity to participate in the care of your patient.        ADDENDUM: We learned from Glory Vargas that the thoracic team is asking Dr Reyes from pulmonary to see the patient first, to explore a bronchosopic option.  While we are " awaiting that work-up, we will move ahead with surgical planning.    Nick Beaulieu M.D.  Otolaryngology/Head & Neck Surgery  [phone]            Servando Pearson MD  Otolaryngology/Head & Neck Surgery  [facility] [address]    Ryna Estrella MD  Thoracic Surgery  [facility]

## 2019-01-22 NOTE — TELEPHONE ENCOUNTER
FUTURE VISIT INFORMATION      FUTURE VISIT INFORMATION:    Date: 1/23/19    Time: 7:30AM    [place] [facility]  REFERRAL INFORMATION:    Referring provider:  Dr Dusty Pearson    Referring providers clinic:  [facility]    Reason for visit/diagnosis: evaluation for possible resection of the stenotic area    RECORDS REQUESTED FROM:       Clinic name Comments Records Status Imaging Status   [facility] 12/27/18 notes with Dr Pearson    10/5/18 Direct Laryngoscopy with Teleoscope, Balloon Dilation, Application of Mitomycin and Steroid Injection    9/7/18 Teleoscopic Direct Laryngoscopy with Balloon Dilation, Mitomycin Application and Steriod Injection [facility] ED 9/27/18 ED notes with Dr Brown Zimmerman   [facility] ED 8/29/18 ED notes with Dr Jose eWinstein [facility]    Imaging [facility] 8/29/18 CT Neck  7/17/18 CT Chest, XR Chest , CT Head [facility] PACS

## 2019-01-23 ENCOUNTER — PRE VISIT (OUTPATIENT)
Dept: OTOLARYNGOLOGY | Facility: CLINIC | Age: 49
End: 2019-01-23

## 2019-01-23 ENCOUNTER — OFFICE VISIT (OUTPATIENT)
Dept: OTOLARYNGOLOGY | Facility: CLINIC | Age: 49
End: 2019-01-23
Payer: COMMERCIAL

## 2019-01-23 VITALS — WEIGHT: 234 LBS | HEIGHT: 65 IN | BODY MASS INDEX: 38.99 KG/M2

## 2019-01-23 DIAGNOSIS — J39.8 TRACHEAL STENOSIS: Primary | ICD-10-CM

## 2019-01-23 RX ORDER — GABAPENTIN 300 MG/1
300 CAPSULE ORAL 3 TIMES DAILY
Refills: 0 | Status: ON HOLD | COMMUNITY
Start: 2019-01-22 | End: 2019-03-19

## 2019-01-23 ASSESSMENT — PAIN SCALES - GENERAL: PAINLEVEL: NO PAIN (0)

## 2019-01-23 ASSESSMENT — MIFFLIN-ST. JEOR: SCORE: 1682.3

## 2019-01-23 NOTE — PATIENT INSTRUCTIONS
1. Please follow-up in clinic 2 weeks after your surgery.  2. Please call the ENT clinic with any questions,concerns, new or worsening symptoms.    -Clinic number is [phone]   - [facility]'s direct line ('s nurse) [phone]

## 2019-01-23 NOTE — LETTER
1/23/2019       RE: Bibi Song  [address] [facility] [address]     Dear Colleague,    Thank you for referring your patient, Bibi Song, to the [facility] EAR NOSE AND THROAT at [facility]. Please see a copy of my visit note below.    January 21, 2019        Dear Dr. Pearson:    I had the pleasure of meeting Bibi Song in consultation today at the [facility] at your request.     History of Present Illness:   Ms. Song is a 48 year old patient of Dr. Pearson's who has a recurrent tracheal stenosis for which Dr Pearson is recommending tracheal resection.      She had an accidental overdose of medications in mid July.  She was admitted from 7/18/ 2018-7/21/2018, and was intubated for at least 2-3 days.  On discharge she was feeling well, but within a month developed shortness of breath.  She was treated for asthma despite not carrying this diagnosis, without effect. She was referred to Dr. Pearson who treated her with prednisone 60 mg a day which helped, but she still had audible inspiratory stridor.      Dr Pearson was concerned about a tracheal stenosis, and on 9/7/2018 she underwent a direct microlaryngoscopy with dilatation of the tracheal stenosis, mitomycin application and steroid injection.  She initially did well for the first 2 weeks but then gradually developed some difficulty breathing.  She returned to the OR on October 5, 2018 and again a tracheal stenosis was seen.  Anteriorly it was 16 mm thick and was situated 32 mm below the vocal cords.  Posteriorly it was 16 mm thick and 20 mm below the vocal cords.  There was a Good view with the Dedo-Hermelinda laryngoscope.  There was no difficulty with mask ventilation. A 18 mm x 2 cm balloon was utilized to dilate the stenotic segment after mucosal incisions were made. The remainder of the trachea appeared normal. The patient required a high dose of propofol according to the  anesthesia team.     She again did well initially, but then worsened again with decreased exercise tolerance. On December 6, 2018 she returned to the operating room.  She had a micro-direct telescopic laryngoscopy with balloon dilatation mitomycin application and steroid injection.  Findings at the time of surgery were a tracheal stenosis. anteriorly that was 10 mm thick and was situated 45 mm below the vocal cords.  Posteriorly it was 16 mm thick and 40 mm below the vocal cords, just over an arterial pulse.  There was no difficulty with mask ventilation. A 18 mm x 4 cm balloon was utilized to dilate the stenotic segment after mucosal incisions were made. The remainder of the trachea appeared normal.        The stenosis has again recurred, and Dr Pearson feels she may do best with tracheal resection.   She was scheduled to see Dr. Estrella today, but for some reason his clinic canceled and rescheduled her with Pulmonary Medicine.      She reports that her breathing has become a little bit worse again.  It is not bad enough to the point where she needs to have dilation again, but is getting there.      MEDICATIONS:     Current Outpatient Medications   Medication Sig Dispense Refill     acetaminophen-codeine (TYLENOL #3) 300-30 MG tablet Take 1 tablet by mouth every 6 hours as needed for severe pain 10 tablet 0     albuterol (2.5 MG/3ML) 0.083% neb solution Take 1 vial (2.5 mg) by nebulization every 4 hours as needed for shortness of breath / dyspnea or wheezing 1 Box 0     albuterol (PROAIR HFA/PROVENTIL HFA/VENTOLIN HFA) 108 (90 Base) MCG/ACT inhaler Inhale 2 puffs into the lungs 4 times daily 1 Inhaler 1     Black Cohosh-SoyIsoflav-Magnol (ESTROVEN MENOPAUSE RELIEF PO)        BuPROPion HCl (WELLBUTRIN PO)        Calcium Carbonate-Vit D-Min (CALCIUM 1200 PO)        fluticasone (FLOVENT DISKUS) 50 MCG/BLIST AEPB Inhale 1 puff into the lungs 2 times daily 1 Inhaler 3     gabapentin (NEURONTIN) 300 MG capsule   0      ibuprofen (ADVIL/MOTRIN) 200 MG capsule Take 1 capsule (200 mg) by mouth every 6 hours as needed for pain 120 capsule 0     levothyroxine (SYNTHROID/LEVOTHROID) 75 MCG tablet Take 1 tablet (75 mcg) by mouth daily 90 tablet 1     MAGNESIUM PO Take 1 tablet by mouth daily        melatonin 3 MG tablet Take 9 mg by mouth nightly as needed. 30 tablet 0     predniSONE (DELTASONE) 20 MG tablet Take 1-2 tablets by mouth as needed for breathing. 10 tablet 1     ranitidine (ZANTAC) 150 MG tablet Take 1 tablet (150 mg) by mouth 2 times daily 60 tablet 1     sertraline (ZOLOFT) 100 MG tablet Take 2 tablets (200 mg) by mouth daily 60 tablet 0     vitamin B complex with vitamin C (VITAMIN  B COMPLEX) TABS tablet Take 1 tablet by mouth daily       ziprasidone (GEODON) 40 MG capsule Take 1 capsule (40 mg) by mouth 2 times daily (with meals) 60 capsule 0       ALLERGIES:  No Known Allergies    HABITS/SOCIAL HISTORY:    Ms. Song works at [employer] in [place].  Her  is in the [employer] system in [place] as well.  They are .     Social History     Socioeconomic History     Marital status:      Spouse name: None     Number of children: None     Years of education: None     Highest education level: None   Social Needs     Financial resource strain: None     Food insecurity - worry: None     Food insecurity - inability: None     Transportation needs - medical: None     Transportation needs - non-medical: None   Occupational History     None   Tobacco Use     Smoking status: Former Smoker     Years: 5.00     Start date: 1988     Last attempt to quit: 1994     Years since quittin.0     Smokeless tobacco: Never Used   Substance and Sexual Activity     Alcohol use: No     Comment: Treatment in      Drug use: No     Sexual activity: Yes     Partners: Male     Birth control/protection: Condom   Other Topics Concern     Parent/sibling w/ CABG, MI or angioplasty before 65F 55M? No   Social  History Narrative     None         PAST MEDICAL HISTORY:   Past Medical History:   Diagnosis Date     Anxiety      Chemical dependency (H)      Depressive disorder      Hoarseness      PONV (postoperative nausea and vomiting)      Problem, psychiatric      Sleep apnea         FAMILY HISTORY:    Family History   Problem Relation Age of Onset     Diabetes Mother      Depression Mother         OCD     Neurologic Disorder Mother      Psychotic Disorder Mother      Respiratory Mother      Thyroid Disease Mother      Gynecology Mother         hysterectomy     Cerebrovascular Disease Mother         pacemaker     Substance Abuse Mother      Mental Illness Mother      Heart Disease Mother      Heart Disease Father         MI,   LATE 60'S     Hypertension Father      Prostate Cancer Father      Obesity Father      Diabetes Father      Cerebrovascular Disease Father      Substance Abuse Father      Depression Father      Depression Brother      Thyroid Disease Brother      Depression Brother      Thyroid Disease Brother      Allergies Son      Asthma Son      Thyroid Disease Maternal Grandmother      Cancer Maternal Grandfather      Psychotic Disorder Paternal Grandmother      Thyroid Disease Paternal Grandfather      Diabetes Brother      Breast Cancer Maternal Aunt      Substance Abuse Brother      Mental Illness Brother      Depression Brother      Diabetes Brother      Mental Illness Son      Asthma Son      Depression Son      Stomach Problem Son        REVIEW OF SYSTEMS:    A 10 point Review of Systems was performed and pertinent positives are noted in the HPI; remaining positives are:  Patient Supplied Answers to Review of Systems  [facility] ROS 1/23/2019   Constitutional Weight gain   Neurology -   Psychology Frequently feeling depressed or sad   Ears, Nose, Throat Nasal congestion or drainage   Cardiopulmonary Breathing problems, Wheezing   Gastrointestinal/Genitourinary Heartburn/indigestion   Musculoskeletal Sore or  stiff joints   Hematologic -   Endocrine -   Other Problems with anesthesia in surgery       PHYSICAL EXAMINATION:    Constitutional:  The patient was well-groomed and in no acute distress. She was accompanied by her .    Skin:  Warm and pink.   Neurologic:  Alert and oriented x3. Cranial nerves III-XII within normal limits. Voice quality is normal, but she has loud breathing.    Psychiatric:  The patient's affect was calm, cooperative, and appropriate.    Respiratory: Breathing comfortably but there is audible sterter. Not stridorous.  She is able to count to about 15 to 20 at a slow pace before she runs out of breath.    Eyes:  Pupils were equal and reactive. Extraocular movements are intact.   Head:  Normocephalic and atraumatic. No lesions or scars.    Ears:  External auditory canals and tympanic membranes were clear.    Nose:  Sinuses were nontender. Anterior rhinoscopy revealed midline septum and absence of purulence or polyps.   Oral cavity/Oropharynx:  Normal tongue, floor of mouth, buccal mucosa, and palate. No lesions or masses on inspection or palpation. No abnormal lymph tissue in the oropharynx. The pterygoid region is nontender.    Hypopharynx/Larynx:  Mirror exam of the larynx deferred.   Neck:   The parotid is soft without masses.  Supple with normal laryngeal and tracheal landmarks.  The tissue is a little bit full with adipose tissue, but the landmarks are palpable.    Lymphatic:  There is no palpable lymphadenopathy or other masses in the neck or parotid.      FIBEROPTIC ENDOSCOPY:  Due to the need to evaluate the larynx, fiberoptic laryngoscopy was indicated. The nose was topically decongested and anesthetized. The fiberoptic laryngoscope was passed under endoscopic vision. The turbinates were normal.  The inferior and middle meati were clear bilaterally without purulence, masses, or polyps.  The nasopharynx was clear.  The eustachian tubes were clear. The soft palate appeared normal with  "good mobility.  The cords were fully mobile.  I did not evaluate the subglottis.       IMPRESSION AND PLAN:   Ms. Song is a delightful 48-year-old woman with a tracheal stenosis that does not respond durably to dilations.  Her mother did have a need for esophageal dilations, but I see no other family history or past medical history that would explain the stenosis.     I agree with Dr. Pearson that she should be considered for a tracheal resection.  She was scheduled to see Dr. Estrella today, but for some reason the appointment was canceled.  I definitely need to have him see her so that she can meet him and so we can plan surgery together.      I think that she will have an upper tracheal resection.  I think that the resection would be inferior to the cricoid. The patient would benefit from an upper tracheal resection.  I would like to do this with Dr. Estrella and he needs to see the patient so that he can assess her and we can do the surgery together.  I expect that I will need to do some suprahyoid release and therefore I will ask speech pathologist to work with the patient as well.      We will definitely need to have him evaluated by the PAC service preparatory to the surgery.  We talked quite a bit about the options.  I explained that there could be some swallowing dysfunction as a result of a suprahyoid release.  I also explained to them that the major risk is that we injure one or both of the recurrent laryngeal nerves, which could result in vocal cord paralysis.  We talked about how to manage the airway postoperatively including an immediate extubation versus extended endotracheal intubation versus a tracheotomy.  I would prefer the first, but obviously we will need to discuss this among many other things with Dr. Estrella's team.     Once we will put \"all our ducks in a row\" as her  puts it, we will plan for a surgical date.  I believe that she will likely need another dilation before we can get the " resection scheduled.     Thank you very much for the opportunity to participate in the care of your patient.        ADDENDUM: We learned from Glory Vargas that the thoracic team is asking Dr Reyes from pulmonary to see the patient first, to explore a bronchosopic option.  While we are awaiting that work-up, we will move ahead with surgical planning.    Nick Beaulieu M.D.  Otolaryngology/Head & Neck Surgery  [phone]            Lorenp MD Lili  Otolaryngology/Head & Neck Surgery  [facility] [address]    Ryan Estrella MD  Thoracic Surgery  [facility]

## 2019-01-23 NOTE — NURSING NOTE
Chief Complaint   Patient presents with     Consult     Discussion of surgery per Dr. Lili Cramer, EMT

## 2019-01-28 ENCOUNTER — TELEPHONE (OUTPATIENT)
Dept: OTOLARYNGOLOGY | Facility: CLINIC | Age: 49
End: 2019-01-28

## 2019-01-28 DIAGNOSIS — J39.8 TRACHEAL STENOSIS: Primary | ICD-10-CM

## 2019-01-28 DIAGNOSIS — E03.9 HYPOTHYROIDISM, UNSPECIFIED TYPE: ICD-10-CM

## 2019-01-28 RX ORDER — LEVOTHYROXINE SODIUM 75 UG/1
75 TABLET ORAL DAILY
Qty: 90 TABLET | Refills: 1 | Status: SHIPPED | OUTPATIENT
Start: 2019-01-28 | End: 2019-08-19

## 2019-01-28 NOTE — TELEPHONE ENCOUNTER
Patient is scheduled for surgery with Dr. Pearson  Spoke or left message with: Patient  Date of Surgery: 2/8/19  Location: [facility]  Informed patient they will need an adult  Yes  Pre-op with surgeon (if applicable): N/A  H&P: Scheduled with PCP  Additional imaging/appointments: N/A  Surgery packet: Mailed 1/28/19  Additional comments: Postop 2/21/19

## 2019-01-28 NOTE — TELEPHONE ENCOUNTER
"Requested Prescriptions   Pending Prescriptions Disp Refills     levothyroxine (SYNTHROID/LEVOTHROID) 75 MCG tablet  Last Written Prescription Date:  8/15/2018  Last Fill Quantity: 90,  # refills: 1   Last office visit: 12/5/2018 with prescribing provider:  Mani    Future Office Visit:     90 tablet 1     Sig: Take 1 tablet (75 mcg) by mouth daily    Thyroid Protocol Passed - 1/28/2019  8:51 AM       Passed - Patient is 12 years or older       Passed - Recent (12 mo) or future (30 days) visit within the authorizing provider's specialty    Patient had office visit in the last 12 months or has a visit in the next 30 days with authorizing provider or within the authorizing provider's specialty.  See \"Patient Info\" tab in inbasket, or \"Choose Columns\" in Meds & Orders section of the refill encounter.             Passed - Medication is active on med list       Passed - Normal TSH on file in past 12 months    Recent Labs   Lab Test 05/16/18  0948   TSH 1.76             Passed - No active pregnancy on record    If patient is pregnant or has had a positive pregnancy test, please check TSH.         Passed - No positive pregnancy test in past 12 months    If patient is pregnant or has had a positive pregnancy test, please check TSH.            "

## 2019-01-30 ENCOUNTER — DOCUMENTATION ONLY (OUTPATIENT)
Dept: OTOLARYNGOLOGY | Facility: CLINIC | Age: 49
End: 2019-01-30

## 2019-01-30 ENCOUNTER — TELEPHONE (OUTPATIENT)
Dept: FAMILY MEDICINE | Facility: CLINIC | Age: 49
End: 2019-01-30

## 2019-01-30 NOTE — TELEPHONE ENCOUNTER
"Patient is having a balloon dilation on her throat this Friday.  She would like to know if ok to still have this done due to the excessive amount of gabapentin?  Patient was prescribed 300mg daily gabapentin by psychiatrist for anxiety about a month ago.  Patient reports she has been taking about 8 tablets (2400mg) daily due to \"it's not like im addicted to it, it is more of a compulsive thing.\"  She typically takes 2 tablets in the am and then will take throughout the day.  Patient states many times that she is embarrassed and did not set out intentionally to misuse the medication, it just happened.  She states today she has only taken 2 tablets (600mg).    She will be contacting her psychiatrist today for instructions on weaning off or back down to prescribed dose.      Routing to provider.  Priscilla BAXTER RN        "

## 2019-01-30 NOTE — PROGRESS NOTES
Per Mary Castellon (ENT RN [facility]) surgery date change is requested for this patient.    OLD Date: 2/8/19    NEW Date 2/1/19      Epic changed      Google changed      Book changed    Mary is aware is will tell patient.  Pre-surg call 1-3 days prior to surgery with arrival time and final instructions.    Patient will have PRE-OP PE in home area.    Lizette Grider   ENT [facility]-Op Coordinator  [phone]

## 2019-01-30 NOTE — TELEPHONE ENCOUNTER
Agree with contacting her psychiatrist about this.  But yes, okay to have the balloon dilation with that dose of the Gabapentin.    Glory Singleton M.D.

## 2019-01-30 NOTE — TELEPHONE ENCOUNTER
Reason for Call:  Other Medication question    Detailed comments: Pt is wondering if she has been taking more gabapentin then she should have could it affect the anesthesia. She is having surgery on Friday on her throat.    Phone Number Patient can be reached at: Home number on file [phone] (home)    Best Time: any    Can we leave a detailed message on this number?     Call taken on 1/30/2019 at 12:49 PM by Janice Silva

## 2019-01-31 ENCOUNTER — ANESTHESIA EVENT (OUTPATIENT)
Dept: SURGERY | Facility: AMBULATORY SURGERY CENTER | Age: 49
End: 2019-01-31

## 2019-01-31 ENCOUNTER — OFFICE VISIT (OUTPATIENT)
Dept: FAMILY MEDICINE | Facility: CLINIC | Age: 49
End: 2019-01-31
Payer: COMMERCIAL

## 2019-01-31 VITALS
OXYGEN SATURATION: 95 % | HEART RATE: 97 BPM | TEMPERATURE: 98.3 F | SYSTOLIC BLOOD PRESSURE: 98 MMHG | WEIGHT: 237 LBS | BODY MASS INDEX: 39.49 KG/M2 | RESPIRATION RATE: 24 BRPM | HEIGHT: 65 IN | DIASTOLIC BLOOD PRESSURE: 72 MMHG

## 2019-01-31 DIAGNOSIS — F42.9 OBSESSIVE-COMPULSIVE DISORDER, UNSPECIFIED TYPE: ICD-10-CM

## 2019-01-31 DIAGNOSIS — Z01.818 PREOP GENERAL PHYSICAL EXAM: Primary | ICD-10-CM

## 2019-01-31 DIAGNOSIS — J39.8 TRACHEAL STENOSIS: ICD-10-CM

## 2019-01-31 DIAGNOSIS — E03.9 HYPOTHYROIDISM, UNSPECIFIED TYPE: ICD-10-CM

## 2019-01-31 DIAGNOSIS — E78.5 HYPERLIPIDEMIA LDL GOAL <160: ICD-10-CM

## 2019-01-31 PROCEDURE — 99214 OFFICE O/P EST MOD 30 MIN: CPT | Performed by: FAMILY MEDICINE

## 2019-01-31 ASSESSMENT — PAIN SCALES - GENERAL: PAINLEVEL: NO PAIN (0)

## 2019-01-31 ASSESSMENT — PATIENT HEALTH QUESTIONNAIRE - PHQ9: SUM OF ALL RESPONSES TO PHQ QUESTIONS 1-9: 2

## 2019-01-31 ASSESSMENT — MIFFLIN-ST. JEOR: SCORE: 1695.9

## 2019-01-31 NOTE — PROGRESS NOTES
[facility]  [address]  [facility] [address]  [phone]  Dept: [phone]    PRE-OP EVALUATION:  Today's date: 2019    Bibi Song (: 1970) presents for pre-operative evaluation assessment as requested by Dr. Pearson.  She requires evaluation and anesthesia risk assessment prior to undergoing surgery/procedure for treatment of throat .    Proposed Surgery/ Procedure: Direct Laryngoscopy with Microscope, Balloon Dilation, Mitomycin Application  Date of Surgery/ Procedure: 19  Time of Surgery/ Procedure: [facility]  Hospital/Surgical Facility: [facility]  Fax number for surgical facility: [phone]  Primary Physician: Glory Singleton  Type of Anesthesia Anticipated: General    Patient has a Health Care Directive or Living Will:  NO    1. NO - Do you have a history of heart attack, stroke, stent, bypass or surgery on an artery in the head, neck, heart or legs?  2. NO - Do you ever have any pain or discomfort in your chest?  3. NO - Do you have a history of  Heart Failure?  4. NO - Are you troubled by shortness of breath when: walking on the level, up a slight hill or at night?  5. NO - Do you currently have a cold, bronchitis or other respiratory infection?  6. NO - Do you have a cough, shortness of breath or wheezing?  7. NO - Do you sometimes get pains in the calves of your legs when you walk?  8. YES - Do you or anyone in your family have previous history of blood clots?  9. YES - Do you or does anyone in your family have a serious bleeding problem such as prolonged bleeding following surgeries or cuts?  10. NO - Have you ever had problems with anemia or been told to take iron pills?  11. NO - Have you had any abnormal blood loss such as black, tarry or bloody stools, or abnormal vaginal bleeding?  12. NO - Have you ever had a blood transfusion?  13. NO - Have you or any of your relatives ever had problems with anesthesia?  14. NO - Do you have sleep apnea,  excessive snoring or daytime drowsiness?  15. NO - Do you have any prosthetic heart valves?  16. NO - Do you have prosthetic joints?  17. NO - Is there any chance that you may be pregnant?      HPI:     HPI related to upcoming procedure: patient was admitted 7/18 - 7/21 after an accidental overdose in July.  After intubation she has developed stridor and was found to have tracheal stenosis. She has had multiple balloon dilatations and mitomycin applications now - three.  She has had to go back to the OR every 2 months or so.  This will be her fourth dilation.      It sounds as though she is going to be seeing Dr. Estrella to consider surgical resection of the area that is causing problems.        See problem list for active medical problems.  Problems all longstanding and stable, except as noted/documented.  See ROS for pertinent symptoms related to these conditions.                                                                                                                                                          .    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 Schoenecker, NP       Anxiety disorder 08/06/2010     Priority: Medium     Family history of diabetes mellitus 08/06/2010     Priority: Medium     Family history of thyroid disease 08/06/2010     Priority: Medium     ALCOHOL ABUSE, IN  REMISSION 08/06/2010     Priority: Medium      Past Medical History:   Diagnosis Date     Anxiety      Chemical dependency (H)      Depressive disorder      Hoarseness      PONV (postoperative nausea and vomiting)      Problem, psychiatric      Sleep apnea      Past Surgical History:   Procedure Laterality Date     INJECT STEROID (LOCATION) N/A 9/7/2018    Procedure: INJECT STEROID (LOCATION);;  Surgeon: Dusty Pearson MD;  Location: [facility] OR     INJECT STEROID (LOCATION) N/A 10/5/2018    Procedure: INJECT STEROID (LOCATION);;  Surgeon: Dusty Pearson MD;  Location: [facility] OR     INJECT STEROID (LOCATION) N/A 12/6/2018    Procedure: Steroid Injection;  Surgeon: Dusty Pearson MD;  Location: [facility] OR     LARYNGOSCOPY N/A 9/7/2018    Procedure: LARYNGOSCOPY;  Teleoscopic Direct Laryngoscopy with Balloon Dilation, Mitomycin Application and Steriod Injection ;  Surgeon: Dusty Pearson MD;  Location: [facility] OR     LARYNGOSCOPY, ESOPHAGOSCOPY WITH DILATION, COMBINED N/A 10/5/2018    Procedure: COMBINED LARYNGOSCOPY, ESOPHAGOSCOPY WITH DILATION;  Direct Laryngoscopy with Teleoscope, Balloon Dilation, Application of Mitomycin and Steroid Injection ;  Surgeon: Dusty Pearson MD;  Location: [facility] OR     LARYNGOSCOPY, ESOPHAGOSCOPY WITH DILATION, COMBINED N/A 12/6/2018    Procedure: Direct Laryngoscopy, Balloon Dilation, Mitomycin Application, Steroid Injection;  Surgeon: Dusty Pearson MD;  Location[facility] OR     Current Outpatient Medications   Medication Sig Dispense Refill     BuPROPion HCl (WELLBUTRIN PO)        Calcium Carbonate-Vit D-Min (CALCIUM 1200 PO)        gabapentin (NEURONTIN) 300 MG capsule   0     levothyroxine (SYNTHROID/LEVOTHROID) 75 MCG tablet Take 1 tablet (75 mcg) by mouth daily 90 tablet 1     MAGNESIUM PO Take 1 tablet by mouth daily        melatonin 3 MG tablet Take 9 mg by mouth nightly as needed. 30 tablet 0     ranitidine (ZANTAC) 150 MG tablet  "Take 1 tablet (150 mg) by mouth 2 times daily 60 tablet 1     sertraline (ZOLOFT) 100 MG tablet Take 2 tablets (200 mg) by mouth daily 60 tablet 0     vitamin B complex with vitamin C (VITAMIN  B COMPLEX) TABS tablet Take 1 tablet by mouth daily       ziprasidone (GEODON) 40 MG capsule Take 1 capsule (40 mg) by mouth 2 times daily (with meals) 60 capsule 0     OTC products: None, except as noted above    No Known Allergies   Latex Allergy: NO    Social History     Tobacco Use     Smoking status: Former Smoker     Years: 5.00     Start date: 1988     Last attempt to quit: 1994     Years since quittin.0     Smokeless tobacco: Never Used   Substance Use Topics     Alcohol use: No     Comment: Treatment in 2007     History   Drug Use No       REVIEW OF SYSTEMS:   CONSTITUTIONAL: NEGATIVE for fever, chills, change in weight  INTEGUMENTARY/SKIN: NEGATIVE for worrisome rashes, moles or lesions  ENT/MOUTH: see above  RESP: NEGATIVE for significant cough or SOB  CV: NEGATIVE for chest pain, palpitations or peripheral edema  PSYCHIATRIC: higher anxiety and has been taking more gabapentin than prescribed recently.  She is going to follow up with her psychiatrist about this.      EXAM:   BP 98/72   Pulse 97   Temp 98.3  F (36.8  C) (Tympanic)   Resp 24   Ht 1.651 m (5' 5\")   Wt 107.5 kg (237 lb)   SpO2 95%   BMI 39.44 kg/m    GENERAL APPEARANCE: healthy, alert and no distress  HENT: ear canals and TM's normal, nose and mouth without ulcers or lesions and stridor noted on inspiration  RESP: lungs clear to auscultation - no rales, rhonchi or wheezes  CV: regular rate and rhythm, normal S1 S2, no S3 or S4 and no murmur, click or rub   ABDOMEN: soft, nontender, no HSM or masses and bowel sounds normal  NEURO: Normal strength and tone, sensory exam grossly normal, mentation intact and speech normal    DIAGNOSTICS:   No labs or EKG required for low risk surgery (cataract, skin procedure, breast biopsy, " etc)    Recent Labs   Lab Test 08/29/18  1110 07/21/18  0658  04/27/17  0955   HGB 11.4* 10.2*   < >  --     260   < >  --     139   < > 142   POTASSIUM 3.5 3.4   < > 3.6   CR 0.71 0.60   < > 0.93   A1C  --   --   --  5.5    < > = values in this interval not displayed.        IMPRESSION:   Reason for surgery/procedure: subglottic stenosis, recurrent   Diagnosis/reason for consult: preoperative evaluation and medical risk assessment    The proposed surgical procedure is considered LOW risk.    REVISED CARDIAC RISK INDEX  The patient has the following serious cardiovascular risks for perioperative complications such as (MI, PE, VFib and 3  AV Block):  No serious cardiac risks  INTERPRETATION: 0 risks: Class I (very low risk - 0.4% complication rate)    The patient has the following additional risks for perioperative complications:  No identified additional risks      ICD-10-CM    1. Preop general physical exam Z01.818    2. Tracheal stenosis J39.8    3. Hypothyroidism, unspecified type E03.9    4. Hyperlipidemia LDL goal <160 E78.5    5. Obsessive-compulsive disorder, unspecified type F42.9        RECOMMENDATIONS:         --Patient is to take all scheduled medications on the day of surgery EXCEPT for modifications listed below.    APPROVAL GIVEN to proceed with proposed procedure, without further diagnostic evaluation       Signed Electronically by: Glory Singleton MD    Copy of this evaluation report is provided to requesting physician.    [facility] Preop Guidelines    Revised Cardiac Risk Index

## 2019-01-31 NOTE — NURSING NOTE
"I encouraged patient to review  ENT Adult Default Surgery Request\" packet.  Highlighted points include:  1. Complete History and Physical within 30 days of surgery, either with PAC clinic or family clinic.   If completed outside of  [facility], please have provider send all of your results to the hospital before the surgery.  Please schedule an appointment with your primary care provider.  2. Same-day surgery, must arrange for an adult to take you home and stay with you for the first 24 hours after surgery.  3. Discuss with primary care provider what medicines are safe before surgery.   Example, Coumadin, Plavix, Aspirin, Ibuprofen/Motrin, herbal products, Vitamin E and Fish oil may possibly be discontinued 7 days prior to surgery date.  4. Stop drinking alcohol at least 24 hours before surgery.  5. Quit or at least cut down smoking as it higher your risks for infection after surgery. No chewing tobacco for at least 8 hours before surgery.  6. Take a bath or shower the night before and morning of surgery.   Use antiseptic soap.  7. No food or drink after midnight. Follow your surgeon s orders for eating and drinking.     Please following surgeon s instructions as if you don t, your surgery could be canceled.     Thank you for choosing [facility].    "

## 2019-01-31 NOTE — PATIENT INSTRUCTIONS
Our Clinic hours are:  Mondays    7:20 am - 7 pm  Tues - Fri  7:20 am - 5 pm    Clinic Phone: [phone]    The clinic lab opens at 7:30 am Mon - Fri and appointments are required.    [facility]. [phone]  Monday  8 am - 7pm  Tues - Fri 8 am - 5:30 pm         Before Your Surgery      Call your surgeon if there is any change in your health. This includes signs of a cold or flu (such as a sore throat, runny nose, cough, rash or fever).    Do not smoke, drink alcohol or take over the counter medicine (unless your surgeon or primary care doctor tells you to) for the 24 hours before and after surgery.    If you take prescribed drugs: Follow your doctor s orders about which medicines to take and which to stop until after surgery.    Eating and drinking prior to surgery: follow the instructions from your surgeon    Take a shower or bath the night before surgery. Use the soap your surgeon gave you to gently clean your skin. If you do not have soap from your surgeon, use your regular soap. Do not shave or scrub the surgery site.  Wear clean pajamas and have clean sheets on your bed.

## 2019-02-01 ENCOUNTER — ANESTHESIA (OUTPATIENT)
Dept: SURGERY | Facility: AMBULATORY SURGERY CENTER | Age: 49
End: 2019-02-01

## 2019-02-01 ENCOUNTER — HOSPITAL ENCOUNTER (OUTPATIENT)
Facility: AMBULATORY SURGERY CENTER | Age: 49
End: 2019-02-01
Attending: OTOLARYNGOLOGY
Payer: COMMERCIAL

## 2019-02-01 VITALS
HEIGHT: 65 IN | BODY MASS INDEX: 39.15 KG/M2 | TEMPERATURE: 98 F | DIASTOLIC BLOOD PRESSURE: 81 MMHG | SYSTOLIC BLOOD PRESSURE: 142 MMHG | OXYGEN SATURATION: 92 % | HEART RATE: 95 BPM | WEIGHT: 235 LBS | RESPIRATION RATE: 18 BRPM

## 2019-02-01 DIAGNOSIS — J39.8 TRACHEAL STENOSIS: ICD-10-CM

## 2019-02-01 LAB
HCG UR QL: NEGATIVE
INTERNAL QC OK POCT: YES

## 2019-02-01 RX ORDER — PROPOFOL 10 MG/ML
INJECTION, EMULSION INTRAVENOUS PRN
Status: DISCONTINUED | OUTPATIENT
Start: 2019-02-01 | End: 2019-02-01

## 2019-02-01 RX ORDER — MEPERIDINE HYDROCHLORIDE 25 MG/ML
12.5 INJECTION INTRAMUSCULAR; INTRAVENOUS; SUBCUTANEOUS
Status: DISCONTINUED | OUTPATIENT
Start: 2019-02-01 | End: 2019-02-02 | Stop reason: HOSPADM

## 2019-02-01 RX ORDER — ONDANSETRON 4 MG/1
4 TABLET, ORALLY DISINTEGRATING ORAL EVERY 30 MIN PRN
Status: DISCONTINUED | OUTPATIENT
Start: 2019-02-01 | End: 2019-02-02 | Stop reason: HOSPADM

## 2019-02-01 RX ORDER — OXYCODONE HYDROCHLORIDE 5 MG/1
5 TABLET ORAL EVERY 4 HOURS PRN
Status: DISCONTINUED | OUTPATIENT
Start: 2019-02-01 | End: 2019-02-02 | Stop reason: HOSPADM

## 2019-02-01 RX ORDER — CEFAZOLIN SODIUM 2 G/50ML
2 SOLUTION INTRAVENOUS
Status: COMPLETED | OUTPATIENT
Start: 2019-02-01 | End: 2019-02-01

## 2019-02-01 RX ORDER — ONDANSETRON 2 MG/ML
INJECTION INTRAMUSCULAR; INTRAVENOUS PRN
Status: DISCONTINUED | OUTPATIENT
Start: 2019-02-01 | End: 2019-02-01

## 2019-02-01 RX ORDER — SODIUM CHLORIDE, SODIUM LACTATE, POTASSIUM CHLORIDE, CALCIUM CHLORIDE 600; 310; 30; 20 MG/100ML; MG/100ML; MG/100ML; MG/100ML
INJECTION, SOLUTION INTRAVENOUS CONTINUOUS
Status: DISCONTINUED | OUTPATIENT
Start: 2019-02-01 | End: 2019-02-01 | Stop reason: HOSPADM

## 2019-02-01 RX ORDER — ONDANSETRON 2 MG/ML
4 INJECTION INTRAMUSCULAR; INTRAVENOUS EVERY 30 MIN PRN
Status: DISCONTINUED | OUTPATIENT
Start: 2019-02-01 | End: 2019-02-02 | Stop reason: HOSPADM

## 2019-02-01 RX ORDER — TRIAMCINOLONE ACETONIDE 40 MG/ML
INJECTION, SUSPENSION INTRA-ARTICULAR; INTRAMUSCULAR PRN
Status: DISCONTINUED | OUTPATIENT
Start: 2019-02-01 | End: 2019-02-01 | Stop reason: HOSPADM

## 2019-02-01 RX ORDER — ACETAMINOPHEN 325 MG/1
650 TABLET ORAL
Status: DISCONTINUED | OUTPATIENT
Start: 2019-02-01 | End: 2019-02-02 | Stop reason: HOSPADM

## 2019-02-01 RX ORDER — GABAPENTIN 300 MG/1
300 CAPSULE ORAL ONCE
Status: COMPLETED | OUTPATIENT
Start: 2019-02-01 | End: 2019-02-01

## 2019-02-01 RX ORDER — NALOXONE HYDROCHLORIDE 0.4 MG/ML
.1-.4 INJECTION, SOLUTION INTRAMUSCULAR; INTRAVENOUS; SUBCUTANEOUS
Status: DISCONTINUED | OUTPATIENT
Start: 2019-02-01 | End: 2019-02-02 | Stop reason: HOSPADM

## 2019-02-01 RX ORDER — LIDOCAINE 40 MG/G
CREAM TOPICAL
Status: DISCONTINUED | OUTPATIENT
Start: 2019-02-01 | End: 2019-02-01 | Stop reason: HOSPADM

## 2019-02-01 RX ORDER — LIDOCAINE HYDROCHLORIDE 40 MG/ML
SOLUTION TOPICAL PRN
Status: DISCONTINUED | OUTPATIENT
Start: 2019-02-01 | End: 2019-02-01 | Stop reason: HOSPADM

## 2019-02-01 RX ORDER — FENTANYL CITRATE 50 UG/ML
INJECTION, SOLUTION INTRAMUSCULAR; INTRAVENOUS PRN
Status: DISCONTINUED | OUTPATIENT
Start: 2019-02-01 | End: 2019-02-01

## 2019-02-01 RX ORDER — HYDROMORPHONE HYDROCHLORIDE 1 MG/ML
.3-.5 INJECTION, SOLUTION INTRAMUSCULAR; INTRAVENOUS; SUBCUTANEOUS EVERY 10 MIN PRN
Status: DISCONTINUED | OUTPATIENT
Start: 2019-02-01 | End: 2019-02-02 | Stop reason: HOSPADM

## 2019-02-01 RX ORDER — ACETAMINOPHEN 325 MG/1
975 TABLET ORAL ONCE
Status: COMPLETED | OUTPATIENT
Start: 2019-02-01 | End: 2019-02-01

## 2019-02-01 RX ORDER — HYDROCODONE BITARTRATE AND ACETAMINOPHEN 5; 325 MG/1; MG/1
1 TABLET ORAL EVERY 6 HOURS PRN
Qty: 10 TABLET | Refills: 0 | Status: SHIPPED | OUTPATIENT
Start: 2019-02-01 | End: 2019-02-21

## 2019-02-01 RX ORDER — DEXAMETHASONE SODIUM PHOSPHATE 4 MG/ML
INJECTION, SOLUTION INTRA-ARTICULAR; INTRALESIONAL; INTRAMUSCULAR; INTRAVENOUS; SOFT TISSUE PRN
Status: DISCONTINUED | OUTPATIENT
Start: 2019-02-01 | End: 2019-02-01

## 2019-02-01 RX ORDER — SCOLOPAMINE TRANSDERMAL SYSTEM 1 MG/1
1 PATCH, EXTENDED RELEASE TRANSDERMAL
Status: DISCONTINUED | OUTPATIENT
Start: 2019-02-01 | End: 2019-02-02 | Stop reason: HOSPADM

## 2019-02-01 RX ORDER — PROPOFOL 10 MG/ML
INJECTION, EMULSION INTRAVENOUS CONTINUOUS PRN
Status: DISCONTINUED | OUTPATIENT
Start: 2019-02-01 | End: 2019-02-01

## 2019-02-01 RX ORDER — ACETAMINOPHEN 325 MG/1
650 TABLET ORAL EVERY 4 HOURS PRN
Qty: 50 TABLET | Refills: 0 | Status: SHIPPED | OUTPATIENT
Start: 2019-02-01 | End: 2019-02-01

## 2019-02-01 RX ORDER — SODIUM CHLORIDE, SODIUM LACTATE, POTASSIUM CHLORIDE, CALCIUM CHLORIDE 600; 310; 30; 20 MG/100ML; MG/100ML; MG/100ML; MG/100ML
INJECTION, SOLUTION INTRAVENOUS CONTINUOUS
Status: DISCONTINUED | OUTPATIENT
Start: 2019-02-01 | End: 2019-02-02 | Stop reason: HOSPADM

## 2019-02-01 RX ORDER — LIDOCAINE HYDROCHLORIDE 20 MG/ML
INJECTION, SOLUTION INFILTRATION; PERINEURAL PRN
Status: DISCONTINUED | OUTPATIENT
Start: 2019-02-01 | End: 2019-02-01

## 2019-02-01 RX ORDER — OXYCODONE HYDROCHLORIDE 5 MG/1
5 TABLET ORAL
Status: COMPLETED | OUTPATIENT
Start: 2019-02-01 | End: 2019-02-01

## 2019-02-01 RX ORDER — FENTANYL CITRATE 50 UG/ML
25-50 INJECTION, SOLUTION INTRAMUSCULAR; INTRAVENOUS
Status: DISCONTINUED | OUTPATIENT
Start: 2019-02-01 | End: 2019-02-02 | Stop reason: HOSPADM

## 2019-02-01 RX ORDER — KETAMINE HYDROCHLORIDE 10 MG/ML
INJECTION, SOLUTION INTRAMUSCULAR; INTRAVENOUS PRN
Status: DISCONTINUED | OUTPATIENT
Start: 2019-02-01 | End: 2019-02-01

## 2019-02-01 RX ADMIN — PROPOFOL 200 MCG/KG/MIN: 10 INJECTION, EMULSION INTRAVENOUS at 12:08

## 2019-02-01 RX ADMIN — PROPOFOL 200 MG: 10 INJECTION, EMULSION INTRAVENOUS at 12:08

## 2019-02-01 RX ADMIN — CEFAZOLIN SODIUM 2 G: 2 SOLUTION INTRAVENOUS at 12:20

## 2019-02-01 RX ADMIN — FENTANYL CITRATE 25 MCG: 50 INJECTION, SOLUTION INTRAMUSCULAR; INTRAVENOUS at 13:12

## 2019-02-01 RX ADMIN — Medication 30 MG: at 12:30

## 2019-02-01 RX ADMIN — FENTANYL CITRATE 50 MCG: 50 INJECTION, SOLUTION INTRAMUSCULAR; INTRAVENOUS at 12:54

## 2019-02-01 RX ADMIN — ACETAMINOPHEN 975 MG: 325 TABLET ORAL at 10:40

## 2019-02-01 RX ADMIN — FENTANYL CITRATE 50 MCG: 50 INJECTION, SOLUTION INTRAMUSCULAR; INTRAVENOUS at 13:20

## 2019-02-01 RX ADMIN — DEXAMETHASONE SODIUM PHOSPHATE 10 MG: 4 INJECTION, SOLUTION INTRA-ARTICULAR; INTRALESIONAL; INTRAMUSCULAR; INTRAVENOUS; SOFT TISSUE at 12:08

## 2019-02-01 RX ADMIN — SCOLOPAMINE TRANSDERMAL SYSTEM 1 PATCH: 1 PATCH, EXTENDED RELEASE TRANSDERMAL at 11:44

## 2019-02-01 RX ADMIN — Medication 20 MG: at 12:54

## 2019-02-01 RX ADMIN — SODIUM CHLORIDE, SODIUM LACTATE, POTASSIUM CHLORIDE, CALCIUM CHLORIDE: 600; 310; 30; 20 INJECTION, SOLUTION INTRAVENOUS at 12:00

## 2019-02-01 RX ADMIN — SODIUM CHLORIDE, SODIUM LACTATE, POTASSIUM CHLORIDE, CALCIUM CHLORIDE: 600; 310; 30; 20 INJECTION, SOLUTION INTRAVENOUS at 10:45

## 2019-02-01 RX ADMIN — FENTANYL CITRATE 50 MCG: 50 INJECTION, SOLUTION INTRAMUSCULAR; INTRAVENOUS at 12:30

## 2019-02-01 RX ADMIN — LIDOCAINE HYDROCHLORIDE 100 MG: 20 INJECTION, SOLUTION INFILTRATION; PERINEURAL at 12:08

## 2019-02-01 RX ADMIN — OXYCODONE HYDROCHLORIDE 5 MG: 5 TABLET ORAL at 13:16

## 2019-02-01 RX ADMIN — KETAMINE HYDROCHLORIDE 30 MG: 10 INJECTION, SOLUTION INTRAMUSCULAR; INTRAVENOUS at 12:08

## 2019-02-01 RX ADMIN — Medication 50 MG: at 12:08

## 2019-02-01 RX ADMIN — GABAPENTIN 300 MG: 300 CAPSULE ORAL at 10:40

## 2019-02-01 RX ADMIN — ONDANSETRON 4 MG: 2 INJECTION INTRAMUSCULAR; INTRAVENOUS at 12:08

## 2019-02-01 ASSESSMENT — MIFFLIN-ST. JEOR: SCORE: 1686.83

## 2019-02-01 NOTE — DISCHARGE INSTRUCTIONS
Scopolamine Patch- (Absorbed through the skin)    This medicine prevents nausea and vomiting caused by motion sickness or anesthesia.  The medicine is in a patch worn behind the ear.      Do NOT use the Scopolamine Patch if you have glaucoma or are allergic to scopolamine.    How to Use This Medicine:    The patch is applied behind the ear.    Keep the patch dry to prevent it from falling off.  Limit contact with water (no bathing or swimming).      If the patch is loose or falls off throw it away.  You do not need to apply a new patch.    After you take off the patch or if it falls off, wash your hands and the area behind your ear with soap and water.      You can remove the patch tomorrow, or leave on for up to 3 days.    Only one patch should be used at any time.    How to Dispose of This Medicine:    Fold the used patch in half with the sticky sides together. Throw any used patch away so that children or pets cannot get to it. You will also need to throw away old patches after the expiration date has passed.    Keep all medicine away from children and never share your medicine with anyone.    Warnings While Using This Medicine:    This medicine can make you sleepy.  Avoid taking sleeping pills and other medicines that can make you sleepy while the patch is on.    Do not drink alcohol while the patch is on.    This medicine can cause temporary blurring and other vision problems if it comes in contact with the eyes.  This is not serious unless accompanied by eye pain and redness.     This medicine may cause problems with urination. If you have problems with urinating, remove the patch.  If you are unable to urinate, call your doctor.      This medicine may make you dizzy or drowsy. Avoid driving, using machines, or doing anything else that could be dangerous if the patch is on.    This medicine may make you sweat less and cause your body to get too hot. Be careful in hot weather or if you are exercising.    Make  sure any doctor or dentist who treats you knows that you have the patch on. This medicine may affect the results of certain medical tests.    Skin burns have been reported at the patch site in several patients wearing an aluminized transdermal system during a magnetic resonance imaging scan (MRI).  Since this patch contains aluminum, it is recommended to remove the patch if you are having an MRI.    Possible Side Effects While Using This Medicine:    Dry mouth    Drowsiness    Temporary blurring of vision and widening of the pupils    Call your doctor right away if you notice any of these side effects:    Allergic reaction: Itching or hives, swelling in your face or hands, swelling or tingling in your mouth or throat, chest tightness, trouble breathing.    Blurred vision that does not go away after the patch is removed    Confusion or memory loss    Fast,slow, or uneven heartbeat    Lightheadedness, dizziness, drowsiness, or fainting    Seeing, hearing, or feeling things that are not there    Restlessness    Severe eye pain    Trouble urinating    If you notice other side effects that you think are caused by this medicine, call your doctor immediately.    [facility]  Home Care Following Anesthesia  For 24 hours after surgery:  1. Get plenty of rest.  A responsible adult must stay with you for at least 24 hours after you leave the surgery center.  2. Do not drive or use heavy equipment.  If you have weakness or tingling, don't drive or use heavy equipment until this feeling goes away.   3. Do not drink alcohol.   4. Avoid strenuous or risky activities.  Ask for help when climbing stairs.  5. You may feel lightheaded.  IF so, sit for a few minutes before standing.  Have someone help you get up.   6. If you have nausea (feel sick to your stomach): Drink only clear liquids such as apple juice, ginger ale, broth or 7-Up.  Rest may also help.  Be sure to drink enough fluids.  Move to  a regular diet as you feel able.   7. You may have a slight fever.  Call the doctor if your fever is over 100 F (37.7 C) (taken under the tongue) or lasts longer than 24 hours.  8. You may have a dry mouth, a sore throat, muscle aches or trouble sleeping. These should go away after 24 hours.  9. Do not make important or legal decisions.               Tips for taking pain medications  To get the best pain relief possible, remember these points:    Take pain medications as directed, before pain becomes severe.    Pain medication can upset your stomach: taking it with food may help.    Constipation is a common side effect of pain medication. Drink plenty of  fluids.    Eat foods high in fiber. Take a stool softener if recommended by your doctor or pharmacist.    Do not drink alcohol, drive or operate machinery while taking pain medications.    Ask about other ways to control pain, such as with heat, ice or relaxation.    Tylenol/Acetaminophen Consumption  To help encourage the safe use of acetaminophen, the makers of TYLENOL  have lowered the maximum daily dose for single-ingredient Extra Strength TYLENOL  (acetaminophen) products sold in the [place] from 8 pills per day (4,000 mg) to 6 pills per day (3,000 mg). The dosing interval has also changed from 2 pills every 4-6 hours to 2 pills every 6 hours.    If you feel your pain relief is insufficient, you may take Tylenol/Acetaminophen in addition to your narcotic pain medication.     Be careful not to exceed 3,000 mg of Tylenol/Acetaminophen in a 24 hour period from all sources.    If you are taking extra strength Tylenol/acetaminophen (500 mg), the maximum dose is 6 tablets in 24 hours.    If you are taking regular strength acetaminophen (325 mg), the maximum dose is 9 tablets in 24 hours.    Call a doctor for any of the followin. Signs of infection (fever, growing tenderness at the surgery site, a large amount of drainage or bleeding, severe pain, foul-smelling  drainage, redness, swelling).  2. It has been over 8 to 10 hours since surgery and you are still not able to urinate (pass water).  3. Headache for over 24 hours.  4. Numbness, tingling or weakness the day after surgery (if you had spinal anesthesia).  Your doctor is:  Dr. Dusty Pearson, ENT Otolaryngology: [phone]                    Or dial [phone] and ask for the resident on call for:  ENT Otolaryngology  For emergency care, call the:  [facility]:  [phone] (TTY for hearing impaired: [phone])

## 2019-02-01 NOTE — ANESTHESIA CARE TRANSFER NOTE
Patient: Bibi Song    Procedure(s):  Direct Laryngoscopy, Balloon Dilation, Mitomycin Application  Steroid Injection    Diagnosis: Subglottic Stenosis  Diagnosis Additional Information: No value filed.    Anesthesia Type:   No value filed.     Note:  Airway :Room Air  Patient transferred to:PACU  Comments: Report to RN    139/78, 16, 106, 98%, 97.9Handoff Report: Identifed the Patient, Identified the Reponsible Provider, Reviewed the pertinent medical history, Discussed the surgical course, Reviewed Intra-OP anesthesia mangement and issues during anesthesia, Set expectations for post-procedure period and Allowed opportunity for questions and acknowledgement of understanding      Vitals: (Last set prior to Anesthesia Care Transfer)    [facility] VITALS  2/1/2019 1233 - 2/1/2019 1309      2/1/2019             Pulse:  123    SpO2:  98 %    Resp Rate (observed):  19    Resp Rate (set):  10                Electronically Signed By: SUSANNAH English CRNA  February 1, 2019  1:09 PM

## 2019-02-01 NOTE — OP NOTE
DATE OF SERVICE: 2/1/19      STAFF SURGEON:  Dusty Pearson MD       RESIDENT SURGEON: Rand Dihn MD       PREOPERATIVE DIAGNOSES: Tracheal stenosis.       POSTOPERATIVE DIAGNOSIS: Tracheal stenosis.       PROCEDURES PERFORMED:   1.  Microdirect telescopic laryngoscopy and bronchoscopy.   2.  Endoscopic-guided dilation of the trachea.   3.  Mitomycin application.   4.  Endoscopic steroid injection into the trachea.       COMPLICATIONS:  None.       ESTIMATED BLOOD LOSS:  5 mL.       SPECIMENS: None.       ANESTHESIA:  Jet ventilation.       OPERATIVE FINDINGS: Tracheal stenosis.  Anteriorly it was 11 mm thick and was situated 43 mm below the vocal cords.  Posteriorly it was 15 mm thick and 40 mm below the vocal cords, just over an arterial pulse.  There was a good view with the Dedo-Hermelinda laryngoscope.  There was no difficulty with mask ventilation. A 16 mm x 4 cm balloon was utilized to dilate the stenotic segment after mucosal incisions were made. The remainder of the trachea appeared normal.  She had a mild improvement after dilation.       INDICATIONS FOR PROCEDURE: Bibi Song is a 48 year old female with a history of tracheal stenosis. She developed airway symptoms after a recent intubation.  She was last dilated initially on December 8, 2018. She has had recurrence of her symptoms quite quickly. We have referred her to our thoracic surgery colleagues for tracheal resection as she has collapsible tracheal stenosis.           DESCRIPTION OF PROCEDURE: After properly identifying the patient and obtaining signed informed consent, the patient was brought to the operating room and laid in supine position on the operating table.  General anesthesia was induced and the patient was ventilated through masking.  The bed was rotated 90 degrees. A shoulder roll and folded blanket were placed to properly position the patient. Two thermoplastic tooth guards were placed over the upper dentition and a  single thermoplastic  tooth guard over the lower dentition. The patient was draped in the usual clean fashion.  The  jet ventilation system was set up and titrated to an adequate pressure.  A timeout was carried out immediately prior to the procedure to confirm the identity of the patient and correctness of the procedure.      The Dedo- Hermelinda laryngoscope was then used to gain access to the larynx. The laryngoscope  was put into suspension with the Lewy suspension device and jet ventilation begun. Jet ventilation was confirmed to show an adequate chest rise. Topical lidocaine was applied to the area. The larynx was topically anesthetized with 4% lidocaine. The larynx and trachea were examined with a rigid endoscope with the magnified image displayed on a video monitor. The telescope was used for the remainer of the procedure. The circumferential scar was measured and findings are noted above. Pictures were taken of the stenosis and distal trachea.        Incisions were then made in the  scar band  at the 6, 9, and 3 o'clock  positions. The balloon was then utilized to dilate the stenotic segment. After 2 dilations, the balloon was removed and the patient resumed jet ventilation. Mitomycin, at a concentration of 1 mg / cc, was applied to the incisional areas for a total of 4 minutes exposure  while the patient was being ventilated.  The mitomycin was then removed and 3 sequential swabs of saline were used to cleanse the area.  An injection of 1.0 mL of 40 mg/mL of Kenalog was then injected into the area around the tracheal stenosis for a total dose of 40 mg. We then proceeded to use the balloon again to dilate the segment for a third time. Topical cocaine was applied to the dilated area. Post dilation photodocumentation was obtained. The laryngoscope and tooth guards were removed.  Mask ventilation was established. The patient was then turned over to Anesthesia for emergence and was masked until awake.         Dusty Pearson was present for the entire case    Rand Dinh MD  Otolaryngology resident

## 2019-02-01 NOTE — ANESTHESIA POSTPROCEDURE EVALUATION
Anesthesia POST Procedure Evaluation    Patient: Bibi Song   MRN:     [id number] Gender:   female   Age:    48 year old :      1970        Preoperative Diagnosis: Subglottic Stenosis   Procedure(s):  Direct Laryngoscopy, Balloon Dilation, Mitomycin Application  Steroid Injection   Postop Comments: No value filed.       Anesthesia Type:  General    Reportable Event: NO     PAIN: Uncomplicated   Sign Out status: Comfortable, Well controlled pain     PONV: No PONV   Sign Out status:  No Nausea or Vomiting     Neuro/Psych: Uneventful perioperative course   Sign Out Status: Preoperative baseline; Age appropriate mentation     Airway/Resp.: Uneventful perioperative course   Sign Out Status: Non labored breathing, age appropriate RR     CV: Uneventful perioperative course   Sign Out status: Appropriate BP and perfusion indices; Appropriate HR/Rhythm     Disposition:   Sign Out in:  PACU  Disposition:  Phase II; Home  Recovery Course: Uneventful  Follow-Up: Not required           Last Anesthesia Record Vitals:  CRNA VITALS  2019 1233 - 2019 1333      2019             Pulse:  123    SpO2:  98 %    Resp Rate (observed):  19    Resp Rate (set):  10          Last PACU/Preop Vitals:  Vitals:    19 1325 19 1330 19 1340   BP:  146/84 142/81   Pulse:      Resp: 12 18 18   Temp:  36.7  C (98.1  F) 36.7  C (98  F)   SpO2: 93% 92% 92%         Electronically Signed By: Nam Cheek DO, 2019, 2:00 PM

## 2019-02-01 NOTE — ANESTHESIA PREPROCEDURE EVALUATION
Anesthesia Pre-Procedure Evaluation    Patient: Bibi Song   MRN:     [id number] Gender:   female   Age:    48 year old :      1970        Preoperative Diagnosis: Subglottic Stenosis   Procedure(s):  Direct Laryngoscopy Balloon Dilation, Mitomycin Application  Steroid Injection     Past Medical History:   Diagnosis Date     Anxiety      Chemical dependency (H)      Depressive disorder      Hoarseness      PONV (postoperative nausea and vomiting)      Problem, psychiatric      Sleep apnea       Past Surgical History:   Procedure Laterality Date     INJECT STEROID (LOCATION) N/A 2018    Procedure: INJECT STEROID (LOCATION);;  Surgeon: Dusty Pearson MD;  [facility]     INJECT STEROID (LOCATION) N/A 10/5/2018    Procedure: INJECT STEROID (LOCATION);;  Surgeon: Dusty Pearson MD;  [facility]     INJECT STEROID (LOCATION) N/A 2018    Procedure: Steroid Injection;  Surgeon: Dusty Pearson MD;  [facility]     LARYNGOSCOPY N/A 2018    Procedure: LARYNGOSCOPY;  Teleoscopic Direct Laryngoscopy with Balloon Dilation, Mitomycin Application and Steriod Injection ;  Surgeon: Dusty Pearson MD;  Location: [facility]     LARYNGOSCOPY, ESOPHAGOSCOPY WITH DILATION, COMBINED N/A 10/5/2018    Procedure: COMBINED LARYNGOSCOPY, ESOPHAGOSCOPY WITH DILATION;  Direct Laryngoscopy with Teleoscope, Balloon Dilation, Application of Mitomycin and Steroid Injection ;  Surgeon: Dusty Pearson MD;  [facility]     LARYNGOSCOPY, ESOPHAGOSCOPY WITH DILATION, COMBINED N/A 2018    Procedure: Direct Laryngoscopy, Balloon Dilation, Mitomycin Application, Steroid Injection;  Surgeon: Dusty Pearson MD;  [facility]          Anesthesia Evaluation     . Pt has had prior anesthetic.     History of anesthetic complications   - PONV        ROS/MED HX    ENT/Pulmonary: Comment: Tracheal stenosis    (+)sleep apnea, , . .    Neurologic:  - neg  neurologic ROS     Cardiovascular:  - neg cardiovascular ROS   (+) ----. : . . . :. . Previous cardiac testing date:results:date: results:ECG reviewed date: results:NSR date: results:          METS/Exercise Tolerance:  >4 METS   Hematologic:  - neg hematologic  ROS       Musculoskeletal:  - neg musculoskeletal ROS       GI/Hepatic: Comment: Ulcerative colitis     (+) Inflammatory bowel disease,       Renal/Genitourinary:         Endo:     (+) thyroid problem hypothyroidism, .      Psychiatric:     (+) psychiatric history depression and anxiety      Infectious Disease:         Malignancy:         Other:                         PHYSICAL EXAM:   Mental Status/Neuro: A/A/O   Airway: Facies: Feasible  Mallampati: II  Mouth/Opening: Full  TM distance: > 6 cm  Neck ROM: Full   Respiratory: Auscultation: CTAB     Resp. Rate: Normal     Resp. Effort: Normal      CV: Rhythm: Regular  Rate: Age appropriate  Heart: Normal Sounds   Comments:      Dental: Normal                  Lab Results   Component Value Date    WBC 14.7 (H) 08/29/2018    HGB 11.4 (L) 08/29/2018    HCT 35.4 08/29/2018     08/29/2018    CRP 31.8 (H) 12/27/2011    SED 17 12/27/2011     08/29/2018    POTASSIUM 3.5 08/29/2018    CHLORIDE 105 08/29/2018    CO2 27 08/29/2018    BUN 11 08/29/2018    CR 0.71 08/29/2018     (H) 08/29/2018    JANICE 8.8 08/29/2018    PHOS 2.9 07/20/2018    MAG 2.0 07/20/2018    ALBUMIN 3.0 (L) 07/18/2018    ALBUMIN 3.0 (L) 07/18/2018    PROTTOTAL 6.1 (L) 07/18/2018    PROTTOTAL 6.4 (L) 07/18/2018    ALT 20 07/18/2018    ALT 21 07/18/2018    AST 19 07/18/2018    AST 22 07/18/2018    ALKPHOS 63 07/18/2018    ALKPHOS 68 07/18/2018    BILITOTAL 0.2 07/18/2018    BILITOTAL 0.2 07/18/2018    LIPASE 106 08/03/2011    PTT 26 07/29/2004    INR 0.91 07/29/2004    TSH 1.76 05/16/2018    T4 0.83 04/27/2017    HCG Negative 02/01/2019    HCGS Negative 07/17/2018       Preop Vitals  BP Readings from Last 3 Encounters:   02/01/19 (!)  "144/102   01/31/19 98/72   12/27/18 138/82    Pulse Readings from Last 3 Encounters:   02/01/19 87   01/31/19 97   12/05/18 88      Resp Readings from Last 3 Encounters:   02/01/19 20   01/31/19 24   12/06/18 16    SpO2 Readings from Last 3 Encounters:   02/01/19 92%   01/31/19 95%   12/06/18 95%      Temp Readings from Last 1 Encounters:   02/01/19 36.6  C (97.8  F) (Oral)    Ht Readings from Last 1 Encounters:   02/01/19 1.651 m (5' 5\")      Wt Readings from Last 1 Encounters:   02/01/19 106.6 kg (235 lb)    Estimated body mass index is 39.11 kg/m  as calculated from the following:    Height as of an earlier encounter on 2/1/19: 1.651 m (5' 5\").    Weight as of an earlier encounter on 2/1/19: 106.6 kg (235 lb).     LDA:  Peripheral IV 12/06/18 Left Hand (Active)   Number of days: 57       Peripheral IV 02/01/19 Right Hand (Active)   Site Assessment WDL 2/1/2019 10:52 AM   Phlebitis Scale 0-->no symptoms 2/1/2019 10:52 AM   Infiltration Scale 0 2/1/2019 10:52 AM   Dressing Intervention New dressing  2/1/2019 10:52 AM   Number of days: 0       Airway - Adult/Peds laryngeal mask airway (Active)   Number of days: 0            Assessment:   ASA SCORE: 3    NPO Status: > 6 hours since completed Solid [employer]   Documentation: H&P complete; Preop Testing complete; Consents complete   Proceeding: Proceed without further delay     Plan:   Anes. Type:  General   Pre-Induction: Midazolam IV   Induction:  IV (Standard); Propofol   Airway: Jet ventilation.   Access/Monitoring: PIV   Maintenance: Propofol; Efrain; IV   Emergence: Procedure Site   Logistics: Same Day Surgery     Postop Pain/Sedation Strategy:  Standard-Options: Opioids PRN     PONV Management:  Adult Risk Factors: Female, H/o PONV or Motion Sickness, Postop Opioids  Prevention: Propofol Infusion; Ondansetron; Dexamethasone     CONSENT: Direct conversation   Plan and risks discussed with: Patient   Blood Products: Consent Deferred (Minimal Blood Loss)              "                Gera Poole MD

## 2019-02-05 ENCOUNTER — OFFICE VISIT (OUTPATIENT)
Dept: PULMONOLOGY | Facility: CLINIC | Age: 49
End: 2019-02-05
Attending: INTERNAL MEDICINE
Payer: COMMERCIAL

## 2019-02-05 VITALS
BODY MASS INDEX: 39.64 KG/M2 | TEMPERATURE: 97.5 F | HEIGHT: 65 IN | SYSTOLIC BLOOD PRESSURE: 132 MMHG | WEIGHT: 237.9 LBS | HEART RATE: 76 BPM | OXYGEN SATURATION: 97 % | DIASTOLIC BLOOD PRESSURE: 88 MMHG

## 2019-02-05 DIAGNOSIS — J39.8 TRACHEAL STENOSIS: Primary | ICD-10-CM

## 2019-02-05 PROCEDURE — G0463 HOSPITAL OUTPT CLINIC VISIT: HCPCS | Mod: ZF

## 2019-02-05 RX ORDER — PREDNISONE 20 MG/1
TABLET ORAL
Refills: 1 | COMMUNITY
Start: 2019-01-16 | End: 2019-02-21

## 2019-02-05 RX ORDER — ALBUTEROL SULFATE 0.83 MG/ML
SOLUTION RESPIRATORY (INHALATION)
Refills: 0 | COMMUNITY
Start: 2018-09-26 | End: 2020-04-22

## 2019-02-05 RX ORDER — ALBUTEROL SULFATE 90 UG/1
AEROSOL, METERED RESPIRATORY (INHALATION)
Refills: 1 | COMMUNITY
Start: 2018-10-23 | End: 2020-04-22

## 2019-02-05 ASSESSMENT — PAIN SCALES - GENERAL: PAINLEVEL: NO PAIN (0)

## 2019-02-05 ASSESSMENT — MIFFLIN-ST. JEOR: SCORE: 1699.99

## 2019-02-05 NOTE — PROGRESS NOTES
LUNG NODULE & INTERVENTIONAL PULMONARY CLINIC  [facility], [facility], [facility]     Bibi Song MRN# [id number]   Age: 48 year old YOB: 1970     Reason for Consultation: tracheal stenosis    Requesting Physician: Dusty Pearson MD  [address] [place] [address]       Assessment and Plan:    1. 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 Has had frequent airway interventions by ENT since 9/17/18 and lasts for about 4-8wks before another intervention is needed for sx management. She will be evaluated by Thoracic Surgery for potential tracheal resection. If not a candidate, we can pursue laser therapy and/or RF ablative techniques in addition to balloon dilation.             Billing: The patient was seen and examined by me and the findings, assessment, and plan as documented was explained to the patient/family who expressed understand.       Randy Reyes MD   of Medicine  Interventional Pulmonology  Department of Pulmonary, Allergy, Critical Care and Sleep Medicine   [facility]  Pager: [phone]          History:     Bibi Song is a 48 year old female with sig h/o for tracheal stenosis, hypothyroidism, depression, anxiety who is here for evaluation/followup of tracheal stenosis.    - No new resp sx or complaints. Improvement in stridor following last dilation procedure on 2/1 by ENT.    - Here for evaluation of tracheal stenosis presumed 2/2 post-intubation tracheal stenosis (PITS) following intubation in 7/18/2018 for accidental overdose on antidepressant. Since 9/7/18 she has seen ENT for tracheal dilations/incisions, steroid injections and mitomycin application. Based on ENT notes and CT, the stenosis is 2.7cm in length and 2.5cm below the vocal cords. She reports that each treatment lasts only 1-2months.   - Personal hx of cancer: No. Up-to-date on mammo. Due  for c-scope.   - Family hx of cancer: Grandfather had lung cancer.   - Exposure hx: Denies asbestos or radon exposure   - Tobacco hx: Past Smoker: 0.5ppd for 10years. Quit 1999.  - My interpretation of the images relevant for this visit includes: no CT   - My interpretation of the PFT's relevant for this visit includes: Obstructive pattern     Other active medical problems include:   - Has anxiety and depression. Stable.    - has hypothyroidism. Stable.            Past Medical History:      Past Medical History:   Diagnosis Date     Anxiety      Chemical dependency (H)      Depressive disorder      Hoarseness      PONV (postoperative nausea and vomiting)      Problem, psychiatric      Sleep apnea            Past Surgical History:      Past Surgical History:   Procedure Laterality Date     INJECT STEROID (LOCATION) N/A 9/7/2018    Procedure: INJECT STEROID (LOCATION);;  Surgeon: Dusty Pearson MD;  [facility]     INJECT STEROID (LOCATION) N/A 10/5/2018    Procedure: INJECT STEROID (LOCATION);;  Surgeon: Dusty Pearson MD;  [facility]     INJECT STEROID (LOCATION) N/A 12/6/2018    Procedure: Steroid Injection;  Surgeon: Dusty Pearson MD;  [facility]     INJECT STEROID (LOCATION) N/A 2/1/2019    Procedure: Steroid Injection;  Surgeon: Dusty Pearson MD;  [facility]     LARYNGOSCOPY N/A 9/7/2018    Procedure: LARYNGOSCOPY;  Teleoscopic Direct Laryngoscopy with Balloon Dilation, Mitomycin Application and Steriod Injection ;  Surgeon: Dusty Pearson MD;  Location[facility]     LARYNGOSCOPY, ESOPHAGOSCOPY WITH DILATION, COMBINED N/A 10/5/2018    Procedure: COMBINED LARYNGOSCOPY, ESOPHAGOSCOPY WITH DILATION;  Direct Laryngoscopy with Teleoscope, Balloon Dilation, Application of Mitomycin and Steroid Injection ;  Surgeon: Dusty Pearson MD;  Location: [facility]     LARYNGOSCOPY, ESOPHAGOSCOPY WITH DILATION, COMBINED N/A 12/6/2018     Procedure: Direct Laryngoscopy, Balloon Dilation, Mitomycin Application, Steroid Injection;  Surgeon: Dusty Pearson MD;  [facility]     LARYNGOSCOPY, ESOPHAGOSCOPY WITH DILATION, COMBINED N/A 2019    Procedure: Direct Laryngoscopy, Balloon Dilation, Mitomycin Application;  Surgeon: Dusty Pearson MD;  Location:  OR          Social History:     Social History     Tobacco Use     Smoking status: Former Smoker     Years: 5.00     Start date: 1988     Last attempt to quit: 1994     Years since quittin.1     Smokeless tobacco: Never Used   Substance Use Topics     Alcohol use: No     Comment: Treatment in           Family History:     Family History   Problem Relation Age of Onset     Diabetes Mother      Depression Mother         OCD     Neurologic Disorder Mother      Psychotic Disorder Mother      Respiratory Mother      Thyroid Disease Mother      Gynecology Mother         hysterectomy     Cerebrovascular Disease Mother         pacemaker     Substance Abuse Mother      Mental Illness Mother      Heart Disease Mother      Heart Disease Father         MI,   LATE 60'S     Hypertension Father      Prostate Cancer Father      Obesity Father      Diabetes Father      Cerebrovascular Disease Father      Substance Abuse Father      Depression Father      Depression Brother      Thyroid Disease Brother      Depression Brother      Thyroid Disease Brother      Allergies Son      Asthma Son      Thyroid Disease Maternal Grandmother      Cancer Maternal Grandfather      Psychotic Disorder Paternal Grandmother      Thyroid Disease Paternal Grandfather      Diabetes Brother      Breast Cancer Maternal Aunt      Substance Abuse Brother      Mental Illness Brother      Depression Brother      Diabetes Brother      Mental Illness Son      Asthma Son      Depression Son      Stomach Problem Son            Allergies:    No Known Allergies       Medications:     Current Outpatient  Medications   Medication Sig     BuPROPion HCl (WELLBUTRIN PO)      Calcium Carbonate-Vit D-Min (CALCIUM 1200 PO)      gabapentin (NEURONTIN) 300 MG capsule      levothyroxine (SYNTHROID/LEVOTHROID) 75 MCG tablet Take 1 tablet (75 mcg) by mouth daily     MAGNESIUM PO Take 1 tablet by mouth daily      melatonin 3 MG tablet Take 9 mg by mouth nightly as needed.     omeprazole (PRILOSEC) 20 MG DR capsule Take 1 capsule (20 mg) by mouth daily     sertraline (ZOLOFT) 100 MG tablet Take 2 tablets (200 mg) by mouth daily     vitamin B complex with vitamin C (VITAMIN  B COMPLEX) TABS tablet Take 1 tablet by mouth daily     ziprasidone (GEODON) 40 MG capsule Take 1 capsule (40 mg) by mouth 2 times daily (with meals)     albuterol (PROVENTIL) (2.5 MG/3ML) 0.083% neb solution inhale the contents of 1 vial BY MOUTH EVERY 4 HOURS AS NEEDED SHORTNESS OF BREATH     FLOVENT DISKUS 50 MCG/BLIST inhaler Inhale 1 Puff by mouth Twice Daily (rinse mouth after each use)     predniSONE (DELTASONE) 20 MG tablet Take 1-2 tablets by mouth as needed for breathing.     VENTOLIN  (90 Base) MCG/ACT inhaler inhale 2 puffs by mouth four times daily as needed for shortness of breath (rinse mouthpiece weekly)     No current facility-administered medications for this visit.           Review of Systems:     CONSTITUTIONAL: negative for fever, chills, change in weight  INTEGUMENTARY/SKIN: no rash or obvious new lesions  ENT/MOUTH: no sore throat, new sinus pain or nasal drainage  RESP: see interval history  CV: negative for chest pain, palpitations or peripheral edema  GI: no nausea, vomiting, change in stools  : no dysuria  MUSCULOSKELETAL: no myalgias, arthralgias  ENDOCRINE: blood sugars with adequate control  PSYCHIATRIC: mood stable  LYMPHATIC: no new lymphadenopathy  HEME: no bleeding or easy bruisability  NEURO: no numbness, weakness, headaches         Physical Exam:     Temp:  [97.5  F (36.4  C)] 97.5  F (36.4  C)  Pulse:  [76] 76  BP:  (132)/(88) 132/88  SpO2:  [97 %] 97 %  Wt Readings from Last 4 Encounters:   02/05/19 107.9 kg (237 lb 14.4 oz)   02/01/19 106.6 kg (235 lb)   01/31/19 107.5 kg (237 lb)   01/23/19 106.1 kg (234 lb)     Constitutional:   Awake, alert and in no apparent distress     Eyes:   Nonicteric, CHERYL     ENT:    Trachea is midline. No gross neck abnormalities      Neck:   Supple without supraclavicular or cervical lymphadenopathy     Lungs:   Good air flow.  No crackles. No rhonchi.  No wheezes.     Cardiovascular:   Normal S1 and S2.  RRR.  No murmur, gallop or rub.  Radial, DP and PT pulses normal and symmetric     Abdomen:   NABS, soft, nontender, nondistended.  No HSM.     Musculoskeletal:   No edema.      Neurologic:   Alert and conversant. Cranial nerves  intact.       Skin:   Warm, dry.  No rash on limited exam.           Current Laboratory Data:   All laboratory and imaging data reviewed.    No results found for this or any previous visit (from the past 24 hour(s)).         Previous Pulmonary Function Testing     FVC-Pred   Date Value Ref Range Status   01/03/2019 3.53 L    09/05/2018 3.54 L      FVC-Pre   Date Value Ref Range Status   01/03/2019 3.97 L    09/05/2018 3.99 L      FVC-%Pred-Pre   Date Value Ref Range Status   01/03/2019 112 %    09/05/2018 112 %      FEV1-Pre   Date Value Ref Range Status   01/03/2019 2.32 L    09/05/2018 1.83 L      FEV1-%Pred-Pre   Date Value Ref Range Status   01/03/2019 82 %    09/05/2018 64 %      FEV1FVC-Pred   Date Value Ref Range Status   01/03/2019 80 %    09/05/2018 80 %      FEV1FVC-Pre   Date Value Ref Range Status   01/03/2019 58 %    09/05/2018 46 %      No results found for: 20029  FEFMax-Pred   Date Value Ref Range Status   01/03/2019 6.83 L/sec    09/05/2018 6.85 L/sec      FEFMax-Pre   Date Value Ref Range Status   01/03/2019 2.57 L/sec    09/05/2018 2.08 L/sec      FEFMax-%Pred-Pre   Date Value Ref Range Status   01/03/2019 37 %    09/05/2018 30 %      ExpTime-Pre    Date Value Ref Range Status   01/03/2019 7.75 sec    09/05/2018 7.43 sec      FIFMax-Pre   Date Value Ref Range Status   01/03/2019 1.78 L/sec    09/05/2018 1.04 L/sec      FEV1FEV6-Pred   Date Value Ref Range Status   01/03/2019 82 %    09/05/2018 82 %      FEV1FEV6-Pre   Date Value Ref Range Status   01/03/2019 59 %    09/05/2018 47 %             Previous Cardiology Imaging   No results found for this or any previous visit (from the past 8760 hour(s)).

## 2019-02-05 NOTE — LETTER
2/5/2019       RE: Bibi Song  [address] [facility] [address]     Dear Colleague,    Thank you for referring your patient, Bibi Song, to the [facility] at [facility]. Please see a copy of my visit note below.        [facility], [facility], [facility]     Bibi Song MRN# [id number]   Age: 48 year old YOB: 1970     Reason for Consultation: tracheal stenosis    Requesting Physician: Dusty Pearson MD  [address] [place] [id number]       Assessment and Plan:    1. 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 Has had frequent airway interventions by ENT since 9/17/18 and lasts for about 4-8wks before another intervention is needed for sx management. She will be evaluated by Thoracic Surgery for potential tracheal resection. If not a candidate, we can pursue laser therapy and/or RF ablative techniques in addition to balloon dilation.             Billing: The patient was seen and examined by me and the findings, assessment, and plan as documented was explained to the patient/family who expressed understand.       Randy Reyes MD   of Medicine  Interventional Pulmonology  Department of Pulmonary, Allergy, Critical Care and Sleep Medicine   [facility]  Pager: [phone]          History:     Bibi Song is a 48 year old female with sig h/o for tracheal stenosis, hypothyroidism, depression, anxiety who is here for evaluation/followup of tracheal stenosis.    - No new resp sx or complaints. Improvement in stridor following last dilation procedure on 2/1 by ENT.    - Here for evaluation of tracheal stenosis presumed 2/2 post-intubation tracheal stenosis (PITS) following intubation in 7/18/2018 for accidental overdose on antidepressant. Since  9/7/18 she has seen ENT for tracheal dilations/incisions, steroid injections and mitomycin application. Based on ENT notes and CT, the stenosis is 2.7cm in length and 2.5cm below the vocal cords. She reports that each treatment lasts only 1-2months.   - Personal hx of cancer: No. Up-to-date on mammo. Due for c-scope.   - Family hx of cancer: Grandfather had lung cancer.   - Exposure hx: Denies asbestos or radon exposure   - Tobacco hx: Past Smoker: 0.5ppd for 10years. Quit 1999.  - My interpretation of the images relevant for this visit includes: no CT   - My interpretation of the PFT's relevant for this visit includes: Obstructive pattern     Other active medical problems include:   - Has anxiety and depression. Stable.    - has hypothyroidism. Stable.            Past Medical History:      Past Medical History:   Diagnosis Date     Anxiety      Chemical dependency (H)      Depressive disorder      Hoarseness      PONV (postoperative nausea and vomiting)      Problem, psychiatric      Sleep apnea            Past Surgical History:      Past Surgical History:   Procedure Laterality Date     INJECT STEROID (LOCATION) N/A 9/7/2018    Procedure: INJECT STEROID (LOCATION);;  Surgeon: Dusty Pearson MD;  [facility]     INJECT STEROID (LOCATION) N/A 10/5/2018    Procedure: INJECT STEROID (LOCATION);;  Surgeon: Dusty Pearson MD;  Location: [facility] OR     INJECT STEROID (LOCATION) N/A 12/6/2018    Procedure: Steroid Injection;  Surgeon: Dusty Pearson MD;  [facility]     INJECT STEROID (LOCATION) N/A 2/1/2019    Procedure: Steroid Injection;  Surgeon: Dusty Pearson MD;  [facility]     LARYNGOSCOPY N/A 9/7/2018    Procedure: LARYNGOSCOPY;  Teleoscopic Direct Laryngoscopy with Balloon Dilation, Mitomycin Application and Steriod Injection ;  Surgeon: Dusty Pearson MD;  Location: [facility] OR     LARYNGOSCOPY, ESOPHAGOSCOPY WITH DILATION, COMBINED N/A 10/5/2018     Procedure: COMBINED LARYNGOSCOPY, ESOPHAGOSCOPY WITH DILATION;  Direct Laryngoscopy with Teleoscope, Balloon Dilation, Application of Mitomycin and Steroid Injection ;  Surgeon: Dusty Pearson MD;  Location: [facility] OR     LARYNGOSCOPY, ESOPHAGOSCOPY WITH DILATION, COMBINED N/A 2018    Procedure: Direct Laryngoscopy, Balloon Dilation, Mitomycin Application, Steroid Injection;  Surgeon: Dusty Pearson MD;  [facility]     LARYNGOSCOPY, ESOPHAGOSCOPY WITH DILATION, COMBINED N/A 2019    Procedure: Direct Laryngoscopy, Balloon Dilation, Mitomycin Application;  Surgeon: Dusty Pearson MD;  [facility]          Social History:     Social History     Tobacco Use     Smoking status: Former Smoker     Years: 5.00     Start date: 1988     Last attempt to quit: 1994     Years since quittin.1     Smokeless tobacco: Never Used   Substance Use Topics     Alcohol use: No     Comment: Treatment in           Family History:     Family History   Problem Relation Age of Onset     Diabetes Mother      Depression Mother         OCD     Neurologic Disorder Mother      Psychotic Disorder Mother      Respiratory Mother      Thyroid Disease Mother      Gynecology Mother         hysterectomy     Cerebrovascular Disease Mother         pacemaker     Substance Abuse Mother      Mental Illness Mother      Heart Disease Mother      Heart Disease Father         MI,   LATE 60'S     Hypertension Father      Prostate Cancer Father      Obesity Father      Diabetes Father      Cerebrovascular Disease Father      Substance Abuse Father      Depression Father      Depression Brother      Thyroid Disease Brother      Depression Brother      Thyroid Disease Brother      Allergies Son      Asthma Son      Thyroid Disease Maternal Grandmother      Cancer Maternal Grandfather      Psychotic Disorder Paternal Grandmother      Thyroid Disease Paternal Grandfather      Diabetes Brother       Breast Cancer Maternal Aunt      Substance Abuse Brother      Mental Illness Brother      Depression Brother      Diabetes Brother      Mental Illness Son      Asthma Son      Depression Son      Stomach Problem Son            Allergies:    No Known Allergies       Medications:     Current Outpatient Medications   Medication Sig     BuPROPion HCl (WELLBUTRIN PO)      Calcium Carbonate-Vit D-Min (CALCIUM 1200 PO)      gabapentin (NEURONTIN) 300 MG capsule      levothyroxine (SYNTHROID/LEVOTHROID) 75 MCG tablet Take 1 tablet (75 mcg) by mouth daily     MAGNESIUM PO Take 1 tablet by mouth daily      melatonin 3 MG tablet Take 9 mg by mouth nightly as needed.     omeprazole (PRILOSEC) 20 MG DR capsule Take 1 capsule (20 mg) by mouth daily     sertraline (ZOLOFT) 100 MG tablet Take 2 tablets (200 mg) by mouth daily     vitamin B complex with vitamin C (VITAMIN  B COMPLEX) TABS tablet Take 1 tablet by mouth daily     ziprasidone (GEODON) 40 MG capsule Take 1 capsule (40 mg) by mouth 2 times daily (with meals)     albuterol (PROVENTIL) (2.5 MG/3ML) 0.083% neb solution inhale the contents of 1 vial BY MOUTH EVERY 4 HOURS AS NEEDED SHORTNESS OF BREATH     FLOVENT DISKUS 50 MCG/BLIST inhaler Inhale 1 Puff by mouth Twice Daily (rinse mouth after each use)     predniSONE (DELTASONE) 20 MG tablet Take 1-2 tablets by mouth as needed for breathing.     VENTOLIN  (90 Base) MCG/ACT inhaler inhale 2 puffs by mouth four times daily as needed for shortness of breath (rinse mouthpiece weekly)     No current facility-administered medications for this visit.           Review of Systems:     CONSTITUTIONAL: negative for fever, chills, change in weight  INTEGUMENTARY/SKIN: no rash or obvious new lesions  ENT/MOUTH: no sore throat, new sinus pain or nasal drainage  RESP: see interval history  CV: negative for chest pain, palpitations or peripheral edema  GI: no nausea, vomiting, change in stools  : no dysuria  MUSCULOSKELETAL: no  myalgias, arthralgias  ENDOCRINE: blood sugars with adequate control  PSYCHIATRIC: mood stable  LYMPHATIC: no new lymphadenopathy  HEME: no bleeding or easy bruisability  NEURO: no numbness, weakness, headaches         Physical Exam:     Temp:  [97.5  F (36.4  C)] 97.5  F (36.4  C)  Pulse:  [76] 76  BP: (132)/(88) 132/88  SpO2:  [97 %] 97 %  Wt Readings from Last 4 Encounters:   02/05/19 107.9 kg (237 lb 14.4 oz)   02/01/19 106.6 kg (235 lb)   01/31/19 107.5 kg (237 lb)   01/23/19 106.1 kg (234 lb)     Constitutional:   Awake, alert and in no apparent distress     Eyes:   Nonicteric, CHERYL     ENT:    Trachea is midline. No gross neck abnormalities      Neck:   Supple without supraclavicular or cervical lymphadenopathy     Lungs:   Good air flow.  No crackles. No rhonchi.  No wheezes.     Cardiovascular:   Normal S1 and S2.  RRR.  No murmur, gallop or rub.  Radial, DP and PT pulses normal and symmetric     Abdomen:   NABS, soft, nontender, nondistended.  No HSM.     Musculoskeletal:   No edema.      Neurologic:   Alert and conversant. Cranial nerves  intact.       Skin:   Warm, dry.  No rash on limited exam.           Current Laboratory Data:   All laboratory and imaging data reviewed.    No results found for this or any previous visit (from the past 24 hour(s)).         Previous Pulmonary Function Testing     FVC-Pred   Date Value Ref Range Status   01/03/2019 3.53 L    09/05/2018 3.54 L      FVC-Pre   Date Value Ref Range Status   01/03/2019 3.97 L    09/05/2018 3.99 L      FVC-%Pred-Pre   Date Value Ref Range Status   01/03/2019 112 %    09/05/2018 112 %      FEV1-Pre   Date Value Ref Range Status   01/03/2019 2.32 L    09/05/2018 1.83 L      FEV1-%Pred-Pre   Date Value Ref Range Status   01/03/2019 82 %    09/05/2018 64 %      FEV1FVC-Pred   Date Value Ref Range Status   01/03/2019 80 %    09/05/2018 80 %      FEV1FVC-Pre   Date Value Ref Range Status   01/03/2019 58 %    09/05/2018 46 %      No results found  for: 20029  FEFMax-Pred   Date Value Ref Range Status   01/03/2019 6.83 L/sec    09/05/2018 6.85 L/sec      FEFMax-Pre   Date Value Ref Range Status   01/03/2019 2.57 L/sec    09/05/2018 2.08 L/sec      FEFMax-%Pred-Pre   Date Value Ref Range Status   01/03/2019 37 %    09/05/2018 30 %      ExpTime-Pre   Date Value Ref Range Status   01/03/2019 7.75 sec    09/05/2018 7.43 sec      FIFMax-Pre   Date Value Ref Range Status   01/03/2019 1.78 L/sec    09/05/2018 1.04 L/sec      FEV1FEV6-Pred   Date Value Ref Range Status   01/03/2019 82 %    09/05/2018 82 %      FEV1FEV6-Pre   Date Value Ref Range Status   01/03/2019 59 %    09/05/2018 47 %             Previous Cardiology Imaging   No results found for this or any previous visit (from the past 8760 hour(s)).           nk you for allowing me to participate in the care of your patient.      Sincerely,    Randy Reyes MD

## 2019-02-05 NOTE — NURSING NOTE
"Oncology Rooming Note    February 5, 2019 2:00 PM   Bibi Song is a 48 year old female who presents for:    Chief Complaint   Patient presents with     Oncology Clinic Visit     NEW; New Lung Nodule     Initial Vitals: /88   Pulse 76   Temp 97.5  F (36.4  C) (Oral)   Ht 1.651 m (5' 5\")   Wt 107.9 kg (237 lb 14.4 oz)   LMP 02/04/2019   SpO2 97%   BMI 39.59 kg/m   Estimated body mass index is 39.59 kg/m  as calculated from the following:    Height as of this encounter: 1.651 m (5' 5\").    Weight as of this encounter: 107.9 kg (237 lb 14.4 oz). Body surface area is 2.22 meters squared.  No Pain (0) Comment: Data Unavailable   Patient's last menstrual period was 02/04/2019.  Allergies reviewed: Yes  Medications reviewed: Yes    Medications: Medication refills not needed today.  Pharmacy name entered into [facility]: [facility] - - [place] - [id number] [facility]    Clinical concerns: No New Concerns [facility] was NOT notified.    10 minutes for nursing intake (face to face time)     Vivi Resendiz MA              "

## 2019-02-06 ENCOUNTER — OFFICE VISIT (OUTPATIENT)
Dept: SURGERY | Facility: CLINIC | Age: 49
End: 2019-02-06
Attending: THORACIC SURGERY (CARDIOTHORACIC VASCULAR SURGERY)
Payer: COMMERCIAL

## 2019-02-06 VITALS
BODY MASS INDEX: 40.1 KG/M2 | SYSTOLIC BLOOD PRESSURE: 134 MMHG | TEMPERATURE: 97.3 F | HEART RATE: 84 BPM | HEIGHT: 65 IN | DIASTOLIC BLOOD PRESSURE: 96 MMHG | WEIGHT: 240.7 LBS

## 2019-02-06 DIAGNOSIS — E66.813 CLASS 3 SEVERE OBESITY DUE TO EXCESS CALORIES WITH BODY MASS INDEX (BMI) OF 40.0 TO 44.9 IN ADULT, UNSPECIFIED WHETHER SERIOUS COMORBIDITY PRESENT (H): ICD-10-CM

## 2019-02-06 DIAGNOSIS — E66.01 CLASS 3 SEVERE OBESITY DUE TO EXCESS CALORIES WITH BODY MASS INDEX (BMI) OF 40.0 TO 44.9 IN ADULT, UNSPECIFIED WHETHER SERIOUS COMORBIDITY PRESENT (H): ICD-10-CM

## 2019-02-06 DIAGNOSIS — J39.8 TRACHEAL STENOSIS: Primary | ICD-10-CM

## 2019-02-06 PROCEDURE — 40000114 ZZH STATISTIC NO CHARGE CLINIC VISIT

## 2019-02-06 RX ORDER — BUPROPION HYDROCHLORIDE 150 MG/1
150 TABLET ORAL EVERY MORNING
Refills: 3 | Status: ON HOLD | COMMUNITY
Start: 2019-02-02 | End: 2019-03-19

## 2019-02-06 ASSESSMENT — MIFFLIN-ST. JEOR: SCORE: 1712.69

## 2019-02-06 ASSESSMENT — PAIN SCALES - GENERAL: PAINLEVEL: NO PAIN (0)

## 2019-02-06 NOTE — NURSING NOTE
"Oncology Rooming Note    February 6, 2019 2:12 PM   Bibi Song is a 48 year old female who presents for:    Chief Complaint   Patient presents with     New Patient     New pt; vitals completed by [facility]     Initial Vitals: BP (!) 134/96   Pulse 84   Temp 97.3  F (36.3  C) (Oral)   Ht 1.651 m (5' 5\")   Wt 109.2 kg (240 lb 11.2 oz)   LMP 02/04/2019   BMI 40.05 kg/m   Estimated body mass index is 40.05 kg/m  as calculated from the following:    Height as of this encounter: 1.651 m (5' 5\").    Weight as of this encounter: 109.2 kg (240 lb 11.2 oz). Body surface area is 2.24 meters squared.  No Pain (0) Comment: Data Unavailable   Patient's last menstrual period was 02/04/2019.  Allergies reviewed: Yes  Medications reviewed: Yes    Medications: Medication refills not needed today.  Pharmacy name entered into [facility]: [facility] - - [place] - [id number] [facility]    Clinical concerns: No new concerns  Dr. Estrella was NOT notified.    10 minutes for nursing intake (face to face time)     Monalisa Carter              "

## 2019-02-06 NOTE — PROGRESS NOTES
THORACIC SURGERY - NEW PATIENT OFFICE VISIT      Dear Dr. Singleton,    I saw Ms. Song at Dr. Beaulieu s request in consultation for the evaluation and treatment of tracheal stenosis.     HPI  Ms. Bibi Song is a 48 year old year-old female with recurrent tracheal stenosis after intubation in July, 2018 for accidental medication overdose. She has undergone multiple dilatations, but continues to have symptoms of decreased exercise tolerance and dyspnea with recurrence of stenosis.      Previsit Tests    PFTs 1/3/19:   FEV1 2.32L, 82%   DLCO 100%    CT Neck 8/29/18: Circumferential thickening of the subglottic trachea with moderate stenosis  2.7-3cm in length      Direct laryngoscopy 12/6/2018      Direct laryngoscopy 2/1/2019  40-43mm from vocal cords, approx 11-15mm thick, 27 mm length     PMH  Anxiety  Depression  Chemical dependency  Sleep apnea  Hoarseness    PSH  Laryngoscopy, esophagoscopy with dilation (9/7/18, 10/5/18, 12/6/18, 2/1/19)     ETOH none  TOB former smoker for 5 years, quit in 1994    Physical examination  Body mass index is 40.05 kg/m .  Good cervical neck extension  Rest of exam unremarkable    From a personal perspective, Ms. Song lives in [place], [place] with her , Sergio. She is not currently working. They have an 8-week-old puppy!!    IMPRESSION   48 year old year-old female with tracheal stenosis.    PLAN  I spent a total of 30 minutes with Ms. Song and Sergio, more than 50% of which were spent in counseling, coordination of care, and face-to-face time.    I reviewed the plan as follows:    Procedure planned: Tracheal resection via cervical approach in conjunction with Dr. Beaulieu ([facility]) . I reviewed the indications, risks, and benefits of the procedure with Ms. Song and Sergio.    I encouraged her to lose weight.    Necessary Preop Testing: PAC visit one week prior to surgery.    They had all their questions answered and were in agreement with the plan.    I appreciate the  opportunity to participate in the care of your patient and will keep you updated.  Sincerely,

## 2019-02-06 NOTE — LETTER
2/6/2019      RE: Bibi Song  [address]  [facility] [address]       THORACIC SURGERY - NEW PATIENT OFFICE VISIT      Dear Dr. Singleton,    I saw Ms. Song at Dr. Beaulieu s request in consultation for the evaluation and treatment of tracheal stenosis.     HPI  Ms. Bibi Song is a 48 year old year-old female with recurrent tracheal stenosis after intubation in July, 2018 for accidental medication overdose. She has undergone multiple dilatations, but continues to have symptoms of decreased exercise tolerance and dyspnea with recurrence of stenosis.      Previsit Tests    PFTs 1/3/19:   FEV1 2.32L, 82%   DLCO 100%    CT Neck 8/29/18: Circumferential thickening of the subglottic trachea with moderate stenosis  2.7-3cm in length      Direct laryngoscopy 12/6/2018      Direct laryngoscopy 2/1/2019  40-43mm from vocal cords, approx 11-15mm thick, 27 mm length     PMH  Anxiety  Depression  Chemical dependency  Sleep apnea  Hoarseness    PSH  Laryngoscopy, esophagoscopy with dilation (9/7/18, 10/5/18, 12/6/18, 2/1/19)     ETOH none  TOB former smoker for 5 years, quit in 1994    Physical examination  Body mass index is 40.05 kg/m .  Good cervical neck extension  Rest of exam unremarkable    From a personal perspective, Ms. Song lives in [place], [place] with her , Sergio. She is not currently working. They have an 8-week-old puppy!!    IMPRESSION   48 year old year-old female with tracheal stenosis.    PLAN  I spent a total of 30 minutes with Ms. Song and Sergio, more than 50% of which were spent in counseling, coordination of care, and face-to-face time.    I reviewed the plan as follows:    Procedure planned: Tracheal resection via cervical approach in conjunction with Dr. Beaulieu ([facility]) . I reviewed the indications, risks, and benefits of the procedure with Ms. Song and Sergio.    I encouraged her to lose weight.    Necessary Preop Testing: PAC visit one week prior to surgery.    They had all  their questions answered and were in agreement with the plan.      Ryan Estrella MD

## 2019-02-07 DIAGNOSIS — J39.8 TRACHEAL STENOSIS: Primary | ICD-10-CM

## 2019-02-07 RX ORDER — CEFAZOLIN SODIUM 2 G/50ML
2 SOLUTION INTRAVENOUS
Status: CANCELLED | OUTPATIENT
Start: 2019-02-07

## 2019-02-07 RX ORDER — CEFAZOLIN SODIUM 1 G/50ML
1 INJECTION, SOLUTION INTRAVENOUS SEE ADMIN INSTRUCTIONS
Status: CANCELLED | OUTPATIENT
Start: 2019-02-07

## 2019-02-14 ENCOUNTER — TELEPHONE (OUTPATIENT)
Dept: SURGERY | Facility: CLINIC | Age: 49
End: 2019-02-14

## 2019-02-14 NOTE — TELEPHONE ENCOUNTER
Spoke with patient to schedule procedure with Dr. Estrella and Dr. Beaulieu   Procedure was scheduled on 03/11 at [facility] OR  Patient will have H&P with PAC on 02/21  Patient is aware a / is needed day of surgery.   Surgery letter was sent via [employer], patient has my direct contact information for any further questions.

## 2019-02-21 ENCOUNTER — OFFICE VISIT (OUTPATIENT)
Dept: SURGERY | Facility: CLINIC | Age: 49
End: 2019-02-21
Payer: COMMERCIAL

## 2019-02-21 ENCOUNTER — OFFICE VISIT (OUTPATIENT)
Dept: OTOLARYNGOLOGY | Facility: CLINIC | Age: 49
End: 2019-02-21
Payer: COMMERCIAL

## 2019-02-21 ENCOUNTER — ANESTHESIA EVENT (OUTPATIENT)
Dept: SURGERY | Facility: CLINIC | Age: 49
End: 2019-02-21
Payer: COMMERCIAL

## 2019-02-21 VITALS
TEMPERATURE: 98 F | HEART RATE: 101 BPM | HEIGHT: 65 IN | DIASTOLIC BLOOD PRESSURE: 79 MMHG | SYSTOLIC BLOOD PRESSURE: 142 MMHG | BODY MASS INDEX: 40.62 KG/M2 | OXYGEN SATURATION: 94 % | WEIGHT: 243.8 LBS | RESPIRATION RATE: 18 BRPM

## 2019-02-21 DIAGNOSIS — J39.8 TRACHEAL STENOSIS: Primary | ICD-10-CM

## 2019-02-21 DIAGNOSIS — J39.8 TRACHEAL STENOSIS: ICD-10-CM

## 2019-02-21 DIAGNOSIS — Z01.818 PREOP EXAMINATION: ICD-10-CM

## 2019-02-21 LAB
ANION GAP SERPL CALCULATED.3IONS-SCNC: 5 MMOL/L (ref 3–14)
BUN SERPL-MCNC: 14 MG/DL (ref 7–30)
CALCIUM SERPL-MCNC: 8.8 MG/DL (ref 8.5–10.1)
CHLORIDE SERPL-SCNC: 104 MMOL/L (ref 94–109)
CO2 SERPL-SCNC: 28 MMOL/L (ref 20–32)
CREAT SERPL-MCNC: 0.82 MG/DL (ref 0.52–1.04)
ERYTHROCYTE [DISTWIDTH] IN BLOOD BY AUTOMATED COUNT: 13.8 % (ref 10–15)
GFR SERPL CREATININE-BSD FRML MDRD: 83 ML/MIN/{1.73_M2}
GLUCOSE SERPL-MCNC: 92 MG/DL (ref 70–99)
HCT VFR BLD AUTO: 39.2 % (ref 35–47)
HGB BLD-MCNC: 12.6 G/DL (ref 11.7–15.7)
MCH RBC QN AUTO: 28.9 PG (ref 26.5–33)
MCHC RBC AUTO-ENTMCNC: 32.1 G/DL (ref 31.5–36.5)
MCV RBC AUTO: 90 FL (ref 78–100)
PLATELET # BLD AUTO: 358 10E9/L (ref 150–450)
POTASSIUM SERPL-SCNC: 4.2 MMOL/L (ref 3.4–5.3)
RBC # BLD AUTO: 4.36 10E12/L (ref 3.8–5.2)
SODIUM SERPL-SCNC: 138 MMOL/L (ref 133–144)
WBC # BLD AUTO: 10 10E9/L (ref 4–11)

## 2019-02-21 RX ORDER — IBUPROFEN 200 MG
600 TABLET ORAL PRN
COMMUNITY
End: 2020-10-01

## 2019-02-21 RX ORDER — SCOLOPAMINE TRANSDERMAL SYSTEM 1 MG/1
1 PATCH, EXTENDED RELEASE TRANSDERMAL ONCE
Status: CANCELLED | OUTPATIENT
Start: 2019-02-21 | End: 2019-02-21

## 2019-02-21 ASSESSMENT — LIFESTYLE VARIABLES: TOBACCO_USE: 1

## 2019-02-21 ASSESSMENT — MIFFLIN-ST. JEOR: SCORE: 1726.75

## 2019-02-21 NOTE — PROGRESS NOTES
HISTORY OF PRESENT ILLNESS: Bibi Song is a 48 year old female with a history of  Tracheal stenosis. She had an accidental overdose of medications in mid July.  She was admitted from 7/18/ 2018-7/21/2018.  During that time she had intubation for at least 2-3 days.  On discharge she was feeling well.  In mid August she developed some shortness of breath.  She was started on albuterol.  She had no problems with asthma or breathing prior to her hospitalization.  She was seen in the emergency room in mid August, she had shortness of breath and was given prednisone. doxycycline and albuterol.  The nebulizer worked well for her but she did get relief from the albuterol.  I met her for the first time on 8/30/2018.  At that time    she had been recently placed on prednisone 60 mg a day which  and this had helped her breathe comfortably.  She still had audible inspiratory stridor but there was good airflow.  There was a concern about possible stenosis versus paradoxic vocal fold motion.  On exam she had a tracheal stenosis which was symptomatic.  On 9/7/2018 she underwent a direct microlaryngoscopy with dilatation of the tracheal stenosis, mitomycin application and steroid injection.  She initially did well for the first 2 weeks but then gradually developed some difficulty breathing.  She returned to clinic on 10/2/2018 with upper airway turbulence.  She was able to get adequate air with light activity.  Examination showed a recurrence of the tracheal stenosis which was felt to be less inflamed.     On October 5, 2018 she returned to the operating room.  A tracheal stenosis was seen.  Anteriorly it was 16 mm thick and was situated 32 mm below the vocal cords.  Posteriorly it was 16 mm thick and 20 mm below the vocal cords.  There was a Good view with the Dedo-Hermelinda laryngoscope.  There was no difficulty with mask ventilation. A 18 mm x 2 cm balloon was utilized to dilate the stenotic segment after mucosal incisions  were made. The remainder of the trachea appeared normal. The patient required a high dose of propofol according to the anesthesia team.    On December 6, 2018 she returned to the operating room.  She had a micro-direct telescopic laryngoscopy with balloon dilatation mitomycin application and steroid injection.  Findings at the time of surgery were: Tracheal stenosis.  Anteriorly it was 10 mm thick and was situated 45 mm below the vocal cords.  Posteriorly it was 16 mm thick and 40 mm below the vocal cords, just over an arterial pulse.  There was a Good view with the Dedo-Hermelinda laryngoscope.  There was no difficulty with mask ventilation. A 18 mm x 4 cm balloon was utilized to dilate the stenotic segment after mucosal incisions were made. The remainder of the trachea appeared normal.    She is currently scheduled for by Dr. Nick Beaulieu and Dr. Ryan Estrella on 3/11/2019.  She is currently breathing well at rest and with light activity           Last 2 Scores for Patient-Answered VHI Questionnaire  VHI Total Score 8/30/2018   VHI Total Score 13       Last 2 Scores for Patient-Answered CSI Questionnaire  CSI Total Score 12/4/2018 12/27/2018   CSI Total Score 2 12         Last 2 Scores for Patient-Answered EAT Questionnaire  EAT Total Score 8/30/2018   EAT Total Score 2           PAST MEDICAL HISTORY:   Past Medical History:   Diagnosis Date     Anxiety      Chemical dependency (H)      Depressive disorder      Hoarseness      Hypothyroidism      PONV (postoperative nausea and vomiting)      Problem, psychiatric      Sleep apnea      Tracheal stenosis        PAST SURGICAL HISTORY:   Past Surgical History:   Procedure Laterality Date     INJECT STEROID (LOCATION) N/A 9/7/2018    Procedure: INJECT STEROID (LOCATION);;  Surgeon: Dusty Pearson MD;  [facility]: [facility]     INJECT STEROID (LOCATION) N/A 10/5/2018    Procedure: INJECT STEROID (LOCATION);;  Surgeon: Dusty Pearson MD;  [facility]      INJECT STEROID (LOCATION) N/A 12/6/2018    Procedure: Steroid Injection;  Surgeon: Dusty Pearson MD;  [facility]     INJECT STEROID (LOCATION) N/A 2/1/2019    Procedure: Steroid Injection;  Surgeon: Dusty Pearson MD;  [facility]     LARYNGOSCOPY N/A 9/7/2018    Procedure: LARYNGOSCOPY;  Teleoscopic Direct Laryngoscopy with Balloon Dilation, Mitomycin Application and Steriod Injection ;  Surgeon: Dusty Pearson MD;  Location: [facility]     LARYNGOSCOPY, ESOPHAGOSCOPY WITH DILATION, COMBINED N/A 10/5/2018    Procedure: COMBINED LARYNGOSCOPY, ESOPHAGOSCOPY WITH DILATION;  Direct Laryngoscopy with Teleoscope, Balloon Dilation, Application of Mitomycin and Steroid Injection ;  Surgeon: Dusty Pearson MD;  [facility]: [facility]     LARYNGOSCOPY, ESOPHAGOSCOPY WITH DILATION, COMBINED N/A 12/6/2018    Procedure: Direct Laryngoscopy, Balloon Dilation, Mitomycin Application, Steroid Injection;  Surgeon: Dusty Pearson MD;  [facility]     LARYNGOSCOPY, ESOPHAGOSCOPY WITH DILATION, COMBINED N/A 2/1/2019    Procedure: Direct Laryngoscopy, Balloon Dilation, Mitomycin Application;  Surgeon: Dusty Pearson MD;  [facility]       FAMILY HISTORY:   Family History   Problem Relation Age of Onset     Diabetes Mother      Depression Mother         OCD     Neurologic Disorder Mother      Psychotic Disorder Mother      Respiratory Mother      Thyroid Disease Mother      Gynecology Mother         hysterectomy     Cerebrovascular Disease Mother         pacemaker     Substance Abuse Mother      Mental Illness Mother      Heart Disease Mother      Heart Disease Father         MI,   LATE 60'S     Hypertension Father      Prostate Cancer Father      Obesity Father      Diabetes Father      Cerebrovascular Disease Father      Substance Abuse Father      Depression Father      Depression Brother      Thyroid Disease Brother      Depression Brother      Thyroid  Disease Brother      Allergies Son      Asthma Son      Thyroid Disease Maternal Grandmother      Cancer Maternal Grandfather      Psychotic Disorder Paternal Grandmother      Thyroid Disease Paternal Grandfather      Diabetes Brother      Breast Cancer Maternal Aunt      Substance Abuse Brother      Mental Illness Brother      Depression Brother      Diabetes Brother      Mental Illness Son      Asthma Son      Depression Son      Stomach Problem Son        SOCIAL HISTORY:   Social History     Tobacco Use     Smoking status: Former Smoker     Years: 5.00     Start date: 1988     Last attempt to quit: 1994     Years since quittin.1     Smokeless tobacco: Never Used   Substance Use Topics     Alcohol use: No     Comment: Treatment in        REVIEW OF SYSTEMS: Ten point review of systems was performed and is negative except for:   [facility] ROS 2019   Constitutional Weight gain   Neurology -   Psychology Frequently feeling anxious   Ears, Nose, Throat -   Cardiopulmonary Wheezing   Gastrointestinal/Genitourinary Heartburn/indigestion   Musculoskeletal Back pain, Neck pain   Hematologic -   Endocrine -   Other Problems with anesthesia in surgery        ALLERGIES: Patient has no known allergies.    MEDICATIONS:   Current Outpatient Medications   Medication Sig Dispense Refill     albuterol (PROVENTIL) (2.5 MG/3ML) 0.083% neb solution inhale the contents of 1 vial BY MOUTH EVERY 4 HOURS AS NEEDED SHORTNESS OF BREATH  0     buPROPion (WELLBUTRIN XL) 150 MG 24 hr tablet Take 150 mg by mouth daily  3     Calcium Carbonate-Vit D-Min (CALCIUM 1200 PO)        FLOVENT DISKUS 50 MCG/BLIST inhaler Inhale 1 Puff by mouth Twice Daily (rinse mouth after each use)  3     gabapentin (NEURONTIN) 300 MG capsule   0     levothyroxine (SYNTHROID/LEVOTHROID) 75 MCG tablet Take 1 tablet (75 mcg) by mouth daily 90 tablet 1     MAGNESIUM PO Take 1 tablet by mouth daily        melatonin 3 MG tablet Take 9 mg by mouth  nightly as needed. 30 tablet 0     omeprazole (PRILOSEC) 20 MG DR capsule Take 1 capsule (20 mg) by mouth daily 30 capsule 0     predniSONE (DELTASONE) 20 MG tablet Take 1-2 tablets by mouth as needed for breathing.  1     sertraline (ZOLOFT) 100 MG tablet Take 2 tablets (200 mg) by mouth daily 60 tablet 0     VENTOLIN  (90 Base) MCG/ACT inhaler inhale 2 puffs by mouth four times daily as needed for shortness of breath (rinse mouthpiece weekly)  1     vitamin B complex with vitamin C (VITAMIN  B COMPLEX) TABS tablet Take 1 tablet by mouth daily       ziprasidone (GEODON) 40 MG capsule Take 1 capsule (40 mg) by mouth 2 times daily (with meals) 60 capsule 0         PHYSICAL EXAMINATION:  She  is awake, alert and in no apparent distress.    Her tympanic membranes are clear and intact bilaterally. External auditory canals are clear.  Nasal exam shows a mild septal deviation without obstruction.  Examination of the oral cavity shows no suspicious lesions.  There is good mouth opening.  There is symmetric movement of the tongue and soft palate.    The oropharynx is clear.  Her neck is supple without significant adenopathy.  Pulse is regular.  Upper airway demonstrates airway turbulence with deep inspiration.  Airflow is improved from prior to surgery and she is comfortable with her respiration.  Cranial nerves II-XII are grossly intact.         PROCEDURE: A flexible laryngoscopy  was performed.  Informed consent was obtained and a time out was performed. 3% lidocaine and 0.25% phenylephrine was sprayed into the nasal cavity and allowed 3 to 5 minutes for effect. The scope was passed through the right sided nostril. Examination showed the vocal folds to be mobile and meet in the midline.  No nodules, polyps or ulcerations are seen.  There is mild inflammation or erythema of the supraglottic or glottic larynx.  With phonation there is mildcontraction of the supraglottic larynx.  The immediate subglottic area is  clear.  I did have her tilt forward and I was able to see further down the trachea.  Approximately 3-4 rings down there was  a narrowing of the trachea.   There appeared to be an adequate airway and there is good airflow although airway turbulence heard with deep inspiration.   The hypopharynx is otherwise clear as is the subglottis.       2/21/2019 Exam         IMPRESSION/PLAN: Tracheal stenosis.  She is scheduled for an excision/resection on 3/11/2019 by Dr. Beaulieu and Dr. Estrella

## 2019-02-21 NOTE — ANESTHESIA PREPROCEDURE EVALUATION
Anesthesia Pre-Procedure Evaluation    Patient: Bibi Song   MRN:     [id number] Gender:   female   Age:    48 year old :      1970        Preoperative Diagnosis: Tracheal Stenosis   Procedure(s):  Tracheal Resection Via Cervical Approach     Past Medical History:   Diagnosis Date     Anxiety      Chemical dependency (H)      Depressive disorder      Hoarseness      Hypothyroidism      PONV (postoperative nausea and vomiting)      Problem, psychiatric      Sleep apnea      Tracheal stenosis       Past Surgical History:   Procedure Laterality Date     INJECT STEROID (LOCATION) N/A 2018    Procedure: INJECT STEROID (LOCATION);;  Surgeon: Dusty Pearson MD;  [facility]     INJECT STEROID (LOCATION) N/A 10/5/2018    Procedure: INJECT STEROID (LOCATION);;  Surgeon: Dusty Pearson MD;  [facility]     INJECT STEROID (LOCATION) N/A 2018    Procedure: Steroid Injection;  Surgeon: Dusty Pearson MD;  [facility]     INJECT STEROID (LOCATION) N/A 2019    Procedure: Steroid Injection;  Surgeon: Dusty Pearson MD;  [facility]     LARYNGOSCOPY N/A 2018    Procedure: LARYNGOSCOPY;  Teleoscopic Direct Laryngoscopy with Balloon Dilation, Mitomycin Application and Steriod Injection ;  Surgeon: Dusty Pearson MD;  [facility]     LARYNGOSCOPY, ESOPHAGOSCOPY WITH DILATION, COMBINED N/A 10/5/2018    Procedure: COMBINED LARYNGOSCOPY, ESOPHAGOSCOPY WITH DILATION;  Direct Laryngoscopy with Teleoscope, Balloon Dilation, Application of Mitomycin and Steroid Injection ;  Surgeon: Dusty Pearson MD;  [facility]     LARYNGOSCOPY, ESOPHAGOSCOPY WITH DILATION, COMBINED N/A 2018    Procedure: Direct Laryngoscopy, Balloon Dilation, Mitomycin Application, Steroid Injection;  Surgeon: Dusty Pearson MD;  [facility]     LARYNGOSCOPY, ESOPHAGOSCOPY WITH DILATION, COMBINED N/A 2019    Procedure:  Direct Laryngoscopy, Balloon Dilation, Mitomycin Application;  Surgeon: Dusty Pearson MD;  [facility]          Anesthesia Evaluation     . Pt has had prior anesthetic. Type: General    History of anesthetic complications   - PONV  Thinks that it takes more medication for her due to her history of ETOH abuse      ROS/MED HX    ENT/Pulmonary: Comment: Sleep apnea has not been diagnosed but concern for. No recent need for inhalers    (+)RACIEL risk factors obese, other ENT- tracheal stenosis, tobacco use, Past use doesn't use CPAP , . .    Neurologic:  - neg neurologic ROS     Cardiovascular: Comment: Occ palpitations    (+) ----. : . . . :. Irregular Heartbeat/Palpitations, . Previous cardiac testing date:results:date: results:ECG reviewed date:7/20/18 results:NSR date: results:          METS/Exercise Tolerance:  >4 METS   Hematologic:  - neg hematologic  ROS       Musculoskeletal:  - neg musculoskeletal ROS       GI/Hepatic: Comment: Ulcerative colitis, no recent symptoms    (+) GERD Asymptomatic on medication, Inflammatory bowel disease,       Renal/Genitourinary:  - ROS Renal section negative       Endo:     (+) thyroid problem hypothyroidism, Obesity, .      Psychiatric: Comment: History of chemical dependency    (+) psychiatric history depression, anxiety and other (comment) (OCD, PTSD)      Infectious Disease:  - neg infectious disease ROS       Malignancy:      - no malignancy   Other:    (+) C-spine cleared: N/A, no H/O Chronic Pain,no other significant disability                        PHYSICAL EXAM:   Mental Status/Neuro: A/A/O; Age Appropriate   Airway: Facies: Feasible   Respiratory:   Resp. Effort: Stridor      CV:   Rate: Age appropriate   Comments:      Dental:  Dental Comments: Loose teeth                Lab Results   Component Value Date    WBC 14.7 (H) 08/29/2018    HGB 11.4 (L) 08/29/2018    HCT 35.4 08/29/2018     08/29/2018    CRP 31.8 (H) 12/27/2011    SED 17 12/27/2011     " 08/29/2018    POTASSIUM 3.5 08/29/2018    CHLORIDE 105 08/29/2018    CO2 27 08/29/2018    BUN 11 08/29/2018    CR 0.71 08/29/2018     (H) 08/29/2018    JANICE 8.8 08/29/2018    PHOS 2.9 07/20/2018    MAG 2.0 07/20/2018    ALBUMIN 3.0 (L) 07/18/2018    ALBUMIN 3.0 (L) 07/18/2018    PROTTOTAL 6.1 (L) 07/18/2018    PROTTOTAL 6.4 (L) 07/18/2018    ALT 20 07/18/2018    ALT 21 07/18/2018    AST 19 07/18/2018    AST 22 07/18/2018    ALKPHOS 63 07/18/2018    ALKPHOS 68 07/18/2018    BILITOTAL 0.2 07/18/2018    BILITOTAL 0.2 07/18/2018    LIPASE 106 08/03/2011    PTT 26 07/29/2004    INR 0.91 07/29/2004    TSH 1.76 05/16/2018    T4 0.83 04/27/2017    HCG Negative 02/01/2019    HCGS Negative 07/17/2018       Preop Vitals  BP Readings from Last 3 Encounters:   02/06/19 (!) 134/96   02/05/19 132/88   02/01/19 142/81    Pulse Readings from Last 3 Encounters:   02/06/19 84   02/05/19 76   02/01/19 95      Resp Readings from Last 3 Encounters:   02/01/19 18   01/31/19 24   12/06/18 16    SpO2 Readings from Last 3 Encounters:   02/05/19 97%   02/01/19 92%   01/31/19 95%      Temp Readings from Last 1 Encounters:   02/06/19 97.3  F (36.3  C) (Oral)    Ht Readings from Last 1 Encounters:   02/06/19 1.651 m (5' 5\")      Wt Readings from Last 1 Encounters:   02/06/19 109.2 kg (240 lb 11.2 oz)    Estimated body mass index is 40.05 kg/m  as calculated from the following:    Height as of 2/6/19: 1.651 m (5' 5\").    Weight as of 2/6/19: 109.2 kg (240 lb 11.2 oz).     LDA:  Peripheral IV 12/06/18 Left Hand (Active)   Number of days: 77       Airway - Adult/Peds laryngeal mask airway (Active)   Number of days: 20            Assessment:   ASA SCORE: 3    NPO Status: > 6 hours since completed [employer]   Documentation: H&P complete; Preop Testing complete; Consents complete   Proceeding: Proceed without further delay  Tobacco Use:  NO Active use of Tobacco/UNKNOWN Tobacco use status     Plan:   Anes. Type:  General "   Pre-Induction: Gabapentin PO; Acetaminophen PO; Dexmedetomidine; Lidocaine Neb (glycopyrollate 15 minutes prior to transport to room if AFOI is preferred with Precedex for sedation following a pre-induction arterial line)     Fluid/Blood-Preparation: Hot Line     Drips/Meds-Preparation: Phenylephrine (pressor boluses available)   Induction:  IV (Standard); Propofol   Airway: Oral ETT (NIM tube, AFOI versus DL as performed with previous surgeries)   Access/Monitoring: PIV; 2nd PIV; US; A-Line     Advanced Monitoring: TEG; BIS   Maintenance: Balanced; Propofol; Ketamine; IV; Inhalational; Dexmedetomidine; Efrain   Emergence: Procedure Site   Logistics: ICU Admission     Postop Pain/Sedation Strategy:  Standard-Options: Opioids PRN  Advanced Options: Dexmedetomidine (cont.)     PONV Management:  Adult Risk Factors: Female, H/o PONV or Motion Sickness, Non-Smoker, Postop Opioids  Prevention: Propofol Infusion; Ondansetron; Dexamethasone       Comments for Plan/Consent:  Patient is a 48 year old female with significant PMHx of tracheal stenosis 2/2 intubation resulting from accidental overdose in august 2018. Patient has had four tracheal dilations successfully performed by Dedo-tess laryngoscope at each tracheal dilation procedure. Each of those procedures were performed under jet ventilation, requiring between 150-200 mcg/kg/min of propofol for sedation (PONV prophylaxis as well). She required ketamine as an adjunct for pain control that the patient believes is related to her history or EtOH abuse. She was hypertensive to systolic's of 150's-160's during most of her previous tracheal dilations.    The patient as of now has moderate stridor at baseline, exercise intolerance/dyspnea on exertion and is scheduled for Tracheal resection. Plan for asleep intubation, neuromonitoring ETT.     Other significant PMHx includes H/o EtOH abuse, RACIEL risk factors not requiring CPAP.                   PAC Discussion and  Assessment    ASA Classification: 3  Case is suitable for: HERMEL DELOR  Anesthetic techniques and relevant risks discussed: GA  Invasive monitoring and risk discussed: Yes  Types: possible arterial line  Possibility and Risk of blood transfusion discussed: Yes  NPO instructions given:   Additional anesthetic preparation and risks discussed:   Needs early admission to pre-op area:   Other:     PAC Resident/NP Anesthesia Assessment:  Bibi Song is a 48 year old female scheduled to undergo tracheal resection via cervical approach with Rikki Estrella and Christofer on 3/11/19. She has the following specific operative considerations:   - RCRI : No serious cardiac risks.    - VTE risk: 1.8%  - RACIEL # of risks 2/8 = Low risk    - Risk of PONV score = 4.  If > 2, anti-emetic intervention recommended. If 3 or > anti emetic intervention recommended with two or more meds    Tracheal stenosis, recurrent, with above procedure now planned. Has Flovent and Albuterol inhaler and nebs but no recent use. Last Prednisone 12/2018.   PONV. Significant history. Scopolamine has helped in past and order placed for DOS. Final decisions regarding prophylaxis by Anesthesia on DOS.  No cardiac history, symptoms or meds. EKG above. Activity limited by DYSPNEA ON EXERTION.   Former smoker. Quit in 1994. Denies sleep study but documented concern for RACIEL. BMI 41.   GERD. Omeprazole at evening meal.   Hypothyroidism. Will take Synthroid on DOS.   Anxiety. Depression. OCD. Will take Bupropion, Gabapentin, Geodon, and Sertraline on DOS.   History of chemical dependency.   Type and screen drawn today.     Patient was discussed with Dr Guevara.        Reviewed and Signed by PAC Mid-Level Provider/Resident  Mid-Level Provider/Resident: SUSANNAH Edward, CNS  Date: 2/21/19  Time: 2:57pm    Attending Anesthesiologist Anesthesia Assessment:  I have examined the patient and reviewed the medical record.  I have discussed the patient with the SHANTA and concur with  her assessment  The patient is scheduled for tracheal resection via cervical approach for tracheal stenosis following intubation for drug overdose.  She has worsening extrathoracic airway obstruction that has not resolved with several direct laryngoscopy interventions  The patient has no cardiac disease  She has history of asthma (that may be the tracheal airway obstruction) but this has been asymptomatic and not required medications in many months  She has GERD which is asymptomatic with omeprazole.  She has history of OCD, PTSD, and anxiety.  She is in recovery for substance abuse.  She has history of PONV    PE:  Very pleasant obese female.  Moderate stridor with speaking but no obvious dyspnea - can complete full sentences.  Loose #7 and # 9.  MPC 2-3.  Thick neck.          Lungs clear with transmitted upper airway stridor.  CV  RRR without murmur    Discussed procedure and anesthetic with patient including GA, possible arterial line, possibly remaining intubated  Patient may benefit from precedex for post op anxiety.  Scopalamine patch ordered.  Consider propofol infusion.  May not want to use decadron unless approved by surgeon  Final plan per attending anesthesiologist the day of surgery.        Reviewed and Signed by PAC Anesthesiologist  Anesthesiologist: Jeremiah Guevara MD  Date: 2/21/2019  Time:   Pass/Fail:   Disposition:     PAC Pharmacist Assessment:        Pharmacist:   Date:   Time:        SUSANNAH Vang CNS

## 2019-02-21 NOTE — PATIENT INSTRUCTIONS
Preparing for Your Surgery      Name:  Bibi Song   MRN:  [id number]   :  1970   Today's Date:  2019     Arriving for surgery:  Surgery date:  3/11/19  Arrival time:  5:30AM  Please come to:       [facility] [address]  [address] [place]  [address]    -   parking is available in front of the hospital.    -  Stop at the Information Desk in the lobby    -   Inform the information person that you are here for surgery. An escort to 3c will be provided. If you would not like an escort, please proceed to 3C on the 3rd floor. [phone]     Influenza visitor restrictions are in force at this time.  The [facility] is prohibiting the following visitors:  -Any sick persons regardless of age  -Anyone with known exposure to a communicable illness  -Anyone under the age of 5    What can I eat or drink?  -  You may have solid food or milk products until 8 hours prior to your surgery. 3/10/19, 11:30PM  -  You may have water, apple juice or 7up/Sprite until 2 hours prior to your surgery. 3/11/19, 5:30AM    Which medicines can I take?  Stop Aspirin and Multivitamins one week prior to surgery.  Hold Ibuprofen for 24 hours and/or Naproxen for 48 hours prior to surgery.   -  Do NOT take these medications in the morning, the day of surgery:   Calcium   Vitamin B complex    -  Please take these medications the day of surgery:    Bupropion(Wellbutrin)  Gabapentin(Neurontin)   Levothyroxine   Sertraline(Zoloft)   Ziprasidone(geodon)        -As Needed:   Albuterol  Flovent   Ventolin   Use above inhalers and bring the day of surgery.    How do I prepare myself?  -  Take two showers: one the night before surgery; and one the morning of surgery.         Use Scrubcare or Hibiclens to wash from neck down.  You may use your own shampoo and conditioner. No other hair products.   -  Do NOT use lotion, powder, deodorant, or antiperspirant the day of your surgery.  -  Do NOT wear  any makeup, fingernail polish or jewelry.  - Do not bring your own medications to the hospital, except for inhalers and eye drops.  -  Bring your ID and insurance card.    Questions or Concerns:  -If you have questions or concerns regarding the day of surgery, please call [phone].     -For questions after surgery please call your surgeons office.       AFTER YOUR SURGERY  Breathing exercises   Breathing exercises help you recover faster. Take deep breaths and let the air out slowly. This will:     Help you wake up after surgery.    Help prevent complications like pneumonia.  Preventing complications will help you go home sooner.   Nausea and vomiting   You may feel sick to your stomach after surgery; if so, let your nurse know.    Pain control:  After surgery, you may have pain. Our goal is to help you manage your pain. Pain medicine will help you feel comfortable enough to do activities that will help you heal.  These activities may include breathing exercises, walking and physical therapy.   To help your health care team treat your pain we will ask: 1) If you have pain  2) where it is located 3) describe your pain in your words  Methods of pain control include medications given by mouth, vein or by nerve block for some surgeries.  Sequential Compression Device (SCD) or Pneumo Boots:  You may need to wear SCD S on your legs or feet. These are wraps connected to a machine that pumps in air and releases it. The repeated pumping helps prevent blood clots from forming.

## 2019-02-21 NOTE — LETTER
2/21/2019       RE: Bibi Song  [address] [address]     Dear Colleague,    Thank you for referring your patient, Bibi Song, to the [facility] EAR NOSE AND THROAT at [facility]. Please see a copy of my visit note below.      HISTORY OF PRESENT ILLNESS: Bibi Song is a 48 year old female with a history of  Tracheal stenosis. She had an accidental overdose of medications in mid July.  She was admitted from 7/18/ 2018-7/21/2018.  During that time she had intubation for at least 2-3 days.  On discharge she was feeling well.  In mid August she developed some shortness of breath.  She was started on albuterol.  She had no problems with asthma or breathing prior to her hospitalization.  She was seen in the emergency room in mid August, she had shortness of breath and was given prednisone. doxycycline and albuterol.  The nebulizer worked well for her but she did get relief from the albuterol.  I met her for the first time on 8/30/2018.  At that time    she had been recently placed on prednisone 60 mg a day which  and this had helped her breathe comfortably.  She still had audible inspiratory stridor but there was good airflow.  There was a concern about possible stenosis versus paradoxic vocal fold motion.  On exam she had a tracheal stenosis which was symptomatic.  On 9/7/2018 she underwent a direct microlaryngoscopy with dilatation of the tracheal stenosis, mitomycin application and steroid injection.  She initially did well for the first 2 weeks but then gradually developed some difficulty breathing.  She returned to clinic on 10/2/2018 with upper airway turbulence.  She was able to get adequate air with light activity.  Examination showed a recurrence of the tracheal stenosis which was felt to be less inflamed.     On October 5, 2018 she returned to the operating room.  A tracheal stenosis was seen.  Anteriorly it was 16 mm thick and was situated  32 mm below the vocal cords.  Posteriorly it was 16 mm thick and 20 mm below the vocal cords.  There was a Good view with the Dedo-Hermelinda laryngoscope.  There was no difficulty with mask ventilation. A 18 mm x 2 cm balloon was utilized to dilate the stenotic segment after mucosal incisions were made. The remainder of the trachea appeared normal. The patient required a high dose of propofol according to the anesthesia team.    On December 6, 2018 she returned to the operating room.  She had a micro-direct telescopic laryngoscopy with balloon dilatation mitomycin application and steroid injection.  Findings at the time of surgery were: Tracheal stenosis.  Anteriorly it was 10 mm thick and was situated 45 mm below the vocal cords.  Posteriorly it was 16 mm thick and 40 mm below the vocal cords, just over an arterial pulse.  There was a Good view with the Dedo-Hermelinda laryngoscope.  There was no difficulty with mask ventilation. A 18 mm x 4 cm balloon was utilized to dilate the stenotic segment after mucosal incisions were made. The remainder of the trachea appeared normal.    She is currently scheduled for by Dr. Nick Beaulieu and Dr. Ryan Estrella on 3/11/2019.  She is currently breathing well at rest and with light activity           Last 2 Scores for Patient-Answered VHI Questionnaire  VHI Total Score 8/30/2018   VHI Total Score 13       Last 2 Scores for Patient-Answered CSI Questionnaire  CSI Total Score 12/4/2018 12/27/2018   CSI Total Score 2 12         Last 2 Scores for Patient-Answered EAT Questionnaire  EAT Total Score 8/30/2018   EAT Total Score 2           PAST MEDICAL HISTORY:   Past Medical History:   Diagnosis Date     Anxiety      Chemical dependency (H)      Depressive disorder      Hoarseness      Hypothyroidism      PONV (postoperative nausea and vomiting)      Problem, psychiatric      Sleep apnea      Tracheal stenosis        PAST SURGICAL HISTORY:   Past Surgical History:   Procedure Laterality  Date     INJECT STEROID (LOCATION) N/A 9/7/2018    Procedure: INJECT STEROID (LOCATION);;  Surgeon: Dusty Pearson MD;  Location: [facility] OR     INJECT STEROID (LOCATION) N/A 10/5/2018    Procedure: INJECT STEROID (LOCATION);;  Surgeon: Dusty Pearson MD;  Location: [facility] OR     INJECT STEROID (LOCATION) N/A 12/6/2018    Procedure: Steroid Injection;  Surgeon: Dusty Pearson MD;  [facility]     INJECT STEROID (LOCATION) N/A 2/1/2019    Procedure: Steroid Injection;  Surgeon: Dusty Pearson MD;  [facility]     LARYNGOSCOPY N/A 9/7/2018    Procedure: LARYNGOSCOPY;  Teleoscopic Direct Laryngoscopy with Balloon Dilation, Mitomycin Application and Steriod Injection ;  Surgeon: Dusty Pearson MD;  Location: [facility] OR     LARYNGOSCOPY, ESOPHAGOSCOPY WITH DILATION, COMBINED N/A 10/5/2018    Procedure: COMBINED LARYNGOSCOPY, ESOPHAGOSCOPY WITH DILATION;  Direct Laryngoscopy with Teleoscope, Balloon Dilation, Application of Mitomycin and Steroid Injection ;  Surgeon: Dusty Pearson MD;  Location: [facility] OR     LARYNGOSCOPY, ESOPHAGOSCOPY WITH DILATION, COMBINED N/A 12/6/2018    Procedure: Direct Laryngoscopy, Balloon Dilation, Mitomycin Application, Steroid Injection;  Surgeon: Dusty Pearson MD;  [facility]     LARYNGOSCOPY, ESOPHAGOSCOPY WITH DILATION, COMBINED N/A 2/1/2019    Procedure: Direct Laryngoscopy, Balloon Dilation, Mitomycin Application;  Surgeon: Dusty Pearson MD;  [facility] OR       FAMILY HISTORY:   Family History   Problem Relation Age of Onset     Diabetes Mother      Depression Mother         OCD     Neurologic Disorder Mother      Psychotic Disorder Mother      Respiratory Mother      Thyroid Disease Mother      Gynecology Mother         hysterectomy     Cerebrovascular Disease Mother         pacemaker     Substance Abuse Mother      Mental Illness Mother      Heart Disease Mother      Heart Disease Father          MI,   LATE 60'S     Hypertension Father      Prostate Cancer Father      Obesity Father      Diabetes Father      Cerebrovascular Disease Father      Substance Abuse Father      Depression Father      Depression Brother      Thyroid Disease Brother      Depression Brother      Thyroid Disease Brother      Allergies Son      Asthma Son      Thyroid Disease Maternal Grandmother      Cancer Maternal Grandfather      Psychotic Disorder Paternal Grandmother      Thyroid Disease Paternal Grandfather      Diabetes Brother      Breast Cancer Maternal Aunt      Substance Abuse Brother      Mental Illness Brother      Depression Brother      Diabetes Brother      Mental Illness Son      Asthma Son      Depression Son      Stomach Problem Son        SOCIAL HISTORY:   Social History     Tobacco Use     Smoking status: Former Smoker     Years: 5.00     Start date: 1988     Last attempt to quit: 1994     Years since quittin.1     Smokeless tobacco: Never Used   Substance Use Topics     Alcohol use: No     Comment: Treatment in        REVIEW OF SYSTEMS: Ten point review of systems was performed and is negative except for:   [facility] ROS 2019   Constitutional Weight gain   Neurology -   Psychology Frequently feeling anxious   Ears, Nose, Throat -   Cardiopulmonary Wheezing   Gastrointestinal/Genitourinary Heartburn/indigestion   Musculoskeletal Back pain, Neck pain   Hematologic -   Endocrine -   Other Problems with anesthesia in surgery        ALLERGIES: Patient has no known allergies.    MEDICATIONS:   Current Outpatient Medications   Medication Sig Dispense Refill     albuterol (PROVENTIL) (2.5 MG/3ML) 0.083% neb solution inhale the contents of 1 vial BY MOUTH EVERY 4 HOURS AS NEEDED SHORTNESS OF BREATH  0     buPROPion (WELLBUTRIN XL) 150 MG 24 hr tablet Take 150 mg by mouth daily  3     Calcium Carbonate-Vit D-Min (CALCIUM 1200 PO)        FLOVENT DISKUS 50 MCG/BLIST inhaler Inhale 1 Puff by mouth  Twice Daily (rinse mouth after each use)  3     gabapentin (NEURONTIN) 300 MG capsule   0     levothyroxine (SYNTHROID/LEVOTHROID) 75 MCG tablet Take 1 tablet (75 mcg) by mouth daily 90 tablet 1     MAGNESIUM PO Take 1 tablet by mouth daily        melatonin 3 MG tablet Take 9 mg by mouth nightly as needed. 30 tablet 0     omeprazole (PRILOSEC) 20 MG DR capsule Take 1 capsule (20 mg) by mouth daily 30 capsule 0     predniSONE (DELTASONE) 20 MG tablet Take 1-2 tablets by mouth as needed for breathing.  1     sertraline (ZOLOFT) 100 MG tablet Take 2 tablets (200 mg) by mouth daily 60 tablet 0     VENTOLIN  (90 Base) MCG/ACT inhaler inhale 2 puffs by mouth four times daily as needed for shortness of breath (rinse mouthpiece weekly)  1     vitamin B complex with vitamin C (VITAMIN  B COMPLEX) TABS tablet Take 1 tablet by mouth daily       ziprasidone (GEODON) 40 MG capsule Take 1 capsule (40 mg) by mouth 2 times daily (with meals) 60 capsule 0         PHYSICAL EXAMINATION:  She  is awake, alert and in no apparent distress.    Her tympanic membranes are clear and intact bilaterally. External auditory canals are clear.  Nasal exam shows a mild septal deviation without obstruction.  Examination of the oral cavity shows no suspicious lesions.  There is good mouth opening.  There is symmetric movement of the tongue and soft palate.    The oropharynx is clear.  Her neck is supple without significant adenopathy.  Pulse is regular.  Upper airway demonstrates airway turbulence with deep inspiration.  Airflow is improved from prior to surgery and she is comfortable with her respiration.  Cranial nerves II-XII are grossly intact.         PROCEDURE: A flexible laryngoscopy  was performed.  Informed consent was obtained and a time out was performed. 3% lidocaine and 0.25% phenylephrine was sprayed into the nasal cavity and allowed 3 to 5 minutes for effect. The scope was passed through the right sided nostril. Examination  showed the vocal folds to be mobile and meet in the midline.  No nodules, polyps or ulcerations are seen.  There is mild inflammation or erythema of the supraglottic or glottic larynx.  With phonation there is mildcontraction of the supraglottic larynx.  The immediate subglottic area is clear.  I did have her tilt forward and I was able to see further down the trachea.  Approximately 3-4 rings down there was  a narrowing of the trachea.   There appeared to be an adequate airway and there is good airflow although airway turbulence heard with deep inspiration.   The hypopharynx is otherwise clear as is the subglottis.       2/21/2019 Exam         IMPRESSION/PLAN: Tracheal stenosis.  She is scheduled for an excision/resection on 3/11/2019 by Dr. Beaulieu and Dr. Sameer Pearson MD

## 2019-03-08 ENCOUNTER — TELEPHONE (OUTPATIENT)
Dept: SURGERY | Facility: CLINIC | Age: 49
End: 2019-03-08

## 2019-03-08 NOTE — TELEPHONE ENCOUNTER
Patient left message asking if she could take an antihistamine prior to surgery. When I called back she had already taking Benadryl yesterday and today but is feeling better and will not take any more up to time of surgery.

## 2019-03-10 ENCOUNTER — NURSE TRIAGE (OUTPATIENT)
Dept: NURSING | Facility: CLINIC | Age: 49
End: 2019-03-10

## 2019-03-10 NOTE — TELEPHONE ENCOUNTER
Patient calling. She is having surgery in the AM and she is concerned that she is taking a different dose of gabapentin than what is listed on her med list. She reports PCP is aware and anesthesiologist she spoke to is but she is still concerned that her med list does not list what she is actually taking. She has to be at hospital at 530 AM so advised to go and update them at that time on her correct dose.

## 2019-03-11 ENCOUNTER — APPOINTMENT (OUTPATIENT)
Dept: GENERAL RADIOLOGY | Facility: CLINIC | Age: 49
End: 2019-03-11
Attending: STUDENT IN AN ORGANIZED HEALTH CARE EDUCATION/TRAINING PROGRAM
Payer: COMMERCIAL

## 2019-03-11 ENCOUNTER — ANESTHESIA (OUTPATIENT)
Dept: SURGERY | Facility: CLINIC | Age: 49
End: 2019-03-11
Payer: COMMERCIAL

## 2019-03-11 ENCOUNTER — HOSPITAL ENCOUNTER (INPATIENT)
Facility: CLINIC | Age: 49
LOS: 8 days | Discharge: HOME | End: 2019-03-19
Attending: OTOLARYNGOLOGY | Admitting: OTOLARYNGOLOGY
Payer: COMMERCIAL

## 2019-03-11 DIAGNOSIS — J39.8 TRACHEAL STENOSIS: ICD-10-CM

## 2019-03-11 DIAGNOSIS — Z90.09 HISTORY OF RESECTION AND ANASTOMOSIS OF TRACHEA: Primary | ICD-10-CM

## 2019-03-11 DIAGNOSIS — R13.12 OROPHARYNGEAL DYSPHAGIA: ICD-10-CM

## 2019-03-11 LAB
ABO + RH BLD: NORMAL
ABO + RH BLD: NORMAL
ANION GAP SERPL CALCULATED.3IONS-SCNC: 8 MMOL/L (ref 3–14)
BASE DEFICIT BLDA-SCNC: 3.8 MMOL/L
BLD GP AB SCN SERPL QL: NORMAL
BLD PROD TYP BPU: NORMAL
BLOOD BANK CMNT PATIENT-IMP: NORMAL
BUN SERPL-MCNC: 14 MG/DL (ref 7–30)
CA-I BLD-MCNC: 4.6 MG/DL (ref 4.4–5.2)
CALCIUM SERPL-MCNC: 8.2 MG/DL (ref 8.5–10.1)
CHLORIDE SERPL-SCNC: 110 MMOL/L (ref 94–109)
CO2 SERPL-SCNC: 24 MMOL/L (ref 20–32)
CREAT SERPL-MCNC: 0.64 MG/DL (ref 0.52–1.04)
CRP SERPL-MCNC: 9.7 MG/L (ref 0–8)
ERYTHROCYTE [DISTWIDTH] IN BLOOD BY AUTOMATED COUNT: 13.9 % (ref 10–15)
GFR SERPL CREATININE-BSD FRML MDRD: >90 ML/MIN/{1.73_M2}
GLUCOSE BLD-MCNC: 109 MG/DL (ref 70–99)
GLUCOSE BLDC GLUCOMTR-MCNC: 81 MG/DL (ref 70–99)
GLUCOSE SERPL-MCNC: 122 MG/DL (ref 70–99)
HCG UR QL: NEGATIVE
HCO3 BLD-SCNC: 22 MMOL/L (ref 21–28)
HCT VFR BLD AUTO: 32.4 % (ref 35–47)
HGB BLD-MCNC: 10.4 G/DL (ref 11.7–15.7)
HGB BLD-MCNC: 12.2 G/DL (ref 11.7–15.7)
MAGNESIUM SERPL-MCNC: 1.9 MG/DL (ref 1.6–2.3)
MCH RBC QN AUTO: 29.7 PG (ref 26.5–33)
MCHC RBC AUTO-ENTMCNC: 32.1 G/DL (ref 31.5–36.5)
MCV RBC AUTO: 93 FL (ref 78–100)
MRSA DNA SPEC QL NAA+PROBE: NEGATIVE
NUM BPU REQUESTED: 2
O2/TOTAL GAS SETTING VFR VENT: 30 %
PCO2 BLD: 41 MM HG (ref 35–45)
PH BLD: 7.34 PH (ref 7.35–7.45)
PHOSPHATE SERPL-MCNC: 3 MG/DL (ref 2.5–4.5)
PLATELET # BLD AUTO: 280 10E9/L (ref 150–450)
PO2 BLD: 73 MM HG (ref 80–105)
POTASSIUM BLD-SCNC: 3.5 MMOL/L (ref 3.4–5.3)
POTASSIUM SERPL-SCNC: 4.1 MMOL/L (ref 3.4–5.3)
RBC # BLD AUTO: 3.5 10E12/L (ref 3.8–5.2)
SODIUM BLD-SCNC: 142 MMOL/L (ref 133–144)
SODIUM SERPL-SCNC: 142 MMOL/L (ref 133–144)
SPECIMEN EXP DATE BLD: NORMAL
SPECIMEN SOURCE: NORMAL
WBC # BLD AUTO: 16.9 10E9/L (ref 4–11)

## 2019-03-11 PROCEDURE — 87641 MR-STAPH DNA AMP PROBE: CPT | Performed by: SURGERY

## 2019-03-11 PROCEDURE — 88311 DECALCIFY TISSUE: CPT | Performed by: OTOLARYNGOLOGY

## 2019-03-11 PROCEDURE — 99223 1ST HOSP IP/OBS HIGH 75: CPT | Mod: GC | Performed by: SURGERY

## 2019-03-11 PROCEDURE — 20000004 ZZH R&B ICU UMMC

## 2019-03-11 PROCEDURE — 37000009 ZZH ANESTHESIA TECHNICAL FEE, EACH ADDTL 15 MIN: Performed by: OTOLARYNGOLOGY

## 2019-03-11 PROCEDURE — 25000125 ZZHC RX 250: Performed by: STUDENT IN AN ORGANIZED HEALTH CARE EDUCATION/TRAINING PROGRAM

## 2019-03-11 PROCEDURE — 87640 STAPH A DNA AMP PROBE: CPT | Performed by: SURGERY

## 2019-03-11 PROCEDURE — 83735 ASSAY OF MAGNESIUM: CPT | Performed by: STUDENT IN AN ORGANIZED HEALTH CARE EDUCATION/TRAINING PROGRAM

## 2019-03-11 PROCEDURE — 37000008 ZZH ANESTHESIA TECHNICAL FEE, 1ST 30 MIN: Performed by: OTOLARYNGOLOGY

## 2019-03-11 PROCEDURE — 25000128 H RX IP 250 OP 636: Performed by: ANESTHESIOLOGY

## 2019-03-11 PROCEDURE — 27211024 ZZHC OR SUPPLY OTHER OPNP: Performed by: OTOLARYNGOLOGY

## 2019-03-11 PROCEDURE — 84295 ASSAY OF SERUM SODIUM: CPT | Performed by: ANESTHESIOLOGY

## 2019-03-11 PROCEDURE — 40000014 ZZH STATISTIC ARTERIAL MONITORING DAILY

## 2019-03-11 PROCEDURE — 71000017 ZZH RECOVERY PHASE 1 LEVEL 3 EA ADDTL HR: Performed by: OTOLARYNGOLOGY

## 2019-03-11 PROCEDURE — 25800030 ZZH RX IP 258 OP 636: Performed by: STUDENT IN AN ORGANIZED HEALTH CARE EDUCATION/TRAINING PROGRAM

## 2019-03-11 PROCEDURE — 25000132 ZZH RX MED GY IP 250 OP 250 PS 637: Performed by: STUDENT IN AN ORGANIZED HEALTH CARE EDUCATION/TRAINING PROGRAM

## 2019-03-11 PROCEDURE — 82947 ASSAY GLUCOSE BLOOD QUANT: CPT | Performed by: ANESTHESIOLOGY

## 2019-03-11 PROCEDURE — 25000128 H RX IP 250 OP 636: Performed by: STUDENT IN AN ORGANIZED HEALTH CARE EDUCATION/TRAINING PROGRAM

## 2019-03-11 PROCEDURE — 25000128 H RX IP 250 OP 636: Performed by: THORACIC SURGERY (CARDIOTHORACIC VASCULAR SURGERY)

## 2019-03-11 PROCEDURE — 81025 URINE PREGNANCY TEST: CPT | Performed by: ANESTHESIOLOGY

## 2019-03-11 PROCEDURE — 71000016 ZZH RECOVERY PHASE 1 LEVEL 3 FIRST HR: Performed by: OTOLARYNGOLOGY

## 2019-03-11 PROCEDURE — 80048 BASIC METABOLIC PNL TOTAL CA: CPT | Performed by: THORACIC SURGERY (CARDIOTHORACIC VASCULAR SURGERY)

## 2019-03-11 PROCEDURE — 71045 X-RAY EXAM CHEST 1 VIEW: CPT

## 2019-03-11 PROCEDURE — 25000125 ZZHC RX 250: Performed by: CLINICAL NURSE SPECIALIST

## 2019-03-11 PROCEDURE — 25000128 H RX IP 250 OP 636

## 2019-03-11 PROCEDURE — 84134 ASSAY OF PREALBUMIN: CPT | Performed by: STUDENT IN AN ORGANIZED HEALTH CARE EDUCATION/TRAINING PROGRAM

## 2019-03-11 PROCEDURE — 25000132 ZZH RX MED GY IP 250 OP 250 PS 637: Performed by: ANESTHESIOLOGY

## 2019-03-11 PROCEDURE — 88305 TISSUE EXAM BY PATHOLOGIST: CPT | Performed by: OTOLARYNGOLOGY

## 2019-03-11 PROCEDURE — 0BB10ZZ EXCISION OF TRACHEA, OPEN APPROACH: ICD-10-PCS | Performed by: THORACIC SURGERY (CARDIOTHORACIC VASCULAR SURGERY)

## 2019-03-11 PROCEDURE — 86140 C-REACTIVE PROTEIN: CPT | Performed by: STUDENT IN AN ORGANIZED HEALTH CARE EDUCATION/TRAINING PROGRAM

## 2019-03-11 PROCEDURE — 40000170 ZZH STATISTIC PRE-PROCEDURE ASSESSMENT II: Performed by: OTOLARYNGOLOGY

## 2019-03-11 PROCEDURE — 82803 BLOOD GASES ANY COMBINATION: CPT | Performed by: ANESTHESIOLOGY

## 2019-03-11 PROCEDURE — 82330 ASSAY OF CALCIUM: CPT | Performed by: ANESTHESIOLOGY

## 2019-03-11 PROCEDURE — 36000076 ZZH SURGERY LEVEL 6 EA 15 ADDTL MIN - UMMC: Performed by: OTOLARYNGOLOGY

## 2019-03-11 PROCEDURE — 25000566 ZZH SEVOFLURANE, EA 15 MIN: Performed by: OTOLARYNGOLOGY

## 2019-03-11 PROCEDURE — 84132 ASSAY OF SERUM POTASSIUM: CPT | Performed by: ANESTHESIOLOGY

## 2019-03-11 PROCEDURE — 85027 COMPLETE CBC AUTOMATED: CPT | Performed by: THORACIC SURGERY (CARDIOTHORACIC VASCULAR SURGERY)

## 2019-03-11 PROCEDURE — 84100 ASSAY OF PHOSPHORUS: CPT | Performed by: STUDENT IN AN ORGANIZED HEALTH CARE EDUCATION/TRAINING PROGRAM

## 2019-03-11 PROCEDURE — 25000125 ZZHC RX 250: Performed by: OTOLARYNGOLOGY

## 2019-03-11 PROCEDURE — 27210794 ZZH OR GENERAL SUPPLY STERILE: Performed by: OTOLARYNGOLOGY

## 2019-03-11 PROCEDURE — 00000146 ZZHCL STATISTIC GLUCOSE BY METER IP

## 2019-03-11 PROCEDURE — 0BJ08ZZ INSPECTION OF TRACHEOBRONCHIAL TREE, VIA NATURAL OR ARTIFICIAL OPENING ENDOSCOPIC: ICD-10-PCS | Performed by: THORACIC SURGERY (CARDIOTHORACIC VASCULAR SURGERY)

## 2019-03-11 PROCEDURE — 40000275 ZZH STATISTIC RCP TIME EA 10 MIN

## 2019-03-11 PROCEDURE — 36000074 ZZH SURGERY LEVEL 6 1ST 30 MIN - UMMC: Performed by: OTOLARYNGOLOGY

## 2019-03-11 RX ORDER — ONDANSETRON 2 MG/ML
4 INJECTION INTRAMUSCULAR; INTRAVENOUS EVERY 30 MIN PRN
Status: DISCONTINUED | OUTPATIENT
Start: 2019-03-11 | End: 2019-03-11 | Stop reason: HOSPADM

## 2019-03-11 RX ORDER — FENTANYL CITRATE 50 UG/ML
INJECTION, SOLUTION INTRAMUSCULAR; INTRAVENOUS PRN
Status: DISCONTINUED | OUTPATIENT
Start: 2019-03-11 | End: 2019-03-11

## 2019-03-11 RX ORDER — DEXMEDETOMIDINE HYDROCHLORIDE 4 UG/ML
0.2-1.2 INJECTION, SOLUTION INTRAVENOUS CONTINUOUS
Status: DISCONTINUED | OUTPATIENT
Start: 2019-03-11 | End: 2019-03-11 | Stop reason: HOSPADM

## 2019-03-11 RX ORDER — GLYCOPYRROLATE 0.2 MG/ML
INJECTION, SOLUTION INTRAMUSCULAR; INTRAVENOUS PRN
Status: DISCONTINUED | OUTPATIENT
Start: 2019-03-11 | End: 2019-03-11

## 2019-03-11 RX ORDER — KETAMINE HYDROCHLORIDE 50 MG/ML
INJECTION, SOLUTION INTRAMUSCULAR; INTRAVENOUS PRN
Status: DISCONTINUED | OUTPATIENT
Start: 2019-03-11 | End: 2019-03-11

## 2019-03-11 RX ORDER — BUPROPION HCL 100 MG
50 TABLET ORAL 3 TIMES DAILY
Status: DISCONTINUED | OUTPATIENT
Start: 2019-03-12 | End: 2019-03-19 | Stop reason: HOSPADM

## 2019-03-11 RX ORDER — PRAZOSIN HYDROCHLORIDE 1 MG/1
1 CAPSULE ORAL AT BEDTIME
COMMUNITY
End: 2020-04-22 | Stop reason: SINTOL

## 2019-03-11 RX ORDER — AMOXICILLIN 250 MG
2 CAPSULE ORAL 2 TIMES DAILY PRN
Status: DISCONTINUED | OUTPATIENT
Start: 2019-03-11 | End: 2019-03-13

## 2019-03-11 RX ORDER — HYDROMORPHONE HYDROCHLORIDE 1 MG/ML
.3-.5 INJECTION, SOLUTION INTRAMUSCULAR; INTRAVENOUS; SUBCUTANEOUS EVERY 5 MIN PRN
Status: DISCONTINUED | OUTPATIENT
Start: 2019-03-11 | End: 2019-03-11 | Stop reason: HOSPADM

## 2019-03-11 RX ORDER — ACETAMINOPHEN 500 MG
500-1000 TABLET ORAL EVERY 6 HOURS PRN
COMMUNITY
End: 2020-06-03

## 2019-03-11 RX ORDER — LIDOCAINE HYDROCHLORIDE AND EPINEPHRINE 10; 10 MG/ML; UG/ML
INJECTION, SOLUTION INFILTRATION; PERINEURAL PRN
Status: DISCONTINUED | OUTPATIENT
Start: 2019-03-11 | End: 2019-03-11 | Stop reason: HOSPADM

## 2019-03-11 RX ORDER — PROPOFOL 10 MG/ML
INJECTION, EMULSION INTRAVENOUS CONTINUOUS PRN
Status: DISCONTINUED | OUTPATIENT
Start: 2019-03-11 | End: 2019-03-11

## 2019-03-11 RX ORDER — CEFAZOLIN SODIUM 2 G/100ML
2 INJECTION, SOLUTION INTRAVENOUS
Status: DISCONTINUED | OUTPATIENT
Start: 2019-03-11 | End: 2019-03-11 | Stop reason: HOSPADM

## 2019-03-11 RX ORDER — LIDOCAINE 40 MG/G
CREAM TOPICAL
Status: DISCONTINUED | OUTPATIENT
Start: 2019-03-11 | End: 2019-03-11 | Stop reason: HOSPADM

## 2019-03-11 RX ORDER — NALOXONE HYDROCHLORIDE 0.4 MG/ML
.1-.4 INJECTION, SOLUTION INTRAMUSCULAR; INTRAVENOUS; SUBCUTANEOUS
Status: DISCONTINUED | OUTPATIENT
Start: 2019-03-11 | End: 2019-03-11 | Stop reason: HOSPADM

## 2019-03-11 RX ORDER — IPRATROPIUM BROMIDE AND ALBUTEROL SULFATE 2.5; .5 MG/3ML; MG/3ML
3 SOLUTION RESPIRATORY (INHALATION) EVERY 4 HOURS PRN
Status: DISCONTINUED | OUTPATIENT
Start: 2019-03-11 | End: 2019-03-19 | Stop reason: HOSPADM

## 2019-03-11 RX ORDER — LABETALOL 20 MG/4 ML (5 MG/ML) INTRAVENOUS SYRINGE
10
Status: DISCONTINUED | OUTPATIENT
Start: 2019-03-11 | End: 2019-03-11 | Stop reason: HOSPADM

## 2019-03-11 RX ORDER — LIDOCAINE 40 MG/G
CREAM TOPICAL
Status: DISCONTINUED | OUTPATIENT
Start: 2019-03-11 | End: 2019-03-19 | Stop reason: HOSPADM

## 2019-03-11 RX ORDER — ONDANSETRON 4 MG/1
4 TABLET, ORALLY DISINTEGRATING ORAL EVERY 30 MIN PRN
Status: DISCONTINUED | OUTPATIENT
Start: 2019-03-11 | End: 2019-03-11 | Stop reason: HOSPADM

## 2019-03-11 RX ORDER — SODIUM CHLORIDE, SODIUM LACTATE, POTASSIUM CHLORIDE, CALCIUM CHLORIDE 600; 310; 30; 20 MG/100ML; MG/100ML; MG/100ML; MG/100ML
INJECTION, SOLUTION INTRAVENOUS CONTINUOUS PRN
Status: DISCONTINUED | OUTPATIENT
Start: 2019-03-11 | End: 2019-03-11

## 2019-03-11 RX ORDER — AMPICILLIN AND SULBACTAM 2; 1 G/1; G/1
3 INJECTION, POWDER, FOR SOLUTION INTRAMUSCULAR; INTRAVENOUS ONCE
Status: COMPLETED | OUTPATIENT
Start: 2019-03-11 | End: 2019-03-11

## 2019-03-11 RX ORDER — NALOXONE HYDROCHLORIDE 0.4 MG/ML
.1-.4 INJECTION, SOLUTION INTRAMUSCULAR; INTRAVENOUS; SUBCUTANEOUS
Status: DISCONTINUED | OUTPATIENT
Start: 2019-03-11 | End: 2019-03-19 | Stop reason: HOSPADM

## 2019-03-11 RX ORDER — ONDANSETRON 4 MG/1
4 TABLET, ORALLY DISINTEGRATING ORAL EVERY 6 HOURS PRN
Status: DISCONTINUED | OUTPATIENT
Start: 2019-03-11 | End: 2019-03-19 | Stop reason: HOSPADM

## 2019-03-11 RX ORDER — POLYETHYLENE GLYCOL 3350 17 G/17G
17 POWDER, FOR SOLUTION ORAL DAILY PRN
Status: DISCONTINUED | OUTPATIENT
Start: 2019-03-11 | End: 2019-03-13

## 2019-03-11 RX ORDER — GABAPENTIN 300 MG/1
900 CAPSULE ORAL ONCE
Status: COMPLETED | OUTPATIENT
Start: 2019-03-11 | End: 2019-03-11

## 2019-03-11 RX ORDER — CEFAZOLIN SODIUM 1 G/3ML
1 INJECTION, POWDER, FOR SOLUTION INTRAMUSCULAR; INTRAVENOUS SEE ADMIN INSTRUCTIONS
Status: DISCONTINUED | OUTPATIENT
Start: 2019-03-11 | End: 2019-03-11 | Stop reason: ALTCHOICE

## 2019-03-11 RX ORDER — AMPICILLIN AND SULBACTAM 2; 1 G/1; G/1
3 INJECTION, POWDER, FOR SOLUTION INTRAMUSCULAR; INTRAVENOUS EVERY 6 HOURS
Status: DISPENSED | OUTPATIENT
Start: 2019-03-11 | End: 2019-03-14

## 2019-03-11 RX ORDER — FENTANYL CITRATE 50 UG/ML
25-50 INJECTION, SOLUTION INTRAMUSCULAR; INTRAVENOUS
Status: DISCONTINUED | OUTPATIENT
Start: 2019-03-11 | End: 2019-03-11 | Stop reason: HOSPADM

## 2019-03-11 RX ORDER — LEVOTHYROXINE SODIUM 75 UG/1
75 TABLET ORAL EVERY MORNING
Status: DISCONTINUED | OUTPATIENT
Start: 2019-03-12 | End: 2019-03-19 | Stop reason: HOSPADM

## 2019-03-11 RX ORDER — SCOLOPAMINE TRANSDERMAL SYSTEM 1 MG/1
1 PATCH, EXTENDED RELEASE TRANSDERMAL ONCE
Status: COMPLETED | OUTPATIENT
Start: 2019-03-11 | End: 2019-03-11

## 2019-03-11 RX ORDER — NALOXONE HYDROCHLORIDE 0.4 MG/ML
.1-.4 INJECTION, SOLUTION INTRAMUSCULAR; INTRAVENOUS; SUBCUTANEOUS
Status: DISCONTINUED | OUTPATIENT
Start: 2019-03-11 | End: 2019-03-11

## 2019-03-11 RX ORDER — METOCLOPRAMIDE 5 MG/1
10 TABLET ORAL EVERY 6 HOURS PRN
Status: DISCONTINUED | OUTPATIENT
Start: 2019-03-11 | End: 2019-03-19 | Stop reason: HOSPADM

## 2019-03-11 RX ORDER — SERTRALINE HYDROCHLORIDE 100 MG/1
200 TABLET, FILM COATED ORAL EVERY MORNING
Status: DISCONTINUED | OUTPATIENT
Start: 2019-03-12 | End: 2019-03-19 | Stop reason: HOSPADM

## 2019-03-11 RX ORDER — PROCHLORPERAZINE MALEATE 10 MG
10 TABLET ORAL EVERY 6 HOURS PRN
Status: DISCONTINUED | OUTPATIENT
Start: 2019-03-11 | End: 2019-03-19 | Stop reason: HOSPADM

## 2019-03-11 RX ORDER — AMOXICILLIN 250 MG
1 CAPSULE ORAL 2 TIMES DAILY PRN
Status: DISCONTINUED | OUTPATIENT
Start: 2019-03-11 | End: 2019-03-13

## 2019-03-11 RX ORDER — IPRATROPIUM BROMIDE AND ALBUTEROL SULFATE 2.5; .5 MG/3ML; MG/3ML
3 SOLUTION RESPIRATORY (INHALATION) EVERY 4 HOURS
Status: DISCONTINUED | OUTPATIENT
Start: 2019-03-11 | End: 2019-03-11

## 2019-03-11 RX ORDER — ONDANSETRON 2 MG/ML
4 INJECTION INTRAMUSCULAR; INTRAVENOUS EVERY 6 HOURS PRN
Status: DISCONTINUED | OUTPATIENT
Start: 2019-03-11 | End: 2019-03-19 | Stop reason: HOSPADM

## 2019-03-11 RX ORDER — SODIUM CHLORIDE, SODIUM LACTATE, POTASSIUM CHLORIDE, CALCIUM CHLORIDE 600; 310; 30; 20 MG/100ML; MG/100ML; MG/100ML; MG/100ML
INJECTION, SOLUTION INTRAVENOUS CONTINUOUS
Status: DISCONTINUED | OUTPATIENT
Start: 2019-03-11 | End: 2019-03-11 | Stop reason: HOSPADM

## 2019-03-11 RX ORDER — GINSENG 100 MG
CAPSULE ORAL EVERY 8 HOURS
Status: COMPLETED | OUTPATIENT
Start: 2019-03-11 | End: 2019-03-12

## 2019-03-11 RX ORDER — LIDOCAINE HYDROCHLORIDE 20 MG/ML
INJECTION, SOLUTION INFILTRATION; PERINEURAL PRN
Status: DISCONTINUED | OUTPATIENT
Start: 2019-03-11 | End: 2019-03-11

## 2019-03-11 RX ORDER — DEXAMETHASONE SODIUM PHOSPHATE 4 MG/ML
INJECTION, SOLUTION INTRA-ARTICULAR; INTRALESIONAL; INTRAMUSCULAR; INTRAVENOUS; SOFT TISSUE PRN
Status: DISCONTINUED | OUTPATIENT
Start: 2019-03-11 | End: 2019-03-11

## 2019-03-11 RX ORDER — ZIPRASIDONE HYDROCHLORIDE 40 MG/1
40 CAPSULE ORAL 2 TIMES DAILY
Status: DISCONTINUED | OUTPATIENT
Start: 2019-03-11 | End: 2019-03-19 | Stop reason: HOSPADM

## 2019-03-11 RX ORDER — OXYCODONE HCL 5 MG/5 ML
5-10 SOLUTION, ORAL ORAL
Status: DISCONTINUED | OUTPATIENT
Start: 2019-03-11 | End: 2019-03-19

## 2019-03-11 RX ORDER — ONDANSETRON 2 MG/ML
INJECTION INTRAMUSCULAR; INTRAVENOUS PRN
Status: DISCONTINUED | OUTPATIENT
Start: 2019-03-11 | End: 2019-03-11

## 2019-03-11 RX ORDER — OXYMETAZOLINE HYDROCHLORIDE 0.05 G/100ML
2 SPRAY NASAL ONCE
Status: DISCONTINUED | OUTPATIENT
Start: 2019-03-11 | End: 2019-03-19 | Stop reason: HOSPADM

## 2019-03-11 RX ORDER — MINERAL OIL/HYDROPHIL PETROLAT
OINTMENT (GRAM) TOPICAL EVERY 8 HOURS
Status: DISCONTINUED | OUTPATIENT
Start: 2019-03-13 | End: 2019-03-19 | Stop reason: HOSPADM

## 2019-03-11 RX ORDER — ONDANSETRON 2 MG/ML
8 INJECTION INTRAMUSCULAR; INTRAVENOUS EVERY 6 HOURS SCHEDULED
Status: DISPENSED | OUTPATIENT
Start: 2019-03-11 | End: 2019-03-12

## 2019-03-11 RX ORDER — SODIUM CHLORIDE, SODIUM LACTATE, POTASSIUM CHLORIDE, CALCIUM CHLORIDE 600; 310; 30; 20 MG/100ML; MG/100ML; MG/100ML; MG/100ML
INJECTION, SOLUTION INTRAVENOUS CONTINUOUS
Status: DISCONTINUED | OUTPATIENT
Start: 2019-03-11 | End: 2019-03-12

## 2019-03-11 RX ORDER — AMINO ACIDS/PROTEIN HYDROLYS 15G-100/30
1 LIQUID (ML) ORAL DAILY
Status: DISCONTINUED | OUTPATIENT
Start: 2019-03-11 | End: 2019-03-12

## 2019-03-11 RX ORDER — AMPICILLIN AND SULBACTAM 1; .5 G/1; G/1
1.5 INJECTION, POWDER, FOR SOLUTION INTRAMUSCULAR; INTRAVENOUS
Status: DISCONTINUED | OUTPATIENT
Start: 2019-03-11 | End: 2019-03-11 | Stop reason: HOSPADM

## 2019-03-11 RX ORDER — HYDROMORPHONE HYDROCHLORIDE 1 MG/ML
.3-.5 INJECTION, SOLUTION INTRAMUSCULAR; INTRAVENOUS; SUBCUTANEOUS
Status: DISCONTINUED | OUTPATIENT
Start: 2019-03-11 | End: 2019-03-14

## 2019-03-11 RX ORDER — HYDROMORPHONE HYDROCHLORIDE 1 MG/ML
INJECTION, SOLUTION INTRAMUSCULAR; INTRAVENOUS; SUBCUTANEOUS
Status: COMPLETED
Start: 2019-03-11 | End: 2019-03-11

## 2019-03-11 RX ORDER — GABAPENTIN 300 MG/1
300 CAPSULE ORAL 3 TIMES DAILY PRN
Status: DISCONTINUED | OUTPATIENT
Start: 2019-03-11 | End: 2019-03-19

## 2019-03-11 RX ORDER — ACETAMINOPHEN 325 MG/1
650 TABLET ORAL EVERY 4 HOURS PRN
Status: DISCONTINUED | OUTPATIENT
Start: 2019-03-11 | End: 2019-03-16 | Stop reason: CLARIF

## 2019-03-11 RX ORDER — PROPOFOL 10 MG/ML
INJECTION, EMULSION INTRAVENOUS PRN
Status: DISCONTINUED | OUTPATIENT
Start: 2019-03-11 | End: 2019-03-11

## 2019-03-11 RX ORDER — METOCLOPRAMIDE HYDROCHLORIDE 5 MG/ML
10 INJECTION INTRAMUSCULAR; INTRAVENOUS EVERY 6 HOURS PRN
Status: DISCONTINUED | OUTPATIENT
Start: 2019-03-11 | End: 2019-03-19 | Stop reason: HOSPADM

## 2019-03-11 RX ADMIN — SODIUM CHLORIDE, POTASSIUM CHLORIDE, SODIUM LACTATE AND CALCIUM CHLORIDE: 600; 310; 30; 20 INJECTION, SOLUTION INTRAVENOUS at 14:38

## 2019-03-11 RX ADMIN — PROPOFOL 50 MG: 10 INJECTION, EMULSION INTRAVENOUS at 08:11

## 2019-03-11 RX ADMIN — ENOXAPARIN SODIUM 40 MG: 40 INJECTION SUBCUTANEOUS at 06:59

## 2019-03-11 RX ADMIN — Medication: at 18:15

## 2019-03-11 RX ADMIN — AMPICILLIN SODIUM AND SULBACTAM SODIUM 1.5 G: 2; 1 INJECTION, POWDER, FOR SOLUTION INTRAMUSCULAR; INTRAVENOUS at 12:31

## 2019-03-11 RX ADMIN — ZIPRASIDONE HYDROCHLORIDE 40 MG: 40 CAPSULE ORAL at 20:13

## 2019-03-11 RX ADMIN — ONDANSETRON 8 MG: 2 INJECTION INTRAMUSCULAR; INTRAVENOUS at 18:23

## 2019-03-11 RX ADMIN — PHENYLEPHRINE HYDROCHLORIDE 100 MCG: 10 INJECTION, SOLUTION INTRAMUSCULAR; INTRAVENOUS; SUBCUTANEOUS at 08:01

## 2019-03-11 RX ADMIN — AMPICILLIN SODIUM AND SULBACTAM SODIUM 3 G: 2; 1 INJECTION, POWDER, FOR SOLUTION INTRAMUSCULAR; INTRAVENOUS at 10:39

## 2019-03-11 RX ADMIN — PROPOFOL 150 MCG/KG/MIN: 10 INJECTION, EMULSION INTRAVENOUS at 07:56

## 2019-03-11 RX ADMIN — PHENYLEPHRINE HYDROCHLORIDE 200 MCG: 10 INJECTION, SOLUTION INTRAMUSCULAR; INTRAVENOUS; SUBCUTANEOUS at 10:18

## 2019-03-11 RX ADMIN — MIDAZOLAM 2 MG: 1 INJECTION INTRAMUSCULAR; INTRAVENOUS at 09:42

## 2019-03-11 RX ADMIN — PROPOFOL 100 MG: 10 INJECTION, EMULSION INTRAVENOUS at 08:01

## 2019-03-11 RX ADMIN — PROPOFOL 50 MG: 10 INJECTION, EMULSION INTRAVENOUS at 08:13

## 2019-03-11 RX ADMIN — GLYCOPYRROLATE 0.2 MG: 0.2 INJECTION, SOLUTION INTRAMUSCULAR; INTRAVENOUS at 07:45

## 2019-03-11 RX ADMIN — GABAPENTIN 900 MG: 300 CAPSULE ORAL at 07:29

## 2019-03-11 RX ADMIN — FENTANYL CITRATE 50 MCG: 50 INJECTION, SOLUTION INTRAMUSCULAR; INTRAVENOUS at 14:19

## 2019-03-11 RX ADMIN — PHENYLEPHRINE HYDROCHLORIDE 150 MCG: 10 INJECTION, SOLUTION INTRAMUSCULAR; INTRAVENOUS; SUBCUTANEOUS at 09:57

## 2019-03-11 RX ADMIN — MIDAZOLAM 0.5 MG: 1 INJECTION INTRAMUSCULAR; INTRAVENOUS at 15:40

## 2019-03-11 RX ADMIN — Medication 30 MG: at 08:01

## 2019-03-11 RX ADMIN — DEXMEDETOMIDINE 0.5 MCG/KG/HR: 100 INJECTION, SOLUTION, CONCENTRATE INTRAVENOUS at 12:54

## 2019-03-11 RX ADMIN — BACITRACIN: 500 OINTMENT TOPICAL at 20:13

## 2019-03-11 RX ADMIN — PHENYLEPHRINE HYDROCHLORIDE 200 MCG: 10 INJECTION, SOLUTION INTRAMUSCULAR; INTRAVENOUS; SUBCUTANEOUS at 09:19

## 2019-03-11 RX ADMIN — PHENYLEPHRINE HYDROCHLORIDE 200 MCG: 10 INJECTION, SOLUTION INTRAMUSCULAR; INTRAVENOUS; SUBCUTANEOUS at 08:22

## 2019-03-11 RX ADMIN — Medication 0.3 MG: at 16:39

## 2019-03-11 RX ADMIN — FENTANYL CITRATE 25 MCG: 50 INJECTION, SOLUTION INTRAMUSCULAR; INTRAVENOUS at 14:07

## 2019-03-11 RX ADMIN — SCOPOLAMINE 1 PATCH: 1 PATCH, EXTENDED RELEASE TRANSDERMAL at 06:51

## 2019-03-11 RX ADMIN — PHENYLEPHRINE HYDROCHLORIDE 200 MCG: 10 INJECTION, SOLUTION INTRAMUSCULAR; INTRAVENOUS; SUBCUTANEOUS at 09:39

## 2019-03-11 RX ADMIN — SODIUM CHLORIDE, POTASSIUM CHLORIDE, SODIUM LACTATE AND CALCIUM CHLORIDE: 600; 310; 30; 20 INJECTION, SOLUTION INTRAVENOUS at 07:45

## 2019-03-11 RX ADMIN — PROPOFOL 30 MG: 10 INJECTION, EMULSION INTRAVENOUS at 08:18

## 2019-03-11 RX ADMIN — PHENYLEPHRINE HYDROCHLORIDE 200 MCG: 10 INJECTION, SOLUTION INTRAMUSCULAR; INTRAVENOUS; SUBCUTANEOUS at 09:57

## 2019-03-11 RX ADMIN — Medication 10 MG: at 20:13

## 2019-03-11 RX ADMIN — PROPOFOL 30 MG: 10 INJECTION, EMULSION INTRAVENOUS at 13:16

## 2019-03-11 RX ADMIN — PROPOFOL 30 MG: 10 INJECTION, EMULSION INTRAVENOUS at 08:08

## 2019-03-11 RX ADMIN — DEXAMETHASONE SODIUM PHOSPHATE 8 MG: 4 INJECTION, SOLUTION INTRA-ARTICULAR; INTRALESIONAL; INTRAMUSCULAR; INTRAVENOUS; SOFT TISSUE at 09:10

## 2019-03-11 RX ADMIN — KETAMINE HYDROCHLORIDE 50 MG: 50 INJECTION, SOLUTION INTRAMUSCULAR; INTRAVENOUS at 08:01

## 2019-03-11 RX ADMIN — PROPOFOL 20 MG: 10 INJECTION, EMULSION INTRAVENOUS at 08:03

## 2019-03-11 RX ADMIN — AMPICILLIN SODIUM AND SULBACTAM SODIUM 3 G: 2; 1 INJECTION, POWDER, FOR SOLUTION INTRAMUSCULAR; INTRAVENOUS at 20:13

## 2019-03-11 RX ADMIN — FENTANYL CITRATE 50 MCG: 50 INJECTION, SOLUTION INTRAMUSCULAR; INTRAVENOUS at 13:06

## 2019-03-11 RX ADMIN — FENTANYL CITRATE 50 MCG: 50 INJECTION, SOLUTION INTRAMUSCULAR; INTRAVENOUS at 13:11

## 2019-03-11 RX ADMIN — PROPOFOL 40 MG: 10 INJECTION, EMULSION INTRAVENOUS at 09:30

## 2019-03-11 RX ADMIN — LIDOCAINE HYDROCHLORIDE 100 MG: 20 INJECTION, SOLUTION INFILTRATION; PERINEURAL at 08:01

## 2019-03-11 RX ADMIN — FENTANYL CITRATE 50 MCG: 50 INJECTION, SOLUTION INTRAMUSCULAR; INTRAVENOUS at 08:01

## 2019-03-11 RX ADMIN — PHENYLEPHRINE HYDROCHLORIDE 200 MCG: 10 INJECTION, SOLUTION INTRAMUSCULAR; INTRAVENOUS; SUBCUTANEOUS at 11:49

## 2019-03-11 RX ADMIN — PHENYLEPHRINE HYDROCHLORIDE 0.5 MCG/KG/MIN: 10 INJECTION, SOLUTION INTRAMUSCULAR; INTRAVENOUS; SUBCUTANEOUS at 09:51

## 2019-03-11 RX ADMIN — PROPOFOL 20 MG: 10 INJECTION, EMULSION INTRAVENOUS at 08:25

## 2019-03-11 RX ADMIN — PHENYLEPHRINE HYDROCHLORIDE 100 MCG: 10 INJECTION, SOLUTION INTRAMUSCULAR; INTRAVENOUS; SUBCUTANEOUS at 08:09

## 2019-03-11 RX ADMIN — Medication 0.3 MG: at 14:35

## 2019-03-11 RX ADMIN — PROPOFOL 30 MG: 10 INJECTION, EMULSION INTRAVENOUS at 08:02

## 2019-03-11 RX ADMIN — FENTANYL CITRATE 25 MCG: 50 INJECTION, SOLUTION INTRAMUSCULAR; INTRAVENOUS at 14:12

## 2019-03-11 RX ADMIN — Medication 20 MG: at 18:25

## 2019-03-11 RX ADMIN — HYDROMORPHONE HYDROCHLORIDE 0.5 MG: 1 INJECTION, SOLUTION INTRAMUSCULAR; INTRAVENOUS; SUBCUTANEOUS at 13:26

## 2019-03-11 RX ADMIN — ONDANSETRON 4 MG: 2 INJECTION INTRAMUSCULAR; INTRAVENOUS at 13:48

## 2019-03-11 RX ADMIN — PROPOFOL 50 MG: 10 INJECTION, EMULSION INTRAVENOUS at 08:04

## 2019-03-11 RX ADMIN — REMIFENTANIL HYDROCHLORIDE 0.1 MCG/KG/MIN: 1 INJECTION, POWDER, LYOPHILIZED, FOR SOLUTION INTRAVENOUS at 08:52

## 2019-03-11 RX ADMIN — HYDROMORPHONE HYDROCHLORIDE 0.3 MG: 1 INJECTION, SOLUTION INTRAMUSCULAR; INTRAVENOUS; SUBCUTANEOUS at 16:39

## 2019-03-11 RX ADMIN — Medication 0.3 MG: at 14:12

## 2019-03-11 ASSESSMENT — ACTIVITIES OF DAILY LIVING (ADL)
ADLS_ACUITY_SCORE: 13
ADLS_ACUITY_SCORE: 13

## 2019-03-11 ASSESSMENT — PAIN DESCRIPTION - DESCRIPTORS
DESCRIPTORS: SORE
DESCRIPTORS: SORE

## 2019-03-11 ASSESSMENT — MIFFLIN-ST. JEOR: SCORE: 1742.81

## 2019-03-11 NOTE — PLAN OF CARE
Neuro: A/O x4, drowsy, RASS -1. PERRL. Follows commands.   Resp: O2 humidified - 3L NC. Capnography on - IPI 7. RR 14. IS 1750. Encouraged to take deep breaths, and verbalized understanding of Is/deep breathing.   Cardiac: Afebrile. NSR in 90's. /60's.   Gi: NG in place. Zofran scheduled. Tube feeds orders once nutrition verifies.   Gu: carlin in OR - removed in PACU. Voiding.   Skin: skin check on admission - intact. Neck incision - approximated with liquid adhesive. Suture connecting neck to chest to prevent neck extension - head remains flexed with z flow pillow behind head.   Lines: R foot art line, 3 PIV - 1 in R, 2 in L. 2 bilateral penrose drains in neck.  Pain: 0.3 mg dilaudid x1. Orders to start PCA dilaudid, waiting for pump.     Plan: Control pain. Monitor breathing/airway. Will continue to monitor and assess.

## 2019-03-11 NOTE — ANESTHESIA CARE TRANSFER NOTE
Patient: Bibi Song    Procedure(s):  Tracheal Resection Via Cervical Approach    Diagnosis: Tracheal Stenosis  Diagnosis Additional Information: No value filed.    Anesthesia Type:   No value filed.     Note:  Airway :Face Mask  Patient transferred to:PACU  Comments: Patient is Sleepy, VSS, following commands. Patient is breathing Spontaneously with face mask . Care report given to PACU RN, and notified of assigned Anesthesiology staff to patient for continuity of PACU care.     Jorge L Solomon CA-1, PGY-2  Pager [phone]  Handoff Report: Identifed the Patient, Identified the Reponsible Provider, Reviewed the pertinent medical history, Discussed the surgical course, Reviewed Intra-OP anesthesia mangement and issues during anesthesia, Set expectations for post-procedure period and Allowed opportunity for questions and acknowledgement of understanding      Vitals: (Last set prior to Anesthesia Care Transfer)    CRNA VITALS  3/11/2019 1314 - 3/11/2019 1352      3/11/2019             Pulse:  85    SpO2:  97 %    Resp Rate (observed):  4  (Abnormal)                 Electronically Signed By: Jorge L Solomon MD  March 11, 2019  1:52 PM

## 2019-03-11 NOTE — PROGRESS NOTES
Otolaryngology - Head & Neck Surgery Brief Progress Note  3/11/2019    Bibi Song was seen at bedside for evaluation of vocal cord function after tracheal resection. At the time of my evaluation, she was doing well post-operatively with pain well-controlled. She denied having difficulty with her speech production or shortness of breath, although she did note that her voice sounded hoarse. Fiberoptic laryngoscopy was indicated due to history of tracheal resection and the potential for recurrent laryngeal nerve injury. Verbal consent was obtained from the patient. Due to the NG tube placed in the left naris, the laryngoscope was passed through the right nasal passage. The right nasal cavity appeared normal without purulence, masses, or polyps. The nasopharynx was clear. The soft palate appeared normal. The base of tongue and vallecula were diffusely blood-tinged, but there was no active hemorrhage or appreciable masses or lesions. The epiglottis was sharp. The arytenoids and aryepiglottic folds were normal and there was no pooling of secretions in the hypopharynx. The larynx appeared normal with fully mobile cords bilaterally. The patient tolerated the procedure well with no complications.    Shannon Grewal MD PGY-1  Otolaryngology - Head & Neck Surgery  Please page ENT with questions by dialing [phone] and entering job code [phone].

## 2019-03-11 NOTE — H&P
SURGICAL ICU ADMISSION NOTE  March 11, 2019      PRIMARY TEAM: Thoracic  PRIMARY PHYSICIAN: Dr. Estrella  REASON FOR CRITICAL CARE ADMISSION: Airway monitoring   ADMITTING PHYSICIAN: Dr. Stoddard  Date of Service (when I saw the patient): 03/11/2019    ASSESSMENT:  Bibi Song is a 48 year old female with PMHx including obesity, RACIEL, GERD, ulcerative colitis, history of chemical dependency, hypothyroidism, OCD, anxiety and depression who developed recurrent tracheal stenosis after intubation in July 2018, for accidental medication overdose. She has undergone multiple dilatations, but continues to have symptoms of decreased exercise tolerance and dyspnea with recurrence of stenosis. CT of neck on 8/29/18 showed circumferential thickening of the subglottic trachea with moderate stenosis 2.7-3cm in length. She underwent tracheal resection via cervical approach on 3/11 with Dr. Estrella and Dr. Beaulieu, EBL <50mL. This was a primary repair with resection of 3.5cm of trachea. She was extubated post-operatively and is admitted to the SICU for airway monitoring.    PLAN:    Neurological:  #Post-operative pain  #Hx of chemical dependency  #Depression  #Anxiety  #OCD  - Monitor neurological status. Notify the MD for any acute changes in exam.  - Pain: Scheduled Tylenol 975mg Q8H, PO oxycodone 5-10mg Q3H PRN, IV dilaudid 0.3-0.5mg Q2H PRN  - PTA wellbutrin, zoloft, ziprasidone, melatonin. PTA gabapentin 300mg TID.     HEENT:  #Tracheal stenosis s/p tracheal resection via cervical approach  - Chin sutured to chest  - Dressing change Qshift starting POD1  - Okay for OOB  - HOB >30  - Humidified O2  - Duonebs Q4H  - Scheduled Zofran 8mg TID  - Monitor drain output  - If stridor, call both thoracic surgery and ENT immediately for intubation in OR.    Pulmonary:   #RACIEL  - Supplemental oxygen to keep saturation above 92 %.  - Incentive spirometer every 15- 30 minutes when awake.  - Humidified O2  - Duonebs Q4H  - If stridor, call  both thoracic surgery and ENT immediately for intubation in OR.    Cardiovascular:    - Monitor hemodynamic status.   - Hold PTA prazosin    Gastroenterology/Nutrition:  #GERD  #Ulcerative colitis  - Strict NPO, no ice chips or swabs  - Okay for enteral meds  - Okay to start TF via Dobhoff, start at 10cc/hr, advance 10cc/hr Q4H to goal  - Nutrition consult placed, appreciate recs.  - Scheduled Zofran 8mg Q6H, scopolamine patch, PRN compazine and reglan  - Protein 1 packet daily  - PTA prilosec  - Bowel regimen: senna-colace, miralax PRN    Fluids/Electrolytes:   - LR @ 75mL/hr for IV fluid hydration  - BMP on admission    Renal:  - Urine output is adequate. Will continue to monitor intake and output.    Endocrine:  #Hypothyroidism  - Sliding scale to be started if BG>180  -  PTA synthroid    ID/Antibiotics:  - Zandra-operative Unasyn    Heme:     #Acute blood loss anemia  - CBC on admission  - Transfuse if hgb <7.0 or signs/symptoms of hypoperfusion. Monitor and trend.     Musculoskeletal:  - Physical and occupational therapy consult   - Activity: OOB  - HOB >30    General Cares/Prophylaxis:    DVT Prophylaxis: Holding DVT ppx due to bleeding risk. Plan to start Lovenox POD1.  GI Prophylaxis: PTA prilosec  Restraints: Restraints for medical healing needed: NO    Lines/ tubes/ drains:  - PIVs, right foot art line, penrose drains x2    Disposition:  - Surgical ICU.     Patient seen, findings and plan discussed with surgical ICU staff, Dr. Stoddard.      Erica Huitron MD  Orthopaedic Surgery Resident, PGY1   Pager: [phone]    .  - - - - - - - - - - - - - - - - - - - - - - - - - - - - - - - - - - - - - - - - - - - - - -   HISTORY PRESENTING ILLNESS: Bibi Song is a 48 year old female with PMHx including obesity, RACIEL, GERD, ulcerative colitis, history of chemical dependency, hypothyroidism, OCD, anxiety and depression. The patient was recently referred to Dr. Estrella with concerns for recurrent tracheal stenosis  after intubation in July 2018, for accidental medication overdose. She has undergone multiple dilatations, but continues to have symptoms of decreased exercise tolerance and dyspnea with recurrence of stenosis. CT of neck on 8/29/18 showed circumferential thickening of the subglottic trachea with moderate stenosis 2.7-3cm in length.    She underwent tracheal resection via cervical approach on 3/11 with Dr. Estrella and Dr. Beaulieu, EBL <50mL. This was a primary repair with resection of 3.5cm of trachea. She was extubated post-operatively and is admitted to the SICU for airway monitoring.     REVIEW OF SYSTEMS: 10 point ROS neg other than the symptoms noted above in the HPI.    PAST MEDICAL HISTORY:   Past Medical History:   Diagnosis Date     Anxiety      Chemical dependency (H)      Depressive disorder      Hoarseness      Hypothyroidism      Obesity      OCD (obsessive compulsive disorder)      PONV (postoperative nausea and vomiting)      Sleep apnea     never diagnosed with sleep study     Tracheal stenosis        SURGICAL HISTORY:   Past Surgical History:   Procedure Laterality Date     INJECT STEROID (LOCATION) N/A 9/7/2018    Procedure: INJECT STEROID (LOCATION);;  Surgeon: Dusty Pearson MD;  Location: [facility] OR     INJECT STEROID (LOCATION) N/A 10/5/2018    Procedure: INJECT STEROID (LOCATION);;  Surgeon: Dusty Pearson MD;  Location: [facility] OR     INJECT STEROID (LOCATION) N/A 12/6/2018    Procedure: Steroid Injection;  Surgeon: Dusty Pearson MD;  [facility]: [facility]     INJECT STEROID (LOCATION) N/A 2/1/2019    Procedure: Steroid Injection;  Surgeon: Dusty Pearson MD;  [facility]     LARYNGOSCOPY N/A 9/7/2018    Procedure: LARYNGOSCOPY;  Teleoscopic Direct Laryngoscopy with Balloon Dilation, Mitomycin Application and Steriod Injection ;  Surgeon: Dusty Pearson MD;  Location: [facility] OR     LARYNGOSCOPY, ESOPHAGOSCOPY WITH DILATION, COMBINED N/A 10/5/2018     Procedure: COMBINED LARYNGOSCOPY, ESOPHAGOSCOPY WITH DILATION;  Direct Laryngoscopy with Teleoscope, Balloon Dilation, Application of Mitomycin and Steroid Injection ;  Surgeon: Dusty Pearson MD;  Location: [facility] OR     LARYNGOSCOPY, ESOPHAGOSCOPY WITH DILATION, COMBINED N/A 2018    Procedure: Direct Laryngoscopy, Balloon Dilation, Mitomycin Application, Steroid Injection;  Surgeon: Dusty Pearson MD;  [facility]     LARYNGOSCOPY, ESOPHAGOSCOPY WITH DILATION, COMBINED N/A 2019    Procedure: Direct Laryngoscopy, Balloon Dilation, Mitomycin Application;  Surgeon: Dusty Pearson MD;  Location: [facility]       SOCIAL HISTORY:   Social History     Socioeconomic History     Marital status:      Spouse name: None     Number of children: None     Years of education: None     Highest education level: None   Occupational History     None   Social Needs     Financial resource strain: None     Food insecurity:     Worry: None     Inability: None     Transportation needs:     Medical: None     Non-medical: None   Tobacco Use     Smoking status: Former Smoker     Years: 5.00     Types: Cigarettes     Start date: 1988     Last attempt to quit: 1994     Years since quittin.2     Smokeless tobacco: Never Used   Substance and Sexual Activity     Alcohol use: No     Comment: Treatment in      Drug use: No     Sexual activity: Yes     Partners: Male     Birth control/protection: Condom   Lifestyle     Physical activity:     Days per week: None     Minutes per session: None     Stress: None   Relationships     Social connections:     Talks on phone: None     Gets together: None     Attends Mosque service: None     Active member of club or organization: None     Attends meetings of clubs or organizations: None     Relationship status: None     Intimate partner violence:     Fear of current or ex partner: None     Emotionally abused: None     Physically abused: None      Forced sexual activity: None   Other Topics Concern     Parent/sibling w/ CABG, MI or angioplasty before 65F 55M? No   Social History Narrative     None     FAMILY HISTORY: No bleeding/clotting disorders nor problems with anesthesia.     ALLERGIES:   No Known Allergies    MEDICATIONS:    No current facility-administered medications on file prior to encounter.   Current Outpatient Medications on File Prior to Encounter:  acetaminophen (TYLENOL) 500 MG tablet Take 500-1,000 mg by mouth every 6 hours as needed for mild pain or other (headache)   albuterol (PROVENTIL) (2.5 MG/3ML) 0.083% neb solution inhale the contents of 1 vial BY MOUTH EVERY 4 HOURS AS NEEDED SHORTNESS OF BREATH   buPROPion (WELLBUTRIN XL) 150 MG 24 hr tablet Take 150 mg by mouth every morning    FLOVENT DISKUS 50 MCG/BLIST inhaler Inhale 1 Puff by mouth PRN (rinse mouth after each use)   gabapentin (NEURONTIN) 300 MG capsule Take 300 mg by mouth 3 times daily    levothyroxine (SYNTHROID/LEVOTHROID) 75 MCG tablet Take 1 tablet (75 mcg) by mouth daily (Patient taking differently: Take 75 mcg by mouth every morning )   melatonin 3 MG tablet Take 20 mg by mouth At Bedtime    omeprazole (PRILOSEC) 20 MG DR capsule Take 1 capsule (20 mg) by mouth daily (Patient taking differently: Take 20 mg by mouth daily (with dinner) )   prazosin (MINIPRESS) 1 MG capsule Take 1 mg by mouth At Bedtime   sertraline (ZOLOFT) 100 MG tablet Take 2 tablets (200 mg) by mouth daily (Patient taking differently: Take 200 mg by mouth every morning )   VENTOLIN  (90 Base) MCG/ACT inhaler inhale 2 puffs by mouth four times daily as needed for shortness of breath (rinse mouthpiece weekly)   vitamin B complex with vitamin C (VITAMIN  B COMPLEX) TABS tablet Take 1 tablet by mouth every morning    ziprasidone (GEODON) 40 MG capsule Take 1 capsule (40 mg) by mouth 2 times daily (with meals)       PHYSICAL EXAMINATION:  Temp:  [96.3  F (35.7  C)-98  F (36.7  C)] 98  F (36.7   C)  Pulse:  [75-88] 79  Heart Rate:  [77-90] 77  Resp:  [11-20] 20  BP: ()/(45-99) 93/61  MAP:  [74 mmHg-97 mmHg] 78 mmHg  Arterial Line BP: (108-147)/(59-80) 138/59  SpO2:  [93 %-100 %] 94 %  General: Resting in bed, appears comfortable  HEENT: Suture from chin to chest, penrose drain x2 to left and right with gauze dressing.   Neuro: A&Ox3, NAD  Pulm/Resp: Clear breath sounds bilaterally without rhonchi, crackles or wheeze, breathing non-labored on 3L O2  CV: RRR, no m/r/g  Abdomen: Soft, non-distended, non-tender, no rebound tenderness or guarding  Extremities: No peripheral edema, moving all extremities, peripheral pulses intact, extremities well perfused    LABS: Reviewed.   Arterial Blood Gases   Recent Labs   Lab 03/11/19  1053   PH 7.34*   PCO2 41   PO2 73*   HCO3 22     Complete Blood Count   Recent Labs   Lab 03/11/19  1053   HGB 12.2     Basic Metabolic Panel  Recent Labs   Lab 03/11/19  1053      POTASSIUM 3.5   *     Liver Function Tests  No lab results found in last 7 days.  Pancreatic Enzymes  No lab results found in last 7 days.  Coagulation Profile  No lab results found in last 7 days.    IMAGING:  Recent Results (from the past 24 hour(s))   XR Chest Port 1 View    Impression    IMPRESSION: No focal airspace disease.

## 2019-03-11 NOTE — PHARMACY-CONSULT NOTE
Pharmacy Tube Feeding Consult    Medication reviewed for administration by feeding tube and for potential food/drug interactions.    Recommendation: changed bupropion to immediate release formulation    Pharmacy will continue to follow as new medications are ordered.    Deedee Chapin, PharmD  Pager [phone]

## 2019-03-11 NOTE — ANESTHESIA PROCEDURE NOTES
Arterial Line Procedure Note  Staff:     Anesthesiologist:  Teja Delgado MD    Resident/CRNA:  Mark Fink MD    Arterial line performed by resident/CRNA in presence of a teaching physician    Location: In OR After Induction  Procedure Start/Stop Times:     patient identified, IV checked, risks and benefits discussed, informed consent, monitors and equipment checked, pre-op evaluation and at physician/surgeon's request      Correct Patient: Yes      Correct Position: Yes      Correct Site: Yes      Correct Procedure: Yes      Correct Laterality:  N/A    Site Marked:  N/A  Line Placement:     Procedure:  Arterial Line    Insertion Site:  Dorsalis Pedis    Skin Prep: Chloraprep      Patient Prep: patient draped, mask, sterile gloves, hat and hand hygiene      Ultrasound Guided?: Yes      Dressing:  Tegaderm    Complications:  None obvious    Arterial waveform: Yes

## 2019-03-11 NOTE — OR NURSING
Dr Delgado notified that patient is feeling anxious, he will put order in for Versed. Also is OK for transfer to ICU at this time.

## 2019-03-11 NOTE — BRIEF OP NOTE
[facility], [place]    Brief Operative Note    Pre-operative diagnosis: Tracheal Stenosis  Post-operative diagnosis * No post-op diagnosis entered *  Procedure: Procedure(s):  Tracheal Resection Via Cervical Approach  Surgeon: Surgeon(s) and Role:     * Nick Beaulieu MD - Primary     * Ryan Estrella MD - Assisting     * Rand Dinh MD - Resident - Assisting     * Jenn Mendez MD - Resident - Assisting     * Bashir Romano MD - Resident - Assisting      * Yue Wilcox MD - Fellow -- Assisting  Anesthesia: General   Estimated blood loss: Less than 50 ml  Drains: Carlitos-Vasquez  Specimens:   ID Type Source Tests Collected by Time Destination   A : calcified node Tissue Other SURGICAL PATHOLOGY EXAM Nick Beaulieu MD 3/11/2019  9:58 AM    B : first resected tracheal range Tissue Other SURGICAL PATHOLOGY EXAM Nick Beaulieu MD 3/11/2019 11:40 AM    C : main stenosis Tissue Other SURGICAL PATHOLOGY EXAM Nick Beaulieu MD 3/11/2019 11:45 AM    D : distal ring Tissue Other SURGICAL PATHOLOGY EXAM Nick Beaulieu MD 3/11/2019 11:46 AM      Findings:   See op note  Complications: None.  Implants: None.

## 2019-03-11 NOTE — OR NURSING
Called Dr. Delgado and verified okay to give lovenox, and notified him of patient taken higher than prescribed dosage of gabapentin.

## 2019-03-11 NOTE — ANESTHESIA POSTPROCEDURE EVALUATION
Anesthesia POST Procedure Evaluation    Patient: Bibi Song   MRN:     [id number] Gender:   female   Age:    48 year old :      1970        Preoperative Diagnosis: Tracheal Stenosis   Procedure(s):  Tracheal Resection Via Cervical Approach   Postop Comments: No value filed.       Anesthesia Type:  General    Reportable Event: NO     PAIN: Uncomplicated   Sign Out status: Comfortable, Well controlled pain     PONV: No PONV   Sign Out status:  No Nausea or Vomiting     Neuro/Psych: Uneventful perioperative course   Sign Out Status: Preoperative baseline; Age appropriate mentation     Airway/Resp.: Uneventful perioperative course   Sign Out Status: Non labored breathing, age appropriate RR; Resp. Status within EXPECTED Parameters     CV: Uneventful perioperative course   Sign Out status: Appropriate BP and perfusion indices; Appropriate HR/Rhythm     Disposition:   Sign Out in:  ICU  Disposition:  ICU  Recovery Course: Recovery in ICU  Follow-Up: Not required     Comments/Narrative:  Receiving 0.5 mg midazolam for anxiety. Can continue to address in the ICU. Stable from hemodynamic and respiratory standpoint. Ready for discharge from PACU to ICU.            Last Anesthesia Record Vitals:  CRNA VITALS  3/11/2019 1314 - 3/11/2019 1414      3/11/2019             NIBP:  102/69    Pulse:  85    ART BP:  102/69          Last PACU/Preop Vitals:  Vitals:    19 1500 19 1515 19 1530   BP: 93/61 102/54 120/72   Pulse: 79 81 96   Resp: 20 18 12   Temp:      SpO2: 94% 91% 92%         Electronically Signed By: Teja Gomez MD, 2019, 3:36 PM

## 2019-03-12 ENCOUNTER — APPOINTMENT (OUTPATIENT)
Dept: GENERAL RADIOLOGY | Facility: CLINIC | Age: 49
End: 2019-03-12
Attending: OTOLARYNGOLOGY
Payer: COMMERCIAL

## 2019-03-12 ENCOUNTER — APPOINTMENT (OUTPATIENT)
Dept: OCCUPATIONAL THERAPY | Facility: CLINIC | Age: 49
End: 2019-03-12
Attending: STUDENT IN AN ORGANIZED HEALTH CARE EDUCATION/TRAINING PROGRAM
Payer: COMMERCIAL

## 2019-03-12 LAB
ANION GAP SERPL CALCULATED.3IONS-SCNC: 9 MMOL/L (ref 3–14)
BASOPHILS # BLD AUTO: 0 10E9/L (ref 0–0.2)
BASOPHILS NFR BLD AUTO: 0.2 %
BUN SERPL-MCNC: 16 MG/DL (ref 7–30)
CA-I BLD-MCNC: 4.5 MG/DL (ref 4.4–5.2)
CALCIUM SERPL-MCNC: 8.4 MG/DL (ref 8.5–10.1)
CHLORIDE SERPL-SCNC: 107 MMOL/L (ref 94–109)
CO2 SERPL-SCNC: 25 MMOL/L (ref 20–32)
CREAT SERPL-MCNC: 0.71 MG/DL (ref 0.52–1.04)
DIFFERENTIAL METHOD BLD: ABNORMAL
EOSINOPHIL # BLD AUTO: 0 10E9/L (ref 0–0.7)
EOSINOPHIL NFR BLD AUTO: 0 %
ERYTHROCYTE [DISTWIDTH] IN BLOOD BY AUTOMATED COUNT: 14.3 % (ref 10–15)
GFR SERPL CREATININE-BSD FRML MDRD: >90 ML/MIN/{1.73_M2}
GLUCOSE BLDC GLUCOMTR-MCNC: 95 MG/DL (ref 70–99)
GLUCOSE SERPL-MCNC: 107 MG/DL (ref 70–99)
HCT VFR BLD AUTO: 35.8 % (ref 35–47)
HGB BLD-MCNC: 11.4 G/DL (ref 11.7–15.7)
IMM GRANULOCYTES # BLD: 0.1 10E9/L (ref 0–0.4)
IMM GRANULOCYTES NFR BLD: 0.6 %
LYMPHOCYTES # BLD AUTO: 2 10E9/L (ref 0.8–5.3)
LYMPHOCYTES NFR BLD AUTO: 15.8 %
MAGNESIUM SERPL-MCNC: 2 MG/DL (ref 1.6–2.3)
MCH RBC QN AUTO: 29.5 PG (ref 26.5–33)
MCHC RBC AUTO-ENTMCNC: 31.8 G/DL (ref 31.5–36.5)
MCV RBC AUTO: 93 FL (ref 78–100)
MONOCYTES # BLD AUTO: 0.8 10E9/L (ref 0–1.3)
MONOCYTES NFR BLD AUTO: 6 %
NEUTROPHILS # BLD AUTO: 9.9 10E9/L (ref 1.6–8.3)
NEUTROPHILS NFR BLD AUTO: 77.4 %
NRBC # BLD AUTO: 0 10*3/UL
NRBC BLD AUTO-RTO: 0 /100
PHOSPHATE SERPL-MCNC: 3.2 MG/DL (ref 2.5–4.5)
PLATELET # BLD AUTO: 331 10E9/L (ref 150–450)
POTASSIUM SERPL-SCNC: 4.2 MMOL/L (ref 3.4–5.3)
PREALB SERPL IA-MCNC: 31 MG/DL (ref 15–45)
RADIOLOGIST FLAGS: ABNORMAL
RBC # BLD AUTO: 3.86 10E12/L (ref 3.8–5.2)
SODIUM SERPL-SCNC: 142 MMOL/L (ref 133–144)
WBC # BLD AUTO: 12.8 10E9/L (ref 4–11)

## 2019-03-12 PROCEDURE — 25000128 H RX IP 250 OP 636: Performed by: STUDENT IN AN ORGANIZED HEALTH CARE EDUCATION/TRAINING PROGRAM

## 2019-03-12 PROCEDURE — 80048 BASIC METABOLIC PNL TOTAL CA: CPT | Performed by: THORACIC SURGERY (CARDIOTHORACIC VASCULAR SURGERY)

## 2019-03-12 PROCEDURE — 84100 ASSAY OF PHOSPHORUS: CPT | Performed by: THORACIC SURGERY (CARDIOTHORACIC VASCULAR SURGERY)

## 2019-03-12 PROCEDURE — 40000014 ZZH STATISTIC ARTERIAL MONITORING DAILY

## 2019-03-12 PROCEDURE — 99233 SBSQ HOSP IP/OBS HIGH 50: CPT | Mod: GC | Performed by: SURGERY

## 2019-03-12 PROCEDURE — 25000132 ZZH RX MED GY IP 250 OP 250 PS 637: Performed by: STUDENT IN AN ORGANIZED HEALTH CARE EDUCATION/TRAINING PROGRAM

## 2019-03-12 PROCEDURE — 36415 COLL VENOUS BLD VENIPUNCTURE: CPT | Performed by: THORACIC SURGERY (CARDIOTHORACIC VASCULAR SURGERY)

## 2019-03-12 PROCEDURE — 27210429 ZZH NUTRITION PRODUCT INTERMEDIATE LITER

## 2019-03-12 PROCEDURE — 90682 RIV4 VACC RECOMBINANT DNA IM: CPT | Performed by: OTOLARYNGOLOGY

## 2019-03-12 PROCEDURE — 97535 SELF CARE MNGMENT TRAINING: CPT | Mod: GO | Performed by: OCCUPATIONAL THERAPIST

## 2019-03-12 PROCEDURE — 40000275 ZZH STATISTIC RCP TIME EA 10 MIN

## 2019-03-12 PROCEDURE — 12000001 ZZH R&B MED SURG/OB UMMC

## 2019-03-12 PROCEDURE — 82330 ASSAY OF CALCIUM: CPT | Performed by: THORACIC SURGERY (CARDIOTHORACIC VASCULAR SURGERY)

## 2019-03-12 PROCEDURE — 85025 COMPLETE CBC W/AUTO DIFF WBC: CPT | Performed by: THORACIC SURGERY (CARDIOTHORACIC VASCULAR SURGERY)

## 2019-03-12 PROCEDURE — 00000146 ZZHCL STATISTIC GLUCOSE BY METER IP

## 2019-03-12 PROCEDURE — 71045 X-RAY EXAM CHEST 1 VIEW: CPT

## 2019-03-12 PROCEDURE — 74018 RADEX ABDOMEN 1 VIEW: CPT

## 2019-03-12 PROCEDURE — 83735 ASSAY OF MAGNESIUM: CPT | Performed by: THORACIC SURGERY (CARDIOTHORACIC VASCULAR SURGERY)

## 2019-03-12 PROCEDURE — 25000128 H RX IP 250 OP 636: Performed by: OTOLARYNGOLOGY

## 2019-03-12 PROCEDURE — 97165 OT EVAL LOW COMPLEX 30 MIN: CPT | Mod: GO | Performed by: OCCUPATIONAL THERAPIST

## 2019-03-12 RX ORDER — AMINO AC/PROTEIN HYDR/WHEY PRO 10G-100/30
1 LIQUID (ML) ORAL
Status: DISCONTINUED | OUTPATIENT
Start: 2019-03-12 | End: 2019-03-12

## 2019-03-12 RX ORDER — ONDANSETRON 2 MG/ML
8 INJECTION INTRAMUSCULAR; INTRAVENOUS EVERY 6 HOURS SCHEDULED
Status: COMPLETED | OUTPATIENT
Start: 2019-03-12 | End: 2019-03-13

## 2019-03-12 RX ORDER — AMINO AC/PROTEIN HYDR/WHEY PRO 10G-100/30
2 LIQUID (ML) ORAL 3 TIMES DAILY
Status: DISCONTINUED | OUTPATIENT
Start: 2019-03-12 | End: 2019-03-19

## 2019-03-12 RX ADMIN — GABAPENTIN 300 MG: 300 CAPSULE ORAL at 13:32

## 2019-03-12 RX ADMIN — SERTRALINE HYDROCHLORIDE 200 MG: 100 TABLET ORAL at 08:35

## 2019-03-12 RX ADMIN — Medication 1 PACKET: at 11:28

## 2019-03-12 RX ADMIN — INFLUENZA A VIRUS A/MICHIGAN/45/2015 (H1N1) RECOMBINANT HEMAGGLUTININ ANTIGEN, INFLUENZA A VIRUS A/SINGAPORE/INFIMH-16-0019/2016 (H3N2) RECOMBINANT HEMAGGLUTININ ANTIGEN, INFLUENZA B VIRUS B/MARYLAND/15/2016 RECOMBINANT HEMAGGLUTININ ANTIGEN, AND INFLUENZA B VIRUS B/PHUKET/3073/2013 RECOMBINANT HEMAGGLUTININ ANTIGEN 0.5 ML: 45; 45; 45; 45 INJECTION INTRAMUSCULAR at 11:34

## 2019-03-12 RX ADMIN — ONDANSETRON 8 MG: 2 INJECTION INTRAMUSCULAR; INTRAVENOUS at 18:06

## 2019-03-12 RX ADMIN — GABAPENTIN 300 MG: 300 CAPSULE ORAL at 20:32

## 2019-03-12 RX ADMIN — AMPICILLIN SODIUM AND SULBACTAM SODIUM 3 G: 2; 1 INJECTION, POWDER, FOR SOLUTION INTRAMUSCULAR; INTRAVENOUS at 02:21

## 2019-03-12 RX ADMIN — Medication 50 MG: at 13:32

## 2019-03-12 RX ADMIN — BACITRACIN: 500 OINTMENT TOPICAL at 04:00

## 2019-03-12 RX ADMIN — Medication 2 PACKET: at 13:33

## 2019-03-12 RX ADMIN — Medication 20 MG: at 17:44

## 2019-03-12 RX ADMIN — BACITRACIN: 500 OINTMENT TOPICAL at 20:49

## 2019-03-12 RX ADMIN — Medication 50 MG: at 20:28

## 2019-03-12 RX ADMIN — GABAPENTIN 300 MG: 300 CAPSULE ORAL at 05:32

## 2019-03-12 RX ADMIN — AMPICILLIN SODIUM AND SULBACTAM SODIUM 3 G: 2; 1 INJECTION, POWDER, FOR SOLUTION INTRAMUSCULAR; INTRAVENOUS at 13:39

## 2019-03-12 RX ADMIN — MULTIVITAMIN 15 ML: LIQUID ORAL at 11:28

## 2019-03-12 RX ADMIN — ZIPRASIDONE HYDROCHLORIDE 40 MG: 40 CAPSULE ORAL at 08:35

## 2019-03-12 RX ADMIN — Medication 50 MG: at 08:35

## 2019-03-12 RX ADMIN — ZIPRASIDONE HYDROCHLORIDE 40 MG: 40 CAPSULE ORAL at 20:28

## 2019-03-12 RX ADMIN — ONDANSETRON 8 MG: 2 INJECTION INTRAMUSCULAR; INTRAVENOUS at 13:24

## 2019-03-12 RX ADMIN — BACITRACIN: 500 OINTMENT TOPICAL at 11:37

## 2019-03-12 RX ADMIN — AMPICILLIN SODIUM AND SULBACTAM SODIUM 3 G: 2; 1 INJECTION, POWDER, FOR SOLUTION INTRAMUSCULAR; INTRAVENOUS at 20:28

## 2019-03-12 RX ADMIN — AMPICILLIN SODIUM AND SULBACTAM SODIUM 3 G: 2; 1 INJECTION, POWDER, FOR SOLUTION INTRAMUSCULAR; INTRAVENOUS at 08:45

## 2019-03-12 RX ADMIN — Medication 10 MG: at 20:32

## 2019-03-12 RX ADMIN — ENOXAPARIN SODIUM 40 MG: 40 INJECTION SUBCUTANEOUS at 08:29

## 2019-03-12 RX ADMIN — LEVOTHYROXINE SODIUM 75 MCG: 75 TABLET ORAL at 08:35

## 2019-03-12 RX ADMIN — Medication 2 PACKET: at 20:34

## 2019-03-12 ASSESSMENT — PAIN DESCRIPTION - DESCRIPTORS
DESCRIPTORS: SORE

## 2019-03-12 ASSESSMENT — ACTIVITIES OF DAILY LIVING (ADL)
ADLS_ACUITY_SCORE: 13
TOILETING: 0-->INDEPENDENT
AMBULATION: 0-->INDEPENDENT
ADLS_ACUITY_SCORE: 13
ADLS_ACUITY_SCORE: 14
COGNITION: 0 - NO COGNITION ISSUES REPORTED
ADLS_ACUITY_SCORE: 17
RETIRED_EATING: 0-->INDEPENDENT
DRESS: 0-->INDEPENDENT
TRANSFERRING: 0-->INDEPENDENT
ADLS_ACUITY_SCORE: 13
BATHING: 0-->INDEPENDENT
SWALLOWING: 0-->SWALLOWS FOODS/LIQUIDS WITHOUT DIFFICULTY
ADLS_ACUITY_SCORE: 14
RETIRED_COMMUNICATION: 0-->UNDERSTANDS/COMMUNICATES WITHOUT DIFFICULTY
FALL_HISTORY_WITHIN_LAST_SIX_MONTHS: NO

## 2019-03-12 NOTE — OP NOTE
Procedure Date: 03/11/2019      CO-SURGEON:  Nick Beaulieu MD  (Alec/HNS)  CO-SURGEON:   Ryan Estrella MD   (Thoracic)     ASSISTANTS:   1.  Yue Wilcox MD, Fellow    2.  Rand Dinh MD, Resident      ANESTHESIA:  General.      ESTIMATED BLOOD LOSS:  15 mL.        DRAINS:   Penrose.      SPECIMENS:      1.  Calcified node in the thymus.     2.  First or second tracheal ring.   3.  Main stenosis.     4.  Distal ring.        COMPLICATIONS:  None.      INDICATIONS:  Bibi Song is a 48-year-old female with history of a 3 cm tracheal stenosis approximately 4 cm below the vocal cords.  This tracheal stenosis occurred from complications after an accidental medication overdose in 07/2018.  She has not had durable response to endobronchial balloon dilations.  We elected to bring her to the operating room today for tracheal resection.        PROCEDURES PERFORMED:     1.  Tracheal resection via cervical approach.     2.  Hyoid release.      PREOPERATIVE DIAGNOSIS:  Tracheal stenosis.      POSTOPERATIVE DIAGNOSES:  Tracheal stenosis.      FINDINGS:   1.  Six tracheal rings resected for a segment of approximately 4 cm.  This was located approximately 4 cm below the cricoid.     2.  A resection anastomosis was able to be approached through the neck with tracheal release and hyoid release to assist in anastomotic closure.     3.  Small calcified nodule in the upper mediastinum  4.  NG tube placed at 55.           DESCRIPTION OF PROCEDURE:  The patient was brought to the operating room, laid on the operating table.  General anesthesia was induced.  The patient was orotracheally intubated by a member of the anesthesia team with the assistance of a flexible fiberoptic bronchoscope.  The patient was initially intubated with a 6-0 laryngeal monitoring tube which was able to be advanced past the area of stenosis, so that the electrodes were at the level of the vocal cords.  A shoulder roll was placed.  The patient was placed  into extension.  The patient was positioned, prepped and draped in sterile fashion for this case.  Timeout was called for identification of the patient and the procedure.      A 6 cm incision was placed in a favorable crease at the level of the cricoid and injected with 1% lidocaine with 1:100,000 epinephrine.  A #15 blade was used to incise through skin and subcutaneous tissue.  Monopolar cautery was then used to incise through subcutaneous tissue.  The anterior jugular vein on the right was ligated.  The strap musculature was identified and split along the midline raphae.  Subplatysmal flaps were elevated.  The thyroid gland and the trachea were exposed.  The thyroid gland was identified and split at the isthmus.  The remainder of the trachea was exposed from the cricoid to the sternum and was elevated at 180 degrees in order to clearly see the anterior face of the trachea.      At this point, Dr Estrella joined us.  He placed a bronchoscope through the endotracheal tube and was used to transilluminate the airway to identify the level of stenosis, which was approximately 3-4 cm below the cricoid and approximately 3-4 cm in length.  This was marked.  The trachea and the area of stenosis was circumferentially freed from the esophagus.  We paid attention to avoid injury to the esophagus and to the recurrent nerves.  An NG was placed and secured at 55 cm at the nose with a transcolumellar stitch.      We felt that the resection could be performed without a sternal split.  However, we needed hyoid release to allow closure.  Therefore our team exposed the hyoid and released the infrahyoid musculature which provided excellent mobility.         Next, with Dr Estrella, the trachea was entered superiorly and anteriorly.  We placed a second airway circuit distally to manage the airway now.  We had to remove an additional ring superiorly, and two rings inferiorly, to remove the abnormal trachea.  In total, we removed 6 tracheal  rings.  Retention sutures were placed in the inferior tracheal segment.  We turned attention to the tracheal anastomosis.  3-0 Vicryl was used to anastomose the membranous trachea posteriorly, the cartilaginous trachea anteriorly with a combination of stitches which resulted in knots within the airway and knots outside the airway.  To close the anterior trachea, we removed the shoulder roll was removed and the patient was placed in a flexed position.  The original 8.0 ETT was now advanced from the mouth and into the distal trachea, removing the second circuit.  Ventilations were resumed without difficulty.       At this point, the wound was flooded with saline and a small leak was noted at one of the tracheal suture entry points and we decided not to further close this as it was a suture hole.  Next, we positioned the 8.0 endotracheal tube was        positioned with a bronchoscope in order to limit injury to the anastomosis.        Next, a single stitch was placed over the strap musculature anteriorly to cover the thyroid cartilage.  Hemostasis was provided with bipolar electrocautery.  A quarter-inch Penrose drain was placed anterior to the strap musculature with a portion that was overlying the anastomosis centrally.  Platysma was closed with 3-0 Vicryl suture.  The skin was closed with single interrupted 5-0 nylon suture.  Then a stitch was placed through the periosteum of the sternum and as well as periosteum of the mandible anteriorly.        The patient was then turned over to Anesthesia.  The patient left the operating room without complication.  The patient was extubated in the operating room.  Dr. Solis Beaulieu and Dr. Ryan Estrella were present for all portions of this case.         SOLIS BEAULIEU MD       As dictated by JOHN TIRADO MD        ADDENDUM:  I was present with the resident and Dr. Estrella's team during all critical portions of the operation, and immediately available for final skin closure.    I have edited Dr. Dinh's note to reflect my perspective on operative findings and flow.        Nick Beaulieu MD  Otolaryngology/Head & Neck Surgery  [phone]                D: 2019   T: 2019   MT: JUSTIN      Name:     ANA GARCIA   MRN:      [id number]        Account:        [id number]   :      1970           Procedure Date: 2019      Document: [id number]

## 2019-03-12 NOTE — PROGRESS NOTES
Miss Song is POD1 status post tracheal resection. She looks great and is feeling terrific. The wond looks healthy. The chin to chest stretch is stable and helps to remind her not to extend her neck.     We will check with thoracic surgery on whether not to start an oral diet; I am OK if Dr Estrella agrees.  We will aviod IV steroids during the healing period.  Continue wean O2 and trasfner to floor when stable.

## 2019-03-12 NOTE — PROGRESS NOTES
"CLINICAL NUTRITION SERVICES - ASSESSMENT NOTE     Nutrition Prescription    Malnutrition Status:    Does not meet criteria     Recommendations already ordered by Registered Dietitian (RD):  -Adv rate by 10 ml q4h to goal of Isosource 1.5 @ 35 ml/hr (840 ml/day) = 1260 kcals, 57 g PRO, 638 ml free H2O, 148 g CHO, 13 g Fiber  -Prosource, 2 pkts TID (240 kcal, 66 g PRO) for total of 1500 kcal/day (14 kcal/kg actual wt) and 123 g PRO (2.1 g/kg IBW) daily.   -Certavite daily (15 ml/day via FT)   -Discontinued Prostat (protein modular), 1 pkt daily        REASON FOR ASSESSMENT  Bibi Song is a 48 year old female assessed by dietitian for Provider Order - RD to Assess and Order TF per Medical Nutrition Therapy Protocol    S/p tracheal resection (3/11)     NUTRITION HISTORY  Pt eating at baseline prior to admit. Of note, pt reports changing diet/lifestyle to promote weight loss in the past. However, since July (developed tracheal stenosis), she regained majority of the weight she lost (~35 lbs).     CURRENT NUTRITION ORDERS  Diet: NPO post-op  Nutrition support: Isosource 1.5 @ 10 ml/hr via NGT   Intake: Started trophic feeds this morning.      LABS   Labs reviewed    MEDICATIONS  Medications reviewed  Prostat (protein modular), 1 pkt daily     ANTHROPOMETRICS  Height: 166.4 cm (5' 5.5\")  Most Recent Weight: 111.4 kg (245 lb 9.5 oz)    IBW: 57.9 kg  BMI: Obesity Grade III BMI >40  Weight History:   Wt Readings from Last 10 Encounters:   03/11/19 111.4 kg (245 lb 9.5 oz)   02/21/19 110.6 kg (243 lb 12.8 oz)   02/06/19 109.2 kg (240 lb 11.2 oz)   02/05/19 107.9 kg (237 lb 14.4 oz)   02/01/19 106.6 kg (235 lb)   01/31/19 107.5 kg (237 lb)   01/23/19 106.1 kg (234 lb)   12/27/18 104.3 kg (230 lb)   12/06/18 104.3 kg (230 lb)   12/05/18 100.2 kg (221 lb)     Dosing Weight: IBW of 57.9 kg and admit wt of 111.4 kg on 3/11     ASSESSED NUTRITION NEEDS  Estimated Energy Needs: 1221 - 1554 kcals/day (11 - 14 kcals/kg actual wt " of 111 kg)  Justification: Obese, BMI >40   Estimated Protein Needs: 116 - 145 grams protein/day (2 - 2.5 grams of pro/kg IBW of 58 kg)  Justification: Obese, BMI >40   Estimated Fluid Needs: 1 mL/kcal  Justification: Maintenance    PHYSICAL FINDINGS  See malnutrition section below.  NGT @ 55 cm, sutured in place    MALNUTRITION  % Intake: No decreased intake noted  % Weight Loss: None noted  Subcutaneous Fat Loss: None observed  Muscle Loss: None observed  Fluid Accumulation/Edema: None noted  Malnutrition Diagnosis: Patient does not meet two of the above criteria necessary for diagnosing malnutrition    NUTRITION DIAGNOSIS  Inadequate protein-energy intake related to NPO status post-operatively, started on trickle feeds via NGT as evidenced by meeting <30% protein-energy needs.        INTERVENTIONS  Implementation  Nutrition Education - Discussed nutrition regimen and goals of appropriate weight loss on hypocaloric, high protien TF regimen.    Collaboration with other providers - SICU rounds, adv schedule  Enteral Nutrition - Goal of 35 ml/hr + Prosource, 1 pkt TID     Goals  Total avg nutritional intake to meet a minimum of 11 kcal/kg actual wt (111 kg) and 2 g PRO/kg IBW (58 kg) per obesity guidelines     Monitoring/Evaluation  Progress toward goals will be monitored and evaluated per protocol.    Digna Collier, RD, LD  SICU RD Pager: [phone]

## 2019-03-12 NOTE — PROGRESS NOTES
"THORACIC SURGERY PROGRESS NOTE     S : No acute events overnught. Pain well controlled. Denies nausea this AM. No fevers overnight.      O  /75   Pulse 89   Temp 97.8  F (36.6  C) (Oral)   Resp 16   Ht 1.664 m (5' 5.5\")   Wt 111.4 kg (245 lb 9.5 oz)   LMP 03/04/2019 (Exact Date)   SpO2 94%   Breastfeeding? No   BMI 40.25 kg/m         Gen: Alert and oriented  Resp: non labored breathing   Neck: Incisions C/D, Chin to chest suture in place. Penrose drain with scant serosanguineous output       Labs  Hb 11.4 (10.4 yesterday)  Plts 331  Cr 0.71  Lytes WNL  BGs well controlled      A/P   48 year old female with PMHx including obesity, RACIEL, GERD, ulcerative colitis, history of chemical dependency, hypothyroidism, OCD, anxiety and depression who developed recurrent tracheal stenosis after intubation in July 2018, for accidental medication overdose.  S/p tracheal resection via cervical approach on 3/11 with Dr. Estrella and Dr. Beaulieu, EBL <50mL. This was a primary repair with resection of 3.5cm of trachea. She was extubated post-operatively and is admitted to the SICU for airway monitoring     - NPO , schedule zofran TID. Advance TFs to goal via [place]. Bowel regimen   - Humidifed O2 , duonebs q4h  - LR @75, strict Is and Os  - If stridor, call both thoracic surgery and ENT immediately for intubation in OR.  - Will discuss floor transfer with staff   - Lovenox today, SCDs     D/w with thoracic surgery fellow      Angeli Sanchez MD      "

## 2019-03-12 NOTE — PROGRESS NOTES
03/12/19 1100   Quick Adds   Type of Visit Initial Occupational Therapy Evaluation   Living Environment   Lives With child(shan), adult;spouse   Living Arrangements house   Home Accessibility stairs to enter home;stairs within home   Number of Stairs, Main Entrance one   Number of Stairs, Within Home, Primary eight   Stair Railings, Within Home, Primary (yes)   Transportation Anticipated family or friend will provide   Living Environment Comment Pt reports living in a split level home, one step to main entry then must go up or down 8 stairs to enter living spaces.     Self-Care   Usual Activity Tolerance moderate   Current Activity Tolerance fair   Regular Exercise No   Equipment Currently Used at Home none   Activity/Exercise/Self-Care Comment Pt reports that prior to January 2019 when her breathing worsened, she was independent with mobility and self cares.  Pt was working at [employer] where she was on her feet much of the day.  States since January she has progressive decrease in activity tolerance, has quit her job and required increased assistance with daily cares.  Pt reports SOB with dressing, bathing, in-home mobilization prior to admission as primary limiting factor.  Pt reports having a new puppy at home and her goal is to be able to tolerate taking the dog for a walk   Functional Level   Ambulation 0-->independent   Transferring 0-->independent   Toileting 0-->independent   Bathing 2-->assistive person   Dressing 2-->assistive person   Eating 0-->independent   Cognition 0 - no cognition issues reported   Fall history within last six months no   Which of the above functional risks had a recent onset or change? ambulation;transferring       Present no   Language english   General Information   Onset of Illness/Injury or Date of Surgery - Date 03/11/19   Referring Physician Yue Wilcox MD   Patient/Family Goals Statement Be able to exercise by walking the dog to be able to lose  "weight   Additional Occupational Profile Info/Pertinent History of Current Problem Bibi Song is a 48 year old female with a past medical history of a 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 She has been minimally responsive to endobronchial balloon dilations, and is now POD#1 s/p tracheal resection via cervical approach and hyoid release   Precautions/Limitations (neck ROM restrictions)   General Observations Pt pleasant and agreeable; chin to chest suture in place, on 3-4L O2 via nc   General Info Comments Activity: ambulate with assist   Cognitive Status Examination   Orientation orientation to person, place and time   Level of Consciousness alert   Follows Commands (Cognition) WNL   Memory intact   Attention No deficits were identified   Cognitive Comment no acute cognitive concerns noted   Visual Perception   Visual Perception Comments pt denies acute visual changes   Sensory Examination   Sensory Comments intact to light touch in all extremities   Pain Assessment   Patient Currently in Pain (yes; incisional/neck pain)   Range of Motion (ROM)   ROM Comment BUE/BLE WFL   Strength   Strength Comments BLE grossly 5/5;  BUE not formally assessed 2/2 concern for strain on surgical site though pt demonstrates good functional strength in BUE (at least 3/5)   Mobility   Bed Mobility Bed mobility skill: Sit to supine   Bed Mobility Skill: Sit to Supine   Level of [place]: Sit/Supine stand-by assist   Transfer Skill: Bed to Chair/Chair to Bed   Level of [place]: Bed to Chair contact guard   Transfer Skill: Sit to Stand   Level of [place]: Sit/Stand contact guard   Balance   Balance Comments no overt LOB with functional ambulation and transfers though pt c/o feeling \"Lightheaded and loopy\" which she attributes to pain medication, benefits from UE support on IV pole   Lower Body Dressing   Level " of [place]: Dress Lower Body moderate assist (50% patients effort)   Eating/Self Feeding   Level of [place]: Eating (currently NPO)   Instrumental Activities of Daily Living (IADL)   IADL Comments family able to assist with IADLs; pt was independent with IADLs prior to January though has required assist over the last couple of months   Activities of Daily Living Analysis   Impairments Contributing to Impaired Activities of Daily Living pain;post surgical precautions;strength decreased   ADL Comments decreased activity tolerance   General Therapy Interventions   Planned Therapy Interventions ADL retraining;IADL retraining;bed mobility training;transfer training;home program guidelines   Clinical Impression   Criteria for Skilled Therapeutic Interventions Met yes, treatment indicated   OT Diagnosis decreased activity tolerance and independence with ADLs   Influenced by the following impairments pain, post surgical precautions, decreased activity tolerance, acute medical needs   Assessment of Occupational Performance 5 or more Performance Deficits   Identified Performance Deficits bed mobility, dressing, bathing, toileting, home management, mobility   Clinical Decision Making (Complexity) Low complexity   Therapy Frequency 5 times/wk   Predicted Duration of Therapy Intervention (days/wks) 3/18/19   Anticipated Discharge Disposition Home with Assist   Risks and Benefits of Treatment have been explained. Yes   Patient, Family & other staff in agreement with plan of care Yes   Total Evaluation Time   Total Evaluation Time (Minutes) 5

## 2019-03-12 NOTE — PROGRESS NOTES
SURGICAL ICU PROGRESS NOTE  March 12, 2019      CO-MORBIDITIES:   No diagnosis found.    ASSESSMENT: Bibi Song is a 48 year old female with PMHx including obesity, RACIEL, GERD, ulcerative colitis, history of chemical dependency, hypothyroidism, OCD, anxiety and depression who developed recurrent tracheal stenosis after intubation in July 2018, for accidental medication overdose. She has undergone multiple dilatations, but continues to have symptoms of decreased exercise tolerance and dyspnea with recurrence of stenosis. CT of neck on 8/29/18 showed circumferential thickening of the subglottic trachea with moderate stenosis 2.7-3cm in length. She underwent tracheal resection via cervical approach on 3/11 with Dr. Estrella and Dr. Beaulieu, EBL <50mL. This was a primary repair with resection of 3.5cm of trachea. She was extubated post-operatively and is admitted to the SICU for airway monitoring.      CHANGES FOR TODAY:  - PCA per thoracic  - Advance TF from trickle, 10mL/hr Q4H to goal  - Keep strict NPO, no ice chips  - Reduce MIVF as TF advanced   - Starting Lovenox today  - Continue scheduled Zofran (3 doses ordered, re-eval as needed)  - D/w thoracic re: duration of Unasyn  - Remove art line  - Possible transfer to floor    PLAN:  Neurological:  #Post-operative pain  #Hx of chemical dependency  #Depression  #Anxiety  #OCD  - Monitor neurological status. Notify the MD for any acute changes in exam.  - Pain: Scheduled Tylenol 975mg Q8H. Dilaudid PCA. Hold narcotics while on PCA (PO oxycodone 5-10mg Q3H PRN, IV dilaudid 0.3-0.5mg Q2H PRN)  - PTA wellbutrin, zoloft, ziprasidone, melatonin. PTA gabapentin 300mg TID PRN.      HEENT:  #Tracheal stenosis s/p tracheal resection via cervical approach  - Chin sutured to chest  - Dressing change Qshift starting POD1  - Okay for OOB  - HOB >30  - Humidified O2  - Duonebs Q4H  - Scheduled Zofran 8mg TID  - Monitor drain output  - If stridor, call both thoracic surgery and ENT  immediately for intubation in OR.     Pulmonary:   #RACIEL  - Supplemental oxygen to keep saturation above 92 %.  - Incentive spirometer every 15- 30 minutes when awake.  - Humidified O2  - Duonebs Q4H  - If stridor, call both thoracic surgery and ENT immediately for intubation in OR.     Cardiovascular:    - Monitor hemodynamic status.   - Hold PTA prazosin     Gastroenterology/Nutrition:  #GERD  #Ulcerative colitis  - Strict NPO, no ice chips or swabs  - Okay for enteral meds  - Okay to start TF via [place], start at 10cc/hr, advance 10cc/hr Q4H to goal of 35 mL/hr  - Nutrition consult placed, appreciate recs.  - Scheduled Zofran 8mg Q6H, scopolamine patch, PRN compazine and reglan  - Protein 1 packet daily  - PTA prilosec  - Bowel regimen: senna-colace, miralax PRN     Fluids/Electrolytes:   - mIVF discontinued     Renal:  - Urine output is adequate. Will continue to monitor intake and output.     Endocrine:  #Hypothyroidism  - Sliding scale to be started if BG>180  -  PTA synthroid     ID/Antibiotics:  - Zandra-operative ppx Unasyn- discuss duration of antibiotics with thoracic     Heme:     #Acute blood loss anemia  - Hgb stable  - Transfuse if hgb <7.0 or signs/symptoms of hypoperfusion. Monitor and trend.      Musculoskeletal:  - Physical and occupational therapy consult   - Activity: OOB  - HOB >30     General Cares/Prophylaxis:    DVT Prophylaxis: Holding DVT ppx due to bleeding risk. Plan to start Lovenox POD1.  GI Prophylaxis: PTA prilosec  Restraints: Restraints for medical healing needed: NO     Lines/ tubes/ drains:  - PIVs, penrose drains x2     Disposition:  - Surgical ICU.      Patient seen, findings and plan discussed with surgical ICU staff, Dr. Stoddard.        Erica Huitron MD  Orthopaedic Surgery Resident, PGY1   Pager: [phone]      ====================================    TODAY'S PROGRESS:   SUBJECTIVE:   No acute events overnight. Pain is tolerable. Not yet passing flatus. Denies CP, SOB,  abdominal pain.       OBJECTIVE:   1. VITAL SIGNS:   Temp:  [96.3  F (35.7  C)-98.8  F (37.1  C)] 97.8  F (36.6  C)  Pulse:  [75-97] 89  Heart Rate:  [] 84  Resp:  [11-20] 16  BP: ()/(45-75) 114/75  MAP:  [70 mmHg-110 mmHg] 70 mmHg  Arterial Line BP: ()/(58-98) 86/61  SpO2:  [91 %-100 %] 94 %  Resp: 16      2. INTAKE/ OUTPUT:   I/O last 3 completed shifts:  In: 2918.5 [I.V.:2888.5; NG/GT:30]  Out: 2875 [Urine:2875]    3. PHYSICAL EXAMINATION:   General: Resting in bed comfortably  Neuro: A&Ox3, NAD  Resp: Breathing non-labored on 3L. NG in place.  CV: RRR  Abdomen: Soft, Non-distended, Non-tender  Incisions: Neck incision c/d/i, penrose drain b/l neck. Chin to chest suture.  Extremities: warm and well perfused, DP pulses 2+ b/l    4. INVESTIGATIONS:   Arterial Blood Gases   Recent Labs   Lab 03/11/19  1053   PH 7.34*   PCO2 41   PO2 73*   HCO3 22     Complete Blood Count   Recent Labs   Lab 03/12/19  0557 03/11/19  1643 03/11/19  1053   WBC 12.8* 16.9*  --    HGB 11.4* 10.4* 12.2    280  --      Basic Metabolic Panel  Recent Labs   Lab 03/12/19  0557 03/11/19  1643 03/11/19  1053    142 142   POTASSIUM 4.2 4.1 3.5   CHLORIDE 107 110*  --    CO2 25 24  --    BUN 16 14  --    CR 0.71 0.64  --    * 122* 109*     Liver Function Tests  No lab results found in last 7 days.  Pancreatic Enzymes  No lab results found in last 7 days.  Coagulation Profile  No lab results found in last 7 days.  Lactate  Invalid input(s): LACTATE    5. RADIOLOGY:   Recent Results (from the past 24 hour(s))   XR Chest Port 1 View   Result Value    Radiologist flags Questionable left pneumothorax. (Urgent)    Narrative    EXAM: XR CHEST PORT 1 VW  3/11/2019 2:25 PM     HISTORY:  s/p tracheal resection       COMPARISON:  8/25/2018    FINDINGS: Supine AP radiograph of the chest. Enteric tube tip projects  over the expected location of the stomach. The mediastinal border,  cardiac silhouette, and pulmonary  vasculature are within normal  limits. No focal airspace opacities. Questionable lucencies over the  left thorax. No pleural effusions.    Multiple nodular radiopacities at the superior margin of the film  favored to represent findings external to the patient.      Impression    IMPRESSION:   1. Lucencies of the left hemithorax. Question pneumothorax. Recommend  follow-up chest radiograph.  2. No focal airspace opacities.    [Urgent Result: Questionable left pneumothorax.]    Finding was identified on 3/11/2019 2:44 PM.     Dr. Huitron was contacted by Dr. Coon at 3/11/2019 4:58 PM and verbalized  understanding of the urgent finding.         =========================================

## 2019-03-12 NOTE — PLAN OF CARE
Neuro: AxOx4. PERRL. Follows commands.    Cardiac: Afebrile. NSR 80s-90s. Normotensive.    Resp: 94-97% on 3L NC humidified.    GI: NG in place @55cm.    : adequate UOP. Voids using bedpan.    *Right foot a-line is reading BP accurately but does not draw back for labs.

## 2019-03-12 NOTE — PLAN OF CARE
OT 4A: Evaluation complete and treatment initiated.  Discharge Planner OT   Patient plan for discharge: Home with family assist (pt reports  has 1 week off work and adult children will be intermittently available to assist as well)  Current status: Pt is progressing well post op.  Pt SBA-CGA for bed mobility and functional transfers, tolerates ambulating approx 175ft with CGA and 1-2 UE support on IV pole.  VSS with sats 92% on 4L O2 via nc.  C/o fatigue and mild lightheadedness (possibly related to pain medications)  Barriers to return to prior living situation: acute medical needs, pain, decreased activity tolerance  Recommendations for discharge: Pending safe stair completion, anticipate pt will be able to discharge to home with family assist once medically appropriate  Rationale for recommendations: Pt is currently below baseline with regards to mobility and independence with self cares; however, anticipate with ongoing therapy intervention while inpatient, pt will be able to safely discharge to home.         Entered by: Phoebe Rodriguez 03/12/2019 1:26 PM

## 2019-03-12 NOTE — PROGRESS NOTES
"Otolaryngology Progress Note  March 12, 2019    S: No acute events overnight. No respiratory issues, stable on 3-4L by NC. Patient reporting she is thirsty this morning.     O: /75   Pulse 89   Temp 97.8  F (36.6  C) (Oral)   Resp 16   Ht 1.664 m (5' 5.5\")   Wt 111.4 kg (245 lb 9.5 oz)   LMP 03/04/2019 (Exact Date)   SpO2 94%   Breastfeeding? No   BMI 40.25 kg/m     General: Alert and oriented x 3, No acute distress   HEENT: EOMI. NG tube sutured in place. Neck incision c/d/i. Neck soft, no crepitus. Chin to chest suture in place. Penrose drain in right neck with minimal serosanguinous output. Voice is strong.    Pulmonary: Breathing non-labored on 4L by NC, no stridor, no accessory muscle use.      Intake/Output Summary (Last 24 hours) at 3/12/2019 0904  Last data filed at 3/12/2019 0700  Gross per 24 hour   Intake 2918.5 ml   Output 3175 ml   Net -256.5 ml       LABS:  ROUTINE IP LABS (Last four results)  BMP  Recent Labs   Lab 03/12/19  0557 03/11/19  1643 03/11/19  1053    142 142   POTASSIUM 4.2 4.1 3.5   CHLORIDE 107 110*  --    JANICE 8.4* 8.2*  --    CO2 25 24  --    BUN 16 14  --    CR 0.71 0.64  --    * 122* 109*     CBC  Recent Labs   Lab 03/12/19  0557 03/11/19  1643 03/11/19  1053   WBC 12.8* 16.9*  --    RBC 3.86 3.50*  --    HGB 11.4* 10.4* 12.2   HCT 35.8 32.4*  --    MCV 93 93  --    MCH 29.5 29.7  --    MCHC 31.8 32.1  --    RDW 14.3 13.9  --     280  --      INRNo lab results found in last 7 days.    Imaging:  CXR 3/11  IMPRESSION:   1. Lucencies of the left hemithorax. Question pneumothorax. Recommend  follow-up chest radiograph.  2. No focal airspace opacities.    A/P: Bibi Song is a 48 year old female with a past medical history of a 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 She has been minimally responsive to endobronchial balloon dilations, and is " now POD#1 s/p tracheal resection via cervical approach and hyoid release.     Neuro:  - Pain control: dilaudid PCA  - PTA sertraline, ziprasidone, wellbutrin     HEENT:  - Incision cares: clean with 0.9% sodium chloride and apply Aquaphor Q8H   - Chin suture to remain intact x 7 days  - Keep penrose in right neck x 3-5 days     Respiratory:  - supplemental O2 PRN to keep sats >92%  - CXR post-op concerning for ?pneumothorax, asymptomatic, repeat CXR ordered today    CV/heme:  - hemodynamically stable    FEN/GI:  - Currently NPO. Will have SLP evaluate today with bedside swallow and ADAT afterward.  - Recommend Nutrition consult to start TFs to maximize nutrition   - Protein modular packet  - Bowel regimen: pericolace BID PRN, miralax PRN   - omeprazole  - scopolamine patch for nausea overnigiht    /Fluids/Lytes:  - voiding independently  - mIVF LR @ 75 mL/h    Endo  - Hx hypothryoidism: levothyroxine 75 mcg    ID:  - IV unasyn for perioperative prophylaxis   - elevated WBC, received steroids intra-op, will trend.     PPX:  - Lovenox 40mg daily   - SCDs  - IS    Dispo: SICU for close airway monitoring. Will defer to thoracic primary.    -- Patient and above plan discussed with TYREE Recio-C  Otolaryngology-Head & Neck Surgery  Please contact ENT with questions by dialing [phone] and entering job code [phone] when prompted.

## 2019-03-13 ENCOUNTER — APPOINTMENT (OUTPATIENT)
Dept: GENERAL RADIOLOGY | Facility: CLINIC | Age: 49
End: 2019-03-13
Attending: OTOLARYNGOLOGY
Payer: COMMERCIAL

## 2019-03-13 LAB
ANION GAP SERPL CALCULATED.3IONS-SCNC: 9 MMOL/L (ref 3–14)
BUN SERPL-MCNC: 24 MG/DL (ref 7–30)
CA-I BLD-MCNC: 4.5 MG/DL (ref 4.4–5.2)
CALCIUM SERPL-MCNC: 8.4 MG/DL (ref 8.5–10.1)
CHLORIDE SERPL-SCNC: 108 MMOL/L (ref 94–109)
CO2 SERPL-SCNC: 26 MMOL/L (ref 20–32)
CREAT SERPL-MCNC: 0.68 MG/DL (ref 0.52–1.04)
ERYTHROCYTE [DISTWIDTH] IN BLOOD BY AUTOMATED COUNT: 13.8 % (ref 10–15)
ERYTHROCYTE [DISTWIDTH] IN BLOOD BY AUTOMATED COUNT: 14.4 % (ref 10–15)
GFR SERPL CREATININE-BSD FRML MDRD: >90 ML/MIN/{1.73_M2}
GLUCOSE BLDC GLUCOMTR-MCNC: 103 MG/DL (ref 70–99)
GLUCOSE BLDC GLUCOMTR-MCNC: 128 MG/DL (ref 70–99)
GLUCOSE BLDC GLUCOMTR-MCNC: 89 MG/DL (ref 70–99)
GLUCOSE SERPL-MCNC: 97 MG/DL (ref 70–99)
HCT VFR BLD AUTO: 31.7 % (ref 35–47)
HCT VFR BLD AUTO: 33.6 % (ref 35–47)
HGB BLD-MCNC: 10.6 G/DL (ref 11.7–15.7)
HGB BLD-MCNC: 9.8 G/DL (ref 11.7–15.7)
MAGNESIUM SERPL-MCNC: 2.2 MG/DL (ref 1.6–2.3)
MCH RBC QN AUTO: 29.5 PG (ref 26.5–33)
MCH RBC QN AUTO: 29.7 PG (ref 26.5–33)
MCHC RBC AUTO-ENTMCNC: 30.9 G/DL (ref 31.5–36.5)
MCHC RBC AUTO-ENTMCNC: 31.5 G/DL (ref 31.5–36.5)
MCV RBC AUTO: 94 FL (ref 78–100)
MCV RBC AUTO: 96 FL (ref 78–100)
PHOSPHATE SERPL-MCNC: 2.8 MG/DL (ref 2.5–4.5)
PLATELET # BLD AUTO: 266 10E9/L (ref 150–450)
PLATELET # BLD AUTO: 275 10E9/L (ref 150–450)
POTASSIUM SERPL-SCNC: 3.7 MMOL/L (ref 3.4–5.3)
RBC # BLD AUTO: 3.32 10E12/L (ref 3.8–5.2)
RBC # BLD AUTO: 3.57 10E12/L (ref 3.8–5.2)
SODIUM SERPL-SCNC: 143 MMOL/L (ref 133–144)
WBC # BLD AUTO: 8.7 10E9/L (ref 4–11)
WBC # BLD AUTO: 8.8 10E9/L (ref 4–11)

## 2019-03-13 PROCEDURE — 25800030 ZZH RX IP 258 OP 636: Performed by: PHYSICIAN ASSISTANT

## 2019-03-13 PROCEDURE — 36415 COLL VENOUS BLD VENIPUNCTURE: CPT | Performed by: SURGERY

## 2019-03-13 PROCEDURE — 25000132 ZZH RX MED GY IP 250 OP 250 PS 637: Performed by: PHYSICIAN ASSISTANT

## 2019-03-13 PROCEDURE — 00000146 ZZHCL STATISTIC GLUCOSE BY METER IP

## 2019-03-13 PROCEDURE — 85027 COMPLETE CBC AUTOMATED: CPT | Performed by: STUDENT IN AN ORGANIZED HEALTH CARE EDUCATION/TRAINING PROGRAM

## 2019-03-13 PROCEDURE — 25000132 ZZH RX MED GY IP 250 OP 250 PS 637: Performed by: STUDENT IN AN ORGANIZED HEALTH CARE EDUCATION/TRAINING PROGRAM

## 2019-03-13 PROCEDURE — 27210429 ZZH NUTRITION PRODUCT INTERMEDIATE LITER

## 2019-03-13 PROCEDURE — 25000128 H RX IP 250 OP 636: Performed by: STUDENT IN AN ORGANIZED HEALTH CARE EDUCATION/TRAINING PROGRAM

## 2019-03-13 PROCEDURE — 82330 ASSAY OF CALCIUM: CPT | Performed by: THORACIC SURGERY (CARDIOTHORACIC VASCULAR SURGERY)

## 2019-03-13 PROCEDURE — 84100 ASSAY OF PHOSPHORUS: CPT | Performed by: SURGERY

## 2019-03-13 PROCEDURE — 40000141 ZZH STATISTIC PERIPHERAL IV START W/O US GUIDANCE

## 2019-03-13 PROCEDURE — 80048 BASIC METABOLIC PNL TOTAL CA: CPT | Performed by: SURGERY

## 2019-03-13 PROCEDURE — 36415 COLL VENOUS BLD VENIPUNCTURE: CPT | Performed by: STUDENT IN AN ORGANIZED HEALTH CARE EDUCATION/TRAINING PROGRAM

## 2019-03-13 PROCEDURE — 85027 COMPLETE CBC AUTOMATED: CPT | Performed by: SURGERY

## 2019-03-13 PROCEDURE — 25800030 ZZH RX IP 258 OP 636: Performed by: STUDENT IN AN ORGANIZED HEALTH CARE EDUCATION/TRAINING PROGRAM

## 2019-03-13 PROCEDURE — 12000001 ZZH R&B MED SURG/OB UMMC

## 2019-03-13 PROCEDURE — 83735 ASSAY OF MAGNESIUM: CPT | Performed by: SURGERY

## 2019-03-13 PROCEDURE — 74018 RADEX ABDOMEN 1 VIEW: CPT

## 2019-03-13 RX ORDER — BISACODYL 10 MG
10 SUPPOSITORY, RECTAL RECTAL DAILY PRN
Status: DISCONTINUED | OUTPATIENT
Start: 2019-03-13 | End: 2019-03-19 | Stop reason: HOSPADM

## 2019-03-13 RX ORDER — POLYETHYLENE GLYCOL 3350 17 G/17G
17 POWDER, FOR SOLUTION ORAL DAILY
Status: DISCONTINUED | OUTPATIENT
Start: 2019-03-13 | End: 2019-03-16

## 2019-03-13 RX ORDER — AMOXICILLIN 250 MG
2 CAPSULE ORAL 2 TIMES DAILY
Status: DISCONTINUED | OUTPATIENT
Start: 2019-03-13 | End: 2019-03-14

## 2019-03-13 RX ORDER — DEXTROSE MONOHYDRATE, SODIUM CHLORIDE, AND POTASSIUM CHLORIDE 50; 1.49; 4.5 G/1000ML; G/1000ML; G/1000ML
INJECTION, SOLUTION INTRAVENOUS CONTINUOUS
Status: DISCONTINUED | OUTPATIENT
Start: 2019-03-13 | End: 2019-03-19 | Stop reason: HOSPADM

## 2019-03-13 RX ADMIN — SENNOSIDES AND DOCUSATE SODIUM 2 TABLET: 8.6; 5 TABLET ORAL at 20:15

## 2019-03-13 RX ADMIN — OXYCODONE HYDROCHLORIDE 5 MG: 5 SOLUTION ORAL at 06:33

## 2019-03-13 RX ADMIN — Medication 50 MG: at 14:11

## 2019-03-13 RX ADMIN — GABAPENTIN 300 MG: 300 CAPSULE ORAL at 17:44

## 2019-03-13 RX ADMIN — Medication 2 PACKET: at 14:13

## 2019-03-13 RX ADMIN — MULTIVITAMIN 15 ML: LIQUID ORAL at 11:43

## 2019-03-13 RX ADMIN — WHITE PETROLATUM: 1.75 OINTMENT TOPICAL at 11:44

## 2019-03-13 RX ADMIN — METOCLOPRAMIDE HYDROCHLORIDE 10 MG: 5 TABLET ORAL at 10:51

## 2019-03-13 RX ADMIN — OXYCODONE HYDROCHLORIDE 5 MG: 5 SOLUTION ORAL at 18:22

## 2019-03-13 RX ADMIN — WHITE PETROLATUM: 1.75 OINTMENT TOPICAL at 17:32

## 2019-03-13 RX ADMIN — ZIPRASIDONE HYDROCHLORIDE 40 MG: 40 CAPSULE ORAL at 20:15

## 2019-03-13 RX ADMIN — GABAPENTIN 300 MG: 300 CAPSULE ORAL at 08:29

## 2019-03-13 RX ADMIN — PROCHLORPERAZINE EDISYLATE 10 MG: 5 INJECTION INTRAMUSCULAR; INTRAVENOUS at 08:05

## 2019-03-13 RX ADMIN — SENNOSIDES AND DOCUSATE SODIUM 2 TABLET: 8.6; 5 TABLET ORAL at 11:44

## 2019-03-13 RX ADMIN — AMPICILLIN SODIUM AND SULBACTAM SODIUM 3 G: 2; 1 INJECTION, POWDER, FOR SOLUTION INTRAMUSCULAR; INTRAVENOUS at 23:12

## 2019-03-13 RX ADMIN — ZIPRASIDONE HYDROCHLORIDE 40 MG: 40 CAPSULE ORAL at 08:14

## 2019-03-13 RX ADMIN — AMPICILLIN SODIUM AND SULBACTAM SODIUM 3 G: 2; 1 INJECTION, POWDER, FOR SOLUTION INTRAMUSCULAR; INTRAVENOUS at 01:03

## 2019-03-13 RX ADMIN — ACETAMINOPHEN 650 MG: 325 TABLET, FILM COATED ORAL at 22:30

## 2019-03-13 RX ADMIN — SERTRALINE HYDROCHLORIDE 200 MG: 100 TABLET ORAL at 08:14

## 2019-03-13 RX ADMIN — ONDANSETRON 4 MG: 2 INJECTION INTRAMUSCULAR; INTRAVENOUS at 06:33

## 2019-03-13 RX ADMIN — POLYETHYLENE GLYCOL 3350 17 G: 17 POWDER, FOR SOLUTION ORAL at 20:15

## 2019-03-13 RX ADMIN — Medication 50 MG: at 20:15

## 2019-03-13 RX ADMIN — Medication 50 MG: at 08:13

## 2019-03-13 RX ADMIN — ONDANSETRON 8 MG: 2 INJECTION INTRAMUSCULAR; INTRAVENOUS at 01:04

## 2019-03-13 RX ADMIN — Medication 2 PACKET: at 22:31

## 2019-03-13 RX ADMIN — AMPICILLIN SODIUM AND SULBACTAM SODIUM 3 G: 2; 1 INJECTION, POWDER, FOR SOLUTION INTRAMUSCULAR; INTRAVENOUS at 08:06

## 2019-03-13 RX ADMIN — LEVOTHYROXINE SODIUM 75 MCG: 75 TABLET ORAL at 08:13

## 2019-03-13 RX ADMIN — SODIUM CHLORIDE, POTASSIUM CHLORIDE, SODIUM LACTATE AND CALCIUM CHLORIDE 500 ML: 600; 310; 30; 20 INJECTION, SOLUTION INTRAVENOUS at 09:59

## 2019-03-13 RX ADMIN — ONDANSETRON 4 MG: 2 INJECTION INTRAMUSCULAR; INTRAVENOUS at 16:16

## 2019-03-13 RX ADMIN — ENOXAPARIN SODIUM 40 MG: 40 INJECTION SUBCUTANEOUS at 08:14

## 2019-03-13 RX ADMIN — DOCUSATE SODIUM 286 ML: 50 LIQUID ORAL at 14:06

## 2019-03-13 RX ADMIN — WHITE PETROLATUM: 1.75 OINTMENT TOPICAL at 01:04

## 2019-03-13 RX ADMIN — POTASSIUM CHLORIDE, DEXTROSE MONOHYDRATE AND SODIUM CHLORIDE: 150; 5; 450 INJECTION, SOLUTION INTRAVENOUS at 14:05

## 2019-03-13 RX ADMIN — Medication 2 PACKET: at 08:32

## 2019-03-13 RX ADMIN — OXYCODONE HYDROCHLORIDE 5 MG: 5 SOLUTION ORAL at 20:15

## 2019-03-13 RX ADMIN — PROCHLORPERAZINE EDISYLATE 10 MG: 5 INJECTION INTRAMUSCULAR; INTRAVENOUS at 18:18

## 2019-03-13 RX ADMIN — AMPICILLIN SODIUM AND SULBACTAM SODIUM 3 G: 2; 1 INJECTION, POWDER, FOR SOLUTION INTRAMUSCULAR; INTRAVENOUS at 17:27

## 2019-03-13 RX ADMIN — Medication 10 MG: at 20:15

## 2019-03-13 ASSESSMENT — ACTIVITIES OF DAILY LIVING (ADL)
ADLS_ACUITY_SCORE: 16

## 2019-03-13 ASSESSMENT — MIFFLIN-ST. JEOR: SCORE: 1763.26

## 2019-03-13 NOTE — PLAN OF CARE
"/68 (BP Location: Left arm)   Pulse 68   Temp 98  F (36.7  C) (Oral)   Resp 18   Ht 1.664 m (5' 5.5\")   Wt 111.4 kg (245 lb 9.5 oz)   LMP 03/04/2019 (Exact Date)   SpO2 92%   Breastfeeding? No   BMI 40.25 kg/m      Pt arrived to [facility] from  around 2200. All belongings and meds accounted for. Pt transferred via wheelchair. A/Ox4. VSS on 2L oxygen via NC. Up with SBA. TF going through NG at 20 mL/hr. Advancing slower than ordered as pt is having intermittent nausea. Goal is 35 mL/hr. Neck incision with scant serosanguinous drainage. Cleaned with normal saline and new gauze applied. Plan to care for incision with aquaphor at midnight. Penrose drain x2. Pain managed with dilaudid PCA at 0.2mg q10min. Pt stated she voided prior to transfer to floor. No BM but passing gas. Strict NPO. Pt is eager to drink/eat. Pt calls appropriately and is able to make needs known. Continue to monitor and notify Thoracic team with any changes or concerns.   "

## 2019-03-13 NOTE — PROGRESS NOTES
"Otolaryngology Progress Note  March 13, 2019    Overnight events: No acute issues overnight. Patient was transferred to floor from ICU     Subjective: Patient reports that she is doing well. Pain is well controlled. Has intermittent nausea. No BM. She is requesting water to drink    O: /65 (BP Location: Left arm)   Pulse 68   Temp 96.5  F (35.8  C) (Oral)   Resp 18   Ht 1.664 m (5' 5.5\")   Wt 111.4 kg (245 lb 9.5 oz)   LMP 03/04/2019 (Exact Date)   SpO2 93%   Breastfeeding? No   BMI 40.25 kg/m    General: Well appearing woman lying calmly in bed. She is alert and oriented x 3  HEENT: NG tube sutured in place. Neck incision is intact. Small amount of serosang drainage around neck. Penrose is intact. There is increased ecchymosis to neck and submental area with no fluctuance or crepitus. No evidence of hematoma. Chin to chest suture in place and neck is  Flexed. No chest crepitus. Voice is strong and clear.   Pulmonary: Respiration is regular and non-labored. no stridor, no accessory muscle use.        Intake/Output Summary (Last 24 hours) at 3/13/2019 0833  Last data filed at 3/13/2019 0700  Gross per 24 hour   Intake 1250 ml   Output 250 ml   Net 1000 ml       LABS:  ROUTINE IP LABS (Last four results)  BMP  Recent Labs   Lab 03/13/19  0546 03/12/19  0557 03/11/19  1643 03/11/19  1053    142 142 142   POTASSIUM 3.7 4.2 4.1 3.5   CHLORIDE 108 107 110*  --    JANICE 8.4* 8.4* 8.2*  --    CO2 26 25 24  --    BUN 24 16 14  --    CR 0.68 0.71 0.64  --    GLC 97 107* 122* 109*     CBC  Recent Labs   Lab 03/13/19  0546 03/12/19  0557 03/11/19  1643 03/11/19  1053   WBC 8.8 12.8* 16.9*  --    RBC 3.32* 3.86 3.50*  --    HGB 9.8* 11.4* 10.4* 12.2   HCT 31.7* 35.8 32.4*  --    MCV 96 93 93  --    MCH 29.5 29.5 29.7  --    MCHC 30.9* 31.8 32.1  --    RDW 14.4 14.3 13.9  --     331 280  --      INRNo lab results found in last 7 days.    Imaging:  CXR 3/11  IMPRESSION:   1. Lucencies of the left " hemithorax. Question pneumothorax. Recommend  follow-up chest radiograph.  2. No focal airspace opacities.    A/P: Bibi Song is a 48 year old female with a past medical history of a 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 She has been minimally responsive to endobronchial balloon dilations, and is now POD#2 s/p tracheal resection via cervical approach and hyoid release.     Neuro:  - Pain control: dilaudid PCA- per thoracic surgery  - PTA sertraline, ziprasidone, wellbutrin     HEENT:  - Incision cares: clean with 0.9% sodium chloride and apply Aquaphor Q8H   - Chin suture to remain intact x 7 days  - Keep penrose in right neck x 3-5 days     Respiratory:  - supplemental O2 PRN to keep sats >92%    CV/heme:  - hemodynamically stable    FEN/GI:  - Currently NPO.   - Diet per thoracic surgery. Will recommend speech pathologist evaluation prior to initiating diet  - Nutritionist following  - Currently on tube feed- advancing to goal  - Protein modular packet  - Bowel regimen: pericolace BID PRN, miralax PRN   - omeprazole  - Antiemetic PRN    /Fluids/Lytes:  - voiding independently  - mIVF per thoracic surgery    Endo  - Hx hypothryoidism: levothyroxine 75 mcg    ID:  - Will recommed at least 48 hours of post-op IV unasyn for perioperative prophylaxis   - Leukocytosis resolved.     PPX:  - Lovenox 40mg daily   - SCDs  - IS    Dispo: . Will defer to thoracic primary.    -- Patient and above plan will be discussed with Dr. Christofer Li MD PGY-4  Otolaryngology- Head and Neck Surgery  Pager: [phone]  Please contact ENT with questions by dialing [phone] and entering job code [phone] when prompted.

## 2019-03-13 NOTE — PLAN OF CARE
OT/5B: CANCEL - patient politely declining OT this morning due to nausea and dizziness with upright activity. Will reschedule.

## 2019-03-13 NOTE — PROGRESS NOTES
"THORACIC SURGERY PROGRESS NOTE     S : Had an episode of dizziness this AM. Denies nausea or emesis. Slowly advancing TFs via NJ (@30 ml /hr this AM). No fevers or chills . Reports flatus     O  /65 (BP Location: Left arm)   Pulse 68   Temp 96.5  F (35.8  C) (Oral)   Resp 18   Ht 1.664 m (5' 5.5\")   Wt 111.4 kg (245 lb 9.5 oz)   LMP 03/04/2019 (Exact Date)   SpO2 93%   Breastfeeding? No   BMI 40.25 kg/m         Gen: Alert and oriented  Resp: non labored breathing   Neck: Incisions C/D, Chin to chest suture in place. Penrose drain with scant serosanguineous     Labs  WBC 8.8  Hb 9.8 (11.4 yesterday)  Plts 266  Cr 0.68  BUn 24  K 3.7         A/P     48 year old female with PMHx including obesity, RACIEL, GERD, ulcerative colitis, history of chemical dependency, hypothyroidism, OCD, anxiety and depression who developed recurrent tracheal stenosis after intubation in July 2018, for accidental medication overdose.  S/p tracheal resection via cervical approach on 3/11 with Dr. Estrella and Dr. Beaulieu, EBL <50mL. This was a primary repair with resection of 3.5cm of trachea. She was extubated post-operatively and is admitted to the SICU for airway monitoring.      - NPO , schedule zofran TID. Advance TFs to goal via NJ. Bowel regimen . Will discuss SLP consult with Dr. Estrella   - Dilaudid PCA, PRN APAP, gabapentin  - Humidifed O2 , duonebs q4h  - Continue Unasyn  - LR bolus 500 ml this AM, strict Is and Os  - If stridor, call both thoracic surgery and ENT immediately for intubation in OR.  - Recheck CBC this PM given dizzines and Hb drop  - Lovenox , PPI, SCDs     D/w with thoracic surgery fellow          Angeli Sanchez MD      "

## 2019-03-13 NOTE — PROGRESS NOTES
Social Work Services Progress Note    Hospital Day: 3  Date of Initial Social Work Evaluation:    Collaborated with:  Patient, financial counseling    Data:  Bibi is a 48 year old female admitted 3/11/19 for procedure    Intervention:  SW consulted today for insurance questions. [facility] had provided authorization for only 2 days of hospital admission. Patent states she and her  were told by [facility] that the hospital has to initiate an extension request. SW followed up with financial counseling who looked into it and found out that [employer] is pursuing this auth.    Assessment:  UR will handle ins concern    Plan:    Anticipated Disposition:  Home, no needs identified    Barriers to d/c plan:  Medical clearance    Follow Up:  sw available if needed.    Debbie GARDUNO, MSW  5B  (Medical/Surgical)  Phone: [phone]  Pager: [phone]      DC instructions

## 2019-03-13 NOTE — PLAN OF CARE
D/I: Pt is A/Ox4. VSS on 2L oxygen via NC. Up with SBA. TF going through NG at 30 mL/hr. Advancing slower than ordered as pt is having intermittent nausea, thoracic updated about it this morning. Goal is 35 mL/hr. Neck incision with scant serosanguinous drainage, via Penrose drain x2. Cleaned with normal saline, then Aquaphor, and new gauze applied per order at midnight.  Pain managed with dilaudid PCA at 0.2mg q10min. C/o dizziness this morning and increased nausea and pain in the neck this morning. Zofran 4mg and Oxycodone 5mg given. Pt is also on scheduled zofran. No BM but passing gas. Strict NPO. Using yangker independently for oral secretions. Pt is eager to drink/eat. Using call light appropriately and is able to make needs known.   P: Continue to monitor neck for pain and bleeding, follow dressing recommendations, and notify Thoracic team with any changes or concerns and follow plan of care.

## 2019-03-13 NOTE — PLAN OF CARE
Neuro: A/O x4, pain managed well with dilaudid PCA. PRN given gabapentin for anxiety. Ambulated 2x short distance in hallway.   Resp: Weaned to 2L NC. Clear LS, infrequent cough. Using IS.  CV: VSS, afebrile.   GI/: TF started through NG, now at 20cc/hr, advancing less frequently than ordered d/t intermittent nausea. Scheduled zofran effective. Awaiting BM, passing gas. Voids in toilet, low-adequate UO.   Skin: incision approximated, small sanguinous drainage.     Plan: transfer to . Continue plan of care.

## 2019-03-13 NOTE — PROVIDER NOTIFICATION
Provider Sara of thoracic team paged due to pt ongoing nausea, unrelieved by zofran, compazine, regland and use of aromatherapy sticks and sea bands. TF was held per patient request.     Provider ordered pink lady enema and IVF. Will hold TF until after enema .     Will continue to monitor.

## 2019-03-13 NOTE — PLAN OF CARE
Pt VSS on 1-2L NC. Up SBA to bathroom. TF held this afternoon due to frequent ongoing nausea. Pink lady enema given with minimal stool output noted. Senna also given. Continues on PCA, no doses used this shift. Pt requested oxy at 1500. Pt worried dilaudid is causing increased nausea.

## 2019-03-13 NOTE — PLAN OF CARE
PT 4A: Per discussion with OT, patient is mobilizing well post-operatively and anticipate patient will only require one therapy discipline while inpatient to facilitate increased functional independence and safe discharge home. PT will HOLD evaluation at this time, OT will continue to follow and notify PT to initiate as needed.

## 2019-03-14 ENCOUNTER — APPOINTMENT (OUTPATIENT)
Dept: OCCUPATIONAL THERAPY | Facility: CLINIC | Age: 49
End: 2019-03-14
Attending: OTOLARYNGOLOGY
Payer: COMMERCIAL

## 2019-03-14 LAB
ANION GAP SERPL CALCULATED.3IONS-SCNC: 9 MMOL/L (ref 3–14)
BUN SERPL-MCNC: 15 MG/DL (ref 7–30)
CA-I BLD-MCNC: 4.5 MG/DL (ref 4.4–5.2)
CALCIUM SERPL-MCNC: 8.5 MG/DL (ref 8.5–10.1)
CHLORIDE SERPL-SCNC: 107 MMOL/L (ref 94–109)
CO2 SERPL-SCNC: 25 MMOL/L (ref 20–32)
CREAT SERPL-MCNC: 0.58 MG/DL (ref 0.52–1.04)
ERYTHROCYTE [DISTWIDTH] IN BLOOD BY AUTOMATED COUNT: 13.5 % (ref 10–15)
GFR SERPL CREATININE-BSD FRML MDRD: >90 ML/MIN/{1.73_M2}
GLUCOSE BLDC GLUCOMTR-MCNC: 100 MG/DL (ref 70–99)
GLUCOSE BLDC GLUCOMTR-MCNC: 103 MG/DL (ref 70–99)
GLUCOSE BLDC GLUCOMTR-MCNC: 97 MG/DL (ref 70–99)
GLUCOSE SERPL-MCNC: 125 MG/DL (ref 70–99)
HCT VFR BLD AUTO: 32 % (ref 35–47)
HGB BLD-MCNC: 10 G/DL (ref 11.7–15.7)
MAGNESIUM SERPL-MCNC: 2.1 MG/DL (ref 1.6–2.3)
MCH RBC QN AUTO: 29.7 PG (ref 26.5–33)
MCHC RBC AUTO-ENTMCNC: 31.3 G/DL (ref 31.5–36.5)
MCV RBC AUTO: 95 FL (ref 78–100)
PHOSPHATE SERPL-MCNC: 3.1 MG/DL (ref 2.5–4.5)
PLATELET # BLD AUTO: 245 10E9/L (ref 150–450)
POTASSIUM SERPL-SCNC: 3.4 MMOL/L (ref 3.4–5.3)
RBC # BLD AUTO: 3.37 10E12/L (ref 3.8–5.2)
SODIUM SERPL-SCNC: 141 MMOL/L (ref 133–144)
WBC # BLD AUTO: 6.8 10E9/L (ref 4–11)

## 2019-03-14 PROCEDURE — 25000132 ZZH RX MED GY IP 250 OP 250 PS 637: Performed by: STUDENT IN AN ORGANIZED HEALTH CARE EDUCATION/TRAINING PROGRAM

## 2019-03-14 PROCEDURE — 40000141 ZZH STATISTIC PERIPHERAL IV START W/O US GUIDANCE

## 2019-03-14 PROCEDURE — 85027 COMPLETE CBC AUTOMATED: CPT | Performed by: SURGERY

## 2019-03-14 PROCEDURE — 80048 BASIC METABOLIC PNL TOTAL CA: CPT | Performed by: SURGERY

## 2019-03-14 PROCEDURE — 25000128 H RX IP 250 OP 636: Performed by: STUDENT IN AN ORGANIZED HEALTH CARE EDUCATION/TRAINING PROGRAM

## 2019-03-14 PROCEDURE — 12000001 ZZH R&B MED SURG/OB UMMC

## 2019-03-14 PROCEDURE — 97535 SELF CARE MNGMENT TRAINING: CPT | Mod: GO

## 2019-03-14 PROCEDURE — 82330 ASSAY OF CALCIUM: CPT | Performed by: THORACIC SURGERY (CARDIOTHORACIC VASCULAR SURGERY)

## 2019-03-14 PROCEDURE — 00000146 ZZHCL STATISTIC GLUCOSE BY METER IP

## 2019-03-14 PROCEDURE — 97110 THERAPEUTIC EXERCISES: CPT | Mod: GO

## 2019-03-14 PROCEDURE — 84100 ASSAY OF PHOSPHORUS: CPT | Performed by: SURGERY

## 2019-03-14 PROCEDURE — 83735 ASSAY OF MAGNESIUM: CPT | Performed by: SURGERY

## 2019-03-14 PROCEDURE — 25000132 ZZH RX MED GY IP 250 OP 250 PS 637: Performed by: PHYSICIAN ASSISTANT

## 2019-03-14 PROCEDURE — 27210429 ZZH NUTRITION PRODUCT INTERMEDIATE LITER

## 2019-03-14 PROCEDURE — 36415 COLL VENOUS BLD VENIPUNCTURE: CPT | Performed by: SURGERY

## 2019-03-14 RX ORDER — POTASSIUM CHLORIDE 7.45 MG/ML
10 INJECTION INTRAVENOUS
Status: DISCONTINUED | OUTPATIENT
Start: 2019-03-14 | End: 2019-03-19 | Stop reason: HOSPADM

## 2019-03-14 RX ORDER — POTASSIUM CL/LIDO/0.9 % NACL 10MEQ/0.1L
10 INTRAVENOUS SOLUTION, PIGGYBACK (ML) INTRAVENOUS
Status: DISCONTINUED | OUTPATIENT
Start: 2019-03-14 | End: 2019-03-19 | Stop reason: HOSPADM

## 2019-03-14 RX ORDER — POTASSIUM CHLORIDE 750 MG/1
20-40 TABLET, EXTENDED RELEASE ORAL
Status: DISCONTINUED | OUTPATIENT
Start: 2019-03-14 | End: 2019-03-19 | Stop reason: HOSPADM

## 2019-03-14 RX ORDER — POTASSIUM CHLORIDE 29.8 MG/ML
20 INJECTION INTRAVENOUS
Status: DISCONTINUED | OUTPATIENT
Start: 2019-03-14 | End: 2019-03-14 | Stop reason: ALTCHOICE

## 2019-03-14 RX ORDER — POTASSIUM CHLORIDE 1.5 G/1.58G
20-40 POWDER, FOR SOLUTION ORAL
Status: DISCONTINUED | OUTPATIENT
Start: 2019-03-14 | End: 2019-03-19 | Stop reason: HOSPADM

## 2019-03-14 RX ADMIN — PROCHLORPERAZINE EDISYLATE 10 MG: 5 INJECTION INTRAMUSCULAR; INTRAVENOUS at 14:14

## 2019-03-14 RX ADMIN — Medication 2 PACKET: at 07:47

## 2019-03-14 RX ADMIN — ENOXAPARIN SODIUM 40 MG: 40 INJECTION SUBCUTANEOUS at 07:45

## 2019-03-14 RX ADMIN — METOCLOPRAMIDE 10 MG: 5 INJECTION, SOLUTION INTRAMUSCULAR; INTRAVENOUS at 01:58

## 2019-03-14 RX ADMIN — OXYCODONE HYDROCHLORIDE 10 MG: 5 SOLUTION ORAL at 20:09

## 2019-03-14 RX ADMIN — Medication 50 MG: at 07:46

## 2019-03-14 RX ADMIN — POLYETHYLENE GLYCOL 3350 17 G: 17 POWDER, FOR SOLUTION ORAL at 07:45

## 2019-03-14 RX ADMIN — WHITE PETROLATUM: 1.75 OINTMENT TOPICAL at 20:11

## 2019-03-14 RX ADMIN — AMPICILLIN SODIUM AND SULBACTAM SODIUM 3 G: 2; 1 INJECTION, POWDER, FOR SOLUTION INTRAMUSCULAR; INTRAVENOUS at 05:54

## 2019-03-14 RX ADMIN — Medication 2 PACKET: at 16:23

## 2019-03-14 RX ADMIN — GABAPENTIN 300 MG: 300 CAPSULE ORAL at 12:29

## 2019-03-14 RX ADMIN — Medication 50 MG: at 13:10

## 2019-03-14 RX ADMIN — GABAPENTIN 300 MG: 300 CAPSULE ORAL at 06:04

## 2019-03-14 RX ADMIN — OXYCODONE HYDROCHLORIDE 10 MG: 5 SOLUTION ORAL at 16:22

## 2019-03-14 RX ADMIN — Medication 50 MG: at 20:09

## 2019-03-14 RX ADMIN — ACETAMINOPHEN 650 MG: 325 TABLET, FILM COATED ORAL at 18:21

## 2019-03-14 RX ADMIN — PROCHLORPERAZINE EDISYLATE 10 MG: 5 INJECTION INTRAMUSCULAR; INTRAVENOUS at 20:14

## 2019-03-14 RX ADMIN — WHITE PETROLATUM: 1.75 OINTMENT TOPICAL at 01:59

## 2019-03-14 RX ADMIN — SENNOSIDES A AND B 10 ML: 415.36 LIQUID ORAL at 20:10

## 2019-03-14 RX ADMIN — ZIPRASIDONE HYDROCHLORIDE 40 MG: 40 CAPSULE ORAL at 20:09

## 2019-03-14 RX ADMIN — OXYCODONE HYDROCHLORIDE 10 MG: 5 SOLUTION ORAL at 06:04

## 2019-03-14 RX ADMIN — ONDANSETRON 4 MG: 2 INJECTION INTRAMUSCULAR; INTRAVENOUS at 09:55

## 2019-03-14 RX ADMIN — SERTRALINE HYDROCHLORIDE 200 MG: 100 TABLET ORAL at 07:46

## 2019-03-14 RX ADMIN — AMPICILLIN SODIUM AND SULBACTAM SODIUM 3 G: 2; 1 INJECTION, POWDER, FOR SOLUTION INTRAMUSCULAR; INTRAVENOUS at 11:27

## 2019-03-14 RX ADMIN — OXYCODONE HYDROCHLORIDE 10 MG: 5 SOLUTION ORAL at 13:10

## 2019-03-14 RX ADMIN — OXYCODONE HYDROCHLORIDE 10 MG: 5 SOLUTION ORAL at 09:50

## 2019-03-14 RX ADMIN — SENNOSIDES A AND B 10 ML: 415.36 LIQUID ORAL at 11:29

## 2019-03-14 RX ADMIN — ZIPRASIDONE HYDROCHLORIDE 40 MG: 40 CAPSULE ORAL at 07:46

## 2019-03-14 RX ADMIN — WHITE PETROLATUM: 1.75 OINTMENT TOPICAL at 09:56

## 2019-03-14 RX ADMIN — Medication 10 MG: at 20:09

## 2019-03-14 RX ADMIN — MULTIVITAMIN 15 ML: LIQUID ORAL at 09:50

## 2019-03-14 RX ADMIN — ONDANSETRON 4 MG: 2 INJECTION INTRAMUSCULAR; INTRAVENOUS at 16:22

## 2019-03-14 RX ADMIN — LEVOTHYROXINE SODIUM 75 MCG: 75 TABLET ORAL at 07:46

## 2019-03-14 RX ADMIN — Medication 20 MG: at 16:22

## 2019-03-14 RX ADMIN — GABAPENTIN 300 MG: 300 CAPSULE ORAL at 18:21

## 2019-03-14 RX ADMIN — ACETAMINOPHEN 650 MG: 325 TABLET, FILM COATED ORAL at 07:46

## 2019-03-14 RX ADMIN — OXYCODONE HYDROCHLORIDE 10 MG: 5 SOLUTION ORAL at 02:20

## 2019-03-14 ASSESSMENT — ACTIVITIES OF DAILY LIVING (ADL)
ADLS_ACUITY_SCORE: 14

## 2019-03-14 ASSESSMENT — MIFFLIN-ST. JEOR: SCORE: 1773.81

## 2019-03-14 NOTE — PLAN OF CARE
A&O x4, VSS, on 1 LPM O2, neck pain was relieved by Oxycodone, nausea was relieved by Zofran. Prn Oxycodone for pain was last given at 2015, prn Tylenol for headache was last given at 2030. prn Gabapentin for anxiety was last given at 1744, and prn Zofran for nausea was last given at 1616. Tube feeding started again at 2000 at 10 mL/hr, increased to 20 mL/hr at 2245, goal is 35 mL/hr. Up with stand by assist. Adequate urine output, BM x1. Anterior bilateral neck incision is intact, small serosanguinous drainage, ecchymosis and erythema noted around the site. Penrose drain is intact scant serosanguinous drainage. Continue to monitor and continue with poc.

## 2019-03-14 NOTE — PROGRESS NOTES
"Otolaryngology Progress Note  March 14, 2019    S: No acute events overnight. She denies any difficulty breathing or SOB. Remains on 2L O2 by NC. Tolerating TFs at 30 mL/h. Pain controlled with medications. +BM yesterday.     O: /53 (BP Location: Left arm)   Pulse 79   Temp 97.1  F (36.2  C) (Oral)   Resp 18   Ht 1.664 m (5' 5.5\")   Wt 113.4 kg (250 lb 1.6 oz)   LMP 03/04/2019 (Exact Date)   SpO2 93%   Breastfeeding? No   BMI 40.99 kg/m    General: Well appearing woman lying calmly in bed. She is alert and oriented x 3  HEENT: NG tube sutured in place. Neck incision is intact. Small amount of serosang drainage around neck. Penrose is intact. Stable ecchymosis to anterior neck and submental area with no fluctuance or crepitus. No evidence of hematoma. Chin to chest suture in place and neck is flexed. No chest crepitus. Voice is strong and clear.   Pulmonary: Respiration is regular and non-labored. no stridor, no accessory muscle use.      Intake/Output Summary (Last 24 hours) at 3/14/2019 0835  Last data filed at 3/14/2019 0600  Gross per 24 hour   Intake 1090.83 ml   Output 800 ml   Net 290.83 ml       LABS:  ROUTINE IP LABS (Last four results)  BMP  Recent Labs   Lab 03/14/19  0501 03/13/19  0546 03/12/19  0557 03/11/19  1643    143 142 142   POTASSIUM 3.4 3.7 4.2 4.1   CHLORIDE 107 108 107 110*   JANICE 8.5 8.4* 8.4* 8.2*   CO2 25 26 25 24   BUN 15 24 16 14   CR 0.58 0.68 0.71 0.64   * 97 107* 122*     CBC  Recent Labs   Lab 03/14/19  0501 03/13/19  1615 03/13/19  0546 03/12/19  0557   WBC 6.8 8.7 8.8 12.8*   RBC 3.37* 3.57* 3.32* 3.86   HGB 10.0* 10.6* 9.8* 11.4*   HCT 32.0* 33.6* 31.7* 35.8   MCV 95 94 96 93   MCH 29.7 29.7 29.5 29.5   MCHC 31.3* 31.5 30.9* 31.8   RDW 13.5 13.8 14.4 14.3    275 266 331     INRNo lab results found in last 7 days.    Imaging:  CXR 3/11  IMPRESSION:   1. Lucencies of the left hemithorax. Question pneumothorax. Recommend  follow-up chest " radiograph.  2. No focal airspace opacities.    A/P: Bibi Song is a 48 year old female with a past medical history of a 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 She has been minimally responsive to endobronchial balloon dilations, and is now POD#3 s/p tracheal resection via cervical approach and hyoid release.     Neuro:  - Pain control: dilaudid PCA- per thoracic surgery  - PTA sertraline, ziprasidone, wellbutrin     HEENT:  - Incision cares: clean with 0.9% sodium chloride and apply Aquaphor Q8H   - Chin suture to remain intact x 7 days  - Keep penrose in right neck x 3-5 days     Respiratory:  - supplemental O2 PRN to keep sats >92%    CV/heme:  - hemodynamically stable    FEN/GI:  - Currently NPO.   - Diet per thoracic surgery. Will recommend speech pathologist evaluation prior to initiating diet  - Nutritionist following for TFs. Currently on tube feeds via NG- advancing to goal  - Protein modular packet  - Bowel regimen: pericolace BID PRN, miralax PRN   - omeprazole  - Antiemetic PRN    /Fluids/Lytes:  - voiding independently  - mIVF per thoracic surgery    Endo  - Hx hypothryoidism: levothyroxine 75 mcg    ID:  - Will recommed at least 48 hours of post-op IV unasyn for perioperative prophylaxis   - Leukocytosis resolved.     PPX:  - Lovenox 40mg daily   - SCDs  - IS    Dispo: Will defer to thoracic primary.    -- Patient and above plan will be discussed with Dr. Christofer Mathew PA-C  Otolaryngology-Head & Neck Surgery  Please contact ENT by dialing * [phone] and entering job code [phone].

## 2019-03-14 NOTE — PLAN OF CARE
Pt VSS on 1L NC, occasional dropping to 88% but quickly increases to >90%. Oxycodone given x2 for neck pain. Ice utilized as well. PCA discontinued with AM. Nausea improved from yesterday per patient but still experiencing nausea with walking and some dizziness. Went on 3 walks this AM and did stairs with PT. Pt had 1 small soft BM and then refused enema due to ulcerative colitis. Bowel regimen given. TF still at 30mL/hr. Goal of 35mL/hr, could be increased this evening if pt tolerating.    Plan for swallow eval tomorrow.     Will continue to monitor respiratory status closely and update thoracic team as appropriate.

## 2019-03-14 NOTE — PLAN OF CARE
D/I: Pt is alert and oriented x4. Vitally stable on 2L oxygen via NC. Up with SBA to the bathroom. TF running via NJ and has just been advanced to 30 mL/hr @ 0600. Sio far tolerating with no c/o nausea. Goal is 35 mL/hr. Neck incision with scant serosanguinous drainage via Penrose drains x2. Cleaned and Aquaphor applied per order. Oxycodone 10mg given x 2 this shift with good effect. Neurontin also given.. Passing gas. Strict NPO. Using call light appropriately and is able to make needs known.   P: Continue to monitor neck for pain and bleeding, follow dressing recommendations, and notify Thoracic team with any changes or concerns and follow plan of care.

## 2019-03-14 NOTE — PLAN OF CARE
Discharge Planner OT   Patient plan for discharge: Not discussed  Current status: Pt educated on supine<>sit transfers with neutral neck and IND-SBA with bed mobility. SBA needed for sit<>stand transfers, LB dressing, and ambulation of 150' without gait device but with assistance pushing IV pole and wheelchair follow. CGA needed to ascend/descend 3 stairs with one railing. Pt reported nausea and dizziness with movement though VSS.   Barriers to return to prior living situation: medical status, endurance  Recommendations for discharge: Home with assist from family as needed, pending ability to safely ascend/descend 8 stairs  Rationale for recommendations: Pt displays improved independence with ADLs and mobility, but may require assistance with ADL/IADLs upon return home.        Entered by: Malathi Vickers 03/14/2019 4:46 PM

## 2019-03-14 NOTE — PROGRESS NOTES
"THORACIC SURGERY PROGRESS NOTE     S : Had nausea overnight and this AM. No more dizziness. Did not advance tube feeds past 30/hr.  Pain controlled. No fevers. Walked to bathroom yesterday. Interested in more walking. Small BM with enema yesterday.     O: Vital signs:  Temp: 97.6  F (36.4  C) Temp src: Oral BP: 111/63 Pulse: 84   Resp: 18 SpO2: (!) 88 % O2 Device: Nasal cannula Oxygen Delivery: 1 LPM Height: 166.4 cm (5' 5.5\") Weight: 113.4 kg (250 lb 1.6 oz)  Estimated body mass index is 40.99 kg/m  as calculated from the following:    Height as of this encounter: 1.664 m (5' 5.5\").    Weight as of this encounter: 113.4 kg (250 lb 1.6 oz).      Gen: Alert and oriented  Resp: non labored breathing   Neck: Incisions C/D, Chin to chest suture in place. Penrose drain with scant serosanguineous      Labs  WBC 6.8  Hb 10 (10.6)  Plts 245  Cr 0.58  BUn 15  K 3.4        A/P     48 year old female with PMHx including obesity, RACIEL, GERD, ulcerative colitis, history of chemical dependency, hypothyroidism, OCD, anxiety and depression who developed recurrent tracheal stenosis after intubation in July 2018, for accidental medication overdose.  S/p tracheal resection via cervical approach on 3/11 with Dr. Estrella and Dr. Beaulieu, EBL <50mL. This was a primary repair with resection of 3.5cm of trachea.      - NPO , schedule zofran TID.   - Advance TFs to goal via dobhoff which is in her stomach. Bowel regimen including enema today.  - SLP consult tomorrow, 3/15  - oxycodone thru tube, discharge IV narcotics 2/2 nausea  - Humidifed O2 , duonebs q4h  - unasyn off today  - penrose per ENT  - If stridor, call both thoracic surgery and ENT immediately for intubation in OR.  - Lovenox , PPI, SCDs     Call with any questions.  Yue Wilcox MD  Cardiothoracic surgery fellow  pgr [phone]           "

## 2019-03-14 NOTE — PROGRESS NOTES
Miss Song is POD3. She is breathing comfortably and very happy. She does not have much discomfort except for persistent nausea. She wonders what this could be related to the NG tube.     Her wound is healthy.  No crepitance.  O2 sats improved.      Plan to start PO's tomorrow and hopefully removal of NG will help with abdominal symptoms.

## 2019-03-15 ENCOUNTER — APPOINTMENT (OUTPATIENT)
Dept: SPEECH THERAPY | Facility: CLINIC | Age: 49
End: 2019-03-15
Attending: OTOLARYNGOLOGY
Payer: COMMERCIAL

## 2019-03-15 ENCOUNTER — APPOINTMENT (OUTPATIENT)
Dept: OCCUPATIONAL THERAPY | Facility: CLINIC | Age: 49
End: 2019-03-15
Attending: OTOLARYNGOLOGY
Payer: COMMERCIAL

## 2019-03-15 ENCOUNTER — APPOINTMENT (OUTPATIENT)
Dept: GENERAL RADIOLOGY | Facility: CLINIC | Age: 49
End: 2019-03-15
Attending: OTOLARYNGOLOGY
Payer: COMMERCIAL

## 2019-03-15 LAB
ANION GAP SERPL CALCULATED.3IONS-SCNC: 7 MMOL/L (ref 3–14)
BLD PROD TYP BPU: NORMAL
BLD PROD TYP BPU: NORMAL
BLD UNIT ID BPU: 0
BLD UNIT ID BPU: 0
BLOOD PRODUCT CODE: NORMAL
BLOOD PRODUCT CODE: NORMAL
BPU ID: NORMAL
BPU ID: NORMAL
BUN SERPL-MCNC: 16 MG/DL (ref 7–30)
CA-I BLD-MCNC: 4.7 MG/DL (ref 4.4–5.2)
CALCIUM SERPL-MCNC: 8.4 MG/DL (ref 8.5–10.1)
CHLORIDE SERPL-SCNC: 105 MMOL/L (ref 94–109)
CO2 SERPL-SCNC: 27 MMOL/L (ref 20–32)
COPATH REPORT: NORMAL
CREAT SERPL-MCNC: 0.62 MG/DL (ref 0.52–1.04)
ERYTHROCYTE [DISTWIDTH] IN BLOOD BY AUTOMATED COUNT: 13.5 % (ref 10–15)
GFR SERPL CREATININE-BSD FRML MDRD: >90 ML/MIN/{1.73_M2}
GLUCOSE BLDC GLUCOMTR-MCNC: 126 MG/DL (ref 70–99)
GLUCOSE SERPL-MCNC: 144 MG/DL (ref 70–99)
HCT VFR BLD AUTO: 32.7 % (ref 35–47)
HGB BLD-MCNC: 10.3 G/DL (ref 11.7–15.7)
MAGNESIUM SERPL-MCNC: 1.9 MG/DL (ref 1.6–2.3)
MCH RBC QN AUTO: 29.4 PG (ref 26.5–33)
MCHC RBC AUTO-ENTMCNC: 31.5 G/DL (ref 31.5–36.5)
MCV RBC AUTO: 93 FL (ref 78–100)
PHOSPHATE SERPL-MCNC: 2.9 MG/DL (ref 2.5–4.5)
PLATELET # BLD AUTO: 288 10E9/L (ref 150–450)
POTASSIUM SERPL-SCNC: 3.3 MMOL/L (ref 3.4–5.3)
POTASSIUM SERPL-SCNC: 4.1 MMOL/L (ref 3.4–5.3)
RBC # BLD AUTO: 3.5 10E12/L (ref 3.8–5.2)
SODIUM SERPL-SCNC: 139 MMOL/L (ref 133–144)
TRANSFUSION STATUS PATIENT QL: NORMAL
WBC # BLD AUTO: 6.8 10E9/L (ref 4–11)

## 2019-03-15 PROCEDURE — 92611 MOTION FLUOROSCOPY/SWALLOW: CPT | Mod: GN

## 2019-03-15 PROCEDURE — 82330 ASSAY OF CALCIUM: CPT | Performed by: THORACIC SURGERY (CARDIOTHORACIC VASCULAR SURGERY)

## 2019-03-15 PROCEDURE — 36415 COLL VENOUS BLD VENIPUNCTURE: CPT | Performed by: THORACIC SURGERY (CARDIOTHORACIC VASCULAR SURGERY)

## 2019-03-15 PROCEDURE — 25800030 ZZH RX IP 258 OP 636: Performed by: PHYSICIAN ASSISTANT

## 2019-03-15 PROCEDURE — 25000132 ZZH RX MED GY IP 250 OP 250 PS 637: Performed by: PHYSICIAN ASSISTANT

## 2019-03-15 PROCEDURE — 36415 COLL VENOUS BLD VENIPUNCTURE: CPT | Performed by: SURGERY

## 2019-03-15 PROCEDURE — 40000141 ZZH STATISTIC PERIPHERAL IV START W/O US GUIDANCE

## 2019-03-15 PROCEDURE — 74230 X-RAY XM SWLNG FUNCJ C+: CPT

## 2019-03-15 PROCEDURE — 83735 ASSAY OF MAGNESIUM: CPT | Performed by: SURGERY

## 2019-03-15 PROCEDURE — 00000146 ZZHCL STATISTIC GLUCOSE BY METER IP

## 2019-03-15 PROCEDURE — 97535 SELF CARE MNGMENT TRAINING: CPT | Mod: GO

## 2019-03-15 PROCEDURE — 80048 BASIC METABOLIC PNL TOTAL CA: CPT | Performed by: SURGERY

## 2019-03-15 PROCEDURE — 92526 ORAL FUNCTION THERAPY: CPT | Mod: GN

## 2019-03-15 PROCEDURE — 84100 ASSAY OF PHOSPHORUS: CPT | Performed by: SURGERY

## 2019-03-15 PROCEDURE — 85027 COMPLETE CBC AUTOMATED: CPT | Performed by: SURGERY

## 2019-03-15 PROCEDURE — 25000132 ZZH RX MED GY IP 250 OP 250 PS 637: Performed by: STUDENT IN AN ORGANIZED HEALTH CARE EDUCATION/TRAINING PROGRAM

## 2019-03-15 PROCEDURE — 84132 ASSAY OF SERUM POTASSIUM: CPT | Performed by: THORACIC SURGERY (CARDIOTHORACIC VASCULAR SURGERY)

## 2019-03-15 PROCEDURE — 25000128 H RX IP 250 OP 636: Performed by: STUDENT IN AN ORGANIZED HEALTH CARE EDUCATION/TRAINING PROGRAM

## 2019-03-15 PROCEDURE — 12000001 ZZH R&B MED SURG/OB UMMC

## 2019-03-15 RX ADMIN — OXYCODONE HYDROCHLORIDE 10 MG: 5 SOLUTION ORAL at 07:49

## 2019-03-15 RX ADMIN — GABAPENTIN 300 MG: 300 CAPSULE ORAL at 04:25

## 2019-03-15 RX ADMIN — POTASSIUM CHLORIDE 40 MEQ: 1.5 POWDER, FOR SOLUTION ORAL at 08:53

## 2019-03-15 RX ADMIN — Medication 50 MG: at 13:24

## 2019-03-15 RX ADMIN — ZIPRASIDONE HYDROCHLORIDE 40 MG: 40 CAPSULE ORAL at 19:42

## 2019-03-15 RX ADMIN — WHITE PETROLATUM: 1.75 OINTMENT TOPICAL at 20:04

## 2019-03-15 RX ADMIN — Medication 2 PACKET: at 07:53

## 2019-03-15 RX ADMIN — OXYCODONE HYDROCHLORIDE 10 MG: 5 SOLUTION ORAL at 21:54

## 2019-03-15 RX ADMIN — OXYCODONE HYDROCHLORIDE 10 MG: 5 SOLUTION ORAL at 11:18

## 2019-03-15 RX ADMIN — OXYCODONE HYDROCHLORIDE 10 MG: 5 SOLUTION ORAL at 00:36

## 2019-03-15 RX ADMIN — MULTIVITAMIN 15 ML: LIQUID ORAL at 11:18

## 2019-03-15 RX ADMIN — ACETAMINOPHEN 650 MG: 325 TABLET, FILM COATED ORAL at 17:46

## 2019-03-15 RX ADMIN — POLYETHYLENE GLYCOL 3350 17 G: 17 POWDER, FOR SOLUTION ORAL at 11:18

## 2019-03-15 RX ADMIN — Medication 20 MG: at 17:50

## 2019-03-15 RX ADMIN — POTASSIUM CHLORIDE, DEXTROSE MONOHYDRATE AND SODIUM CHLORIDE: 150; 5; 450 INJECTION, SOLUTION INTRAVENOUS at 05:11

## 2019-03-15 RX ADMIN — SERTRALINE HYDROCHLORIDE 200 MG: 100 TABLET ORAL at 07:50

## 2019-03-15 RX ADMIN — OXYCODONE HYDROCHLORIDE 10 MG: 5 SOLUTION ORAL at 17:46

## 2019-03-15 RX ADMIN — POTASSIUM CHLORIDE 20 MEQ: 1.5 POWDER, FOR SOLUTION ORAL at 11:20

## 2019-03-15 RX ADMIN — Medication 2 PACKET: at 13:24

## 2019-03-15 RX ADMIN — Medication 10 MG: at 19:41

## 2019-03-15 RX ADMIN — Medication 2 PACKET: at 19:42

## 2019-03-15 RX ADMIN — ENOXAPARIN SODIUM 40 MG: 40 INJECTION SUBCUTANEOUS at 08:37

## 2019-03-15 RX ADMIN — LEVOTHYROXINE SODIUM 75 MCG: 75 TABLET ORAL at 07:50

## 2019-03-15 RX ADMIN — Medication 50 MG: at 19:42

## 2019-03-15 RX ADMIN — OXYCODONE HYDROCHLORIDE 10 MG: 5 SOLUTION ORAL at 04:25

## 2019-03-15 RX ADMIN — ONDANSETRON 4 MG: 2 INJECTION INTRAMUSCULAR; INTRAVENOUS at 10:02

## 2019-03-15 RX ADMIN — SENNOSIDES A AND B 10 ML: 415.36 LIQUID ORAL at 07:50

## 2019-03-15 RX ADMIN — PROCHLORPERAZINE EDISYLATE 10 MG: 5 INJECTION INTRAMUSCULAR; INTRAVENOUS at 19:51

## 2019-03-15 RX ADMIN — GABAPENTIN 300 MG: 300 CAPSULE ORAL at 11:19

## 2019-03-15 RX ADMIN — ACETAMINOPHEN 650 MG: 325 TABLET, FILM COATED ORAL at 13:24

## 2019-03-15 RX ADMIN — SENNOSIDES A AND B 10 ML: 415.36 LIQUID ORAL at 19:41

## 2019-03-15 RX ADMIN — Medication 50 MG: at 07:51

## 2019-03-15 RX ADMIN — PROCHLORPERAZINE EDISYLATE 10 MG: 5 INJECTION INTRAMUSCULAR; INTRAVENOUS at 13:24

## 2019-03-15 RX ADMIN — OXYCODONE HYDROCHLORIDE 10 MG: 5 SOLUTION ORAL at 14:15

## 2019-03-15 RX ADMIN — ZIPRASIDONE HYDROCHLORIDE 40 MG: 40 CAPSULE ORAL at 07:51

## 2019-03-15 RX ADMIN — POTASSIUM CHLORIDE, DEXTROSE MONOHYDRATE AND SODIUM CHLORIDE: 150; 5; 450 INJECTION, SOLUTION INTRAVENOUS at 16:00

## 2019-03-15 RX ADMIN — WHITE PETROLATUM: 1.75 OINTMENT TOPICAL at 07:52

## 2019-03-15 RX ADMIN — ONDANSETRON 4 MG: 2 INJECTION INTRAMUSCULAR; INTRAVENOUS at 04:08

## 2019-03-15 RX ADMIN — GABAPENTIN 300 MG: 300 CAPSULE ORAL at 17:46

## 2019-03-15 RX ADMIN — WHITE PETROLATUM: 1.75 OINTMENT TOPICAL at 00:43

## 2019-03-15 ASSESSMENT — ACTIVITIES OF DAILY LIVING (ADL)
ADLS_ACUITY_SCORE: 14

## 2019-03-15 ASSESSMENT — MIFFLIN-ST. JEOR: SCORE: 1763.26

## 2019-03-15 NOTE — PROGRESS NOTES
"Otolaryngology Progress Note  March 15, 2019    S: No acute events overnight. No issues with breathing. She reports she is doing well.     O: /60 (BP Location: Left arm)   Pulse 89   Temp 96.5  F (35.8  C) (Oral)   Resp 16   Ht 1.664 m (5' 5.5\")   Wt 114.5 kg (252 lb 6.8 oz)   LMP 03/04/2019 (Exact Date)   SpO2 95%   Breastfeeding? No   BMI 41.37 kg/m    General: Well appearing woman lying calmly in bed. She is alert and oriented x 3  HEENT: NG tube sutured in place. Neck incision is intact. Small amount of serosang drainage around neck. Penrose removed during exam. Stable to improved ecchymosis to anterior neck and submental area with no fluctuance or crepitus. No evidence of hematoma. Chin to chest suture in place and neck is flexed. No chest crepitus. Voice is strong and clear.   Pulmonary: Respiration is regular and non-labored. no stridor, no accessory muscle use.    Intake/Output Summary (Last 24 hours) at 3/15/2019 1218  Last data filed at 3/15/2019 1217  Gross per 24 hour   Intake 1105 ml   Output 1600 ml   Net -495 ml     LABS:  ROUTINE IP LABS (Last four results)  BMP  Recent Labs   Lab 03/15/19  0530 03/14/19  0501 03/13/19  0546 03/12/19  0557    141 143 142   POTASSIUM 3.3* 3.4 3.7 4.2   CHLORIDE 105 107 108 107   JANICE 8.4* 8.5 8.4* 8.4*   CO2 27 25 26 25   BUN 16 15 24 16   CR 0.62 0.58 0.68 0.71   * 125* 97 107*     CBC  Recent Labs   Lab 03/15/19  0530 03/14/19  0501 03/13/19  1615 03/13/19  0546   WBC 6.8 6.8 8.7 8.8   RBC 3.50* 3.37* 3.57* 3.32*   HGB 10.3* 10.0* 10.6* 9.8*   HCT 32.7* 32.0* 33.6* 31.7*   MCV 93 95 94 96   MCH 29.4 29.7 29.7 29.5   MCHC 31.5 31.3* 31.5 30.9*   RDW 13.5 13.5 13.8 14.4    245 275 266     INRNo lab results found in last 7 days.    Imaging:  CXR 3/11  IMPRESSION:   1. Lucencies of the left hemithorax. Question pneumothorax. Recommend  follow-up chest radiograph.  2. No focal airspace opacities.    A/P: Bibi Song is a 48 year old " female with a past medical history of a 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 She has been minimally responsive to endobronchial balloon dilations, and is now POD#4 s/p tracheal resection via cervical approach and hyoid release.     Neuro:  - Pain control: Tylenol PRN, oxycodone PRN, IV dilaudid for breakthrough  - PTA sertraline, ziprasidone, wellbutrin     HEENT:  - Incision cares: clean with 0.9% sodium chloride and apply Aquaphor Q8H   - Chin suture to remain intact x 7 days  - Penrose drain removed today   - Consider steroid inhaler/nebs for granulation tissue prevention - will defer to thoracic surgery     Respiratory:  - supplemental O2 PRN to keep sats >92%    CV/heme:  - hemodynamically stable    FEN/GI:  - Currently NPO.   - SLP evaluated today and recommend ongoing NPO.   - Nutrition following for TFs. Currently on tube feeds via NG at goal  - Protein modular packet  - Bowel regimen: pericolace BID PRN, miralax PRN   - omeprazole  - Antiemetic PRN    /Fluids/Lytes:  - voiding independently  - mIVF per thoracic surgery    Endo  - Hx hypothryoidism: levothyroxine 75 mcg    ID:  - Will recommed at least 48 hours of post-op IV unasyn for perioperative prophylaxis - complete  - Leukocytosis resolved.     PPX:  - Lovenox 40mg daily   - SCDs  - IS    Dispo: Will defer to thoracic primary.    -- Patient and above plan will be discussed with Dr. Christofer Mathew, PA-C  Otolaryngology-Head & Neck Surgery  Please contact ENT by dialing * [phone] and entering job code [phone].

## 2019-03-15 NOTE — PROGRESS NOTES
03/15/19 1008   General Information   Onset Date 03/11/19   Start of Care Date 03/15/19   Referring Physician Zuri Martinez MD   Patient Profile Review/OT: Additional Occupational Profile Info See Profile for full history and prior level of function   Patient/Family Goals Statement Pt did not state   Swallowing Evaluation Videofluoroscopic evaluation   Behaviorial Observations Alert   Mode of current nutrition NPO;NG   Respiratory Status O2 Supply   Type of O2 supply Nasal cannula   Comments 48 year old female with PMHx including obesity, RACIEL, GERD, ulcerative colitis, history of chemical dependency, hypothyroidism, OCD, anxiety and depression who developed recurrent tracheal stenosis after intubation in July 2018, for accidental medication overdose.  S/p tracheal resection via cervical approach on 3/11 with Dr. Estrella and Dr. Beaulieu, EBL <50mL. This was a primary repair with resection of 3.5cm of trachea. Video swallow study completed per MD orders to further assess oropharyngeal swallow function.    VFSS Evaluation   VFSS Additional Documentation Yes   VFSS Eval: Radiology   Radiologist [facility] radiologist   Views Taken left lateral   Physical Location of Procedure radiology suite #5   VFSS Eval: Thin Liquid Texture Trial   Mode of Presentation, Thin Liquid spoon;cup;self-fed;fed by clinician   Order of Presentation 1, 2, 3, 7   Preparatory Phase Poor bolus control   Oral Phase, Thin Liquid Premature pharyngeal entry   Pharyngeal Phase, Thin Liquid Delayed swallow reflex   Rosenbek's Penetration Aspiration Scale: Thin Liquid Trial Results 4 - contrast contacts vocal cords, no residue remains (penetration)   Response to Aspiration (productive throat clear)   Successful Strategies Trialed During Procedure, Thin Liquid chin tuck  (not effective)   Diagnostic Statement Thin liquids via spoon and cup resulted in consistent laryngeal penetration of varying degrees. Pt with questionable deep  penetration and possible aspiration, however limited views of trachea d/t pts positioning     VFSS Eval: Nectar Thick Liquid Texture Trial   Mode of Presentation, Nectar cup;self-fed   Order of Presentation 4   Preparatory Phase Poor bolus control   Oral Phase, Nectar Premature pharyngeal entry   Pharyngeal Phase, Nectar Delayed swallow reflex   Rosenbek's Penetration Aspiration Scale: Nectar-Thick Liquid Trial Results 4 - contrast contacts vocal cords, no residue remains (penetration)   Response to Aspiration, Nectar other (see comments)  (productive throat clearing)   Diagnostic Statement Nectar thick liquids via cup resulted in deep laryngeal penetration. Pt with reflexive throat clearing, however unable to fully r/o aspiration d/t positioning   VFSS Eval: Honey Thick Texture Trial   Mode of Presentation, Honey spoon;self-fed   Order of Presentation 5   Preparatory Phase Poor bolus control   Oral Phase, Honey Premature pharyngeal entry   Pharyngeal Phase, Honey Delayed swallow reflex   Rosenbek's Penetration Aspiration Scale: Honey Trial Results 3 - contrast remains above the vocal cords, visible residue remains (penetration)   Diagnostic Statement Honey thick liquids via spoon resulted in moderate penetration, pt with trace amounts of residuals in vestibule which partially cleared with double swallow/throat clearing   VFSS Eval: Puree Solid Texture Trial   Mode of Presentation, Puree spoon;self-fed   Order of Presentation 6   Preparatory Phase Poor bolus control   Oral Phase, Puree Premature pharyngeal entry   Pharyngeal Phase, Puree Delayed swallow reflex;Residue in valleculae   Rosenbek's Penetration Aspiration Scale: Puree Food Trial Results 1 - no aspiration, contrast does not enter airway   Diagnostic Statement No aspiration or penetration. pt with trace residuals in valleculae which cleared with second swallow   FEES Evaluation   Additional Documentation No   Swallow Compensations   Swallow Compensations  Alternate viscosity of consistencies;Effortful swallow;Pacing;Reduce amounts;Multiple swallow   Results Suspect aspiration   General Therapy Interventions   Planned Therapy Interventions Dysphagia Treatment   Dysphagia treatment Oropharyngeal exercise training  (PO readiness)   Swallow Eval: Clinical Impressions   Skilled Criteria for Therapy Intervention Skilled criteria met.  Treatment indicated.   Dysphagia Outcome Severity Scale (JEAN) Level 2 - JEAN   Treatment Diagnosis moderate-severe oropharyngeal dysphagia   Diet texture recommendations NPO   Recommended Feeding/Eating Techniques other (see comments)  (please continue excellent oral cares)   Demonstrates Need for Referral to Another Service occupational therapy;physical therapy;dietitian;respiratory therapy   Therapy Frequency 5 times/wk   Predicted Duration of Therapy Intervention (days/wks) 1 week   Anticipated Discharge Disposition home w/ outpatient services   Risks and Benefits of Treatment have been explained. Yes   Patient, family and/or staff in agreement with Plan of Care Yes   Clinical Impression Comments SLP: Video swallow study completed per MD orders. Pt presents with moderate-severe oropharyngeal dysphagia. Pts swallow function characterized by reduced laryngeal elevation and reduced epiglottic inversion. Unfortunately, pts inability to relax shoulders d/t pain at surgical site impacted ability to fully r/o aspiration (unable to fully view trachea throughout exam despite max attempts at position modifications). However, pt with varying degrees of laryngeal penetration and questionable aspiration across thin, nectar thick, and honey thick liquids despite compensatory strategies. Pt with reflexive throat clear in response to deep penetration. Pt tolerated pureed textures with no aspiration or penetration. Suspect presence of TF impacting epiglottic inversion. Recommend continue NPO status. Please continue excellent oral cares. ST to continue to  follow to complete oropharyngeal exercises and repeat swallow assessment when appropriate per Thoracic team. Anticipate pt will require ongoing ST upon discharge.    Total Evaluation Time   Total Evaluation Time (Minutes) 15

## 2019-03-15 NOTE — PLAN OF CARE
PT5B: DEFER- per discussion with OT pt up moving SBA with no balance concerns. Pt does report nausea/dizziness with mobility likely 2/2 to flexed head positioning with neck sutures. Given this limitation, PT unable to address potential vestibular impairments until pt has full/unrestricted neck ROM. May benefit from OP vestibular rehab once sutures removed to address dizziness/nausea if they do not resolve of their own. Pt does not demo acute care PT needs at this time and will continue working with OT for mobility and self-cares. Will complete PT orders, please re-consult if new needs arise.

## 2019-03-15 NOTE — PROGRESS NOTES
STAFF ADDENDUM:  I saw and evaluated Ms. Song and agree with the resident s findings and plan of care as documented in the resident s note and edited by me, as applicable.      In summary, Ms. Song is making steady progress. She will work on swallowing exercises over the weekend. We plan to remove the chin stitch on Mon (POD#7).  The patient had all questions answered and was in agreement with the plan.  Ryan Estrella MD

## 2019-03-15 NOTE — PLAN OF CARE
Discharge Planner SLP   Patient plan for discharge: none stated  Current status: SLP: Video swallow study completed per MD orders. Pt presents with moderate-severe oropharyngeal dysphagia. Pts swallow function characterized by reduced laryngeal elevation and reduced epiglottic inversion. Unfortunately, pts inability to relax shoulders d/t pain at surgical site impacted ability to fully r/o aspiration (unable to fully view trachea throughout exam despite max attempts at position modifications). However, pt with varying degrees of laryngeal penetration and questionable aspiration across thin, nectar thick, and honey thick liquids despite compensatory strategies. Pt with reflexive throat clear in response to deep penetration. Pt tolerated pureed textures with no aspiration or penetration. Suspect presence of TF impacting epiglottic inversion; discussed with MD. Recommend continue NPO status. Please continue excellent oral cares. ST to continue to follow to complete oropharyngeal exercises and repeat swallow assessment when appropriate per Thoracic team. Anticipate pt will require ongoing ST upon discharge.   Barriers to return to prior living situation: dysphagia, medical status  Recommendations for discharge: pt may benefit from ongoing ST upon discharge pending progress  Rationale for recommendations: pt below baseline swallow function        Entered by: Barbra Esparza 03/15/2019 10:33 AM

## 2019-03-15 NOTE — PLAN OF CARE
Discharge Planner OT   Patient plan for discharge: Home with assist from   Current status: Therapist educated pt on compensatory techniques to complete bed mobility, UB dressing, shower transfers, and standing ADLs safely while maintaining a neutral neck. Pt IND with UB dressing and bed mobility with verbal cues for positioning. SBA needed for sit<>stand transfer, ambulation of 20' with assistance pushing IV pole, shower transfer, and standing and sitting g/h tasks. VSS, though pt reports continued nausea/dizziness with transfers and ambulation.  Barriers to return to prior living situation: medical status, nausea/dizziness with movement  Recommendations for discharge: Home with assist from family as needed  Rationale for recommendations: Pt displays improved independence with ADLs and mobility, but may require assistance with ADL/IADLs upon return home.       Entered by: Malathi Vickers 03/15/2019 3:35 PM

## 2019-03-15 NOTE — PLAN OF CARE
Alert and oriented x4. Reports neck pain. Oxycodone administered x2 with effect. Tube feeds at goal rate. Reports intermittent nausea. No emesis. Zofran and compazine administered with effect. Pt reports compazine works better for her. Pt also stated that ambulation worsens nausea. LS clear. Using IS independently. Incision cares done per orders. Left penrose drain with small amt serosanguinous drainage. Pt to see speech therapy for swallow eval tomorrow and hopefully be able to have feeding tube removed. Continue to assess pain and effectiveness of interventions. Assess GI status. Monitor respiratory status.

## 2019-03-15 NOTE — PROGRESS NOTES
THORACIC SURGERY PROGRESS NOTE     S : Had nausea overnight, TFs held. Reports some dizziness when she ambulates. No fevers or chills overnight. Adequate UOP (1.5 L yesterday)     O  Patient Vitals for the past 24 hrs:   BP Temp Temp src Pulse Heart Rate Resp SpO2 Weight   03/14/19 2219 110/67 95.9  F (35.5  C) Oral -- 79 16 91 % --   03/14/19 1734 -- -- -- -- -- -- -- 114.5 kg (252 lb 6.8 oz)   03/14/19 1457 114/58 96.4  F (35.8  C) Oral 89 -- 16 94 % --   03/14/19 1215 -- -- -- -- -- -- 90 % --   03/14/19 1212 111/63 97.6  F (36.4  C) Oral 84 -- 18 (!) 88 % --        Gen: Alert and oriented  Resp: non labored breathing   Neck: Incisions C/D, Chin to chest suture in place. Penrose drain with scant serosanguineous     Labs  WBC WNL  Hb 10.3  Plts 288  Mg 1.9  Phos 2.9  K 3.3  BGs well controlled        48 year old female with PMHx including obesity, RACIEL, GERD, ulcerative colitis, history of chemical dependency, hypothyroidism, OCD, anxiety and depression who developed recurrent tracheal stenosis after intubation in July 2018, for accidental medication overdose.  S/p tracheal resection via cervical approach on 3/11 with Dr. Estrella and Dr. Beaulieu, EBL <50mL. This was a primary repair with resection of 3.5cm of trachea.      - NPO , schedule zofran TID.   - Advance TFs to goal via dobhoff which is in her stomach. Bowel regimen  - SLP consult 3/15, will start clears  - oxycodone thru tube, discharge IV narcotics 2/2 nausea  - Humidifed O2 , duonebs q4h  - unasyn off 3/14  - penrose per ENT  - If stridor, call both thoracic surgery and ENT immediately for intubation in OR.  - Lovenox , PPI, SCDs         D/w with thoracic surgery fellow      Angeli Sanchez MD

## 2019-03-15 NOTE — PLAN OF CARE
Pt A&Ox4. VSS. Up SBA to bathroom and duffy x1. Voids adequately. No BM today.   Tube feed infusing @ 35mL/hr. PIV access lost, waiting for vascular access for infusing 50mL/hr D5NS1/2KCl.   Pt c/o aching pain in throat adequately relieved with q3 oxycodone. Dressing changed around stitches. Neck drain was removed @0700.     Video speech study today w/ speech consult: pt remains NPO.     Continue with plan of care.

## 2019-03-15 NOTE — PLAN OF CARE
D/I: Pt is alert and oriented x4. Vitally stable on 2L oxygen via NC. Up with SBA to the bathroom. Continued to c/o nausea and TF stopped @ 0430. Zofran given for nausea. NJ flushed. Neck incision with scant serosanguinous drainage via Penrose drains x1. Cleaned and Aquaphor applied per order. Oxycodone 10mg given x 2 this shift with good effect. Neurontin also given.. Passing gas. Strict NPO. Using call light appropriately and is able to make needs known.   P: Continue to monitor neck for pain and bleeding, follow dressing recommendations, and notify Thoracic team with any changes or concerns and follow plan of care.

## 2019-03-16 ENCOUNTER — APPOINTMENT (OUTPATIENT)
Dept: SPEECH THERAPY | Facility: CLINIC | Age: 49
End: 2019-03-16
Attending: OTOLARYNGOLOGY
Payer: COMMERCIAL

## 2019-03-16 LAB
ANION GAP SERPL CALCULATED.3IONS-SCNC: 7 MMOL/L (ref 3–14)
BUN SERPL-MCNC: 18 MG/DL (ref 7–30)
CA-I BLD-MCNC: 4.7 MG/DL (ref 4.4–5.2)
CALCIUM SERPL-MCNC: 8.1 MG/DL (ref 8.5–10.1)
CHLORIDE SERPL-SCNC: 104 MMOL/L (ref 94–109)
CO2 SERPL-SCNC: 28 MMOL/L (ref 20–32)
CREAT SERPL-MCNC: 0.61 MG/DL (ref 0.52–1.04)
ERYTHROCYTE [DISTWIDTH] IN BLOOD BY AUTOMATED COUNT: 13.5 % (ref 10–15)
GFR SERPL CREATININE-BSD FRML MDRD: >90 ML/MIN/{1.73_M2}
GLUCOSE SERPL-MCNC: 157 MG/DL (ref 70–99)
HCT VFR BLD AUTO: 31.4 % (ref 35–47)
HGB BLD-MCNC: 9.9 G/DL (ref 11.7–15.7)
MAGNESIUM SERPL-MCNC: 1.9 MG/DL (ref 1.6–2.3)
MCH RBC QN AUTO: 29.6 PG (ref 26.5–33)
MCHC RBC AUTO-ENTMCNC: 31.5 G/DL (ref 31.5–36.5)
MCV RBC AUTO: 94 FL (ref 78–100)
PHOSPHATE SERPL-MCNC: 3.1 MG/DL (ref 2.5–4.5)
PLATELET # BLD AUTO: 313 10E9/L (ref 150–450)
POTASSIUM SERPL-SCNC: 4.4 MMOL/L (ref 3.4–5.3)
RBC # BLD AUTO: 3.34 10E12/L (ref 3.8–5.2)
SODIUM SERPL-SCNC: 138 MMOL/L (ref 133–144)
WBC # BLD AUTO: 6.8 10E9/L (ref 4–11)

## 2019-03-16 PROCEDURE — 40000802 ZZH SITE CHECK

## 2019-03-16 PROCEDURE — 25000132 ZZH RX MED GY IP 250 OP 250 PS 637: Performed by: STUDENT IN AN ORGANIZED HEALTH CARE EDUCATION/TRAINING PROGRAM

## 2019-03-16 PROCEDURE — 36415 COLL VENOUS BLD VENIPUNCTURE: CPT | Performed by: SURGERY

## 2019-03-16 PROCEDURE — 82330 ASSAY OF CALCIUM: CPT | Performed by: THORACIC SURGERY (CARDIOTHORACIC VASCULAR SURGERY)

## 2019-03-16 PROCEDURE — 25800030 ZZH RX IP 258 OP 636: Performed by: PHYSICIAN ASSISTANT

## 2019-03-16 PROCEDURE — 27210429 ZZH NUTRITION PRODUCT INTERMEDIATE LITER

## 2019-03-16 PROCEDURE — 92526 ORAL FUNCTION THERAPY: CPT | Mod: GN

## 2019-03-16 PROCEDURE — 80048 BASIC METABOLIC PNL TOTAL CA: CPT | Performed by: SURGERY

## 2019-03-16 PROCEDURE — 40000141 ZZH STATISTIC PERIPHERAL IV START W/O US GUIDANCE

## 2019-03-16 PROCEDURE — 12000001 ZZH R&B MED SURG/OB UMMC

## 2019-03-16 PROCEDURE — 83735 ASSAY OF MAGNESIUM: CPT | Performed by: SURGERY

## 2019-03-16 PROCEDURE — 84100 ASSAY OF PHOSPHORUS: CPT | Performed by: SURGERY

## 2019-03-16 PROCEDURE — 25000132 ZZH RX MED GY IP 250 OP 250 PS 637: Performed by: PHYSICIAN ASSISTANT

## 2019-03-16 PROCEDURE — 25000128 H RX IP 250 OP 636: Performed by: STUDENT IN AN ORGANIZED HEALTH CARE EDUCATION/TRAINING PROGRAM

## 2019-03-16 PROCEDURE — 85027 COMPLETE CBC AUTOMATED: CPT | Performed by: SURGERY

## 2019-03-16 RX ORDER — POLYETHYLENE GLYCOL 3350 17 G/17G
17 POWDER, FOR SOLUTION ORAL 2 TIMES DAILY
Status: DISCONTINUED | OUTPATIENT
Start: 2019-03-16 | End: 2019-03-19 | Stop reason: HOSPADM

## 2019-03-16 RX ADMIN — WHITE PETROLATUM: 1.75 OINTMENT TOPICAL at 21:20

## 2019-03-16 RX ADMIN — MULTIVITAMIN 15 ML: LIQUID ORAL at 10:19

## 2019-03-16 RX ADMIN — OXYCODONE HYDROCHLORIDE 10 MG: 5 SOLUTION ORAL at 19:14

## 2019-03-16 RX ADMIN — Medication 50 MG: at 14:03

## 2019-03-16 RX ADMIN — Medication 10 MG: at 19:14

## 2019-03-16 RX ADMIN — ACETAMINOPHEN 650 MG: 325 TABLET, FILM COATED ORAL at 23:47

## 2019-03-16 RX ADMIN — OXYCODONE HYDROCHLORIDE 10 MG: 5 SOLUTION ORAL at 02:48

## 2019-03-16 RX ADMIN — SERTRALINE HYDROCHLORIDE 200 MG: 100 TABLET ORAL at 09:09

## 2019-03-16 RX ADMIN — Medication 20 MG: at 16:07

## 2019-03-16 RX ADMIN — Medication 2 PACKET: at 14:03

## 2019-03-16 RX ADMIN — Medication 50 MG: at 09:09

## 2019-03-16 RX ADMIN — ONDANSETRON 4 MG: 2 INJECTION INTRAMUSCULAR; INTRAVENOUS at 09:10

## 2019-03-16 RX ADMIN — SENNOSIDES A AND B 10 ML: 415.36 LIQUID ORAL at 09:10

## 2019-03-16 RX ADMIN — ZIPRASIDONE HYDROCHLORIDE 40 MG: 40 CAPSULE ORAL at 09:10

## 2019-03-16 RX ADMIN — SENNOSIDES A AND B 10 ML: 415.36 LIQUID ORAL at 19:15

## 2019-03-16 RX ADMIN — WHITE PETROLATUM: 1.75 OINTMENT TOPICAL at 09:10

## 2019-03-16 RX ADMIN — PROCHLORPERAZINE MALEATE 10 MG: 10 TABLET, FILM COATED ORAL at 02:48

## 2019-03-16 RX ADMIN — GABAPENTIN 300 MG: 300 CAPSULE ORAL at 14:03

## 2019-03-16 RX ADMIN — ACETAMINOPHEN 650 MG: 325 TABLET, FILM COATED ORAL at 19:26

## 2019-03-16 RX ADMIN — ACETAMINOPHEN 650 MG: 325 TABLET, FILM COATED ORAL at 12:56

## 2019-03-16 RX ADMIN — OXYCODONE HYDROCHLORIDE 10 MG: 5 SOLUTION ORAL at 06:50

## 2019-03-16 RX ADMIN — ZIPRASIDONE HYDROCHLORIDE 40 MG: 40 CAPSULE ORAL at 19:15

## 2019-03-16 RX ADMIN — Medication 2 PACKET: at 09:09

## 2019-03-16 RX ADMIN — POLYETHYLENE GLYCOL 3350 17 G: 17 POWDER, FOR SOLUTION ORAL at 09:10

## 2019-03-16 RX ADMIN — OXYCODONE HYDROCHLORIDE 10 MG: 5 SOLUTION ORAL at 12:56

## 2019-03-16 RX ADMIN — PROCHLORPERAZINE EDISYLATE 10 MG: 5 INJECTION INTRAMUSCULAR; INTRAVENOUS at 16:08

## 2019-03-16 RX ADMIN — LEVOTHYROXINE SODIUM 75 MCG: 75 TABLET ORAL at 09:09

## 2019-03-16 RX ADMIN — OXYCODONE HYDROCHLORIDE 10 MG: 5 SOLUTION ORAL at 10:19

## 2019-03-16 RX ADMIN — ENOXAPARIN SODIUM 40 MG: 40 INJECTION SUBCUTANEOUS at 09:10

## 2019-03-16 RX ADMIN — GABAPENTIN 300 MG: 300 CAPSULE ORAL at 19:26

## 2019-03-16 RX ADMIN — OXYCODONE HYDROCHLORIDE 10 MG: 5 SOLUTION ORAL at 23:47

## 2019-03-16 RX ADMIN — Medication 50 MG: at 19:14

## 2019-03-16 RX ADMIN — OXYCODONE HYDROCHLORIDE 10 MG: 5 SOLUTION ORAL at 16:07

## 2019-03-16 RX ADMIN — GABAPENTIN 300 MG: 300 CAPSULE ORAL at 09:24

## 2019-03-16 RX ADMIN — Medication 2 PACKET: at 19:16

## 2019-03-16 RX ADMIN — POTASSIUM CHLORIDE, DEXTROSE MONOHYDRATE AND SODIUM CHLORIDE: 150; 5; 450 INJECTION, SOLUTION INTRAVENOUS at 04:38

## 2019-03-16 ASSESSMENT — ACTIVITIES OF DAILY LIVING (ADL)
ADLS_ACUITY_SCORE: 14

## 2019-03-16 ASSESSMENT — MIFFLIN-ST. JEOR: SCORE: 1764.81

## 2019-03-16 NOTE — PROGRESS NOTES
THORACIC SURGERY PROGRESS NOTE     S : No issues overnight. Still has some dizziness on moving head but is able to ambulate     O  Patient Vitals for the past 24 hrs:   BP Temp Temp src Heart Rate Resp SpO2 Weight   03/16/19 0659 117/74 97.2  F (36.2  C) Oral 71 16 95 % --   03/15/19 2155 106/58 96.8  F (36  C) Oral 85 16 96 % --   03/15/19 1755 -- -- -- -- -- -- 113.4 kg (250 lb 1.6 oz)   03/15/19 1438 134/70 95.8  F (35.4  C) Oral 91 16 95 % --   03/15/19 0741 109/60 96.5  F (35.8  C) Oral 78 16 95 % --        Gen: Alert and oriented  Resp: non labored breathing   Neck: Incisions C/D, Chin to chest suture in place.    Labs  WBC WNL  Hb 9.9 ( stable)  Plts 313  BGs well controlled        48 year old female with PMHx including obesity, RACIEL, GERD, ulcerative colitis, history of chemical dependency, hypothyroidism, OCD, anxiety and depression who developed recurrent tracheal stenosis after intubation in July 2018, for accidental medication overdose.  S/p tracheal resection via cervical approach on 3/11 with Dr. Estrella and Dr. Beaulieu, EBL <50mL. This was a primary repair with resection of 3.5cm of trachea.      - Failed swallow study yesterday, will stay NPO , schedule zofran TID.   -TFs @ goal via dobhoff . Bowel regimen  - oxycodone thru tube, discharge IV narcotics 2/2 nausea  - Humidifed O2 , duonebs q4h  - unasyn off 3/14  - penrose per ENT  - If stridor, call both thoracic surgery and ENT immediately for intubation in OR.  - Lovenox , PPI, SCDs     D/w with staff     Angeli Sanchez MD

## 2019-03-16 NOTE — PLAN OF CARE
Patient A&O x 4 this shift. VSS on RA. Patient up independently around room and unit. TF running at 35 mL/hr and IVF running at 50 mL/hr. Patient given ok to start with ice chips today while completing swallowing exercises. Patient tolerating well. Patient showered today. Incision C/D/I and Aquaphor applied and dressing changed. Prn Oxycodone given x 2 this shift, prn Tylenol x 1, and Gabapentin x 2 with relief of pain. Continue with current plan of care.

## 2019-03-16 NOTE — PLAN OF CARE
Pt A&O x4, VSS on 2L NC . Pt c/o neck pain. Given tylenol dilaudid and gabapentin. Having intermittent nausea relieved with compazine x1. TF running at goal rate of 35. Walked around unit with staff. SBA to bathroom. Fluids running at 50 mL/hr. Dressing changed no drainage noted. Continues with NPO. Will continue to monitor.

## 2019-03-16 NOTE — PLAN OF CARE
Discharge Planner SLP   Patient plan for discharge: unknown  Current status: Recommend continue NPO status with alternate source of nutrition. Ok for small, single ice chips in moderation to promote improved quality of oral-pharyngeal mucosa and aid in completion of pharyngeal strengthening exercises. Pt with written instructions for exercises. Recommend repeat VFSS after removal of chin stitch by thoracic.  Barriers to return to prior living situation: TF dependence  Recommendations for discharge: home with OP ST intervention as needed  Rationale for recommendations: unclear if Pt will require ongoing ST needs post discharge (likely pending repeat VFSS results)       Entered by: Bibi Miller 03/16/2019 9:25 AM

## 2019-03-16 NOTE — PLAN OF CARE
S=Pt slept most of the night. Up about every 4 hours to use toilet and bathroom.   B=Continues to have chin sutured to neck and horizontal neck wound C/D/I.  A=VSS. SBA, mostly independent. Voiding well. Pain well controlled with liquid oxycodone. Some nausea resolved with compazine. Moves frequently. Pleasant and active in care. Strict NPO.  R=Continue with POC. Monitor closely.

## 2019-03-16 NOTE — PROGRESS NOTES
"Otolaryngology Progress Note  March 16, 2019    S: No acute events overnight. No issues with breathing. She reports she is tolerating the continuous tube feeds at goal    O: /74 (BP Location: Left arm)   Pulse 89   Temp 97.2  F (36.2  C) (Oral)   Resp 16   Ht 1.664 m (5' 5.5\")   Wt 113.4 kg (250 lb 1.6 oz)   LMP 03/04/2019 (Exact Date)   SpO2 95%   Breastfeeding? No   BMI 40.99 kg/m    General: Well appearing woman lying calmly in bed. She is alert and oriented x 3  HEENT: NG tube sutured in place. Neck incision is intact. Small amount of serosang drainage around neck. Improved ecchymosis to anterior neck and submental area with no fluctuance or crepitus. No evidence of hematoma. Chin to chest suture in place and neck is flexed. No chest crepitus. Voice is strong and clear.   Pulmonary: Respiration is regular and non-labored. no stridor, no accessory muscle use.    LABS:  ROUTINE IP LABS (Last four results)  BMP  Recent Labs   Lab 03/16/19  0517 03/15/19  1612 03/15/19  0530 03/14/19  0501 03/13/19  0546     --  139 141 143   POTASSIUM 4.4 4.1 3.3* 3.4 3.7   CHLORIDE 104  --  105 107 108   JANICE 8.1*  --  8.4* 8.5 8.4*   CO2 28  --  27 25 26   BUN 18  --  16 15 24   CR 0.61  --  0.62 0.58 0.68   *  --  144* 125* 97     CBC  Recent Labs   Lab 03/16/19  0517 03/15/19  0530 03/14/19  0501 03/13/19  1615   WBC 6.8 6.8 6.8 8.7   RBC 3.34* 3.50* 3.37* 3.57*   HGB 9.9* 10.3* 10.0* 10.6*   HCT 31.4* 32.7* 32.0* 33.6*   MCV 94 93 95 94   MCH 29.6 29.4 29.7 29.7   MCHC 31.5 31.5 31.3* 31.5   RDW 13.5 13.5 13.5 13.8    288 245 275     INRNo lab results found in last 7 days.    Imaging:  CXR 3/11  IMPRESSION:   1. Lucencies of the left hemithorax. Question pneumothorax. Recommend  follow-up chest radiograph.  2. No focal airspace opacities.    A/P: Bibi Song is a 48 year old female with a past medical history of a 3 cm tracheal stenosis proximately 4 cm below the vocal cords that developed " after an accidental medication overdose requiring intubation in 07/2018. She has been minimally responsive to endobronchial balloon dilations, and is now POD#5 s/p tracheal resection via cervical approach and hyoid release.     Neuro:  - Pain control: Tylenol PRN, oxycodone PRN, IV dilaudid for breakthrough  - PTA sertraline, ziprasidone, wellbutrin     HEENT:  - Incision cares: clean with 0.9% sodium chloride and apply Aquaphor Q8H   - Chin suture to remain intact x 7 days  - Penrose drain removed today   - Consider steroid inhaler/nebs for granulation tissue prevention - will defer to thoracic surgery     Respiratory:  - supplemental O2 PRN to keep sats >92%    CV/heme:  - hemodynamically stable    FEN/GI:  - Currently NPO.   - SLP evaluated today and recommend ongoing NPO.   - Nutrition following for TFs. Currently on tube feeds via NG at goal continuous rate  - Protein modular packet  - Bowel regimen: pericolace BID PRN, miralax PRN   - omeprazole  - Antiemetic PRN, could consider a scopolamine patch as patient feels motion sick when moving around    /Fluids/Lytes:  - voiding independently  - mIVF per thoracic surgery    Endo  - Hx hypothryoidism: levothyroxine 75 mcg    ID:  - Will recommed at least 48 hours of post-op IV unasyn for perioperative prophylaxis - complete  - Leukocytosis resolved.     PPX:  - Lovenox 40mg daily   - SCDs  - IS    Dispo: Will defer to thoracic primary.    -- Patient and above plan will be discussed with Dr. Christofer Mendez MD PGY3  Otolaryngology-Head & Neck Surgery  Please contact ENT by dialing [phone] and entering job code [phone].

## 2019-03-17 ENCOUNTER — APPOINTMENT (OUTPATIENT)
Dept: SPEECH THERAPY | Facility: CLINIC | Age: 49
End: 2019-03-17
Attending: OTOLARYNGOLOGY
Payer: COMMERCIAL

## 2019-03-17 LAB
ANION GAP SERPL CALCULATED.3IONS-SCNC: 9 MMOL/L (ref 3–14)
BUN SERPL-MCNC: 18 MG/DL (ref 7–30)
CA-I BLD-MCNC: 4.7 MG/DL (ref 4.4–5.2)
CALCIUM SERPL-MCNC: 8.5 MG/DL (ref 8.5–10.1)
CHLORIDE SERPL-SCNC: 102 MMOL/L (ref 94–109)
CO2 SERPL-SCNC: 27 MMOL/L (ref 20–32)
CREAT SERPL-MCNC: 0.66 MG/DL (ref 0.52–1.04)
ERYTHROCYTE [DISTWIDTH] IN BLOOD BY AUTOMATED COUNT: 13.5 % (ref 10–15)
GFR SERPL CREATININE-BSD FRML MDRD: >90 ML/MIN/{1.73_M2}
GLUCOSE SERPL-MCNC: 129 MG/DL (ref 70–99)
HCT VFR BLD AUTO: 33.1 % (ref 35–47)
HGB BLD-MCNC: 10.6 G/DL (ref 11.7–15.7)
MAGNESIUM SERPL-MCNC: 1.9 MG/DL (ref 1.6–2.3)
MCH RBC QN AUTO: 29.9 PG (ref 26.5–33)
MCHC RBC AUTO-ENTMCNC: 32 G/DL (ref 31.5–36.5)
MCV RBC AUTO: 93 FL (ref 78–100)
PHOSPHATE SERPL-MCNC: 3.6 MG/DL (ref 2.5–4.5)
PLATELET # BLD AUTO: 323 10E9/L (ref 150–450)
POTASSIUM SERPL-SCNC: 4.2 MMOL/L (ref 3.4–5.3)
RBC # BLD AUTO: 3.55 10E12/L (ref 3.8–5.2)
SODIUM SERPL-SCNC: 138 MMOL/L (ref 133–144)
WBC # BLD AUTO: 6.8 10E9/L (ref 4–11)

## 2019-03-17 PROCEDURE — 92526 ORAL FUNCTION THERAPY: CPT | Mod: GN

## 2019-03-17 PROCEDURE — 27210429 ZZH NUTRITION PRODUCT INTERMEDIATE LITER

## 2019-03-17 PROCEDURE — 85027 COMPLETE CBC AUTOMATED: CPT | Performed by: SURGERY

## 2019-03-17 PROCEDURE — 82330 ASSAY OF CALCIUM: CPT | Performed by: THORACIC SURGERY (CARDIOTHORACIC VASCULAR SURGERY)

## 2019-03-17 PROCEDURE — 25000132 ZZH RX MED GY IP 250 OP 250 PS 637: Performed by: STUDENT IN AN ORGANIZED HEALTH CARE EDUCATION/TRAINING PROGRAM

## 2019-03-17 PROCEDURE — 84100 ASSAY OF PHOSPHORUS: CPT | Performed by: SURGERY

## 2019-03-17 PROCEDURE — 12000001 ZZH R&B MED SURG/OB UMMC

## 2019-03-17 PROCEDURE — 25000132 ZZH RX MED GY IP 250 OP 250 PS 637: Performed by: PHYSICIAN ASSISTANT

## 2019-03-17 PROCEDURE — 25000132 ZZH RX MED GY IP 250 OP 250 PS 637: Performed by: THORACIC SURGERY (CARDIOTHORACIC VASCULAR SURGERY)

## 2019-03-17 PROCEDURE — 83735 ASSAY OF MAGNESIUM: CPT | Performed by: SURGERY

## 2019-03-17 PROCEDURE — 25000128 H RX IP 250 OP 636: Performed by: STUDENT IN AN ORGANIZED HEALTH CARE EDUCATION/TRAINING PROGRAM

## 2019-03-17 PROCEDURE — 36415 COLL VENOUS BLD VENIPUNCTURE: CPT | Performed by: SURGERY

## 2019-03-17 PROCEDURE — 80048 BASIC METABOLIC PNL TOTAL CA: CPT | Performed by: SURGERY

## 2019-03-17 RX ADMIN — Medication 2 PACKET: at 20:29

## 2019-03-17 RX ADMIN — ACETAMINOPHEN 650 MG: 325 SOLUTION ORAL at 06:58

## 2019-03-17 RX ADMIN — OXYCODONE HYDROCHLORIDE 10 MG: 5 SOLUTION ORAL at 10:18

## 2019-03-17 RX ADMIN — GABAPENTIN 300 MG: 300 CAPSULE ORAL at 11:43

## 2019-03-17 RX ADMIN — CHLORASEPTIC 1 ML: 1.5 LIQUID ORAL at 17:04

## 2019-03-17 RX ADMIN — Medication 2 PACKET: at 13:29

## 2019-03-17 RX ADMIN — OXYCODONE HYDROCHLORIDE 10 MG: 5 SOLUTION ORAL at 04:01

## 2019-03-17 RX ADMIN — CHLORASEPTIC 1 ML: 1.5 LIQUID ORAL at 18:01

## 2019-03-17 RX ADMIN — GABAPENTIN 300 MG: 300 CAPSULE ORAL at 04:01

## 2019-03-17 RX ADMIN — ZIPRASIDONE HYDROCHLORIDE 40 MG: 40 CAPSULE ORAL at 08:00

## 2019-03-17 RX ADMIN — POLYETHYLENE GLYCOL 3350 17 G: 17 POWDER, FOR SOLUTION ORAL at 08:00

## 2019-03-17 RX ADMIN — PROCHLORPERAZINE MALEATE 10 MG: 10 TABLET, FILM COATED ORAL at 17:04

## 2019-03-17 RX ADMIN — ONDANSETRON 4 MG: 2 INJECTION INTRAMUSCULAR; INTRAVENOUS at 05:24

## 2019-03-17 RX ADMIN — GABAPENTIN 300 MG: 300 CAPSULE ORAL at 16:03

## 2019-03-17 RX ADMIN — CHLORASEPTIC 1 ML: 1.5 LIQUID ORAL at 13:29

## 2019-03-17 RX ADMIN — Medication 50 MG: at 08:00

## 2019-03-17 RX ADMIN — ACETAMINOPHEN 650 MG: 325 SOLUTION ORAL at 20:28

## 2019-03-17 RX ADMIN — MULTIVITAMIN 15 ML: LIQUID ORAL at 10:18

## 2019-03-17 RX ADMIN — OXYCODONE HYDROCHLORIDE 10 MG: 5 SOLUTION ORAL at 23:56

## 2019-03-17 RX ADMIN — SENNOSIDES A AND B 10 ML: 415.36 LIQUID ORAL at 08:00

## 2019-03-17 RX ADMIN — WHITE PETROLATUM: 1.75 OINTMENT TOPICAL at 08:00

## 2019-03-17 RX ADMIN — SERTRALINE HYDROCHLORIDE 200 MG: 100 TABLET ORAL at 08:00

## 2019-03-17 RX ADMIN — Medication 10 MG: at 20:29

## 2019-03-17 RX ADMIN — PROCHLORPERAZINE EDISYLATE 10 MG: 5 INJECTION INTRAMUSCULAR; INTRAVENOUS at 10:18

## 2019-03-17 RX ADMIN — OXYCODONE HYDROCHLORIDE 5 MG: 5 SOLUTION ORAL at 17:04

## 2019-03-17 RX ADMIN — ACETAMINOPHEN 650 MG: 325 SOLUTION ORAL at 16:03

## 2019-03-17 RX ADMIN — Medication 50 MG: at 13:29

## 2019-03-17 RX ADMIN — CHLORASEPTIC 1 ML: 1.5 LIQUID ORAL at 08:01

## 2019-03-17 RX ADMIN — OXYCODONE HYDROCHLORIDE 10 MG: 5 SOLUTION ORAL at 13:29

## 2019-03-17 RX ADMIN — CHLORASEPTIC 1 ML: 1.5 LIQUID ORAL at 20:37

## 2019-03-17 RX ADMIN — Medication 20 MG: at 17:08

## 2019-03-17 RX ADMIN — Medication 50 MG: at 20:29

## 2019-03-17 RX ADMIN — ENOXAPARIN SODIUM 40 MG: 40 INJECTION SUBCUTANEOUS at 06:54

## 2019-03-17 RX ADMIN — ZIPRASIDONE HYDROCHLORIDE 40 MG: 40 CAPSULE ORAL at 20:29

## 2019-03-17 RX ADMIN — LEVOTHYROXINE SODIUM 75 MCG: 75 TABLET ORAL at 08:00

## 2019-03-17 RX ADMIN — OXYCODONE HYDROCHLORIDE 10 MG: 5 SOLUTION ORAL at 06:59

## 2019-03-17 RX ADMIN — WHITE PETROLATUM: 1.75 OINTMENT TOPICAL at 17:08

## 2019-03-17 RX ADMIN — OXYCODONE HYDROCHLORIDE 5 MG: 5 SOLUTION ORAL at 18:10

## 2019-03-17 RX ADMIN — WHITE PETROLATUM: 1.75 OINTMENT TOPICAL at 05:21

## 2019-03-17 RX ADMIN — Medication 2 PACKET: at 08:00

## 2019-03-17 RX ADMIN — OXYCODONE HYDROCHLORIDE 10 MG: 5 SOLUTION ORAL at 20:28

## 2019-03-17 RX ADMIN — CHLORASEPTIC 1 ML: 1.5 LIQUID ORAL at 05:19

## 2019-03-17 RX ADMIN — ACETAMINOPHEN 650 MG: 325 SOLUTION ORAL at 10:18

## 2019-03-17 ASSESSMENT — ACTIVITIES OF DAILY LIVING (ADL)
ADLS_ACUITY_SCORE: 14

## 2019-03-17 NOTE — PROGRESS NOTES
"THORACIC SURGERY PROGRESS NOTE     S : No issues overnight. Mild dizziness on walking, attributes to hyperflexion of neck . Had a BM yesterday and today     O  /64 (BP Location: Left arm)   Pulse 89   Temp 95.9  F (35.5  C) (Oral)   Resp 18   Ht 1.664 m (5' 5.5\")   Wt 113.6 kg (250 lb 7.1 oz)   LMP 03/04/2019 (Exact Date)   SpO2 93%   Breastfeeding? No   BMI 41.04 kg/m      Gen: Alert and oriented  Resp: non labored breathing   Neck: Incisions C/D, Chin to chest suture in place.    Labs  WBC WNL  Hb 10.9 (stable)  Plts 323  BGs well controlled        48 year old female with PMHx including obesity, RACIEL, GERD, ulcerative colitis, history of chemical dependency, hypothyroidism, OCD, anxiety and depression who developed recurrent tracheal stenosis after intubation in July 2018, for accidental medication overdose.  S/p tracheal resection via cervical approach on 3/11 with Dr. Estrella and Dr. Beaulieu, EBL <50mL. This was a primary repair with resection of 3.5cm of trachea. Doing well      - Failed swallow study 3/15, will stay NPO , schedule zofran TID.   -TFs @ goal via dobhoff . Bowel regimen  - oxycodone thru tube, discharge IV narcotics 2/2 nausea  - Humidifed O2 , duonebs q4h  - unasyn off 3/14  - penrose per ENT  - If stridor, call both thoracic surgery and ENT immediately for intubation in OR.  - Lovenox , PPI, SCDs     D/w with thoracic surgery fellow     Angeli Sanchez MD      "

## 2019-03-17 NOTE — PLAN OF CARE
Pt A&O x4. VSS on RA stats at 93%. Up independently in room and around unit. Slight nausea at end of shift. Given Zofran x1 with relief. TF running at 35 mL/hr and IVF at 50 mL/hr. NPO with ice chips. Tolerating well. Dressing changed, CDI. Given gabapentin x1, tylenol x1 and oxycodone x2 for pain and headache with relief. 1 BM overnight. Will continue to monitor.

## 2019-03-17 NOTE — PLAN OF CARE
Pt A&O x4, Spot checked 02 during shift 91-93% on RA. Up independently in room and around unit. Took three walks throughout day. Slight nausea after walk. Given compazine x1 with relief. TF running at 35 mL/hr and IVF at 50 mL/hr. Pt ok'd to start with ice chips today. Tolerating well. Dressing changed. Incision CDI. Given gabapentin x1, tylenol x1 and oxycodone x2 for pain and headache with relief. Will continue to monitor.

## 2019-03-17 NOTE — PROGRESS NOTES
"Otolaryngology Progress Note  March 17, 2019    S: No acute events overnight. Doing very well. No issues with breathing. Tolerating continuous tube feeds at goal, as well as ice chips.    O: /63 (BP Location: Left arm)   Pulse 89   Temp 96.3  F (35.7  C) (Oral)   Resp 16   Ht 1.664 m (5' 5.5\")   Wt 113.6 kg (250 lb 7.1 oz)   LMP 03/04/2019 (Exact Date)   SpO2 91%   Breastfeeding? No   BMI 41.04 kg/m    General: Resting comfortably in bed, in no acute distress.  HEENT: NG tube sutured in place. Neck incision is intact. Small amount of serosanguinous drainage around neck. Improved ecchymosis to anterior neck and submental area with no fluctuance or crepitus. No evidence of hematoma. Chin to chest suture in place and neck is flexed. No chest crepitus. Voice is strong and clear.   Pulmonary: Respiration is regular and non-labored. No stridor, no accessory muscle use.    LABS:  ROUTINE IP LABS (Last four results)  BMP  Recent Labs   Lab 03/17/19  0522 03/16/19  0517 03/15/19  1612 03/15/19  0530 03/14/19  0501    138  --  139 141   POTASSIUM 4.2 4.4 4.1 3.3* 3.4   CHLORIDE 102 104  --  105 107   JANICE 8.5 8.1*  --  8.4* 8.5   CO2 27 28  --  27 25   BUN 18 18  --  16 15   CR 0.66 0.61  --  0.62 0.58   * 157*  --  144* 125*     CBC  Recent Labs   Lab 03/17/19  0522 03/16/19  0517 03/15/19  0530 03/14/19  0501   WBC 6.8 6.8 6.8 6.8   RBC 3.55* 3.34* 3.50* 3.37*   HGB 10.6* 9.9* 10.3* 10.0*   HCT 33.1* 31.4* 32.7* 32.0*   MCV 93 94 93 95   MCH 29.9 29.6 29.4 29.7   MCHC 32.0 31.5 31.5 31.3*   RDW 13.5 13.5 13.5 13.5    313 288 719     A/P: Bibi Song is a 48 year old female with a past medical history of a 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 She has been minimally responsive to endobronchial balloon dilations, and is now POD #6 s/p tracheal resection via cervical approach and " hyoid release.     Neuro:  - Pain control: Tylenol PRN, oxycodone PRN  - PTA sertraline, ziprasidone, wellbutrin     HEENT:  - Incision cares: clean with 0.9% sodium chloride and apply Aquaphor Q8H   - Chin suture to remain intact x7 days  - Penrose drain removed  - Consider steroid inhaler/nebs for granulation tissue prevention - will defer to thoracic surgery     Respiratory:  - Supplemental O2 PRN to keep sats >92%    CV/heme:  - Hemodynamically stable    FEN/GI:  - Currently NPO besides ice chips  - Nutrition following for TFs. Currently on tube feeds via NG at goal continuous rate  - Protein modular packet  - Bowel regimen: pericolace BID PRN, miralax PRN   - Omeprazole  - Antiemetic PRN, could consider a scopolamine patch as patient feels motion sick when moving around    /Fluids/Lytes:  - Voiding independently  - mIVF per thoracic surgery    Endo  - Hx hypothyroidism: levothyroxine 75 mcg    ID:  - Will recommed at least 48 hours of post-op IV unasyn for perioperative prophylaxis - completed  - Leukocytosis resolved    PPX:  - Lovenox 40mg daily   - SCDs  - IS    Dispo: Will defer to thoracic primary.    -- Patient seen and above plan discussed with staff surgeon, Dr. Blair.    Shannon Grewal MD PGY-1  Otolaryngology-Head & Neck Surgery  Please contact ENT by dialing * [phone] and entering job code [phone].

## 2019-03-17 NOTE — PROGRESS NOTES
I rounded on the patient with the ENT team - covering in Dr Beaulieu's absence.   Patient doing well, no breathing problems.  No crepitus on exam.  Plan per thoracic surgery is to cut Tylor stitch tomorrow, swallow study, and ideally remove NG tube.    Jacinta Blair MD    Department of Otolaryngology

## 2019-03-17 NOTE — PLAN OF CARE
Patient A&O x 4 this shift. VSS on RA. Patient up independently in room and around unit. Tube feeding running at 35 mL/hr and D5 1/2 NS w/20 mEq of K infusing at 50 mL/hr. Prn Oxycodone given x 2, prn Tylenol x 1, prn Gabapentin x 1, and prn Compazine x 1 this shift with relief. Continue with current plan of care.

## 2019-03-17 NOTE — PLAN OF CARE
Discharge Planner SLP   Patient plan for discharge: unknown  Current status: Recommend continue NPO with alternate source of nutrition. Ok for ice chips as tolerated when awake/alert and sitting upright. Recommend repeat VFSS following removal of chin suture by thoracic. Removal of FT may also improve swallow function based on prior VFSS results, but will defer to thoracic team for whether or not to remove prior to exam.  Barriers to return to prior living situation: dysphagia  Recommendations for discharge: pending hospital course  Rationale for recommendations: unclear if Pt will require ongoing ST intervention post discharge       Entered by: Bibi Miller 03/17/2019 11:52 AM

## 2019-03-18 ENCOUNTER — APPOINTMENT (OUTPATIENT)
Dept: OCCUPATIONAL THERAPY | Facility: CLINIC | Age: 49
End: 2019-03-18
Attending: OTOLARYNGOLOGY
Payer: COMMERCIAL

## 2019-03-18 LAB
ANION GAP SERPL CALCULATED.3IONS-SCNC: 5 MMOL/L (ref 3–14)
BUN SERPL-MCNC: 20 MG/DL (ref 7–30)
CA-I BLD-MCNC: 4.7 MG/DL (ref 4.4–5.2)
CALCIUM SERPL-MCNC: 9 MG/DL (ref 8.5–10.1)
CHLORIDE SERPL-SCNC: 103 MMOL/L (ref 94–109)
CO2 SERPL-SCNC: 30 MMOL/L (ref 20–32)
CREAT SERPL-MCNC: 0.68 MG/DL (ref 0.52–1.04)
ERYTHROCYTE [DISTWIDTH] IN BLOOD BY AUTOMATED COUNT: 13.9 % (ref 10–15)
GFR SERPL CREATININE-BSD FRML MDRD: >90 ML/MIN/{1.73_M2}
GLUCOSE SERPL-MCNC: 105 MG/DL (ref 70–99)
HCT VFR BLD AUTO: 35.7 % (ref 35–47)
HGB BLD-MCNC: 11.1 G/DL (ref 11.7–15.7)
MAGNESIUM SERPL-MCNC: 2.1 MG/DL (ref 1.6–2.3)
MCH RBC QN AUTO: 29.5 PG (ref 26.5–33)
MCHC RBC AUTO-ENTMCNC: 31.1 G/DL (ref 31.5–36.5)
MCV RBC AUTO: 95 FL (ref 78–100)
PHOSPHATE SERPL-MCNC: 3.6 MG/DL (ref 2.5–4.5)
PLATELET # BLD AUTO: 347 10E9/L (ref 150–450)
POTASSIUM SERPL-SCNC: 4.6 MMOL/L (ref 3.4–5.3)
RBC # BLD AUTO: 3.76 10E12/L (ref 3.8–5.2)
SODIUM SERPL-SCNC: 138 MMOL/L (ref 133–144)
WBC # BLD AUTO: 8.1 10E9/L (ref 4–11)

## 2019-03-18 PROCEDURE — 25000132 ZZH RX MED GY IP 250 OP 250 PS 637: Performed by: STUDENT IN AN ORGANIZED HEALTH CARE EDUCATION/TRAINING PROGRAM

## 2019-03-18 PROCEDURE — 85027 COMPLETE CBC AUTOMATED: CPT | Performed by: SURGERY

## 2019-03-18 PROCEDURE — 82330 ASSAY OF CALCIUM: CPT | Performed by: THORACIC SURGERY (CARDIOTHORACIC VASCULAR SURGERY)

## 2019-03-18 PROCEDURE — 84100 ASSAY OF PHOSPHORUS: CPT | Performed by: SURGERY

## 2019-03-18 PROCEDURE — 25000128 H RX IP 250 OP 636: Performed by: STUDENT IN AN ORGANIZED HEALTH CARE EDUCATION/TRAINING PROGRAM

## 2019-03-18 PROCEDURE — 97535 SELF CARE MNGMENT TRAINING: CPT | Mod: GO

## 2019-03-18 PROCEDURE — 25800030 ZZH RX IP 258 OP 636: Performed by: PHYSICIAN ASSISTANT

## 2019-03-18 PROCEDURE — 27210429 ZZH NUTRITION PRODUCT INTERMEDIATE LITER

## 2019-03-18 PROCEDURE — 36415 COLL VENOUS BLD VENIPUNCTURE: CPT | Performed by: SURGERY

## 2019-03-18 PROCEDURE — 12000001 ZZH R&B MED SURG/OB UMMC

## 2019-03-18 PROCEDURE — 83735 ASSAY OF MAGNESIUM: CPT | Performed by: SURGERY

## 2019-03-18 PROCEDURE — 25000132 ZZH RX MED GY IP 250 OP 250 PS 637: Performed by: THORACIC SURGERY (CARDIOTHORACIC VASCULAR SURGERY)

## 2019-03-18 PROCEDURE — 80048 BASIC METABOLIC PNL TOTAL CA: CPT | Performed by: SURGERY

## 2019-03-18 RX ORDER — IBUPROFEN 200 MG
200-400 TABLET ORAL EVERY 6 HOURS PRN
Status: DISCONTINUED | OUTPATIENT
Start: 2019-03-18 | End: 2019-03-19 | Stop reason: HOSPADM

## 2019-03-18 RX ADMIN — OXYCODONE HYDROCHLORIDE 10 MG: 5 SOLUTION ORAL at 22:15

## 2019-03-18 RX ADMIN — GABAPENTIN 300 MG: 300 CAPSULE ORAL at 16:23

## 2019-03-18 RX ADMIN — ACETAMINOPHEN 650 MG: 325 SOLUTION ORAL at 11:27

## 2019-03-18 RX ADMIN — MULTIVITAMIN 15 ML: LIQUID ORAL at 10:21

## 2019-03-18 RX ADMIN — IBUPROFEN 400 MG: 200 TABLET, FILM COATED ORAL at 18:54

## 2019-03-18 RX ADMIN — OXYCODONE HYDROCHLORIDE 10 MG: 5 SOLUTION ORAL at 13:16

## 2019-03-18 RX ADMIN — OXYCODONE HYDROCHLORIDE 10 MG: 5 SOLUTION ORAL at 18:52

## 2019-03-18 RX ADMIN — WHITE PETROLATUM: 1.75 OINTMENT TOPICAL at 09:42

## 2019-03-18 RX ADMIN — CHLORASEPTIC 1 ML: 1.5 LIQUID ORAL at 04:00

## 2019-03-18 RX ADMIN — Medication 2 PACKET: at 14:16

## 2019-03-18 RX ADMIN — GABAPENTIN 300 MG: 300 CAPSULE ORAL at 06:06

## 2019-03-18 RX ADMIN — PROCHLORPERAZINE MALEATE 10 MG: 10 TABLET, FILM COATED ORAL at 14:16

## 2019-03-18 RX ADMIN — OXYCODONE HYDROCHLORIDE 10 MG: 5 SOLUTION ORAL at 10:21

## 2019-03-18 RX ADMIN — Medication 2 PACKET: at 08:08

## 2019-03-18 RX ADMIN — ZIPRASIDONE HYDROCHLORIDE 40 MG: 40 CAPSULE ORAL at 20:53

## 2019-03-18 RX ADMIN — SERTRALINE HYDROCHLORIDE 200 MG: 100 TABLET ORAL at 08:07

## 2019-03-18 RX ADMIN — Medication 50 MG: at 09:41

## 2019-03-18 RX ADMIN — WHITE PETROLATUM: 1.75 OINTMENT TOPICAL at 15:29

## 2019-03-18 RX ADMIN — Medication 20 MG: at 20:53

## 2019-03-18 RX ADMIN — Medication 50 MG: at 20:53

## 2019-03-18 RX ADMIN — CHLORASEPTIC 1 ML: 1.5 LIQUID ORAL at 01:23

## 2019-03-18 RX ADMIN — GABAPENTIN 300 MG: 300 CAPSULE ORAL at 12:11

## 2019-03-18 RX ADMIN — ENOXAPARIN SODIUM 40 MG: 40 INJECTION SUBCUTANEOUS at 08:08

## 2019-03-18 RX ADMIN — Medication 10 MG: at 20:53

## 2019-03-18 RX ADMIN — WHITE PETROLATUM: 1.75 OINTMENT TOPICAL at 01:25

## 2019-03-18 RX ADMIN — Medication 2 PACKET: at 20:54

## 2019-03-18 RX ADMIN — ACETAMINOPHEN 650 MG: 325 SOLUTION ORAL at 15:39

## 2019-03-18 RX ADMIN — ZIPRASIDONE HYDROCHLORIDE 40 MG: 40 CAPSULE ORAL at 09:41

## 2019-03-18 RX ADMIN — LEVOTHYROXINE SODIUM 75 MCG: 75 TABLET ORAL at 08:07

## 2019-03-18 RX ADMIN — OXYCODONE HYDROCHLORIDE 10 MG: 5 SOLUTION ORAL at 03:50

## 2019-03-18 RX ADMIN — OXYCODONE HYDROCHLORIDE 10 MG: 5 SOLUTION ORAL at 15:39

## 2019-03-18 RX ADMIN — Medication 50 MG: at 13:16

## 2019-03-18 RX ADMIN — PROCHLORPERAZINE MALEATE 10 MG: 10 TABLET, FILM COATED ORAL at 23:46

## 2019-03-18 RX ADMIN — POTASSIUM CHLORIDE, DEXTROSE MONOHYDRATE AND SODIUM CHLORIDE: 150; 5; 450 INJECTION, SOLUTION INTRAVENOUS at 01:24

## 2019-03-18 RX ADMIN — OXYCODONE HYDROCHLORIDE 10 MG: 5 SOLUTION ORAL at 07:06

## 2019-03-18 RX ADMIN — CHLORASEPTIC 1 ML: 1.5 LIQUID ORAL at 10:29

## 2019-03-18 RX ADMIN — ACETAMINOPHEN 650 MG: 325 SOLUTION ORAL at 03:50

## 2019-03-18 ASSESSMENT — ACTIVITIES OF DAILY LIVING (ADL)
ADLS_ACUITY_SCORE: 14

## 2019-03-18 NOTE — PROGRESS NOTES
0542 patient left 5B in bed with NST and House Orderly for transfer to 6A, room 14 bed 1. All belonging, medications, and paper medical chart were transferred with patient.

## 2019-03-18 NOTE — PLAN OF CARE
ST 6A: Hold-- unable to complete VFSS this date due to radiology's schedule. Exam scheduled for 8:30 AM 3/18/19

## 2019-03-18 NOTE — PLAN OF CARE
48 year old female here with tracheal stenosis. POD #6 s/p trachael resection via cervical approach and hyoid release. MD plan today is to cut Tylor stitch (chin to chest suture) today, swallow study and ideally remove NJ tube.     Incision care done at midnight and new gauze placed per order. NJ is bridled. Tube feeding is running at goal of 35 ml/hour. Ice chips are allowed. All medications are put through the NJ. Right PIV has MIVF of D5 1/2 NS with 20 K infusing at 50 ml. Independent with bed mobility. Walks to bathroom and in the halls independently. Has received prn pain medication which is helpful in reducing level of pain. No nausea this shift.    Will be transferring to , room 14-1. Nurse report given to  RYLEE Ring.

## 2019-03-18 NOTE — PLAN OF CARE
Discharge Planner OT   Patient plan for discharge: home with assist   Current status: Pt initially desats to 87% on RA with activity, pt cued for pacing and PLB and able to maintain sats >92%. Pt independent with functional transfers, mobility, and navigating stairs.   Barriers to return to prior living situation: medical status, post op deconditioning   Recommendations for discharge: home with assist   Rationale for recommendations: Pt below baseline in activity tolerance for IADs, recommend pt continue HEP to progress towards PLOF. If pt continues to be limited by deconditioning then recommend follow-up with OP PT.        Entered by: Nerissa Coelho 03/18/2019 2:51 PM        **Pt with questions regarding her post op restrictions. Therapist paged MDs two times today however therapist has not received clarification. Please update the patient as able.

## 2019-03-18 NOTE — PLAN OF CARE
Pt A&O x4, VSS on RA. SBA to bathroom. MF infusing at 50 mL/hr through PIV. Tube feed at goal rate of 35. C/o pain near incisions given oxycodone x2, tylenol x1 and gabapentin x1. Complained of intermittent nausea given compazine x1 with relief. Will continue to monitor.

## 2019-03-18 NOTE — PLAN OF CARE
Status: POD #7 s/p tracheal resection via cervical approach and hyoid release.    VS: WNL  Neuros: intact.  GI: NPO, TF at 35cc/hr via NJ tube.   : voiding spont , BM x2 , passing gas.  ?  IV: SL.   Activity: Up with one and a gait belt.   Pain: Oxycodone and tylenol, Pt given PRN gabapentin. Advil also added.   Respiratory/Trach: Sats stable on RA.   Skin: Incision KIERAN no drainage noted.   Social:  at bedside this afternoon.    Plan of care: Video swallow study at 8 AM. Pt to have NJ tube DC'd before test. Possible discontinue to home after. Cont POC.

## 2019-03-18 NOTE — PROGRESS NOTES
Report received from [facility] on 5B. All belongings in pt room, pt settled in room. Will give report to oncoming nurse.

## 2019-03-18 NOTE — PROGRESS NOTES
"THORACIC SURGERY PROGRESS NOTE     S: No acute events overnight. Patient is feeling well except for nose and throat soreness that she attributes to her NG tube. Voiding spontaneously (not measuring), and BM x2 yesterday.      O  /67 (BP Location: Left arm)   Pulse 68   Temp 96.2  F (35.7  C) (Oral)   Resp 16   Ht 1.664 m (5' 5.5\")   Wt 113.6 kg (250 lb 7.1 oz)   LMP 03/04/2019 (Exact Date)   SpO2 90%   Breastfeeding? No   BMI 41.04 kg/m      Gen: AAOx3, sitting up in bed, no acute distress, appears well  Resp: non labored breathing on room air  Neck: Incisions c/d/i, chin to chest suture in place  CV: RRR  Abd: soft, non-distended, non-tender  Ext: SCDs on no edema    Labs reviewed  WBC 8.1 (6.8)  Hb 11.1 (10.6)  Plts 347 (323)  Electrolytes WNL    No new imaging.    A/P:  48 year old female with PMHx including obesity, RACIEL, GERD, ulcerative colitis, history of chemical dependency, hypothyroidism, OCD, anxiety and depression who developed recurrent tracheal stenosis after intubation in July 2018, for accidental medication overdose.  S/p tracheal resection via cervical approach on 3/11 with Dr. Estrella and Dr. Beaulieu, EBL <50mL. This was a primary repair with resection of 3.5cm of trachea. Doing well.     - Will remove neck stitch today and likely will remove NG tube then repeat swallow study  - Diet advancement pending repeat swallow study  - Scheduled zofran TID  - TFs @ goal via dobhoff . Bowel regimen  - Oxycodone thru tube  - Humidifed O2, duonebs q4h  - S/p unasyn (done 3/14)  - If stridor, call both thoracic surgery and ENT immediately for intubation in OR.  - Lovenox , PPI, SCDs  - Possible discharge home tomorrow pending how today progresses    Patient seen with Dr. Wilcox, Thoracic Fellow, will discuss with staff.    Zuri Martinez MD (PGY-1)  General Surgery Resident  Thoracic Surgery      "

## 2019-03-18 NOTE — PROGRESS NOTES
"Otolaryngology Progress Note  March 18, 2019    S: No acute events overnight. Continues to do very well. No issues with breathing. She does have some nasal discomfort attributable to the NG tube.    O: /67 (BP Location: Left arm)   Pulse 68   Temp 96.2  F (35.7  C) (Oral)   Resp 16   Ht 1.664 m (5' 5.5\")   Wt 113.6 kg (250 lb 7.1 oz)   LMP 03/04/2019 (Exact Date)   SpO2 90%   Breastfeeding? No   BMI 41.04 kg/m    General: Resting comfortably in bed, in no acute distress.  HEENT: NG tube sutured in place. Neck incision is intact. Moderate ecchymosis to anterior neck and submental area with no fluctuance or crepitus. No evidence of hematoma. Chin to chest suture in place and neck is flexed. No chest crepitus. Voice is strong and clear.   Pulmonary: Respirations are regular and non-labored. No stridor, no accessory muscle use.    LABS:  BMP  Recent Labs   Lab 03/18/19  0429 03/17/19  0522 03/16/19  0517 03/15/19  1612 03/15/19  0530    138 138  --  139   POTASSIUM 4.6 4.2 4.4 4.1 3.3*   CHLORIDE 103 102 104  --  105   JANICE 9.0 8.5 8.1*  --  8.4*   CO2 30 27 28  --  27   BUN 20 18 18  --  16   CR 0.68 0.66 0.61  --  0.62   * 129* 157*  --  144*     CBC  Recent Labs   Lab 03/18/19  0429 03/17/19  0522 03/16/19  0517 03/15/19  0530   WBC 8.1 6.8 6.8 6.8   RBC 3.76* 3.55* 3.34* 3.50*   HGB 11.1* 10.6* 9.9* 10.3*   HCT 35.7 33.1* 31.4* 32.7*   MCV 95 93 94 93   MCH 29.5 29.9 29.6 29.4   MCHC 31.1* 32.0 31.5 31.5   RDW 13.9 13.5 13.5 13.5    323 313 288     A/P: Bibi M Nohemi is a 48 year old female with a past medical history of a 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 She has been minimally responsive to endobronchial balloon dilations, and is now POD #7 s/p tracheal resection via cervical approach and hyoid release.     Neuro:  - Pain control: Tylenol PRN, oxycodone PRN  - PTA " sertraline, ziprasidone, wellbutrin     HEENT:  - Incision cares: clean with 0.9% sodium chloride and apply Aquaphor Q8H   - Chin suture to remain intact x7 days, per Thoracic Surgery anticipate removal today  - Penrose drain removed  - Consider steroid inhaler/nebs for granulation tissue prevention - will defer to thoracic surgery     Respiratory:  - Supplemental O2 PRN to keep sats >92%    CV/heme:  - Hemodynamically stable    FEN/GI:  - Currently NPO besides ice chips  - Nutrition following for TFs. Currently on tube feeds via NG at goal continuous rate  - Repeat swallow study today and possible NG tube removal  - Protein modular packet  - Bowel regimen: pericolace BID PRN, miralax PRN   - Omeprazole  - Antiemetic PRN, could consider a scopolamine patch as patient feels motion sick when moving around    /Fluids/Lytes:  - Voiding independently  - mIVF per thoracic surgery    Endo  - Hx hypothyroidism: levothyroxine 75 mcg    ID:  - Will recommed at least 48 hours of post-op IV unasyn for perioperative prophylaxis - completed  - Leukocytosis resolved    PPX:  - Lovenox 40mg daily   - SCDs  - IS    Dispo: Will defer to thoracic primary.    -- Patient and above plan discussed with staff surgeon, Dr. Blair.    Shannon Grewal MD PGY-1  Otolaryngology-Head & Neck Surgery  Please contact ENT by dialing * [phone] and entering job code [phone].

## 2019-03-19 ENCOUNTER — APPOINTMENT (OUTPATIENT)
Dept: GENERAL RADIOLOGY | Facility: CLINIC | Age: 49
End: 2019-03-19
Attending: PHYSICIAN ASSISTANT
Payer: COMMERCIAL

## 2019-03-19 ENCOUNTER — PATIENT OUTREACH (OUTPATIENT)
Dept: CARE COORDINATION | Facility: CLINIC | Age: 49
End: 2019-03-19

## 2019-03-19 ENCOUNTER — APPOINTMENT (OUTPATIENT)
Dept: SPEECH THERAPY | Facility: CLINIC | Age: 49
End: 2019-03-19
Attending: OTOLARYNGOLOGY
Payer: COMMERCIAL

## 2019-03-19 VITALS
BODY MASS INDEX: 40.25 KG/M2 | HEART RATE: 83 BPM | DIASTOLIC BLOOD PRESSURE: 45 MMHG | WEIGHT: 250.44 LBS | TEMPERATURE: 96.4 F | SYSTOLIC BLOOD PRESSURE: 115 MMHG | RESPIRATION RATE: 16 BRPM | HEIGHT: 66 IN | OXYGEN SATURATION: 93 %

## 2019-03-19 LAB
ANION GAP SERPL CALCULATED.3IONS-SCNC: 7 MMOL/L (ref 3–14)
BUN SERPL-MCNC: 24 MG/DL (ref 7–30)
CA-I BLD-MCNC: 4.5 MG/DL (ref 4.4–5.2)
CALCIUM SERPL-MCNC: 8.9 MG/DL (ref 8.5–10.1)
CHLORIDE SERPL-SCNC: 104 MMOL/L (ref 94–109)
CO2 SERPL-SCNC: 25 MMOL/L (ref 20–32)
CREAT SERPL-MCNC: 0.7 MG/DL (ref 0.52–1.04)
ERYTHROCYTE [DISTWIDTH] IN BLOOD BY AUTOMATED COUNT: 13.9 % (ref 10–15)
GFR SERPL CREATININE-BSD FRML MDRD: >90 ML/MIN/{1.73_M2}
GLUCOSE SERPL-MCNC: 102 MG/DL (ref 70–99)
HCT VFR BLD AUTO: 35.3 % (ref 35–47)
HGB BLD-MCNC: 10.7 G/DL (ref 11.7–15.7)
MAGNESIUM SERPL-MCNC: 2.2 MG/DL (ref 1.6–2.3)
MCH RBC QN AUTO: 29.8 PG (ref 26.5–33)
MCHC RBC AUTO-ENTMCNC: 30.3 G/DL (ref 31.5–36.5)
MCV RBC AUTO: 98 FL (ref 78–100)
PHOSPHATE SERPL-MCNC: 3.6 MG/DL (ref 2.5–4.5)
PLATELET # BLD AUTO: 274 10E9/L (ref 150–450)
POTASSIUM SERPL-SCNC: 4.1 MMOL/L (ref 3.4–5.3)
RBC # BLD AUTO: 3.59 10E12/L (ref 3.8–5.2)
SODIUM SERPL-SCNC: 136 MMOL/L (ref 133–144)
WBC # BLD AUTO: 8.6 10E9/L (ref 4–11)

## 2019-03-19 PROCEDURE — 84100 ASSAY OF PHOSPHORUS: CPT | Performed by: SURGERY

## 2019-03-19 PROCEDURE — 85027 COMPLETE CBC AUTOMATED: CPT | Performed by: SURGERY

## 2019-03-19 PROCEDURE — 25000132 ZZH RX MED GY IP 250 OP 250 PS 637: Performed by: THORACIC SURGERY (CARDIOTHORACIC VASCULAR SURGERY)

## 2019-03-19 PROCEDURE — 36415 COLL VENOUS BLD VENIPUNCTURE: CPT | Performed by: SURGERY

## 2019-03-19 PROCEDURE — 25000132 ZZH RX MED GY IP 250 OP 250 PS 637: Performed by: STUDENT IN AN ORGANIZED HEALTH CARE EDUCATION/TRAINING PROGRAM

## 2019-03-19 PROCEDURE — 80048 BASIC METABOLIC PNL TOTAL CA: CPT | Performed by: SURGERY

## 2019-03-19 PROCEDURE — 25000131 ZZH RX MED GY IP 250 OP 636 PS 637: Performed by: STUDENT IN AN ORGANIZED HEALTH CARE EDUCATION/TRAINING PROGRAM

## 2019-03-19 PROCEDURE — 82330 ASSAY OF CALCIUM: CPT | Performed by: THORACIC SURGERY (CARDIOTHORACIC VASCULAR SURGERY)

## 2019-03-19 PROCEDURE — 74230 X-RAY XM SWLNG FUNCJ C+: CPT

## 2019-03-19 PROCEDURE — 92611 MOTION FLUOROSCOPY/SWALLOW: CPT | Mod: GN

## 2019-03-19 PROCEDURE — 27210429 ZZH NUTRITION PRODUCT INTERMEDIATE LITER

## 2019-03-19 PROCEDURE — 92526 ORAL FUNCTION THERAPY: CPT | Mod: GN

## 2019-03-19 PROCEDURE — 25000128 H RX IP 250 OP 636: Performed by: STUDENT IN AN ORGANIZED HEALTH CARE EDUCATION/TRAINING PROGRAM

## 2019-03-19 PROCEDURE — 83735 ASSAY OF MAGNESIUM: CPT | Performed by: SURGERY

## 2019-03-19 RX ORDER — OXYCODONE HCL 5 MG/5 ML
5-10 SOLUTION, ORAL ORAL
Qty: 500 ML | Refills: 0 | Status: SHIPPED | OUTPATIENT
Start: 2019-03-19 | End: 2019-04-04

## 2019-03-19 RX ORDER — OXYCODONE HYDROCHLORIDE 5 MG/1
5-10 TABLET ORAL
Qty: 25 TABLET | Refills: 0 | Status: SHIPPED | OUTPATIENT
Start: 2019-03-19 | End: 2019-03-22

## 2019-03-19 RX ORDER — OXYCODONE HYDROCHLORIDE 5 MG/1
5-10 TABLET ORAL EVERY 4 HOURS PRN
Status: DISCONTINUED | OUTPATIENT
Start: 2019-03-19 | End: 2019-03-19 | Stop reason: HOSPADM

## 2019-03-19 RX ORDER — GABAPENTIN 300 MG/1
600 CAPSULE ORAL 3 TIMES DAILY PRN
Status: DISCONTINUED | OUTPATIENT
Start: 2019-03-19 | End: 2019-03-19 | Stop reason: HOSPADM

## 2019-03-19 RX ORDER — GABAPENTIN 400 MG/1
400 CAPSULE ORAL ONCE
Status: COMPLETED | OUTPATIENT
Start: 2019-03-19 | End: 2019-03-19

## 2019-03-19 RX ORDER — BARIUM SULFATE 400 MG/ML
SUSPENSION ORAL ONCE
Status: COMPLETED | OUTPATIENT
Start: 2019-03-19 | End: 2019-03-19

## 2019-03-19 RX ORDER — GABAPENTIN 300 MG/1
600 CAPSULE ORAL 3 TIMES DAILY PRN
Qty: 60 CAPSULE | Refills: 0 | Status: SHIPPED | OUTPATIENT
Start: 2019-03-19 | End: 2019-03-25

## 2019-03-19 RX ORDER — BUPROPION HYDROCHLORIDE 100 MG/1
50 TABLET ORAL 3 TIMES DAILY
Qty: 90 TABLET | Refills: 0 | Status: SHIPPED | OUTPATIENT
Start: 2019-03-19 | End: 2020-06-03 | Stop reason: DRUGHIGH

## 2019-03-19 RX ORDER — POLYETHYLENE GLYCOL 3350 17 G/17G
1 POWDER, FOR SOLUTION ORAL 2 TIMES DAILY
Qty: 60 PACKET | Refills: 0 | Status: SHIPPED | OUTPATIENT
Start: 2019-03-19 | End: 2020-04-22

## 2019-03-19 RX ADMIN — OXYCODONE HYDROCHLORIDE 10 MG: 5 SOLUTION ORAL at 02:11

## 2019-03-19 RX ADMIN — WHITE PETROLATUM: 1.75 OINTMENT TOPICAL at 07:53

## 2019-03-19 RX ADMIN — IBUPROFEN 400 MG: 200 TABLET, FILM COATED ORAL at 02:10

## 2019-03-19 RX ADMIN — ENOXAPARIN SODIUM 40 MG: 40 INJECTION SUBCUTANEOUS at 07:40

## 2019-03-19 RX ADMIN — BARIUM SULFATE 50 ML: 400 SUSPENSION ORAL at 08:40

## 2019-03-19 RX ADMIN — OXYCODONE HYDROCHLORIDE 10 MG: 5 SOLUTION ORAL at 05:24

## 2019-03-19 RX ADMIN — ACETAMINOPHEN 650 MG: 325 SOLUTION ORAL at 03:50

## 2019-03-19 RX ADMIN — Medication 50 MG: at 13:43

## 2019-03-19 RX ADMIN — OXYCODONE HYDROCHLORIDE 10 MG: 5 TABLET ORAL at 13:43

## 2019-03-19 RX ADMIN — IBUPROFEN 400 MG: 200 TABLET, FILM COATED ORAL at 11:42

## 2019-03-19 RX ADMIN — OXYCODONE HYDROCHLORIDE 10 MG: 5 SOLUTION ORAL at 08:05

## 2019-03-19 RX ADMIN — ONDANSETRON 4 MG: 4 TABLET, ORALLY DISINTEGRATING ORAL at 06:17

## 2019-03-19 RX ADMIN — LEVOTHYROXINE SODIUM 75 MCG: 75 TABLET ORAL at 07:40

## 2019-03-19 RX ADMIN — GABAPENTIN 300 MG: 300 CAPSULE ORAL at 03:50

## 2019-03-19 RX ADMIN — GABAPENTIN 400 MG: 400 CAPSULE ORAL at 07:40

## 2019-03-19 RX ADMIN — GABAPENTIN 600 MG: 300 CAPSULE ORAL at 13:43

## 2019-03-19 RX ADMIN — SERTRALINE HYDROCHLORIDE 200 MG: 100 TABLET ORAL at 07:40

## 2019-03-19 RX ADMIN — Medication 2 PACKET: at 07:41

## 2019-03-19 ASSESSMENT — ACTIVITIES OF DAILY LIVING (ADL)
ADLS_ACUITY_SCORE: 14

## 2019-03-19 NOTE — PLAN OF CARE
DISCHARGE                         3/19/2019  5:23 PM  ----------------------------------------------------------------------------  Discharged to: Home  Via: private transportation  Accompanied by: Family  Discharge Instructions: Reviewed diet,activity, medications, follow up appointments, when to call the MD, aftercare instructions.  Prescriptions: To be filled by  pharmacy; medication list reviewed & sent with pt. Pt to  meds on her way out.  Follow Up Appointments: arranged; information given  Belongings: All sent with pt  IV: d/c'd  Pt exhibits understanding of above discharge instructions; all questions answered.    Discharge Paperwork: Signed, copied, and sent home with patient.

## 2019-03-19 NOTE — PROGRESS NOTES
"Otolaryngology Progress Note  March 18, 2019    S: No acute events overnight. Up and walking the halls this morning. Reports her neck feels much better now that the chin to chest sutures has been removed. No issues breathing.     O: /45 (BP Location: Right arm)   Pulse 83   Temp 96.4  F (35.8  C) (Oral)   Resp 16   Ht 1.664 m (5' 5.5\")   Wt 113.6 kg (250 lb 7.1 oz)   LMP 03/04/2019 (Exact Date)   SpO2 93%   Breastfeeding? No   BMI 41.04 kg/m    General: Resting comfortably in bed, in no acute distress.  HEENT: NG tube sutured in place. Neck incision is intact. Improving ecchymosis to anterior neck and submental area with no fluctuance or crepitus. No evidence of hematoma. No crepitus over the chest. Voice is strong and clear.   Pulmonary: Respiration is regular and non-labored. No stridor, no accessory muscle use.    LABS:  ROUTINE IP LABS (Last four results)  BMP  Recent Labs   Lab 03/19/19  0646 03/18/19  0429 03/17/19  0522 03/16/19  0517    138 138 138   POTASSIUM 4.1 4.6 4.2 4.4   CHLORIDE 104 103 102 104   JANICE 8.9 9.0 8.5 8.1*   CO2 25 30 27 28   BUN 24 20 18 18   CR 0.70 0.68 0.66 0.61   * 105* 129* 157*     CBC  Recent Labs   Lab 03/19/19  0646 03/18/19  0429 03/17/19  0522 03/16/19  0517   WBC 8.6 8.1 6.8 6.8   RBC 3.59* 3.76* 3.55* 3.34*   HGB 10.7* 11.1* 10.6* 9.9*   HCT 35.3 35.7 33.1* 31.4*   MCV 98 95 93 94   MCH 29.8 29.5 29.9 29.6   MCHC 30.3* 31.1* 32.0 31.5   RDW 13.9 13.9 13.5 13.5    347 323 313     A/P: Bibi Song is a 48 year old female with a past medical history of a 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 She has been minimally responsive to endobronchial balloon dilations, and is now POD #8 s/p tracheal resection via cervical approach and hyoid release.     Neuro:  - Pain control: Tylenol PRN, oxycodone PRN  - PTA sertraline, ziprasidone, " wellbutrin     HEENT:  - Incision cares: clean with 0.9% sodium chloride and apply Aquaphor Q8H   - Consider steroid inhaler/nebs for granulation tissue prevention - will defer to thoracic surgery     Respiratory:  - Supplemental O2 PRN to keep sats >92%    CV/heme:  - Hemodynamically stable    FEN/GI:  - Currently NPO besides ice chips. Plan for video swallow study this morning.   - Nutrition following for TFs. Currently on tube feeds via NG at goal continuous rate  - Protein modular packet  - Bowel regimen: pericolace BID PRN, miralax PRN   - Omeprazole  - Antiemetic PRN, could consider a scopolamine patch as patient feels motion sick when moving around    /Fluids/Lytes:  - Voiding independently  - mIVF per thoracic surgery    Endo  - Hx hypothyroidism: levothyroxine 75 mcg    ID:  - Will recommed at least 48 hours of post-op IV unasyn for perioperative prophylaxis - completed  - Leukocytosis resolved    PPX:  - Lovenox 40mg daily   - SCDs  - IS    Dispo: Will defer to thoracic primary, anticipate discharge to home today.    -- Patient seen and above plan discussed with staff surgeon, Dr. Blair.    Kerry Mathew PA-C  Otolaryngology-Head & Neck Surgery  Please contact ENT by dialing * [phone] and entering job code [phone].

## 2019-03-19 NOTE — PROGRESS NOTES
THORACIC & FOREGUT SURGERY    Seen and examined.  Doing well, but failed swallow evaluation.    Vitals:    03/18/19 1535 03/18/19 2334 03/19/19 0015 03/19/19 0732   BP: 111/59 107/56  115/45   BP Location:  Right arm  Right arm   Pulse: 85   83   Resp: 16 16  16   Temp: 96  F (35.6  C) 96.7  F (35.9  C)  96.4  F (35.8  C)   TempSrc: Oral Oral  Oral   SpO2: 91% (!) 89% 96% 93%   Weight:       Height:            Most recent labs and Imaging Reviewed       A/P: 48 year old female POD# 8 s/p tracheal resection  [place] thick liquids diet  Prn pain control  Sutures to be removed today  Likely discharge to home today if tolerating oral intake      Jaswinder Carter MD

## 2019-03-19 NOTE — PLAN OF CARE
"/45 (BP Location: Right arm)   Pulse 83   Temp 96.4  F (35.8  C) (Oral)   Resp 16   Ht 1.664 m (5' 5.5\")   Wt 113.6 kg (250 lb 7.1 oz)   LMP 03/04/2019 (Exact Date)   SpO2 93%   Breastfeeding? No   BMI 41.04 kg/m      Shift: 8251-0901   Reason for Admission: POD 8 trach resection   VS: VSS on RA  Neuros: A/Ox4, able to make needs known. Neuros intact.  GI/: No BMs this shift. Voiding not saving   Diet: Tolerating FLD + nectar thick liquids. Encourage fluid intake  Drains/Lines: PIV SL  Activity: Pt up ad dory  Pain/Nausea: Denies nausea. C/o of pain r/t to neck incision. PRN Oxycodone given x2 and Ibuprofen x1  Skin: Neck incision ecchymosis, sutures removed, KIERAN?  Social:  at bedside   New this shift: Removed NGT, discontinued TF, Swallow Study done.   Plan of care: Pt will discharge tonight. Pt will need ST consult at home. Will continue to monitor and follow POC.   "

## 2019-03-19 NOTE — PROGRESS NOTES
CLINICAL NUTRITION SERVICES - BRIEF NOTE     Nutrition Prescription        Recommendations already ordered by Registered Dietitian (RD):  - Discontinued TF orders for Isosource 1.5   - Discontinued Prosource (protein modular)   - Discontinued Certavite (multvitamin with mineral)       Per RN report, informed that pt's FT was pulled earlier today and her diet was advanced to Nectar Thickened Full Liquids per SLP recommendations, per results of VSS today. Plan is for pt to discharge home this afternoon.   Noted pt's TF orders were still active.     Interventions:  1. Collaborated with SLP regarding diet recommendations and need for additional diet teaching. Informed that pt was given written information of diet guidelines to follow at home.  2. Met with pt to review diet allowances per SLP recommendations. Answered questions regarding diet. Received request to provide a 1-2 day supply of nectar thickened packets, in addition to samples of Gelatein 20 (nutritional supplement) for pt to take home.  Pt's request full filled.     No further intervention planned at this time d/t pt discharging home today.      Luci Serrano RD,LD  Unit pager [phone]

## 2019-03-19 NOTE — PLAN OF CARE
"Discharge Planner SLP   Patient plan for discharge: \"home today\"  Current status: SLP: Video swallow study completed per MD orders. Pt presents with moderate oropharyngeal dysphagia. Pts swallow function characterized by reduced hyolaryngeal elevation and no epiglottic inversion (despite removal of TF and chin stitch). These deficits resulted in aspiration with cough response on thin liquids, and varying degrees of laryngeal penetration across all additional PO trials. Pt was able to expell penetrated material with cues to use supraglottic swallow technique (swallow, throat clear, swallow). Pt with mild-moderate pharyngeal residuals remaining in valleculae and pyriforms which pt was able to clear with cues to double swallow. From an oropharyngeal standpoint, pt would be appropriate to cautiously initiate nectar thick full liquids with strict use of swallow precautions. Pt should be fully upright and alert for all PO, take small single sips/bites, use supraglottic swallow technique (swallow, throat clear, swallow), double swallow each bite, and alternate between consistencies. ST to continue to follow to assess diet tolerance and complete oropharyngeal exercises. Should pt discharge home today, pt will require OP ST follow up.   Barriers to return to prior living situation: dysphagia, aspiration risk   Recommendations for discharge: ongoing ST upon discharge (home with OP ST follow up)  Rationale for recommendations: pt continues to present with ongoing aspiration risk and would benefit from ST f/u to assess diet tolerance and complete oropharyngeal exercises        Entered by: Barbra Esparza 03/19/2019 9:31 AM      "

## 2019-03-19 NOTE — DISCHARGE SUMMARY
NAME: Bibi Song   MRN: [id number]   : 1970     DATE OF ADMISSION: 3/11/2019     PRE/POSTOPERATIVE DIAGNOSES: Post-intubation tracheal stenosis.     PROCEDURES PERFORMED:   1.  Tracheal resection and reconstruction via a transcervical approach.   2.  Flexible bronchoscopy.    PATHOLOGY RESULTS:  A. CALCIFIED NODE:   - Result will be reported in addendum after decalcification and   microscopic examination.     B. FIRST RESECTED TRACHEAL RING:   - Benign trachea with ossified cartilage.     C. MAIN STENOSIS, EXCISION:   - Benign trachea with submucosal fibrosis/scarring.     D. DISTAL RING, EXCISION:   - Benign trachea with submucosal fibrosis/scarring.     CULTURE RESULTS: None     INTRAOPERATIVE COMPLICATIONS: None     POSTOPERATIVE MEDICAL ISSUES: No complications, pt did have some difficulty with swallow study and was put on nectar thick diet at time of discharge with close follow up with speech therapy.    DRAINS/TUBES PRESENT AT DISCHARGE: None    DATE OF DISCHARGE:  2019    HOSPITAL COURSE: Bibi Song is a 48 year old female who on 3/11/2019 underwent the above-named procedures.  She tolerated the operation well and postoperatively was transferred to the general post-surgical unit.  The remainder of her course was essentially uncomplicated with the exception of the above noted swallow study.  Prior to discharge, her pain was controlled well, she was able to perform ADLs and ambulate independently without difficulty, and had full return of bowel and bladder function.  On 2019, she was discharged to home in stable condition with close follow up in place.    DISCHARGE EXAM:   A&O, NAD  Resp non-labored  Distal extremities warm    Incisions healing well     DISCHARGE INSTRUCTIONS:  Discharge Procedure Orders   SPEECH THERAPY REFERRAL   Standing Status: Future Standing Exp. Date: 20   Referral Type: Therapeutic Services   Number of Visits Requested: 1     Reason for your  "hospital stay   Order Comments: Status post tracheal resection     Activity   Order Comments: Your activity upon discharge: activity as tolerated     Order Specific Question Answer Comments   Is discharge order? Yes      Discharge Instructions   Order Comments: THORACIC SURGERY DISCHARGE INSTRUCTIONS    If your plans upon discharge include prolonged periods of sitting (i.e a lengthy car or plane ride), it is highly beneficial to get up and walk at least once per hour to help prevent swelling and blood clots.     Do not submerge, soak, or scrub incision or swim until seen in follow-up.    Take incentive spirometer home for continued frequent use    Activity as tolerated, no strenous activity until seen in follow-up     Stay hydrated (small frequent liquid intake due to speech therapy restrictions).     Take over the counter fiber (metamucil or benefiber) and stool softeners (Miralax, docusate or senna) if becoming constipated.     Call for fever greater than 101.5, chills, increased size of incision, red skin around incision, vision changes, muscle strength changes, sensation changes, shortness of breath, or other concerns.    No driving while taking narcotic pain medication.    Transition to ibuprofen or tylenol/acetaminophen for pain control. Do not take tylenol/acetaminophen and acetaminophen containing narcotic (e.g., percocet or vicodin) at the same time. If you have known ulcer problems, or kidney trouble (elevated creatinine) do not take the ibuprofen.    In emergencies, call 911    For other Questions or Concerns;   A.) During weekday working hours (Monday through Friday 8am to 4:30pm)   call [phone] and ask to speak to a clinical nurse specialist.     B.) At nights (after 4:30pm), on weekends, or if urgent call [phone] and   tell the  \"I would like to page job code [phone], the thoracic surgery   fellow on call, please.\"     Adult [facility] Follow-up and recommended labs and tests "   Order Comments: 1.) Follow up with primary care physician, Glory Singleton, in 1-2 weeks.  2.) Follow up with Lorene Quintero MD, in the OR for a surveillance bronchoscopy in 2 weeks.   3.) Follow up with Dr. Nick Beaulieu from ENT in one week.    Appointments on [place] and/or [facility] (with [facility] or [facility] provider or service). Call [phone] if you haven't heard regarding these appointments within 7 days of discharge.     Full Code     Order Specific Question Answer Comments   Code status determined by: Discussion with patient/legal decision maker      Diet   Order Comments: Follow this diet upon discharge: Full liquid diet with nectar thick liquids, supraglottic swallow     Order Specific Question Answer Comments   Is discharge order? Yes      Diet   Order Comments: Nectar thick full liquids with strict use of swallow precautions. Pt should be fully upright and alert for all PO, take small single sips/bites, use supraglottic swallow technique (swallow, throat clear, swallow), double swallow each bite, and alternate between consistencies     Order Specific Question Answer Comments   Is discharge order? Yes        DISCHARGE MEDICATIONS:   Current Discharge Medication List      START taking these medications    Details   buPROPion (WELLBUTRIN) 100 MG tablet 0.5 tablets (50 mg) by Oral or Feeding Tube route 3 times daily  Qty: 90 tablet, Refills: 0    Associated Diagnoses: History of resection and anastomosis of trachea; Tracheal stenosis      oxyCODONE (ROXICODONE) 5 MG tablet Take 1-2 tablets (5-10 mg) by mouth every 3 hours as needed for moderate to severe pain or severe pain (crush pills)  Qty: 25 tablet, Refills: 0    Associated Diagnoses: History of resection and anastomosis of trachea      oxyCODONE (ROXICODONE) 5 MG/5ML solution 5-10 mLs (5-10 mg) by Oral or Feeding Tube route every 3 hours as needed for moderate to severe pain  Qty: 500 mL, Refills: 0    Associated Diagnoses: History of resection and  anastomosis of trachea; Tracheal stenosis      polyethylene glycol (MIRALAX/GLYCOLAX) packet Take 17 g by mouth 2 times daily  Qty: 60 packet, Refills: 0    Associated Diagnoses: History of resection and anastomosis of trachea; Tracheal stenosis         CONTINUE these medications which have CHANGED    Details   gabapentin (NEURONTIN) 300 MG capsule 2 capsules (600 mg) by Oral or Feeding Tube route 3 times daily as needed (Anxiety)  Qty: 60 capsule, Refills: 0    Associated Diagnoses: History of resection and anastomosis of trachea; Tracheal stenosis         CONTINUE these medications which have NOT CHANGED    Details   acetaminophen (TYLENOL) 500 MG tablet Take 500-1,000 mg by mouth every 6 hours as needed for mild pain or other (headache)      albuterol (PROVENTIL) (2.5 MG/3ML) 0.083% neb solution inhale the contents of 1 vial BY MOUTH EVERY 4 HOURS AS NEEDED SHORTNESS OF BREATH  Refills: 0      CALCIUM-MAGNESIUM-ZINC PO Take 1 tablet by mouth 3 times daily      FLOVENT DISKUS 50 MCG/BLIST inhaler Inhale 1 Puff by mouth PRN (rinse mouth after each use)  Refills: 3      ibuprofen (ADVIL/MOTRIN) 200 MG tablet Take 600 mg by mouth as needed for mild pain      levothyroxine (SYNTHROID/LEVOTHROID) 75 MCG tablet Take 1 tablet (75 mcg) by mouth daily  Qty: 90 tablet, Refills: 1    Associated Diagnoses: Hypothyroidism, unspecified type      melatonin 3 MG tablet Take 20 mg by mouth At Bedtime   Qty: 30 tablet, Refills: 0      omeprazole (PRILOSEC) 20 MG DR capsule Take 1 capsule (20 mg) by mouth daily  Qty: 30 capsule, Refills: 0    Associated Diagnoses: Tracheal stenosis      prazosin (MINIPRESS) 1 MG capsule Take 1 mg by mouth At Bedtime      sertraline (ZOLOFT) 100 MG tablet Take 2 tablets (200 mg) by mouth daily  Qty: 60 tablet, Refills: 0    Associated Diagnoses: Obsessive-compulsive disorder, unspecified type; Other specified anxiety disorders; Major depressive disorder, recurrent episode, moderate (H)      VENTOLIN   (90 Base) MCG/ACT inhaler inhale 2 puffs by mouth four times daily as needed for shortness of breath (rinse mouthpiece weekly)  Refills: 1      vitamin B complex with vitamin C (VITAMIN  B COMPLEX) TABS tablet Take 1 tablet by mouth every morning       ziprasidone (GEODON) 40 MG capsule Take 1 capsule (40 mg) by mouth 2 times daily (with meals)  Qty: 60 capsule, Refills: 0    Associated Diagnoses: Obsessive-compulsive disorder, unspecified type         STOP taking these medications       buPROPion (WELLBUTRIN XL) 150 MG 24 hr tablet Comments:   Reason for Stopping:

## 2019-03-19 NOTE — PROGRESS NOTES
03/19/19 0910   General Information   Onset Date 03/11/19   Start of Care Date 03/19/19   Referring Physician Zuri Martinez MD   Patient Profile Review/OT: Additional Occupational Profile Info See Profile for full history and prior level of function   Patient/Family Goals Statement Pt would like to go home   Swallowing Evaluation Videofluoroscopic evaluation   Behaviorial Observations WNL (within normal limits)   Mode of current nutrition NPO   Respiratory Status Room air   Comments 48 year old female with PMHx including obesity, RACIEL, GERD, ulcerative colitis, history of chemical dependency, hypothyroidism, OCD, anxiety and depression who developed recurrent tracheal stenosis after intubation in July 2018, for accidental medication overdose.  S/p tracheal resection via cervical approach on 3/11 with Dr. Estrella and Dr. Beaulieu, EBL <50mL. This was a primary repair with resection of 3.5cm of trachea. Previous VFSS results demonstrated varying degrees of laryngeal penetration and incomplete epiglottic inversion. Repeat video swallow study completed now with pts TF out and chin stitch removed.    VFSS Eval: Radiology   Radiologist [facility] radiologist   Views Taken left lateral   Physical Location of Procedure radiology suite #5   VFSS Eval: Thin Liquid Texture Trial   Mode of Presentation, Thin Liquid cup;self-fed   Order of Presentation 3, 4   Preparatory Phase Poor bolus control   Oral Phase, Thin Liquid Premature pharyngeal entry   Pharyngeal Phase, Thin Liquid Delayed swallow reflex   Rosenbek's Penetration Aspiration Scale: Thin Liquid Trial Results 6 - contrast passes glottis, no subglottic residue remains (aspiration)   Response to Aspiration productive volitional cough following clinician cue   Successful Strategies Trialed During Procedure, Thin Liquid chin tuck  (not effective )   Diagnostic Statement Thin liquids via cup resulted in deep penetration and aspiration x1 with use of the  chin tuck. Pt with productive cough in response to aspiration which expelled contrast from trachea    VFSS Eval: Nectar Thick Liquid Texture Trial   Mode of Presentation, Nectar cup;self-fed   Order of Presentation 1, 2, 9   Preparatory Phase Poor bolus control   Oral Phase, Nectar Premature pharyngeal entry   Pharyngeal Phase, Nectar Delayed swallow reflex;Residue in valleculae;Residue in pyriform sinus   Rosenbek's Penetration Aspiration Scale: Nectar-Thick Liquid Trial Results 5 - contrast contacts vocal cords, visible residue remains (penetration)   Response to Aspiration, Nectar productive volitional cough following clinician cue   Successful Strategies Trialed During Procedure, Nectar other (see comments)  (supraglottic swallow)   Diagnostic Statement Nectar thick liquids via cup resulted in deep penetration with and without chin tuck strategy. Pt was able to clear laryngeal vestibule with supraglottic swallow technique.    VFSS Eval: Honey Thick Texture Trial   Mode of Presentation, Honey cup;self-fed   Order of Presentation 5, 6   Preparatory Phase Poor bolus control   Oral Phase, Honey Premature pharyngeal entry   Pharyngeal Phase, Honey Delayed swallow reflex;Residue in valleculae;Residue in pyriform sinus   Rosenbek's Penetration Aspiration Scale: Honey Trial Results 3 - contrast remains above the vocal cords, visible residue remains (penetration)   Successful Strategies Trialed During Procedure, Honey other (see comments)  (supraglottic swallow)   Diagnostic Statement Honey thick liquids via cup resulted in moderate laryngeal penetration. Pt was able to clear laryngeal vestibule with supraglottic swallow. Pt with mild pharyngeal residuals in valleculae and pyriforms which pt was able to clear with double swallows   VFSS Eval: Puree Solid Texture Trial   Mode of Presentation, Puree spoon;self-fed   Order of Presentation 7   Preparatory Phase Poor bolus control   Oral Phase, Puree Premature pharyngeal  entry   Pharyngeal Phase, Puree Delayed swallow reflex;Residue in valleculae;Residue in pyriform sinus   Rosenbek's Penetration Aspiration Scale: Puree Food Trial Results 3 - contrast remains above the vocal cords, visible residue remains (penetration)   Successful Strategies Trialed During Procedure, Puree (supraglottic swallow)   Diagnostic Statement Pureed textures via spoon resulted in mild penetration. Pt with able to clear with supraglottic swallow. Pt with mild-moderate pharyngeal residuals which pt was able to clear with double swallows   VFSS Eval: Semisolid Texture Trial   Mode of Presentation, Semisolid spoon;self-fed   Order of Presentation 8   Preparatory Phase Poor bolus control   Oral Phase, Semisolid Premature pharyngeal entry   Pharyngeal Phase, Semisolid Residue in valleculae;Residue in pyriform sinus   Rosenbek's Penetration Aspiration Scale: Semisolid Food Trial Results 3 - contrast remains above the vocal cords, visible residue remains (penetration)   Successful Strategies Trialed During Procedure, Semisolid other (see comments)  (supraglottic swallow)   Diagnostic Statement Semisolid textures resulted in mild laryngeal penetration. Pt was able to clear penetrated material with supraglottic swallow. Pt with mild-moderate pharyngeal residuals which pt was able to clear with cues to double swallow   FEES Evaluation   Additional Documentation No   Swallow Compensations   Swallow Compensations Alternate viscosity of consistencies;Effortful swallow;Pacing;Reduce amounts;Supraglottic swallow;Multiple swallow   Results Aspiration   General Therapy Interventions   Planned Therapy Interventions Dysphagia Treatment   Dysphagia treatment Compensatory strategies for swallowing;Instruction of safe swallow strategies;Modified diet education;Oropharyngeal exercise training   Swallow Eval: Clinical Impressions   Skilled Criteria for Therapy Intervention Skilled criteria met.  Treatment indicated.   Dysphagia  Outcome Severity Scale (JEAN) Level 3 - JEAN   Treatment Diagnosis moderate oropharyngeal dysphagia   Diet texture recommendations Full liquid;Nectar thick liquids   Recommended Feeding/Eating Techniques check mouth frequently for oral residue/pocketing;alternate between small bites and sips of food/liquid;hard swallow w/ each bite or sip;maintain upright posture during/after eating for 30 mins;small sips/bites;other (see comments)  (supraglottic swallow)   Demonstrates Need for Referral to Another Service dietitian;occupational therapy;physical therapy;respiratory therapy   Therapy Frequency 5 times/wk   Predicted Duration of Therapy Intervention (days/wks) 1 week   Anticipated Discharge Disposition home w/ outpatient services   Risks and Benefits of Treatment have been explained. Yes   Patient, family and/or staff in agreement with Plan of Care Yes   Clinical Impression Comments SLP: Video swallow study completed per MD orders. Pt presents with moderate oropharyngeal dysphagia. Pts swallow function characterized by reduced hyolaryngeal elevation and no epiglottic inversion (despite removal of TF and chin stitch). These deficits resulted in aspiration with cough response on thin liquids, and varying degrees of laryngeal penetration across all additional PO trials. Pt was able to expell penetrated material with cues to use supraglottic swallow technique (swallow, throat clear, swallow). Pt with mild-moderate pharyngeal residuals remaining in valleculae and pyriforms which pt was able to clear with cues to double swallow. From an oropharyngeal standpoint, pt would be appropriate to cautiously initiate nectar thick full liquids with strict use of swallow precautions. Pt should be fully upright and alert for all PO, take small single sips/bites, use supraglottic swallow technique (swallow, throat clear, swallow), double swallow each bite, and alternate between consistencies. ST to continue to follow to assess diet  tolerance and complete oropharyngeal exercises. Should pt discharge home today, pt will require OP ST follow up.    Total Evaluation Time   Total Evaluation Time (Minutes) 15

## 2019-03-19 NOTE — PLAN OF CARE
POD 8 tracheal resection. VSS. A&O x4, neuros intact. Incision WDL except bruising, KIERAN. Pain managed with PRN Tylenol x1 and Oxy x2, Ibuprofen x1, and Gabapentin x1. NJ infusing goal 35ml. Video swallow study at 0830, NJ to be discontinued prior. With possible discharge pending swallow study.

## 2019-03-20 NOTE — PROGRESS NOTES
[facility]: Post-Discharge Note  SITUATION                                                      Admission:    Admission Date: 03/11/19   Reason for Admission: Post-intubation tracheal stenosis.   Discharge:   Discharge Date: 03/19/19  Discharge Diagnosis: Post-intubation tracheal stenosis.   Discharge Service: Thoracic surgery    BACKGROUND                                                      Bibi Song is a 48 year old female with PMHx including obesity, RACIEL, GERD, ulcerative colitis, history of chemical dependency, hypothyroidism, OCD, anxiety and depression who developed recurrent tracheal stenosis after intubation in July 2018, for accidental medication overdose. She has undergone multiple dilatations, but continues to have symptoms of decreased exercise tolerance and dyspnea with recurrence of stenosis. CT of neck on 8/29/18 showed circumferential thickening of the subglottic trachea with moderate stenosis 2.7-3cm in length. She underwent tracheal resection via cervical approach on 3/11 with Dr. Estrella and Dr. Beaulieu, EBL <50mL. This was a primary repair with resection of 3.5cm of trachea. She was extubated post-operatively and is admitted to the SICU for airway monitoring.    ASSESSMENT      Discharge Assessment  Patient reports symptoms are: Improved  Does the patient have all of their medications?: Yes  Does patient know what their new medications are for?: Yes  Does patient have a follow-up appointment scheduled?: Yes  Does patient have any other questions or concerns?: No    Post-op  Did the patient have surgery or a procedure: Yes  Fever: No  Chills: No  Eating & Drinking: eating and drinking without complaints/concerns  PO Intake: soft foods(Patient is thickening her liquids)  Bowel Function: normal  Urinary Status: voiding without complaint/concerns    PLAN                                                      Outpatient Plan:      1.) Follow up with primary care physician,  Glory Singleton, in 1-2 weeks.  2.) Follow up with Lorene Quintero MD, in the OR for a surveillance bronchoscopy in 2 weeks.   3.) Follow up with Dr. Nick Beaulieu from ENT in one week.    Future Appointments   Date Time Provider Department Center   3/25/2019  9:30 AM Hortensia Acevedo SLP [facility]   3/27/2019 11:50 AM Nick Beaulieu MD [facility]   3/27/2019 12:30 PM Connie Agudelo MD [facility]           Renata Arias, [facility]

## 2019-03-20 NOTE — PLAN OF CARE
Speech Language Therapy Discharge Summary    Reason for therapy discharge:    Discharged to home with outpatient therapy.    Progress towards therapy goal(s). See goals on Care Plan in [facility] electronic health record for goal details.  Goals not met.  Barriers to achieving goals:   discharge from facility.    Therapy recommendation(s):    Continued therapy is recommended.  Rationale/Recommendations:  Continued ST for dysphagia to continue to assess PO readiness and train oropharyngeal exercises.    From an oropharyngeal standpoint, pt would be appropriate to cautiously initiate nectar thick full liquids with strict use of swallow precautions. Pt should be fully upright and alert for all PO, take small single sips/bites, use supraglottic swallow technique (swallow, throat clear, swallow), double swallow each bite, and alternate between consistencies. ST to continue to follow to assess diet tolerance and complete oropharyngeal exercises. Should pt discharge home today, pt will require OP ST follow up.

## 2019-03-21 ENCOUNTER — TELEPHONE (OUTPATIENT)
Dept: OTOLARYNGOLOGY | Facility: CLINIC | Age: 49
End: 2019-03-21

## 2019-03-21 NOTE — TELEPHONE ENCOUNTER
Health Call Center    Phone Message    May a detailed message be left on voicemail: yes    Reason for Call: Other: Patient is calling because after her surgery she had with  he put her on a honey diet she said but she is able to drink fluids without thickening them up and she can swallow her pills without crushing them. She would like to know if this is ok that she does this or should she not be doing this. Please follow up with the patient asap. Thank  you.     Action Taken: Message routed to:  [facility] ([facility]): ent

## 2019-03-21 NOTE — TELEPHONE ENCOUNTER
RN called patient and advised her to continue with the discharge instructions of drinking honey-thick liquids and crushing pills until she is seen and evaluated by a provider. She has an appointment with an SLP on Monday. Patient verbalized agreement of plan.

## 2019-03-25 ENCOUNTER — HOSPITAL ENCOUNTER (OUTPATIENT)
Dept: SPEECH THERAPY | Facility: CLINIC | Age: 49
Setting detail: THERAPIES SERIES
End: 2019-03-25
Attending: PHYSICIAN ASSISTANT
Payer: COMMERCIAL

## 2019-03-25 DIAGNOSIS — Z90.09 HISTORY OF RESECTION AND ANASTOMOSIS OF TRACHEA: ICD-10-CM

## 2019-03-25 DIAGNOSIS — R13.12 OROPHARYNGEAL DYSPHAGIA: ICD-10-CM

## 2019-03-25 DIAGNOSIS — J39.8 TRACHEAL STENOSIS: ICD-10-CM

## 2019-03-25 PROCEDURE — 92526 ORAL FUNCTION THERAPY: CPT | Mod: GN

## 2019-03-25 PROCEDURE — 92610 EVALUATE SWALLOWING FUNCTION: CPT | Mod: GN

## 2019-03-25 RX ORDER — GABAPENTIN 300 MG/1
600 CAPSULE ORAL 3 TIMES DAILY PRN
Qty: 180 CAPSULE | Refills: 0 | Status: SHIPPED | OUTPATIENT
Start: 2019-03-25 | End: 2020-06-03 | Stop reason: DRUGHIGH

## 2019-03-25 NOTE — PROGRESS NOTES
Impressions: The patient demonstrated throat clear s/p thin, nectar, and honey trials, and a reflexive cough on thin trials. The patient reported increased discomfort with honey compared to nectar with a reported globus sensation. The patient was educated to continue with nectar thick liquids and use of swallow-cough-swallow strategy. The patient reported she was having difficulties with pureed diet recommended. ST provided education on how to expand her puree diet. ST is recommending therapy for dysphagia with a 1 time per week frequency. VFSS recommended prior to upgrading diet.       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 She has undergone multiple dilatations, but continues to have symptoms of decreased exercise tolerance and dyspnea with recurrence of stenosis. CT of neck on 8/29/18 showed circumferential thickening of the subglottic trachea with moderate stenosis 2.7-3cm in length. She underwent tracheal resection via cervical approach on 3/11 with Dr. Estrella and Dr. Beaulieu, EBL <50mL. This was a primary repair with resection of 3.5cm of trachea. She was extubated post-operatively and is admitted to the SICU for airway monitoring. Video swallow study completed at recent hospital stay. Pt presents with moderate oropharyngeal dysphagia. Pts swallow function characterized by reduced hyolaryngeal elevation and no epiglottic inversion (despite removal of TF and chin  nancy). These deficits resulted in aspiration with cough response on thin liquids, and varying degrees of laryngeal penetration across all additional PO trials. Pt was able to expell penetrated material with cues to use supraglottic swallow technique (swallow, throat clear, swallow). Pt with mild-moderate pharyngeal residuals remaining in valleculae and pyriforms which pt was able to clear with cues to double swallow.    Respiratory Status Room air   Prior Level Of Function Swallowing  (Recent NPO in hospital stay. )   Living Environment [facility]   General Observations Pt accompanied by    Patient/family Goals Return to regular diet with thin liquids.    Pain Assessment   Pain Reported No   Fall Risk Screen   Fall screen completed by SLP   Have you fallen 2 or more times in the past year? No   Have you fallen and had an injury in the past year? No   Abuse Screen (yes response referral indicated)   Feels Unsafe at Home or Work/School no   Feels Threatened by Someone no   Does Anyone Try to Keep You From Having Contact with Others or Doing Things Outside Your Home? no   Physical Signs of Abuse Present no   Clinical Swallow Evaluation   Oral Musculature generally intact   Swallow Eval   Feeding Assistance no assistance needed   Clinical Swallow Eval: Thin Liquid Texture Trial   Mode of Presentation, Thin Liquids cup;self-fed   Volume of Liquid or Food Presented 2 single sips   Oral Phase of Swallow Premature pharyngeal entry   Pharyngeal Phase of Swallow impaired;coughing/choking;throat clearing;feeling of something stuck in throat   Successful Strategies Trialed During Procedure hard swallow  (swallow, cough, swallow sequence)   Diagnostic Statement The patient trialed thin liquids with and without use of strategies swallow-cough-swallow. The patient demonstrated a cough and throat clear with both trials. ST is concerned with patients intake of thins and recommended continued use of thickener.     Clinical Swallow Eval: Nectar Thick Liquid Texture Trial   Mode of Presentation, Nectar cup;self-fed   Volume of Nectar Presented 2 single sips   Pharyngeal Phase, Nectar impaired;throat clearing;repeated swallows   Successful Strategies Trialed During Procedure, Nectar hard swallow   Diagnostic Statement The patient trialed nectar thick liquids with and without use of strategy swallow-cough-swallow. The patient demonstrated a cough and throat clear with both trials. The patient is recommended to continue with nectar thick liquids in home setting as recommended at discharge from hospital.    Clinical Swallow Eval: Honey Thick Liquid Texture Trial   Mode of Presentation, Honey cup;self-fed   Volume of Honey Presented 2 single sips   Pharyngeal Phase, Honey impaired;feeling of something stuck in throat;repeated swallows;throat clearing   Successful Strategies Trialed During Procedure, Honey hard swallow   Diagnostic Statement The patient trialed honey thick liquids. She demonstrated a throat clear s/p swallow w/ and w/o use of swallow-cough-swallow routine. The patient reported she felt something stuck in throat. She reported she is having difficulty with the thicker foods in home. The patient demonstrated same reaction to liquids with nectar and honey presentations.    VFSS Evaluation   VFSS Additional Documentation No   FEES Evaluation   Additional Documentation No   Swallow Compensations   Swallow Compensations Supraglottic swallow;Effortful swallow   Educational Assessment   Barriers to Learning Emotional  (pt reported increased anxiety during assessments)   Preferred Learning Style Demonstration;Pictures/video   Esophageal Phase of Swallow   Patient reports or presents with symptoms of esophageal dysphagia No   General Therapy Interventions   Planned Therapy Interventions Dysphagia Treatment   Dysphagia treatment Oropharyngeal exercise training;Modified diet education   Swallow Eval: Clinical Impressions    Skilled Criteria for Therapy Intervention Skilled criteria met.  Treatment indicated.   Functional Assessment Scale (FAS) 3   Dysphagia Outcome Severity Scale (JEAN) Level 3 - JEAN   Treatment Diagnosis Dysphagia   Diet texture recommendations Dysphagia diet level 1;Nectar thick liquids   Recommended Feeding/Eating Techniques no straws;small sips/bites;maintain upright posture during/after eating for 30 mins;hard swallow w/ each bite or sip   Rehab Potential good, to achieve stated therapy goals   Therapy Frequency other (see comments)   Predicted Duration of Therapy Intervention (days/wks) 1 time per week for 4 weeks.    Anticipated Discharge Disposition home   Risks and Benefits of Treatment have been explained. Yes   Patient, family and/or staff in agreement with Plan of Care Yes   Clinical Impression Comments The patient demonstrated throat clear s/p thin, nectar, and honey trials, and a reflexive cough on thin trials. The patient reported increased discomfort with honey compared to nectar with a reported globus sensation. The patient was educated to continue with nectar thick liquids and use of swallow-cough-swallow strategy. The patient reported she was having difficulties with pureed diet recommended. ST provided education on how to expand her puree diet. ST is recommending therapy for dysphagia with a 1 time per week frequency.    Swallow Goals   SLP Swallow Goals 1;2;3   Swallow Goal 1   Goal Identifier Diet   Goal Description The patient will report understanding of diet of nectar with puree as recommended with 100% accuracy.    Target Date 04/26/19   Swallow Goal 2   Goal Identifier Oropharyngeal Exercises   Goal Description The patient will be able to demonstrate understanding of oropharyngeal exercises with use of visual aid per ST observation.    Target Date 04/26/19   Swallow Goal 3   Goal Identifier Upgraded diet-VFSS   Goal Description The patient will be able to upgrade diet s/p exercises as  measured by VFSS.    Target Date 04/26/19   Total Session Time   SLP Eval: oral/pharyngeal swallow function, clinical minutes (81043) 40   Total Evaluation Time 40   Therapy Certification   Certification date from 03/25/19   Certification date to 04/26/19   Medical Diagnosis Dysphagia   Certification I certify the need for these services furnished under this plan of treatment and while under my care.  (Physician co-signature of this document indicates review and certification of the therapy plan).

## 2019-03-25 NOTE — PROGRESS NOTES
THORACIC SURGERY FOLLOW UP VISIT     Dear Dr. Singleton,  I saw Ms. Song in follow-up today in place of Dr Estrella. The clinical summary follows:    PREOP DIAGNOSIS   Post intubation mid-tracheal stenosis     PROCEDURE   03/11/2019 Transcervical tracheal resection (combined with Dr Beaulieu)     HISTOPATHOLOGY   Benign trachea with submucosal fibrosis/scarring.      COMPLICATIONS  Failed swallow studies x 2     INTERVAL STUDIES  Direct laryngostomy 3/27 by ENT Dr. Beaulieu -- no concerns.     ETOH none  TOB former smoker for 5 years, quit in 1994    BMI 39  Collar incision well healed. Minimal hoarseness. No stridor.      PMH  Anxiety  Depression  Chemical dependency  Hoarseness  Obesity  Sleep apnea  Obsessive-compulsive disorder     SUBJECTIVE   Ms. Song is doing well, she is walking and is so happy with her breathing now. She is moving her neck but it's still quite stiff. She is working with speech therapy for continued dysphagia.     From a personal perspective, she lives near [place] with her  and 2 dogs.     IMPRESSION (Z90.09) History of resection and anastomosis of trachea  (primary encounter diagnosis)  (J39.8) Tracheal stenosis    61 year-old female 2 weeks after transcervical tracheal resection for benign stenosis.      PLAN  I reviewed the plan as follows:  1) Follow-up with speech therapy 1x a week, already scheduled.  2) Follow up in 3 months per ENT and Dr. Beaulieu  3) No thoracic surgical follow up needed unless concerns or questions arise.     They had all their questions answered and were in agreement with the plan.  I appreciate the opportunity to participate in the care of your patient and will keep you updated.  Sincerely,

## 2019-03-27 ENCOUNTER — OFFICE VISIT (OUTPATIENT)
Dept: SURGERY | Facility: CLINIC | Age: 49
End: 2019-03-27
Attending: THORACIC SURGERY (CARDIOTHORACIC VASCULAR SURGERY)
Payer: COMMERCIAL

## 2019-03-27 ENCOUNTER — OFFICE VISIT (OUTPATIENT)
Dept: OTOLARYNGOLOGY | Facility: CLINIC | Age: 49
End: 2019-03-27
Payer: COMMERCIAL

## 2019-03-27 VITALS — WEIGHT: 240 LBS | HEIGHT: 65 IN | BODY MASS INDEX: 39.99 KG/M2

## 2019-03-27 VITALS
HEART RATE: 91 BPM | WEIGHT: 240.1 LBS | BODY MASS INDEX: 40 KG/M2 | SYSTOLIC BLOOD PRESSURE: 114 MMHG | TEMPERATURE: 97.8 F | DIASTOLIC BLOOD PRESSURE: 71 MMHG | HEIGHT: 65 IN | RESPIRATION RATE: 16 BRPM | OXYGEN SATURATION: 92 %

## 2019-03-27 DIAGNOSIS — J39.8 TRACHEAL STENOSIS: ICD-10-CM

## 2019-03-27 DIAGNOSIS — J39.8 TRACHEAL STENOSIS: Primary | ICD-10-CM

## 2019-03-27 DIAGNOSIS — Z90.09 HISTORY OF RESECTION AND ANASTOMOSIS OF TRACHEA: Primary | ICD-10-CM

## 2019-03-27 PROCEDURE — G0463 HOSPITAL OUTPT CLINIC VISIT: HCPCS | Mod: ZF

## 2019-03-27 ASSESSMENT — PAIN SCALES - GENERAL
PAINLEVEL: NO PAIN (0)
PAINLEVEL: NO PAIN (0)

## 2019-03-27 ASSESSMENT — MIFFLIN-ST. JEOR
SCORE: 1709.97
SCORE: 1709.51

## 2019-03-27 NOTE — PROGRESS NOTES
March 27, 2019    HISTORY OF PRESENT ILLNESS:  Ms. Song is a now approximately two weeks status post a tracheal resection between me and Dr. Estrella on 3/11/19.  Surgery went very well.  She has had no complications postoperatively.      Her major difficulty is she still has a little trouble with laryngeal elevation and at a swallowing evaluation yesterday at [facility] she still has a little bit of trouble closing the epiglottis completely.  Therefore, she remains on a pureed diet.      Otherwise, she feels well.  She is absolutely ecstatic about the fact that she can walk again.  She has taken several walks and is very grateful to me, Dr. Estrella and Dr. Pearson for managing her.  They expressed that it is so good for her to be able to breathe again.      PHYSICAL EXAMINATION:  On examination, she looks terrific.  She is with her  today.  Her voice is strong and she has no stridor.  She seems to be breathing very comfortably.  The neck incision is healing very nicely and there is minimal edema.      FIBEROPTIC ENDOSCOPY:  Due to the need to evaluate her larynx, fiberoptic laryngoscopy was indicated. The nose was topically decongested and anesthetized. The fiberoptic laryngoscope was passed under endoscopic vision. The turbinates were normal.  The inferior and middle meati were clear bilaterally without purulence, masses, or polyps.  The nasopharynx was clear.  The eustachian tubes were clear. The soft palate appeared normal with good mobility.  The epiglottis was sharp, and the visualized portion of the vallecula was clear.  The larynx was clear with mobile cords.  The arytenoids were clear, and there was no pooling in the hypopharynx. I hoped to visualize the subglottis.  Unfortunately, her gag reflex is strong as I approached the supraglottis and I could not see into the subglottis.      IMPRESSION AND PLAN:  Ms. Song has had a good result from surgical resection early.  We will probably need to  do some scans to make sure there is no recurrence.  I will work with Dr. Estrella to see who orders these studies. If he wants to do that, I do not have to see her on a regular basis.  However, one of us does need to follow her for the next year or two to make sure that there is no recurrence.      I did share with her the results of the pathology that demonstrated no evidence of malignancy or tumor in the subglottic segment that was removed.  There were also some calcified nodes in the dissection field which turned out to be benign.  She was happy to hear this news as well.       Nick Beaulieu M.D.  Otolaryngology/Head & Neck Surgery  [phone]                  Servando Pearson MD  Otolaryngology/Head & Neck Surgery  [facility] 396    Ryan Estrella MD  Thoracic Surgery  [facility]

## 2019-03-27 NOTE — NURSING NOTE
"Oncology Rooming Note    March 27, 2019 12:41 PM   Bibi Song is a 48 year old female who presents for:    Chief Complaint   Patient presents with     RECHECK     f/up after surgery      Initial Vitals: LMP 03/04/2019 (Exact Date)  Estimated body mass index is 39.94 kg/m  as calculated from the following:    Height as of an earlier encounter on 3/27/19: 1.651 m (5' 5\").    Weight as of an earlier encounter on 3/27/19: 108.9 kg (240 lb). There is no height or weight on file to calculate BSA.  Data Unavailable Comment: Data Unavailable   Patient's last menstrual period was 03/04/2019 (exact date).  Allergies reviewed: Yes  Medications reviewed: Yes    Medications: Medication refills not needed today.  Pharmacy name entered into [facility]: [facility] - - [address] - [id number] [facility]    Clinical concerns: none        Thelma Kris, CMA              "

## 2019-03-27 NOTE — NURSING NOTE
Chief Complaint   Patient presents with     RECHECK     post op tracheal resection 3/11     Ricardo Cramer, EMT

## 2019-03-27 NOTE — LETTER
3/27/2019       RE: Bibi Song  [address] [address]     Dear Colleague,    Thank you for referring your patient, Bibi Song, to the [facility] at [facility]. Please see a copy of my visit note below.    March 27, 2019    HISTORY OF PRESENT ILLNESS:  Ms. Song is a now approximately two weeks status post a tracheal resection between me and Dr. Estrella on 3/11/19.  Surgery went very well.  She has had no complications postoperatively.      Her major difficulty is she still has a little trouble with laryngeal elevation and at a swallowing evaluation yesterday at [place] she still has a little bit of trouble closing the epiglottis completely.  Therefore, she remains on a pureed diet.      Otherwise, she feels well.  She is absolutely ecstatic about the fact that she can walk again.  She has taken several walks and is very grateful to me, Dr. Estrella and Dr. Pearson for managing her.  They expressed that it is so good for her to be able to breathe again.      PHYSICAL EXAMINATION:  On examination, she looks terrific.  She is with her  today.  Her voice is strong and she has no stridor.  She seems to be breathing very comfortably.  The neck incision is healing very nicely and there is minimal edema.      FIBEROPTIC ENDOSCOPY:  Due to the need to evaluate her larynx, fiberoptic laryngoscopy was indicated. The nose was topically decongested and anesthetized. The fiberoptic laryngoscope was passed under endoscopic vision. The turbinates were normal.  The inferior and middle meati were clear bilaterally without purulence, masses, or polyps.  The nasopharynx was clear.  The eustachian tubes were clear. The soft palate appeared normal with good mobility.  The epiglottis was sharp, and the visualized portion of the vallecula was clear.  The larynx was clear with mobile cords.  The arytenoids were clear, and there was no pooling  in the hypopharynx. I hoped to visualize the subglottis.  Unfortunately, her gag reflex is strong as I approached the supraglottis and I could not see into the subglottis.      IMPRESSION AND PLAN:  Ms. Song has had a good result from surgical resection early.  We will probably need to do some scans to make sure there is no recurrence.  I will work with Dr. Estrella to see who orders these studies. If he wants to do that, I do not have to see her on a regular basis.  However, one of us does need to follow her for the next year or two to make sure that there is no recurrence.      I did share with her the results of the pathology that demonstrated no evidence of malignancy or tumor in the subglottic segment that was removed.  There were also some calcified nodes in the dissection field which turned out to be benign.  She was happy to hear this news as well.     Nick Beaulieu M.D.  Otolaryngology/Head & Neck Surgery  [phone]    [facility] MD Lili  Otolaryngology/Head & Neck Surgery  [facility] 396    Ryan Estrella MD  Thoracic Surgery  [facility]

## 2019-04-04 ENCOUNTER — TELEPHONE (OUTPATIENT)
Dept: SURGERY | Facility: CLINIC | Age: 49
End: 2019-04-04

## 2019-04-04 DIAGNOSIS — J39.8 TRACHEAL STENOSIS: ICD-10-CM

## 2019-04-04 DIAGNOSIS — Z90.09 HISTORY OF RESECTION AND ANASTOMOSIS OF TRACHEA: ICD-10-CM

## 2019-04-04 RX ORDER — OXYCODONE HCL 5 MG/5 ML
5-10 SOLUTION, ORAL ORAL
Qty: 500 ML | Refills: 0 | Status: SHIPPED | OUTPATIENT
Start: 2019-04-04 | End: 2020-04-22

## 2019-04-04 NOTE — TELEPHONE ENCOUNTER
Person calling: patient    Reason for call: Oxycodone refill. Pt state she is taking 10 ml 3-4 times daily, was given #500 ml at hospital discharge 3/19/19 and now running out. Pain 6-7 and decreases to 2-3 after taking, using tylenol. Saw Dr. Bonilla on 3/27 so has no further follow up. Will come  from [facility] if able to refill. Please call to discuss [phone].    Action taken: routed this encounter to the following provider(s):  Genesis Matta NP and Lilliam Merchant NP

## 2019-04-10 DIAGNOSIS — J39.8 TRACHEAL STENOSIS: Primary | ICD-10-CM

## 2019-06-01 ENCOUNTER — NURSE TRIAGE (OUTPATIENT)
Dept: NURSING | Facility: CLINIC | Age: 49
End: 2019-06-01

## 2019-06-01 NOTE — TELEPHONE ENCOUNTER
Patient calling reporting she broke a tooth yesterday and it is throbbing. Patient is questioning if she can be seen stating she is unable to get a hold of her dentist.   Stating she has taken Tylenol and Ibuprofen with no improvement.    Per guidelines advised to be seen with in 4 hours.    Caller verbalized understanding. Denies further questions. Patient plans to go into [facility].    María Elena Rodriguez RN  [facility]        Reason for Disposition    [1] SEVERE pain AND [2] not improved 2 hours after pain medicine/ice    Additional Information    Negative: Knocked out (unconscious) > 1 minute    Negative: Difficult to awaken or acting confused (e.g., disoriented, slurred speech)    Negative: Severe neck pain    Negative: Sounds like a life-threatening emergency to the triager    Negative: Wound looks infected    Negative: Tooth knocked out    Negative: Tooth is almost falling out    Negative: [1] Bleeding AND [2] won't stop after 10 minutes of direct pressure (using correct technique)    Negative: [1] Tooth pushed out if its normal position AND [2] looks severe    Negative: [1] Tooth pushed out of its normal position AND [2] interferes with normal bite or chewing    Negative: Sounds like a serious injury to the triager    Protocols used: TOOTH INJURY-A-

## 2019-07-21 ENCOUNTER — NURSE TRIAGE (OUTPATIENT)
Dept: NURSING | Facility: CLINIC | Age: 49
End: 2019-07-21

## 2019-07-21 NOTE — TELEPHONE ENCOUNTER
"Pt recently took ibuprofen and wants to take a Pamprin, wonders if ok to take with ibuprofen. Informed there are different formulas of Pamprin. To purchase it at a location with a pharmacist who can look at the ingredients and the other medications she is on and advise her. She works at [employer] and will do so.     Ghazala Aldrich RN, [facility]      Reason for Disposition    Caller has URGENT medication question about med that PCP prescribed and triager unable to answer question    Additional Information    Negative: Drug overdose and nurse unable to answer question    Negative: Caller requesting information not related to medicine    Negative: Caller requesting a prescription for Strep throat and has a positive culture result    Negative: Rash while taking a medication or within 3 days of stopping it    Negative: Immunization reaction suspected    Negative: [1] Asthma and [2] having symptoms of asthma (cough, wheezing, etc)    Negative: MORE THAN A DOUBLE DOSE of a prescription or over-the-counter (OTC) drug    Negative: [1] DOUBLE DOSE (an extra dose or lesser amount) of over-the-counter (OTC) drug AND [2] any symptoms (e.g., dizziness, nausea, pain, sleepiness)    Negative: [1] DOUBLE DOSE (an extra dose or lesser amount) of prescription drug AND [2] any symptoms (e.g., dizziness, nausea, pain, sleepiness)    Negative: Took another person's prescription drug    Negative: [1] DOUBLE DOSE (an extra dose or lesser amount) of prescription drug AND [2] NO symptoms (Exception: a double dose of antibiotics)    Negative: Diabetes drug error or overdose (e.g., insulin or extra dose)    Negative: [1] Request for URGENT new prescription or refill of \"essential\" medication (i.e., likelihood of harm to patient if not taken) AND [2] triager unable to fill per unit policy    Negative: Pharmacy calling with prescription questions and triager unable to answer question    Protocols used: MEDICATION QUESTION " CALL-A-AH

## 2019-08-14 ENCOUNTER — TELEPHONE (OUTPATIENT)
Dept: FAMILY MEDICINE | Facility: CLINIC | Age: 49
End: 2019-08-14

## 2019-08-14 ENCOUNTER — HOSPITAL ENCOUNTER (EMERGENCY)
Facility: CLINIC | Age: 49
Discharge: HOME | End: 2019-08-14
Attending: NURSE PRACTITIONER | Admitting: NURSE PRACTITIONER
Payer: COMMERCIAL

## 2019-08-14 VITALS
TEMPERATURE: 97.9 F | DIASTOLIC BLOOD PRESSURE: 81 MMHG | OXYGEN SATURATION: 94 % | SYSTOLIC BLOOD PRESSURE: 117 MMHG | BODY MASS INDEX: 38.27 KG/M2 | WEIGHT: 230 LBS

## 2019-08-14 DIAGNOSIS — M62.830 SPASM OF BACK MUSCLES: ICD-10-CM

## 2019-08-14 PROCEDURE — 99214 OFFICE O/P EST MOD 30 MIN: CPT | Mod: Z6 | Performed by: NURSE PRACTITIONER

## 2019-08-14 PROCEDURE — G0463 HOSPITAL OUTPT CLINIC VISIT: HCPCS | Performed by: NURSE PRACTITIONER

## 2019-08-14 RX ORDER — CYCLOBENZAPRINE HCL 10 MG
10 TABLET ORAL 3 TIMES DAILY PRN
Qty: 20 TABLET | Refills: 0 | Status: SHIPPED | OUTPATIENT
Start: 2019-08-14 | End: 2019-08-20

## 2019-08-14 NOTE — ED AVS SNAPSHOT
[facility] Emergency Department  [address] [address]  Phone:  [phone]  Fax:  [phone]                                    Bibi Song   MRN: [id number]    Department:  [facility] Emergency Department   Date of Visit:  8/14/2019           After Visit Summary Signature Page    I have received my discharge instructions, and my questions have been answered. I have discussed any challenges I see with this plan with the nurse or doctor.    ..........................................................................................................................................  Patient/Patient Representative Signature      ..........................................................................................................................................  Patient Representative Print Name and Relationship to Patient    ..................................................               ................................................  Date                                   Time    ..........................................................................................................................................  Reviewed by Signature/Title    ...................................................              ..............................................  Date                                               Time          22EPIC Rev 08/18

## 2019-08-14 NOTE — DISCHARGE INSTRUCTIONS
Heat alternating with ice packs as discussed.  Start Physical Therapy. (referral sent)  Tylenol 650 mg every 4-6 hours as needed for pain.  Ibuprofen 400-600 mg every 6-8 hours as needed for pain.  Flexeril 10 mg three times a day as needed for muscle spasm.  Recheck with your regular doctor if  not improving in 1-2 weeks.

## 2019-08-14 NOTE — TELEPHONE ENCOUNTER
"Since Provider is not at her desk and doing a procedure, I called patient back and suggested she try Urgent care as they open at 12 noon at [place]. Patient verbalized understanding and stated: \"I will try that\" . Bianka Maurer RN    "

## 2019-08-14 NOTE — TELEPHONE ENCOUNTER
"S-(situation): Requesting appt today for Back pain-   PATIENT HAS TO WORK TODAY AT 2 PM.    B-(background):   Denies history of Back injury, or back diagnosis.    A-(assessment):     \"I have had Sciatica before and I had it when I was pregnant, so I think that is what it is. Every night at work it just started spasming in my lower back and it travels down my leg...\"     Denies numbness, tingling or weakness.    Is taking Tylenol and Ibuprofen  \"but they don't help much..\"     R-(recommendations): Continue OTC pain medications, try heat or ice, gentle stretching.     Will check with Provider, but I did not promise she could be seen such short notice when Provider schedule is full today.     Patient stated: \"If she can just give me something for pain, that would be fine..\"   Advised she would need to be seen to obtain a pain medication prescription.    Bianka Maurer RN      "

## 2019-08-14 NOTE — TELEPHONE ENCOUNTER
Reason for call:  Patient reporting a symptom    Symptom or request: Pt has had sciatica back pain x 5 days and wants to see Dr. Singleton today.  Please call patient and advise.      Duration (how long have symptoms been present): 5 days    Have you been treated for this before? Yes    Additional comments:     Phone Number patient can be reached at:  Home number on file [phone] (home)    Best Time:  any    Can we leave a detailed message on this number:  YES    Call taken on 8/14/2019 at 11:17 AM by Velma Apodaca

## 2019-08-15 ASSESSMENT — ENCOUNTER SYMPTOMS
FEVER: 0
COUGH: 0
ABDOMINAL PAIN: 0
NUMBNESS: 0
DIZZINESS: 0
LIGHT-HEADEDNESS: 0
CHILLS: 0
HEADACHES: 0
NECK PAIN: 0
WEAKNESS: 0
BACK PAIN: 1

## 2019-08-19 ENCOUNTER — TELEPHONE (OUTPATIENT)
Dept: FAMILY MEDICINE | Facility: CLINIC | Age: 49
End: 2019-08-19

## 2019-08-19 DIAGNOSIS — E78.5 HYPERLIPIDEMIA LDL GOAL <160: Primary | ICD-10-CM

## 2019-08-19 DIAGNOSIS — E03.9 HYPOTHYROIDISM, UNSPECIFIED TYPE: ICD-10-CM

## 2019-08-20 RX ORDER — LEVOTHYROXINE SODIUM 75 UG/1
75 TABLET ORAL DAILY
Qty: 30 TABLET | Refills: 0 | Status: SHIPPED | OUTPATIENT
Start: 2019-08-20 | End: 2019-09-27

## 2019-08-20 NOTE — TELEPHONE ENCOUNTER
I left a message for the patient to return my call.  CSS please deliver message below.Needs lab ONLY appt.  Pt will need to be fasting for labs: No food or drink other than water for at least 10-12 hours prior to blood draw.    Dorys RYDER RN

## 2019-08-20 NOTE — TELEPHONE ENCOUNTER
Routing refill request to provider for review/approval because:  Labs not current:  TSH with free T4. Lipids also needed per HM. Cued labs.    Last OV 1/31/19: Return in about 1 year (around 1/31/2020) for Physical Exam.    Dorys RYDER RN

## 2019-08-20 NOTE — TELEPHONE ENCOUNTER
"Requested Prescriptions   Pending Prescriptions Disp Refills     levothyroxine (SYNTHROID/LEVOTHROID) 75 MCG tablet [Pharmacy Med Name: LEVOTHYROXINE SODIUM 75 MCG TABLET] 90 tablet 1     Sig: Take 1 tablet (75 mcg) by mouth daily       Thyroid Protocol Failed - 8/19/2019  9:47 AM        Failed - Normal TSH on file in past 12 months     Recent Labs   Lab Test 05/16/18  0948   TSH 1.76              Passed - Patient is 12 years or older        Passed - Recent (12 mo) or future (30 days) visit within the authorizing provider's specialty     Patient had office visit in the last 12 months or has a visit in the next 30 days with authorizing provider or within the authorizing provider's specialty.  See \"Patient Info\" tab in inbasket, or \"Choose Columns\" in Meds & Orders section of the refill encounter.              Passed - Medication is active on med list        Passed - No active pregnancy on record     If patient is pregnant or has had a positive pregnancy test, please check TSH.          Passed - No positive pregnancy test in past 12 months     If patient is pregnant or has had a positive pregnancy test, please check TSH.          Last Written Prescription Date:  1/28/2019  Last Fill Quantity: 90,  # refills: 1   Last office visit: 1/31/2019 with prescribing provider:  Mani   Future Office Visit:      "

## 2019-08-23 DIAGNOSIS — E78.5 HYPERLIPIDEMIA LDL GOAL <160: ICD-10-CM

## 2019-08-23 DIAGNOSIS — E03.9 HYPOTHYROIDISM, UNSPECIFIED TYPE: ICD-10-CM

## 2019-08-23 LAB
CHOLEST SERPL-MCNC: 265 MG/DL
HDLC SERPL-MCNC: 50 MG/DL
LDLC SERPL CALC-MCNC: 181 MG/DL
NONHDLC SERPL-MCNC: 215 MG/DL
TRIGL SERPL-MCNC: 169 MG/DL
TSH SERPL DL<=0.005 MIU/L-ACNC: 2.14 MU/L (ref 0.4–4)

## 2019-08-23 PROCEDURE — 80061 LIPID PANEL: CPT | Performed by: FAMILY MEDICINE

## 2019-08-23 PROCEDURE — 36415 COLL VENOUS BLD VENIPUNCTURE: CPT | Performed by: FAMILY MEDICINE

## 2019-08-23 PROCEDURE — 84443 ASSAY THYROID STIM HORMONE: CPT | Performed by: FAMILY MEDICINE

## 2019-08-25 ENCOUNTER — TELEPHONE (OUTPATIENT)
Dept: FAMILY MEDICINE | Facility: CLINIC | Age: 49
End: 2019-08-25

## 2019-08-25 NOTE — TELEPHONE ENCOUNTER
Patient calling about lipid test results. She saw that her results from last Friday were high and she was inquiring if she would be started on cholesterol medication. Patient can be reached at [phone] (home), ok to leave a message.

## 2019-08-26 NOTE — TELEPHONE ENCOUNTER
Patient was rad the [employer] message attached to labs. Patient agrees with the plan.    Ольга NAPOLES RN

## 2019-08-26 NOTE — RESULT ENCOUNTER NOTE
The American Heart Association and American College of Cardiology recommends statins for anyone who's 10 year risk exceeds 7.5%, your risk is 1.8%, therefore a statin (cholesterol lowering drug) is not recommended. Your cholesterol has increased significant overly of the past year however.  Continue to work on cutting down processed fats.    Thyroid is in normal range.  Glory Singleton M.D.    The 10-year ASCVD risk score (El TRAN Jr., et al., 2013) is: 1.8%    Values used to calculate the score:      Age: 49 years      Sex: Female      Is Non- : No      Diabetic: No      Tobacco smoker: No      Systolic Blood Pressure: 117 mmHg      Is BP treated: No      HDL Cholesterol: 50 mg/dL      Total Cholesterol: 265 mg/dL

## 2019-09-01 ENCOUNTER — NURSE TRIAGE (OUTPATIENT)
Dept: NURSING | Facility: CLINIC | Age: 49
End: 2019-09-01

## 2019-09-01 ENCOUNTER — HOSPITAL ENCOUNTER (EMERGENCY)
Facility: CLINIC | Age: 49
Discharge: HOME | End: 2019-09-01
Attending: PHYSICIAN ASSISTANT | Admitting: PHYSICIAN ASSISTANT
Payer: COMMERCIAL

## 2019-09-01 VITALS
RESPIRATION RATE: 16 BRPM | TEMPERATURE: 97.9 F | OXYGEN SATURATION: 95 % | HEART RATE: 71 BPM | SYSTOLIC BLOOD PRESSURE: 119 MMHG | BODY MASS INDEX: 38.11 KG/M2 | WEIGHT: 229 LBS | DIASTOLIC BLOOD PRESSURE: 75 MMHG

## 2019-09-01 DIAGNOSIS — M54.50 RIGHT-SIDED LOW BACK PAIN WITHOUT SCIATICA: ICD-10-CM

## 2019-09-01 PROCEDURE — G0463 HOSPITAL OUTPT CLINIC VISIT: HCPCS | Performed by: PHYSICIAN ASSISTANT

## 2019-09-01 PROCEDURE — 99214 OFFICE O/P EST MOD 30 MIN: CPT | Mod: Z6 | Performed by: PHYSICIAN ASSISTANT

## 2019-09-01 RX ORDER — CYCLOBENZAPRINE HCL 10 MG
10 TABLET ORAL 3 TIMES DAILY PRN
Qty: 20 TABLET | Refills: 0 | Status: SHIPPED | OUTPATIENT
Start: 2019-09-01 | End: 2019-09-04

## 2019-09-01 RX ORDER — OXYCODONE AND ACETAMINOPHEN 5; 325 MG/1; MG/1
1-2 TABLET ORAL EVERY 6 HOURS PRN
Qty: 5 TABLET | Refills: 0 | Status: SHIPPED | OUTPATIENT
Start: 2019-09-01 | End: 2020-04-22

## 2019-09-01 RX ORDER — PREDNISONE 20 MG/1
40 TABLET ORAL DAILY
Qty: 10 TABLET | Refills: 0 | Status: SHIPPED | OUTPATIENT
Start: 2019-09-01 | End: 2019-09-06

## 2019-09-01 NOTE — LETTER
[facility] EMERGENCY DEPARTMENT  [address] [address]  Phone: [phone]  Fax: [phone]    September 1, 2019        Bibi Song  [address] [address]          To whom it may concern:    RE: Bibi Mtz was evaluated in the urgent care for back pain on 9/1/2019.  I recommend she rest the back with minimal activity only as tolerated by her symptoms for the next 3 days or until her next follow-up appointment.  During this time he should avoid any bending of the low back, twisting, turning, kneeling or squatting heavy lifting greater than 10 pounds.  She may also need to avoid any periods of prolonged standing.    Please contact me for questions or concerns.      Sincerely,        Kathy Alvarado PA-C

## 2019-09-01 NOTE — ED TRIAGE NOTES
Patient presents to urgent care today with complaint of lower right back pain/spasm. Pain radiating down right leg with sciatica. Pt with history of sciatica. Wanda Faria LPN on 9/1/2019 at 3:35 PM

## 2019-09-01 NOTE — ED AVS SNAPSHOT
[facility]  [address] [address]  Phone:  [phone]  Fax:  [phone]                                    Bibi Song   MRN: [id number]    Department:  [facility]   Date of Visit:  9/1/2019           After Visit Summary Signature Page    I have received my discharge instructions, and my questions have been answered. I have discussed any challenges I see with this plan with the nurse or doctor.    ..........................................................................................................................................  Patient/Patient Representative Signature      ..........................................................................................................................................  Patient Representative Print Name and Relationship to Patient    ..................................................               ................................................  Date                                   Time    ..........................................................................................................................................  Reviewed by Signature/Title    ...................................................              ..............................................  Date                                               Time          22EPIC Rev 08/18

## 2019-09-01 NOTE — ED PROVIDER NOTES
History     Chief Complaint   Patient presents with     Back Pain     HPI  Bibi Song is a 49 year old female who presents to the urgent care with concern over low back pain is been present for last 3 days.  Patient denies any significant instigating injury or trauma however states that she had been doing a lot of up-and-down movements through her place of employment.  She complains of spasm of her right low back.  Symptoms are exacerbated by certain movements, changes in position and alleviated by nothing.  She does have pain which radiates down to her right thigh and does complain of numbness/paresthesias in her right leg to the toes.  She denies any fever, chills, myalgias, cough, dyspnea, wheezing, vomiting, diarrhea, abdominal pain, dysuria, hematuria, change in bowel or bladder control or saddles paresthseias.  She has attempted to treat with Tylenol without significant improvement.  She reports that symptoms are similar to what she had earlier this month however more severe.  She did have complete resolution between episodes.  She has had a history of back pain previously was referred for MRI several years ago, however never completed.      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 Schoenecker, NP       Anxiety disorder 08/06/2010     Priority:  Medium     Family history of diabetes mellitus 08/06/2010     Priority: Medium     Family history of thyroid disease 08/06/2010     Priority: Medium     ALCOHOL ABUSE, IN REMISSION 08/06/2010     Priority: Medium      Past Medical History:    Past Medical History:   Diagnosis Date     Anxiety      Chemical dependency (H)      Depressive disorder      Hoarseness      Hypothyroidism      Obesity      OCD (obsessive compulsive disorder)      PONV (postoperative nausea and vomiting)      Sleep apnea      Tracheal stenosis      Past Surgical History:    Past Surgical History:   Procedure Laterality Date     INJECT STEROID (LOCATION) N/A 9/7/2018    Procedure: INJECT STEROID (LOCATION);;  Surgeon: Dusty Pearson MD;  [facility]     INJECT STEROID (LOCATION) N/A 10/5/2018    Procedure: INJECT STEROID (LOCATION);;  Surgeon: Dusty Pearson MD;  [facility]     INJECT STEROID (LOCATION) N/A 12/6/2018    Procedure: Steroid Injection;  Surgeon: Dusty Pearson MD;  [facility]     INJECT STEROID (LOCATION) N/A 2/1/2019    Procedure: Steroid Injection;  Surgeon: Dusty Pearson MD;  [facility]     LARYNGOSCOPY N/A 9/7/2018    Procedure: LARYNGOSCOPY;  Teleoscopic Direct Laryngoscopy with Balloon Dilation, Mitomycin Application and Steriod Injection ;  Surgeon: Dusty Pearson MD;  [facility]     LARYNGOSCOPY, ESOPHAGOSCOPY WITH DILATION, COMBINED N/A 10/5/2018    Procedure: COMBINED LARYNGOSCOPY, ESOPHAGOSCOPY WITH DILATION;  Direct Laryngoscopy with Teleoscope, Balloon Dilation, Application of Mitomycin and Steroid Injection ;  Surgeon: Dusty Pearson MD;  [facility]     LARYNGOSCOPY, ESOPHAGOSCOPY WITH DILATION, COMBINED N/A 12/6/2018    Procedure: Direct Laryngoscopy, Balloon Dilation, Mitomycin Application, Steroid Injection;  Surgeon: Dusty Pearson MD;  [facility]     LARYNGOSCOPY, ESOPHAGOSCOPY WITH DILATION,  COMBINED N/A 2019    Procedure: Direct Laryngoscopy, Balloon Dilation, Mitomycin Application;  Surgeon: Dusty Pearson MD;  [facility]     RESECT TRACHEA WITH RECONSTRUCTION N/A 3/11/2019    Procedure: Tracheal Resection Via Cervical Approach;  Surgeon: Nick Beaulieu MD;  Location: [facility]     Family History:    Family History   Problem Relation Age of Onset     Diabetes Mother      Depression Mother         OCD     Neurologic Disorder Mother      Psychotic Disorder Mother      Respiratory Mother      Thyroid Disease Mother      Gynecology Mother         hysterectomy     Cerebrovascular Disease Mother         pacemaker     Substance Abuse Mother      Mental Illness Mother      Heart Disease Mother      Thrombosis Mother         no details available     Heart Disease Father         MI,   LATE 60'S     Hypertension Father      Prostate Cancer Father      Obesity Father      Diabetes Father      Cerebrovascular Disease Father      Substance Abuse Father      Depression Father      Thrombosis Father      Depression Brother      Thyroid Disease Brother      Depression Brother      Thyroid Disease Brother      Allergies Son      Asthma Son      Thyroid Disease Maternal Grandmother      Cancer Maternal Grandfather      Psychotic Disorder Paternal Grandmother      Thyroid Disease Paternal Grandfather      Diabetes Brother      Breast Cancer Maternal Aunt      Substance Abuse Brother      Mental Illness Brother      Depression Brother      Diabetes Brother      Mental Illness Son      Asthma Son      Depression Son      Stomach Problem Son      Social History:  Marital Status:   [2]  Social History     Tobacco Use     Smoking status: Former Smoker     Years: 5.00     Types: Cigarettes     Start date: 1988     Last attempt to quit: 1994     Years since quittin.6     Smokeless tobacco: Never Used   Substance Use Topics     Alcohol use: No     Comment: Treatment in      Drug use: No       Medications:      cyclobenzaprine (FLEXERIL) 10 MG tablet   oxyCODONE-acetaminophen (PERCOCET) 5-325 MG tablet   predniSONE (DELTASONE) 20 MG tablet   acetaminophen (TYLENOL) 500 MG tablet   albuterol (PROVENTIL) (2.5 MG/3ML) 0.083% neb solution   buPROPion (WELLBUTRIN) 100 MG tablet   CALCIUM-MAGNESIUM-ZINC PO   FLOVENT DISKUS 50 MCG/BLIST inhaler   gabapentin (NEURONTIN) 300 MG capsule   ibuprofen (ADVIL/MOTRIN) 200 MG tablet   levothyroxine (SYNTHROID/LEVOTHROID) 75 MCG tablet   melatonin 3 MG tablet   omeprazole (PRILOSEC) 20 MG DR capsule   oxyCODONE (ROXICODONE) 5 MG/5ML solution   polyethylene glycol (MIRALAX/GLYCOLAX) packet   prazosin (MINIPRESS) 1 MG capsule   sertraline (ZOLOFT) 100 MG tablet   VENTOLIN  (90 Base) MCG/ACT inhaler   vitamin B complex with vitamin C (VITAMIN  B COMPLEX) TABS tablet   ziprasidone (GEODON) 40 MG capsule     Review of Systems   Constitutional: Negative for chills and fever.   Respiratory: Negative for cough, shortness of breath and wheezing.    Cardiovascular: Negative for chest pain.   Gastrointestinal: Negative for abdominal pain, diarrhea, nausea and vomiting.   Genitourinary: Negative for dysuria, frequency, hematuria and urgency.   Musculoskeletal: Positive for back pain. Negative for arthralgias, gait problem and joint swelling.   Skin: Negative for color change, rash and wound.   Neurological: Positive for numbness (paresthesias). Negative for dizziness, seizures, syncope, weakness and headaches.     Physical Exam   BP: 119/75  Pulse: 71  Temp: 97.9  F (36.6  C)  Resp: 16  Weight: 103.9 kg (229 lb)  SpO2: 95 %  Physical Exam   Constitutional: She is oriented to person, place, and time. She appears well-developed and well-nourished. No distress.   HENT:   Head: Normocephalic and atraumatic.   Mouth/Throat: No oropharyngeal exudate.   Cardiovascular: Normal rate, regular rhythm and normal heart sounds. Exam reveals no gallop and no friction rub.   No murmur  heard.  Pulmonary/Chest: Effort normal and breath sounds normal. No respiratory distress. She has no wheezes. She has no rales.   Abdominal: Soft. Bowel sounds are normal. There is no tenderness. There is no rebound and no guarding.   Musculoskeletal:        Thoracic back: Normal.        Lumbar back: She exhibits decreased range of motion, tenderness and pain. She exhibits no bony tenderness, no deformity, no laceration and normal pulse.   Neurological: She is alert and oriented to person, place, and time. No sensory deficit.   Reflex Scores:       Patellar reflexes are 2+ on the right side and 2+ on the left side.  Skin: Skin is warm and dry. Capillary refill takes less than 2 seconds. No rash noted. No erythema.     ED Course        Procedures        Critical Care time:  none        No results found for this or any previous visit (from the past 24 hour(s)).  Medications - No data to display    Assessments & Plan (with Medical Decision Making)     I have reviewed the nursing notes.    I have reviewed the findings, diagnosis, plan and need for follow up with the patient.       New Prescriptions    CYCLOBENZAPRINE (FLEXERIL) 10 MG TABLET    Take 1 tablet (10 mg) by mouth 3 times daily as needed for muscle spasms    OXYCODONE-ACETAMINOPHEN (PERCOCET) 5-325 MG TABLET    Take 1-2 tablets by mouth every 6 hours as needed for pain    PREDNISONE (DELTASONE) 20 MG TABLET    Take 2 tablets (40 mg) by mouth daily for 5 days     Final diagnoses:   Right sided low back pain     79-year-old female presents to the urgent care with concern over right-sided low back pain the last 3 days to radiate down her right thigh causes intermittent paresthesias in her right foot/toe.  She had stable vital signs upon arrival.  Physical exam findings as described above include mild tenderness palpation in the right low back however patient does appear to have significant discomfort.  Distal neurovascular status is intact.   differential for her  symptoms would include muscle strain, spasm, sciatica, other lumbar radiculopathy , DDD, spinal stenosis, herniated disc.  I do not suspect spondylolisthesis and she did not have any worrisome symptoms to suggest cauda equina, epidural abscess, osteomyelitis or other emergent need for MRI.  She was discharged home stable with instructions for continued OTC symptomatic treatment with Tylenol/ibuprofen, ice/heat.  Prescriptions for Flexeril, oxycodone and prednisone given.  Follow-up with primary care provider if no improvement within the next 5 to 7 days.  Worrisome reasons to return to the ER/UC sooner discussed.    Disclaimer: This note consists of symbols derived from keyboarding, dictation, and/or voice recognition software. As a result, there may be errors in the script that have gone undetected.  Please consider this when interpreting information found in the chart.    9/1/2019   [facility] EMER[facility]     Kathy Alvarado PA-C  09/06/19 1606

## 2019-09-01 NOTE — TELEPHONE ENCOUNTER
"Patient reports she was seen for back pain in the [place] ED on 8/14. She was prescribed Flexeril. She is currently out of the medication and reports \"my back is spasming up really bad.\"    Patient called to ask if she could get a refill of Flexeril. Advised she would need to be seen to get a refill. Encouraged patient that she should follow up with PCP also about the back pain. Patient says she is supposed to follow up with PCP and go to PT but hasn't had time to do it yet. Patient is going to head into urgent care [facility] [place].     Carlee Brandt RN/[facility]    Reason for Disposition    Caller has medication question only, adult not sick, and triager answers question    Protocols used: MEDICATION QUESTION CALL-A-AH      "

## 2019-09-02 ASSESSMENT — ENCOUNTER SYMPTOMS
COLOR CHANGE: 0
SHORTNESS OF BREATH: 0
DIZZINESS: 0
BACK PAIN: 1
NAUSEA: 0
HEMATURIA: 0
HEADACHES: 0
FEVER: 0
FREQUENCY: 0
CHILLS: 0
ARTHRALGIAS: 0
ABDOMINAL PAIN: 0
WEAKNESS: 0
SEIZURES: 0
WOUND: 0
JOINT SWELLING: 0
WHEEZING: 0
DIARRHEA: 0
COUGH: 0
VOMITING: 0
NUMBNESS: 1
DYSURIA: 0

## 2019-09-03 ENCOUNTER — TELEPHONE (OUTPATIENT)
Dept: FAMILY MEDICINE | Facility: CLINIC | Age: 49
End: 2019-09-03

## 2019-09-03 NOTE — TELEPHONE ENCOUNTER
Reason for Call:  Medication or medication refill:    Do you use a [facility]?  Name of the pharmacy and phone number for the current request:  [facility] [phone]    Name of the medication requested: Prednisone, 40mg 1x/day    Other request: Was given rx in Urgent Care on Sunday 9/1/19. Is almost out and it is helping, wants a few more days of rx    Can we leave a detailed message on this number? YES    Phone number patient can be reached at: Home number on file [phone] (home)    Best Time: Any    Call taken on 9/3/2019 at 4:11 PM by Rachael Goddard

## 2019-09-03 NOTE — TELEPHONE ENCOUNTER
LM to call clinic/RN to address Prednisone refill request and symptoms.  Pt was seen in the ED for back pain and was given Flexeril, Percocet and Prednisone.  Pt has Prednisone to cover until Friday 9/6/19.  Emily

## 2019-09-04 ENCOUNTER — OFFICE VISIT (OUTPATIENT)
Dept: FAMILY MEDICINE | Facility: CLINIC | Age: 49
End: 2019-09-04
Payer: COMMERCIAL

## 2019-09-04 ENCOUNTER — TELEPHONE (OUTPATIENT)
Dept: FAMILY MEDICINE | Facility: CLINIC | Age: 49
End: 2019-09-04

## 2019-09-04 ENCOUNTER — ANCILLARY PROCEDURE (OUTPATIENT)
Dept: GENERAL RADIOLOGY | Facility: CLINIC | Age: 49
End: 2019-09-04
Attending: NURSE PRACTITIONER
Payer: COMMERCIAL

## 2019-09-04 VITALS
DIASTOLIC BLOOD PRESSURE: 74 MMHG | BODY MASS INDEX: 38.15 KG/M2 | RESPIRATION RATE: 16 BRPM | OXYGEN SATURATION: 95 % | WEIGHT: 229 LBS | HEART RATE: 81 BPM | HEIGHT: 65 IN | SYSTOLIC BLOOD PRESSURE: 124 MMHG | TEMPERATURE: 98.4 F

## 2019-09-04 DIAGNOSIS — M54.5 LOW BACK PAIN, UNSPECIFIED BACK PAIN LATERALITY, UNSPECIFIED CHRONICITY, WITH SCIATICA PRESENCE UNSPECIFIED: ICD-10-CM

## 2019-09-04 DIAGNOSIS — M54.5 LOW BACK PAIN, UNSPECIFIED BACK PAIN LATERALITY, UNSPECIFIED CHRONICITY, WITH SCIATICA PRESENCE UNSPECIFIED: Primary | ICD-10-CM

## 2019-09-04 DIAGNOSIS — E66.01 MORBID OBESITY (H): ICD-10-CM

## 2019-09-04 PROCEDURE — 99214 OFFICE O/P EST MOD 30 MIN: CPT | Performed by: NURSE PRACTITIONER

## 2019-09-04 PROCEDURE — 72100 X-RAY EXAM L-S SPINE 2/3 VWS: CPT | Mod: FY

## 2019-09-04 RX ORDER — CYCLOBENZAPRINE HCL 10 MG
10 TABLET ORAL 3 TIMES DAILY PRN
Qty: 20 TABLET | Refills: 0 | Status: SHIPPED | OUTPATIENT
Start: 2019-09-04 | End: 2020-04-22

## 2019-09-04 RX ORDER — GABAPENTIN 300 MG/1
300 CAPSULE ORAL 3 TIMES DAILY
Refills: 5 | COMMUNITY
Start: 2019-08-17 | End: 2020-08-12

## 2019-09-04 ASSESSMENT — MIFFLIN-ST. JEOR: SCORE: 1654.62

## 2019-09-04 NOTE — PROGRESS NOTES
Subjective     Bibi Song is a 49 year old female who presents to clinic today for the following health issues:  Chief Complaint   Patient presents with     ER F/U     Back pain      Back Pain     Wants more pain meds for back pain      HPI   Back Pain       Duration: a week         Specific cause: Started with back spasms, then lifted a box at work then got worse the next day     Description:   Location of pain: low back, right and middle back   Character of pain: sharp, dull ache, burning and cramping  Pain radiation:radiates into the right buttocks, radiates into the right leg, radiates into the left buttocks and radiates into the left leg  New numbness or weakness in legs, not attributed to pain:  no     Intensity: Currently 3/10, At its worst 8/10    History:   Pain interferes with job: YES  History of back problems: spasms have happened before   Any previous MRI or X-rays: None  Sees a specialist for back pain:  No  Therapies tried without relief: acetaminophen (Tylenol), cold, heat, massage, muscle relaxants, opioids, rest, sitting and stretch    Alleviating factors:   Improved by: none      Precipitating factors:  Worsened by: Bending, Standing, Lying Flat and Walking          Accompanying Signs & Symptoms:  Risk of Fracture:  None  Risk of Cauda Equina:  None  Risk of Infection:  None  Risk of Cancer:  None  Risk of Ankylosing Spondylitis:  Onset at age <35, male, AND morning back stiffness. no          States the Prednisone seems to be helping. She is going out of town this weekend and works next so isn't sure she can do PT.  Denies previous problems with her back.            Patient Active Problem List   Diagnosis     Moderate major depression (H)     OCD (obsessive compulsive disorder)     Anxiety disorder     Family history of diabetes mellitus     Family history of thyroid disease     ALCOHOL ABUSE, IN REMISSION     Hyperlipidemia LDL goal <160     Cervicalgia     Hypothyroidism, unspecified type      Ulcerative colitis with complication, unspecified location (H)     TCA (tricyclic antidepressant) overdose of undetermined intent     Tracheal stenosis     Obesity (BMI 35.0-39.9) with comorbidity (H)     Past Surgical History:   Procedure Laterality Date     INJECT STEROID (LOCATION) N/A 9/7/2018    Procedure: INJECT STEROID (LOCATION);;  Surgeon: Dusty Pearson MD;  Location: [facility]     INJECT STEROID (LOCATION) N/A 10/5/2018    Procedure: INJECT STEROID (LOCATION);;  Surgeon: Dusty Pearson MD;  Location: [facility]     INJECT STEROID (LOCATION) N/A 12/6/2018    Procedure: Steroid Injection;  Surgeon: Dusty Pearson MD;  [facility]     INJECT STEROID (LOCATION) N/A 2/1/2019    Procedure: Steroid Injection;  Surgeon: Dusty Pearson MD;  [facility]     LARYNGOSCOPY N/A 9/7/2018    Procedure: LARYNGOSCOPY;  Teleoscopic Direct Laryngoscopy with Balloon Dilation, Mitomycin Application and Steriod Injection ;  Surgeon: Dusty Pearson MD;  Location: [facility]     LARYNGOSCOPY, ESOPHAGOSCOPY WITH DILATION, COMBINED N/A 10/5/2018    Procedure: COMBINED LARYNGOSCOPY, ESOPHAGOSCOPY WITH DILATION;  Direct Laryngoscopy with Teleoscope, Balloon Dilation, Application of Mitomycin and Steroid Injection ;  Surgeon: Dusty Pearson MD;  [facility]     LARYNGOSCOPY, ESOPHAGOSCOPY WITH DILATION, COMBINED N/A 12/6/2018    Procedure: Direct Laryngoscopy, Balloon Dilation, Mitomycin Application, Steroid Injection;  Surgeon: Dusty Pearson MD;  [facility]     LARYNGOSCOPY, ESOPHAGOSCOPY WITH DILATION, COMBINED N/A 2/1/2019    Procedure: Direct Laryngoscopy, Balloon Dilation, Mitomycin Application;  Surgeon: Dusty Pearson MD;  [facility]     RESECT TRACHEA WITH RECONSTRUCTION N/A 3/11/2019    Procedure: Tracheal Resection Via Cervical Approach;  Surgeon: Nick Beaulieu MD;  Location: [facility]       Social History     Tobacco Use      Smoking status: Former Smoker     Years: 5.00     Types: Cigarettes     Start date: 1988     Last attempt to quit: 1994     Years since quittin.6     Smokeless tobacco: Never Used   Substance Use Topics     Alcohol use: No     Comment: Treatment in 2007     Family History   Problem Relation Age of Onset     Diabetes Mother      Depression Mother         OCD     Neurologic Disorder Mother      Psychotic Disorder Mother      Respiratory Mother      Thyroid Disease Mother      Gynecology Mother         hysterectomy     Cerebrovascular Disease Mother         pacemaker     Substance Abuse Mother      Mental Illness Mother      Heart Disease Mother      Thrombosis Mother         no details available     Heart Disease Father         MI,   LATE 60'S     Hypertension Father      Prostate Cancer Father      Obesity Father      Diabetes Father      Cerebrovascular Disease Father      Substance Abuse Father      Depression Father      Thrombosis Father      Depression Brother      Thyroid Disease Brother      Depression Brother      Thyroid Disease Brother      Allergies Son      Asthma Son      Thyroid Disease Maternal Grandmother      Cancer Maternal Grandfather      Psychotic Disorder Paternal Grandmother      Thyroid Disease Paternal Grandfather      Diabetes Brother      Breast Cancer Maternal Aunt      Substance Abuse Brother      Mental Illness Brother      Depression Brother      Diabetes Brother      Mental Illness Son      Asthma Son      Depression Son      Stomach Problem Son          Current Outpatient Medications   Medication Sig Dispense Refill     acetaminophen (TYLENOL) 500 MG tablet Take 500-1,000 mg by mouth every 6 hours as needed for mild pain or other (headache)       buPROPion (WELLBUTRIN) 100 MG tablet 0.5 tablets (50 mg) by Oral or Feeding Tube route 3 times daily (Patient taking differently: 300 mg by Oral or Feeding Tube route daily ) 90 tablet 0     cyclobenzaprine (FLEXERIL) 10 MG  tablet Take 1 tablet (10 mg) by mouth 3 times daily as needed for muscle spasms 20 tablet 0     ibuprofen (ADVIL/MOTRIN) 200 MG tablet Take 600 mg by mouth as needed for mild pain       levothyroxine (SYNTHROID/LEVOTHROID) 75 MCG tablet Take 1 tablet (75 mcg) by mouth daily 30 tablet 0     melatonin 3 MG tablet Take 20 mg by mouth At Bedtime  30 tablet 0     predniSONE (DELTASONE) 20 MG tablet Take 2 tablets (40 mg) by mouth daily for 5 days 10 tablet 0     sertraline (ZOLOFT) 100 MG tablet Take 2 tablets (200 mg) by mouth daily (Patient taking differently: Take 200 mg by mouth every morning ) 60 tablet 0     ziprasidone (GEODON) 40 MG capsule Take 1 capsule (40 mg) by mouth 2 times daily (with meals) 60 capsule 0     albuterol (PROVENTIL) (2.5 MG/3ML) 0.083% neb solution inhale the contents of 1 vial BY MOUTH EVERY 4 HOURS AS NEEDED SHORTNESS OF BREATH  0     CALCIUM-MAGNESIUM-ZINC PO Take 1 tablet by mouth 3 times daily       FLOVENT DISKUS 50 MCG/BLIST inhaler Inhale 1 Puff by mouth PRN (rinse mouth after each use)  3     gabapentin (NEURONTIN) 300 MG capsule Take 300 mg by mouth 3 times daily  5     gabapentin (NEURONTIN) 300 MG capsule 2 capsules (600 mg) by Oral or Feeding Tube route 3 times daily as needed (Anxiety) 180 capsule 0     omeprazole (PRILOSEC) 20 MG DR capsule Take 1 capsule (20 mg) by mouth daily (Patient not taking: Reported on 9/4/2019) 30 capsule 0     oxyCODONE (ROXICODONE) 5 MG/5ML solution 5-10 mLs (5-10 mg) by Oral or Feeding Tube route every 3 hours as needed for moderate to severe pain (Patient not taking: Reported on 9/4/2019) 500 mL 0     oxyCODONE-acetaminophen (PERCOCET) 5-325 MG tablet Take 1-2 tablets by mouth every 6 hours as needed for pain (Patient not taking: Reported on 9/4/2019) 5 tablet 0     polyethylene glycol (MIRALAX/GLYCOLAX) packet Take 17 g by mouth 2 times daily (Patient not taking: Reported on 9/4/2019) 60 packet 0     prazosin (MINIPRESS) 1 MG capsule Take 1 mg by  "mouth At Bedtime       VENTOLIN  (90 Base) MCG/ACT inhaler inhale 2 puffs by mouth four times daily as needed for shortness of breath (rinse mouthpiece weekly)  1     vitamin B complex with vitamin C (VITAMIN  B COMPLEX) TABS tablet Take 1 tablet by mouth every morning        No Known Allergies  BP Readings from Last 3 Encounters:   09/04/19 124/74   09/01/19 119/75   08/14/19 117/81    Wt Readings from Last 3 Encounters:   09/04/19 103.9 kg (229 lb)   09/01/19 103.9 kg (229 lb)   08/14/19 104.3 kg (230 lb)                      Reviewed and updated as needed this visit by Provider         Review of Systems   ROS COMP: Constitutional, HEENT, cardiovascular, pulmonary, gi and gu systems are negative, except as otherwise noted.      Objective    /74   Pulse 81   Temp 98.4  F (36.9  C) (Tympanic)   Resp 16   Ht 1.651 m (5' 5\")   Wt 103.9 kg (229 lb)   SpO2 95%   BMI 38.11 kg/m    Body mass index is 38.11 kg/m .  Physical Exam   GENERAL: healthy, alert and no distress  NECK: no asymmetry  RESP: lungs clear  CV: regular rate and rhythm  MS: no gross musculoskeletal defects noted      Diagnostic Test Results:  Labs reviewed in Epic  No results found for this or any previous visit (from the past 24 hour(s)).        Assessment & Plan     1. Low back pain, unspecified back pain laterality, unspecified chronicity, with sciatica presence unspecified    - cyclobenzaprine (FLEXERIL) 10 MG tablet; Take 1 tablet (10 mg) by mouth 3 times daily as needed for muscle spasms  Dispense: 20 tablet; Refill: 0  - XR Lumbar Spine 2/3 Views; Future  See plan below.    2. Morbid obesity (H)    Encouraged healthy diet, exercise to aid in weight loss.         BMI:   Estimated body mass index is 38.11 kg/m  as calculated from the following:    Height as of this encounter: 1.651 m (5' 5\").    Weight as of this encounter: 103.9 kg (229 lb).           See Patient Instructions  Patient Instructions   Continue with ice or heat to " low back whichever feels better at this point.  Finish the Prednisone and then can try Ibuprofen 600 mg as needed for pain.  Continue with the muscle relaxants as needed.  Will be notified of pending x ray results.  Start PT whenever able to do that.      Our Clinic hours are:  Mondays    7:20 am - 7 pm  Tues - Fri  7:20 am - 5 pm    Clinic Phone: [phone]    The clinic lab opens at 7:30 am Mon - Fri and appointments are required.    [facility] [place]  Ph. [phone]  Monday  8 am - 7pm  Tues - Fri 8 am - 5:30 pm           Return in about 2 weeks (around 9/18/2019) for or sooner if symptoms persist or worsen.    SUSANNAH Cintron CNP  [facility]

## 2019-09-04 NOTE — TELEPHONE ENCOUNTER
Pt is down in bed after walking.  Back is in great pain.  Encouraged rest, ice, Flexeril, Ibuprofen.  Pt is alone with no help.  Will try to move after 2-3 hrs of rest.  KpavelRn

## 2019-09-04 NOTE — PATIENT INSTRUCTIONS
Continue with ice or heat to low back whichever feels better at this point.  Finish the Prednisone and then can try Ibuprofen 600 mg as needed for pain.  Continue with the muscle relaxants as needed.  Will be notified of pending x ray results.  Start PT whenever able to do that.      Our Clinic hours are:  Mondays    7:20 am - 7 pm  Tues -  Fri  7:20 am - 5 pm    Clinic Phone: [phone]    The clinic lab opens at 7:30 am Mon - Fri and appointments are required.    [facility] [place]  Ph. [phone]  Monday  8 am - 7pm  Tues - Fri 8 am - 5:30 pm

## 2019-09-04 NOTE — TELEPHONE ENCOUNTER
Reason for call:  Patient reporting a symptom    Symptom or request: Patient was seen today and was told to do walking and patient did so and now she is in a lot of pain.    Duration (how long have symptoms been present): was seen today    Have you been treated for this before? Yes    Phone Number patient can be reached at:  Home number on file [phone] (home)    Best Time:  any    Can we leave a detailed message on this number:  YES    Call taken on 9/4/2019 at 3:06 PM by Ofelia Andersen

## 2019-09-04 NOTE — LETTER
[facility]  [address]  [facility] [address]  Phone: [phone]      September 4, 2019      RE: Bibi Song  [address] [address]        To whom it may concern:    Bibi Song is under my professional care. She was seen today in clinic for follow up. The employee is UNABLE to return to work until 9/9/2019.          Sincerely,    Janice Storey NP

## 2019-09-05 ENCOUNTER — TELEPHONE (OUTPATIENT)
Dept: FAMILY MEDICINE | Facility: CLINIC | Age: 49
End: 2019-09-05

## 2019-09-05 ENCOUNTER — HOSPITAL ENCOUNTER (EMERGENCY)
Facility: CLINIC | Age: 49
Discharge: HOME | End: 2019-09-05
Attending: FAMILY MEDICINE | Admitting: FAMILY MEDICINE
Payer: COMMERCIAL

## 2019-09-05 VITALS
DIASTOLIC BLOOD PRESSURE: 95 MMHG | RESPIRATION RATE: 18 BRPM | OXYGEN SATURATION: 99 % | WEIGHT: 234 LBS | TEMPERATURE: 98.7 F | SYSTOLIC BLOOD PRESSURE: 129 MMHG | HEIGHT: 65 IN | BODY MASS INDEX: 38.99 KG/M2

## 2019-09-05 DIAGNOSIS — M54.41 ACUTE BILATERAL LOW BACK PAIN WITH RIGHT-SIDED SCIATICA: ICD-10-CM

## 2019-09-05 PROCEDURE — 99284 EMERGENCY DEPT VISIT MOD MDM: CPT | Mod: Z6 | Performed by: FAMILY MEDICINE

## 2019-09-05 PROCEDURE — 99282 EMERGENCY DEPT VISIT SF MDM: CPT | Performed by: FAMILY MEDICINE

## 2019-09-05 RX ORDER — OXYCODONE HYDROCHLORIDE 5 MG/1
5 TABLET ORAL EVERY 6 HOURS PRN
Qty: 6 TABLET | Refills: 0 | Status: SHIPPED | OUTPATIENT
Start: 2019-09-05 | End: 2020-04-22

## 2019-09-05 RX ORDER — SULINDAC 200 MG/1
200 TABLET ORAL 2 TIMES DAILY WITH MEALS
Qty: 20 TABLET | Refills: 0 | Status: SHIPPED | OUTPATIENT
Start: 2019-09-05 | End: 2020-04-22

## 2019-09-05 ASSESSMENT — ENCOUNTER SYMPTOMS
BLOOD IN STOOL: 0
COUGH: 0
WHEEZING: 0
BACK PAIN: 1
CHILLS: 0
DYSURIA: 0
PALPITATIONS: 0
FREQUENCY: 0
FEVER: 0
SINUS PRESSURE: 0
SORE THROAT: 0
DIAPHORESIS: 0
NAUSEA: 0
CONSTIPATION: 0
HEADACHES: 0
VOMITING: 0
SHORTNESS OF BREATH: 0
DIARRHEA: 0
ABDOMINAL PAIN: 0

## 2019-09-05 ASSESSMENT — MIFFLIN-ST. JEOR: SCORE: 1677.3

## 2019-09-05 NOTE — ED AVS SNAPSHOT
[facility] Emergency Department  [address] [facility] [address]  Phone:  [phone]  Fax:  [phone]                                    Bibi Song   MRN: [id number]    Department:  [facility] Emergency Department   Date of Visit:  9/5/2019           After Visit Summary Signature Page    I have received my discharge instructions, and my questions have been answered. I have discussed any challenges I see with this plan with the nurse or doctor.    ..........................................................................................................................................  Patient/Patient Representative Signature      ..........................................................................................................................................  Patient Representative Print Name and Relationship to Patient    ..................................................               ................................................  Date                                   Time    ..........................................................................................................................................  Reviewed by Signature/Title    ...................................................              ..............................................  Date                                               Time          22EPIC Rev 08/18

## 2019-09-05 NOTE — TELEPHONE ENCOUNTER
No appts in [facility] or [place].  Pt will go to [facility]

## 2019-09-05 NOTE — TELEPHONE ENCOUNTER
Reason for Call:  Back spasms    Detailed comments: patient is calling and stating that she saw ANDREW Storey yesterday for this and it is much worse. She is now wanting something for pain. Please advise.    Phone Number Patient can be reached at: Home number on file [phone] (home)    Best Time: any    Can we leave a detailed message on this number? YES   Rachael Mercado  [facility]      Call taken on 9/5/2019 at 10:28 AM by Rachael Mercado

## 2019-09-05 NOTE — LETTER
September 5, 2019      To Whom It May Concern:      Bibi Song was seen in our Emergency Department today, 09/05/19.  I expect her condition to improve over the next 3 days.  She may return to work/school when improved - on return she should avoid lifting greater than 10 pounds, pushing greater than 20 pounds, bending, stooping or overhead work for 7 days.    Sincerely,        Brown Zimmerman MD

## 2019-09-05 NOTE — DISCHARGE INSTRUCTIONS
"  ICD-10-CM    1. Acute bilateral low back pain with right-sided sciatica M54.41     Take sulindac 200 mg twice daily for 7 days with food or milk in place of ibuprofen.  Tylenol 1000 mg everuy 6 hours scheduled. Take oxycodone 5 mg orally every 6 hours for breakthrough pain. take flexeril 10 mg at bedtime as needed for spasm interfering with sleep. lidocaine 4% patch to the maximally painfully area - off for 12 of every 24 hours. start physical therapy.  return immed for numbness inner thighs, foot drop, incontinence/retention urine/stool. Guerrero book \"heal your own book\"         "

## 2019-09-05 NOTE — ED PROVIDER NOTES
History     Chief Complaint   Patient presents with     Back Pain     back pain for 1 week, no injury. seen over the weekend, back spasms increased,      HPI  Bibi Song is a 49 year old female who presents with a history of obsessive-compulsive disorder anxiety depression prior TCA overdose and prior prolonged intubation with secondary tracheal stenosis and underwent surgery in March 2019 to remove scar tissue is done well since that time.  She presents for her fourth visit for back pain since mid August.  She at that time was seen here for right-sided back pain radiating into the right buttock right denied cauda equina symptoms at the time there is been no fall or trauma.  Recommended for physical therapy which she has not yet seen and for ibuprofen, Flexeril and follow-up.   She had been improving and then had a recurrence of her low back pain without injury when she presented 9/1/2019 approximately 4 days ago for low back pain that is been 3 days at that time.  Does get up and down frequently at work but no change with this and no heavy lifting and no obvious injury.  Was experiencing right low back spasm and possibly some radicular symptoms but now denies significant radicular symptoms below the level of the knee.  She had no cauda equina symptoms at that time and a reassuring examination of painful position changes.        Today she returns as she had felt better for a few days but the spasm recurred this morning with no increased radicular symptoms no cauda equina symptoms.  No new injuries.  But pain is moderate intensity worse with movement and decreased range of motion.  She did take ibuprofen before arrival and had some relief with this.  Pain has been worse with ambulation.    No symptoms suggestive of  cauda equina syndrome (central spinal stenosis) such as incontinence or retention of urine or stool, inner thigh numbness or foot drop.    Allergies:  No Known Allergies    Problem List:    Patient  Active Problem List    Diagnosis Date Noted     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     Priority: Medium     TCA (tricyclic antidepressant) overdose of undetermined intent 07/18/2018     Priority: Medium     Ulcerative colitis with complication, unspecified location (H) 04/27/2017     Priority: Medium     Hypothyroidism, unspecified type 12/08/2016     Priority: Medium     Cervicalgia 10/10/2012     Priority: Medium     Hyperlipidemia LDL goal <160 10/31/2010     Priority: Medium     Moderate major depression (H) 08/06/2010     Priority: Medium     OCD (obsessive compulsive disorder) 08/06/2010     Priority: Medium     [facility] NP. Schoenecker, NP       Anxiety disorder 08/06/2010     Priority: Medium     Family history of diabetes mellitus 08/06/2010     Priority: Medium     Family history of thyroid disease 08/06/2010     Priority: Medium     ALCOHOL ABUSE, IN REMISSION 08/06/2010     Priority: Medium        Past Medical History:    Past Medical History:   Diagnosis Date     Anxiety      Chemical dependency (H)      Depressive disorder      Hoarseness      Hypothyroidism      Obesity      OCD (obsessive compulsive disorder)      PONV (postoperative nausea and vomiting)      Sleep apnea      Tracheal stenosis        Past Surgical History:    Past Surgical History:   Procedure Laterality Date     INJECT STEROID (LOCATION) N/A 9/7/2018    Procedure: INJECT STEROID (LOCATION);;  Surgeon: Dusty Pearson MD;  [facility]     INJECT STEROID (LOCATION) N/A 10/5/2018    Procedure: INJECT STEROID (LOCATION);;  Surgeon: Dusty Pearson MD;  [facility]     INJECT STEROID (LOCATION) N/A 12/6/2018    Procedure: Steroid Injection;  Surgeon: Dusty Pearson MD;  [facility]     INJECT STEROID (LOCATION) N/A 2/1/2019    Procedure: Steroid Injection;  Surgeon: Dusty Pearson MD;   [facility]     LARYNGOSCOPY N/A 9/7/2018    Procedure: LARYNGOSCOPY;  Teleoscopic Direct Laryngoscopy with Balloon Dilation, Mitomycin Application and Steriod Injection ;  Surgeon: Dusty Pearson MD;  [facility]     LARYNGOSCOPY, ESOPHAGOSCOPY WITH DILATION, COMBINED N/A 10/5/2018    Procedure: COMBINED LARYNGOSCOPY, ESOPHAGOSCOPY WITH DILATION;  Direct Laryngoscopy with Teleoscope, Balloon Dilation, Application of Mitomycin and Steroid Injection ;  Surgeon: Dusty Pearson MD;  [facility]     LARYNGOSCOPY, ESOPHAGOSCOPY WITH DILATION, COMBINED N/A 12/6/2018    Procedure: Direct Laryngoscopy, Balloon Dilation, Mitomycin Application, Steroid Injection;  Surgeon: Dusty Pearson MD;  [facility]     LARYNGOSCOPY, ESOPHAGOSCOPY WITH DILATION, COMBINED N/A 2/1/2019    Procedure: Direct Laryngoscopy, Balloon Dilation, Mitomycin Application;  Surgeon: Dusty Pearson MD;  [facility]     RESECT TRACHEA WITH RECONSTRUCTION N/A 3/11/2019    Procedure: Tracheal Resection Via Cervical Approach;  Surgeon: Nick Beaulieu MD;  Location: [facility]       Family History:    Family History   Problem Relation Age of Onset     Diabetes Mother      Depression Mother         OCD     Neurologic Disorder Mother      Psychotic Disorder Mother      Respiratory Mother      Thyroid Disease Mother      Gynecology Mother         hysterectomy     Cerebrovascular Disease Mother         pacemaker     Substance Abuse Mother      Mental Illness Mother      Heart Disease Mother      Thrombosis Mother         no details available     Heart Disease Father         MI,   LATE 60'S     Hypertension Father      Prostate Cancer Father      Obesity Father      Diabetes Father      Cerebrovascular Disease Father      Substance Abuse Father      Depression Father      Thrombosis Father      Depression Brother      Thyroid Disease Brother      Depression Brother      Thyroid Disease Brother       Allergies Son      Asthma Son      Thyroid Disease Maternal Grandmother      Cancer Maternal Grandfather      Psychotic Disorder Paternal Grandmother      Thyroid Disease Paternal Grandfather      Diabetes Brother      Breast Cancer Maternal Aunt      Substance Abuse Brother      Mental Illness Brother      Depression Brother      Diabetes Brother      Mental Illness Son      Asthma Son      Depression Son      Stomach Problem Son        Social History:  Marital Status:   [2]  Social History     Tobacco Use     Smoking status: Former Smoker     Years: 5.00     Types: Cigarettes     Start date: 1988     Last attempt to quit: 1994     Years since quittin.6     Smokeless tobacco: Never Used   Substance Use Topics     Alcohol use: No     Comment: Treatment in 2007     Drug use: No        Medications:      oxyCODONE (ROXICODONE) 5 MG tablet   sulindac (CLINORIL) 200 MG tablet   acetaminophen (TYLENOL) 500 MG tablet   albuterol (PROVENTIL) (2.5 MG/3ML) 0.083% neb solution   buPROPion (WELLBUTRIN) 100 MG tablet   CALCIUM-MAGNESIUM-ZINC PO   cyclobenzaprine (FLEXERIL) 10 MG tablet   FLOVENT DISKUS 50 MCG/BLIST inhaler   gabapentin (NEURONTIN) 300 MG capsule   gabapentin (NEURONTIN) 300 MG capsule   ibuprofen (ADVIL/MOTRIN) 200 MG tablet   levothyroxine (SYNTHROID/LEVOTHROID) 75 MCG tablet   melatonin 3 MG tablet   omeprazole (PRILOSEC) 20 MG DR capsule   oxyCODONE (ROXICODONE) 5 MG/5ML solution   oxyCODONE-acetaminophen (PERCOCET) 5-325 MG tablet   polyethylene glycol (MIRALAX/GLYCOLAX) packet   prazosin (MINIPRESS) 1 MG capsule   predniSONE (DELTASONE) 20 MG tablet   sertraline (ZOLOFT) 100 MG tablet   VENTOLIN  (90 Base) MCG/ACT inhaler   vitamin B complex with vitamin C (VITAMIN  B COMPLEX) TABS tablet   ziprasidone (GEODON) 40 MG capsule         Review of Systems   Constitutional: Negative for chills, diaphoresis and fever.   HENT: Negative for ear pain, sinus pressure and sore throat.   "  Eyes: Negative for visual disturbance.   Respiratory: Negative for cough, shortness of breath and wheezing.    Cardiovascular: Negative for chest pain and palpitations.   Gastrointestinal: Negative for abdominal pain, blood in stool, constipation, diarrhea, nausea and vomiting.   Genitourinary: Negative for dysuria, frequency and urgency.   Musculoskeletal: Positive for back pain.   Skin: Negative for rash.   Neurological: Negative for headaches.   All other systems reviewed and are negative.        Physical Exam   BP: (!) 129/95  Heart Rate: 85  Temp: 98.7  F (37.1  C)  Resp: 18  Height: 165.1 cm (5' 5\")  Weight: 106.1 kg (234 lb)  SpO2: 99 %      Physical Exam   Constitutional: She appears distressed.   Eyes: Conjunctivae are normal.   Neck: Neck supple.   Cardiovascular: Exam reveals no friction rub.   No murmur heard.  Neurological: She is alert. She exhibits normal muscle tone.   Skin: No rash noted. She is not diaphoretic. No pallor.     GENERAL: Alert and distressed  CARDIOVASCULAR: Peripheral pulses are palpable  GASTROINTESTINAL: No abdominal tenderness, mass or organomegaly    MUSCULOSKELTAL:  Lumbosacral spine area reveals local tenderness - RT low back, RT midline.   reduced and painful range of motion  Straight leg raise     Lying: Left (-) Right (-)  SI Joint Testing      Figure of four testing (-)  Other joints      Hips: full range of motion without pain.      Knees: full range of motion without pain.    NEURO:      Deep Tendon Reflexes: Present and symmetric      Motor strength: 5/5 throughout      Sensation to light touch intact      Heel and toe gait painful but normal      ED Course        Procedures               Critical Care time:  none               No results found for this or any previous visit (from the past 24 hour(s)).    Medications - No data to display    Assessments & Plan (with Medical Decision Making)     MDM: Bibi YVETTE Nohemi is a 49 year old female who presents with her fourth " "visit for acute low back pain since mid August.  She has had no cauda equina symptoms, fever or other red flags.  No lumbar surgery.  Reassuring examination without new findings.  X-ray yesterday was reviewed and demonstrated no significant findings although disc disease may be present.  Reviewed her prior management strategies and additional management as below.  Precautions are given for return.  Reviewed [place] monitoring program and she was given 5 Percocet on 9/1.  Given additional 6 tablets of oxycodone for breakthrough pain.    I have reviewed the nursing notes.    I have reviewed the findings, diagnosis, plan and need for follow up with the patient.       New Prescriptions    OXYCODONE (ROXICODONE) 5 MG TABLET    Take 1 tablet (5 mg) by mouth every 6 hours as needed for pain    SULINDAC (CLINORIL) 200 MG TABLET    Take 1 tablet (200 mg) by mouth 2 times daily (with meals)       Final diagnoses:   Acute bilateral low back pain with right-sided sciatica - Take sulindac 200 mg twice daily for 7 days with food or milk in place of ibuprofen.  Tylenol 1000 mg everuy 6 hours scheduled. Take oxycodone 5 mg orally every 6 hours for breakthrough pain. take flexeril 10 mg at bedtime as needed for spasm interfering with sleep. lidocaine 4% patch to the maximally painfully area - off for 12 of every 24 hours. start physical therapy.  return immed for numbness inner thighs, foot drop, incontinence/retention urine/stool. Guerrero book \"heal your own book\"       9/5/2019   [facility] EMERGENCY DEPARTMENT     Brown Zimmerman MD  09/05/19 [id number]    "

## 2019-09-27 DIAGNOSIS — E03.9 HYPOTHYROIDISM, UNSPECIFIED TYPE: ICD-10-CM

## 2019-09-27 RX ORDER — LEVOTHYROXINE SODIUM 75 UG/1
75 TABLET ORAL DAILY
Qty: 90 TABLET | Refills: 1 | Status: SHIPPED | OUTPATIENT
Start: 2019-09-27 | End: 2020-03-11

## 2019-09-27 NOTE — TELEPHONE ENCOUNTER
"Prescription approved per [facility] [place].  Priscilla BAXTER RN      Requested Prescriptions   Pending Prescriptions Disp Refills     levothyroxine (SYNTHROID/LEVOTHROID) 75 MCG tablet [Pharmacy Med Name: LEVOTHYROXINE SODIUM 75 MCG TABLET] 30 tablet 0     Sig: Take 1 tablet (75 mcg) by mouth daily       Thyroid Protocol Passed - 9/27/2019  9:43 AM        Passed - Patient is 12 years or older        Passed - Recent (12 mo) or future (30 days) visit within the authorizing provider's specialty     Patient had office visit in the last 12 months or has a visit in the next 30 days with authorizing provider or within the authorizing provider's specialty.  See \"Patient Info\" tab in inbasket, or \"Choose Columns\" in Meds & Orders section of the refill encounter.              Passed - Medication is active on med list        Passed - Normal TSH on file in past 12 months     Recent Labs   Lab Test 08/23/19  0723   TSH 2.14              Passed - No active pregnancy on record     If patient is pregnant or has had a positive pregnancy test, please check TSH.          Passed - No positive pregnancy test in past 12 months     If patient is pregnant or has had a positive pregnancy test, please check TSH.            "

## 2019-10-25 ENCOUNTER — TELEPHONE (OUTPATIENT)
Dept: FAMILY MEDICINE | Facility: CLINIC | Age: 49
End: 2019-10-25

## 2019-10-25 NOTE — TELEPHONE ENCOUNTER
"S-(situation): Pt and spouse on phone wanting to know if she can start taking 'Keto Boost' with her other meds.    B-(background): Last OV 9/4/19 with MARILYNN Storey CNP    A-(assessment):   Pt wants to try 'Keto Boost' diet and wants to know if it will interact with her prescriptions. \"It's supposed to be a weight loss supplement.\"   She sees her psychiatrist on the 7th \"and I was thinking about asking her.\"   \"I'm way overdue for a Physical. I'm gonna have to wait right now.\"     Med list reviewed.  These are meds she is NOT taking:    cyclobenzaprine  Albuterol nebs   Flovent inhaler  Melatonin  Omeprazole  Oxycodone  Percocet  Miralax  Prazosin  Prednisone  Sulindac  Ventolin  She takes just Vitamin B complex (not with Vit C)    R-(recommendations): Please review and advise.    Dorys RYDER RN    "

## 2019-10-28 NOTE — TELEPHONE ENCOUNTER
I can't make recommendations of an over the counter supplement as they are not FDA regulated and the results/side effects are not studied or evidence based.  Most of the time with Keto diets, supplementation of ketones is not needed/recommended.    Glory Singleton M.D.

## 2019-11-04 ENCOUNTER — HEALTH MAINTENANCE LETTER (OUTPATIENT)
Age: 49
End: 2019-11-04

## 2020-01-13 LAB
PHQ9 SCORE: 11
PHQ9 SCORE: 11

## 2020-01-23 NOTE — TELEPHONE ENCOUNTER
"  Reason for Disposition    [1] Pain lasts > 10 minutes AND [2] age > 40 AND [3] associated chest, arm, neck, upper back or jaw pain     \"I have right sided pain that is radiating inwards\". Yesterday Bibi started to feel sick to her stomach and remains very nauseated.  Pain lasted all through the night and she wasn't able to sleep.    Additional Information    Negative: Shock suspected (e.g., cold/pale/clammy skin, too weak to stand, low BP, rapid pulse)    Negative: Difficult to awaken or acting confused  (e.g., disoriented, slurred speech)    Negative: Passed out (i.e., lost consciousness, collapsed and was not responding)    Negative: Sounds like a life-threatening emergency to the triager    Negative: Chest pain    Pain is mainly in upper abdomen  (if needed ask: \"is it mainly above the belly button?\")    Negative: Severe difficulty breathing (e.g., struggling for each breath, speaks in single words)    Negative: Shock suspected (e.g., cold/pale/clammy skin, too weak to stand, low BP, rapid pulse)    Negative: Difficult to awaken or acting confused  (e.g., disoriented, slurred speech)    Negative: Passed out (i.e., lost consciousness, collapsed and was not responding)    Negative: Visible sweat on face or sweat dripping down face    Negative: Sounds like a life-threatening emergency to the triager    Negative: Followed an abdomen (stomach) injury    Negative: Chest pain    Negative: [1] SEVERE pain (e.g., excruciating) AND [2] present > 1 hour    Negative: [1] Pain lasts > 10 minutes AND [2] age > 50    Protocols used: ABDOMINAL PAIN - UPPER-ADULT-, ABDOMINAL PAIN - FEMALE-ADULT-      Julienne Belle RN  [facility]      " ----- Message from Yaya De Dios MD sent at 1/23/2020 10:14 AM CST -----  Let patient know tests are ok overall.  She has developed a mild anemia so I would like to check a CBC iron TIBC and reticulocyte count in about 6 weeks.  We should also check a stool for occult blood.  Patient's creatinine is also slightly above the last 1. Let's recheck a BMP with her labs in 6 weeks

## 2020-01-27 ENCOUNTER — TELEPHONE (OUTPATIENT)
Dept: FAMILY MEDICINE | Facility: CLINIC | Age: 50
End: 2020-01-27

## 2020-02-14 DIAGNOSIS — Z13.21 ENCOUNTER FOR VITAMIN DEFICIENCY SCREENING: ICD-10-CM

## 2020-02-14 DIAGNOSIS — Z79.899 ENCOUNTER FOR LONG-TERM (CURRENT) USE OF OTHER MEDICATIONS: Primary | ICD-10-CM

## 2020-02-14 LAB
ALBUMIN SERPL-MCNC: 3.7 G/DL (ref 3.4–5)
ALP SERPL-CCNC: 85 U/L (ref 40–150)
ALT SERPL W P-5'-P-CCNC: 28 U/L (ref 0–50)
ANION GAP SERPL CALCULATED.3IONS-SCNC: 4 MMOL/L (ref 3–14)
AST SERPL W P-5'-P-CCNC: 18 U/L (ref 0–45)
BILIRUB DIRECT SERPL-MCNC: <0.1 MG/DL (ref 0–0.2)
BILIRUB SERPL-MCNC: 0.2 MG/DL (ref 0.2–1.3)
BUN SERPL-MCNC: 16 MG/DL (ref 7–30)
CALCIUM SERPL-MCNC: 9 MG/DL (ref 8.5–10.1)
CHLORIDE SERPL-SCNC: 110 MMOL/L (ref 94–109)
CHOLEST SERPL-MCNC: 219 MG/DL
CO2 SERPL-SCNC: 26 MMOL/L (ref 20–32)
CREAT SERPL-MCNC: 0.82 MG/DL (ref 0.52–1.04)
GFR SERPL CREATININE-BSD FRML MDRD: 82 ML/MIN/{1.73_M2}
GLUCOSE SERPL-MCNC: 98 MG/DL (ref 70–99)
HBA1C MFR BLD: 5.3 % (ref 0–5.6)
HDLC SERPL-MCNC: 44 MG/DL
LDLC SERPL CALC-MCNC: 151 MG/DL
NONHDLC SERPL-MCNC: 175 MG/DL
POTASSIUM SERPL-SCNC: 4.4 MMOL/L (ref 3.4–5.3)
PROLACTIN SERPL-MCNC: 40 UG/L (ref 3–27)
PROT SERPL-MCNC: 7.1 G/DL (ref 6.8–8.8)
SODIUM SERPL-SCNC: 140 MMOL/L (ref 133–144)
T4 FREE SERPL-MCNC: 0.99 NG/DL (ref 0.76–1.46)
TRIGL SERPL-MCNC: 119 MG/DL
TSH SERPL DL<=0.005 MIU/L-ACNC: 2.49 MU/L (ref 0.4–4)

## 2020-02-14 PROCEDURE — 82306 VITAMIN D 25 HYDROXY: CPT | Performed by: PHYSICIAN ASSISTANT

## 2020-02-14 PROCEDURE — 83036 HEMOGLOBIN GLYCOSYLATED A1C: CPT | Performed by: PHYSICIAN ASSISTANT

## 2020-02-14 PROCEDURE — 80048 BASIC METABOLIC PNL TOTAL CA: CPT | Performed by: PHYSICIAN ASSISTANT

## 2020-02-14 PROCEDURE — 84146 ASSAY OF PROLACTIN: CPT | Performed by: PHYSICIAN ASSISTANT

## 2020-02-14 PROCEDURE — 80076 HEPATIC FUNCTION PANEL: CPT | Performed by: PHYSICIAN ASSISTANT

## 2020-02-14 PROCEDURE — 80061 LIPID PANEL: CPT | Performed by: PHYSICIAN ASSISTANT

## 2020-02-14 PROCEDURE — 84439 ASSAY OF FREE THYROXINE: CPT | Performed by: PHYSICIAN ASSISTANT

## 2020-02-14 PROCEDURE — 84443 ASSAY THYROID STIM HORMONE: CPT | Performed by: PHYSICIAN ASSISTANT

## 2020-02-14 PROCEDURE — 36415 COLL VENOUS BLD VENIPUNCTURE: CPT | Performed by: PHYSICIAN ASSISTANT

## 2020-02-16 LAB — DEPRECATED CALCIDIOL+CALCIFEROL SERPL-MC: 9 UG/L (ref 20–75)

## 2020-03-09 DIAGNOSIS — E03.9 HYPOTHYROIDISM, UNSPECIFIED TYPE: ICD-10-CM

## 2020-03-09 NOTE — TELEPHONE ENCOUNTER
"Requested Prescriptions   Pending Prescriptions Disp Refills     levothyroxine (SYNTHROID/LEVOTHROID) 75 MCG tablet 90 tablet 1     Sig: Take 1 tablet (75 mcg) by mouth daily       Thyroid Protocol Passed - 3/9/2020  3:55 PM        Passed - Patient is 12 years or older        Passed - Recent (12 mo) or future (30 days) visit within the authorizing provider's specialty     Patient has had an office visit with the authorizing provider or a provider within the authorizing providers department within the previous 12 mos or has a future within next 30 days. See \"Patient Info\" tab in inbasket, or \"Choose Columns\" in Meds & Orders section of the refill encounter.              Passed - Medication is active on med list        Passed - Normal TSH on file in past 12 months     Recent Labs   Lab Test 02/14/20  0730   TSH 2.49              Passed - No active pregnancy on record     If patient is pregnant or has had a positive pregnancy test, please check TSH.          Passed - No positive pregnancy test in past 12 months     If patient is pregnant or has had a positive pregnancy test, please check TSH.             Last Written Prescription Date:  9/27/2019  Last Fill Quantity: 90,  # refills: 1   Last office visit: 9/4/2019 with prescribing provider:  Cordelia   Future Office Visit:      "

## 2020-03-11 RX ORDER — LEVOTHYROXINE SODIUM 75 UG/1
75 TABLET ORAL DAILY
Qty: 90 TABLET | Refills: 1 | Status: SHIPPED | OUTPATIENT
Start: 2020-03-11 | End: 2020-03-16

## 2020-03-16 ENCOUNTER — TELEPHONE (OUTPATIENT)
Dept: FAMILY MEDICINE | Facility: CLINIC | Age: 50
End: 2020-03-16

## 2020-03-16 DIAGNOSIS — E03.9 HYPOTHYROIDISM, UNSPECIFIED TYPE: ICD-10-CM

## 2020-03-16 RX ORDER — LEVOTHYROXINE SODIUM 75 UG/1
75 TABLET ORAL DAILY
Qty: 90 TABLET | Refills: 1 | Status: SHIPPED | OUTPATIENT
Start: 2020-03-16 | End: 2020-08-12

## 2020-04-11 ENCOUNTER — VIRTUAL VISIT (OUTPATIENT)
Dept: FAMILY MEDICINE | Facility: OTHER | Age: 50
End: 2020-04-11

## 2020-04-11 NOTE — PROGRESS NOTES
"Date: 2020 08:35:42  Clinician: Glory Patton  Clinician NPI: [id number]  Patient: Bibi Song  Patient : 1970  Patient Address: [address] [place] [address]  Patient Phone: [phone]  Visit Protocol: URI  Patient Summary:  Bibi is a 49 year old ( : 1970 ) female who initiated a Visit for COVID-19 (Coronavirus) evaluation and screening. When asked the question \"Please sign me up to receive news, health information and promotions. \", Bibi responded \"No\".    Bibi states her symptoms started today.   Her symptoms consist of a sore throat and nasal congestion.   Symptom details     Nasal secretions: The color of her mucus is clear.    Sore throat: Bibi reports having mild throat pain (1-3 on a 10 point pain scale), does not have exudate on her tonsils, and can swallow liquids. She is not sure if the lymph nodes in her neck are enlarged. A rash has not appeared on the skin since the sore throat started.      Bibi denies having rhinitis, facial pain or pressure, myalgias, chills, diarrhea, vomiting, nausea, teeth pain, headache, fever, wheezing, cough, malaise, and ear pain. She also denies taking antibiotic medication for the symptoms and having recent facial or sinus surgery in the past 60 days. She is not experiencing dyspnea.   Precipitating events  Within the past week, Bibi has not been exposed to someone with strep throat.   Pertinent COVID-19 (Coronavirus) information  Bibi has not traveled internationally or to the areas where COVID-19 (Coronavirus) is widespread, including cruise ship travel in the last 14 days before the start of her symptoms.   Bibi does not work or volunteer as healthcare worker or a  and does not work or volunteer in a healthcare facility.   She does not live with a healthcare worker.   Bibi has not had a close contact with a laboratory-confirmed COVID-19 patient within 14 days of symptom onset. She also has not had a close contact " with a suspected COVID-19 patient within 14 days of symptom onset.   Pertinent medical history  Bibi does not get yeast infections when she takes antibiotics.   Bibi does not need a return to work/school note.   Weight: 240 lbs   Bibi does not smoke or use smokeless tobacco.   Weight: 240 lbs    MEDICATIONS: gabapentin oral, sertraline oral, bupropion HCl oral, levothyroxine oral, Geodon oral, ALLERGIES: NKDA  Clinician Response:  Dear Bibi,   Based on the information you have provided, you do have symptoms that could be consistent with Coronavirus (COVID-19).&nbsp; It is also possible your symptoms may be due to seasonal allergies, especially if you do not have a fever. &nbsp;   The coronavirus causes mild to severe respiratory illness with the most common symptoms including fever, cough and difficulty breathing. Unfortunately, many viruses cause similar symptoms and it can be difficult to distinguish between viruses, especially in mild cases, so we are presuming that anyone with cough or fever has coronavirus at this time.  Coronavirus/COVID-19 has reached the point of community spread in [place], meaning that we are finding the virus in people with no known exposure risk for joao the virus. Given the increasing commonness of coronavirus in the community we are no longer testing patients who are not critically ill.  If you are a health care worker, you should refer to your employee health office for instructions about testing and returning to work.  For everyone else who has cough or fever, you should assume you are infected with coronavirus. Since you will not be tested but have symptoms that may be consistent with coronavirus, the CDC recommends you stay in self-isolation until these three things have happened:    You have had no fever for at least 72 hours (that is three full days of no fever without the use of medicine that reduces fevers)    AND   Other symptoms have improved (for example, when  your cough or shortness of breath have improved)   AND   At least 7 days have passed since your symptoms first appeared.   How to Isolate:    Isolate yourself at home.   Do Not allow any visitors  Do Not go to work or school  Do Not go to Religion,  centers, shopping, or other public places.  Do Not shake hands.  Avoid close contact with others (hugging, kissing).   Protect Others:    Cover Your Mouth and Nose with a mask, disposable tissue or wash cloth to avoid spreading germs to others.  Wash your hands and face frequently with soap and water.   Managing Symptoms:    At this time, we primarily recommend Tylenol (Acetaminophen) for fever or pain. If you have liver or kidney problems, contact your primary care provider for instructions on use of tylenol. Adults can take 650 mg (two 325 mg pills) by mouth every 4-6 hours as needed OR 1,000 mg (two 500 mg pills) every 8 hours as needed. MAXIMUM DAILY DOSE: 3,000mg. For children, refer to dosing on bottle based on age or weight.&nbsp; For allergy symptoms such as nasal congestion and sore throat, using over the counter fluticasone nasal spray (Flonase) and/or loratadine (Claritin) may be helpful.   If you develop significant shortness of breath that prevents you from doing normal activities, please call 911 or proceed to the nearest emergency room and alert them immediately that you have been in self-isolation for possible coronavirus.   If you have a higher risk medical condition such as cancer, heart failure, end stage renal disease on dialysis or have a transplant, please reach out to your specialist's clinic to advise them of your [facility] visit should you not improve within the next two days.  For more information about COVID19 and options for caring for yourself at home, please visit the CDC website at https://www.cdc.gov/coronavirus/2019-ncov/about/steps-when-sick.htmlFor more options for care at [facility], please visit our website at  [email or web address]     Diagnosis: Nasal congestion  Diagnosis ICD: R09.81

## 2020-04-22 ENCOUNTER — VIRTUAL VISIT (OUTPATIENT)
Dept: FAMILY MEDICINE | Facility: CLINIC | Age: 50
End: 2020-04-22
Payer: COMMERCIAL

## 2020-04-22 DIAGNOSIS — M54.50 ACUTE RIGHT-SIDED LOW BACK PAIN WITHOUT SCIATICA: Primary | ICD-10-CM

## 2020-04-22 PROCEDURE — 99213 OFFICE O/P EST LOW 20 MIN: CPT | Mod: TEL | Performed by: FAMILY MEDICINE

## 2020-04-22 RX ORDER — CYCLOBENZAPRINE HCL 10 MG
10 TABLET ORAL 3 TIMES DAILY PRN
Qty: 20 TABLET | Refills: 0 | Status: SHIPPED | OUTPATIENT
Start: 2020-04-22 | End: 2020-06-03

## 2020-04-22 NOTE — PATIENT INSTRUCTIONS
When You Have Low Back Pain    Caring for Your Back  You are not alone.    Low back pain is very common. Nearly half of all adults have low back pain in any given year. The good news is that back pain is rarely a danger to your health. Most people can manage their back pain on their own and about half of them start feeling better within 2 weeks. In 9 out of 10 cases, low back pain goes away or no longer limits daily activity within 6 weeks.     Your outlook is good!    Your symptoms tell us that your low back pain is most likely not a danger to you. Most of the time we do not know the exact cause of low back pain, even if you see a doctor or have an MRI. However, treatment can still work without knowing the cause of the pain. Less than 1 in 100 people need surgery for their back pain.     What can I do about my low back pain?     There are three things you can do to ease low back pain and help it go away.    Use heat or cold packs.    Take medicine as directed.    Use positions, movements and exercises.     Using heat or cold packs    Try cold packs or gentle heat to ease your pain. Use whichever gives you the most relief. Apply the cold pack or heat for 15 minutes at a time, as often as needed.    Taking medicine      If your doctor has prescribed medicine, be sure to follow the directions.    If you take over-the-counter medicine, read and follow the directions.    Talk to your doctor if you have any questions.     Using positions, movements and exercises    Research tells us that moving your joints and muscles can help you recover from back pain. Such activity should be simple and gentle. Use the positions in the photos as well as walking to help relieve your pain. Try taking a short walk every 3 to 4 hours during the day. Walk for a few minutes inside your home or take longer walks outside, on a treadmill or at a mall. Slowly increase the amount of time you walk. Expect discomfort when you begin, but it should  lessen as your back starts to heal. When your back feels better, walk daily to keep your back and body healthy.    Finding a comfortable position    When your back pain is new, certain positions will ease your pain. Gently try each of the positions below until you find one that is helpful. Once you find a position of comfort, use it as often as you like when you are resting. You will recover faster if you combine rest with activity.         Lie on your back with your legs bent. You can do this by placing a pillow under your knees. Or you may lie on the floor and rest your lower legs on the seat of a chair.       Lie on your side with your knees bent, and place a pillow between your knees.       Lie on your stomach over pillows.      When should I call my doctor?    Your back pain should improve over the first couple of weeks. As it improves, you should be able to return to your normal activities. But call your doctor if:    You have a sudden change in your ability to control your bladder or bowels.    You feel tingling in your groin or legs.    The pain spreads down your leg and into your foot.    Your toes, feet or leg muscles feel weak.    You feel generally unwell or sick.    Your pain does not get better or gets worse.      If you are deaf or hard of hearing, please let us know. We provide many free services including sign language interpreters,oral interpreters, TTYs, telephone amplifiers, note takers and written materials.    For informational purposes only. Not to replace the advice of your health care provider. Copyright   2013 [facility]. All rights reserved. [employer] [id number] - Rev 06/14.

## 2020-04-22 NOTE — PROGRESS NOTES
"Bibi Song is a 50 year old female who is being evaluated via a billable telephone visit.      The patient has been notified of following:     \"This telephone visit will be conducted via a call between you and your physician/provider. We have found that certain health care needs can be provided without the need for a physical exam.  This service lets us provide the care you need with a short phone conversation.  If a prescription is necessary we can send it directly to your pharmacy.  If lab work is needed we can place an order for that and you can then stop by our lab to have the test done at a later time.    Telephone visits are billed at different rates depending on your insurance coverage. During this emergency period, for some insurers they may be billed the same as an in-person visit.  Please reach out to your insurance provider with any questions.    If during the course of the call the physician/provider feels a telephone visit is not appropriate, you will not be charged for this service.\"    Patient has given verbal consent for Telephone visit?  Yes    How would you like to obtain your AVS? MyChart    Subjective     Bibi Song is a 50 year old female who presents to clinic today for the following health issues:    HPI  Back Pain       Duration: yesterday 04/21/2020        Specific cause: turning/bending, was trying to stretch, upon turning she felt she pulled something.     Description:   Location of pain: low back right  Character of pain: sharp, dull ache and waxing and waning  Pain radiation:none  New numbness or weakness in legs, not attributed to pain:  no     Intensity: , At its worst 8/10    History:   Pain interferes with job: YES  History of back problems: recurrent self limited episodes of low back pain in the past and previous degenerative joint disease of the lumbar spine  Any previous MRI or X-rays: None  Sees a specialist for back pain:  No  Therapies tried without relief: " acetaminophen (Tylenol), activity, massage, rest and standing    Alleviating factors:   Improved by: heat and sitting      Precipitating factors:  Worsened by: Lifting, Bending, Standing, Lying Flat and Walking          Accompanying Signs & Symptoms:  Risk of Fracture:  None  Risk of Cauda Equina:  None  Risk of Infection:  None  Risk of Cancer:  None  Risk of Ankylosing Spondylitis:  Onset at age <35, male, AND morning back stiffness. no                    Reviewed and updated as needed this visit by Provider         Review of Systems   ROS COMP: Constitutional, HEENT, cardiovascular, pulmonary, gi and gu systems are negative, except as otherwise noted.       Objective   Reported vitals:  There were no vitals taken for this visit.   healthy, alert and no distress  PSYCH: Alert and oriented times 3; coherent speech, normal   rate and volume, able to articulate logical thoughts, able   to abstract reason, no tangential thoughts, no hallucinations   or delusions  Her affect is normal  RESP: No cough, no audible wheezing, able to talk in full sentences  Remainder of exam unable to be completed due to telephone visits    Diagnostic Test Results:  none         Assessment/Plan:  1. Acute right-sided low back pain without sciatica  Has used cyclobenzaprine in the past without difficulties.  No red flag symptoms.      When You Have Low Back Pain    Caring for Your Back  You are not alone.    Low back pain is very common. Nearly half of all adults have low back pain in any given year. The good news is that back pain is rarely a danger to your health. Most people can manage their back pain on their own and about half of them start feeling better within 2 weeks. In 9 out of 10 cases, low back pain goes away or no longer limits daily activity within 6 weeks.     Your outlook is good!    Your symptoms tell us that your low back pain is most likely not a danger to you. Most of the time we do not know the exact cause of low back  pain, even if you see a doctor or have an MRI. However, treatment can still work without knowing the cause of the pain. Less than 1 in 100 people need surgery for their back pain.     What can I do about my low back pain?     There are three things you can do to ease low back pain and help it go away.    Use heat or cold packs.    Take medicine as directed.    Use positions, movements and exercises.     Using heat or cold packs    Try cold packs or gentle heat to ease your pain. Use whichever gives you the most relief. Apply the cold pack or heat for 15 minutes at a time, as often as needed.    Taking medicine      If your doctor has prescribed medicine, be sure to follow the directions.    If you take over-the-counter medicine, read and follow the directions.    Talk to your doctor if you have any questions.     Using positions, movements and exercises    Research tells us that moving your joints and muscles can help you recover from back pain. Such activity should be simple and gentle. Use the positions in the photos as well as walking to help relieve your pain. Try taking a short walk every 3 to 4 hours during the day. Walk for a few minutes inside your home or take longer walks outside, on a treadmill or at a mall. Slowly increase the amount of time you walk. Expect discomfort when you begin, but it should lessen as your back starts to heal. When your back feels better, walk daily to keep your back and body healthy.    Finding a comfortable position    When your back pain is new, certain positions will ease your pain. Gently try each of the positions below until you find one that is helpful. Once you find a position of comfort, use it as often as you like when you are resting. You will recover faster if you combine rest with activity.         Lie on your back with your legs bent. You can do this by placing a pillow under your knees. Or you may lie on the floor and rest your lower legs on the seat of a chair.        Lie on your side with your knees bent, and place a pillow between your knees.       Lie on your stomach over pillows.      When should I call my doctor?    Your back pain should improve over the first couple of weeks. As it improves, you should be able to return to your normal activities. But call your doctor if:    You have a sudden change in your ability to control your bladder or bowels.    You feel tingling in your groin or legs.    The pain spreads down your leg and into your foot.    Your toes, feet or leg muscles feel weak.    You feel generally unwell or sick.    Your pain does not get better or gets worse.      If you are deaf or hard of hearing, please let us know. We provide many free services including sign language interpreters,oral interpreters, TTYs, telephone amplifiers, note takers and written materials.    For informational purposes only. Not to replace the advice of your health care provider. Copyright   2013 [facility]. All rights reserved. [employer] [id number] - Rev 06/14.      No follow-ups on file.    This patient was seen virtually during the COVID-19 outbreak in attempts to keep healthy patients out of the clinic per CDC guidelines (practicing social distancing).  I evaluated them by phone/evisit and the note reflects our conversation/visit.        Phone call duration:  6 minutes    Glory Singleton MD

## 2020-05-18 ENCOUNTER — TRANSFERRED RECORDS (OUTPATIENT)
Dept: HEALTH INFORMATION MANAGEMENT | Facility: CLINIC | Age: 50
End: 2020-05-18

## 2020-05-19 ENCOUNTER — NURSE TRIAGE (OUTPATIENT)
Dept: NURSING | Facility: CLINIC | Age: 50
End: 2020-05-19

## 2020-05-19 ENCOUNTER — VIRTUAL VISIT (OUTPATIENT)
Dept: FAMILY MEDICINE | Facility: CLINIC | Age: 50
End: 2020-05-19
Payer: COMMERCIAL

## 2020-05-19 DIAGNOSIS — M54.42 ACUTE LEFT-SIDED LOW BACK PAIN WITH LEFT-SIDED SCIATICA: Primary | ICD-10-CM

## 2020-05-19 PROCEDURE — 99213 OFFICE O/P EST LOW 20 MIN: CPT | Mod: TEL | Performed by: NURSE PRACTITIONER

## 2020-05-19 RX ORDER — TIZANIDINE 2 MG/1
2 TABLET ORAL 3 TIMES DAILY
Qty: 21 TABLET | Refills: 0 | Status: SHIPPED | OUTPATIENT
Start: 2020-05-19 | End: 2020-05-21

## 2020-05-19 RX ORDER — CLONAZEPAM 1 MG/1
1 TABLET ORAL 2 TIMES DAILY PRN
COMMUNITY
End: 2020-06-03

## 2020-05-19 RX ORDER — IBUPROFEN 800 MG/1
800 TABLET, FILM COATED ORAL EVERY 8 HOURS PRN
Qty: 42 TABLET | Refills: 0 | Status: SHIPPED | OUTPATIENT
Start: 2020-05-19 | End: 2020-08-12

## 2020-05-19 RX ORDER — TRAZODONE HYDROCHLORIDE 100 MG/1
100 TABLET ORAL AT BEDTIME
COMMUNITY
End: 2021-07-26

## 2020-05-19 ASSESSMENT — ENCOUNTER SYMPTOMS
BACK PAIN: 1
RESPIRATORY NEGATIVE: 1
CARDIOVASCULAR NEGATIVE: 1
CONSTITUTIONAL NEGATIVE: 1

## 2020-05-19 NOTE — PATIENT INSTRUCTIONS
1. X-ray was negative for any acute fractures.  2. This is likely just inflammation.  3. Take ibuprofen 600-800mg every 6-8 hours for 2-4 weeks. Take with food.  4. If you develop any upset stomach or GI symptoms, please contact us.  5. Rest, ice/heat, and elevate as able.  6. Follow-up in 2-4 weeks if symptoms do not improve or sooner if symptoms worsen.

## 2020-05-19 NOTE — TELEPHONE ENCOUNTER
"Pt calls again re low back pain.  Had virtual visit for R-sided low back pain 4/22/2020.  Prescribed Flexeril.  Pt states \"Flexeril didn't do any good.\"  However pt has taken 19 out of the 20 tabs ordered.    \"Now pain has moved to L buttocks, radiating down L leg.\"  New L-sided pain onset \"a few days ago.\"    \"R side is doing much better.\"  \"No problems with R side any more.\"    When probing for reasons for back pain, pt reports \"digging a lot in the front lawn.\"  Over-use injury appears to be primary issue.    Pt states current pain is \"fine while sitting.\"  \"With movement, pain returns.\"  \"Able to walk.\"    When discussing in-person clinical eval, pt states \"I have a sore throat.\"  \"I also have OCD so I'm really worried about coming to the clinic.\"  Prefers telephone provider visit to discuss possible prednisone regimen which pt reports helped back pain in the past.  Warm transferred to a  for an appointment now.    [facility] Nurse Advisor     Additional Information    Negative: Passed out (i.e., fainted, collapsed and was not responding)    Negative: Shock suspected (e.g., cold/pale/clammy skin, too weak to stand, low BP, rapid pulse)    Negative: Sounds like a life-threatening emergency to the triager    Negative: Major injury to the back (e.g., MVA, fall > 10 feet or 3 meters, penetrating injury, etc.)    Negative: Pain in the upper back over the ribs (rib cage) that radiates (travels) into the chest    Negative: Pain in the upper back over the ribs (rib cage) and worsened by coughing (or clearly increases with breathing)    Negative: SEVERE back pain of sudden onset and age > 60    Negative: SEVERE abdominal pain (e.g., excruciating)    Negative: Abdominal pain and age > 60    Negative: Unable to urinate (or only a few drops) and bladder feels very full    Negative: Loss of bladder or bowel control (urine or bowel incontinence; wetting self, leaking stool) of new onset    Negative: " Numbness (loss of sensation) in groin or rectal area    Negative: Pain radiates into groin, scrotum    Negative: Blood in urine (red, pink, or tea-colored)    Negative: Vomiting and pain over lower ribs of back (i.e., flank - kidney area)    Negative: Weakness of a leg or foot (e.g., unable to bear weight, dragging foot)    Negative: Patient sounds very sick or weak to the triager    Negative: Fever > 100.5 F (38.1 C) and flank pain    Negative: Pain or burning with passing urine (urination)    Negative: SEVERE back pain (e.g., excruciating, unable to do any normal activities) and not improved after pain medicine and CARE ADVICE    Negative: Numbness in an arm or hand (i.e., loss of sensation) and upper back pain    Negative: Numbness in a leg or foot (i.e., loss of sensation)    Negative: High-risk adult (e.g., history of cancer, history of HIV, or history of IV drug abuse)    Negative: Painful rash with multiple small blisters grouped together (i.e., dermatomal distribution or 'band' or 'stripe')    Negative: Pain radiates into the thigh or further down the leg, and in both legs    Negative: Age > 50 and no history of prior similar back pain    Pain radiates into the thigh or further down the leg    MODERATE back pain (e.g., interferes with normal activities) and present > 3 days    Protocols used: BACK PAIN-A-[place]

## 2020-05-19 NOTE — PROGRESS NOTES
"Bibi Song is a 50 year old female who is being evaluated via a billable telephone visit.      The patient has been notified of following:     \"This telephone visit will be conducted via a call between you and your physician/provider. We have found that certain health care needs can be provided without the need for a physical exam.  This service lets us provide the care you need with a short phone conversation.  If a prescription is necessary we can send it directly to your pharmacy.  If lab work is needed we can place an order for that and you can then stop by our lab to have the test done at a later time.    Telephone visits are billed at different rates depending on your insurance coverage. During this emergency period, for some insurers they may be billed the same as an in-person visit.  Please reach out to your insurance provider with any questions.    If during the course of the call the physician/provider feels a telephone visit is not appropriate, you will not be charged for this service.\"    Patient has given verbal consent for Telephone visit?  Yes    What phone number would you like to be contacted at?     How would you like to obtain your AVS? Mail a copy    Subjective     Bibi Song is a 50 year old female who presents via phone visit today for the following health issues:    HPI  Back Pain       Duration: 2-3 days        Specific cause: none    Description:   Location of pain: low back and down leg on the left side  Character of pain: sharp and stabbing  Pain radiation:radiates into the left buttocks and radiates into the left leg  New numbness or weakness in legs, not attributed to pain:  no     Intensity: Currently 10/10 today, has gotten worse today    History:   Pain interferes with job: Not applicable  History of back problems: recurrent self limited episodes of low back pain in the past  Any previous MRI or X-rays: None  Sees a specialist for back pain:  Did PT a long time " ago  Therapies tried without relief: Ibuprofen    Alleviating factors:   Improved by: heating pad helped a little      Precipitating factors:  Worsened by: any movement          Accompanying Signs & Symptoms:  Risk of Fracture:  None  Risk of Cauda Equina:  None  Risk of Infection:  None  Risk of Cancer:  None  Risk of Ankylosing Spondylitis:  Onset at age <35, male, AND morning back stiffness. no          Additional provider notes: Was outside in the yard working in garden. Thinks she hyperextended a couple times to get at the grass, but is not aware of any injury or episode. Woke up the next morning with back pain radiating down her leg. Was told in the UC in the past that they think she has degenerative discs. Using 3 ibuprofen every 6 hours during the day. Has one more of the flexeril, but doesn't think they are helping. Symptoms usually either get better or much worse. She has been stretching and doing exercises          Patient Active Problem List   Diagnosis     Moderate major depression (H)     OCD (obsessive compulsive disorder)     Anxiety disorder     Family history of diabetes mellitus     Family history of thyroid disease     ALCOHOL ABUSE, IN REMISSION     Hyperlipidemia LDL goal <160     Cervicalgia     Hypothyroidism, unspecified type     Ulcerative colitis with complication, unspecified location (H)     TCA (tricyclic antidepressant) overdose of undetermined intent     Tracheal stenosis     Obesity (BMI 35.0-39.9) with comorbidity (H)     Past Surgical History:   Procedure Laterality Date     INJECT STEROID (LOCATION) N/A 9/7/2018    Procedure: INJECT STEROID (LOCATION);;  Surgeon: Dusty Pearson MD;  [facility]     INJECT STEROID (LOCATION) N/A 10/5/2018    Procedure: INJECT STEROID (LOCATION);;  Surgeon: Dusty Pearson MD;  [facility]     INJECT STEROID (LOCATION) N/A 12/6/2018    Procedure: Steroid Injection;  Surgeon: Dusty Pearson MD;  Location:   OR     INJECT STEROID (LOCATION) N/A 2019    Procedure: Steroid Injection;  Surgeon: Dusty Pearson MD;  [facility]     LARYNGOSCOPY N/A 2018    Procedure: LARYNGOSCOPY;  Teleoscopic Direct Laryngoscopy with Balloon Dilation, Mitomycin Application and Steriod Injection ;  Surgeon: Dusty Pearson MD;  [facility]     LARYNGOSCOPY, ESOPHAGOSCOPY WITH DILATION, COMBINED N/A 10/5/2018    Procedure: COMBINED LARYNGOSCOPY, ESOPHAGOSCOPY WITH DILATION;  Direct Laryngoscopy with Teleoscope, Balloon Dilation, Application of Mitomycin and Steroid Injection ;  Surgeon: Dusty Pearson MD;  [facility]     LARYNGOSCOPY, ESOPHAGOSCOPY WITH DILATION, COMBINED N/A 2018    Procedure: Direct Laryngoscopy, Balloon Dilation, Mitomycin Application, Steroid Injection;  Surgeon: Dusty Pearson MD;  [facility]     LARYNGOSCOPY, ESOPHAGOSCOPY WITH DILATION, COMBINED N/A 2019    Procedure: Direct Laryngoscopy, Balloon Dilation, Mitomycin Application;  Surgeon: Dusty Pearson MD;  [facility]     RESECT TRACHEA WITH RECONSTRUCTION N/A 3/11/2019    Procedure: Tracheal Resection Via Cervical Approach;  Surgeon: Nick Beaulieu MD;  Location[facility]       Social History     Tobacco Use     Smoking status: Former Smoker     Years: 5.00     Types: Cigarettes     Start date: 1988     Last attempt to quit: 1994     Years since quittin.3     Smokeless tobacco: Never Used   Substance Use Topics     Alcohol use: No     Comment: Treatment in      Family History   Problem Relation Age of Onset     Diabetes Mother      Depression Mother         OCD     Neurologic Disorder Mother      Psychotic Disorder Mother      Respiratory Mother      Thyroid Disease Mother      Gynecology Mother         hysterectomy     Cerebrovascular Disease Mother         pacemaker     Substance Abuse Mother      Mental Illness Mother      Heart Disease Mother       Thrombosis Mother         no details available     Heart Disease Father         MI,   LATE 60'S     Hypertension Father      Prostate Cancer Father      Obesity Father      Diabetes Father      Cerebrovascular Disease Father      Substance Abuse Father      Depression Father      Thrombosis Father      Depression Brother      Thyroid Disease Brother      Depression Brother      Thyroid Disease Brother      Allergies Son      Asthma Son      Thyroid Disease Maternal Grandmother      Cancer Maternal Grandfather      Psychotic Disorder Paternal Grandmother      Thyroid Disease Paternal Grandfather      Diabetes Brother      Breast Cancer Maternal Aunt      Substance Abuse Brother      Mental Illness Brother      Depression Brother      Diabetes Brother      Mental Illness Son      Asthma Son      Depression Son      Stomach Problem Son          Current Outpatient Medications   Medication Sig Dispense Refill     acetaminophen (TYLENOL) 500 MG tablet Take 500-1,000 mg by mouth every 6 hours as needed for mild pain or other (headache)       buPROPion (WELLBUTRIN) 100 MG tablet 0.5 tablets (50 mg) by Oral or Feeding Tube route 3 times daily (Patient taking differently: 300 mg by Oral or Feeding Tube route daily ) 90 tablet 0     clonazePAM (KLONOPIN) 1 MG tablet Take 1 mg by mouth 2 times daily as needed for anxiety       cyclobenzaprine (FLEXERIL) 10 MG tablet Take 1 tablet (10 mg) by mouth 3 times daily as needed for muscle spasms 20 tablet 0     gabapentin (NEURONTIN) 300 MG capsule Take 300 mg by mouth 3 times daily 400 mg tid  5     ibuprofen (ADVIL/MOTRIN) 200 MG tablet Take 600 mg by mouth as needed for mild pain       ibuprofen (ADVIL/MOTRIN) 800 MG tablet Take 1 tablet (800 mg) by mouth every 8 hours as needed for moderate pain 42 tablet 0     levothyroxine (SYNTHROID/LEVOTHROID) 75 MCG tablet Take 1 tablet (75 mcg) by mouth daily 90 tablet 1     melatonin 3 MG tablet Take 20 mg by mouth At Bedtime  30 tablet 0      sertraline (ZOLOFT) 100 MG tablet Take 2 tablets (200 mg) by mouth daily 60 tablet 0     tiZANidine (ZANAFLEX) 2 MG tablet Take 1 tablet (2 mg) by mouth 3 times daily 21 tablet 0     traZODone (DESYREL) 100 MG tablet Take 100 mg by mouth At Bedtime Taking 200mg at hs       ziprasidone (GEODON) 40 MG capsule Take 1 capsule (40 mg) by mouth 2 times daily (with meals) (Patient taking differently: Take 40 mg by mouth 2 times daily (with meals) One 40mg daily and one 60mg in the evening) 60 capsule 0     gabapentin (NEURONTIN) 300 MG capsule 2 capsules (600 mg) by Oral or Feeding Tube route 3 times daily as needed (Anxiety) 180 capsule 0     No Known Allergies  BP Readings from Last 3 Encounters:   09/05/19 (!) 129/95   09/04/19 124/74   09/01/19 119/75    Wt Readings from Last 3 Encounters:   09/05/19 106.1 kg (234 lb)   09/04/19 103.9 kg (229 lb)   09/01/19 103.9 kg (229 lb)                    Reviewed and updated as needed this visit by Provider  Tobacco  Allergies  Meds  Problems  Med Hx  Surg Hx  Fam Hx         Review of Systems   Constitutional: Negative.    HENT: Negative.    Respiratory: Negative.    Cardiovascular: Negative.    Musculoskeletal: Positive for back pain (left lower back pain with sciatica).             Objective   Reported vitals:  There were no vitals taken for this visit.   healthy, alert, mild distress and cooperative  PSYCH: Alert and oriented times 3; coherent speech, normal   rate and volume, able to articulate logical thoughts, able   to abstract reason, no tangential thoughts, no hallucinations   or delusions  Her affect is normal and pleasant  RESP: No cough, no audible wheezing, able to talk in full sentences  Remainder of exam unable to be completed due to telephone visits    Diagnostic Test Results:  Labs reviewed in Epic        Assessment/Plan:  1. Acute left-sided low back pain with left-sided sciatica  Increase ibuprofen to 800mg TID for 1-2 weeks only. Discussed s/s of  bleeding. Recommended taking with food. Use Tylenol as needed. Rest, ice/heat. Flexeril not working; prescribed tizanidine so she could use it during the day (less drowsy). Referral to PT per patient request for chronic/recurrent back pain. Recommended follow-up with PCP in 1-2 weeks if symptoms do not improve. Discussed possible imaging at that time.    - tiZANidine (ZANAFLEX) 2 MG tablet; Take 1 tablet (2 mg) by mouth 3 times daily  Dispense: 21 tablet; Refill: 0  - ibuprofen (ADVIL/MOTRIN) 800 MG tablet; Take 1 tablet (800 mg) by mouth every 8 hours as needed for moderate pain  Dispense: 42 tablet; Refill: 0  - PHYSICAL THERAPY REFERRAL; Future    Return if symptoms worsen or fail to improve.      Phone call duration:  9:54 minutes    Yolanda Duenas DNP, NP-C 5/19/2020 9:42 AM

## 2020-05-21 DIAGNOSIS — M54.42 ACUTE LEFT-SIDED LOW BACK PAIN WITH LEFT-SIDED SCIATICA: ICD-10-CM

## 2020-05-21 RX ORDER — TIZANIDINE 2 MG/1
2 TABLET ORAL 3 TIMES DAILY
Qty: 21 TABLET | Refills: 0 | Status: SHIPPED | OUTPATIENT
Start: 2020-05-21 | End: 2020-08-12

## 2020-05-21 NOTE — TELEPHONE ENCOUNTER
Refilled x 1, notify patient that further refills will require an office visit in person, may want to schedule that if she has ongoing problems.    Glory Singleton M.D.

## 2020-05-21 NOTE — TELEPHONE ENCOUNTER
Zanaflex refill.  Last written 5/19/20 #21.  Pt has had several virtual visits for this, last being on 5/19/20.   Last seen in clinic for this on 9/4/19.  Advise.  Dylan

## 2020-05-21 NOTE — TELEPHONE ENCOUNTER
Pending Prescriptions:                       Disp   Refills    tiZANidine (ZANAFLEX) 2 MG tablet         21 tab*0            Sig: Take 1 tablet (2 mg) by mouth 3 times daily      [facility] [place], [place] - [address]  [address]  [address] [facility] ([place] [address]  Phone: [phone] Fax: [phone] Alternate Fax: [phone]    Ofelia Andersen on 5/21/2020 at 1:25 PM

## 2020-05-22 ENCOUNTER — TRANSFERRED RECORDS (OUTPATIENT)
Dept: HEALTH INFORMATION MANAGEMENT | Facility: CLINIC | Age: 50
End: 2020-05-22

## 2020-05-27 ENCOUNTER — HOSPITAL ENCOUNTER (EMERGENCY)
Facility: CLINIC | Age: 50
Discharge: HOME | End: 2020-05-27
Attending: NURSE PRACTITIONER | Admitting: NURSE PRACTITIONER
Payer: COMMERCIAL

## 2020-05-27 VITALS
BODY MASS INDEX: 39.15 KG/M2 | DIASTOLIC BLOOD PRESSURE: 71 MMHG | TEMPERATURE: 97.8 F | WEIGHT: 235 LBS | SYSTOLIC BLOOD PRESSURE: 103 MMHG | OXYGEN SATURATION: 93 % | HEIGHT: 65 IN

## 2020-05-27 DIAGNOSIS — S91.112A LACERATION OF LEFT GREAT TOE WITHOUT FOREIGN BODY PRESENT OR DAMAGE TO NAIL, INITIAL ENCOUNTER: ICD-10-CM

## 2020-05-27 PROCEDURE — 90715 TDAP VACCINE 7 YRS/> IM: CPT | Performed by: NURSE PRACTITIONER

## 2020-05-27 PROCEDURE — 12001 RPR S/N/AX/GEN/TRNK 2.5CM/<: CPT | Mod: Z6 | Performed by: NURSE PRACTITIONER

## 2020-05-27 PROCEDURE — 99283 EMERGENCY DEPT VISIT LOW MDM: CPT | Mod: 25 | Performed by: NURSE PRACTITIONER

## 2020-05-27 PROCEDURE — 25000128 H RX IP 250 OP 636: Performed by: NURSE PRACTITIONER

## 2020-05-27 PROCEDURE — 90471 IMMUNIZATION ADMIN: CPT | Performed by: NURSE PRACTITIONER

## 2020-05-27 PROCEDURE — 12001 RPR S/N/AX/GEN/TRNK 2.5CM/<: CPT | Performed by: NURSE PRACTITIONER

## 2020-05-27 PROCEDURE — 99282 EMERGENCY DEPT VISIT SF MDM: CPT | Mod: 25 | Performed by: NURSE PRACTITIONER

## 2020-05-27 RX ADMIN — CLOSTRIDIUM TETANI TOXOID ANTIGEN (FORMALDEHYDE INACTIVATED), CORYNEBACTERIUM DIPHTHERIAE TOXOID ANTIGEN (FORMALDEHYDE INACTIVATED), BORDETELLA PERTUSSIS TOXOID ANTIGEN (GLUTARALDEHYDE INACTIVATED), BORDETELLA PERTUSSIS FILAMENTOUS HEMAGGLUTININ ANTIGEN (FORMALDEHYDE INACTIVATED), BORDETELLA PERTUSSIS PERTACTIN ANTIGEN, AND BORDETELLA PERTUSSIS FIMBRIAE 2/3 ANTIGEN 0.5 ML: 5; 2; 2.5; 5; 3; 5 INJECTION, SUSPENSION INTRAMUSCULAR at 09:11

## 2020-05-27 ASSESSMENT — MIFFLIN-ST. JEOR: SCORE: 1676.83

## 2020-05-27 ASSESSMENT — ENCOUNTER SYMPTOMS: WOUND: 1

## 2020-05-27 NOTE — ED PROVIDER NOTES
History     Chief Complaint   Patient presents with     Laceration     left foot lac and pain     HPI  Bibi Song is a 50 year old female who presents to the emergency department for evaluation due to laceration. This morning she stepped into a vent hole causing a laceration to the dorsal aspect of her left great toe. Bleeding controlled prior to arrival. Denies any other accompanying injuries. No numbness or tingling. No nail involvement. Last tetanus in 2010.     Allergies:  No Known Allergies    Problem List:    Patient Active Problem List    Diagnosis Date Noted     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     Priority: Medium     TCA (tricyclic antidepressant) overdose of undetermined intent 07/18/2018     Priority: Medium     Ulcerative colitis with complication, unspecified location (H) 04/27/2017     Priority: Medium     Hypothyroidism, unspecified type 12/08/2016     Priority: Medium     Cervicalgia 10/10/2012     Priority: Medium     Hyperlipidemia LDL goal <160 10/31/2010     Priority: Medium     Moderate major depression (H) 08/06/2010     Priority: Medium     OCD (obsessive compulsive disorder) 08/06/2010     Priority: Medium     [facility] - Psych NP. Schoenecker, NP       Anxiety disorder 08/06/2010     Priority: Medium     Family history of diabetes mellitus 08/06/2010     Priority: Medium     Family history of thyroid disease 08/06/2010     Priority: Medium     ALCOHOL ABUSE, IN REMISSION 08/06/2010     Priority: Medium        Past Medical History:    Past Medical History:   Diagnosis Date     Anxiety      Chemical dependency (H)      Depressive disorder      Hoarseness      Hypothyroidism      Obesity      OCD (obsessive compulsive disorder)      PONV (postoperative nausea and vomiting)      Sleep apnea      Tracheal stenosis        Past Surgical History:    Past Surgical History:   Procedure Laterality Date      INJECT STEROID (LOCATION) N/A 9/7/2018    Procedure: INJECT STEROID (LOCATION);;  Surgeon: Dusty Pearson MD;  [facility]     INJECT STEROID (LOCATION) N/A 10/5/2018    Procedure: INJECT STEROID (LOCATION);;  Surgeon: Dusty Pearson MD;  [facility]     INJECT STEROID (LOCATION) N/A 12/6/2018    Procedure: Steroid Injection;  Surgeon: Dusty Pearson MD;  [facility]     INJECT STEROID (LOCATION) N/A 2/1/2019    Procedure: Steroid Injection;  Surgeon: Dusty Pearson MD;  [facility]     LARYNGOSCOPY N/A 9/7/2018    Procedure: LARYNGOSCOPY;  Teleoscopic Direct Laryngoscopy with Balloon Dilation, Mitomycin Application and Steriod Injection ;  Surgeon: Dusty Pearson MD;  [facility]     LARYNGOSCOPY, ESOPHAGOSCOPY WITH DILATION, COMBINED N/A 10/5/2018    Procedure: COMBINED LARYNGOSCOPY, ESOPHAGOSCOPY WITH DILATION;  Direct Laryngoscopy with Teleoscope, Balloon Dilation, Application of Mitomycin and Steroid Injection ;  Surgeon: Dusty Pearson MD;  [facility]     LARYNGOSCOPY, ESOPHAGOSCOPY WITH DILATION, COMBINED N/A 12/6/2018    Procedure: Direct Laryngoscopy, Balloon Dilation, Mitomycin Application, Steroid Injection;  Surgeon: Dusty Pearson MD;  [facility]     LARYNGOSCOPY, ESOPHAGOSCOPY WITH DILATION, COMBINED N/A 2/1/2019    Procedure: Direct Laryngoscopy, Balloon Dilation, Mitomycin Application;  Surgeon: Dusty Pearson MD;  [facility]     RESECT TRACHEA WITH RECONSTRUCTION N/A 3/11/2019    Procedure: Tracheal Resection Via Cervical Approach;  Surgeon: Nick Beaulieu MD;  [facility]       Family History:    Family History   Problem Relation Age of Onset     Diabetes Mother      Depression Mother         OCD     Neurologic Disorder Mother      Psychotic Disorder Mother      Respiratory Mother      Thyroid Disease Mother      Gynecology Mother         hysterectomy      Cerebrovascular Disease Mother         pacemaker     Substance Abuse Mother      Mental Illness Mother      Heart Disease Mother      Thrombosis Mother         no details available     Heart Disease Father         MI,   LATE 60'S     Hypertension Father      Prostate Cancer Father      Obesity Father      Diabetes Father      Cerebrovascular Disease Father      Substance Abuse Father      Depression Father      Thrombosis Father      Depression Brother      Thyroid Disease Brother      Depression Brother      Thyroid Disease Brother      Allergies Son      Asthma Son      Thyroid Disease Maternal Grandmother      Cancer Maternal Grandfather      Psychotic Disorder Paternal Grandmother      Thyroid Disease Paternal Grandfather      Diabetes Brother      Breast Cancer Maternal Aunt      Substance Abuse Brother      Mental Illness Brother      Depression Brother      Diabetes Brother      Mental Illness Son      Asthma Son      Depression Son      Stomach Problem Son        Social History:  Marital Status:   [2]  Social History     Tobacco Use     Smoking status: Former Smoker     Years: 5.00     Types: Cigarettes     Start date: 1988     Last attempt to quit: 1994     Years since quittin.4     Smokeless tobacco: Never Used   Substance Use Topics     Alcohol use: No     Comment: Treatment in      Drug use: No        Medications:    acetaminophen (TYLENOL) 500 MG tablet  buPROPion (WELLBUTRIN) 100 MG tablet  clonazePAM (KLONOPIN) 1 MG tablet  cyclobenzaprine (FLEXERIL) 10 MG tablet  gabapentin (NEURONTIN) 300 MG capsule  gabapentin (NEURONTIN) 300 MG capsule  ibuprofen (ADVIL/MOTRIN) 200 MG tablet  ibuprofen (ADVIL/MOTRIN) 800 MG tablet  levothyroxine (SYNTHROID/LEVOTHROID) 75 MCG tablet  melatonin 3 MG tablet  sertraline (ZOLOFT) 100 MG tablet  tiZANidine (ZANAFLEX) 2 MG tablet  traZODone (DESYREL) 100 MG tablet  ziprasidone (GEODON) 40 MG capsule          Review of Systems   Skin: Positive  "for wound.   All other systems reviewed and are negative.      Physical Exam   BP: 103/71  Heart Rate: 77  Temp: 97.8  F (36.6  C)  Height: 165.1 cm (5' 5\")  Weight: 106.6 kg (235 lb)  SpO2: 93 %      Physical Exam  Constitutional:       Appearance: Normal appearance.   Cardiovascular:      Rate and Rhythm: Normal rate and regular rhythm.   Pulmonary:      Effort: Pulmonary effort is normal.      Breath sounds: Normal breath sounds.   Musculoskeletal:        Feet:    Feet:      Comments: Pulses and perfusion are equal bilaterally.     Skin:     General: Skin is warm.      Capillary Refill: Capillary refill takes less than 2 seconds.   Neurological:      Mental Status: She is alert.         ED Course        [facility]    -Laceration Repair    Date/Time: 5/27/2020 8:59 AM  Performed by: Tsering Cherry APRN CNP  Authorized by: Tsering Cherry APRN CNP       ANESTHESIA (see MAR for exact dosages):     Anesthesia method:  Nerve block    Block needle gauge:  27 G    Block anesthetic:  Bupivacaine 0.5% w/o epi    Block injection procedure:  Anatomic landmarks identified, introduced needle, incremental injection, anatomic landmarks palpated and negative aspiration for blood    Block outcome:  Anesthesia achieved      LACERATION DETAILS     Location:  Toe    Toe location:  L big toe    Length (cm):  2    REPAIR TYPE:     Repair type:  Simple      EXPLORATION:     Hemostasis achieved with:  Direct pressure    Wound exploration: wound explored through full range of motion and entire depth of wound probed and visualized      TREATMENT:     Area cleansed with:  Betadine and Hibiclens    Amount of cleaning:  Standard    Irrigation solution:  Sterile saline    Irrigation method:  Syringe    SKIN REPAIR     Repair method:  Sutures    Suture size:  4-0    Suture material:  Nylon    Number of sutures:  6    APPROXIMATION     Approximation:  Close    POST-PROCEDURE DETAILS     Dressing:  Antibiotic " ointment      PROCEDURE   Patient Tolerance:  Patient tolerated the procedure well with no immediate complications        No results found for this or any previous visit (from the past 24 hour(s)).    Medications   Tdap (tetanus-diphtheria-acell pertussis) (ADACEL) injection 0.5 mL (0.5 mLs Intramuscular Given 5/27/20 0911)       Assessments & Plan (with Medical Decision Making)   Bibi Song is a 50 year old female who presents to the emergency department for evaluation due to laceration. This morning she stepped into a vent hole causing a laceration to the dorsal aspect of her left great toe.  See procedure note above.  Sick sutures placed.  Return in 7 to 10 days for suture removal.  Educated on wound care and worrisome reasons to seek urgent/emergent reevaluation.  Verbalized understanding of discharge instructions, agreeable to plan of care.  Patient discharged in no acute distress and ambulating comfortably.  I have reviewed the nursing notes.    I have reviewed the findings, diagnosis, plan and need for follow up with the patient.    Discharge Medication List as of 5/27/2020  9:08 AM        Final diagnoses:   Laceration of left great toe without foreign body present or damage to nail, initial encounter     5/27/2020   [facility] EMERGENCY DEPARTMENT     Tsering Cherry, APRN CNP  05/27/20 [id number]

## 2020-05-27 NOTE — ED AVS SNAPSHOT
[facility]  [address] [address]  Phone:  [phone]  Fax:  [phone]                                    Bibi Song   MRN: [id number]    Department:  [facility]   Date of Visit:  5/27/2020           After Visit Summary Signature Page    I have received my discharge instructions, and my questions have been answered. I have discussed any challenges I see with this plan with the nurse or doctor.    ..........................................................................................................................................  Patient/Patient Representative Signature      ..........................................................................................................................................  Patient Representative Print Name and Relationship to Patient    ..................................................               ................................................  Date                                   Time    ..........................................................................................................................................  Reviewed by Signature/Title    ...................................................              ..............................................  Date                                               Time          22EPIC Rev 08/18

## 2020-06-02 ENCOUNTER — TELEPHONE (OUTPATIENT)
Dept: FAMILY MEDICINE | Facility: CLINIC | Age: 50
End: 2020-06-02

## 2020-06-02 NOTE — TELEPHONE ENCOUNTER
Patient had stitches put in last week.  Toe is more sore on the bottom.  Doesn't look like there is an infection.  There was some yellowish drainage yesterday.  No swelling, no redness, no fever.  She is wondering if it is getting infected.  Appointment made for tomorrow morning with PCP.    Sera Fitch RN

## 2020-06-02 NOTE — TELEPHONE ENCOUNTER
Reason for call:  Patient reporting a symptom    Symptom or request: Pt cut her left great toe and had sutures put in the ED 5/27.  Pt states that the toe hurts more today.  No redness or swelling.  Did have a little drainage yesterday, but she thinks that is because it was wrapped too tight.  Please call patient and advise.      Please call patient and advise.      Duration (how long have symptoms been present): ongoing    Have you been treated for this before? Yes    Additional comments:     Phone Number patient can be reached at:  Home number on file [phone] (home)    Best Time:  any    Can we leave a detailed message on this number:  YES    Call taken on 6/2/2020 at 7:46 AM by Velma Apodaca

## 2020-06-03 ENCOUNTER — OFFICE VISIT (OUTPATIENT)
Dept: FAMILY MEDICINE | Facility: CLINIC | Age: 50
End: 2020-06-03
Payer: COMMERCIAL

## 2020-06-03 VITALS
HEART RATE: 73 BPM | RESPIRATION RATE: 16 BRPM | OXYGEN SATURATION: 95 % | SYSTOLIC BLOOD PRESSURE: 122 MMHG | WEIGHT: 249 LBS | TEMPERATURE: 98.5 F | HEIGHT: 66 IN | DIASTOLIC BLOOD PRESSURE: 70 MMHG | BODY MASS INDEX: 40.02 KG/M2

## 2020-06-03 DIAGNOSIS — Z48.02 VISIT FOR SUTURE REMOVAL: Primary | ICD-10-CM

## 2020-06-03 PROCEDURE — 99024 POSTOP FOLLOW-UP VISIT: CPT | Performed by: FAMILY MEDICINE

## 2020-06-03 RX ORDER — LORAZEPAM 1 MG/1
TABLET ORAL
COMMUNITY
Start: 2020-05-22 | End: 2020-06-18

## 2020-06-03 RX ORDER — BUPROPION HYDROCHLORIDE 150 MG/1
TABLET ORAL
COMMUNITY
Start: 2020-05-05

## 2020-06-03 RX ORDER — ZIPRASIDONE HYDROCHLORIDE 60 MG/1
60 CAPSULE ORAL 2 TIMES DAILY
COMMUNITY
Start: 2020-05-22 | End: 2021-05-12

## 2020-06-03 ASSESSMENT — PAIN SCALES - GENERAL: PAINLEVEL: SEVERE PAIN (7)

## 2020-06-03 ASSESSMENT — MIFFLIN-ST. JEOR: SCORE: 1748.27

## 2020-06-03 ASSESSMENT — PATIENT HEALTH QUESTIONNAIRE - PHQ9: SUM OF ALL RESPONSES TO PHQ QUESTIONS 1-9: 16

## 2020-06-03 NOTE — PATIENT INSTRUCTIONS
Our Clinic hours are:  Mondays    7:20 am - 7 pm  Tues -  Fri  7:20 am - 5 pm    Clinic Phone: [phone]    The clinic lab opens at 7:30 am Mon - Fri and appointments are required.    [facility]. [phone]  Monday  8 am - 7pm  Tues - Fri 8 am - 5:30 pm          present

## 2020-06-03 NOTE — PROGRESS NOTES
"Subjective     Bibi Song is a 50 year old female who presents to clinic today for the following health issues:    HPI   Chief Complaint   Patient presents with     Suture Removal     Patient is here today to have suture removed in left big toe. Patient has 6 sutures. Patient has been using antibiotic ointment. Patient has a constant ache rated at a 7/10.        Stepped in a vent at home and foot was cut.  Sutures placed in ER.       Reviewed and updated as needed this visit by Provider         Review of Systems   Constitutional, HEENT, cardiovascular, pulmonary, gi and gu systems are negative, except as otherwise noted.      Objective    /70   Pulse 73   Temp 98.5  F (36.9  C) (Tympanic)   Resp 16   Ht 1.664 m (5' 5.5\")   Wt 112.9 kg (249 lb)   SpO2 95%   Breastfeeding No   BMI 40.81 kg/m    Body mass index is 40.81 kg/m .  Physical Exam   Three suture removed without difficult from left great toe.    Antibiotic ointment and bandage applied.     Discussed keeping clean. Watch for infection.      There is no redness/no drainage, wound appears to be healing well.    ASSESSMENT/PLAN:      ICD-10-CM    1. Visit for suture removal  Z48.02        Patient Instructions       Our Clinic hours are:  Mondays    7:20 am - 7 pm  Tues -  Fri  7:20 am - 5 pm    Clinic Phone: [phone]    The clinic lab opens at 7:30 am Mon - Fri and appointments are required.    [facility] [place]  Ph. [phone]  Monday  8 am - 7pm  Tues - Fri 8 am - 5:30 pm                 "

## 2020-06-12 ENCOUNTER — TRANSFERRED RECORDS (OUTPATIENT)
Dept: HEALTH INFORMATION MANAGEMENT | Facility: CLINIC | Age: 50
End: 2020-06-12

## 2020-06-18 RX ORDER — GABAPENTIN 400 MG/1
800 CAPSULE ORAL 3 TIMES DAILY
Qty: 1 CAPSULE | Refills: 0 | COMMUNITY
Start: 2020-06-18 | End: 2022-07-01 | Stop reason: DRUGHIGH

## 2020-06-18 RX ORDER — LORAZEPAM 1 MG/1
1-2 TABLET ORAL DAILY PRN
Qty: 1 TABLET | Refills: 0 | COMMUNITY
Start: 2020-06-18 | End: 2021-05-12

## 2020-06-22 ENCOUNTER — TELEPHONE (OUTPATIENT)
Dept: FAMILY MEDICINE | Facility: CLINIC | Age: 50
End: 2020-06-22

## 2020-06-22 NOTE — TELEPHONE ENCOUNTER
S-(situation): bilateral shoulder pain for years.  It goes back and forth between the left and right shoulder.  Last night, the right shoulder hurt.  Today, the left shoulder hurts.  Pain is palpable.   rubs the spot and it improves.    The pain is described as an aching.  Ibuprofen is not helpful.  Pt has full range of motion.  The pain is not limiting her abilities to perform activities of daily living.    Denies cough, shortness of breath, chest pain or chest pressure, or palpitations.    B-(background): bilateral shoulder pain as noted above    A-(assessment): ongoing shoulder pain, bilateral, for years.  Worse in recent weeks.    R-(recommendations): Advised office visit for further evaluation.    Ana Luisa Guidry RN

## 2020-06-22 NOTE — TELEPHONE ENCOUNTER
Reason for Call:  Other call back    Detailed comments: Bursitis of the shoulder and she is asking if it can go to the other shoulder.    Phone Number Patient can be reached at: Home number on file [phone] (home)    Best Time: any    Can we leave a detailed message on this number? YES    Call taken on 6/22/2020 at 10:31 AM by Ofelia Andersen

## 2020-07-20 ENCOUNTER — TRANSFERRED RECORDS (OUTPATIENT)
Dept: HEALTH INFORMATION MANAGEMENT | Facility: CLINIC | Age: 50
End: 2020-07-20

## 2020-08-12 ENCOUNTER — VIRTUAL VISIT (OUTPATIENT)
Dept: FAMILY MEDICINE | Facility: CLINIC | Age: 50
End: 2020-08-12
Payer: COMMERCIAL

## 2020-08-12 DIAGNOSIS — K51.919 ULCERATIVE COLITIS WITH COMPLICATION, UNSPECIFIED LOCATION (H): Primary | ICD-10-CM

## 2020-08-12 DIAGNOSIS — E03.9 HYPOTHYROIDISM, UNSPECIFIED TYPE: ICD-10-CM

## 2020-08-12 PROCEDURE — 99214 OFFICE O/P EST MOD 30 MIN: CPT | Mod: TEL | Performed by: FAMILY MEDICINE

## 2020-08-12 RX ORDER — LEVOTHYROXINE SODIUM 75 UG/1
75 TABLET ORAL DAILY
Qty: 90 TABLET | Refills: 1 | Status: SHIPPED | OUTPATIENT
Start: 2020-08-12 | End: 2021-03-22

## 2020-08-12 NOTE — PATIENT INSTRUCTIONS
Our Clinic hours are:  Mondays    7:20 am - 7 pm  Tues -  Fri  7:20 am - 5 pm    Clinic Phone: [phone]    The clinic lab opens at 7:30 am Mon - Fri and appointments are required.    [facility]. [phone]  Monday  8 am - 7pm  Tues - Fri 8 am - 5:30 pm

## 2020-08-12 NOTE — PROGRESS NOTES
"Bibi Song is a 50 year old female who is being evaluated via a billable telephone visit.      The patient has been notified of following:     \"This telephone visit will be conducted via a call between you and your physician/provider. We have found that certain health care needs can be provided without the need for a physical exam.  This service lets us provide the care you need with a short phone conversation.  If a prescription is necessary we can send it directly to your pharmacy.  If lab work is needed we can place an order for that and you can then stop by our lab to have the test done at a later time.    Telephone visits are billed at different rates depending on your insurance coverage. During this emergency period, for some insurers they may be billed the same as an in-person visit.  Please reach out to your insurance provider with any questions.    If during the course of the call the physician/provider feels a telephone visit is not appropriate, you will not be charged for this service.\"    Patient has given verbal consent for Telephone visit?  Yes    What phone number would you like to be contacted at? [phone]    How would you like to obtain your AVS? MyChart    Subjective     Bibi Song is a 50 year old female who presents via phone visit today for the following health issues:    HPI   Chief Complaint   Patient presents with     Colitis     Patient feels like her colitis is acting up and would like to discuss if there is something sh e is able to take to help her control her symptoms. Patient has taken prednisone and that was helpful.     Health Maintenance     agreed to colonoscopy and mammogram     Hard to go to the bathroom, feels like she needs to go to the bathroom all the time, noticing BRBPR with bowel movements.  Last colonoscopy was 2010 in our system.   Gets flare ups intermittently.  Thinks that this flare up started 1 month ago.  Increased gas, bloating, etc.   Hasn't seen GI " in many years, but can't even tell me who she saw last.     Hypothyroidism Follow-up      Since last visit, patient describes the following symptoms: Weight stable, no hair loss, no skin changes, no constipation, no loose stools      How many servings of fruits and vegetables do you eat daily?  0-1    On average, how many sweetened beverages do you drink each day (Examples: soda, juice, sweet tea, etc.  Do NOT count diet or artificially sweetened beverages)?   0    How many days per week do you exercise enough to make your heart beat faster? 3 or less    How many minutes a day do you exercise enough to make your heart beat faster? 9 or less    How many days per week do you miss taking your medication? 0       Reviewed and updated as needed this visit by Provider         Review of Systems   Constitutional, HEENT, cardiovascular, pulmonary, gi and gu systems are negative, except as otherwise noted.       Objective          Vitals:  No vitals were obtained today due to virtual visit.    healthy and alert  PSYCH: Alert and oriented times 3; coherent speech, normal   rate and volume, able to articulate logical thoughts, able   to abstract reason, no tangential thoughts, no hallucinations   or delusions  Her affect is normal  RESP: No cough, no audible wheezing, able to talk in full sentences  Remainder of exam unable to be completed due to telephone visits    Diagnostic Test Results:  Labs reviewed in Epic        Assessment & Plan     1. Hypothyroidism, unspecified type  Last TSH in normal range 2/2020  - levothyroxine (SYNTHROID/LEVOTHROID) 75 MCG tablet; Take 1 tablet (75 mcg) by mouth daily  Dispense: 90 tablet; Refill: 1    2. Ulcerative colitis with complication, unspecified location (H)  Patient with known ulcerative colitis diagnosis, >10 years since last colonoscopy. Needs to establish with GI and get follow up colonoscopy done.   Asked to contact me if there is a large delay in getting in to see someone.  -  GASTROENTEROLOGY ADULT REF CONSULT ONLY; Future     No follow-ups on file.    Glory Singleton MD  [facility]    No follow-ups on file.     Glory Singleton MD

## 2020-08-13 ENCOUNTER — TRANSFERRED RECORDS (OUTPATIENT)
Dept: HEALTH INFORMATION MANAGEMENT | Facility: CLINIC | Age: 50
End: 2020-08-13

## 2020-08-13 ENCOUNTER — MEDICAL CORRESPONDENCE (OUTPATIENT)
Dept: HEALTH INFORMATION MANAGEMENT | Facility: CLINIC | Age: 50
End: 2020-08-13

## 2020-08-20 DIAGNOSIS — K51.511 LEFT SIDED COLITIS WITH RECTAL BLEEDING (H): Primary | ICD-10-CM

## 2020-08-24 ENCOUNTER — TRANSFERRED RECORDS (OUTPATIENT)
Dept: HEALTH INFORMATION MANAGEMENT | Facility: CLINIC | Age: 50
End: 2020-08-24

## 2020-09-20 ENCOUNTER — NURSE TRIAGE (OUTPATIENT)
Dept: NURSING | Facility: CLINIC | Age: 50
End: 2020-09-20

## 2020-09-20 NOTE — TELEPHONE ENCOUNTER
Patient calling with a general question regarding colonoscopy prep prior to her colonoscopy tomorrow. No triage needed.  Gave information based on [facility] protocol.  Sarika Matias RN [facility]

## 2020-09-21 ENCOUNTER — TRANSFERRED RECORDS (OUTPATIENT)
Dept: HEALTH INFORMATION MANAGEMENT | Facility: CLINIC | Age: 50
End: 2020-09-21

## 2020-09-29 ENCOUNTER — TRANSFERRED RECORDS (OUTPATIENT)
Dept: HEALTH INFORMATION MANAGEMENT | Facility: CLINIC | Age: 50
End: 2020-09-29

## 2020-10-01 ENCOUNTER — OFFICE VISIT (OUTPATIENT)
Dept: FAMILY MEDICINE | Facility: CLINIC | Age: 50
End: 2020-10-01
Payer: COMMERCIAL

## 2020-10-01 VITALS
DIASTOLIC BLOOD PRESSURE: 70 MMHG | RESPIRATION RATE: 16 BRPM | SYSTOLIC BLOOD PRESSURE: 102 MMHG | TEMPERATURE: 97.2 F | BODY MASS INDEX: 37.61 KG/M2 | WEIGHT: 234 LBS | OXYGEN SATURATION: 92 % | HEIGHT: 66 IN | HEART RATE: 71 BPM

## 2020-10-01 DIAGNOSIS — M77.11 LATERAL EPICONDYLITIS OF RIGHT ELBOW: ICD-10-CM

## 2020-10-01 DIAGNOSIS — G89.29 CHRONIC RIGHT SHOULDER PAIN: Primary | ICD-10-CM

## 2020-10-01 DIAGNOSIS — M25.511 CHRONIC RIGHT SHOULDER PAIN: Primary | ICD-10-CM

## 2020-10-01 DIAGNOSIS — Z23 NEED FOR PROPHYLACTIC VACCINATION AND INOCULATION AGAINST INFLUENZA: ICD-10-CM

## 2020-10-01 PROCEDURE — 99214 OFFICE O/P EST MOD 30 MIN: CPT | Performed by: FAMILY MEDICINE

## 2020-10-01 PROCEDURE — 90682 RIV4 VACC RECOMBINANT DNA IM: CPT | Performed by: FAMILY MEDICINE

## 2020-10-01 RX ORDER — BALSALAZIDE DISODIUM 750 MG/1
CAPSULE ORAL
COMMUNITY
Start: 2020-09-26

## 2020-10-01 ASSESSMENT — PAIN SCALES - GENERAL: PAINLEVEL: SEVERE PAIN (7)

## 2020-10-01 ASSESSMENT — MIFFLIN-ST. JEOR: SCORE: 1680.23

## 2020-10-01 NOTE — Clinical Note
Please abstract the following data from this visit with this patient into the appropriate field in Epic:    Tests that can be patient reported without a hard copy:    Colonoscopy done on this date: 9/26/2020 (approximately), by this group: [facility] , results were normal.     Other Tests found in the patient's chart through Chart Review/Care Everywhere:        Note to Abstraction: If this section is blank, no results were found via Chart Review/Care Everywhere.

## 2020-10-01 NOTE — PATIENT INSTRUCTIONS
Health Maintenance reviewed and plan for update discussed.  Patient asked to schedule her mammogram     Acetaminophen/Tylenol 1000 mg three times daily 6 hours apart is fine.        Our Clinic hours are:  Mondays    7:20 am - 7 pm  Tues - Fri  7:20 am - 5 pm    Clinic Phone: [phone]    The clinic lab opens at 7:30 am Mon - Fri and appointments are required.    [facility]. [phone]  Monday  8 am - 7pm  Tues - Fri 8 am - 5:30 pm              Patient Education     Understanding Lateral Epicondylitis    Tendons are strong bands of tissue that connect muscles to bones. Lateral epicondylitis affects the tendons that connect muscles in the forearm to the lateral epicondyle. This is the bony knob on the outer side of the elbow. The condition occurs if the extensor tendons of the wrist become red and swollen (irritated). This can cause pain in the elbow, forearm, and wrist. Because the condition is sometimes caused by playing tennis, it is also known as  tennis elbow.   How to say it  LA-tuhr-yves [id number]-LY-[id number]   What causes lateral epicondylitis?  The condition most often occurs because of overuse. This can be from any activity that repeatedly puts stress on the forearm extensor muscles or tendons and wrist. For instance, playing tennis, lifting weights, cutting meat, painting, and typing can all cause the condition. Wear and tear of the tendons from aging or an injury to the tendons can also cause the condition.  Symptoms of lateral epicondylitis  The most common symptom is pain. You may feel it on the outer side of the elbow and down the back of the forearm. It may be worse when moving or using the elbow, forearm, or wrist. You may also feel pain when gripping or lifting things.  Treatment for lateral epicondylitis  Treatments may include:    Resting the elbow, forearm, and wrist. You ll need to avoid movements that can make your symptoms worse. You also may need to avoid certain  sports and types of work for a time. This helps relieve symptoms and prevent further damage to the tendons.    Changing the action that caused the problem. For instance, if the tendons were damaged from playing tennis, it may help to change your playing technique or use different equipment. This helps prevent further damage to the tendons.    Using cold packs. Putting an ice pack on the injured area can help reduce pain and swelling.    Taking pain medicines. Taking prescription or over-the-counter pain medicines may help reduce pain and swelling.      Wearing a brace. This helps reduce strain on the muscles and tendons in the forearm, which may relieve symptoms. It is very important to wear the brace properly.    Doing exercises and physical therapy. These help improve strength and range of motion in the elbow, forearm, and wrist.    Getting shots of medicine into the injured area. These may help relieve symptoms for a time.    Having surgery. This may be an option if other treatments fail to relieve symptoms. In many cases, the surgeon removes the damaged tissue.  Possible complications of lateral epicondylitis  If the tendons involved don t heal properly, symptoms may return or get worse. To help prevent this, follow your treatment plan as directed.  When to call your healthcare provider  Call your healthcare provider right away if you have any of these:    Fever of 100.4 F (38 C) or higher, or as directed    Redness, swelling, or warmth in the elbow or forearm that gets worse    Symptoms that don t get better with treatment, or get worse    New symptoms   Date Last Reviewed: 3/10/2016    5186-2902 [employer]. [address] [place] [address], [place] [address]. All rights reserved. This information is not intended as a substitute for professional medical care. Always follow your healthcare professional's instructions.

## 2020-10-01 NOTE — PROGRESS NOTES
"Subjective     Bibi Song is a 50 year old female who presents to clinic today for the following health issues:    HPI       Chief Complaint   Patient presents with     Musculoskeletal Problem     Patient has right arm weakness. Patient notices pain in shoulder and traveling down arm. Patient has no noted swelling and no known injury. Patient is limited on ROM due to weakness. Patient rates pain at a 7/10.      Flu Shot     Health Maintenance     mammogram. Had colonoscopy 9/21/2020 [facility] normal      Imm/Inj     Flu Shot       Left handed female with right arm weakness/pain     Joint Pain    Onset: 3-4 weeks    Description:   Location: right shoulder  Character: Dull ache    Intensity: moderate    Progression of Symptoms: same    Accompanying Signs & Symptoms:  Other symptoms: radiation of pain to forearm    History:   Previous similar pain: no       Precipitating factors:   Trauma or overuse: no     Alleviating factors:  Improved by: heating pad    Therapies Tried and outcome:  heat and tried lifting weights (5 lb weights)                  Review of Systems   Constitutional, HEENT, cardiovascular, pulmonary, gi and gu systems are negative, except as otherwise noted.      Objective    /70   Pulse 71   Temp 97.2  F (36.2  C) (Tympanic)   Resp 16   Ht 1.664 m (5' 5.5\")   Wt 106.1 kg (234 lb)   SpO2 92%   Breastfeeding No   BMI 38.35 kg/m    Body mass index is 38.35 kg/m .  Physical Exam   GENERAL APPEARANCE: healthy, alert and no distress  ORTHO:   SHOULDER Exam-Right   Inspection: no swelling, no bruising, no discoloration, no obvious deformity, no asymmetry, no glenohumeral joint anterior bulge, no distal clavicle elevation, no muscle atrophy, no scapular winging   Tenderness of: SC joint- no, clavicle(prox-mid)- no, clavicle-(mid-distal)- no, AC joint- no, acromion- no, anterior capsule- no, prox bicep tendon- no, greater tuberosity- no, prox humerus- no, supraspinatous- no, infraspinatous- no, " superior trapezious- no, rhomboids- no   Range of Motion: Active- forward flexion- normal, abduction- normal, external rotation- normal, internal rotation- normal  Range of Motion: Passive- forward flexion- normal, abduction- normal, external rotation- normal, internal rotation- normal   Strength: forward flexion- 5/5, abduction- 5/5, internal rotation- 5/5, external rotation- 5/5 and bicep-            Elbow Exam: Inspection: no swelling  Tender: lateral epicondyle  Range of Motion: all normal  Strength: elbow strength full       Cervical Spine Exam:  Tender:  right trapezius muscles  Non-tender:  spinous processes, right paracervical muscles  Range of Motion:  Full ROM of cervical spine  Strength: Full strength of all neck muscles  Special tests:  DTRs of upper extremities 3/4                Assessment & Plan     Need for prophylactic vaccination and inoculation against influenza     - INFLUENZA QUAD, RECOMBINANT, P-FREE (RIV4) (FLUBLOCK) [72291]  - Vaccine Administration, Initial [93873]    Chronic right shoulder pain   nothing to suggest rotator cuff tear at this time, also no mechanism for this.   Recommend PT, ice, stretching  - PHYSICAL THERAPY REFERRAL; Future    Lateral epicondylitis of right elbow  Discussed the causes and treatment of lateral epicondylitis including ice, heat, stretching, massage, NSAIDs, elbow straps, and wrist brace use.  Rest as possible.  Steroid injection if not improving.                  No follow-ups on file.    Glory Singleton MD  [facility]

## 2020-10-27 ENCOUNTER — TRANSFERRED RECORDS (OUTPATIENT)
Dept: HEALTH INFORMATION MANAGEMENT | Facility: CLINIC | Age: 50
End: 2020-10-27

## 2020-11-11 ENCOUNTER — ANCILLARY PROCEDURE (OUTPATIENT)
Dept: GENERAL RADIOLOGY | Facility: CLINIC | Age: 50
End: 2020-11-11
Attending: NURSE PRACTITIONER
Payer: COMMERCIAL

## 2020-11-11 ENCOUNTER — OFFICE VISIT (OUTPATIENT)
Dept: FAMILY MEDICINE | Facility: CLINIC | Age: 50
End: 2020-11-11
Payer: COMMERCIAL

## 2020-11-11 ENCOUNTER — TELEPHONE (OUTPATIENT)
Dept: FAMILY MEDICINE | Facility: CLINIC | Age: 50
End: 2020-11-11

## 2020-11-11 ENCOUNTER — NURSE TRIAGE (OUTPATIENT)
Dept: NURSING | Facility: CLINIC | Age: 50
End: 2020-11-11

## 2020-11-11 VITALS
BODY MASS INDEX: 36.83 KG/M2 | DIASTOLIC BLOOD PRESSURE: 88 MMHG | HEART RATE: 90 BPM | SYSTOLIC BLOOD PRESSURE: 132 MMHG | OXYGEN SATURATION: 95 % | WEIGHT: 229.2 LBS | TEMPERATURE: 98.1 F | HEIGHT: 66 IN | RESPIRATION RATE: 16 BRPM

## 2020-11-11 DIAGNOSIS — Z86.59 HISTORY OF CLAUSTROPHOBIA: ICD-10-CM

## 2020-11-11 DIAGNOSIS — M25.361 KNEE GIVES OUT, RIGHT: ICD-10-CM

## 2020-11-11 DIAGNOSIS — M25.561 ACUTE PAIN OF RIGHT KNEE: Primary | ICD-10-CM

## 2020-11-11 DIAGNOSIS — M25.561 ACUTE PAIN OF RIGHT KNEE: ICD-10-CM

## 2020-11-11 PROCEDURE — 73562 X-RAY EXAM OF KNEE 3: CPT | Mod: RT | Performed by: RADIOLOGY

## 2020-11-11 PROCEDURE — 99214 OFFICE O/P EST MOD 30 MIN: CPT | Performed by: NURSE PRACTITIONER

## 2020-11-11 RX ORDER — LORAZEPAM 0.5 MG/1
0.5 TABLET ORAL
Qty: 1 TABLET | Refills: 0 | Status: SHIPPED | OUTPATIENT
Start: 2020-11-11 | End: 2021-02-01

## 2020-11-11 ASSESSMENT — MIFFLIN-ST. JEOR: SCORE: 1658.45

## 2020-11-11 ASSESSMENT — PAIN SCALES - GENERAL: PAINLEVEL: WORST PAIN (10)

## 2020-11-11 ASSESSMENT — ENCOUNTER SYMPTOMS: CONSTITUTIONAL NEGATIVE: 1

## 2020-11-11 NOTE — TELEPHONE ENCOUNTER
Ativan prescribed to take 1 hour prior to MRI.     Please let patient know.     Thanks!  Yolanda Duenas DNP, NP-C 11/11/2020 11:36 AM

## 2020-11-11 NOTE — TELEPHONE ENCOUNTER
She has pain in her knee with use and in her calf. I connected her with scheduling for an appointment today and advised urgent care or the ER if she can't get an appointment.  Shira Galvez RN  [facility]      Additional Information    Negative: Sounds like a life-threatening emergency to the triager    Negative: Followed a knee injury    Negative: Leg pain is main symptom    Negative: Knee swelling is main symptom    Negative: [1] Swollen joint AND [2] fever    Negative: [1] Red area or streak AND [2] fever    Negative: Patient sounds very sick or weak to the triager    Negative: [1] SEVERE pain (e.g., excruciating, unable to walk) AND [2] not improved after 2 hours of pain medicine     Pain @ 9 with use, at rest it's okay, 3.    Negative: [1] Can't move swollen joint at all AND [2] no fever    [1] Thigh or calf pain AND [2] only 1 side AND [3] present > 1 hour    Protocols used: KNEE PAIN-A-AH

## 2020-11-11 NOTE — TELEPHONE ENCOUNTER
Reason for call:    Symptom or request:     Patient had an appt today with gera bradford, 11/11; she order an MRI, however patient is claustrophobia, she is requesting medication to help with claustrophobia.   Patient reports will have ride to and from appt. appt is Friday.     [facility] pharmacy      Best Time:  any    Can we leave a detailed message on this number?  YES     Enriqueta Moreno

## 2020-11-11 NOTE — PROGRESS NOTES
"Subjective     Bibi Song is a 50 year old female who presents to clinic today for the following health issues:    HPI         Musculoskeletal problem/pain  Onset/Duration: 2 weeks  Description  Location: knee - right  Joint Swelling: no  Redness: no  Pain: YES  Warmth: no  Intensity:  10/10  intermittent  Progression of Symptoms:  worsening  Accompanying signs and symptoms:   Fevers: no  Numbness/tingling/weakness: yes  Has been having episodes of her knee going out on her, no falls  History  Trauma to the area: no  Recent illness:  no  Previous similar problem: no  Previous evaluation:  no  Precipitating or alleviating factors:  Aggravating factors include: sitting and climbing stairs  Therapies tried and outcome: nothing    Additional provider notes: Last couple of weeks states she has been walking and it will give out. No pain until this morning when she got out of bed. 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 Patient wanting x-ray today.     Review of Systems   Constitutional: Negative.    Musculoskeletal:        Right knee pain today and giving out x 2 weeks            Objective    /88 (BP Location: Right arm, Patient Position: Chair, Cuff Size: Adult Regular)   Pulse 90   Temp 98.1  F (36.7  C) (Tympanic)   Resp 16   Ht 1.664 m (5' 5.5\")   Wt 104 kg (229 lb 3.2 oz)   SpO2 95%   BMI 37.56 kg/m    Body mass index is 37.56 kg/m .  Physical Exam  Vitals signs and nursing note reviewed.   Constitutional:       General: She is not in acute distress.     Appearance: Normal appearance. She is not ill-appearing or toxic-appearing.   Musculoskeletal: Normal range of motion.      Right knee: She exhibits abnormal alignment (apppears slightly off when compared to left knee/leg). She exhibits normal range of motion, no swelling, no effusion, no ecchymosis, no deformity, no laceration, no erythema, normal patellar mobility and no bony tenderness. Tenderness (to lateral lower knee) found. " "  Neurological:      Mental Status: She is alert.                    Assessment & Plan     Acute pain of right knee  No acute fracture or concerns noted upon initial provider review. Will notify patient when official read from radiology comes through. Concern for potential tear as knee has been giving out. Discussed with patient, MRI ordered. She is claustrophobic, but states if it is on her knee, she should be fine with MRI. Instructions given on how to schedule appointment.     - XR Knee Right 3 Views; Future  - MR Knee Right w/o Contrast; Future    Knee gives out, right    - MR Knee Right w/o Contrast; Future    History of Claustrophobia  Ativan prescribed to take 1 hour prior to MRI. Patient to have someone drive her to and from imaging and no driving the rest of the day.       BMI:   Estimated body mass index is 37.56 kg/m  as calculated from the following:    Height as of this encounter: 1.664 m (5' 5.5\").    Weight as of this encounter: 104 kg (229 lb 3.2 oz).            Patient Instructions   Right knee pain and giving out:   1. X-ray was negative for any acute fractures. We will notify you when radiology does the official read.  2. MRI ordered due to knee giving out. Please call the number below to schedule that appointment.   3. Take ibuprofen 800mg every 8 hours for 1-2 weeks. Take with food.  4. If you develop any upset stomach or GI symptoms, please contact us.  5. You can also take Tylenol 1000mg every 8 hours (max 3000mg/day). Recommend taking 4 hours after Ibuprofen.  6. Rest, ice/heat, compression (wrap), and elevate as able.  7. Follow-up in 1-2 weeks if symptoms do not improve or sooner if symptoms worsen.        Return if symptoms worsen or fail to improve.    SUSANNAH Wyatt CNP  [facility]    "

## 2020-11-11 NOTE — PATIENT INSTRUCTIONS
Right knee pain and giving out:   1. X-ray was negative for any acute fractures. We will notify you when radiology does the official read.  2. MRI ordered due to knee giving out. Please call the number below to schedule that appointment.   3. Take ibuprofen 800mg every 8 hours for 1-2 weeks. Take with food.  4. If you develop any upset stomach or GI symptoms, please contact us.  5. You can also take Tylenol 1000mg every 8 hours (max 3000mg/day). Recommend taking 4 hours after Ibuprofen.  6. Rest, ice/heat, compression (wrap), and elevate as able.  7. Follow-up in 1-2 weeks if symptoms do not improve or sooner if symptoms worsen.

## 2020-11-13 ENCOUNTER — HOSPITAL ENCOUNTER (OUTPATIENT)
Dept: MRI IMAGING | Facility: CLINIC | Age: 50
Discharge: HOME | End: 2020-11-13
Attending: NURSE PRACTITIONER | Admitting: NURSE PRACTITIONER
Payer: COMMERCIAL

## 2020-11-13 DIAGNOSIS — M25.361 KNEE GIVES OUT, RIGHT: ICD-10-CM

## 2020-11-13 DIAGNOSIS — M25.561 ACUTE PAIN OF RIGHT KNEE: ICD-10-CM

## 2020-11-13 PROCEDURE — 73721 MRI JNT OF LWR EXTRE W/O DYE: CPT | Mod: 26 | Performed by: RADIOLOGY

## 2020-11-13 PROCEDURE — 73721 MRI JNT OF LWR EXTRE W/O DYE: CPT | Mod: RT

## 2020-11-16 ENCOUNTER — HEALTH MAINTENANCE LETTER (OUTPATIENT)
Age: 50
End: 2020-11-16

## 2020-11-16 ENCOUNTER — TELEPHONE (OUTPATIENT)
Dept: FAMILY MEDICINE | Facility: CLINIC | Age: 50
End: 2020-11-16

## 2020-11-16 DIAGNOSIS — M25.561 ACUTE PAIN OF RIGHT KNEE: Primary | ICD-10-CM

## 2020-11-16 NOTE — TELEPHONE ENCOUNTER
MRI showed meniscal tear. Reviewed results with patient. Ortho referral placed for further evaluation and follow-up.     Yolanda Duenas DNP, NP-C 11/16/2020 2:52 PM

## 2020-11-21 ENCOUNTER — E-VISIT (OUTPATIENT)
Dept: URGENT CARE | Facility: URGENT CARE | Age: 50
End: 2020-11-21
Payer: COMMERCIAL

## 2020-11-21 DIAGNOSIS — Z20.822 SUSPECTED COVID-19 VIRUS INFECTION: Primary | ICD-10-CM

## 2020-11-21 PROCEDURE — 99421 OL DIG E/M SVC 5-10 MIN: CPT | Mod: 95 | Performed by: FAMILY MEDICINE

## 2020-11-21 NOTE — PATIENT INSTRUCTIONS
Dear Bibi Song,    Your symptoms show that you may have coronavirus (COVID-19). This illness can cause fever, cough and trouble breathing. Many people get a mild case and get better on their own. Some people can get very sick.    Will I be tested for COVID-19?  We would like to test you for Covid-19 virus. I have placed orders for this test.   To schedule: go to your [employer] home page and scroll down to the section that says  You have an appointment that needs to be scheduled  and click the large green button that says  Schedule Now  and follow the steps to find the next available openings.    If you are unable to complete these [employer] scheduling steps, please call [phone] to schedule your testing.     When it's time for your COVID test:  Stay at least 6 feet away from others. (If someone will drive you to your test, stay in the backseat, as far away from the  as you can.)  Cover your mouth and nose with a mask, tissue or washcloth.  Go straight to the testing site. Don't make any stops on the way there or back.    Starting now:     Do not go to work. Follow your usual processes for taking time away from work.  o If you receive a negative COVID-19 test result and were NOT exposed to someone with a known positive COVID-19 test, you can return to work once you're free of fever for 24 hours without fever-reducing medication and your symptoms are improving or resolved.  o If you receive a positive COVID-19 test result, return to work should be at least 10 days from symptom onset (20 days if people have immune compromise) and people should be fever-free for 24 hours without medications, and the respiratory symptoms should be improved significantly before returning to work or school  o If you were exposed to someone who has tested positive for COVID-19, you can return to work 14 days after your last contact with the positive individual, provided you do not have symptoms at all during that  "time.    During this time, don't leave the house except for testing or medical care.  o Stay in your own room, even for meals. Use your own bathroom if you can.  o Stay away from others in your home. No hugging, kissing or shaking hands. No visitors.  o Don't go to work, school or anywhere else.    Clean \"high touch\" surfaces often (doorknobs, counters, handles, etc.). Use a household cleaning spray or wipes. You'll find a full list of  on the EPA website: www.epa.gov/pesticide-registration/list-n-disinfectants-use-against-sars-cov-2.    Cover your mouth and nose with a mask, tissue or washcloth to avoid spreading germs.    Wash your hands and face often. Use soap and water.    People in these groups are at risk for severe illness due to COVID-19:  o People 65 years and older  o People who live in a nursing home or long-term care facility  o People with chronic disease (lung, heart, cancer, diabetes, kidney, liver, immunologic)  o People who have a weakened immune system, including those who:  - Are in cancer treatment  - Take medicine that weakens the immune system, such as corticosteroids  - Had a bone marrow or organ transplant  - Have an immune deficiency  - Have poorly controlled HIV or AIDS  - Are obese (body mass index of 40 or higher)  - Smoke regularly      Caregivers should wear gloves while washing dishes, handling laundry and cleaning bedrooms and bathrooms.    Use caution when washing and drying laundry: Don't shake dirty laundry, and use the warmest water setting that you can.    For more tips, go to www.cdc.gov/coronavirus/2019-ncov/downloads/10Things.pdf.    Sign up for [employer]. We know it's scary to hear that you might have COVID-19. We want to track your symptoms to make sure you're okay over the next 2 weeks. Please look for an email from [employer]--this is a free, online program that we'll use to keep in touch. To sign up, follow the link in the email you will receive. Learn more " at [email or web address]    How can I take care of myself?    Get lots of rest. Drink extra fluids (unless a doctor has told you not to)    Take Tylenol (acetaminophen) for fever or pain. If you have liver or kidney problems, ask your family doctor if it's okay to take Tylenol.  Adults can take either:    650 mg (two 325 mg pills) every 4 to 6 hours, or     1,000 mg (two 500 mg pills) every 8 hours as needed.    Note: Don't take more than 3,000 mg in one day. Acetaminophen is found in many medicines (both prescribed and over-the-counter medicines). Read all labels to be sure you don't take too much.  For children, check the Tylenol bottle for the right dose. The dose is based on the child's age or weight.    If you have other health problems (like cancer, heart failure, an organ transplant or severe kidney disease): Call your specialty clinic if you don't feel better in the next 2 days.    Know when to call 911. Emergency warning signs include:  Trouble breathing or shortness of breath  Pain or pressure in the chest that doesn't go away  Feeling confused like you haven't felt before, or not being able to wake up  Bluish-colored lips or face    Where can I get more information?    [facility] - About COVID-19: [email or web address]  CDC - What to Do If You're Sick: www.cdc.gov/coronavirus/2019-ncov/about/steps-when-sick.html

## 2020-11-23 ENCOUNTER — AMBULATORY - HEALTHEAST (OUTPATIENT)
Dept: FAMILY MEDICINE | Facility: CLINIC | Age: 50
End: 2020-11-23

## 2020-11-23 DIAGNOSIS — Z20.822 SUSPECTED COVID-19 VIRUS INFECTION: ICD-10-CM

## 2020-11-24 ENCOUNTER — AMBULATORY - HEALTHEAST (OUTPATIENT)
Dept: FAMILY MEDICINE | Facility: CLINIC | Age: 50
End: 2020-11-24

## 2020-11-24 DIAGNOSIS — Z20.822 SUSPECTED COVID-19 VIRUS INFECTION: ICD-10-CM

## 2020-11-25 ENCOUNTER — COMMUNICATION - HEALTHEAST (OUTPATIENT)
Dept: SCHEDULING | Facility: CLINIC | Age: 50
End: 2020-11-25

## 2020-12-17 ENCOUNTER — TRANSFERRED RECORDS (OUTPATIENT)
Dept: HEALTH INFORMATION MANAGEMENT | Facility: CLINIC | Age: 50
End: 2020-12-17

## 2020-12-24 ENCOUNTER — TELEPHONE (OUTPATIENT)
Dept: FAMILY MEDICINE | Facility: CLINIC | Age: 50
End: 2020-12-24

## 2020-12-24 NOTE — TELEPHONE ENCOUNTER
Patient Quality Outreach      Summary:    Patient has the following on her problem list/HM:   Depression / Dysthymia review    6 Month Remission: 4-8 month window range: 10/4/2020-2/1/2021         PHQ-9 SCORE 1/31/2019 1/13/2020 6/3/2020   PHQ-9 Total Score - - -   PHQ-9 Total Score 2 - 16   PHQ-9 External Data - 11 -       If PHQ-9 recheck is 5 or more, route to provider for next steps.    Patient is due/failing the following:   Breast Cancer Screening - Mammogram and PHQ-9 Needed    Type of outreach:    Sent [employer] message.    Questions for provider review:    None                                                                                                                                     Isabel Sheldon MA

## 2021-01-14 ENCOUNTER — TRANSFERRED RECORDS (OUTPATIENT)
Dept: HEALTH INFORMATION MANAGEMENT | Facility: CLINIC | Age: 51
End: 2021-01-14

## 2021-02-01 ENCOUNTER — TELEPHONE (OUTPATIENT)
Dept: FAMILY MEDICINE | Facility: CLINIC | Age: 51
End: 2021-02-01

## 2021-02-01 ENCOUNTER — VIRTUAL VISIT (OUTPATIENT)
Dept: FAMILY MEDICINE | Facility: CLINIC | Age: 51
End: 2021-02-01
Payer: COMMERCIAL

## 2021-02-01 DIAGNOSIS — F10.10 ETOH ABUSE: Primary | ICD-10-CM

## 2021-02-01 PROCEDURE — 99213 OFFICE O/P EST LOW 20 MIN: CPT | Mod: TEL | Performed by: FAMILY MEDICINE

## 2021-02-01 NOTE — TELEPHONE ENCOUNTER
"Patient is very tearful. She is an alcoholic and is on the edge of drinking again. She is wanting to get herself into a treatment program as an out patient. She reports that she needs to keep working as she cannot afford inpatient. Patient does have someone from [facility] calling her at 1:30 PM today to see if she will quality and someone from her work calling her at 4 PM to ask more questions.    Patient would like to be off of work for one week prior to starting a program to \"get her affairs in order:\" She is going to need to have FMLA forms filled out to be off for that week. She is going to drop them off at the  today to get them to Dr. Singleton.    Spoke with Sharri to see what the best course of action would be to help her navigate through the system. She recommended ordering a care coordination referral and she could give her a call to help her get started to see if she qualifies for a Rule 25. Also, could give patient phone numbers for Behavioral Health intake ([phone]) as well as the general number ([phone])    Referral pended. Routed to provider to review and sign referral if appropriate, and address FMLA request.  Rema Lynch RN    "

## 2021-02-01 NOTE — TELEPHONE ENCOUNTER
Reason for call:  Patient reporting a symptom    Symptom or request: Pt calling crying.  She is an alcoholic and started drinking again.  She is trying to get herself into a program asap.  She wants to know if Dr. Singleton would complete FMLA forms for her to be out of work for treatment?  Please call patient and advise.    Duration (how long have symptoms been present): today    Have you been treated for this before? Yes    Additional comments:     Phone Number patient can be reached at:  Home number on file [phone] (home)    Best Time:  any    Can we leave a detailed message on this number:  YES    Call taken on 2/1/2021 at 10:33 AM by Velma Ibarra

## 2021-02-01 NOTE — TELEPHONE ENCOUNTER
Patient is scheduled.  Left message on personal/confidential voice mail for Sharri and notified that Care Coordination referral was ordered.  Rema Lynch RN

## 2021-02-01 NOTE — PROGRESS NOTES
"Bibi is a 50 year old who is being evaluated via a billable video visit.      How would you like to obtain your AVS? MyChart  If the video visit is dropped, the invitation should be resent by: Text to cell phone: [phone]  Will anyone else be joining your video visit? No       Assessment & Plan     Alcoholism /alcohol abuse (H)  Seeking treatment  Needs FMLA forms filled out  tx program is 5 weeks, 4days/week 9 am -2 pm. Works for [employer].                          BMI:   Estimated body mass index is 37.56 kg/m  as calculated from the following:    Height as of 20: 1.664 m (5' 5.5\").    Weight as of 20: 104 kg (229 lb 3.2 oz).             No follow-ups on file.    Glory Singleton MD  [facility]     Bibi is a 50 year old who presents to clinic today for the following health issues     HPI       Patient is requesting FMLA paper work completed.  Patient reports that she has been having a problem with alcohol and is seeking treatment.    Started drinking again after her dad  in August.   She calls people when she is drunk and threatens suicide. Boss and coworkers are concerned.  Not drinking at work.   Seeking day treatment.     Review of Systems         Objective           Vitals:  No vitals were obtained today due to virtual visit.    Physical Exam   GENERAL: Healthy, alert and no distress  EYES: Eyes grossly normal to inspection.  No discharge or erythema, or obvious scleral/conjunctival abnormalities.  RESP: No audible wheeze, cough, or visible cyanosis.  No visible retractions or increased work of breathing.    SKIN: Visible skin clear. No significant rash, abnormal pigmentation or lesions.  NEURO: Cranial nerves grossly intact.  Mentation and speech appropriate for age.  PSYCH: Mentation appears normal, affect normal/bright, judgement and insight intact, normal speech and appearance well-groomed.      8 minute phone call.   "

## 2021-02-01 NOTE — TELEPHONE ENCOUNTER
Please schedule at 3:20 for a virtual/phone visit so we can complete the FMLA forms.      Glory Singleton M.D.

## 2021-02-02 ENCOUNTER — TELEPHONE (OUTPATIENT)
Dept: FAMILY MEDICINE | Facility: CLINIC | Age: 51
End: 2021-02-02

## 2021-02-02 NOTE — TELEPHONE ENCOUNTER
At this time I will recommend treatment and discussion with physician running the rehabilitation program which medication is appropriate for her.      Glory Singleton M.D.

## 2021-02-02 NOTE — TELEPHONE ENCOUNTER
"Reason for call:  Patient reporting a symptom    Symptom or request: Pt is doing outpatient treatment for alcoholism starting this week and would like an Rx from Dr. Singleton for \"Cravings.\"  [facility]  Please call patient and advise.      Duration (how long have symptoms been present): ongoing    Have you been treated for this before? Yes    Additional comments:     Phone Number patient can be reached at:  Home number on file [phone] (home)    Best Time:  any    Can we leave a detailed message on this number:  YES    Call taken on 2/2/2021 at 10:33 AM by Velma Ibarra    "

## 2021-02-03 ENCOUNTER — PATIENT OUTREACH (OUTPATIENT)
Dept: CARE COORDINATION | Facility: CLINIC | Age: 51
End: 2021-02-03

## 2021-02-03 NOTE — PROGRESS NOTES
Clinic Care Coordination Contact  [facility]/Voicemail     CHW Outreach attempted x 1.  Left message on patient's voicemail with call back information and requested return call.  Plan: CHW will try to reach patient again in 1-2 business days.    Jazmín SUBRAMANIAN Community Health Worker  [facility] Clinic Care Coordination  [facility]  Phone: [phone]

## 2021-02-04 ENCOUNTER — PATIENT OUTREACH (OUTPATIENT)
Dept: NURSING | Facility: CLINIC | Age: 51
End: 2021-02-04
Payer: COMMERCIAL

## 2021-02-04 NOTE — LETTER
[facility] CARE COORDINATION    February 4, 2021    Bibi Song  [address] [place] [address]      Dear Bibi,    I am a clinic community health worker who works with Glory Singleton MD at   [facility]. I wanted to thank you for spending the time to talk with me.  Below is a description of clinic care coordination and how we can further assist you.      The clinic care coordination team is made up of a registered nurse,  and community health worker who understand the health care system. The goal of clinic care coordination is to help you manage your health and improve access to the health care system in the most efficient manner. The team can assist you in meeting your health care goals by providing education, coordinating services, strengthening the communication among your providers and supporting you with any resource needs.    Please feel free to contact me with any questions or concerns. We are focused on providing you with the highest-quality healthcare experience possible and that all starts with you.     Sincerely,     Jazmín SUBRAMANIAN, Community Health Worker  [facility] Clinic Care Coordination  [facility]  Phone: [phone]

## 2021-02-04 NOTE — PROGRESS NOTES
Clinic Care Coordination Contact  Community Health Worker Initial Outreach    Patient accepts CC: No, patient stated that PCP has helped her to set up treatment that begins Friday, 2/5. Patient feels she is doing fine otherwise and requested CCC Intro letter letter be sent via [employer]. Patient will be sent Care Coordination introduction letter for future reference.     The Clinic Community Health Worker spoke with the patient today at the request of the PCP to discuss possible Clinic Care Coordination enrollment. The service was described to the patient and immediate needs were discussed. The patient declined enrollment at this time.     Jazmín SUBRAMANIAN Community Health Worker  [facility] Care Coordination  [facility]  Phone: [phone]

## 2021-02-05 ENCOUNTER — TRANSFERRED RECORDS (OUTPATIENT)
Dept: HEALTH INFORMATION MANAGEMENT | Facility: CLINIC | Age: 51
End: 2021-02-05

## 2021-03-01 ENCOUNTER — TELEPHONE (OUTPATIENT)
Dept: FAMILY MEDICINE | Facility: CLINIC | Age: 51
End: 2021-03-01

## 2021-03-01 NOTE — LETTER
[facility]  [address]  [place] [facility] [address]  [phone]        March 1, 2021    Regarding:  Bibi CRAWFORD Nohemi  [address] [address]              To Whom It May Concern;      Bibi Song may return to work on 3/15/2021 without restrictions.          Sincerely,      Glory Singleton MD

## 2021-03-01 NOTE — TELEPHONE ENCOUNTER
Pt requesting a letter stating she can go back to work on 3/15/21.  Pt is done with her day program on 3/12/21 and will do the night program after that.  This is in regards to the date on her [facility] paper work.  Advise.  Gideon

## 2021-03-01 NOTE — TELEPHONE ENCOUNTER
Back to work 3/15 -     Reason for Call:  Letter Request    Detailed comments: Pt would like a letter stating that she can go back to work 3/15.  She'd like to  the ltter @ clinic .  Please call patient and advise.      Phone Number Patient can be reached at: Home number on file [phone] (home)    Best Time: any    Can we leave a detailed message on this number? YES    Call taken on 3/1/2021 at 11:33 AM by Velma Ibarra

## 2021-03-04 ENCOUNTER — TRANSFERRED RECORDS (OUTPATIENT)
Dept: HEALTH INFORMATION MANAGEMENT | Facility: CLINIC | Age: 51
End: 2021-03-04

## 2021-03-22 ENCOUNTER — TELEPHONE (OUTPATIENT)
Dept: FAMILY MEDICINE | Facility: CLINIC | Age: 51
End: 2021-03-22

## 2021-03-22 DIAGNOSIS — E03.9 HYPOTHYROIDISM, UNSPECIFIED TYPE: ICD-10-CM

## 2021-03-22 RX ORDER — LEVOTHYROXINE SODIUM 75 UG/1
75 TABLET ORAL DAILY
Qty: 30 TABLET | Refills: 0 | Status: SHIPPED | OUTPATIENT
Start: 2021-03-22 | End: 2021-04-20

## 2021-03-22 NOTE — TELEPHONE ENCOUNTER
Patient is almost out of medication, no need to call pt unless there are questions. Confirmed patient talkes one 75mcg tablet per day.

## 2021-03-22 NOTE — TELEPHONE ENCOUNTER
LM to call clinic/RN concerning Levothyroxine refill.  Last TSH labs were done 2/14/20.  Has been seen in the last yr for other concerns.  Advise.  Dylan

## 2021-04-01 DIAGNOSIS — K51.511 LEFT SIDED COLITIS WITH RECTAL BLEEDING (H): ICD-10-CM

## 2021-04-01 DIAGNOSIS — E03.9 HYPOTHYROIDISM, UNSPECIFIED TYPE: ICD-10-CM

## 2021-04-01 LAB
ALBUMIN SERPL-MCNC: 3.6 G/DL (ref 3.4–5)
ALP SERPL-CCNC: 84 U/L (ref 40–150)
ALT SERPL W P-5'-P-CCNC: 28 U/L (ref 0–50)
ANION GAP SERPL CALCULATED.3IONS-SCNC: 4 MMOL/L (ref 3–14)
AST SERPL W P-5'-P-CCNC: 25 U/L (ref 0–45)
BASOPHILS # BLD AUTO: 0 10E9/L (ref 0–0.2)
BASOPHILS NFR BLD AUTO: 0.3 %
BILIRUB SERPL-MCNC: 0.2 MG/DL (ref 0.2–1.3)
BUN SERPL-MCNC: 14 MG/DL (ref 7–30)
CALCIUM SERPL-MCNC: 9 MG/DL (ref 8.5–10.1)
CHLORIDE SERPL-SCNC: 105 MMOL/L (ref 94–109)
CO2 SERPL-SCNC: 28 MMOL/L (ref 20–32)
CREAT SERPL-MCNC: 0.87 MG/DL (ref 0.52–1.04)
CRP SERPL-MCNC: 11.3 MG/L (ref 0–8)
DEPRECATED CALCIDIOL+CALCIFEROL SERPL-MC: 10 UG/L (ref 20–75)
DIFFERENTIAL METHOD BLD: NORMAL
EOSINOPHIL # BLD AUTO: 0.3 10E9/L (ref 0–0.7)
EOSINOPHIL NFR BLD AUTO: 4.2 %
ERYTHROCYTE [DISTWIDTH] IN BLOOD BY AUTOMATED COUNT: 13.8 % (ref 10–15)
GFR SERPL CREATININE-BSD FRML MDRD: 76 ML/MIN/{1.73_M2}
GLUCOSE SERPL-MCNC: 93 MG/DL (ref 70–99)
HCT VFR BLD AUTO: 39.3 % (ref 35–47)
HGB BLD-MCNC: 13 G/DL (ref 11.7–15.7)
LYMPHOCYTES # BLD AUTO: 2.6 10E9/L (ref 0.8–5.3)
LYMPHOCYTES NFR BLD AUTO: 35.7 %
MCH RBC QN AUTO: 30 PG (ref 26.5–33)
MCHC RBC AUTO-ENTMCNC: 33.1 G/DL (ref 31.5–36.5)
MCV RBC AUTO: 91 FL (ref 78–100)
MONOCYTES # BLD AUTO: 0.4 10E9/L (ref 0–1.3)
MONOCYTES NFR BLD AUTO: 5.5 %
NEUTROPHILS # BLD AUTO: 4 10E9/L (ref 1.6–8.3)
NEUTROPHILS NFR BLD AUTO: 54.3 %
PLATELET # BLD AUTO: 338 10E9/L (ref 150–450)
POTASSIUM SERPL-SCNC: 4 MMOL/L (ref 3.4–5.3)
PROT SERPL-MCNC: 7.1 G/DL (ref 6.8–8.8)
RBC # BLD AUTO: 4.33 10E12/L (ref 3.8–5.2)
SODIUM SERPL-SCNC: 137 MMOL/L (ref 133–144)
T4 FREE SERPL-MCNC: 0.79 NG/DL (ref 0.76–1.46)
TSH SERPL DL<=0.005 MIU/L-ACNC: 1.75 MU/L (ref 0.4–4)
WBC # BLD AUTO: 7.3 10E9/L (ref 4–11)

## 2021-04-01 PROCEDURE — 84439 ASSAY OF FREE THYROXINE: CPT | Performed by: PHYSICIAN ASSISTANT

## 2021-04-01 PROCEDURE — 36415 COLL VENOUS BLD VENIPUNCTURE: CPT | Performed by: PHYSICIAN ASSISTANT

## 2021-04-01 PROCEDURE — 82306 VITAMIN D 25 HYDROXY: CPT | Performed by: PHYSICIAN ASSISTANT

## 2021-04-01 PROCEDURE — 80050 GENERAL HEALTH PANEL: CPT | Performed by: PHYSICIAN ASSISTANT

## 2021-04-01 PROCEDURE — 86140 C-REACTIVE PROTEIN: CPT | Performed by: PHYSICIAN ASSISTANT

## 2021-04-07 ENCOUNTER — TRANSFERRED RECORDS (OUTPATIENT)
Dept: HEALTH INFORMATION MANAGEMENT | Facility: CLINIC | Age: 51
End: 2021-04-07

## 2021-04-12 ENCOUNTER — TRANSFERRED RECORDS (OUTPATIENT)
Dept: HEALTH INFORMATION MANAGEMENT | Facility: CLINIC | Age: 51
End: 2021-04-12

## 2021-04-19 DIAGNOSIS — E03.9 HYPOTHYROIDISM, UNSPECIFIED TYPE: ICD-10-CM

## 2021-04-20 RX ORDER — LEVOTHYROXINE SODIUM 75 UG/1
75 TABLET ORAL DAILY
Qty: 90 TABLET | Refills: 1 | Status: SHIPPED | OUTPATIENT
Start: 2021-04-20 | End: 2021-07-26

## 2021-04-20 NOTE — TELEPHONE ENCOUNTER
"Prescription approved per [facility] Refill Protocol.  Rema Lynch RN    Requested Prescriptions   Pending Prescriptions Disp Refills     levothyroxine (SYNTHROID/LEVOTHROID) 75 MCG tablet 30 tablet 0     Sig: Take 1 tablet (75 mcg) by mouth daily       Thyroid Protocol Passed - 4/19/2021 11:46 AM        Passed - Patient is 12 years or older        Passed - Recent (12 mo) or future (30 days) visit within the authorizing provider's specialty     Patient has had an office visit with the authorizing provider or a provider within the authorizing providers department within the previous 12 mos or has a future within next 30 days. See \"Patient Info\" tab in inbasket, or \"Choose Columns\" in Meds & Orders section of the refill encounter.              Passed - Medication is active on med list        Passed - Normal TSH on file in past 12 months     Recent Labs   Lab Test 04/01/21  0735   TSH 1.75              Passed - No active pregnancy on record     If patient is pregnant or has had a positive pregnancy test, please check TSH.          Passed - No positive pregnancy test in past 12 months     If patient is pregnant or has had a positive pregnancy test, please check TSH.               "

## 2021-05-02 ENCOUNTER — VIRTUAL VISIT (OUTPATIENT)
Dept: URGENT CARE | Facility: CLINIC | Age: 51
End: 2021-05-02
Payer: COMMERCIAL

## 2021-05-02 DIAGNOSIS — R05.9 COUGH: Primary | ICD-10-CM

## 2021-05-02 DIAGNOSIS — R05.9 COUGH: ICD-10-CM

## 2021-05-02 DIAGNOSIS — Z20.822 SUSPECTED COVID-19 VIRUS INFECTION: ICD-10-CM

## 2021-05-02 LAB
SARS-COV-2 RNA RESP QL NAA+PROBE: NORMAL
SPECIMEN SOURCE: NORMAL

## 2021-05-02 PROCEDURE — U0005 INFEC AGEN DETEC AMPLI PROBE: HCPCS | Performed by: FAMILY MEDICINE

## 2021-05-02 PROCEDURE — U0003 INFECTIOUS AGENT DETECTION BY NUCLEIC ACID (DNA OR RNA); SEVERE ACUTE RESPIRATORY SYNDROME CORONAVIRUS 2 (SARS-COV-2) (CORONAVIRUS DISEASE [COVID-19]), AMPLIFIED PROBE TECHNIQUE, MAKING USE OF HIGH THROUGHPUT TECHNOLOGIES AS DESCRIBED BY CMS-2020-01-R: HCPCS | Performed by: FAMILY MEDICINE

## 2021-05-02 PROCEDURE — 99212 OFFICE O/P EST SF 10 MIN: CPT | Performed by: NURSE PRACTITIONER

## 2021-05-02 NOTE — PROGRESS NOTES
SUBJECTIVE:   Bibi Song is a 51 year old female presenting with a chief complaint of covid exposure to   Onset of symptoms was 1 day(s) ago.  Course of illness is same.    Severity mild  Current and Associated symptoms: fatigue aches chills sore throat  Treatment measures tried include Fluids and Rest, tylenol    Past Medical History:   Diagnosis Date     Anxiety      Chemical dependency (H)      Depressive disorder      Hoarseness      Hypothyroidism      Obesity      OCD (obsessive compulsive disorder)      PONV (postoperative nausea and vomiting)      Sleep apnea     never diagnosed with sleep study     Tracheal stenosis      Current Outpatient Medications   Medication Sig Dispense Refill     balsalazide (COLAZAL) 750 MG capsule        buPROPion (WELLBUTRIN XL) 150 MG 24 hr tablet        gabapentin (NEURONTIN) 400 MG capsule Take 2 capsules (800 mg) by mouth 3 times daily 1 capsule 0     levothyroxine (SYNTHROID/LEVOTHROID) 75 MCG tablet Take 1 tablet (75 mcg) by mouth daily 90 tablet 1     LORazepam (ATIVAN) 1 MG tablet Take 1-2 tablets (1-2 mg) by mouth daily as needed for anxiety 1 tablet 0     sertraline (ZOLOFT) 100 MG tablet Take 2 tablets (200 mg) by mouth daily 60 tablet 0     traZODone (DESYREL) 100 MG tablet Take 100 mg by mouth At Bedtime Taking 200mg at hs       ziprasidone (GEODON) 60 MG capsule 60 mg 2 times daily       Social History     Tobacco Use     Smoking status: Former Smoker     Years: 5.00     Types: Cigarettes     Start date: 1988     Quit date: 1994     Years since quittin.3     Smokeless tobacco: Never Used   Substance Use Topics     Alcohol use: No     Comment: Treatment in        ROS:  Review of systems negative except as stated above.    OBJECTIVE:  There were no vitals taken for this visit.  GENERAL APPEARANCE: alert and no distress  RESP: lungs clear to auscultation   CV: regular rates and rhythm  SKIN: no suspicious lesions or  rashes    ASSESSMENT:  (R05) Cough  (primary encounter diagnosis)    Plan: Symptomatic COVID-19 Virus (Coronavirus) by PCR  Home treat and monitor symptoms  Isolate as discussed    Telephone time spent 11 minutes    SUSANNAH Urias CNP

## 2021-05-03 ENCOUNTER — TELEPHONE (OUTPATIENT)
Dept: FAMILY MEDICINE | Facility: CLINIC | Age: 51
End: 2021-05-03

## 2021-05-03 LAB
LABORATORY COMMENT REPORT: NORMAL
SARS-COV-2 RNA RESP QL NAA+PROBE: NEGATIVE
SPECIMEN SOURCE: NORMAL

## 2021-05-03 NOTE — TELEPHONE ENCOUNTER
Patient is requesting a note for work as she is in quaratine.  tested positive for COVID on 4/30/21. She was tested 5/2/21 and is waiting results. Routed to provider. Will fax note when available.  Rema Lynch RN

## 2021-05-03 NOTE — TELEPHONE ENCOUNTER
So, is she wanting to be out through the second test?  Technically (per the CDC), 2 weeks after fully vaccinated (which she will be as of Wednesday 5/5), she would not need to further test or further quarantine as long as asymptomatic.     I'm unsure as to what her employer is looking for - a return to work date or guidance on interpreting CDC guidelines?    Glory Singleton M.D.

## 2021-05-03 NOTE — LETTER
[facility]  [address]  [place] [facility] [address]  [phone]        May 3, 2021    Regarding:  Bibi Song  [address] [address]              To Whom It May Concern;      Bibi Song was exposed to COVID.  She will be fully vaccinated as of 5/5/2021.     As per school nurse plan Pt needs to quarantine for 2 weeks (she is in quarantine WITH + COVID  and son).    She was negative on 5/2/21 and if she tests negative on 5/15/21 she can come back to work.          Sincerely,      Glory Singleton MD

## 2021-05-03 NOTE — TELEPHONE ENCOUNTER
More information needed.  Is she able to quarantine away from her ?  When was her last close contact with him?  When did his symptoms start?    If she is not able to quarantine away from her , she will need to quarantine for 14 days after his quarantine ends.     Where is she working?    Please get further information and start letter.    Her COVID screening was negative, but if she has ongoing contact with someone who is positive, she will be high risk.     Doesn't appear she has had COVID vaccine, this is also recommended after her quarantine has ended.    Glory Singleton M.D.

## 2021-05-03 NOTE — TELEPHONE ENCOUNTER
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  Pt needs to quarantine for 2 weeks (she is in quarantine WITH + COVID  and son).    She was negative on 5/2/21 and if she tests negative on 5/15/21 she can come back to work.    She needs a letter stating the reason she is being quarantined ie:+ testing family.    Fax letter to school nurse @ [phone] and she will get COVID pay.    Advise.  Sierra

## 2021-05-03 NOTE — TELEPHONE ENCOUNTER
Reason for Call:  Other    Detailed comments: Pt's  tested positive for Covid. His results came back on Friday. She needs a letter for her work saying she needs to stay home to quarantine. She was tested yesterday so is waiting for results. Fax results to: [phone] Att: Isha    Phone Number Patient can be reached at: Home number on file [phone] (home)    Best Time: anytime    Can we leave a detailed message on this number? YES    Call taken on 5/3/2021 at 7:28 AM by Caitlin Borja

## 2021-05-03 NOTE — TELEPHONE ENCOUNTER
Pt is unable to Quarantine from her .  Pts husbands symptoms started on 4/28/21.  He tested + on 4/29/21.  Has no symptoms now.  Pt had her 2nd COVID Vaccine(4/21/21) to her exposure on 4/28/21.  Pt works for [employer] and tested negative on 5/2/21 and plans to be tested again on 5/15/21.  Advise.  Sierra

## 2021-05-04 ENCOUNTER — TRANSFERRED RECORDS (OUTPATIENT)
Dept: HEALTH INFORMATION MANAGEMENT | Facility: CLINIC | Age: 51
End: 2021-05-04

## 2021-05-05 ENCOUNTER — MEDICAL CORRESPONDENCE (OUTPATIENT)
Dept: HEALTH INFORMATION MANAGEMENT | Facility: CLINIC | Age: 51
End: 2021-05-05

## 2021-05-07 DIAGNOSIS — Z79.899 ENCOUNTER FOR LONG-TERM (CURRENT) USE OF MEDICATIONS: Primary | ICD-10-CM

## 2021-05-12 ENCOUNTER — VIRTUAL VISIT (OUTPATIENT)
Dept: FAMILY MEDICINE | Facility: CLINIC | Age: 51
End: 2021-05-12
Payer: COMMERCIAL

## 2021-05-12 DIAGNOSIS — Z20.822 EXPOSURE TO COVID-19 VIRUS: Primary | ICD-10-CM

## 2021-05-12 PROCEDURE — 99212 OFFICE O/P EST SF 10 MIN: CPT | Mod: TEL | Performed by: FAMILY MEDICINE

## 2021-05-12 RX ORDER — ZIPRASIDONE HYDROCHLORIDE 60 MG/1
60 CAPSULE ORAL 2 TIMES DAILY WITH MEALS
COMMUNITY
End: 2022-07-01

## 2021-05-12 RX ORDER — ZIPRASIDONE HYDROCHLORIDE 40 MG/1
40 CAPSULE ORAL 2 TIMES DAILY WITH MEALS
COMMUNITY
End: 2022-07-01

## 2021-05-12 ASSESSMENT — PATIENT HEALTH QUESTIONNAIRE - PHQ9: SUM OF ALL RESPONSES TO PHQ QUESTIONS 1-9: 2

## 2021-05-12 NOTE — PATIENT INSTRUCTIONS
Our Clinic hours are:  Mondays    7:20 am - 7 pm  Tues -  Fri  7:20 am - 5 pm    Clinic Phone: [phone]    The clinic lab opens at 7:30 am Mon - Fri and appointments are required.    [facility]. [phone]  Monday  8 am - 7pm  Tues - Fri 8 am - 5:30 pm

## 2021-05-12 NOTE — PROGRESS NOTES
"Bibi is a 51 year old who is being evaluated via a billable telephone visit.      What phone number would you like to be contacted at? [phone]  How would you like to obtain your AVS? 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    Weight as of 11/11/20: 104 kg (229 lb 3.2 oz).           No follow-ups on file.    Glory Singleton MD  [facility]   Bibi is a 51 year old who presents for the following health issues  accompanied by her self:    HPI       Concern for COVID-19  About how many days ago did these symptoms start? No symptoms  Is this your first visit for this illness? No- was tested prior with negative results  In the 14 days before your symptoms started, have you had close contact with someone with COVID-19 (Coronavirus)? Yes, I have been in contact with someone who has COVID-19/Coronavirus (confirmed by lab test).  Do you have a fever or chills? No  Are you having new or worsening difficulty breathing? No  Do you have new or worsening cough? No  Have you had any new or unexplained body aches? No    Have you experienced any of the following NEW symptoms?    Headache: No    Sore throat: No- dry cough that is no longer present    Loss of taste or smell: No    Chest pain: No    Diarrhea: No    Rash: No  What treatments have you tried? n/a  Who do you live with?  and son  Are you, or a household member, a healthcare worker or a ? No  Do you live in a nursing home, group home, or shelter? No  Do you have a way to get food/medications if quarantined? Yes, I have a friend or family " member who can help me.    Patient had covid vaccines. She was only a week past her second vaccine when her  and son tested positive.              Review of Systems   Constitutional, HEENT, cardiovascular, pulmonary, gi and gu systems are negative, except as otherwise noted.      Objective           Vitals:  No vitals were obtained today due to virtual visit.    Physical Exam   healthy, alert and no distress  PSYCH: Alert and oriented times 3; coherent speech, normal   rate and volume, able to articulate logical thoughts, able   to abstract reason, no tangential thoughts, no hallucinations   or delusions  Her affect is normal  RESP: No cough, no audible wheezing, able to talk in full sentences  Remainder of exam unable to be completed due to telephone visits                Phone call duration: 5 minutes

## 2021-05-14 DIAGNOSIS — Z20.822 EXPOSURE TO COVID-19 VIRUS: ICD-10-CM

## 2021-05-14 LAB
SARS-COV-2 RNA RESP QL NAA+PROBE: NORMAL
SPECIMEN SOURCE: NORMAL

## 2021-05-14 PROCEDURE — U0003 INFECTIOUS AGENT DETECTION BY NUCLEIC ACID (DNA OR RNA); SEVERE ACUTE RESPIRATORY SYNDROME CORONAVIRUS 2 (SARS-COV-2) (CORONAVIRUS DISEASE [COVID-19]), AMPLIFIED PROBE TECHNIQUE, MAKING USE OF HIGH THROUGHPUT TECHNOLOGIES AS DESCRIBED BY CMS-2020-01-R: HCPCS | Performed by: FAMILY MEDICINE

## 2021-05-14 PROCEDURE — U0005 INFEC AGEN DETEC AMPLI PROBE: HCPCS | Performed by: FAMILY MEDICINE

## 2021-05-15 ENCOUNTER — TELEPHONE (OUTPATIENT)
Dept: LAB | Facility: CLINIC | Age: 51
End: 2021-05-15

## 2021-05-15 LAB
LABORATORY COMMENT REPORT: ABNORMAL
SARS-COV-2 RNA RESP QL NAA+PROBE: POSITIVE
SPECIMEN SOURCE: ABNORMAL

## 2021-05-15 NOTE — TELEPHONE ENCOUNTER
Coronavirus (COVID-19) Notification     Reason for call  Patient requesting results     Lab Result    Lab test 2019-nCoV rRt-PCR in process        RN Recommendations/Instructions per [facility]  Continue quarantee and following instructions until you receive the results     Please Contact your PCP clinic or return to the Emergency department if your:    Symptoms worsen or other concerning symptom's.     Patient informed that if test for COVID19 is POSITIVE,  you will receive a call typically within 48 hours from the test date (date lab collected).  If NEGATIVE result, you will receive a letter in the mail or [employer].      Yolette Lira LPN

## 2021-05-16 ENCOUNTER — NURSE TRIAGE (OUTPATIENT)
Dept: NURSING | Facility: CLINIC | Age: 51
End: 2021-05-16

## 2021-05-16 NOTE — TELEPHONE ENCOUNTER
Pt requesting a letter to be faxed to her employer stating that she tested positive for Covid yesterday, 5/15/21.  Pt advised that she would need to do this through Medical Records.  I offered to transfer her through, but pt said she will call back tomorrow to get it.  Tejal Sherman RN 05/16/21 4:46 PM  [facility] Nurse Advisor    Reason for Disposition    [1] Follow-up call from patient regarding patient's clinical status AND [2] information NON-URGENT    Additional Information    Negative: Lab calling with strep throat test results and triager can call in prescription    Negative: Lab calling with urinalysis test results and triager can call in prescription    Negative: Medication questions    Negative: ED call to PCP    Negative: Physician call to PCP    Negative: Call about patient who is currently hospitalized    Negative: Lab or radiology calling with CRITICAL test results    Negative: [1] Prescription not at pharmacy AND [2] was prescribed today by PCP    Negative: [1] Follow-up call from patient regarding patient's clinical status AND [2] information urgent    Negative: [1] Caller requests to speak ONLY to PCP AND [2] urgent question    Negative: [1] Caller requests to speak to PCP now AND [2] won't tell us reason for call  (Exception: if 10 pm to 6 am, caller must first discuss reason for the call)    Negative: Notification of hospital admission    Negative: Notification of death    Negative: Caller requesting lab results    Negative: Lab or radiology calling with test results    Protocols used: [facility] - [facility]

## 2021-05-16 NOTE — TELEPHONE ENCOUNTER
"-Coronavirus (COVID-19) Notification    Caller Name (Patient, parent, daughter/son, grandparent, etc)  Patient  She is fully vaccinated now but became after  got COVID19 after one week of 2nd dose of vaccine.   Reason for call  Notify of Positive Coronavirus (COVID-19) lab results, assess symptoms,  review [facility] recommendations    Lab Result    Lab test:  2019-nCoV rRt-PCR or SARS-CoV-2 PCR    Oropharyngeal AND/OR nasopharyngeal swabs is POSITIVE for 2019-nCoV RNA/SARS-COV-2 PCR (COVID-19 virus)    RN Recommendations/Instructions per [facility] Coronavirus COVID-19 recommendations    Brief introduction script  Introduce self then review script:  \"I am calling on behalf of [employer].  We were notified that your Coronavirus test (COVID-19) for was POSITIVE for the virus.  I have some information to relay to you but first I wanted to mention that the [place] Dept of Health will be contacting you shortly [it's possible MD already called Patient] to talk to you more about how you are feeling and other people you have had contact with who might now also have the virus.  Also, [facility] is Partnering with the [facility] for Covid-19 research, you may be contacted directly by research staff.\"    Assessment (Inquire about Patient's current symptoms)   Assessment   Current Symptoms at time of phone call: (if no symptoms, document No symptoms] NO    Symptoms onset (if applicable) Thursday was achy.      If at time of call, Patients symptoms hare worsened, the Patient should contact 911 or have someone drive them to Emergency Dept promptly:      If Patient calling 911, inform 911 personal that you have tested positive for the Coronavirus (COVID-19).  Place mask on and await 911 to arrive.    If Emergency Dept, If possible, please have another adult drive you to the Emergency Dept but you need to wear mask when in contact with other people.      Monoclonal Antibody Administration    You " "may be eligible to receive a new treatment with a monoclonal antibody for preventing hospitalization in patients at high risk for complications from COVID-19.   This medication is still experimental and available on a limited basis; it is given through an IV and must be given at an infusion center. Please note that not all people who are eligible will receive the medication since it is in limited supply.     Are you interested in being considered for this medication?  No.   Does the patient fit the criteria: Patient declined    If patient qualifies based on above criteria:  \"You will be contacted if you are selected to receive this treatment in the next 1-2 business days.   This is time sensitive and if you are not selected in the next 1-2 business days, you will not receive the medication.  If you do not receive a call to schedule, you have not been selected.\"      Review information with Patient    Your result was positive. This means you have COVID-19 (coronavirus).  We have sent you a letter that reviews the information that I'll be reviewing with you now.    How can I protect others?    If you have symptoms: stay home and away from others (self-isolate) until:    You've had no fever--and no medicine that reduces fever--for 1 full day (24 hours). And       Your other symptoms have gotten better. For example, your cough or breathing has improved. And     At least 10 days have passed since your symptoms started. (If you've been told by a doctor that you have a weak immune system, wait 20 days.)     If you don't have symptoms: Stay home and away from others (self-isolate) until at least 10 days have passed since your first positive COVID-19 test. (Date test collected)    During this time:    Stay in your own room, including for meals. Use your own bathroom if you can.    Stay away from others in your home. No hugging, kissing or shaking hands. No visitors.     Don't go to work, school or anywhere else.     Clean "  high touch  surfaces often (doorknobs, counters, handles, etc.). Use a household cleaning spray or wipes. You'll find a full list on the EPA website at www.epa.gov/pesticide-registration/list-n-disinfectants-use-against-sars-cov-2.     Cover your mouth and nose with a mask, tissue or other face covering to avoid spreading germs.    Wash your hands and face often with soap and water.    Make a list of people you have been in close contact with recently, even if either of you wore a face covering.   ; Start your list from 2 days before you became ill or had a positive test.  ; Include anyone that was within 6 feet of you for a cumulative total of 15 minutes or more in 24 hours. (Example: if you sat next to Bogdan for 5 minutes in the morning and 10 minutes in the afternoon, then you were in close contact for 15 minutes total that day. Bogdan would be added to your list.)    A public health worker will call or text you. It is important that you answer. They will ask you questions about possible exposures to COVID-19, such as people you have been in direct contact with and places you have visited.    Tell the people on your list that you have COVID-19; they should stay away from others for 14 days starting from the last time they were in contact with you (unless you are told something different from a public health worker).     Caregivers in these groups are at risk for severe illness due to COVID-19:  o People 65 years and older  o People who live in a nursing home or long-term care facility  o People with chronic disease (lung, heart, cancer, diabetes, kidney, liver, immunologic)  o People who have a weakened immune system, including those who:  - Are in cancer treatment  - Take medicine that weakens the immune system, such as corticosteroids  - Had a bone marrow or organ transplant  - Have an immune deficiency  - Have poorly controlled HIV or AIDS  - Are obese (body mass index of 40 or higher)  - Smoke  regularly    Caregivers should wear gloves while washing dishes, handling laundry and cleaning bedrooms and bathrooms.    Wash and dry laundry with special caution. Don't shake dirty laundry, and use the warmest water setting you can.    If you have a weakened immune system, ask your doctor about other actions you should take.    For more tips, go to www.cdc.gov/coronavirus/2019-ncov/downloads/10Things.pdf.    You should not go back to work until you meet the guidelines above for ending your home isolation. You don't need to be retested for COVID-19 before going back to work--studies show that you won't spread the virus if it's been at least 10 days since your symptoms started (or 20 days, if you have a weak immune system).    Employers: This document serves as formal notice of your employee's medical guidelines for going back to work. They must meet the above guidelines before going back to work in person.    How can I take care of myself?    1. Get lots of rest. Drink extra fluids (unless a doctor has told you not to).    2. Take Tylenol (acetaminophen) for fever or pain. If you have liver or kidney problems, ask your family doctor if it's okay to take Tylenol.     Take either:     650 mg (two 325 mg pills) every 4 to 6 hours, or     1,000 mg (two 500 mg pills) every 8 hours as needed.     Note: Don't take more than 3,000 mg in one day. Acetaminophen is found in many medicines (both prescribed and over-the-counter medicines). Read all labels to be sure you don't take too much.    For children, check the Tylenol bottle for the right dose (based on their age or weight).    3. If you have other health problems (like cancer, heart failure, an organ transplant or severe kidney disease): Call your specialty clinic if you don't feel better in the next 2 days.    4. Know when to call 911: Emergency warning signs include:    Trouble breathing or shortness of breath    Pain or pressure in the chest that doesn't go  away    Feeling confused like you haven't felt before, or not being able to wake up    Bluish-colored lips or face    5. Sign up for [employer]. We know it's scary to hear that you have COVID-19. We want to track your symptoms to make sure you're okay over the next 2 weeks. Please look for an email from [employer]--this is a free, online program that we'll use to keep in touch. To sign up, follow the link in the email. Learn more at [email or web address].    Where can I get more information?    [facility]: [email or web address]    Coronavirus Basics: www.health.[facility].[place]./diseases/coronavirus/basics.html    What to Do If You're Sick: www.cdc.gov/coronavirus/2019-ncov/about/steps-when-sick.html    Ending Home Isolation: www.cdc.gov/coronavirus/2019-ncov/hcp/disposition-in-home-patients.html     Caring for Someone with COVID-19: www.cdc.gov/coronavirus/2019-ncov/if-you-are-sick/care-for-someone.html     [facility] clinical trials (COVID-19 research studies): [email or web address].[email or web address]-clinical-trials     A Positive COVID-19 letter will be sent via [employer] or the mail. (Exception, no letters sent to Presurgerical/Preprocedure Patients)    Brittney Pryor RN    Reason for Disposition    Caller requesting lab results    Additional Information    Negative: Lab calling with strep throat test results and triager can call in prescription    Negative: Lab calling with urinalysis test results and triager can call in prescription    Negative: Medication questions    Negative: ED call to PCP    Negative: Physician call to PCP    Negative: Call about patient who is currently hospitalized    Negative: Lab or radiology calling with CRITICAL test results    Negative: [1] Prescription not at pharmacy AND [2] was prescribed today by PCP    Negative: [1] Follow-up call from patient regarding patient's clinical status AND [2] information urgent    Negative: [1] Caller requests to speak ONLY to  PCP AND [2] urgent question    Negative: [1] Caller requests to speak to PCP now AND [2] won't tell us reason for call  (Exception: if 10 pm to 6 am, caller must first discuss reason for the call)    Negative: Notification of hospital admission    Negative: Notification of death    Protocols used: PCP CALL - NO TRIAGE-A-

## 2021-05-17 ENCOUNTER — TRANSFERRED RECORDS (OUTPATIENT)
Dept: HEALTH INFORMATION MANAGEMENT | Facility: CLINIC | Age: 51
End: 2021-05-17

## 2021-05-17 ENCOUNTER — NURSE TRIAGE (OUTPATIENT)
Dept: NURSING | Facility: CLINIC | Age: 51
End: 2021-05-17

## 2021-05-17 NOTE — TELEPHONE ENCOUNTER
Shira Galvez, RN  Cl Provider Careteam Pool 3 hours ago (6:24 AM)     Please call patient to notify of status of requested letter.    Routing comment      COVID lab result was faxedt carmen Vieira @ below # and pt notified results were faxed. [email or web address]

## 2021-05-17 NOTE — TELEPHONE ENCOUNTER
Needs a letter for work to show she tested positive on Wednesday. She is covid-19 positive and needs a letter for work  Faxed to [employer]:  [phone]. Please call Bibi to let her know the status of the request:  [phone], may leave a detailed message.  Thank you,  Shira Galvez RN  [facility]    Additional Information    Negative: Nursing judgment    Negative: Nursing judgment    Negative: Nursing judgment    Nursing judgment    Protocols used: INFORMATION ONLY CALL - NO TRIAGE-A-[place]

## 2021-05-19 NOTE — PROGRESS NOTES
"Chief Complaint  Hand Problem (bilateral hands - swelling, redness, itchiness, sensitivity to temperature - 1 year)    Subjective          Bryan Neil presents to [facility]  History of Present Illness  1.  Patient originally seen back in September 2020 with bilateral hand swelling, redness, burning and itching.  Lab reports at that time did show slightly positive CRP but otherwise labs were negative.  Upper extremity Dopplers for thoracic outlet were done which were positive bilaterally.  Patient was referred to a sick surgeon for further evaluation however patient was unaware of the appointment having been made in December therefore did not show for that appointment.  Patient states that it gets quite painful especially at the end of the day, burning paresthesias, also painful to the point that he cannot sleep at night.  We have tried anti-inflammatories and Medrol in the past but have not been helpful.  He also started wearing gloves to see if it was an allergy but this did not help.  Patient wonders if it could be Lyme disease  2.  Patient feels that his left ear is clogged with wax.  Objective   Vital Signs:   /86 (BP Location: Right arm, Patient Position: Sitting, Cuff Size: Large Adult)   Pulse 96   Temp 98 °F (36.7 °C) (Temporal)   Resp 16   Ht 182.9 cm (72\")   Wt (!) 143 kg (316 lb)   SpO2 98%   BMI 42.86 kg/m²     Physical Exam  Vitals reviewed.   Constitutional:       Appearance: He is well-developed.   HENT:      Head: Normocephalic and atraumatic.      Ears:      Comments: Bilateral cerumen impaction.  This was flushed to clear the medical assistant, patient tolerated well.  Eyes:      Conjunctiva/sclera: Conjunctivae normal.      Pupils: Pupils are equal, round, and reactive to light.   Pulmonary:      Effort: Pulmonary effort is normal.   Musculoskeletal:      Comments: Bilateral hand swelling, left greater than right also left palm erythematous " Transferred to floor after report given to next RN caregiver. Belongings and chart sent w/ pt. Went by w/c and on 2 L/M O2.     Skin:     General: Skin is warm and dry.   Neurological:      Mental Status: He is alert and oriented to person, place, and time.   Psychiatric:         Behavior: Behavior normal.        Result Review :                Doppler Arterial Upper Extremity Stress CAR (10/15/2020 13:48)  Parvovirus B19 Antibody, IgG & IgM (09/28/2020 15:33)  KIRAN With / DsDNA, RNP, Sjogrens A / B, [place] (09/28/2020 15:33)  Rheumatoid Arthritis Expanded Panel (09/28/2020 15:33)  Hemoglobin A1c (09/28/2020 15:33)    Assessment and Plan    Diagnoses and all orders for this visit:    1. Thoracic outlet syndrome of both thoracic outlets (Primary)  -     Ambulatory Referral to Thoracic Surgery  -     acetaminophen-codeine (TYLENOL #3) 300-30 MG per tablet; Take 1 tablet by mouth Every 4 (Four) Hours As Needed for Moderate Pain .  Dispense: 20 tablet; Refill: 0    2. Bilateral impacted cerumen    3. Bilateral hand swelling  -     Lyme Disease, Western Blot  -     acetaminophen-codeine (TYLENOL #3) 300-30 MG per tablet; Take 1 tablet by mouth Every 4 (Four) Hours As Needed for Moderate Pain .  Dispense: 20 tablet; Refill: 0      I would like to see if we can get another appointment with the thoracic surgeon for further evaluation possible thoracic outlet compression causing his symptoms.  Because he has trouble sleeping at night due to pain we will try short prescription Tylenol 3 until we can get into the thoracic surgeon.      Follow Up   No follow-ups on file.  Patient was given instructions and counseling regarding his condition or for health maintenance advice. Please see specific information pulled into the AVS if appropriate.

## 2021-06-01 ENCOUNTER — TRANSFERRED RECORDS (OUTPATIENT)
Dept: HEALTH INFORMATION MANAGEMENT | Facility: CLINIC | Age: 51
End: 2021-06-01

## 2021-06-22 ENCOUNTER — MEDICAL CORRESPONDENCE (OUTPATIENT)
Dept: HEALTH INFORMATION MANAGEMENT | Facility: CLINIC | Age: 51
End: 2021-06-22

## 2021-06-24 ENCOUNTER — TRANSFERRED RECORDS (OUTPATIENT)
Dept: HEALTH INFORMATION MANAGEMENT | Facility: CLINIC | Age: 51
End: 2021-06-24

## 2021-06-24 DIAGNOSIS — Z79.899 ENCOUNTER FOR LONG-TERM (CURRENT) USE OF MEDICATIONS: ICD-10-CM

## 2021-06-24 LAB
ALBUMIN SERPL-MCNC: 3.8 G/DL (ref 3.4–5)
ALP SERPL-CCNC: 75 U/L (ref 40–150)
ALT SERPL W P-5'-P-CCNC: 18 U/L (ref 0–50)
ANION GAP SERPL CALCULATED.3IONS-SCNC: 10 MMOL/L (ref 3–14)
AST SERPL W P-5'-P-CCNC: 18 U/L (ref 0–45)
BASOPHILS # BLD AUTO: 0 10E9/L (ref 0–0.2)
BASOPHILS NFR BLD AUTO: 0.1 %
BILIRUB SERPL-MCNC: 0.4 MG/DL (ref 0.2–1.3)
BUN SERPL-MCNC: 15 MG/DL (ref 7–30)
CALCIUM SERPL-MCNC: 9.2 MG/DL (ref 8.5–10.1)
CHLORIDE SERPL-SCNC: 104 MMOL/L (ref 94–109)
CO2 SERPL-SCNC: 25 MMOL/L (ref 20–32)
CREAT SERPL-MCNC: 0.92 MG/DL (ref 0.52–1.04)
DIFFERENTIAL METHOD BLD: NORMAL
EOSINOPHIL # BLD AUTO: 0.3 10E9/L (ref 0–0.7)
EOSINOPHIL NFR BLD AUTO: 3.3 %
ERYTHROCYTE [DISTWIDTH] IN BLOOD BY AUTOMATED COUNT: 13.5 % (ref 10–15)
GFR SERPL CREATININE-BSD FRML MDRD: 71 ML/MIN/{1.73_M2}
GLUCOSE SERPL-MCNC: 88 MG/DL (ref 70–99)
HCT VFR BLD AUTO: 38.4 % (ref 35–47)
HGB BLD-MCNC: 12.6 G/DL (ref 11.7–15.7)
LYMPHOCYTES # BLD AUTO: 2.7 10E9/L (ref 0.8–5.3)
LYMPHOCYTES NFR BLD AUTO: 29.5 %
MCH RBC QN AUTO: 30 PG (ref 26.5–33)
MCHC RBC AUTO-ENTMCNC: 32.8 G/DL (ref 31.5–36.5)
MCV RBC AUTO: 91 FL (ref 78–100)
MONOCYTES # BLD AUTO: 0.4 10E9/L (ref 0–1.3)
MONOCYTES NFR BLD AUTO: 4.5 %
NEUTROPHILS # BLD AUTO: 5.7 10E9/L (ref 1.6–8.3)
NEUTROPHILS NFR BLD AUTO: 62.6 %
PLATELET # BLD AUTO: 342 10E9/L (ref 150–450)
POTASSIUM SERPL-SCNC: 4 MMOL/L (ref 3.4–5.3)
PROT SERPL-MCNC: 7 G/DL (ref 6.8–8.8)
RBC # BLD AUTO: 4.2 10E12/L (ref 3.8–5.2)
SODIUM SERPL-SCNC: 139 MMOL/L (ref 133–144)
T4 FREE SERPL-MCNC: 0.89 NG/DL (ref 0.76–1.46)
TSH SERPL DL<=0.005 MIU/L-ACNC: 1.82 MU/L (ref 0.4–4)
WBC # BLD AUTO: 9 10E9/L (ref 4–11)

## 2021-06-24 PROCEDURE — 99000 SPECIMEN HANDLING OFFICE-LAB: CPT | Performed by: PHYSICIAN ASSISTANT

## 2021-06-24 PROCEDURE — 36415 COLL VENOUS BLD VENIPUNCTURE: CPT | Performed by: PHYSICIAN ASSISTANT

## 2021-06-24 PROCEDURE — 84439 ASSAY OF FREE THYROXINE: CPT | Performed by: PHYSICIAN ASSISTANT

## 2021-06-24 PROCEDURE — 80050 GENERAL HEALTH PANEL: CPT | Performed by: PHYSICIAN ASSISTANT

## 2021-06-24 PROCEDURE — 80307 DRUG TEST PRSMV CHEM ANLYZR: CPT | Mod: 90 | Performed by: PHYSICIAN ASSISTANT

## 2021-06-26 LAB
AMPHETAMINES UR QL: NEGATIVE NG/ML
BARBITURATES SERPLBLD QL: NEGATIVE UG/ML
BENZODIAZ SERPL QL: NEGATIVE NG/ML
CANNABINOIDS SERPL QL: NEGATIVE NG/ML
COCAINE SERPL QL: NEGATIVE NG/ML
ETHANOL BLD GC-MCNC: NEGATIVE GM/DL
METHADONE SERPL QL: NEGATIVE NG/ML
OPIATES SERPL QL: NEGATIVE NG/ML
OXYCODONE SERPL QL: NEGATIVE NG/ML
PCP SERPL QL: NEGATIVE NG/ML
PROPOXYPH SERPL QL: NEGATIVE NG/ML

## 2021-07-26 ENCOUNTER — OFFICE VISIT (OUTPATIENT)
Dept: FAMILY MEDICINE | Facility: CLINIC | Age: 51
End: 2021-07-26
Payer: COMMERCIAL

## 2021-07-26 ENCOUNTER — TRANSFERRED RECORDS (OUTPATIENT)
Dept: HEALTH INFORMATION MANAGEMENT | Facility: CLINIC | Age: 51
End: 2021-07-26

## 2021-07-26 VITALS
HEIGHT: 66 IN | OXYGEN SATURATION: 95 % | HEART RATE: 83 BPM | RESPIRATION RATE: 16 BRPM | WEIGHT: 236 LBS | DIASTOLIC BLOOD PRESSURE: 76 MMHG | TEMPERATURE: 98.7 F | BODY MASS INDEX: 37.93 KG/M2 | SYSTOLIC BLOOD PRESSURE: 118 MMHG

## 2021-07-26 DIAGNOSIS — E03.9 HYPOTHYROIDISM, UNSPECIFIED TYPE: ICD-10-CM

## 2021-07-26 DIAGNOSIS — K51.919 ULCERATIVE COLITIS WITH COMPLICATION, UNSPECIFIED LOCATION (H): ICD-10-CM

## 2021-07-26 DIAGNOSIS — F32.1 MODERATE MAJOR DEPRESSION (H): ICD-10-CM

## 2021-07-26 DIAGNOSIS — Z00.00 ROUTINE GENERAL MEDICAL EXAMINATION AT A HEALTH CARE FACILITY: Primary | ICD-10-CM

## 2021-07-26 DIAGNOSIS — E66.01 MORBID OBESITY (H): ICD-10-CM

## 2021-07-26 LAB
CHOLEST SERPL-MCNC: 250 MG/DL
FASTING STATUS PATIENT QL REPORTED: NO
HDLC SERPL-MCNC: 52 MG/DL
LDLC SERPL CALC-MCNC: 173 MG/DL
NONHDLC SERPL-MCNC: 198 MG/DL
TRIGL SERPL-MCNC: 123 MG/DL

## 2021-07-26 PROCEDURE — 36415 COLL VENOUS BLD VENIPUNCTURE: CPT | Performed by: FAMILY MEDICINE

## 2021-07-26 PROCEDURE — 80061 LIPID PANEL: CPT | Performed by: FAMILY MEDICINE

## 2021-07-26 PROCEDURE — 99396 PREV VISIT EST AGE 40-64: CPT | Performed by: FAMILY MEDICINE

## 2021-07-26 PROCEDURE — 86803 HEPATITIS C AB TEST: CPT | Performed by: FAMILY MEDICINE

## 2021-07-26 PROCEDURE — 87389 HIV-1 AG W/HIV-1&-2 AB AG IA: CPT | Performed by: FAMILY MEDICINE

## 2021-07-26 RX ORDER — LEVOTHYROXINE SODIUM 75 UG/1
75 TABLET ORAL DAILY
Qty: 90 TABLET | Refills: 1 | Status: SHIPPED | OUTPATIENT
Start: 2021-07-26 | End: 2022-05-12

## 2021-07-26 ASSESSMENT — ENCOUNTER SYMPTOMS
DYSURIA: 0
BREAST MASS: 0
SORE THROAT: 0
FEVER: 0
ABDOMINAL PAIN: 0
PARESTHESIAS: 0
FREQUENCY: 1
CONSTIPATION: 0
ARTHRALGIAS: 1
SHORTNESS OF BREATH: 0
NERVOUS/ANXIOUS: 1
NAUSEA: 0
DIZZINESS: 0
WEAKNESS: 0
DIARRHEA: 0
CHILLS: 0
PALPITATIONS: 0
JOINT SWELLING: 0
HEMATOCHEZIA: 0
MYALGIAS: 1
EYE PAIN: 0
COUGH: 0
HEARTBURN: 0
HEMATURIA: 0
HEADACHES: 0

## 2021-07-26 ASSESSMENT — PAIN SCALES - GENERAL: PAINLEVEL: SEVERE PAIN (6)

## 2021-07-26 ASSESSMENT — MIFFLIN-ST. JEOR: SCORE: 1684.3

## 2021-07-26 NOTE — PROGRESS NOTES
SUBJECTIVE:   CC: Bibi Song is an 51 year old woman who presents for preventive health visit.       Patient has been advised of split billing requirements and indicates understanding: Yes  Healthy Habits:     Getting at least 3 servings of Calcium per day:  Yes    Bi-annual eye exam:  NO    Dental care twice a year:  Yes    Sleep apnea or symptoms of sleep apnea:  None    Diet:  Regular (no restrictions)    Frequency of exercise:  1 day/week    Duration of exercise:  Less than 15 minutes    Taking medications regularly:  Yes    Medication side effects:  Not applicable    PHQ-2 Total Score: 0    Additional concerns today:  No          Hypothyroidism Follow-up      Since last visit, patient describes the following symptoms: Weight stable, no hair loss, no skin changes, no constipation, no loose stools    Joint Pain    Onset: 3 years    Description:   Location: left shoulder and right shoulder  Character: Dull ache    Intensity: 6/10    Progression of Symptoms: worse    Accompanying Signs & Symptoms:  Other symptoms: wrapping around arm    History:   Previous similar pain: YES- has seen other providers for this      Precipitating factors:   Trauma or overuse: no     Alleviating factors:  Improved by: heat and massage    Therapies Tried and outcome: heat, massage         Today's PHQ-2 Score:   PHQ-2 ( 1999 Pfizer) 7/26/2021   Q1: Little interest or pleasure in doing things 0   Q2: Feeling down, depressed or hopeless 0   PHQ-2 Score 0   Q1: Little interest or pleasure in doing things Not at all   Q2: Feeling down, depressed or hopeless Not at all   PHQ-2 Score 0       Abuse: Current or Past (Physical, Sexual or Emotional) - No  Do you feel safe in your environment? Yes    Have you ever done Advance Care Planning? (For example, a Health Directive, POLST, or a discussion with a medical provider or your loved ones about your wishes): No, advance care planning information given to patient to review.  Patient plans  to discuss their wishes with loved ones or provider.      Social History     Tobacco Use     Smoking status: Former Smoker     Years: 5.00     Types: Cigarettes     Start date: 1988     Quit date: 1994     Years since quittin.5     Smokeless tobacco: Never Used   Substance Use Topics     Alcohol use: No     Comment: Treatment in      If you drink alcohol do you typically have >3 drinks per day or >7 drinks per week? No    Alcohol Use 2021   Prescreen: >3 drinks/day or >7 drinks/week? Not Applicable   Prescreen: >3 drinks/day or >7 drinks/week? -   No flowsheet data found.    Reviewed orders with patient.  Reviewed health maintenance and updated orders accordingly - Yes  Lab work is in process  Labs reviewed in EPIC    Breast Cancer Screening:  Any new diagnosis of family breast, ovarian, or bowel cancer? No    FHS-7: No flowsheet data found.    Mammogram Screening: Recommended annual mammography  Pertinent mammograms are reviewed under the imaging tab.    History of abnormal Pap smear: NO - age 30-65 PAP every 5 years with negative HPV co-testing recommended  PAP / HPV Latest Ref Rng & Units 2017 2014 10/12/2011   PAP (Historical) - OTHER-NIL, See Result NIL NIL   HPV16 NEG Negative - -   HPV18 NEG Negative - -   HRHPV NEG Negative - -     Reviewed and updated as needed this visit by clinical staff  Tobacco  Allergies  Meds   Med Hx  Surg Hx  Fam Hx  Soc Hx        Reviewed and updated as needed this visit by Provider                    Review of Systems   Constitutional: Negative for chills and fever.   HENT: Negative for congestion, ear pain, hearing loss and sore throat.    Eyes: Negative for pain and visual disturbance.   Respiratory: Negative for cough and shortness of breath.    Cardiovascular: Negative for chest pain, palpitations and peripheral edema.   Gastrointestinal: Negative for abdominal pain, constipation, diarrhea, heartburn, hematochezia and nausea.   Breasts:   "Negative for tenderness, breast mass and discharge.   Genitourinary: Positive for frequency and vaginal discharge. Negative for dysuria, genital sores, hematuria, pelvic pain, urgency and vaginal bleeding.   Musculoskeletal: Positive for arthralgias and myalgias. Negative for joint swelling.   Skin: Negative for rash.   Neurological: Negative for dizziness, weakness, headaches and paresthesias.   Psychiatric/Behavioral: Negative for mood changes. The patient is nervous/anxious.           OBJECTIVE:   /76   Pulse 83   Temp 98.7  F (37.1  C) (Tympanic)   Resp 16   Ht 1.664 m (5' 5.5\")   Wt 107 kg (236 lb)   SpO2 95%   Breastfeeding No   BMI 38.68 kg/m    Physical Exam  GENERAL: healthy, alert and no distress  EYES: Eyes grossly normal to inspection, PERRL and conjunctivae and sclerae normal  HENT: ear canals and TM's normal, nose and mouth without ulcers or lesions  NECK: no adenopathy, no asymmetry, masses, or scars and thyroid normal to palpation  RESP: lungs clear to auscultation - no rales, rhonchi or wheezes  BREAST: normal without masses, tenderness or nipple discharge and no palpable axillary masses or adenopathy  CV: regular rate and rhythm, normal S1 S2, no S3 or S4, no murmur, click or rub, no peripheral edema and peripheral pulses strong  ABDOMEN: soft, nontender, no hepatosplenomegaly, no masses and bowel sounds normal  MS: no gross musculoskeletal defects noted, no edema  SKIN: no suspicious lesions or rashes  NEURO: Normal strength and tone, mentation intact and speech normal  PSYCH: mentation appears normal, affect normal/bright    Diagnostic Test Results:  Labs reviewed in Epic    ASSESSMENT/PLAN:   1. Routine general medical examination at a health care facility     - Lipid panel reflex to direct LDL Non-fasting; Future  - Hepatitis C antibody; Future  - HIV Antigen Antibody Combo; Future    2. Hypothyroidism, unspecified type     - levothyroxine (SYNTHROID/LEVOTHROID) 75 MCG tablet; " "Take 1 tablet (75 mcg) by mouth daily  Dispense: 90 tablet; Refill: 1    3. Ulcerative colitis with complication, unspecified location (H)   sees [facility]    4.  Mod jordan depression  Seeing Jazm[facility] CLEMENTS at [facility]    5.  Morbid obesity  This contributes to overall health risks    Patient has been advised of split billing requirements and indicates understanding: Yes  COUNSELING:  Reviewed preventive health counseling, as reflected in patient instructions       Regular exercise       Healthy diet/nutrition    Estimated body mass index is 38.68 kg/m  as calculated from the following:    Height as of this encounter: 1.664 m (5' 5.5\").    Weight as of this encounter: 107 kg (236 lb).    Weight management plan: Discussed healthy diet and exercise guidelines    She reports that she quit smoking about 27 years ago. Her smoking use included cigarettes. She started smoking about 32 years ago. She quit after 5.00 years of use. She has never used smokeless tobacco.      Counseling Resources:  ATP IV Guidelines  Pooled Cohorts Equation Calculator  Breast Cancer Risk Calculator  BRCA-Related Cancer Risk Assessment: FHS-7 Tool  FRAX Risk Assessment  ICSI Preventive Guidelines  Dietary Guidelines for Americans, 2010  USDA's MyPlate  ASA Prophylaxis  Lung CA Screening    Glory Singleton MD  [facility]  "

## 2021-07-26 NOTE — PATIENT INSTRUCTIONS
"Health Maintenance reviewed and plan for update discussed.  Patient asked to schedule her mammogram [phone]    SHINGLES VACCINE/SHINGRIX  If you had chicken pox as a child, you are at risk of shingles (a painful rash with blisters that can sometimes lead to chronic nerve pain or blindness if it affects your eye).    The new Shingrix vaccine is supposed to be more effective at preventing shingles (98%).  Even if you had Zostavax (the original shingles vaccine), this is recommended. I encourage people to check with their pharmacy (or ours) and have them run it through to check the cost.  It's often better covered through your pharmacy benefit.      It is a two part vaccine series - one now and one in 2-6 months.  Many people will feel a bit \"flu like\" with fever and body aches for a few days after the injection.  Taking ibuprofen can help with this.      Our Clinic hours are:  Mondays    7:20 am - 7 pm  Tues -  Fri  7:20 am - 5 pm    Clinic Phone: [phone]    The clinic lab opens at 7:30 am Mon - Fri and appointments are required.    [facility]  Ph. [phone]  Monday  8 am - 7pm  Tues - Fri 8 am - 5:30 pm         Preventive Health Recommendations  Female Ages 50 - 64    Yearly exam: See your health care provider every year in order to  o Review health changes.   o Discuss preventive care.    o Review your medicines if your doctor has prescribed any.      Get a Pap test every three years (unless you have an abnormal result and your provider advises testing more often).    If you get Pap tests with HPV test, you only need to test every 5 years, unless you have an abnormal result.     You do not need a Pap test if your uterus was removed (hysterectomy) and you have not had cancer.    You should be tested each year for STDs (sexually transmitted diseases) if you're at risk.     Have a mammogram every 1 to 2 years.    Have a colonoscopy at age 50, or have a yearly FIT test (stool test). " These exams screen for colon cancer.      Have a cholesterol test every 5 years, or more often if advised.    Have a diabetes test (fasting glucose) every three years. If you are at risk for diabetes, you should have this test more often.     If you are at risk for osteoporosis (brittle bone disease), think about having a bone density scan (DEXA).    Shots: Get a flu shot each year. Get a tetanus shot every 10 years.    Nutrition:     Eat at least 5 servings of fruits and vegetables each day.    Eat whole-grain bread, whole-wheat pasta and brown rice instead of white grains and rice.    Get adequate Calcium and Vitamin D.     Lifestyle    Exercise at least 150 minutes a week (30 minutes a day, 5 days a week). This will help you control your weight and prevent disease.    Limit alcohol to one drink per day.    No smoking.     Wear sunscreen to prevent skin cancer.     See your dentist every six months for an exam and cleaning.    See your eye doctor every 1 to 2 years.

## 2021-07-27 LAB
HCV AB SERPL QL IA: NONREACTIVE
HIV 1+2 AB+HIV1 P24 AG SERPL QL IA: NONREACTIVE

## 2021-09-18 ENCOUNTER — HEALTH MAINTENANCE LETTER (OUTPATIENT)
Age: 51
End: 2021-09-18

## 2021-10-01 ENCOUNTER — TRANSFERRED RECORDS (OUTPATIENT)
Dept: HEALTH INFORMATION MANAGEMENT | Facility: CLINIC | Age: 51
End: 2021-10-01
Payer: COMMERCIAL

## 2021-11-27 NOTE — TELEPHONE ENCOUNTER
Left message on machine to call back    Ольга NAPOLES RN     I have personally performed a face to face diagnostic evaluation on this patient. I have reviewed the ACP note and agree with the history, exam and plan of care, except as noted.

## 2021-12-03 ENCOUNTER — E-VISIT (OUTPATIENT)
Dept: FAMILY MEDICINE | Facility: CLINIC | Age: 51
End: 2021-12-03
Payer: COMMERCIAL

## 2021-12-03 ENCOUNTER — TELEPHONE (OUTPATIENT)
Dept: FAMILY MEDICINE | Facility: CLINIC | Age: 51
End: 2021-12-03

## 2021-12-03 DIAGNOSIS — F41.0 PANIC ATTACK: Primary | ICD-10-CM

## 2021-12-03 PROCEDURE — 99421 OL DIG E/M SVC 5-10 MIN: CPT | Performed by: NURSE PRACTITIONER

## 2021-12-03 ASSESSMENT — ANXIETY QUESTIONNAIRES
3. WORRYING TOO MUCH ABOUT DIFFERENT THINGS: SEVERAL DAYS
2. NOT BEING ABLE TO STOP OR CONTROL WORRYING: SEVERAL DAYS
7. FEELING AFRAID AS IF SOMETHING AWFUL MIGHT HAPPEN: SEVERAL DAYS
1. FEELING NERVOUS, ANXIOUS, OR ON EDGE: SEVERAL DAYS
5. BEING SO RESTLESS THAT IT IS HARD TO SIT STILL: SEVERAL DAYS
GAD7 TOTAL SCORE: 7
6. BECOMING EASILY ANNOYED OR IRRITABLE: SEVERAL DAYS
GAD7 TOTAL SCORE: 7
8. IF YOU CHECKED OFF ANY PROBLEMS, HOW DIFFICULT HAVE THESE MADE IT FOR YOU TO DO YOUR WORK, TAKE CARE OF THINGS AT HOME, OR GET ALONG WITH OTHER PEOPLE?: VERY DIFFICULT
GAD7 TOTAL SCORE: 7
7. FEELING AFRAID AS IF SOMETHING AWFUL MIGHT HAPPEN: SEVERAL DAYS
4. TROUBLE RELAXING: SEVERAL DAYS

## 2021-12-03 ASSESSMENT — PATIENT HEALTH QUESTIONNAIRE - PHQ9
SUM OF ALL RESPONSES TO PHQ QUESTIONS 1-9: 8
10. IF YOU CHECKED OFF ANY PROBLEMS, HOW DIFFICULT HAVE THESE PROBLEMS MADE IT FOR YOU TO DO YOUR WORK, TAKE CARE OF THINGS AT HOME, OR GET ALONG WITH OTHER PEOPLE: VERY DIFFICULT
SUM OF ALL RESPONSES TO PHQ QUESTIONS 1-9: 8

## 2021-12-03 NOTE — LETTER
December 3, 2021      Bibi Song  [address] [address]        To Whom It May Concern:    Bibi Song  was seen on 12/3/2021.  Please excuse her from work missed due to medical concerns with chronic condition. Return to work to be determined by specialist.         Sincerely,          Ana Luisa Kwong, SUSANNAH CNP

## 2021-12-03 NOTE — TELEPHONE ENCOUNTER
Reason for call:  Note for work please    Symptom or request: anxiety and depression is really bad right now. I need a note for my work.  If she could pick it up today  She is waiting to hear back from work with a fax # to send note to    Duration (how long have symptoms been present): week    Have you been treated for this before? Yes    Additional comments:     Phone Number patient can be reached at:  Home number on file [phone] (home)    Best Time:  any    Can we leave a detailed message on this number:  YES    Call taken on 12/3/2021 at 8:47 AM by Vielka Olsen

## 2021-12-04 ASSESSMENT — PATIENT HEALTH QUESTIONNAIRE - PHQ9: SUM OF ALL RESPONSES TO PHQ QUESTIONS 1-9: 8

## 2021-12-04 ASSESSMENT — ANXIETY QUESTIONNAIRES: GAD7 TOTAL SCORE: 7

## 2021-12-07 ENCOUNTER — TELEPHONE (OUTPATIENT)
Dept: FAMILY MEDICINE | Facility: CLINIC | Age: 51
End: 2021-12-07
Payer: COMMERCIAL

## 2021-12-07 NOTE — LETTER
December 7, 2021      Bibi Song  [address] [address]        To Whom It May Concern:    Bibi Song  was seen on 12/3/21.  Please excuse her  From missing work 12/6-12/8. She may return to work on 12/9/21.        Sincerely,        An aLuisa Kwong, SUSANNAH CNP

## 2021-12-07 NOTE — LETTER
[facility]  [address] [place]  [address]  Phone: [phone]  Fax: [phone]            December 7,2021        Bibi Song  [address] [place] [address]        To whom it may concern:

## 2021-12-07 NOTE — TELEPHONE ENCOUNTER
Reason for Call:  Letter    Detailed comments: Pt asking for letter to return to work 12/9 AND excused from work 12/6, 12/7 & 12/8.  Pt wants letter faxed to:  [phone]  Please call patient and advise.      Phone Number Patient can be reached at: Home number on file [phone] (home)    Best Time: any    Can we leave a detailed message on this number? YES    Call taken on 12/7/2021 at 10:41 AM by Velma Ibarra

## 2021-12-08 LAB — PHQ9 SCORE: 13

## 2021-12-20 ENCOUNTER — TRANSFERRED RECORDS (OUTPATIENT)
Dept: HEALTH INFORMATION MANAGEMENT | Facility: CLINIC | Age: 51
End: 2021-12-20
Payer: COMMERCIAL

## 2022-01-08 ENCOUNTER — HEALTH MAINTENANCE LETTER (OUTPATIENT)
Age: 52
End: 2022-01-08

## 2022-02-15 ENCOUNTER — TRANSFERRED RECORDS (OUTPATIENT)
Dept: HEALTH INFORMATION MANAGEMENT | Facility: CLINIC | Age: 52
End: 2022-02-15
Payer: COMMERCIAL

## 2022-02-17 PROBLEM — F10.20 ALCOHOL DEPENDENCE, UNCOMPLICATED (H): Status: ACTIVE | Noted: 2021-02-01

## 2022-02-28 ENCOUNTER — OFFICE VISIT (OUTPATIENT)
Dept: FAMILY MEDICINE | Facility: CLINIC | Age: 52
End: 2022-02-28
Payer: COMMERCIAL

## 2022-02-28 VITALS
RESPIRATION RATE: 14 BRPM | HEART RATE: 70 BPM | OXYGEN SATURATION: 97 % | SYSTOLIC BLOOD PRESSURE: 128 MMHG | TEMPERATURE: 98 F | BODY MASS INDEX: 36.38 KG/M2 | DIASTOLIC BLOOD PRESSURE: 84 MMHG | WEIGHT: 222 LBS

## 2022-02-28 DIAGNOSIS — M54.50 ACUTE RIGHT-SIDED LOW BACK PAIN WITHOUT SCIATICA: Primary | ICD-10-CM

## 2022-02-28 DIAGNOSIS — M62.838 MUSCLE SPASM: ICD-10-CM

## 2022-02-28 DIAGNOSIS — Z12.31 VISIT FOR SCREENING MAMMOGRAM: ICD-10-CM

## 2022-02-28 DIAGNOSIS — M53.3 SI (SACROILIAC) JOINT DYSFUNCTION: ICD-10-CM

## 2022-02-28 PROCEDURE — 99214 OFFICE O/P EST MOD 30 MIN: CPT | Performed by: NURSE PRACTITIONER

## 2022-02-28 RX ORDER — METHOCARBAMOL 500 MG/1
500 TABLET, FILM COATED ORAL 4 TIMES DAILY PRN
Qty: 20 TABLET | Refills: 0 | Status: SHIPPED | OUTPATIENT
Start: 2022-02-28 | End: 2023-11-22

## 2022-02-28 RX ORDER — METHYLPREDNISOLONE 4 MG
TABLET, DOSE PACK ORAL
Qty: 21 TABLET | Refills: 0 | Status: SHIPPED | OUTPATIENT
Start: 2022-02-28 | End: 2022-03-10

## 2022-02-28 ASSESSMENT — PATIENT HEALTH QUESTIONNAIRE - PHQ9
SUM OF ALL RESPONSES TO PHQ QUESTIONS 1-9: 9
10. IF YOU CHECKED OFF ANY PROBLEMS, HOW DIFFICULT HAVE THESE PROBLEMS MADE IT FOR YOU TO DO YOUR WORK, TAKE CARE OF THINGS AT HOME, OR GET ALONG WITH OTHER PEOPLE: SOMEWHAT DIFFICULT
SUM OF ALL RESPONSES TO PHQ QUESTIONS 1-9: 9

## 2022-02-28 ASSESSMENT — ANXIETY QUESTIONNAIRES
GAD7 TOTAL SCORE: 5
4. TROUBLE RELAXING: SEVERAL DAYS
2. NOT BEING ABLE TO STOP OR CONTROL WORRYING: SEVERAL DAYS
GAD7 TOTAL SCORE: 5
7. FEELING AFRAID AS IF SOMETHING AWFUL MIGHT HAPPEN: SEVERAL DAYS
5. BEING SO RESTLESS THAT IT IS HARD TO SIT STILL: NOT AT ALL
GAD7 TOTAL SCORE: 5
6. BECOMING EASILY ANNOYED OR IRRITABLE: NOT AT ALL
1. FEELING NERVOUS, ANXIOUS, OR ON EDGE: SEVERAL DAYS
7. FEELING AFRAID AS IF SOMETHING AWFUL MIGHT HAPPEN: SEVERAL DAYS
3. WORRYING TOO MUCH ABOUT DIFFERENT THINGS: SEVERAL DAYS

## 2022-02-28 ASSESSMENT — PAIN SCALES - GENERAL: PAINLEVEL: EXTREME PAIN (9)

## 2022-02-28 ASSESSMENT — ENCOUNTER SYMPTOMS: BACK PAIN: 1

## 2022-02-28 NOTE — PROGRESS NOTES
"  Assessment & Plan       ICD-10-CM    1. Acute right-sided low back pain without sciatica  M54.50 methocarbamol (ROBAXIN) 500 MG tablet     methylPREDNISolone (MEDROL DOSEPAK) 4 MG tablet therapy pack   2. SI (sacroiliac) joint dysfunction  M53.3 methocarbamol (ROBAXIN) 500 MG tablet     methylPREDNISolone (MEDROL DOSEPAK) 4 MG tablet therapy pack   3. Muscle spasm  M62.838 methocarbamol (ROBAXIN) 500 MG tablet     methylPREDNISolone (MEDROL DOSEPAK) 4 MG tablet therapy pack   4. Visit for screening mammogram  Z12.31 MA SCREENING DIGITAL BILAT - Future  (s+30)     Acute low back pain with SI joint dysfunction and muscle spasm present.  Will start muscle relaxer and steroid.  Discussed symptomatic management including stretches, ice, and potential need for physical therapy.  History of ulcerative colitis recommend avoiding excessive NSAID use however intermittent use may be beneficial for acute symptoms of low back pain.  Patient is in agreement this plan she will follow up if symptoms are not improving in the next several days.              BMI:   Estimated body mass index is 36.38 kg/m  as calculated from the following:    Height as of 7/26/21: 1.664 m (5' 5.5\").    Weight as of this encounter: 100.7 kg (222 lb).           Return in about 4 days (around 3/4/2022) for ongoing symptoms if not improving.    Ana Luisa Kwong, SUSANNAH CNP  [facility]    Mc Mtz is a 51 year old who presents for the following health issues:    Back Pain     History of Present Illness       Back Pain:  She presents for follow up of back pain. Patient's back pain is a recurring problem.  Location of back pain:  Right lower back, right side of neck, left side of neck and right buttock  Description of back pain: burning  Back pain spreads: right buttocks    Since patient first noticed back pain, pain is: unchanged  Does back pain interfere with her job:  Yes      Mental Health Follow-up:  Patient presents " to follow-up on Depression & Anxiety.Patient's depression since last visit has been:  Better  The patient is not having other symptoms associated with depression.  Patient's anxiety since last visit has been:  Better  The patient is not having other symptoms associated with anxiety.  Any significant life events: housing concerns and other  Patient is not feeling anxious or having panic attacks.  Patient has no concerns about alcohol or drug use.     Social History  Tobacco Use    Smoking status: Former Smoker      Years: 5.00      Types: Cigarettes      Start date: 1988      Quit date: 1994      Years since quittin.1    Smokeless tobacco: Never Used  Vaping Use    Vaping Use: Never used  Alcohol use: No    Comment: Treatment in   Drug use: No      Today's PHQ-9         PHQ-9 Total Score:     (P) 9   PHQ-9 Q9 Thoughts of better off dead/self-harm past 2 weeks :   (P) Not at all   Thoughts of suicide or self harm:      Self-harm Plan:        Self-harm Action:          Safety concerns for self or others:           She eats 0-1 servings of fruits and vegetables daily.She consumes 2 sweetened beverage(s) daily.She exercises with enough effort to increase her heart rate 10 to 19 minutes per day.  She exercises with enough effort to increase her heart rate 5 days per week.   She is taking medications regularly.     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 It was severe this morning that she had to be taken out by wheelchair from her work. She works at a school. She has had back pain off and on. She has not had a significant episode of back pain for at least 1 year.   Typically she has upper back pain and always around her bra line. This morning woke up with the low back pain. She is a stomach/ back sleeper. Bed is older.   She reports that her mental health is doing well in general and she follows with someone for this.     Review of Systems   Musculoskeletal: Positive for back pain.      Constitutional, HEENT, cardiovascular, pulmonary, gi and gu systems are negative, except as otherwise noted.      Objective    /84   Pulse 70   Temp 98  F (36.7  C) (Tympanic)   Resp 14   Wt 100.7 kg (222 lb)   LMP 02/14/2022 (Approximate)   SpO2 97%   Breastfeeding No   BMI 36.38 kg/m    Body mass index is 36.38 kg/m .     Physical Exam   GENERAL: healthy, alert and no distress  NECK: no adenopathy, no asymmetry, masses, or scars and thyroid normal to palpation  RESP: lungs clear to auscultation - no rales, rhonchi or wheezes  CV: regular rate and rhythm, normal S1 S2, no S3 or S4, no murmur, click or rub, no peripheral edema and peripheral pulses strong  ABDOMEN: soft, nontender, no hepatosplenomegaly, no masses and bowel sounds normal  MS: no gross musculoskeletal defects noted, no edema  NEURO: Normal strength and tone, mentation intact and speech normal  Comprehensive back pain exam:  Tenderness of right SI joint, Pain limits the following motions: all movements. worse with flexion , Lower extremity strength functional and equal on both sides, Lower extremity reflexes within normal limits bilaterally, Lower extremity sensation normal and equal   PSYCH: mentation appears normal, affect normal/bright        \plain        Answers for HPI/ROS submitted by the patient on 2/28/2022  If you checked off any problems, how difficult have  these problems made it for you to do your work, take care of things at home, or get along with other people?: Somewhat difficult  PHQ9 TOTAL SCORE: 9  NARCISA 7 TOTAL SCORE: 5

## 2022-02-28 NOTE — PATIENT INSTRUCTIONS
Patient Education   When You Have Low Back Pain  Caring for Your Back  You are not alone.  Low back pain is very common. Nearly half of all adults have low back pain in any given year.  The good news is that back pain is rarely a danger to your health. Most people can manage their back pain on their own and about half of them start feeling better within 2 weeks. In 9 out of 10 cases, low back pain goes away or no longer limits daily activity within 6 weeks.  Your outlook is good!  Your symptoms tell us that your low back pain is most likely not a danger to you. Most of the time we do not know the exact cause of low back pain, even if you see a doctor or have an MRI. However, treatment can still work without knowing the cause of the pain. Less than 1 in 100 people need surgery for their back pain.  What can I do about my low back pain?  There are three things you can do to ease low back pain and help it go away.    Use heat or cold packs.    Take medicine as directed.    Use positions, movements and exercises.  Using heat or cold packs  Try cold packs or gentle heat to ease your pain. Use whichever gives you the most relief. Apply the cold pack or heat for 15 minutes at a time, as often as needed.  Taking medicine    If your doctor has prescribed medicine, be sure to follow the directions.    If you take over-the-counter medicine, read and follow the directions.    Talk to your doctor if you have any questions.  Using positions, movements and exercises  Research tells us that moving your joints and muscles can help you recover from back pain. Such activity should be simple and gentle.  Use the positions in the photos as well as walking to help relieve your pain. Try taking a short walk every 3 to 4 hours during the day. Walk for a few minutes inside your home or take longer walks outside, on a treadmill or at a mall. Slowly increase the amount of time you walk.  Expect discomfort when you begin, but it should lessen  as your back starts to heal. When your back feels better, walk daily to keep your back and body healthy.  Finding a comfortable position  When your back pain is new, certain positions will ease your pain. Gently try each of the positions below until you find one that is helpful. Once you find a position of comfort, use it as often as you like when you are resting. You will recover faster if you combine rest with activity.         When should I call my doctor?  Your back pain should improve over the first couple of weeks. As it improves, you should be able to return to your normal activities. But call your doctor if:    You have a sudden change in your ability to control?your bladder or bowels.    You feel tingling in your groin or legs.    The pain spreads down your leg and into your foot.    Your toes, feet or leg muscles feel weak.    You feel generally unwell or sick.    Your pain does not get better or gets worse.    For informational purposes only. Not to replace the advice of your health care provider.  Copyright   2013 [employer] [facility]. All rights reserved. [employer] [id number] - REV 03/16.

## 2022-03-01 ASSESSMENT — ANXIETY QUESTIONNAIRES: GAD7 TOTAL SCORE: 5

## 2022-03-03 ENCOUNTER — NURSE TRIAGE (OUTPATIENT)
Dept: NURSING | Facility: CLINIC | Age: 52
End: 2022-03-03
Payer: COMMERCIAL

## 2022-03-03 NOTE — TELEPHONE ENCOUNTER
Patient hurt her back on Monday and is having spasms.  Patient was at work and back started with spasms and was seen on Monday 2/28/2022 and was given muscle relaxor and steroid.  Today still having spasms.  Patient has questions on steroid as she is not taking it diligently.  Patient states that she will take steroid and phone back if not better when done with medication.    COVID 19 Nurse Triage Plan/Patient Instructions    Please be aware that novel coronavirus (COVID-19) may be circulating in the community. If you develop symptoms such as fever, cough, or SOB or if you have concerns about the presence of another infection including coronavirus (COVID-19), please contact your health care provider or visit [email or web address].     Disposition/Instructions    Home care recommended. Follow home care protocol based instructions.    Thank you for taking steps to prevent the spread of this virus.  o Limit your contact with others.  o Wear a simple mask to cover your cough.  o Wash your hands well and often.    Resources    [facility]: About COVID-19: [email or web address]    CDC: What to Do If You're Sick: www.cdc.gov/coronavirus/2019-ncov/about/steps-when-sick.html    CDC: Ending Home Isolation: www.cdc.gov/coronavirus/2019-ncov/hcp/disposition-in-home-patients.html     CDC: Caring for Someone: www.cdc.gov/coronavirus/2019-ncov/if-you-are-sick/care-for-someone.html     [facility]: Interim Guidance for Hospital Discharge to Home: www.health.[facility].[place]/diseases/coronavirus/hcp/hospdischarge.pdf    [facility] clinical trials (COVID-19 research studies): clinicalaffairs.[facility]-clinical-trials     Below are the COVID-19 hotlines at the [place] Department of [facility] ([facility]). Interpreters are available.   o For health questions: Call 741-874-5996 or 1-991.280.8968 (7 a.m. to 7 p.m.)  o For questions about schools and childcare: Call 349-778-7833 or 1-163.192.8279 (7 a.m. to 7 p.m.)

## 2022-03-04 ENCOUNTER — NURSE TRIAGE (OUTPATIENT)
Dept: NURSING | Facility: CLINIC | Age: 52
End: 2022-03-04
Payer: COMMERCIAL

## 2022-03-04 NOTE — TELEPHONE ENCOUNTER
Went in on Monday with back spasms. On a steroid and muscle relaxer and it's still acting up. 1-2 weeks ago [place] No pain after that. I connected with scheduling for an appointment and advised urgent care if they can't get her into the clinic.  [facility]    Nurse Triage SBAR    Is this a 2nd Level Triage? NO  [facility]    Reason for Disposition    [1] SEVERE back pain (e.g., excruciating, unable to do any normal activities) AND [2] not improved 2 hours after pain medicine     Tingling, without walking she's fine it's ten when she walks.    Additional Information    Negative: Passed out (i.e., lost consciousness, collapsed and was not responding)    Negative: Shock suspected (e.g., cold/pale/clammy skin, too weak to stand, low BP, rapid pulse)    Negative: Sounds like a life-threatening emergency to the triager    Negative: Major injury to the back (e.g., MVA, fall > 10 feet or 3 meters, penetrating injury, etc.)    Negative: Followed a tailbone injury    Negative: [1] Pain in the upper back over the ribs (rib cage) AND [2] radiates (travels, goes) into chest    Negative: [1] Pain in the upper back over the ribs (rib cage) AND [2] worsened by coughing (or clearly increases with breathing)    Negative: Back pain during pregnancy    Negative: Pain mainly in flank (i.e., in the side, over the lower ribs or just below the ribs)    Negative: [1] SEVERE back pain (e.g., excruciating) AND [2] sudden onset AND [3] age > 60    Negative: [1] Unable to urinate (or only a few drops) > 4 hours AND [2] bladder feels very full (e.g., palpable bladder or strong urge to urinate)    Negative: [1] Loss of bladder or bowel control (urine or bowel incontinence; wetting self, leaking stool) AND [2] new onset    Negative: Numbness in groin or rectal area (i.e., loss of sensation)    Negative: [1] SEVERE abdominal pain AND [2] present > 1 hour     Negative: [1] Abdominal pain AND [2] age > 60    Negative: Weakness of a leg or foot (e.g., unable to bear weight, dragging foot)    Negative: Unable to walk    Negative: Patient sounds very sick or weak to the triager    Protocols used: BACK PAIN-A-AH

## 2022-03-10 ENCOUNTER — TELEPHONE (OUTPATIENT)
Dept: FAMILY MEDICINE | Facility: CLINIC | Age: 52
End: 2022-03-10

## 2022-03-10 ENCOUNTER — TELEPHONE (OUTPATIENT)
Dept: FAMILY MEDICINE | Facility: CLINIC | Age: 52
End: 2022-03-10
Payer: COMMERCIAL

## 2022-03-10 ENCOUNTER — OFFICE VISIT (OUTPATIENT)
Dept: FAMILY MEDICINE | Facility: CLINIC | Age: 52
End: 2022-03-10
Payer: COMMERCIAL

## 2022-03-10 VITALS
RESPIRATION RATE: 16 BRPM | WEIGHT: 222.8 LBS | HEART RATE: 79 BPM | OXYGEN SATURATION: 94 % | DIASTOLIC BLOOD PRESSURE: 76 MMHG | TEMPERATURE: 98 F | SYSTOLIC BLOOD PRESSURE: 108 MMHG | HEIGHT: 66 IN | BODY MASS INDEX: 35.81 KG/M2

## 2022-03-10 DIAGNOSIS — M54.16 LUMBAR RADICULOPATHY: Primary | ICD-10-CM

## 2022-03-10 PROCEDURE — 99213 OFFICE O/P EST LOW 20 MIN: CPT | Performed by: NURSE PRACTITIONER

## 2022-03-10 RX ORDER — TIZANIDINE HYDROCHLORIDE 4 MG/1
4 CAPSULE, GELATIN COATED ORAL 3 TIMES DAILY
Qty: 30 CAPSULE | Refills: 1 | Status: SHIPPED | OUTPATIENT
Start: 2022-03-10 | End: 2023-11-22

## 2022-03-10 ASSESSMENT — PAIN SCALES - GENERAL: PAINLEVEL: MODERATE PAIN (5)

## 2022-03-10 ASSESSMENT — ENCOUNTER SYMPTOMS: BACK PAIN: 1

## 2022-03-10 NOTE — PROGRESS NOTES
"  Assessment & Plan     Lumbar radiculopathy  No red flags but minimal improvement with conservative care. Recommend MRI, will refer for IVONE if indicated by results. Recommend PT. Can try tizanidine as well.  - MR Lumbar Spine w/o Contrast; Future  - Physical Therapy Referral; Future  - tiZANidine (ZANAFLEX) 4 MG capsule; Take 1 capsule (4 mg) by mouth 3 times daily       BMI:   Estimated body mass index is 36.51 kg/m  as calculated from the following:    Height as of this encounter: 1.664 m (5' 5.5\").    Weight as of this encounter: 101.1 kg (222 lb 12.8 oz).       Patient Instructions   Schedule MRI at [phone]  Someone will call you to schedule PT.  Try the tizanidine instead of methocarbamol.  I will let you know the results of the MRI.      Return in about 1 month (around 4/10/2022) for worsening or continued symptoms.    SUSANNAH Acuña CNP  [facility]   Bibi is a 51 year old who presents for the following health issues        Pain History:  When did you first notice your pain? - February 28, 2022  Have you seen anyone else for your pain? Yes - last Monday, seen doctor from [facility]  Where in your body do you have pain? Back Pain  Onset/Duration: 10 days  Description:   Location of pain: low back right  Character of pain: sharp  Pain radiation: radiates into the right buttocks and radiates into the right leg  New numbness or weakness in legs, not attributed to pain: no   Intensity: At its worst 9/10, 5/10 at rest  Progression of Symptoms: improving  History:   Specific cause: fall the week before?  Pain interferes with job: YES  History of back problems: recurrent self limited episodes of low back pain in the past  Any previous MRI or X-rays: None  Sees a specialist for back pain: No  Alleviating factors:   Improved by: none    Precipitating factors:  Worsened by: Walking  Therapies tried and outcome: methocarbamol and methylprednisolone not " "effective    Accompanying Signs & Symptoms:  Risk of Fracture: None  Risk of Cauda Equina: None  Risk of Infection: None  Risk of Cancer: None  Risk of Ankylosing Spondylitis: Onset at age <35, male, AND morning back stiffness  no     Above HPI reviewed. Additionally, was seen last week for same, given medrol, robaxin, mild improvement but pain continues. Increased pain with walking. Pain to right lateral thigh, right buttock. No lower extremity weakness. No paresthesias. No fevers. No history of malignancy or IVDU.    Review of Systems   Musculoskeletal: Positive for back pain.      Constitutional, HEENT, cardiovascular, pulmonary, gi and gu systems are negative, except as otherwise noted.      Objective    /76 (BP Location: Right arm, Patient Position: Sitting, Cuff Size: Adult Large)   Pulse 79   Temp 98  F (36.7  C) (Tympanic)   Resp 16   Ht 1.664 m (5' 5.5\")   Wt 101.1 kg (222 lb 12.8 oz)   LMP 02/14/2022 (Approximate)   SpO2 94%   BMI 36.51 kg/m    Body mass index is 36.51 kg/m .  Physical Exam   General Appearance:  Alert, cooperative, no distress, appears stated age. Afebrile. Appears comfortable sitting in chair. Rises from seated position without difficulty.  Integument: Warm, dry, no rashes or lesions.  HEENT: Atraumatic, normocephalic. Face nontraumatic. Conjunctiva clear, Lids normal.  Neck: Supple, no meningismus. No Cspine tenderness. Neck ROM intact.  Respiratory: No distress.   Cardiovascular: Regular rate  Musculoskeletal: Back: No Lspine or Tspine tenderness or crepitus to palpation. Mild TTP right lumbar paraspinal musculature. Strength testing: hip flexion, extension 5/5 bilaterally, knee flexion/extension 5/5 bilaterally, ankle plantar/dorsiflexion 5/5 bilaterally.  Straight leg raise positive right. Gait: observed, no antalgia or abnormalities. Steady gait. 2+ bilateral pedal pulses.  Normal toe raise, heel walk. Normal flexion and extension of toes.  Neurologic: Alert and " orientated appropriately. No focal deficits. Sensation intact in distal LEs. DTRs: patellar 2+ bilaterally, achilles 2+ bilaterally.  Psych: Normal mood and affect.

## 2022-03-10 NOTE — TELEPHONE ENCOUNTER
"Reason for call:  Patient reporting a symptom    Symptom or request: Pt just saw ANDREW Owens for back pain and an MRI was ordered.  Pt called to schedule the MRI and she cannot do an MRI, due to being \"Scared to death or being trapped in there,\"  Offered to send order to [facility] for open MRI and pt responded, \"I do not drive in the cities/past [place].\"  Wants ANDREW Owens to refer her to a specialist.  Please call patient and advise.    Routed to provider, per RN     Duration (how long have symptoms been present): ongoing    Have you been treated for this before? Yes    Additional comments:     Phone Number patient can be reached at:  Home number on file [phone] (home)    Best Time:  An y    Can we leave a detailed message on this number:  YES    Call taken on 3/10/2022 at 2:41 PM by Velma Ibarra    "

## 2022-03-10 NOTE — PATIENT INSTRUCTIONS
Schedule MRI at [phone]  Someone will call you to schedule PT.  Try the tizanidine instead of methocarbamol.  I will let you know the results of the MRI.

## 2022-03-10 NOTE — TELEPHONE ENCOUNTER
We can get a CT instead, sometimes we don't get as much/as good of information from a CT as opposed to an MRI, but we can probably get what we need from a CT. This is ordered and she should schedule this in place of the CT.  Perla Owens, CNP

## 2022-03-10 NOTE — TELEPHONE ENCOUNTER
Pt calls & states she saw provider 2/28/22 for right low back pain, sciatica. Was prescribed muscle relaxer & steroid & states this did not help much. Pt continued to have muscle spasms while taking both meds. States now spasms are gone but its hard to walk. States she walks a short distance, has to stop & adjust & take pressure off of the right leg & then keeps walking.   Pt has also tried ice/heat, tylenol/ibuprofen.   appt made for today.    Mary Holland RN

## 2022-03-11 NOTE — TELEPHONE ENCOUNTER
Left detailed message to inform patient and scheduling phone number given.     Petty Paz RN on 3/11/2022 at 8:26 AM

## 2022-03-13 ENCOUNTER — TELEPHONE (OUTPATIENT)
Dept: NURSING | Facility: CLINIC | Age: 52
End: 2022-03-13
Payer: COMMERCIAL

## 2022-03-13 NOTE — TELEPHONE ENCOUNTER
Patient calling requesting input on if she could get a CT scan over the weekend. Advised the ER would have the capabilities of doing a CT scan but that since her scan is already ordered and scheduled, best to just have this completed as ordered. Patient agreeable to this.     Jaycee Calixto RN 03/13/22 2:12 PM   [facility] Triage Nurse Advisor

## 2022-03-16 ENCOUNTER — TELEPHONE (OUTPATIENT)
Dept: FAMILY MEDICINE | Facility: CLINIC | Age: 52
End: 2022-03-16
Payer: COMMERCIAL

## 2022-03-16 NOTE — TELEPHONE ENCOUNTER
Patient returned call and was given message from below. She would like to know if an Xray could be ordered instead due to the symptoms not improving. She has to walk for her job and it is causing her a lot of pain. Patient states she would start the PT but need more information. She would like to get in for an xray as soon as possible.

## 2022-03-16 NOTE — TELEPHONE ENCOUNTER
Patient wanting to know, since CT scan was denied, would an x-ray show anything and could she possibly have that done instead.      Call taken on 3/16/2022 at 4:18 PM by Luci Mcneal

## 2022-03-16 NOTE — TELEPHONE ENCOUNTER
Left message for patient to return call. I received a denial from her insurance for upcoming CT scan as she has not completed 6 weeks of conservative care yet. Would recommend she start PT for the next couple weeks, then if not improving, we can move forward with the CT at the end of the month.  Perla Owens, CNP

## 2022-03-17 ENCOUNTER — TRANSFERRED RECORDS (OUTPATIENT)
Dept: HEALTH INFORMATION MANAGEMENT | Facility: CLINIC | Age: 52
End: 2022-03-17
Payer: COMMERCIAL

## 2022-03-17 NOTE — TELEPHONE ENCOUNTER
Called patient to discuss further, left VM to return call. An xray is good for looking at bones/joints, looking for fractures (which we do not suspect) and isn't going to show us discs etc to get the information that I was intending to get with the CT or MRI. I do not think an xray is necessary as it will not provide us with answers. I think the next best step is for her to start PT, and if things are not improving over the next few weeks, we can get the CT scheduled at that time. It is not uncommon for back pain to last 6-8 weeks.

## 2022-03-18 ENCOUNTER — TELEPHONE (OUTPATIENT)
Dept: FAMILY MEDICINE | Facility: CLINIC | Age: 52
End: 2022-03-18
Payer: COMMERCIAL

## 2022-03-18 NOTE — TELEPHONE ENCOUNTER
Reached out to patient again. Left detailed VM on mobile (states okay in demographics).  Advised she return call to RN at [phone] with any questions, etc.     Leana Pinzon RN  [facility]

## 2022-03-19 ENCOUNTER — HOSPITAL ENCOUNTER (EMERGENCY)
Facility: CLINIC | Age: 52
Discharge: HOME | End: 2022-03-19
Attending: FAMILY MEDICINE | Admitting: FAMILY MEDICINE
Payer: COMMERCIAL

## 2022-03-19 ENCOUNTER — NURSE TRIAGE (OUTPATIENT)
Dept: NURSING | Facility: CLINIC | Age: 52
End: 2022-03-19
Payer: COMMERCIAL

## 2022-03-19 VITALS
BODY MASS INDEX: 36.65 KG/M2 | DIASTOLIC BLOOD PRESSURE: 80 MMHG | TEMPERATURE: 97.9 F | HEART RATE: 65 BPM | HEIGHT: 65 IN | SYSTOLIC BLOOD PRESSURE: 114 MMHG | OXYGEN SATURATION: 93 % | RESPIRATION RATE: 16 BRPM | WEIGHT: 220 LBS

## 2022-03-19 DIAGNOSIS — M54.16 LUMBAR RADICULOPATHY: ICD-10-CM

## 2022-03-19 PROCEDURE — 250N000011 HC RX IP 250 OP 636: Performed by: FAMILY MEDICINE

## 2022-03-19 PROCEDURE — 99284 EMERGENCY DEPT VISIT MOD MDM: CPT | Performed by: FAMILY MEDICINE

## 2022-03-19 PROCEDURE — 99285 EMERGENCY DEPT VISIT HI MDM: CPT | Mod: 25 | Performed by: FAMILY MEDICINE

## 2022-03-19 PROCEDURE — 96374 THER/PROPH/DIAG INJ IV PUSH: CPT | Performed by: FAMILY MEDICINE

## 2022-03-19 RX ORDER — OXYCODONE HYDROCHLORIDE 5 MG/1
5-10 TABLET ORAL EVERY 6 HOURS PRN
Qty: 12 TABLET | Refills: 0 | Status: SHIPPED | OUTPATIENT
Start: 2022-03-19 | End: 2022-03-22

## 2022-03-19 RX ORDER — ONDANSETRON 2 MG/ML
4 INJECTION INTRAMUSCULAR; INTRAVENOUS ONCE
Status: DISCONTINUED | OUTPATIENT
Start: 2022-03-19 | End: 2022-03-19 | Stop reason: HOSPADM

## 2022-03-19 RX ADMIN — HYDROMORPHONE HYDROCHLORIDE 1 MG: 1 INJECTION, SOLUTION INTRAMUSCULAR; INTRAVENOUS; SUBCUTANEOUS at 10:14

## 2022-03-19 NOTE — TELEPHONE ENCOUNTER
Nurse Triage SBAR    Is this a 2nd Level Triage? NO    Situation:   severe lower back pain    Background:   Pt has been having severe back pains for 4 weeks now. Seen on 2/28 and was prescribed Robaxin and methylprednisolone, which pt thinks has not been very helpful.  Seen on 3/10, advised PT and MRI. MRI declined due to fear. PT has not been initiated due to schedule conflict. Was advised CT scan but pt states her insurance will not cover procedure.    Assessment:   Severe lower back pain, unable to walk. Rated 10/10  Muscle relaxant taken 1 hour ago but has not been helpful.  Able to urinate but was difficult due to pain.  Right lower abdominal pain    Protocol Recommended Disposition:   Go to ED Now (Or PCP Triage)    Recommendation:       FNA advised ED. PCP triage not offered.    Does the patient meet one of the following criteria for ADS visit consideration? 16+ years old, with an [facility] PCP     TIP  Providers, please consider if this condition is appropriate for management at one of our Acute and Diagnostic Services sites.     If patient is a good candidate, please use dotphrase <dot>triageresponse and select Refer to ADS to document.     ADS closed on Saturdays.    Per protocol, advised ED. Care advice reviewed. Patient verbalizes understanding.     Greer Hernandez RN/[place] Nurse Advisor              Reason for Disposition    Unable to walk    Additional Information    Negative: Passed out (i.e., lost consciousness, collapsed and was not responding)    Negative: Shock suspected (e.g., cold/pale/clammy skin, too weak to stand, low BP, rapid pulse)    Negative: Sounds like a life-threatening emergency to the triager    Negative: [1] SEVERE back pain (e.g., excruciating) AND [2] sudden onset AND [3] age > 60    Negative: [1] Unable to urinate (or only a few drops) > 4 hours AND [2] bladder feels very full (e.g., palpable bladder or strong urge to urinate)    Negative: [1] Loss of bladder or bowel control  (urine or bowel incontinence; wetting self, leaking stool) AND [2] new onset    Negative: Numbness in groin or rectal area (i.e., loss of sensation)    Negative: [1] SEVERE abdominal pain AND [2] present > 1 hour    Negative: [1] Abdominal pain AND [2] age > 60    Negative: Weakness of a leg or foot (e.g., unable to bear weight, dragging foot)    Protocols used: BACK PAIN-A-AH

## 2022-03-19 NOTE — ED PROVIDER NOTES
"  History     Chief Complaint   Patient presents with     Back Pain     HPI    Bibi Song is a 51 year old female who presents with low back pain.  Symptoms began 3 weeks ago.  The came on when she went to work 1 morning and were severe and she had \"spasms.\"   She was seen in the clinic that day, February 28, and prescribed methocarbamol and a Medrol Dosepak.  She did not feel like the steroids helped particularly.  She has also been taking Tylenol and ibuprofen.  After week the symptoms were better but then they worsened again in the last week and are aggravated by walking and she is developed radiating pain down the right leg past the knee with a sense of weakness in the leg but no numbness.  She does not have any perineal sensation change.  She has not been incontinent of urine or stool.  She has been having fevers, sweats, chills.  She has no history of IV drug use.  She was seen again in the clinic on March 10 and prescribed tizanidine.  An MRI of the spine was ordered but she was afraid she would be claustrophobic it was changed to a CT but coverage for this was denied by her insurance company.    Allergies:  No Known Allergies    Problem List:    Patient Active Problem List    Diagnosis Date Noted     Alcoholism /alcohol abuse 02/01/2021     Priority: Medium     Replacing diagnoses that were inactivated after the 10/1/2021 regulatory import.       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     Priority: Medium     TCA (tricyclic antidepressant) overdose of undetermined intent 07/18/2018     Priority: Medium     Ulcerative colitis with complication, unspecified location (H) 04/27/2017     Priority: Medium     [facility] - ulcerative colitis       Hypothyroidism, unspecified type 12/08/2016     Priority: Medium     Cervicalgia 10/10/2012     Priority: Medium     Hyperlipidemia LDL goal <160 10/31/2010     Priority: Medium     Moderate major " depression (H) 08/06/2010     Priority: Medium     OCD (obsessive compulsive disorder) 08/06/2010     Priority: Medium     Sahra and Finesse Wall PA-C       Anxiety disorder 08/06/2010     Priority: Medium     Family history of diabetes mellitus 08/06/2010     Priority: Medium     Family history of thyroid disease 08/06/2010     Priority: Medium     ALCOHOL ABUSE, IN REMISSION 08/06/2010     Priority: Medium        Past Medical History:    Past Medical History:   Diagnosis Date     Anxiety      Chemical dependency (H)      Depressive disorder      Hoarseness      Hypothyroidism      Obesity      OCD (obsessive compulsive disorder)      PONV (postoperative nausea and vomiting)      Sleep apnea      Tracheal stenosis        Past Surgical History:    Past Surgical History:   Procedure Laterality Date     INJECT STEROID (LOCATION) N/A 9/7/2018    Procedure: INJECT STEROID (LOCATION);;  Surgeon: Dusty Pearson MD;  Location: [facility]     INJECT STEROID (LOCATION) N/A 10/5/2018    Procedure: INJECT STEROID (LOCATION);;  Surgeon: Dusty Pearson MD;  Location: [facility]     INJECT STEROID (LOCATION) N/A 12/6/2018    Procedure: Steroid Injection;  Surgeon: Dusty Pearson MD;  [facility]     INJECT STEROID (LOCATION) N/A 2/1/2019    Procedure: Steroid Injection;  Surgeon: Dusty Pearson MD;  [facility]     LARYNGOSCOPY N/A 9/7/2018    Procedure: LARYNGOSCOPY;  Teleoscopic Direct Laryngoscopy with Balloon Dilation, Mitomycin Application and Steriod Injection ;  Surgeon: Dusty Pearson MD;  Location: [facility] OR     LARYNGOSCOPY, ESOPHAGOSCOPY WITH DILATION, COMBINED N/A 10/5/2018    Procedure: COMBINED LARYNGOSCOPY, ESOPHAGOSCOPY WITH DILATION;  Direct Laryngoscopy with Teleoscope, Balloon Dilation, Application of Mitomycin and Steroid Injection ;  Surgeon: Dusty Pearson MD;  Location: [facility] OR     LARYNGOSCOPY, ESOPHAGOSCOPY WITH DILATION,  COMBINED N/A 12/6/2018    Procedure: Direct Laryngoscopy, Balloon Dilation, Mitomycin Application, Steroid Injection;  Surgeon: Dusty Pearson MD;  [facility]     LARYNGOSCOPY, ESOPHAGOSCOPY WITH DILATION, COMBINED N/A 2/1/2019    Procedure: Direct Laryngoscopy, Balloon Dilation, Mitomycin Application;  Surgeon: Dusty Pearson MD;  [facility]     RESECT TRACHEA WITH RECONSTRUCTION N/A 3/11/2019    Procedure: Tracheal Resection Via Cervical Approach;  Surgeon: Nick Beaulieu MD;  Location: [facility] OR       Family History:    Family History   Problem Relation Age of Onset     Diabetes Mother      Depression Mother         OCD     Neurologic Disorder Mother      Psychotic Disorder Mother      Respiratory Mother      Thyroid Disease Mother      Gynecology Mother         hysterectomy     Cerebrovascular Disease Mother         pacemaker     Substance Abuse Mother      Mental Illness Mother      Heart Disease Mother      Thrombosis Mother         no details available     Heart Disease Father         MI,   LATE 60'S     Hypertension Father      Prostate Cancer Father      Obesity Father      Diabetes Father      Cerebrovascular Disease Father      Substance Abuse Father      Depression Father      Thrombosis Father      Depression Brother      Thyroid Disease Brother      Depression Brother      Thyroid Disease Brother      Allergies Son      Asthma Son      Thyroid Disease Maternal Grandmother      Cancer Maternal Grandfather      Psychotic Disorder Paternal Grandmother      Thyroid Disease Paternal Grandfather      Diabetes Brother      Breast Cancer Maternal Aunt      Substance Abuse Brother      Mental Illness Brother      Depression Brother      Diabetes Brother      Mental Illness Son      Asthma Son      Depression Son      Stomach Problem Son        Social History:  Marital Status:   [2]  Social History     Tobacco Use     Smoking status: Former Smoker     Years: 5.00     Types:  "Cigarettes     Start date: 1988     Quit date: 1994     Years since quittin.2     Smokeless tobacco: Never Used   Vaping Use     Vaping Use: Never used   Substance Use Topics     Alcohol use: No     Comment: Treatment in      Drug use: No        Medications:    oxyCODONE (ROXICODONE) 5 MG tablet  balsalazide (COLAZAL) 750 MG capsule  buPROPion (WELLBUTRIN XL) 150 MG 24 hr tablet  gabapentin (NEURONTIN) 400 MG capsule  levothyroxine (SYNTHROID/LEVOTHROID) 75 MCG tablet  methocarbamol (ROBAXIN) 500 MG tablet  sertraline (ZOLOFT) 100 MG tablet  tiZANidine (ZANAFLEX) 4 MG capsule  ziprasidone (GEODON) 40 MG capsule  ziprasidone (GEODON) 60 MG capsule          Review of Systems  All other systems are reviewed and are negative    Physical Exam   BP: 117/82  Pulse: 72  Temp: 97.9  F (36.6  C)  Resp: 16  Height: 165.1 cm (5' 5\")  Weight: 99.8 kg (220 lb)  SpO2: 98 %      Physical Exam  Nursing note and vitals were reviewed.  Constitutional: Awake and alert, adequately nourished and developed appearing 51-year-old in moderate to severe discomfort, who does not appear acutely ill, and who answers questions appropriately and cooperates with examination.  Neck: Freely mobile.    Pulmonary/Chest: Breathing is unlabored.     Musculoskeletal: Spinal curvatures are normal.  Not able to assess back range of motion due to pain.  No tenderness with palpation of the lower spine.  No areas of swelling, ecchymosis, rash in the lower back.  Neurological: Alert, oriented, thought content logical, coherent.  Motor strength intact in the lower extremities on manual muscle testing.  Psychiatric: Affect broad and appropriate.        ED Course                 Procedures              Critical Care time:  none               No results found for this or any previous visit (from the past 24 hour(s)).    Medications   ondansetron (ZOFRAN) injection 4 mg (has no administration in time range)   HYDROmorphone (DILAUDID) injection 1 " mg (1 mg Intravenous Given 3/19/22 1014)       Assessments & Plan (with Medical Decision Making)     51-year-old female presented with 3 weeks of low back pain with radiation down the right leg without any red flag symptoms including no perineal sensation change, fever, incontinence.  No history of IV drug use.  Symptoms consistent with lumbar radiculopathy.  3 weeks ago she had a Medrol dose pack with inconsistent relief.  She responded well to a single dose of hydromorphone in the ED.  Recommended she continue on tizanidine, Tylenol, ibuprofen and can add short-term oxycodone.  I recommend against repeating the Medrol Dosepak as it is unlikely to be effective.  She is comfortable with holding off on advanced imaging and seeing how she responds to conservative treatment but if it does not get better she will follow-up for MRI of the lumbar spine as previously ordered.  Avoidance of aggravating activity, stretching, strengthening over the long-term also reviewed with her as well as the need for weight loss.  She and her  expressed understanding and their questions were answered.    I have reviewed the nursing notes.    I have reviewed the findings, diagnosis, plan and need for follow up with the patient.       New Prescriptions    OXYCODONE (ROXICODONE) 5 MG TABLET    Take 1-2 tablets (5-10 mg) by mouth every 6 hours as needed for pain       Final diagnoses:   Lumbar radiculopathy       3/19/2022   [facility] EMERGENCY DEPT     Richardson Lam MD  03/19/22 8266

## 2022-03-19 NOTE — DISCHARGE INSTRUCTIONS
Avoid heavy lifting, pushing, pulling.  Continue to do gentle stretching.    Schedule physical therapy.    Take ibuprofen, 400 mg, 4 times per day if needed for pain.  You may add acetaminophen 1000 mg 4 times per day if needed for pain.    You may add oxycodone 5 mg, 1-2 tablets up to every 6 hours if needed for pain.  Try to use this primarily only at night to help with sleep.    Do not use alcohol, operate machinery, drive, or climb on ladders, or perform other complex motor activity or make important decisions for 8 hours after taking oxycodone. Use docusate (100mg) 2 times a day to prevent constipation while on narcotics.

## 2022-03-19 NOTE — ED TRIAGE NOTES
Pt has been having back pain x4 weeks.  Pt has not had CT or MRI.  Pt has seen PCP.  Pt states today she has been experiencing back spasms.  Pt had 2 falls prior to this pain starting, states that she felt fine after falls.  Took Zanaflex today. MD at bedside.

## 2022-03-21 NOTE — TELEPHONE ENCOUNTER
Patient was seen in ED as advised and was given roxicodone. Patient also agreed to complete the MRI.    Dianne Rai RN BSN

## 2022-03-29 ENCOUNTER — HOSPITAL ENCOUNTER (OUTPATIENT)
Dept: PHYSICAL THERAPY | Facility: CLINIC | Age: 52
Setting detail: THERAPIES SERIES
Discharge: HOME | End: 2022-03-29
Attending: FAMILY MEDICINE
Payer: COMMERCIAL

## 2022-03-29 DIAGNOSIS — M54.16 LUMBAR RADICULOPATHY: ICD-10-CM

## 2022-03-29 PROCEDURE — 97140 MANUAL THERAPY 1/> REGIONS: CPT | Mod: GP | Performed by: PHYSICAL THERAPIST

## 2022-03-29 PROCEDURE — 97110 THERAPEUTIC EXERCISES: CPT | Mod: GP | Performed by: PHYSICAL THERAPIST

## 2022-03-29 PROCEDURE — 97161 PT EVAL LOW COMPLEX 20 MIN: CPT | Mod: GP | Performed by: PHYSICAL THERAPIST

## 2022-03-29 NOTE — PROGRESS NOTES
"   03/29/22 0800   General Information   Type of Visit Initial OP Ortho PT Evaluation   Start of Care Date 03/29/22   Referring Physician Richardson Lam MD;  Dr Glory Singleton PCP    Orders Evaluate and Treat   Date of Order 03/19/22   Certification Required? 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 Pain;H. Impaired gait   Functional Limitations perform activities of daily living;perform required work activities;perform desired leisure / sports activities   Symptom Location L LB and R LE    How/Where did it occur From insidious onset   Onset date of current episode/exacerbation 02/28/22   Chronicity Recurrent   Pain rating (0-10 point scale)   (did not complete as not any pain today)   Pain quality   (not answered)   Frequency of pain/symptoms C. With activity   Pain/symptoms exacerbated by C. Lifting;D. Carrying;B. Walking   Pain/symptoms eased by H. Cold;G. Heat;I. OTC medication(s)   Progression of symptoms since onset: Improved  (no pain today)   Prior Level of Function   Functional Level Prior Comment pain with walking   Current Level of Function   Patient role/employment history A. Employed   Employment Comments para in school   Fall Risk Screen   Fall screen completed by PT   Have you fallen 2 or more times in " the past year? Yes   Is patient a fall risk? No   Fall screen comments slipped on ice   Abuse Screen (yes response referral indicated)   Feels Unsafe at Home or Work/School no   Feels Threatened by Someone no   Does Anyone Try to Keep You From Having Contact with Others or Doing Things Outside Your Home? no   Physical Signs of Abuse Present no   Lumbar Spine/SI Objective Findings   Observation NAD   Posture cross legs; post pelvic tilt   Gait/Locomotion wnl today   Balance/Proprioception (Single Leg Stance) 3-5 sec EO     Flexion ROM to knees tightness no pan    Extension ROM hinges TL jnct   Pelvic Screen - forward bend; +R march   Hip Flexion (L2) Strength 4   Hip Abduction Strength 4   Hip Extension Strength 3+   Knee Extension (L3) Strength 5-   Ankle Dorsiflexion (L4) Strength 5-   Great Toe Extension (L5) Strength 5   Ankle Plantar Flexion (S1) Strength 5   Hamstring Flexibility slightly tight L<R pop angle   Hip Flexor Flexibility wnl   Quadricep Flexibility wnl   SLR +R   Ely Test -   Spring Test +L   Palpation R lumbar paraspinals tender   Planned Therapy Interventions   Planned Therapy Interventions manual therapy;neuromuscular re-education;ROM;strengthening;stretching   Clinical Impression   Criteria for Skilled Therapeutic Interventions Met yes, treatment indicated   PT Diagnosis LBP and R radiculopathy   Influenced by the following impairments obesity; hypomobile Lumbar spin; LE weakness; proximal weakness   Functional limitations due to impairments pain with gait and ADLs; lifting    Clinical Presentation Stable/Uncomplicated   Clinical Decision Making (Complexity) Low complexity   Therapy Frequency 1 time/week   Predicted Duration of Therapy Intervention (days/wks) 10 weeks   Risk & Benefits of therapy have been explained Yes   Patient, Family & other staff in agreement with plan of care Yes   Clinical Impression Comments Pt presents with decreased lumbar mobiity; LE and abdominal weakness; poor  posture and poor body mechanics; will benefit from skilled intervention to decrease pain, improve strength and functional mobility allowing her to resume previous activities without pain or difficulyt    Education Assessment   Preferred Learning Style Listening;Reading;Demonstration;Pictures/video   Barriers to Learning No barriers   ORTHO GOALS   PT Ortho Eval Goals 1;2;3;4   Ortho Goal 1   Goal Description work full day/full responsibilities with LBP or radicular pain   Target Date 06/07/22   Ortho Goal 2   Goal Description Independent in HEP incorporated into regular fitness routine    Target Date 06/07/22   Ortho Goal 3   Goal Description walk 2 miles with no increase in pain allowing her to continue cardio and wt loss program    Target Date 06/07/22   Ortho Goal 4   Goal Description demonstrate proper body mechanics lifting to complete house work; and sit posture thus avoiding future exacerbations of LBP   Target Date 06/07/22   Total Evaluation Time   PT Isidoro, Low Complexity Minutes (88281) 20

## 2022-04-06 LAB — PHQ9 SCORE: 9

## 2022-04-25 ENCOUNTER — TRANSFERRED RECORDS (OUTPATIENT)
Dept: HEALTH INFORMATION MANAGEMENT | Facility: CLINIC | Age: 52
End: 2022-04-25
Payer: COMMERCIAL

## 2022-05-09 DIAGNOSIS — E03.9 HYPOTHYROIDISM, UNSPECIFIED TYPE: ICD-10-CM

## 2022-05-12 RX ORDER — LEVOTHYROXINE SODIUM 75 UG/1
75 TABLET ORAL DAILY
Qty: 90 TABLET | Refills: 0 | Status: SHIPPED | OUTPATIENT
Start: 2022-05-12 | End: 2022-06-10

## 2022-05-12 NOTE — TELEPHONE ENCOUNTER
Sent Patient a [email or web address] message asking if she is still taking this as this was sent for a 6 month supply last July and she should have been out before today. Please check [email or web address] message for a response.     Dianne Rai RN BSN PHN

## 2022-05-12 NOTE — TELEPHONE ENCOUNTER
Routing refill request to provider for review/approval because:  A break in medication last RX on 7/26/21 for 6 months.    The patient reports she is taking it daily.    Thank you    Ольга NAPOLES RN

## 2022-05-21 ENCOUNTER — NURSE TRIAGE (OUTPATIENT)
Dept: NURSING | Facility: CLINIC | Age: 52
End: 2022-05-21
Payer: COMMERCIAL
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
## 2022-05-31 ENCOUNTER — TRANSFERRED RECORDS (OUTPATIENT)
Dept: HEALTH INFORMATION MANAGEMENT | Facility: CLINIC | Age: 52
End: 2022-05-31
Payer: COMMERCIAL

## 2022-07-01 ENCOUNTER — OFFICE VISIT (OUTPATIENT)
Dept: FAMILY MEDICINE | Facility: CLINIC | Age: 52
End: 2022-07-01
Payer: COMMERCIAL

## 2022-07-01 VITALS
HEIGHT: 65 IN | DIASTOLIC BLOOD PRESSURE: 64 MMHG | WEIGHT: 228 LBS | OXYGEN SATURATION: 94 % | TEMPERATURE: 97 F | SYSTOLIC BLOOD PRESSURE: 114 MMHG | RESPIRATION RATE: 18 BRPM | BODY MASS INDEX: 37.99 KG/M2 | HEART RATE: 84 BPM

## 2022-07-01 DIAGNOSIS — N92.4 PERIMENOPAUSAL MENORRHAGIA: ICD-10-CM

## 2022-07-01 DIAGNOSIS — Z23 HIGH PRIORITY FOR 2019-NCOV VACCINE: ICD-10-CM

## 2022-07-01 DIAGNOSIS — Z00.00 ROUTINE GENERAL MEDICAL EXAMINATION AT A HEALTH CARE FACILITY: Primary | ICD-10-CM

## 2022-07-01 DIAGNOSIS — Z12.4 SCREENING FOR CERVICAL CANCER: ICD-10-CM

## 2022-07-01 DIAGNOSIS — E03.9 HYPOTHYROIDISM, UNSPECIFIED TYPE: ICD-10-CM

## 2022-07-01 LAB
ANION GAP SERPL CALCULATED.3IONS-SCNC: 5 MMOL/L (ref 3–14)
BUN SERPL-MCNC: 17 MG/DL (ref 7–30)
CALCIUM SERPL-MCNC: 8.9 MG/DL (ref 8.5–10.1)
CHLORIDE BLD-SCNC: 108 MMOL/L (ref 94–109)
CHOLEST SERPL-MCNC: 244 MG/DL
CO2 SERPL-SCNC: 25 MMOL/L (ref 20–32)
CREAT SERPL-MCNC: 0.74 MG/DL (ref 0.52–1.04)
FASTING STATUS PATIENT QL REPORTED: NO
GFR SERPL CREATININE-BSD FRML MDRD: >90 ML/MIN/1.73M2
GLUCOSE BLD-MCNC: 101 MG/DL (ref 70–99)
HBA1C MFR BLD: 5.4 % (ref 0–5.6)
HDLC SERPL-MCNC: 54 MG/DL
LDLC SERPL CALC-MCNC: 159 MG/DL
NONHDLC SERPL-MCNC: 190 MG/DL
POTASSIUM BLD-SCNC: 3.9 MMOL/L (ref 3.4–5.3)
SODIUM SERPL-SCNC: 138 MMOL/L (ref 133–144)
TRIGL SERPL-MCNC: 154 MG/DL
TSH SERPL DL<=0.005 MIU/L-ACNC: 1.12 MU/L (ref 0.4–4)

## 2022-07-01 PROCEDURE — 80048 BASIC METABOLIC PNL TOTAL CA: CPT | Performed by: FAMILY MEDICINE

## 2022-07-01 PROCEDURE — 91306 COVID-19,PF,MODERNA (18+ YRS BOOSTER .25ML): CPT | Performed by: FAMILY MEDICINE

## 2022-07-01 PROCEDURE — 83036 HEMOGLOBIN GLYCOSYLATED A1C: CPT | Performed by: FAMILY MEDICINE

## 2022-07-01 PROCEDURE — 87624 HPV HI-RISK TYP POOLED RSLT: CPT | Performed by: FAMILY MEDICINE

## 2022-07-01 PROCEDURE — 99396 PREV VISIT EST AGE 40-64: CPT | Mod: 25 | Performed by: FAMILY MEDICINE

## 2022-07-01 PROCEDURE — 99213 OFFICE O/P EST LOW 20 MIN: CPT | Mod: 25 | Performed by: FAMILY MEDICINE

## 2022-07-01 PROCEDURE — G0145 SCR C/V CYTO,THINLAYER,RESCR: HCPCS | Performed by: FAMILY MEDICINE

## 2022-07-01 PROCEDURE — 80061 LIPID PANEL: CPT | Performed by: FAMILY MEDICINE

## 2022-07-01 PROCEDURE — 84443 ASSAY THYROID STIM HORMONE: CPT | Performed by: FAMILY MEDICINE

## 2022-07-01 PROCEDURE — 82306 VITAMIN D 25 HYDROXY: CPT | Performed by: FAMILY MEDICINE

## 2022-07-01 PROCEDURE — 36415 COLL VENOUS BLD VENIPUNCTURE: CPT | Performed by: FAMILY MEDICINE

## 2022-07-01 PROCEDURE — 0064A COVID-19,PF,MODERNA (18+ YRS BOOSTER .25ML): CPT | Performed by: FAMILY MEDICINE

## 2022-07-01 RX ORDER — GABAPENTIN 800 MG/1
TABLET ORAL
COMMUNITY
Start: 2022-06-20 | End: 2022-07-07

## 2022-07-01 RX ORDER — ERGOCALCIFEROL 1.25 MG/1
CAPSULE ORAL
COMMUNITY
Start: 2021-04-07 | End: 2023-11-22

## 2022-07-01 RX ORDER — GABAPENTIN 100 MG/1
CAPSULE ORAL
COMMUNITY
Start: 2022-06-13 | End: 2022-07-07

## 2022-07-01 RX ORDER — TRAZODONE HYDROCHLORIDE 50 MG/1
TABLET, FILM COATED ORAL
COMMUNITY
Start: 2022-06-28 | End: 2022-07-07

## 2022-07-01 RX ORDER — LEVOTHYROXINE SODIUM 75 UG/1
75 TABLET ORAL DAILY
Qty: 90 TABLET | Refills: 3 | Status: SHIPPED | OUTPATIENT
Start: 2022-07-01 | End: 2023-08-18

## 2022-07-01 RX ORDER — LORAZEPAM 1 MG/1
TABLET ORAL
COMMUNITY
Start: 2022-06-08

## 2022-07-01 ASSESSMENT — ENCOUNTER SYMPTOMS
SORE THROAT: 0
ABDOMINAL PAIN: 1
FEVER: 0
MYALGIAS: 1
SHORTNESS OF BREATH: 0
HEMATURIA: 0
DIZZINESS: 0
PARESTHESIAS: 0
DYSURIA: 0
DIARRHEA: 0
NAUSEA: 0
HEADACHES: 0
HEARTBURN: 0
CHILLS: 0
EYE PAIN: 0
FREQUENCY: 1
BREAST MASS: 1
WEAKNESS: 0
CONSTIPATION: 1
COUGH: 0
PALPITATIONS: 0
ARTHRALGIAS: 1
HEMATOCHEZIA: 0
NERVOUS/ANXIOUS: 1
JOINT SWELLING: 0

## 2022-07-01 ASSESSMENT — PAIN SCALES - GENERAL: PAINLEVEL: SEVERE PAIN (6)

## 2022-07-01 ASSESSMENT — PATIENT HEALTH QUESTIONNAIRE - PHQ9
SUM OF ALL RESPONSES TO PHQ QUESTIONS 1-9: 6
10. IF YOU CHECKED OFF ANY PROBLEMS, HOW DIFFICULT HAVE THESE PROBLEMS MADE IT FOR YOU TO DO YOUR WORK, TAKE CARE OF THINGS AT HOME, OR GET ALONG WITH OTHER PEOPLE: SOMEWHAT DIFFICULT
SUM OF ALL RESPONSES TO PHQ QUESTIONS 1-9: 6

## 2022-07-01 NOTE — PATIENT INSTRUCTIONS
"Health Maintenance reviewed and plan for update discussed.  Patient asked to schedule her mammogram [phone]  As 1 in 8 women are diagnosed with breast cancer in their lifetime, regular mammograms are important for early detection!      SHINGLES VACCINE/SHINGRIX  If you had chicken pox as a child, you are at risk of shingles (a painful rash with blisters that can sometimes lead to chronic nerve pain or blindness if it affects your eye).    The new Shingrix vaccine is supposed to be more effective at preventing shingles (98%).  Even if you had Zostavax (the original shingles vaccine), this is recommended. I encourage people to check with their pharmacy (or ours) and have them run it through to check the cost.  It's often better covered through your pharmacy benefit.      It is a two part vaccine series - one now and one in 2-6 months.  Many people will feel a bit \"flu like\" with fever and body aches for a few days after the injection.  Taking ibuprofen can help with this.      Our Clinic hours are:  Mondays    7:20 am - 7 pm  Tues -  Fri  7:20 am - 5 pm    Clinic Phone: [phone]    The clinic lab opens at 7:30 am Mon - Fri and appointments are required.    [facility]. [phone]  Monday  8 am - 7pm  Tues - Fri 8 am - 5:30 pm       Preventive Health Recommendations  Female Ages 50 - 64    Yearly exam: See your health care provider every year in order to  Review health changes.   Discuss preventive care.    Review your medicines if your doctor has prescribed any.    Get a Pap test every three years (unless you have an abnormal result and your provider advises testing more often).  If you get Pap tests with HPV test, you only need to test every 5 years, unless you have an abnormal result.   You do not need a Pap test if your uterus was removed (hysterectomy) and you have not had cancer.  You should be tested each year for STDs (sexually transmitted diseases) if you're at risk.   Have a " mammogram every 1 to 2 years.  Have a colonoscopy at age 50, or have a yearly FIT test (stool test). These exams screen for colon cancer.    Have a cholesterol test every 5 years, or more often if advised.  Have a diabetes test (fasting glucose) every three years. If you are at risk for diabetes, you should have this test more often.   If you are at risk for osteoporosis (brittle bone disease), think about having a bone density scan (DEXA).    Shots: Get a flu shot each year. Get a tetanus shot every 10 years.    Nutrition:   Eat at least 5 servings of fruits and vegetables each day.  Eat whole-grain bread, whole-wheat pasta and brown rice instead of white grains and rice.  Get adequate Calcium and Vitamin D.     Lifestyle  Exercise at least 150 minutes a week (30 minutes a day, 5 days a week). This will help you control your weight and prevent disease.  Limit alcohol to one drink per day.  No smoking.   Wear sunscreen to prevent skin cancer.   See your dentist every six months for an exam and cleaning.  See your eye doctor every 1 to 2 years.

## 2022-07-01 NOTE — PROGRESS NOTES
SUBJECTIVE:   CC: Bibi Song is an 52 year old woman who presents for preventive health visit.     Chief Complaint   Patient presents with     Physical     Health Maintenance     Patient is not fasting at this time. Patient will schedule a mammogram.      LAB REQUEST     Vitamin D levels     Imm/Inj     COVID-19 VACCINE       Patient has been advised of split billing requirements and indicates understanding: Yes  Healthy Habits:     Getting at least 3 servings of Calcium per day:  Yes    Bi-annual eye exam:  NO    Dental care twice a year:  Yes    Sleep apnea or symptoms of sleep apnea:  None    Diet:  Regular (no restrictions)    Frequency of exercise:  2-3 days/week    Duration of exercise:  Less than 15 minutes    Taking medications regularly:  Yes    Medication side effects:  None    PHQ-2 Total Score: 2    Additional concerns today:  Yes          Hypothyroidism Follow-up      Since last visit, patient describes the following symptoms: Weight stable, no hair loss, no skin changes, no constipation, no loose stools      Today's PHQ-2 Score:   PHQ-2 (  [employer]) 2022   Q1: Little interest or pleasure in doing things 1   Q2: Feeling down, depressed or hopeless 1   PHQ-2 Score 2   PHQ-2 Total Score (12-17 Years)- Positive if 3 or more points; Administer PHQ-A if positive -   Q1: Little interest or pleasure in doing things Several days   Q2: Feeling down, depressed or hopeless Several days   PHQ-2 Score 2       Abuse: Current or Past (Physical, Sexual or Emotional) - No  Do you feel safe in your environment? Yes        Social History     Tobacco Use     Smoking status: Former Smoker     Years: 5.00     Types: Cigarettes     Start date: 1988     Quit date: 1994     Years since quittin.5     Smokeless tobacco: Never Used   Substance Use Topics     Alcohol use: No     Comment: Treatment in 2007     If you drink alcohol do you typically have >3 drinks per day or >7 drinks per week?  No    Alcohol Use 7/1/2022   Prescreen: >3 drinks/day or >7 drinks/week? Not Applicable   Prescreen: >3 drinks/day or >7 drinks/week? -   No flowsheet data found.    Reviewed orders with patient.  Reviewed health maintenance and updated orders accordingly - Yes  Lab work is in process    Breast Cancer Screening:    FHS-7: No flowsheet data found.    Mammogram Screening: Recommended annual mammography  Pertinent mammograms are reviewed under the imaging tab.    History of abnormal Pap smear: NO - age 30-65 PAP every 5 years with negative HPV co-testing recommended  PAP / HPV Latest Ref Rng & Units 4/27/2017 1/23/2014 10/12/2011   PAP (Historical) - OTHER-NIL, See Result NIL NIL   HPV16 NEG Negative - -   HPV18 NEG Negative - -   HRHPV NEG Negative - -     Reviewed and updated as needed this visit by clinical staff   Tobacco  Allergies  Meds   Med Hx  Surg Hx  Fam Hx  Soc Hx          Reviewed and updated as needed this visit by Provider                       Review of Systems   Constitutional: Negative for chills and fever.   HENT: Negative for congestion, ear pain, hearing loss and sore throat.    Eyes: Positive for visual disturbance. Negative for pain.   Respiratory: Negative for cough and shortness of breath.    Cardiovascular: Negative for chest pain, palpitations and peripheral edema.   Gastrointestinal: Positive for abdominal pain and constipation. Negative for diarrhea, heartburn, hematochezia and nausea.   Breasts:  Positive for tenderness and breast mass. Negative for discharge.   Genitourinary: Positive for frequency. Negative for dysuria, genital sores, hematuria, pelvic pain, urgency, vaginal bleeding and vaginal discharge.   Musculoskeletal: Positive for arthralgias and myalgias. Negative for joint swelling.   Skin: Negative for rash.   Neurological: Negative for dizziness, weakness, headaches and paresthesias.   Psychiatric/Behavioral: Negative for mood changes. The patient is nervous/anxious.   "    Periods are every month, but they are quite heavy.       OBJECTIVE:   /64   Pulse 84   Temp 97  F (36.1  C) (Tympanic)   Resp 18   Ht 1.651 m (5' 5\")   Wt 103.4 kg (228 lb)   SpO2 94%   BMI 37.94 kg/m    Physical Exam  GENERAL: healthy, alert and no distress  NECK: no adenopathy, no asymmetry, masses, or scars and thyroid normal to palpation  RESP: lungs clear to auscultation - no rales, rhonchi or wheezes  CV: regular rate and rhythm, normal S1 S2, no S3 or S4, no murmur, click or rub, no peripheral edema and peripheral pulses strong  ABDOMEN: soft, nontender, no hepatosplenomegaly, no masses and bowel sounds normal   (female): normal female external genitalia, normal urethral meatus, vaginal mucosa, normal cervix/adnexa/uterus without masses or discharge  MS: no gross musculoskeletal defects noted, no edema  NEURO: Normal strength and tone, mentation intact and speech normal  PSYCH: mentation appears normal, affect normal/bright    Diagnostic Test Results:  Labs reviewed in Epic    ASSESSMENT/PLAN:   (Z00.00) Routine general medical examination at a health care facility  (primary encounter diagnosis)  Comment:    Plan: Lipid panel reflex to direct LDL Non-fasting,         TSH with free T4 reflex, Basic metabolic panel         (Ca, Cl, CO2, Creat, Gluc, K, Na, BUN),         Hemoglobin A1c             (E03.9) Hypothyroidism, unspecified type  Comment:    Plan: TSH with free T4 reflex, levothyroxine         (SYNTHROID/LEVOTHROID) 75 MCG tablet             (Z23) High priority for 2019-nCoV vaccine  Comment:    Plan: COVID-19,PF,MODERNA (18+ Yrs BOOSTER .25mL)             (Z12.4) Screening for cervical cancer  Comment:    Plan: Pap screen with HPV - recommended age 30 - 65         years             (N92.4) Perimenopausal menorrhagia  Comment:    Plan: US Pelvic Complete with Transvaginal               Patient has been advised of split billing requirements and indicates understanding: " "Yes    COUNSELING:  Reviewed preventive health counseling, as reflected in patient instructions       Regular exercise       Healthy diet/nutrition    Estimated body mass index is 37.94 kg/m  as calculated from the following:    Height as of this encounter: 1.651 m (5' 5\").    Weight as of this encounter: 103.4 kg (228 lb).    Weight management plan: Discussed healthy diet and exercise guidelines    She reports that she quit smoking about 28 years ago. Her smoking use included cigarettes. She started smoking about 33 years ago. She quit after 5.00 years of use. She has never used smokeless tobacco.      Counseling Resources:  ATP IV Guidelines  Pooled Cohorts Equation Calculator  Breast Cancer Risk Calculator  BRCA-Related Cancer Risk Assessment: FHS-7 Tool  FRAX Risk Assessment  ICSI Preventive Guidelines  Dietary Guidelines for Americans, 2010  USDA's MyPlate  ASA Prophylaxis  Lung CA Screening    Glory Singleton MD  [facility]  Answers for HPI/ROS submitted by the patient on 7/1/2022  If you checked off any problems, how difficult have these problems made it for you to do your work, take care of things at home, or get along with other people?: Somewhat difficult  PHQ9 TOTAL SCORE: 6      "

## 2022-07-02 LAB — DEPRECATED CALCIDIOL+CALCIFEROL SERPL-MC: 23 UG/L (ref 20–75)

## 2022-07-06 ENCOUNTER — TRANSFERRED RECORDS (OUTPATIENT)
Dept: FAMILY MEDICINE | Facility: CLINIC | Age: 52
End: 2022-07-06

## 2022-07-06 NOTE — ED PROVIDER NOTES
History     Chief Complaint   Patient presents with     Back Pain     started last night, spasming     HPI  Bibi Song is a 49 year old female who presents to urgent care for low back pain. Pain is right sided and radiates into her right buttock. Patient has had pain in this region before but feels a flare of this since last night. Describes intermittent spasms of the right side. Denies saddle anesthesia, bowel or bladder incontinence, lower extremity numbness/tingling/weakness.     No fall or trauma. No fevers. Otherwise feels well. Treating her pain with Tylenol.     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 Schoenecker, NP       Anxiety disorder 08/06/2010     Priority: Medium     Family history of diabetes mellitus 08/06/2010     Priority: Medium     Family history of thyroid disease 08/06/2010     Priority: Medium     ALCOHOL ABUSE, IN REMISSION 08/06/2010     Priority: Medium        Past Medical History:    Past Medical History:   Diagnosis Date     Anxiety      Chemical dependency (H)      Depressive disorder      Hoarseness      Hypothyroidism      Obesity      OCD (obsessive compulsive disorder)      PONV (postoperative nausea and vomiting)      Sleep apnea      Tracheal stenosis        Past Surgical History:    Past Surgical History:   Procedure  Laterality Date     INJECT STEROID (LOCATION) N/A 9/7/2018    Procedure: INJECT STEROID (LOCATION);;  Surgeon: Dusty Pearson MD;  [facility]     INJECT STEROID (LOCATION) N/A 10/5/2018    Procedure: INJECT STEROID (LOCATION);;  Surgeon: Dusty Pearson MD;  [facility]     INJECT STEROID (LOCATION) N/A 12/6/2018    Procedure: Steroid Injection;  Surgeon: Dusty Pearson MD;  [facility]     INJECT STEROID (LOCATION) N/A 2/1/2019    Procedure: Steroid Injection;  Surgeon: Dusty Pearson MD;  [facility]     LARYNGOSCOPY N/A 9/7/2018    Procedure: LARYNGOSCOPY;  Teleoscopic Direct Laryngoscopy with Balloon Dilation, Mitomycin Application and Steriod Injection ;  Surgeon: Dusty Pearson MD;  [facility]     LARYNGOSCOPY, ESOPHAGOSCOPY WITH DILATION, COMBINED N/A 10/5/2018    Procedure: COMBINED LARYNGOSCOPY, ESOPHAGOSCOPY WITH DILATION;  Direct Laryngoscopy with Teleoscope, Balloon Dilation, Application of Mitomycin and Steroid Injection ;  Surgeon: Dusty Pearson MD;  [facility]     LARYNGOSCOPY, ESOPHAGOSCOPY WITH DILATION, COMBINED N/A 12/6/2018    Procedure: Direct Laryngoscopy, Balloon Dilation, Mitomycin Application, Steroid Injection;  Surgeon: Dusty Pearson MD;  [facility]     LARYNGOSCOPY, ESOPHAGOSCOPY WITH DILATION, COMBINED N/A 2/1/2019    Procedure: Direct Laryngoscopy, Balloon Dilation, Mitomycin Application;  Surgeon: Dusty Pearson MD;  [facility]     RESECT TRACHEA WITH RECONSTRUCTION N/A 3/11/2019    Procedure: Tracheal Resection Via Cervical Approach;  Surgeon: Nick Beaulieu MD;  Location: [facility]       Family History:    Family History   Problem Relation Age of Onset     Diabetes Mother      Depression Mother         OCD     Neurologic Disorder Mother      Psychotic Disorder Mother      Respiratory Mother      Thyroid Disease Mother      Gynecology Mother          hysterectomy     Cerebrovascular Disease Mother         pacemaker     Substance Abuse Mother      Mental Illness Mother      Heart Disease Mother      Thrombosis Mother         no details available     Heart Disease Father         MI,   LATE 60'S     Hypertension Father      Prostate Cancer Father      Obesity Father      Diabetes Father      Cerebrovascular Disease Father      Substance Abuse Father      Depression Father      Thrombosis Father      Depression Brother      Thyroid Disease Brother      Depression Brother      Thyroid Disease Brother      Allergies Son      Asthma Son      Thyroid Disease Maternal Grandmother      Cancer Maternal Grandfather      Psychotic Disorder Paternal Grandmother      Thyroid Disease Paternal Grandfather      Diabetes Brother      Breast Cancer Maternal Aunt      Substance Abuse Brother      Mental Illness Brother      Depression Brother      Diabetes Brother      Mental Illness Son      Asthma Son      Depression Son      Stomach Problem Son        Social History:  Marital Status:   [2]  Social History     Tobacco Use     Smoking status: Former Smoker     Years: 5.00     Types: Cigarettes     Start date: 1988     Last attempt to quit: 1994     Years since quittin.6     Smokeless tobacco: Never Used   Substance Use Topics     Alcohol use: No     Comment: Treatment in      Drug use: No        Medications:      cyclobenzaprine (FLEXERIL) 10 MG tablet   acetaminophen (TYLENOL) 500 MG tablet   albuterol (PROVENTIL) (2.5 MG/3ML) 0.083% neb solution   buPROPion (WELLBUTRIN) 100 MG tablet   CALCIUM-MAGNESIUM-ZINC PO   FLOVENT DISKUS 50 MCG/BLIST inhaler   gabapentin (NEURONTIN) 300 MG capsule   ibuprofen (ADVIL/MOTRIN) 200 MG tablet   levothyroxine (SYNTHROID/LEVOTHROID) 75 MCG tablet   melatonin 3 MG tablet   omeprazole (PRILOSEC) 20 MG DR capsule   oxyCODONE (ROXICODONE) 5 MG/5ML solution   polyethylene glycol (MIRALAX/GLYCOLAX) packet   prazosin  (MINIPRESS) 1 MG capsule   sertraline (ZOLOFT) 100 MG tablet   VENTOLIN  (90 Base) MCG/ACT inhaler   vitamin B complex with vitamin C (VITAMIN  B COMPLEX) TABS tablet   ziprasidone (GEODON) 40 MG capsule         Review of Systems   Constitutional: Negative for chills and fever.   HENT: Negative for congestion.    Respiratory: Negative for cough.    Cardiovascular: Negative for chest pain.   Gastrointestinal: Negative for abdominal pain.   Musculoskeletal: Positive for back pain. Negative for neck pain.   Neurological: Negative for dizziness, weakness, light-headedness, numbness and headaches.       Physical Exam   BP: 117/81  Heart Rate: 93  Temp: 97.9  F (36.6  C)  Weight: 104.3 kg (230 lb)  SpO2: 94 %      Physical Exam  Appearance:  Alert, oriented. NAD.  Resp:  Lung sounds CTA.  CV:  RRR. No murmur.  BACK:  No midline back tenderness on exam.  There is diffuse right lumbar paraspinal muscle tenderness to palpation.  Normal lower extremity strength.  Gait is steady.      ED Course        Procedures               No results found for this or any previous visit (from the past 24 hour(s)).    Medications - No data to display    Assessments & Plan (with Medical Decision Making)   History and exam is consistent with muscle spasm.  No indication for emergent MRI imaging today as pt has no new trauma or neurologic symptoms or objective findings of weakness or signs of cauda equina on exam.No evidence of cauda equina.  Denies saddle anesthesia, bowel or bladder incontinence, lower extremity numbness/tingling/weakness.    Plan as follows:  Heat alternating with ice packs as discussed.  Start Physical Therapy. (referral sent)  Tylenol 650 mg every 4-6 hours as needed for pain.  Ibuprofen 400-600 mg every 6-8 hours as needed for pain.  Flexeril 10 mg three times a day as needed for muscle spasm.  Recheck with your regular doctor if  not improving in 1-2 weeks.    I have reviewed the nursing notes.    I have reviewed  the findings, diagnosis, plan and need for follow up with the patient.      Discharge Medication List as of 8/14/2019  1:32 PM      START taking these medications    Details   cyclobenzaprine (FLEXERIL) 10 MG tablet Take 1 tablet (10 mg) by mouth 3 times daily as needed for muscle spasms, Disp-20 tablet, R-0, E-Prescribe             Final diagnoses:   Spasm of back muscles - right side       8/14/2019   [facility] EMERGENCY DEPARTMENT     Zoe Hay APRN CNP  08/15/19 1021     baseline Cr 0.8, up to 1.36  - s/p gentle IVFs  - holding bp meds  resolved

## 2022-07-07 RX ORDER — ZIPRASIDONE HYDROCHLORIDE 20 MG/1
40 CAPSULE ORAL AT BEDTIME
Qty: 1 CAPSULE | Refills: 0 | COMMUNITY
Start: 2022-07-07

## 2022-07-07 RX ORDER — GABAPENTIN 800 MG/1
800 TABLET ORAL 3 TIMES DAILY
COMMUNITY
Start: 2022-07-07

## 2022-07-07 RX ORDER — GABAPENTIN 100 MG/1
100 CAPSULE ORAL 3 TIMES DAILY
COMMUNITY
Start: 2022-07-07

## 2022-07-08 LAB
BKR LAB AP GYN ADEQUACY: NORMAL
BKR LAB AP GYN INTERPRETATION: NORMAL
BKR LAB AP HPV REFLEX: NORMAL
BKR LAB AP PREVIOUS ABNORMAL: NORMAL
PATH REPORT.COMMENTS IMP SPEC: NORMAL
PATH REPORT.COMMENTS IMP SPEC: NORMAL
PATH REPORT.RELEVANT HX SPEC: NORMAL

## 2022-07-12 LAB
HUMAN PAPILLOMA VIRUS 16 DNA: NEGATIVE
HUMAN PAPILLOMA VIRUS 18 DNA: NEGATIVE
HUMAN PAPILLOMA VIRUS FINAL DIAGNOSIS: NORMAL
HUMAN PAPILLOMA VIRUS OTHER HR: NEGATIVE

## 2022-07-25 ENCOUNTER — ANCILLARY PROCEDURE (OUTPATIENT)
Dept: MAMMOGRAPHY | Facility: CLINIC | Age: 52
End: 2022-07-25
Attending: NURSE PRACTITIONER
Payer: COMMERCIAL

## 2022-07-25 DIAGNOSIS — Z12.31 VISIT FOR SCREENING MAMMOGRAM: ICD-10-CM

## 2022-07-25 LAB — PHQ9 SCORE: 11

## 2022-07-25 PROCEDURE — 77067 SCR MAMMO BI INCL CAD: CPT | Mod: TC | Performed by: RADIOLOGY

## 2022-07-25 PROCEDURE — 77063 BREAST TOMOSYNTHESIS BI: CPT | Mod: TC | Performed by: RADIOLOGY

## 2022-08-10 ENCOUNTER — TRANSFERRED RECORDS (OUTPATIENT)
Dept: FAMILY MEDICINE | Facility: CLINIC | Age: 52
End: 2022-08-10

## 2022-09-28 ENCOUNTER — TRANSFERRED RECORDS (OUTPATIENT)
Dept: FAMILY MEDICINE | Facility: CLINIC | Age: 52
End: 2022-09-28

## 2022-10-14 ENCOUNTER — TELEPHONE (OUTPATIENT)
Dept: FAMILY MEDICINE | Facility: CLINIC | Age: 52
End: 2022-10-14

## 2022-10-14 DIAGNOSIS — E03.9 HYPOTHYROIDISM, UNSPECIFIED TYPE: Primary | ICD-10-CM

## 2022-10-14 NOTE — TELEPHONE ENCOUNTER
I'd recommend a TSH with reflex in 2 months after being on the new diet, may just be something we need to watch more closely.    RN can order  Diagnosis code: hypothyroidism    Glory Singleton M.D.

## 2022-10-14 NOTE — TELEPHONE ENCOUNTER
Writer ordered lab per Dr Singleton.  Writer called patient and informed her that she can be on the diet and we should have a Thyroid lab done in two months and asked her to schedule a lab after 12/13/22.    Patient stated understanding.    Ricardo MCKEE [facility]

## 2022-10-14 NOTE — TELEPHONE ENCOUNTER
Reason for call:    Symptom or request:    Patient called stating that started a new diet that may interfere with her thyriod medication. The plan is called optavia -- snacks that are unprocess but contains a lot of wheat or soy-- patient unclear which ingredient.       Best Time:  any    Can we leave a detailed message on this number?  YES     Enriqueta GONZALEZ  Station

## 2022-10-14 NOTE — TELEPHONE ENCOUNTER
Writer called pateint  Patient is calling stating that she is beginning a new diet high in soy the diet plan is called Optavia  Patient does not intend to be on Optavia forever but has begun it and wants to be on it for a while  The Optavia diet did instruct the patient to consult with her Dr if she is on thyroid medications.    Routed to Dr Singleton for advisement    Ricardo MCKEE [facility]

## 2022-10-19 LAB — PHQ9 SCORE: 6

## 2022-10-27 ENCOUNTER — TRANSFERRED RECORDS (OUTPATIENT)
Dept: HEALTH INFORMATION MANAGEMENT | Facility: CLINIC | Age: 52
End: 2022-10-27

## 2022-11-20 ENCOUNTER — HEALTH MAINTENANCE LETTER (OUTPATIENT)
Age: 52
End: 2022-11-20

## 2022-12-14 ENCOUNTER — TRANSFERRED RECORDS (OUTPATIENT)
Dept: HEALTH INFORMATION MANAGEMENT | Facility: CLINIC | Age: 52
End: 2022-12-14

## 2022-12-24 ENCOUNTER — NURSE TRIAGE (OUTPATIENT)
Dept: NURSING | Facility: CLINIC | Age: 52
End: 2022-12-24

## 2022-12-24 NOTE — TELEPHONE ENCOUNTER
"Nurse Triage SBAR    Is this a 2nd Level Triage? NO    Situation: Pt called with concerns about an UC flare up.    Background: Bibi states she has been 'doing well for a long time'.    Assessment: Bibi states she has 'been gassy and smelly'. She also states she is having bright red bloody stools for the last couple of weeks that comes and goes. Bibi states she know she needs a colonoscopy and plans to schedule one but is hoping to be restarted on her balsalazide until then.     Protocol Recommended Disposition:   See PCP Within 3 Days    Recommendation: Bibi did not want to set up an appt yet until her PCP reached back out to her. Please review and contact pt at your earliest convenience.     Routed to provider    Reason for Disposition    MILD rectal bleeding (more than just a few drops or streaks)    Additional Information    Negative: Shock suspected (e.g., cold/pale/clammy skin, too weak to stand, low BP, rapid pulse)    Negative: Difficult to awaken or acting confused (e.g., disoriented, slurred speech)    Negative: Passed out (i.e., lost consciousness, collapsed and was not responding)    Negative: [1] Vomiting AND [2] contains red blood or black (\"coffee ground\") material  (Exception: few red streaks in vomit that only happened once)    Negative: Sounds like a life-threatening emergency to the triager    Negative: SEVERE rectal bleeding (large blood clots; constant or on and off bleeding)    Negative: SEVERE dizziness (e.g., unable to stand, requires support to walk, feels like passing out now)    Negative: [1] MODERATE rectal bleeding (small blood clots, passing blood without stool, or toilet water turns red) AND [2] more than once a day    Negative: Pale skin (pallor) of new-onset or worsening    Negative: Black or tarry bowel movements (Exception: chronic-unchanged black-grey bowel movements AND is taking iron pills or Pepto-bismol)    Negative: [1] Constant abdominal pain AND [2] present > 2 hours    " Negative: Rectal foreign body (i.e., now or within past week;  inserted or swallowed)    Negative: High-risk adult (e.g., prior surgery on aorta, abdominal aortic aneurysm)    Negative: Taking Coumadin (warfarin) or other strong blood thinner, or known bleeding disorder (e.g., thrombocytopenia)    Negative: Known cirrhosis of the liver (or history of liver failure or ascites)    Negative: [1] Colonoscopy AND [2] in past 72 hours    Negative: Patient sounds very sick or weak to the triager    Negative: MODERATE rectal bleeding (small blood clots, passing blood without stool, or toilet water turns red)    Protocols used: RECTAL BLEEDING-A-    Jaycee Jauregui RN  [facility] Nurse Advisor   12/24/2022  3:47 PM

## 2022-12-26 ENCOUNTER — TELEPHONE (OUTPATIENT)
Dept: FAMILY MEDICINE | Facility: CLINIC | Age: 52
End: 2022-12-26

## 2022-12-26 RX ORDER — BALSALAZIDE DISODIUM 750 MG/1
CAPSULE ORAL
Status: CANCELLED | OUTPATIENT
Start: 2022-12-26

## 2022-12-26 NOTE — TELEPHONE ENCOUNTER
This medication being originally prescribed from GI should be managed by them. She should reach out to them to discuss a refill.   SUSANNAH Somers CNP

## 2022-12-26 NOTE — TELEPHONE ENCOUNTER
"Provider Elenita responded with the follpowing routing comment:    \"Does she follow with a gastroenterologist for management of her UC? If so she should reach out to them to see what their recommendations would be as well. Dr. Singleton is out of office until next Monday.   Ana Luisa Kwong, APRN CNP \"    Writer did call patient to review provider Elenita's note and to get the patient scheduled to see a provider at the [facility].    Patient did not answer.    Writer left message requesting patient call back to clinic.    Ricardo MCKEE [facility]    "

## 2022-12-26 NOTE — TELEPHONE ENCOUNTER
Patient requested this medication in two separate related encounters today.  Medication was refused.  Patient was instructed to follow up with [facility] ( prescribed the medication two years ago) or to make an appointment for evaluation.  This encounter closed.    Ricardo MCKEE [facility]

## 2022-12-26 NOTE — TELEPHONE ENCOUNTER
Writer spoke to patient in related tele encounter.  Writer instructed patient to follow up with her GI, [facility].  Patient stated she would do that.    Ricardo MCKEE [facility]

## 2022-12-26 NOTE — TELEPHONE ENCOUNTER
Writer called patient.  Patient is followed by [facility].  Writer instructed patient to call [facility] and to tell them about her symptoms and medication request.  Patient stated that she would do that.    Ricardo MCKEE [facility]

## 2022-12-26 NOTE — TELEPHONE ENCOUNTER
Medication Question or Refill    Contacts       Type Contact Phone/Fax    12/26/2022 01:55 PM CST Phone (Incoming) Nohemi Bibi YVETTE (Self) [phone] (M)          What medication are you calling about (include dose and sig)?:     Controlled Substance Agreement on file:   CSA -- Patient Level:    CSA: None found at the patient level.       Who prescribed the medication?: GI Doctor    Do you need a refill? Yes:     When did you use the medication last? 2 months    Patient offered an appointment? No    Do you have any questions or concerns?  Yes:     Preferred Pharmacy:     [place], [place] - [address]  [address]  [address] [facility] [address]  Phone: [phone] Fax: [phone] Alternate Fax: [phone]      Could we send this information to you in [facility] or would you prefer to receive a phone call?:   Patient would prefer a phone call   Okay to leave a detailed message?: Yes at Cell number on file:    Telephone Information:   Mobile [phone]

## 2022-12-30 ENCOUNTER — TELEPHONE (OUTPATIENT)
Dept: FAMILY MEDICINE | Facility: CLINIC | Age: 52
End: 2022-12-30

## 2022-12-30 DIAGNOSIS — E66.01 MORBID OBESITY (H): Primary | ICD-10-CM

## 2022-12-30 DIAGNOSIS — K51.919 ULCERATIVE COLITIS WITH COMPLICATION, UNSPECIFIED LOCATION (H): ICD-10-CM

## 2022-12-30 NOTE — TELEPHONE ENCOUNTER
Symptoms    Describe your symptoms: started menopause symptoms. No periods since July. Should I be taking more vitamin d?  How long have you been having symptoms: moods are swinging terribly. Unable to sleep at night.     Have you been seen for this:  No    Appointment offered?: No    Home remedies tried: more vit D?  Please advise    Preferred Pharmacy:   [facility] [place], [place] - [address]  [address]  [address] [facility] [address]  Phone: [phone] Fax: [phone] Alternate Fax: [phone]      Could we send this information to you in [employer] or would you prefer to receive a phone call?:   Patient would prefer a phone call   Okay to leave a detailed message?: Yes at Home number on file [phone] (home)

## 2023-01-01 NOTE — TELEPHONE ENCOUNTER
Adult women should get between 8332-8135 international unit(s) of Vitamin D3 daily.      Glory Singleton M.D.

## 2023-01-03 NOTE — TELEPHONE ENCOUNTER
Left message below on Bibi's cell voicemail as it states in her demographics ok to leave a message on her cell.    Janice Palomo RN

## 2023-01-24 ENCOUNTER — TRANSFERRED RECORDS (OUTPATIENT)
Dept: HEALTH INFORMATION MANAGEMENT | Facility: CLINIC | Age: 53
End: 2023-01-24

## 2023-01-24 LAB
ALT SERPL-CCNC: 13 IU/L (ref 0–32)
AST SERPL-CCNC: 25 IU/L (ref 0–40)
CREATININE (EXTERNAL): 0.89 MG/DL (ref 0.57–1)
GFR ESTIMATED (EXTERNAL): 77 ML/MIN/1.73
GLUCOSE (EXTERNAL): 96 MG/DL (ref 70–99)
POTASSIUM (EXTERNAL): 3.4 MMOL/L (ref 3.5–5.2)

## 2023-03-29 ENCOUNTER — TRANSFERRED RECORDS (OUTPATIENT)
Dept: HEALTH INFORMATION MANAGEMENT | Facility: CLINIC | Age: 53
End: 2023-03-29

## 2023-06-06 ENCOUNTER — TRANSFERRED RECORDS (OUTPATIENT)
Dept: HEALTH INFORMATION MANAGEMENT | Facility: CLINIC | Age: 53
End: 2023-06-06
Payer: COMMERCIAL

## 2023-06-06 NOTE — TELEPHONE ENCOUNTER
Left message for patient to contact pharmacy for her medication.    Ольга NAPOLES RN     [As Noted in HPI] : as noted in HPI [Negative] : Respiratory

## 2023-06-19 LAB — PHQ9 SCORE: 15

## 2023-08-18 DIAGNOSIS — E03.9 HYPOTHYROIDISM, UNSPECIFIED TYPE: ICD-10-CM

## 2023-08-18 RX ORDER — LEVOTHYROXINE SODIUM 75 UG/1
75 TABLET ORAL DAILY
Qty: 30 TABLET | Refills: 0 | Status: SHIPPED | OUTPATIENT
Start: 2023-08-18 | End: 2023-09-19

## 2023-08-18 NOTE — TELEPHONE ENCOUNTER
"Has appointment scheduled for 8/24/23      Requested Prescriptions   Pending Prescriptions Disp Refills    levothyroxine (SYNTHROID/LEVOTHROID) 75 MCG tablet 90 tablet 3     Sig: Take 1 tablet (75 mcg) by mouth daily       Thyroid Protocol Failed - 8/18/2023 11:40 AM        Failed - Recent (12 mo) or future (30 days) visit within the authorizing provider's specialty     Patient has had an office visit with the authorizing provider or a provider within the authorizing providers department within the previous 12 mos or has a future within next 30 days. See \"Patient Info\" tab in inbasket, or \"Choose Columns\" in Meds & Orders section of the refill encounter.              Failed - Normal TSH on file in past 12 months     Recent Labs   Lab Test 07/01/22  1119   TSH 1.12              Passed - Patient is 12 years or older        Passed - Medication is active on med list        Passed - No active pregnancy on record     If patient is pregnant or has had a positive pregnancy test, please check TSH.          Passed - No positive pregnancy test in past 12 months     If patient is pregnant or has had a positive pregnancy test, please check TSH.               "

## 2023-08-28 ENCOUNTER — TRANSFERRED RECORDS (OUTPATIENT)
Dept: HEALTH INFORMATION MANAGEMENT | Facility: CLINIC | Age: 53
End: 2023-08-28
Payer: COMMERCIAL

## 2023-09-16 ENCOUNTER — HEALTH MAINTENANCE LETTER (OUTPATIENT)
Age: 53
End: 2023-09-16

## 2023-09-19 DIAGNOSIS — E03.9 HYPOTHYROIDISM, UNSPECIFIED TYPE: ICD-10-CM

## 2023-09-19 RX ORDER — LEVOTHYROXINE SODIUM 75 UG/1
75 TABLET ORAL DAILY
Qty: 30 TABLET | Refills: 0 | Status: SHIPPED | OUTPATIENT
Start: 2023-09-19 | End: 2023-10-20

## 2023-09-19 NOTE — TELEPHONE ENCOUNTER
"Requested Prescriptions   Pending Prescriptions Disp Refills    levothyroxine (SYNTHROID/LEVOTHROID) 75 MCG tablet 30 tablet 0     Sig: Take 1 tablet (75 mcg) by mouth daily       Thyroid Protocol Failed - 9/19/2023 11:22 AM        Failed - Recent (12 mo) or future (30 days) visit within the authorizing provider's specialty     Patient has had an office visit with the authorizing provider or a provider within the authorizing providers department within the previous 12 mos or has a future within next 30 days. See \"Patient Info\" tab in inbasket, or \"Choose Columns\" in Meds & Orders section of the refill encounter.              Failed - Normal TSH on file in past 12 months     Recent Labs   Lab Test 07/01/22  1119   TSH 1.12              Passed - Patient is 12 years or older        Passed - Medication is active on med list        Passed - No active pregnancy on record     If patient is pregnant or has had a positive pregnancy test, please check TSH.          Passed - No positive pregnancy test in past 12 months     If patient is pregnant or has had a positive pregnancy test, please check TSH.               "

## 2023-10-20 DIAGNOSIS — E03.9 HYPOTHYROIDISM, UNSPECIFIED TYPE: ICD-10-CM

## 2023-10-23 RX ORDER — LEVOTHYROXINE SODIUM 75 UG/1
75 TABLET ORAL DAILY
Qty: 30 TABLET | Refills: 0 | Status: SHIPPED | OUTPATIENT
Start: 2023-10-23 | End: 2023-11-22

## 2023-11-16 ENCOUNTER — NURSE TRIAGE (OUTPATIENT)
Dept: FAMILY MEDICINE | Facility: CLINIC | Age: 53
End: 2023-11-16
Payer: COMMERCIAL

## 2023-11-16 NOTE — TELEPHONE ENCOUNTER
Symptoms    Describe your symptoms: Pt has had dizziness X 1 mo and wants Dr. Singleton to send an Rx to pharmacy for her.  I DID tell pt that she will need an appt and she declined appt in [facility], where they have openings this am and she also declined an E-visit.  Please call patient and advise.      Any pain: No    How long have you been having symptoms: 1  months    Have you been seen for this:  No    Appointment offered?: Yes: Pt Declines appt in [facility]    Triage offered?: Yes:     Home remedies tried:     Preferred Pharmacy:   [facility] [place], [place] - [address]  [address]  [address] [facility] [address]  Phone: [phone] Fax: [phone] Alternate Fax: [phone]    Could we send this information to you [facility] or would you prefer to receive a phone call?:   Patient would prefer a phone call   Okay to leave a detailed message?: Yes at Cell number on file:    Telephone Information:   Mobile [phone]

## 2023-11-16 NOTE — TELEPHONE ENCOUNTER
Pt having dizziness on and off for 1 month, feels worse when she stands up quickly. Has not passed out. No chest pain, sob or weakness, pt does not want to make an appt or go to ER today. Offered same day in [place] tomorrow but pt declined. Due to work. Would like to try something OTC has been taking dramamine prn. Has an appt for next Wednesday.  Holley Lechuga RN on 11/16/2023 at 11:55 AM           Reason for Disposition   Lightheadedness (dizziness) present now, after 2 hours of rest and fluids    Additional Information   Negative: SEVERE difficulty breathing (e.g., struggling for each breath, speaks in single words)   Negative: Shock suspected (e.g., cold/pale/clammy skin, too weak to stand, low BP, rapid pulse)   Negative: Difficult to awaken or acting confused (e.g., disoriented, slurred speech)   Negative: Fainted, and still feels dizzy afterwards   Negative: Overdose (accidental or intentional) of medications   Negative: New neurologic deficit that is present now: * Weakness of the face, arm, or leg on one side of the body * Numbness of the face, arm, or leg on one side of the body * Loss of speech or garbled speech   Negative: Heart beating < 50 beats per minute OR > 140 beats per minute   Negative: Sounds like a life-threatening emergency to the triager   Negative: Chest pain   Negative: Rectal bleeding, bloody stool, or tarry-black stool   Negative: Vomiting is main symptom   Negative: Diarrhea is main symptom   Negative: Headache is main symptom   Negative: Heat exhaustion suspected (i.e., dehydration from heat exposure)   Negative: Patient states that they are having an anxiety or panic attack   Negative: Dizziness from low blood sugar (i.e., < 60 mg/dl or 3.5 mmol/l)   Negative: SEVERE dizziness (e.g., unable to stand, requires support to walk, feels like passing out now)   Negative: SEVERE headache or neck pain   Negative: Spinning or tilting sensation (vertigo) present now and one or more stroke risk  factors (i.e., hypertension, diabetes mellitus, prior stroke/TIA, heart attack, age over 60) (Exception: Prior physician evaluation for this AND no different/worse than usual.)   Negative: Neurologic deficit that was brief (now gone), ANY of the following:* Weakness of the face, arm, or leg on one side of the body* Numbness of the face, arm, or leg on one side of the body* Loss of speech or garbled speech   Negative: Loss of vision or double vision  (Exception: Similar to previous migraines.)   Negative: Extra heartbeats, irregular heart beating, or heart is beating very fast (i.e., 'palpitations')   Negative: Difficulty breathing   Negative: Drinking very little and dehydration suspected (e.g., no urine > 12 hours, very dry mouth, very lightheaded)   Negative: Follows bleeding (e.g., stomach, rectum, vagina)  (Exception: Became dizzy from sight of small amount blood.)   Negative: Patient sounds very sick or weak to the triager   Negative: Fever > 103 F (39.4 C)   Negative: Fever > 100.0 F (37.8 C) and has diabetes mellitus or a weak immune system (e.g., HIV positive, cancer chemotherapy, organ transplant, splenectomy, chronic steroids)   Negative: Spinning or tilting sensation (vertigo) present now    Protocols used: Dizziness-A-[place]

## 2023-11-21 ENCOUNTER — TRANSFERRED RECORDS (OUTPATIENT)
Dept: HEALTH INFORMATION MANAGEMENT | Facility: CLINIC | Age: 53
End: 2023-11-21
Payer: COMMERCIAL

## 2023-11-22 ENCOUNTER — MYC REFILL (OUTPATIENT)
Dept: FAMILY MEDICINE | Facility: CLINIC | Age: 53
End: 2023-11-22

## 2023-11-22 ENCOUNTER — LAB (OUTPATIENT)
Dept: LAB | Facility: CLINIC | Age: 53
End: 2023-11-22
Payer: COMMERCIAL

## 2023-11-22 ENCOUNTER — OFFICE VISIT (OUTPATIENT)
Dept: FAMILY MEDICINE | Facility: CLINIC | Age: 53
End: 2023-11-22
Payer: COMMERCIAL

## 2023-11-22 VITALS
DIASTOLIC BLOOD PRESSURE: 74 MMHG | WEIGHT: 239.19 LBS | HEART RATE: 74 BPM | OXYGEN SATURATION: 98 % | BODY MASS INDEX: 39.85 KG/M2 | RESPIRATION RATE: 20 BRPM | SYSTOLIC BLOOD PRESSURE: 132 MMHG | HEIGHT: 65 IN | TEMPERATURE: 97 F

## 2023-11-22 DIAGNOSIS — Z00.00 ROUTINE GENERAL MEDICAL EXAMINATION AT A HEALTH CARE FACILITY: Primary | ICD-10-CM

## 2023-11-22 DIAGNOSIS — E03.9 HYPOTHYROIDISM, UNSPECIFIED TYPE: ICD-10-CM

## 2023-11-22 DIAGNOSIS — N95.1 PERIMENOPAUSAL: ICD-10-CM

## 2023-11-22 DIAGNOSIS — N92.4 PERIMENOPAUSAL MENORRHAGIA: ICD-10-CM

## 2023-11-22 DIAGNOSIS — E78.5 HYPERLIPIDEMIA LDL GOAL <160: ICD-10-CM

## 2023-11-22 LAB
CHOLEST SERPL-MCNC: 305 MG/DL
FSH SERPL IRP2-ACNC: 50.6 MIU/ML
HDLC SERPL-MCNC: 56 MG/DL
HGB BLD-MCNC: 13.4 G/DL (ref 11.7–15.7)
LDLC SERPL CALC-MCNC: 207 MG/DL
NONHDLC SERPL-MCNC: 249 MG/DL
T4 FREE SERPL-MCNC: 0.97 NG/DL (ref 0.9–1.7)
TRIGL SERPL-MCNC: 211 MG/DL
TSH SERPL DL<=0.005 MIU/L-ACNC: 4.44 UIU/ML (ref 0.3–4.2)

## 2023-11-22 PROCEDURE — 84439 ASSAY OF FREE THYROXINE: CPT

## 2023-11-22 PROCEDURE — 80061 LIPID PANEL: CPT

## 2023-11-22 PROCEDURE — 90480 ADMN SARSCOV2 VAC 1/ONLY CMP: CPT | Performed by: FAMILY MEDICINE

## 2023-11-22 PROCEDURE — 84443 ASSAY THYROID STIM HORMONE: CPT

## 2023-11-22 PROCEDURE — 36415 COLL VENOUS BLD VENIPUNCTURE: CPT

## 2023-11-22 PROCEDURE — 90682 RIV4 VACC RECOMBINANT DNA IM: CPT | Performed by: FAMILY MEDICINE

## 2023-11-22 PROCEDURE — 99214 OFFICE O/P EST MOD 30 MIN: CPT | Mod: 25 | Performed by: FAMILY MEDICINE

## 2023-11-22 PROCEDURE — 85018 HEMOGLOBIN: CPT

## 2023-11-22 PROCEDURE — 91320 SARSCV2 VAC 30MCG TRS-SUC IM: CPT | Performed by: FAMILY MEDICINE

## 2023-11-22 PROCEDURE — 99396 PREV VISIT EST AGE 40-64: CPT | Mod: 25 | Performed by: FAMILY MEDICINE

## 2023-11-22 PROCEDURE — 83001 ASSAY OF GONADOTROPIN (FSH): CPT

## 2023-11-22 PROCEDURE — 90471 IMMUNIZATION ADMIN: CPT | Performed by: FAMILY MEDICINE

## 2023-11-22 RX ORDER — LEVOTHYROXINE SODIUM 75 UG/1
75 TABLET ORAL DAILY
Qty: 90 TABLET | Refills: 3 | Status: CANCELLED | OUTPATIENT
Start: 2023-11-22

## 2023-11-22 RX ORDER — LEVOTHYROXINE SODIUM 75 UG/1
75 TABLET ORAL DAILY
Qty: 90 TABLET | Refills: 3 | Status: SHIPPED | OUTPATIENT
Start: 2023-11-22 | End: 2023-11-24 | Stop reason: DRUGHIGH

## 2023-11-22 ASSESSMENT — PATIENT HEALTH QUESTIONNAIRE - PHQ9
10. IF YOU CHECKED OFF ANY PROBLEMS, HOW DIFFICULT HAVE THESE PROBLEMS MADE IT FOR YOU TO DO YOUR WORK, TAKE CARE OF THINGS AT HOME, OR GET ALONG WITH OTHER PEOPLE: SOMEWHAT DIFFICULT
5. POOR APPETITE OR OVEREATING: NOT AT ALL
SUM OF ALL RESPONSES TO PHQ QUESTIONS 1-9: 15
SUM OF ALL RESPONSES TO PHQ QUESTIONS 1-9: 15

## 2023-11-22 ASSESSMENT — ENCOUNTER SYMPTOMS
EYE PAIN: 1
FREQUENCY: 1
MYALGIAS: 1
HEARTBURN: 1
DYSURIA: 0
HEMATOCHEZIA: 0
ABDOMINAL PAIN: 1
PALPITATIONS: 0
ARTHRALGIAS: 1
HEMATURIA: 0
DIARRHEA: 1
HEADACHES: 0
COUGH: 0
NAUSEA: 0
CONSTIPATION: 1
PARESTHESIAS: 0
DIZZINESS: 1
JOINT SWELLING: 0
WEAKNESS: 0
BREAST MASS: 0
FEVER: 0
NERVOUS/ANXIOUS: 0
SHORTNESS OF BREATH: 0
CHILLS: 0
SORE THROAT: 0

## 2023-11-22 ASSESSMENT — PAIN SCALES - GENERAL: PAINLEVEL: EXTREME PAIN (9)

## 2023-11-22 ASSESSMENT — ANXIETY QUESTIONNAIRES
1. FEELING NERVOUS, ANXIOUS, OR ON EDGE: NOT AT ALL
GAD7 TOTAL SCORE: 6
7. FEELING AFRAID AS IF SOMETHING AWFUL MIGHT HAPPEN: MORE THAN HALF THE DAYS
2. NOT BEING ABLE TO STOP OR CONTROL WORRYING: MORE THAN HALF THE DAYS
3. WORRYING TOO MUCH ABOUT DIFFERENT THINGS: MORE THAN HALF THE DAYS
6. BECOMING EASILY ANNOYED OR IRRITABLE: NOT AT ALL
GAD7 TOTAL SCORE: 6
5. BEING SO RESTLESS THAT IT IS HARD TO SIT STILL: NOT AT ALL

## 2023-11-22 NOTE — PROGRESS NOTES
SUBJECTIVE:   Bibi is a 53 year old, presenting for the following:  Physical        2023     7:01 AM   Additional Questions   Roomed by Yun valenzuela CMA   Accompanied by Self       Healthy Habits:     Getting at least 3 servings of Calcium per day:  Yes    Bi-annual eye exam:  NO    Dental care twice a year:  Yes    Sleep apnea or symptoms of sleep apnea:  None    Diet:  Regular (no restrictions)    Frequency of exercise:  None    Taking medications regularly:  Yes    Medication side effects:  Muscle aches and Lightheadedness    Additional concerns today:  No      Today's PHQ-9 Score:       2023     5:33 AM   PHQ-9 SCORE   PHQ-9 Total Score MyChart 15 (Moderately severe depression)   PHQ-9 Total Score 15     - Nerve pain on left side of tongue due to cutting it on broken tooth. She is rinsing with salt water and using oral gel with minimal improvement.    - Irregular periods. LMS: 2023    - Her mom passed away this past August    Depression and Anxiety Follow-Up  Wellbutrin XL  150mg every day, zoloft 200mg every day, ativan 1mg qd  How are you doing with your depression since your last visit? Worsened somewhat  How are you doing with your anxiety since your last visit?  No change  Are you having other symptoms that might be associated with depression or anxiety? Yes:  sleep problems  Have you had a significant life event? Mom passed away   Do you have any concerns with your use of alcohol or other drugs? No    Social History     Tobacco Use    Smoking status: Former     Years: 5     Types: Cigarettes     Start date: 1988     Quit date: 1994     Years since quittin.9    Smokeless tobacco: Never   Vaping Use    Vaping Use: Never used   Substance Use Topics    Alcohol use: No     Comment: Treatment in     Drug use: No         2022    11:20 AM 2022    10:37 AM 2023     5:33 AM   PHQ   PHQ-9 Total Score 9 6 15   Q9: Thoughts of better off dead/self-harm past 2 weeks Not  at all Not at all Not at all         12/3/2021     5:02 AM 2022    11:20 AM 2023     7:08 AM   NARCISA-7 SCORE   Total Score 7 (mild anxiety) 5 (mild anxiety)    Total Score 7 5 6         2023     5:33 AM   Last PHQ-9   1.  Little interest or pleasure in doing things 2   2.  Feeling down, depressed, or hopeless 2   3.  Trouble falling or staying asleep, or sleeping too much 3   4.  Feeling tired or having little energy 2   5.  Poor appetite or overeating 2   6.  Feeling bad about yourself 1   7.  Trouble concentrating 2   8.  Moving slowly or restless 1   Q9: Thoughts of better off dead/self-harm past 2 weeks 0   PHQ-9 Total Score 15         2023     7:08 AM   NARCISA-7    1. Feeling nervous, anxious, or on edge 0   2. Not being able to stop or control worrying 2   3. Worrying too much about different things 2   4. Trouble relaxing 0   5. Being so restless that it is hard to sit still 0   6. Becoming easily annoyed or irritable 0   7. Feeling afraid, as if something awful might happen 2   NARCISA-7 Total Score 6       Suicide Assessment Five-step Evaluation and Treatment (SAFE-T)    Hypothyroidism Follow-up  Levothyroxine 75mcg qd  Since last visit, patient describes the following symptoms: constipation, loose stools, and fatigue    TSH   Date Value Ref Range Status   2022 1.12 0.40 - 4.00 mU/L Final   2021 1.82 0.40 - 4.00 mU/L Final     T4 Free   Date Value Ref Range Status   2021 0.89 0.76 - 1.46 ng/dL Final       Social History     Tobacco Use    Smoking status: Former     Years: 5     Types: Cigarettes     Start date: 1988     Quit date: 1994     Years since quittin.9    Smokeless tobacco: Never   Substance Use Topics    Alcohol use: No     Comment: Treatment in 2023     5:37 AM   Alcohol Use   Prescreen: >3 drinks/day or >7 drinks/week? Not Applicable     Reviewed orders with patient.  Reviewed health maintenance and updated orders accordingly  - Yes  Lab work is in process    Breast Cancer Screening:    FHS-7:       7/25/2022     8:53 AM   Breast CA Risk Assessment (FHS-7)   Did any of your first-degree relatives have breast or ovarian cancer? Yes   Did any of your relatives have bilateral breast cancer? No   Did any man in your family have breast cancer? No   Did any woman in your family have breast and ovarian cancer? No   Did any woman in your family have breast cancer before age 50 y? No   Do you have 2 or more relatives with breast and/or ovarian cancer? No   Do you have 2 or more relatives with breast and/or bowel cancer? No       Mammogram Screening: Recommended annual mammography  Pertinent mammograms are reviewed under the imaging tab.    History of abnormal Pap smear: NO - age 30-65 PAP every 5 years with negative HPV co-testing recommended      Latest Ref Rng & Units 7/1/2022    11:06 AM 4/27/2017    10:00 AM 4/27/2017     9:42 AM   PAP / HPV   PAP  Negative for Intraepithelial Lesion or Malignancy (NILM)      PAP (Historical)    OTHER-NIL, See Result    HPV 16 DNA Negative Negative  Negative     HPV 18 DNA Negative Negative  Negative     Other HR HPV Negative Negative  Negative       Reviewed and updated as needed this visit by clinical staff   Tobacco  Allergies  Meds              Reviewed and updated as needed this visit by Provider                     Review of Systems   Constitutional:  Negative for chills and fever.   HENT:  Positive for congestion and hearing loss. Negative for ear pain and sore throat.    Eyes:  Positive for pain and visual disturbance.   Respiratory:  Negative for cough and shortness of breath.    Cardiovascular:  Negative for chest pain, palpitations and peripheral edema.   Gastrointestinal:  Positive for abdominal pain, constipation, diarrhea and heartburn. Negative for hematochezia and nausea.   Breasts:  Positive for tenderness. Negative for breast mass and discharge.   Genitourinary:  Positive for frequency.  "Negative for dysuria, genital sores, hematuria, pelvic pain, urgency, vaginal bleeding and vaginal discharge.   Musculoskeletal:  Positive for arthralgias and myalgias. Negative for joint swelling.   Skin:  Negative for rash.   Neurological:  Positive for dizziness. Negative for weakness, headaches and paresthesias.   Psychiatric/Behavioral:  Negative for mood changes. The patient is not nervous/anxious.      Continues to have menstrual cycles, more irregular now but still heavy and painful.  Didn't get the pelvic US I ordered at her last visit 7/2022.       OBJECTIVE:   /74   Pulse 74   Temp 97  F (36.1  C) (Tympanic)   Resp 20   Ht 1.651 m (5' 5\")   Wt 108.5 kg (239 lb 3 oz)   LMP 11/07/2023 (Approximate)   SpO2 98%   BMI 39.80 kg/m    Physical Exam  GENERAL: healthy, alert and no distress  HENT: normal cephalic/atraumatic, ear canals and TM's normal, nose and mouth without ulcers or lesions, oral mucous membranes moist, and sore on the left side of her tongue (from a recent tooth fracture that was rubbing on the area)  NECK: no adenopathy, no asymmetry, masses, or scars and thyroid normal to palpation  RESP: lungs clear to auscultation - no rales, rhonchi or wheezes  CV: regular rate and rhythm, normal S1 S2, no S3 or S4, no murmur, click or rub, no peripheral edema and peripheral pulses strong  ABDOMEN: soft, nontender, no hepatosplenomegaly, no masses and bowel sounds normal  MS: no gross musculoskeletal defects noted, no edema  PSYCH: mentation appears normal, affect normal/bright    Diagnostic Test Results:  Labs reviewed in Epic    ASSESSMENT/PLAN:   (Z00.00) Routine general medical examination at a health care facility  (primary encounter diagnosis)  Comment:    Plan:      (E78.5) Hyperlipidemia LDL goal <160  Comment:    Plan: Lipid panel reflex to direct LDL Non-fasting             (E03.9) Hypothyroidism, unspecified type  Comment: clinically euthyroid  Plan: TSH WITH FREE T4 REFLEX, " levothyroxine         (SYNTHROID/LEVOTHROID) 75 MCG tablet             (N95.1) Perimenopausal  Comment:    Plan: Follicle stimulating hormone             (N92.4) Perimenopausal menorrhagia  Comment: will check FSH today. We discussed that at her age with heavy cycles, may require more of an evaluation with ultrasound and EMB.   Refer to gyn  If FSH is in the menopausal range, this becomes a much more urgent matter.   Plan: Ob/Gyn  Referral, US Pelvic Complete         with Transvaginal, Hemoglobin             Patient has been advised of split billing requirements and indicates understanding: Yes      COUNSELING:  Reviewed preventive health counseling, as reflected in patient instructions       Regular exercise       Healthy diet/nutrition        She reports that she quit smoking about 29 years ago. Her smoking use included cigarettes. She started smoking about 35 years ago. She has never used smokeless tobacco.        Glory Singleton MD  [facility]

## 2023-11-22 NOTE — RESULT ENCOUNTER NOTE
Bibi,    These labs are normal or acceptable.    Please contact my office if you have questions.    Glory Singleton M.D.

## 2023-11-24 DIAGNOSIS — E03.9 HYPOTHYROIDISM, UNSPECIFIED TYPE: ICD-10-CM

## 2023-11-24 DIAGNOSIS — E78.5 HYPERLIPIDEMIA LDL GOAL <160: Primary | ICD-10-CM

## 2023-11-24 RX ORDER — LEVOTHYROXINE SODIUM 88 UG/1
88 TABLET ORAL DAILY
Qty: 90 TABLET | Refills: 0 | Status: SHIPPED | OUTPATIENT
Start: 2023-11-24 | End: 2024-09-23

## 2023-11-24 RX ORDER — ROSUVASTATIN CALCIUM 20 MG/1
20 TABLET, COATED ORAL DAILY
Qty: 90 TABLET | Refills: 3 | Status: SHIPPED | OUTPATIENT
Start: 2023-11-24

## 2023-11-24 NOTE — RESULT ENCOUNTER NOTE
Call patient:  1.  Blood work shows that you are in menopause.  This makes the bleeding more important to evaluate.  Have the pelvic ultrasound and see OB/GYN soon.  2.  Cholesterol is very high.  LDL or bad cholesterol is >200 and total cholesterol is over 300.  We need to start Rosuvastatin 20 mg daily. Prescription sent to pharmacy.  3.  TSH is mildly elevated and free T4 is on the low side. I would increase levothyroxine dose to 88 mcg daily.  New prescription sent to pharmacy.  Recheck TSH and lipids in 3 months.    Glory Singleton M.D.

## 2023-12-20 ENCOUNTER — TRANSFERRED RECORDS (OUTPATIENT)
Dept: HEALTH INFORMATION MANAGEMENT | Facility: CLINIC | Age: 53
End: 2023-12-20
Payer: COMMERCIAL

## 2024-01-24 ENCOUNTER — TRANSFERRED RECORDS (OUTPATIENT)
Dept: HEALTH INFORMATION MANAGEMENT | Facility: CLINIC | Age: 54
End: 2024-01-24
Payer: COMMERCIAL

## 2024-02-22 DIAGNOSIS — E03.9 HYPOTHYROIDISM, UNSPECIFIED TYPE: ICD-10-CM

## 2024-02-22 RX ORDER — LEVOTHYROXINE SODIUM 88 UG/1
88 TABLET ORAL DAILY
Qty: 90 TABLET | Refills: 0 | OUTPATIENT
Start: 2024-02-22

## 2024-02-22 NOTE — TELEPHONE ENCOUNTER
Overdue for needed care. Please call to schedule lab only appointment to recheck TSH/lipids. Once appt is scheduled, route back to the pool.

## 2024-03-01 ENCOUNTER — LAB (OUTPATIENT)
Dept: LAB | Facility: CLINIC | Age: 54
End: 2024-03-01
Payer: COMMERCIAL

## 2024-03-01 DIAGNOSIS — E03.9 HYPOTHYROIDISM, UNSPECIFIED TYPE: ICD-10-CM

## 2024-03-01 DIAGNOSIS — E78.5 HYPERLIPIDEMIA LDL GOAL <160: ICD-10-CM

## 2024-03-01 LAB
CHOLEST SERPL-MCNC: 171 MG/DL
FASTING STATUS PATIENT QL REPORTED: YES
HDLC SERPL-MCNC: 58 MG/DL
LDLC SERPL CALC-MCNC: 90 MG/DL
NONHDLC SERPL-MCNC: 113 MG/DL
T4 FREE SERPL-MCNC: 0.9 NG/DL (ref 0.9–1.7)
TRIGL SERPL-MCNC: 115 MG/DL
TSH SERPL DL<=0.005 MIU/L-ACNC: 4.44 UIU/ML (ref 0.3–4.2)

## 2024-03-01 PROCEDURE — 80061 LIPID PANEL: CPT

## 2024-03-01 PROCEDURE — 36415 COLL VENOUS BLD VENIPUNCTURE: CPT

## 2024-03-01 PROCEDURE — 84439 ASSAY OF FREE THYROXINE: CPT

## 2024-03-01 PROCEDURE — 84443 ASSAY THYROID STIM HORMONE: CPT

## 2024-03-01 RX ORDER — LEVOTHYROXINE SODIUM 88 UG/1
88 TABLET ORAL DAILY
Qty: 90 TABLET | Refills: 0 | Status: CANCELLED | OUTPATIENT
Start: 2024-03-01

## 2024-03-01 NOTE — TELEPHONE ENCOUNTER
Pending Prescriptions:                       Disp   Refills    levothyroxine (SYNTHROID/LEVOTHROID) 88 M*90 tab*0            Sig: Take 1 tablet (88 mcg) by mouth daily  Refused Prescriptions:                       Disp   Refills    levothyroxine (SYNTHROID/LEVOTHROID) 88 MC*90 tab*0        Sig: Take 1 tablet (88 mcg) by mouth daily  Refused By: BEHRENDT, JULIE A  Reason for Refusal: Patient needs appointment    Pt was seen today for lab work      Katerine Parker on 3/1/2024 at 11:20 AM

## 2024-03-01 NOTE — TELEPHONE ENCOUNTER
Routing refill request to provider for review/approval because:  Labs out of range:  TSH pending    Pending Prescriptions:                       Disp   Refills    levothyroxine (SYNTHROID/LEVOTHROID) 88 M*90 tab*0            Sig: Take 1 tablet (88 mcg) by mouth daily  Refused Prescriptions:                       Disp   Refills    levothyroxine (SYNTHROID/LEVOTHROID) 88 MC*90 tab*0        Sig: Take 1 tablet (88 mcg) by mouth daily  Refused By: BEHRENDT, JULIE A  Reason for Refusal: Patient needs appointment    Requested Prescriptions   Pending Prescriptions Disp Refills    levothyroxine (SYNTHROID/LEVOTHROID) 88 MCG tablet 90 tablet 0     Sig: Take 1 tablet (88 mcg) by mouth daily       Thyroid Protocol Failed - 3/1/2024 11:21 AM        Failed - Normal TSH on file in past 12 months     Recent Labs   Lab Test 11/22/23  0729   TSH 4.44*              Passed - Patient is 12 years or older        Passed - Recent (12 mo) or future (30 days) visit within the authorizing provider's specialty     The patient must have completed an in-person or virtual visit within the past 12 months or has a future visit scheduled within the next 90 days with the authorizing provider s specialty.  Urgent care and e-visits do not quality as an office visit for this protocol.          Passed - Medication is active on med list        Passed - No active pregnancy on record     If patient is pregnant or has had a positive pregnancy test, please check TSH.          Passed - No positive pregnancy test in past 12 months     If patient is pregnant or has had a positive pregnancy test, please check TSH.           Refused Prescriptions Disp Refills    levothyroxine (SYNTHROID/LEVOTHROID) 88 MCG tablet 90 tablet 0     Sig: Take 1 tablet (88 mcg) by mouth daily       Thyroid Protocol Failed - 3/1/2024 11:21 AM        Failed - Normal TSH on file in past 12 months     Recent Labs   Lab Test 11/22/23  0729   TSH 4.44*              Passed - Patient is 12  years or older        Passed - Recent (12 mo) or future (30 days) visit within the authorizing provider's specialty     The patient must have completed an in-person or virtual visit within the past 12 months or has a future visit scheduled within the next 90 days with the authorizing provider s specialty.  Urgent care and e-visits do not quality as an office visit for this protocol.          Passed - Medication is active on med list        Passed - No active pregnancy on record     If patient is pregnant or has had a positive pregnancy test, please check TSH.          Passed - No positive pregnancy test in past 12 months     If patient is pregnant or has had a positive pregnancy test, please check TSH.             Petty Paz RN on 3/1/2024 at 11:41 AM

## 2024-03-04 RX ORDER — LEVOTHYROXINE SODIUM 100 UG/1
100 TABLET ORAL DAILY
Qty: 90 TABLET | Refills: 0 | Status: SHIPPED | OUTPATIENT
Start: 2024-03-04 | End: 2024-06-10

## 2024-04-01 ENCOUNTER — TRANSFERRED RECORDS (OUTPATIENT)
Dept: HEALTH INFORMATION MANAGEMENT | Facility: CLINIC | Age: 54
End: 2024-04-01
Payer: COMMERCIAL

## 2024-05-01 ENCOUNTER — TRANSFERRED RECORDS (OUTPATIENT)
Dept: HEALTH INFORMATION MANAGEMENT | Facility: CLINIC | Age: 54
End: 2024-05-01
Payer: COMMERCIAL

## 2024-06-10 DIAGNOSIS — E03.9 HYPOTHYROIDISM, UNSPECIFIED TYPE: ICD-10-CM

## 2024-06-10 RX ORDER — LEVOTHYROXINE SODIUM 88 UG/1
88 TABLET ORAL DAILY
Qty: 90 TABLET | Refills: 0 | Status: CANCELLED | OUTPATIENT
Start: 2024-06-10

## 2024-06-10 RX ORDER — LEVOTHYROXINE SODIUM 100 UG/1
100 TABLET ORAL DAILY
Qty: 30 TABLET | Refills: 0 | Status: SHIPPED | OUTPATIENT
Start: 2024-06-10 | End: 2024-07-15

## 2024-06-10 NOTE — TELEPHONE ENCOUNTER
Prescription sent with no pharmacy and two doses.  Please ALWAYS select the correct pharmacy before sending requests.      March 2024 dose increased to 100 mcg of levothyroxine and patient was told to recheck lab in 2 months.    Glory Singleton M.D.

## 2024-06-11 ENCOUNTER — TRANSFERRED RECORDS (OUTPATIENT)
Dept: HEALTH INFORMATION MANAGEMENT | Facility: CLINIC | Age: 54
End: 2024-06-11
Payer: COMMERCIAL

## 2024-06-11 LAB
ALT SERPL-CCNC: 24 IU/L (ref 0–32)
AST SERPL-CCNC: 26 IU/L (ref 0–40)
CREATININE (EXTERNAL): 0.67 MG/DL (ref 0.57–1)
GFR ESTIMATED (EXTERNAL): 103 ML/MIN/1.73
GLUCOSE (EXTERNAL): 97 MG/DL (ref 70–99)
POTASSIUM (EXTERNAL): 4.2 MMOL/L (ref 3.5–5.2)

## 2024-06-19 ENCOUNTER — TRANSFERRED RECORDS (OUTPATIENT)
Dept: HEALTH INFORMATION MANAGEMENT | Facility: CLINIC | Age: 54
End: 2024-06-19
Payer: COMMERCIAL

## 2024-06-24 ENCOUNTER — ANCILLARY PROCEDURE (OUTPATIENT)
Dept: MAMMOGRAPHY | Facility: CLINIC | Age: 54
End: 2024-06-24
Attending: FAMILY MEDICINE
Payer: COMMERCIAL

## 2024-06-24 DIAGNOSIS — Z12.31 VISIT FOR SCREENING MAMMOGRAM: ICD-10-CM

## 2024-06-24 PROCEDURE — 77067 SCR MAMMO BI INCL CAD: CPT | Mod: TC | Performed by: RADIOLOGY

## 2024-06-24 PROCEDURE — 77063 BREAST TOMOSYNTHESIS BI: CPT | Mod: TC | Performed by: RADIOLOGY

## 2024-07-15 DIAGNOSIS — E03.9 HYPOTHYROIDISM, UNSPECIFIED TYPE: ICD-10-CM

## 2024-07-15 RX ORDER — LEVOTHYROXINE SODIUM 100 UG/1
100 TABLET ORAL DAILY
Qty: 30 TABLET | Refills: 0 | Status: SHIPPED | OUTPATIENT
Start: 2024-07-15 | End: 2024-08-08

## 2024-07-15 NOTE — TELEPHONE ENCOUNTER
Medication Question or Refill    What medication are you calling about (include dose and sig)?: Levothyroxine - Enough to get her through until lab appt scheduled for 7/24.  No need to call patient back, unless there are questions or problems.      Preferred Pharmacy:   [facility] – [place] - [address]  [address]  [facility] [address]  Phone: [phone] Fax: [phone] Alternate Fax: [phone]    Controlled Substance Agreement on file:   CSA -- Patient Level:    CSA: None found at the patient level.       Who prescribed the medication?: Mani    Do you need a refill? Yes    When did you use the medication last? Today    Patient offered an appointment? Yes: Made appt for labs 7/24    Do you have any questions or concerns?  No    Could we [facility] this [facility] or would you prefer to receive a phone call?:   Patient would prefer a phone call   Okay to leave a detailed message?: Yes at Cell number on file:    Telephone Information:   Mobile [phone]

## 2024-07-26 ENCOUNTER — LAB (OUTPATIENT)
Dept: LAB | Facility: CLINIC | Age: 54
End: 2024-07-26
Payer: COMMERCIAL

## 2024-07-26 DIAGNOSIS — E03.9 HYPOTHYROIDISM, UNSPECIFIED TYPE: ICD-10-CM

## 2024-07-26 LAB — TSH SERPL DL<=0.005 MIU/L-ACNC: 3.28 UIU/ML (ref 0.3–4.2)

## 2024-07-26 PROCEDURE — 84443 ASSAY THYROID STIM HORMONE: CPT

## 2024-07-26 PROCEDURE — 36415 COLL VENOUS BLD VENIPUNCTURE: CPT

## 2024-08-05 ENCOUNTER — TRANSFERRED RECORDS (OUTPATIENT)
Dept: HEALTH INFORMATION MANAGEMENT | Facility: CLINIC | Age: 54
End: 2024-08-05
Payer: COMMERCIAL

## 2024-08-07 DIAGNOSIS — E03.9 HYPOTHYROIDISM, UNSPECIFIED TYPE: ICD-10-CM

## 2024-08-08 RX ORDER — LEVOTHYROXINE SODIUM 100 UG/1
TABLET ORAL
Qty: 90 TABLET | Refills: 1 | Status: SHIPPED | OUTPATIENT
Start: 2024-08-08

## 2024-08-28 ENCOUNTER — TRANSFERRED RECORDS (OUTPATIENT)
Dept: HEALTH INFORMATION MANAGEMENT | Facility: CLINIC | Age: 54
End: 2024-08-28
Payer: COMMERCIAL

## 2024-09-12 ENCOUNTER — OFFICE VISIT (OUTPATIENT)
Dept: FAMILY MEDICINE | Facility: CLINIC | Age: 54
End: 2024-09-12
Payer: COMMERCIAL

## 2024-09-12 ENCOUNTER — TELEPHONE (OUTPATIENT)
Dept: FAMILY MEDICINE | Facility: CLINIC | Age: 54
End: 2024-09-12

## 2024-09-12 VITALS
OXYGEN SATURATION: 92 % | HEIGHT: 65 IN | WEIGHT: 242 LBS | DIASTOLIC BLOOD PRESSURE: 76 MMHG | BODY MASS INDEX: 40.32 KG/M2 | HEART RATE: 86 BPM | SYSTOLIC BLOOD PRESSURE: 129 MMHG | RESPIRATION RATE: 22 BRPM | TEMPERATURE: 98.4 F

## 2024-09-12 DIAGNOSIS — K51.919 ULCERATIVE COLITIS WITH COMPLICATION, UNSPECIFIED LOCATION (H): ICD-10-CM

## 2024-09-12 DIAGNOSIS — F42.9 OBSESSIVE-COMPULSIVE DISORDER, UNSPECIFIED TYPE: Primary | ICD-10-CM

## 2024-09-12 DIAGNOSIS — F33.9 RECURRENT MAJOR DEPRESSIVE DISORDER, REMISSION STATUS UNSPECIFIED (H): ICD-10-CM

## 2024-09-12 DIAGNOSIS — F41.1 GENERALIZED ANXIETY DISORDER: ICD-10-CM

## 2024-09-12 PROCEDURE — 99214 OFFICE O/P EST MOD 30 MIN: CPT | Performed by: FAMILY MEDICINE

## 2024-09-12 ASSESSMENT — PATIENT HEALTH QUESTIONNAIRE - PHQ9
10. IF YOU CHECKED OFF ANY PROBLEMS, HOW DIFFICULT HAVE THESE PROBLEMS MADE IT FOR YOU TO DO YOUR WORK, TAKE CARE OF THINGS AT HOME, OR GET ALONG WITH OTHER PEOPLE: SOMEWHAT DIFFICULT
SUM OF ALL RESPONSES TO PHQ QUESTIONS 1-9: 13
SUM OF ALL RESPONSES TO PHQ QUESTIONS 1-9: 13

## 2024-09-12 ASSESSMENT — PAIN SCALES - GENERAL: PAINLEVEL: SEVERE PAIN (7)

## 2024-09-12 NOTE — TELEPHONE ENCOUNTER
Pt called again.  Explained that she will have to get her FMLA for mental health issues from psych provider/[facility].  Pt states that she will needs Intermittent FMLA for her UC and thyroid issues.

## 2024-09-12 NOTE — PROGRESS NOTES
"  Assessment & Plan     Obsessive-compulsive disorder, unspecified type  Generalized anxiety disorder  Recurrent major depressive disorder, remission status unspecified (H24)  Sees Psych at 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        BMI  Estimated body mass index is 40.27 kg/m  as calculated from the following:    Height as of this encounter: 1.651 m (5' 5\").    Weight as of this encounter: 109.8 kg (242 lb).       Mc Mtz is a 54 year old, presenting for the following health issues:  Colitis        9/12/2024     3:04 PM   Additional Questions   Roomed by Yun COLLAZO CMA   Accompanied by      - Intermittent FMLA to complete for ulcerative colitis. She has been followed at [facility] in the past    History of Present Illness       Reason for visit:  Fmla   She is taking medications regularly.         Review of Systems  Constitutional, HEENT, cardiovascular, pulmonary, gi and gu systems are negative, except as otherwise noted.      Objective    /76   Pulse 86   Temp 98.4  F (36.9  C) (Tympanic)   Resp 22   Ht 1.651 m (5' 5\")   Wt 109.8 kg (242 lb)   LMP 08/21/2024 (Approximate)   SpO2 92%   BMI 40.27 kg/m    Body mass index is 40.27 kg/m .  Physical Exam   GENERAL: alert and no distress  PSYCH: mentation appears normal, affect normal/bright            Signed Electronically by: Glory Singleton MD    "

## 2024-09-12 NOTE — TELEPHONE ENCOUNTER
CL[place] for pt - Pt has appt with Dr. Singletno today for [facility] paperwork.  If paperwork is for ongoing mental health issues, pt will need to have her psych provider complete the [place] paperwork.  If for another reason, Dr. Singleton can discuss that with her at appt today.

## 2024-09-16 ENCOUNTER — APPOINTMENT (OUTPATIENT)
Dept: CT IMAGING | Facility: CLINIC | Age: 54
End: 2024-09-16
Attending: EMERGENCY MEDICINE
Payer: COMMERCIAL

## 2024-09-16 ENCOUNTER — HOSPITAL ENCOUNTER (EMERGENCY)
Facility: CLINIC | Age: 54
Discharge: HOME | End: 2024-09-16
Attending: EMERGENCY MEDICINE | Admitting: EMERGENCY MEDICINE
Payer: COMMERCIAL

## 2024-09-16 VITALS
HEART RATE: 78 BPM | WEIGHT: 240 LBS | BODY MASS INDEX: 39.94 KG/M2 | DIASTOLIC BLOOD PRESSURE: 87 MMHG | SYSTOLIC BLOOD PRESSURE: 158 MMHG | RESPIRATION RATE: 24 BRPM | TEMPERATURE: 98.6 F | OXYGEN SATURATION: 94 %

## 2024-09-16 DIAGNOSIS — R07.9 LEFT-SIDED CHEST PAIN: ICD-10-CM

## 2024-09-16 DIAGNOSIS — R93.89 ABNORMAL CT SCAN: ICD-10-CM

## 2024-09-16 LAB
ANION GAP SERPL CALCULATED.3IONS-SCNC: 9 MMOL/L (ref 7–15)
BASOPHILS # BLD AUTO: 0.1 10E3/UL (ref 0–0.2)
BASOPHILS NFR BLD AUTO: 1 %
BUN SERPL-MCNC: 19.6 MG/DL (ref 6–20)
CALCIUM SERPL-MCNC: 9.2 MG/DL (ref 8.8–10.4)
CHLORIDE SERPL-SCNC: 102 MMOL/L (ref 98–107)
CREAT SERPL-MCNC: 0.86 MG/DL (ref 0.51–0.95)
D DIMER PPP FEU-MCNC: 0.73 UG/ML FEU (ref 0–0.5)
EGFRCR SERPLBLD CKD-EPI 2021: 79 ML/MIN/1.73M2
EOSINOPHIL # BLD AUTO: 0.4 10E3/UL (ref 0–0.7)
EOSINOPHIL NFR BLD AUTO: 4 %
ERYTHROCYTE [DISTWIDTH] IN BLOOD BY AUTOMATED COUNT: 14.3 % (ref 10–15)
GLUCOSE SERPL-MCNC: 100 MG/DL (ref 70–99)
HCO3 SERPL-SCNC: 27 MMOL/L (ref 22–29)
HCT VFR BLD AUTO: 39.8 % (ref 35–47)
HGB BLD-MCNC: 13.2 G/DL (ref 11.7–15.7)
HOLD SPECIMEN: NORMAL
IMM GRANULOCYTES # BLD: 0 10E3/UL
IMM GRANULOCYTES NFR BLD: 0 %
LYMPHOCYTES # BLD AUTO: 2.8 10E3/UL (ref 0.8–5.3)
LYMPHOCYTES NFR BLD AUTO: 29 %
MCH RBC QN AUTO: 30.8 PG (ref 26.5–33)
MCHC RBC AUTO-ENTMCNC: 33.2 G/DL (ref 31.5–36.5)
MCV RBC AUTO: 93 FL (ref 78–100)
MONOCYTES # BLD AUTO: 0.6 10E3/UL (ref 0–1.3)
MONOCYTES NFR BLD AUTO: 6 %
NEUTROPHILS # BLD AUTO: 5.9 10E3/UL (ref 1.6–8.3)
NEUTROPHILS NFR BLD AUTO: 61 %
NRBC # BLD AUTO: 0 10E3/UL
NRBC BLD AUTO-RTO: 0 /100
PLATELET # BLD AUTO: 303 10E3/UL (ref 150–450)
POTASSIUM SERPL-SCNC: 4.3 MMOL/L (ref 3.4–5.3)
RBC # BLD AUTO: 4.29 10E6/UL (ref 3.8–5.2)
SODIUM SERPL-SCNC: 138 MMOL/L (ref 135–145)
TROPONIN T SERPL HS-MCNC: <6 NG/L
WBC # BLD AUTO: 9.8 10E3/UL (ref 4–11)

## 2024-09-16 PROCEDURE — 250N000009 HC RX 250: Performed by: EMERGENCY MEDICINE

## 2024-09-16 PROCEDURE — 85025 COMPLETE CBC W/AUTO DIFF WBC: CPT | Performed by: EMERGENCY MEDICINE

## 2024-09-16 PROCEDURE — 93010 ELECTROCARDIOGRAM REPORT: CPT | Performed by: EMERGENCY MEDICINE

## 2024-09-16 PROCEDURE — 250N000013 HC RX MED GY IP 250 OP 250 PS 637: Performed by: EMERGENCY MEDICINE

## 2024-09-16 PROCEDURE — 84484 ASSAY OF TROPONIN QUANT: CPT | Performed by: EMERGENCY MEDICINE

## 2024-09-16 PROCEDURE — 85379 FIBRIN DEGRADATION QUANT: CPT | Performed by: EMERGENCY MEDICINE

## 2024-09-16 PROCEDURE — 36415 COLL VENOUS BLD VENIPUNCTURE: CPT | Performed by: EMERGENCY MEDICINE

## 2024-09-16 PROCEDURE — 250N000011 HC RX IP 250 OP 636: Performed by: EMERGENCY MEDICINE

## 2024-09-16 PROCEDURE — 99284 EMERGENCY DEPT VISIT MOD MDM: CPT | Performed by: EMERGENCY MEDICINE

## 2024-09-16 PROCEDURE — 96374 THER/PROPH/DIAG INJ IV PUSH: CPT | Mod: 59 | Performed by: EMERGENCY MEDICINE

## 2024-09-16 PROCEDURE — 258N000003 HC RX IP 258 OP 636: Performed by: EMERGENCY MEDICINE

## 2024-09-16 PROCEDURE — 93005 ELECTROCARDIOGRAM TRACING: CPT | Performed by: EMERGENCY MEDICINE

## 2024-09-16 PROCEDURE — 96361 HYDRATE IV INFUSION ADD-ON: CPT | Performed by: EMERGENCY MEDICINE

## 2024-09-16 PROCEDURE — 99285 EMERGENCY DEPT VISIT HI MDM: CPT | Mod: 25 | Performed by: EMERGENCY MEDICINE

## 2024-09-16 PROCEDURE — 80048 BASIC METABOLIC PNL TOTAL CA: CPT | Performed by: EMERGENCY MEDICINE

## 2024-09-16 PROCEDURE — 71275 CT ANGIOGRAPHY CHEST: CPT

## 2024-09-16 RX ORDER — IOPAMIDOL 755 MG/ML
91 INJECTION, SOLUTION INTRAVASCULAR ONCE
Status: COMPLETED | OUTPATIENT
Start: 2024-09-16 | End: 2024-09-16

## 2024-09-16 RX ORDER — KETOROLAC TROMETHAMINE 15 MG/ML
10 INJECTION, SOLUTION INTRAMUSCULAR; INTRAVENOUS
Status: COMPLETED | OUTPATIENT
Start: 2024-09-16 | End: 2024-09-16

## 2024-09-16 RX ORDER — SODIUM CHLORIDE, SODIUM LACTATE, POTASSIUM CHLORIDE, CALCIUM CHLORIDE 600; 310; 30; 20 MG/100ML; MG/100ML; MG/100ML; MG/100ML
1000 INJECTION, SOLUTION INTRAVENOUS CONTINUOUS
Status: DISCONTINUED | OUTPATIENT
Start: 2024-09-16 | End: 2024-09-16 | Stop reason: HOSPADM

## 2024-09-16 RX ORDER — DIAZEPAM 2 MG
2 TABLET ORAL
Status: COMPLETED | OUTPATIENT
Start: 2024-09-16 | End: 2024-09-16

## 2024-09-16 RX ORDER — METHOCARBAMOL 500 MG/1
500 TABLET, FILM COATED ORAL 4 TIMES DAILY PRN
Qty: 10 TABLET | Refills: 0 | Status: SHIPPED | OUTPATIENT
Start: 2024-09-16 | End: 2024-09-23

## 2024-09-16 RX ADMIN — KETOROLAC TROMETHAMINE 10 MG: 15 INJECTION, SOLUTION INTRAMUSCULAR; INTRAVENOUS at 07:41

## 2024-09-16 RX ADMIN — SODIUM CHLORIDE, POTASSIUM CHLORIDE, SODIUM LACTATE AND CALCIUM CHLORIDE 1000 ML: 600; 310; 30; 20 INJECTION, SOLUTION INTRAVENOUS at 09:41

## 2024-09-16 RX ADMIN — DIAZEPAM 2 MG: 2 TABLET ORAL at 07:41

## 2024-09-16 RX ADMIN — IOPAMIDOL 91 ML: 755 INJECTION, SOLUTION INTRAVENOUS at 09:24

## 2024-09-16 RX ADMIN — SODIUM CHLORIDE 100 ML: 9 INJECTION, SOLUTION INTRAVENOUS at 09:24

## 2024-09-16 ASSESSMENT — ENCOUNTER SYMPTOMS
EYES NEGATIVE: 1
ENDOCRINE NEGATIVE: 1
ALLERGIC/IMMUNOLOGIC NEGATIVE: 1
CONSTITUTIONAL NEGATIVE: 1
CHEST TIGHTNESS: 1
HEMATOLOGIC/LYMPHATIC NEGATIVE: 1
GASTROINTESTINAL NEGATIVE: 1
PSYCHIATRIC NEGATIVE: 1
MUSCULOSKELETAL NEGATIVE: 1
NEUROLOGICAL NEGATIVE: 1

## 2024-09-16 ASSESSMENT — ACTIVITIES OF DAILY LIVING (ADL)
ADLS_ACUITY_SCORE: 37

## 2024-09-16 ASSESSMENT — COLUMBIA-SUICIDE SEVERITY RATING SCALE - C-SSRS
1. IN THE PAST MONTH, HAVE YOU WISHED YOU WERE DEAD OR WISHED YOU COULD GO TO SLEEP AND NOT WAKE UP?: NO
2. HAVE YOU ACTUALLY HAD ANY THOUGHTS OF KILLING YOURSELF IN THE PAST MONTH?: NO
6. HAVE YOU EVER DONE ANYTHING, STARTED TO DO ANYTHING, OR PREPARED TO DO ANYTHING TO END YOUR LIFE?: NO

## 2024-09-16 NOTE — ED PROVIDER NOTES
History     Chief Complaint   Patient presents with    Chest Pain    Back Pain     HPI  Bibi Song is a 54 year old female who presents for evaluation with chest pain neck pain that started a day prior.    Reviewed the medical record.  Office visit on 9/12/24-history of alcohol abuse in remission, hypothyroidism and a history of TCA overdose  prior diagnosis of anxiety, obsessive-compulsive disorder, depression, prior diagnosis of tracheal stenosis.  Patient's current prescribed medications were reviewed with patient at bedside.    On arrival patient reported pain is left-sided chest radiating to her back and shoulder blade.  Worse with palpation and pleuritic in nature.  She reported she was in her usual state of health until yesterday afternoon around 12 PM when she woke up with left axillary pain and discomfort.  The pain is reproducible with palpation.  Pain is also pleuritic in nature.  She reports she has had no palpitations.  No personal history of heart disease or arrhythmia.  Family history of atrial fibrillation.  She is a former smoker who quit over 30 years ago.  No recent fever or chills.  No dyspnea on exertion orthopnea.  With persistent pain despite massage by her  Sergio who accompanied her from home in [facility] she presented for further care.    Allergies:  No Known Allergies    Problem List:    Patient Active Problem List    Diagnosis Date Noted    Alcoholism /alcohol abuse 02/01/2021     Priority: Medium     Replacing diagnoses that were inactivated after the 10/1/2021 regulatory import.      Obesity (BMI 35.0-39.9) with comorbidity (H) 09/04/2019     Priority: Medium    Tracheal stenosis 08/31/2018     Priority: Medium    TCA (tricyclic antidepressant) overdose of undetermined intent 07/18/2018     Priority: Medium    Ulcerative colitis with complication, unspecified location (H) 04/27/2017     Priority: Medium     [facility] - ulcerative colitis      Hypothyroidism,  unspecified type 12/08/2016     Priority: Medium    Cervicalgia 10/10/2012     Priority: Medium    Hyperlipidemia LDL goal <160 10/31/2010     Priority: Medium    Moderate major depression (H) 08/06/2010     Priority: Medium    OCD (obsessive compulsive disorder) 08/06/2010     Priority: Medium     [facility] - Jazmín Wall PA-C      Anxiety disorder 08/06/2010     Priority: Medium    Family history of diabetes mellitus 08/06/2010     Priority: Medium    Family history of thyroid disease 08/06/2010     Priority: Medium    ALCOHOL ABUSE, IN REMISSION 08/06/2010     Priority: Medium        Past Medical History:    Past Medical History:   Diagnosis Date    Anxiety     Chemical dependency (H)     Depressive disorder     Hoarseness     Hypothyroidism     Obesity     OCD (obsessive compulsive disorder)     PONV (postoperative nausea and vomiting)     Sleep apnea     Tracheal stenosis        Past Surgical History:    Past Surgical History:   Procedure Laterality Date    INJECT STEROID (LOCATION) N/A 9/7/2018    Procedure: INJECT STEROID (LOCATION);;  Surgeon: Dusty Pearson MD;  [facility]    INJECT STEROID (LOCATION) N/A 10/5/2018    Procedure: INJECT STEROID (LOCATION);;  Surgeon: Dusty Pearson MD;  [facility]    INJECT STEROID (LOCATION) N/A 12/6/2018    Procedure: Steroid Injection;  Surgeon: Dsuty Pearson MD;  [facility]    INJECT STEROID (LOCATION) N/A 2/1/2019    Procedure: Steroid Injection;  Surgeon: Dusty Pearson MD;  [facility]    LARYNGOSCOPY N/A 9/7/2018    Procedure: LARYNGOSCOPY;  Teleoscopic Direct Laryngoscopy with Balloon Dilation, Mitomycin Application and Steriod Injection ;  Surgeon: Dusty Pearson MD;  [facility]    LARYNGOSCOPY, ESOPHAGOSCOPY WITH DILATION, COMBINED N/A 10/5/2018    Procedure: COMBINED LARYNGOSCOPY, ESOPHAGOSCOPY WITH DILATION;  Direct Laryngoscopy with Teleoscope, Balloon Dilation,  Application of Mitomycin and Steroid Injection ;  Surgeon: Dusty Pearson MD;  [facility]    LARYNGOSCOPY, ESOPHAGOSCOPY WITH DILATION, COMBINED N/A 12/6/2018    Procedure: Direct Laryngoscopy, Balloon Dilation, Mitomycin Application, Steroid Injection;  Surgeon: Dusty Pearson MD;  [facility]    LARYNGOSCOPY, ESOPHAGOSCOPY WITH DILATION, COMBINED N/A 2/1/2019    Procedure: Direct Laryngoscopy, Balloon Dilation, Mitomycin Application;  Surgeon: Dusty Pearson MD;  [facility]    RESECT TRACHEA WITH RECONSTRUCTION N/A 3/11/2019    Procedure: Tracheal Resection Via Cervical Approach;  Surgeon: Nick Beaulieu MD;  Location: [facility]       Family History:    Family History   Problem Relation Age of Onset    Diabetes Mother     Depression Mother         OCD    Neurologic Disorder Mother     Psychotic Disorder Mother     Respiratory Mother     Thyroid Disease Mother     Gynecology Mother         hysterectomy    Cerebrovascular Disease Mother         pacemaker    Substance Abuse Mother     Mental Illness Mother     Heart Disease Mother     Thrombosis Mother         no details available    Heart Disease Father         MI,   LATE 60'S    Hypertension Father     Prostate Cancer Father     Obesity Father     Diabetes Father     Cerebrovascular Disease Father     Substance Abuse Father     Depression Father     Thrombosis Father     Depression Brother     Thyroid Disease Brother     Depression Brother     Thyroid Disease Brother     Allergies Son     Asthma Son     Thyroid Disease Maternal Grandmother     Cancer Maternal Grandfather     Psychotic Disorder Paternal Grandmother     Thyroid Disease Paternal Grandfather     Diabetes Brother     Breast Cancer Maternal Aunt     Substance Abuse Brother     Mental Illness Brother     Depression Brother     Diabetes Brother     Mental Illness Son     Asthma Son     Depression Son     Stomach Problem Son        Social History:  Marital Status:    [2]  Social History     Tobacco Use    Smoking status: Former     Current packs/day: 0.00     Types: Cigarettes     Start date: 1988     Quit date: 1994     Years since quittin.7    Smokeless tobacco: Never   Vaping Use    Vaping status: Never Used   Substance Use Topics    Alcohol use: No     Comment: Treatment in     Drug use: No        Medications:    methocarbamol (ROBAXIN) 500 MG tablet  balsalazide (COLAZAL) 750 MG capsule  buPROPion (WELLBUTRIN XL) 150 MG 24 hr tablet  gabapentin (NEURONTIN) 100 MG capsule  gabapentin (NEURONTIN) 800 MG tablet  levothyroxine (SYNTHROID/LEVOTHROID) 100 MCG tablet  levothyroxine (SYNTHROID/LEVOTHROID) 88 MCG tablet  LORazepam (ATIVAN) 1 MG tablet  rosuvastatin (CRESTOR) 20 MG tablet  sertraline (ZOLOFT) 100 MG tablet  ziprasidone (GEODON) 20 MG capsule          Review of Systems   Constitutional: Negative.    HENT: Negative.     Eyes: Negative.    Respiratory:  Positive for chest tightness.    Cardiovascular:  Positive for chest pain.   Gastrointestinal: Negative.    Endocrine: Negative.    Genitourinary: Negative.    Musculoskeletal: Negative.    Skin: Negative.    Allergic/Immunologic: Negative.    Neurological: Negative.    Hematological: Negative.    Psychiatric/Behavioral: Negative.     All other systems reviewed and are negative.      Physical Exam   BP: (!) 158/100  Pulse: 84  Temp: 98.6  F (37  C)  Resp: 20  Weight: 108.9 kg (240 lb)  SpO2: 96 %      Physical Exam  Constitutional:       General: She is not in acute distress.     Appearance: She is well-developed. She is not ill-appearing, toxic-appearing or diaphoretic.   HENT:      Head: Normocephalic and atraumatic.   Eyes:      Pupils: Pupils are equal, round, and reactive to light.   Cardiovascular:      Rate and Rhythm: Normal rate and regular rhythm.      Heart sounds: Normal heart sounds.   Pulmonary:      Effort: Pulmonary effort is normal.      Breath sounds: Normal breath sounds.    Chest:      Chest wall: Tenderness present. No mass, deformity, crepitus or edema. There is no dullness to percussion.   Musculoskeletal:      Cervical back: Normal range of motion and neck supple.      Right lower leg: No tenderness. No edema.      Left lower leg: No tenderness. No edema.   Skin:     General: Skin is warm.      Capillary Refill: Capillary refill takes less than 2 seconds.      Coloration: Skin is not cyanotic or pale.      Findings: No ecchymosis, erythema or rash.      Nails: There is no clubbing.   Neurological:      General: No focal deficit present.      Mental Status: She is alert and oriented to person, place, and time.   Psychiatric:         Mood and Affect: Mood normal. Mood is not anxious.         Behavior: Behavior normal. Behavior is not agitated.         ED Course        Procedures              EKG Interpretation:      Interpreted by Ayaz Servin MD  Time reviewed: 0650  Symptoms at time of EKG: Left-sided chest pain,  Rhythm: normal sinus   Rate: Normal  Axis: Normal  Ectopy: none  Conduction: normal  ST Segments/ T Waves: ST flattening in inferior leads, T wave inversion with depression in V2  Q Waves: nonspecific  Comparison to prior: When compared with EKG dated 7/17/2018 T wave changes are unchanged.  Sinus tachycardia is resolved    Clinical Impression: No acute ischemia arrhythmia from recent comparison from 7/17/2018.  T wave changes in precordial leads       Critical Care time:  none               ED medications:  Medications   ketorolac (TORADOL) injection 10 mg (10 mg Intravenous $Given 9/16/24 0741)   diazepam (VALIUM) tablet 2 mg (2 mg Oral $Given 9/16/24 0741)   lactated ringers BOLUS 1,000 mL (0 mLs Intravenous Stopped 9/16/24 1054)   iopamidol (ISOVUE-370) solution 91 mL (91 mLs Intravenous $Given 9/16/24 0924)   sodium chloride 0.9 % bag 500mL for CT scan flush use (100 mLs Intravenous $Given 9/16/24 0924)      ED Vitals:  Vitals:    09/16/24 0900 09/16/24  1000 09/16/24 1030 09/16/24 1058   BP: 134/87 (!) 150/91 (!) 158/87    Pulse: 78 77 78    Resp: 24      Temp:       TempSrc:       SpO2: 95% 96% 94% 94%   Weight:          ED Labs and imaging:  Results for orders placed or performed during the hospital encounter of 09/16/24   CT Chest Pulmonary Embolism w Contrast     Status: None    Narrative    CT CHEST PULMONARY EMBOLISM W CONTRAST 9/16/2024 9:38 AM    CLINICAL HISTORY: Atraumatic left axillary, chest and scapular pain.   Elevated D-dimer.  Evaluate for pulmonary embolism versus other acute  cardiopulmonary process  TECHNIQUE: CT angiogram chest during arterial phase injection IV  contrast. 2D and 3D MIP reconstructions were performed by the CT  technologist. Dose reduction techniques were used.     CONTRAST: 91 mL Isovue 370    COMPARISON: 3/12/2019 chest x-ray chest CT on 7/17/2018    FINDINGS:  ANGIOGRAM CHEST: No pulmonary emboli. Stable mildly enlarged main  pulmonary artery measuring 3.6 cm which can be seen with pulmonary  hypertension. Thoracic aorta is negative for dissection. No CT  evidence of right heart strain.    LUNGS AND PLEURA: Mild bibasilar atelectasis. No infiltrate or pleural  effusion. Stable right upper lobe 3 mm nodule (6/77) and right middle  lobe 3 mm nodule (6/135), benign due to stability since 2018. No new  or enlarging nodules.    MEDIASTINUM/AXILLAE: No lymphadenopathy. No thoracic aortic aneurysm.  No coronary artery calcifications. Trace pericardial effusion. Small  hiatal hernia.    UPPER ABDOMEN: 3.1 x 2.5 cm nodule in the left adrenal gland has CT  attenuation of 5 Hounsfield units and is stable since 2018, likely a  benign lipid rich adenoma.    MUSCULOSKELETAL: No concerning bone lesions.      Impression    IMPRESSION:  1.  No pulmonary emboli. No acute findings in the chest.  2.  Stable mildly enlarged main pulmonary artery consistent with  pulmonary hypertension.  3.  Stable 3.1 cm left adrenal nodule, similar to 2018,  likely a  benign adenoma. Follow-up nonemergent adrenal protocol CT can be  considered.    JESIKA BENEDICT MD         SYSTEM ID:  [id number]   Troponin T, High Sensitivity     Status: Normal   Result Value Ref Range    Troponin T, High Sensitivity <6 <=14 ng/L   Basic metabolic panel     Status: Abnormal   Result Value Ref Range    Sodium 138 135 - 145 mmol/L    Potassium 4.3 3.4 - 5.3 mmol/L    Chloride 102 98 - 107 mmol/L    Carbon Dioxide (CO2) 27 22 - 29 mmol/L    Anion Gap 9 7 - 15 mmol/L    Urea Nitrogen 19.6 6.0 - 20.0 mg/dL    Creatinine 0.86 0.51 - 0.95 mg/dL    GFR Estimate 79 >60 mL/min/1.73m2    Calcium 9.2 8.8 - 10.4 mg/dL    Glucose 100 (H) 70 - 99 mg/dL   [employer] Draw     Status: None    Narrative    The following orders were created for panel order [employer] Draw.  Procedure                               Abnormality         Status                     ---------                               -----------         ------                     Extra Blue Top Tube[[id number]]                              Final result                 Please view results for these tests on the individual orders.   CBC with platelets and differential     Status: None   Result Value Ref Range    WBC Count 9.8 4.0 - 11.0 10e3/uL    RBC Count 4.29 3.80 - 5.20 10e6/uL    Hemoglobin 13.2 11.7 - 15.7 g/dL    Hematocrit 39.8 35.0 - 47.0 %    MCV 93 78 - 100 fL    MCH 30.8 26.5 - 33.0 pg    MCHC 33.2 31.5 - 36.5 g/dL    RDW 14.3 10.0 - 15.0 %    Platelet Count 303 150 - 450 10e3/uL    % Neutrophils 61 %    % Lymphocytes 29 %    % Monocytes 6 %    % Eosinophils 4 %    % Basophils 1 %    % Immature Granulocytes 0 %    NRBCs per 100 WBC 0 <1 /100    Absolute Neutrophils 5.9 1.6 - 8.3 10e3/uL    Absolute Lymphocytes 2.8 0.8 - 5.3 10e3/uL    Absolute Monocytes 0.6 0.0 - 1.3 10e3/uL    Absolute Eosinophils 0.4 0.0 - 0.7 10e3/uL    Absolute Basophils 0.1 0.0 - 0.2 10e3/uL    Absolute Immature Granulocytes 0.0 <=0.4 10e3/uL    Absolute NRBCs 0.0  10e3/uL   Extra Blue Top Tube     Status: None   Result Value Ref Range    Hold Specimen JIC    D dimer quantitative     Status: Abnormal   Result Value Ref Range    D-Dimer Quantitative 0.73 (H) 0.00 - 0.50 ug/mL FEU    Narrative    This D-dimer assay is intended for use in conjunction with a clinical pretest probability assessment model to exclude pulmonary embolism (PE) and deep venous thrombosis (DVT) in outpatients suspected of PE or DVT. The cut-off value is 0.50 ug/mL FEU.    For patients 50 years of age or older, the application of age-adjusted cut-off values for D-Dimer may increase the specificity without significant effect on sensitivity. The literature suggested calculation age adjusted cut-off in ug/L = age in years x 10 ug/L. The results in this laboratory are reported as ug/mL rather than ug/L. The calculation for age adjusted cut off in ug/mL= age in years x 0.01 ug/mL. For example, the cut off for a 76 year old male is 76 x 0.01 ug/mL = 0.76 ug/mL (760 ug/L).    M Charito et al. Age adjusted D-dimer cut-off levels to rule out pulmonary embolism: The ADJUST-PE Study. LYNN 2014;311:2410-6490.; HJ Sergo et al. Diagnostic accuracy of conventional or age adjusted D-dimer cutoff values in older patients with suspected venous thromboembolism. Systemic review and meta-analysis. BMJ 2013:346:f2492.   CBC with platelets, differential     Status: None    Narrative    The following orders were created for panel order CBC with platelets, differential.  Procedure                               Abnormality         Status                     ---------                               -----------         ------                     CBC with platelets and d...[[id number]]                      Final result                 Please view results for these tests on the individual orders.     Assessments & Plan (with Medical Decision Making)   Assessment Summary and clinical Impression: 54-year-old female who presented  reporting atraumatic left-sided chest pain radiating to her back and shoulder blade in the last 24 hours.  Pain was reproducible to palpation and described a pleuritic in nature.  She was afebrile blood pressure was 158/100.  She was 96% on room air EKG on arrival revealed no acute ischemia.  Given reproducible nature of the pain suspect musculoskeletal etiology however given her age and family history reported workup was undertaken to ensure symptoms were not due to an emergent cardiopulmonary process including acute coronary syndrome.  Workup revealed elevated D-dimer when age-adjusted.  CT imaging revealed no evidence for pulmonary emboli or acute chest findings.  Stable left adrenal nodule and concern for possible pulmonary hypertension with mildly enlarged main pulmonary artery.  Follow-up care for follow-up imaging reviewed patient and spouse with trial of Robaxin and a plan to manage symptoms suspicious for musculoskeletal etiology.    ED course and plan:  Reviewed the medical record.  Office visit on 9/12/24 EKG obtained when compared with EKG from 7/17/2018 revealed  No acute ischemia with old T wave inversion with depression in precordial leads of V2 V3.  Given her age and comorbidities with report of symptoms of last 24 hours workup was broadened to ensure symptoms were not due to an emergent cardiac or pulmonary process.  Workup revealed a negative troponin.  Normal metabolic profile.  Glucose 100.normal hemogram elevated D-dimer when age-adjusted and CT PE protocol was obtained revealed evidence of pulmonary emboli acute findings.  Radiology noted stable mildly enlarged main pulmonary artery.  Consistent with pulmonary hypertension stable left adrenal nodule.  See details outlined in radiology report.  Patient was discharged with plan to trial robaxin with symptoms of concern because the suspicion is mostly likely musculoskeletal in nature given reproducible nature of her symptoms with plan for  follow-up imaging if persistent symptoms: MRI of the neck and rotator cuff and echocardiogram given pulmonary hypertension concerns on CT imaging.  Reviewed results return to be reexamined including concerning symptoms.  Patient and spouse present during ED course of care expressed understanding agreement with plan of care.        Disclaimer: This note consists of symbols derived from keyboarding, dictation and/or voice recognition software. As a result, there may be errors in the script that have gone undetected. Please consider this when interpreting information found in this chart.   I have reviewed the nursing notes.    I have reviewed the findings, diagnosis, plan and need for follow up with the patient.           Medical Decision Making  The patient's presentation was of high complexity (atraumatic chest pain,).    The patient's evaluation involved:  ordering and/or review of 2 test(s) in this encounter (Laboratory studies, EKG, telemetry monitoring, imaging)    The patient's management necessitated high risk (disposition planning, follow-up imaging, medication management).        Discharge Medication List as of 9/16/2024 10:55 AM        START taking these medications    Details   methocarbamol (ROBAXIN) 500 MG tablet Take 1 tablet (500 mg) by mouth 4 times daily as needed for muscle spasms or other., Disp-10 tablet, R-0, E-Prescribe             Final diagnoses:   Left-sided chest pain   Abnormal CT scan - IMPRESSION: 1.  No pulmonary emboli. No acute findings in the chest. 2.  Stable mildly enlarged main pulmonary artery consistent with pulmonary hypertension. 3.  Stable 3.1 cm left adrenal nodule, similar to 2018, likely a benign adenoma. Follow-up nonemergent adrenal protocol CT can be considered.       9/16/2024   [facility] EMERGENCY DEPT       Ayaz Servin MD  09/16/24 8224

## 2024-09-16 NOTE — DISCHARGE INSTRUCTIONS
1) Your evaluation today did not reveal that your symptoms reported in the last 24 hours prior to arriving for care was due to an emergent condition or diagnosis such as heart attack, blood clot, infection imaging was reassuring.  Radiology noted some unexpected abnormal findings and follow-up imaging was recommended.  Be sure to reach out to clinic provider care team for follow-up imaging as discussed.  Nonemergent adrenal protocol CT for follow-up stable left adrenal nodule.  Echocardiogram for follow-up for pulmonary enlargement that could be due to pulmonary hypertension.    2) For home you are offered medications to help manage your symptoms as needed.  We offered Robaxinif needed for muscle pain and also discussed the role of massage therapy, topical creams such as Aspercreme or lidocaine cream and/or lidocaine patch.  We also reviewed consideration for follow-up imaging to ensure this is not related to rotator cuff which is MRI

## 2024-09-16 NOTE — LETTER
September 16, 2024      To Whom It May Concern:      Bibi Song was seen in our Emergency Department today, 09/16/24.  Please excuse her from missing work due to needing to seek care.  Follow-up care is recommended if persistent symptoms.  This work note is valid until 9/18/2024.      Sincerely,             Cezar Servin MD

## 2024-09-16 NOTE — ED TRIAGE NOTES
Pt presents with concerns for left sided CP that wraps around to her back near her shoulder blade. Pt states pain is made worse with touch, movement, and is hard to take deep breaths. Pt denies any recent trauma or injury. Pt states she took ibuprofen 1 hour ago.      Triage Assessment (Adult)       Row Name 09/16/24 0647          Triage Assessment    Airway WDL WDL        Respiratory WDL    Respiratory WDL X;rhythm/pattern     Rhythm/Pattern, Respiratory unlabored;depth regular;shallow;shortness of breath        Skin Circulation/Temperature WDL    Skin Circulation/Temperature WDL WDL        Cardiac WDL    Cardiac WDL X;chest pain        Chest Pain Assessment    Chest Pain Location upper back, left;anterior chest, left     Chest Pain Radiation back     Chest Pain Intervention cardiac biomarkers drawn;cardiac monitoring continued;cardiac monitor placed;12-lead ECG obtained;activity minimized        Peripheral/Neurovascular WDL    Peripheral Neurovascular WDL WDL        Cognitive/Neuro/Behavioral WDL    Cognitive/Neuro/Behavioral WDL WDL

## 2024-09-17 ENCOUNTER — TELEPHONE (OUTPATIENT)
Dept: FAMILY MEDICINE | Facility: CLINIC | Age: 54
End: 2024-09-17
Payer: COMMERCIAL

## 2024-09-17 NOTE — TELEPHONE ENCOUNTER
Dr. Singleton,    Please see request for echo.    09/16 ER visit. Per the note:  Plan for follow-up imaging if persistent symptoms: MRI of the neck and rotator cuff and echocardiogram given pulmonary hypertension concerns on CT imaging.     Patient states she is experiencing body aches and fatigue. Denies the left sided chest pain.     Order pended for review as patient is refusing ER F/U since she had an OV 09/12.     Thank you  Fabrizio CRAWFORD RN  [facility]

## 2024-09-17 NOTE — TELEPHONE ENCOUNTER
Order/Referral Request    Who is requesting: Pt    Orders being requested: Echo    Reason service is needed/diagnosis: Pt was seen in ER yesterday  and was told she needs an Echo.  Pt declines to schedule an appt with Dr. Singleton for ER follow up.  Please call patient and advise.      When are orders needed by: ?    Has this been discussed with Provider: No    Does patient have a preference on a Group/Provider/Facility? [facility]    Does patient have an appointment scheduled?: No    Where to send orders: Place orders within Epic    [facility] or would you prefer to receive a phone call?:   Patient would prefer a phone call   Okay to leave a detailed message?: Yes at Cell number on file:    Telephone Information:   Mobile [phone]

## 2024-09-18 ENCOUNTER — NURSE TRIAGE (OUTPATIENT)
Dept: NURSING | Facility: CLINIC | Age: 54
End: 2024-09-18
Payer: COMMERCIAL

## 2024-09-18 NOTE — TELEPHONE ENCOUNTER
See nurse triage encounter from today, patient is scheduled with PCP on 9-23-24. Will defer to that appointment for provider to determine if echo is needed, closing this encounter.      RYLEE Camp

## 2024-09-18 NOTE — TELEPHONE ENCOUNTER
Nurse Triage SBAR    Is this a 2nd Level Triage? NO    Situation: Pt wishes to update PCP team further from recent follow up call    Background: Pt seen in ED 9/16. Called yesterday to PCP office for follow up. Calls again this morning to report that when she woke up there is more back pain then yesterday but its still manageable     Assessment: Declined triage service. Requested information be sent to PCP and would appreciate non-urgent callback, particulary if she needs appointment or imaging moved up    Protocol Recommended Disposition:   Call PCP When Office is Open    Recommendation: Route to PCP team     Routed to provider    Does the patient meet one of the following criteria for ADS visit consideration? 16+ years old, with an [facility] PCP     TIP  Providers, please consider if this condition is appropriate for management at one of our Acute and Diagnostic Services sites.     If patient is a good candidate, please use dotphrase <dot>triageresponse and select Refer to ADS to document.    Reason for Disposition   [1] Caller requesting NON-URGENT health information AND [2] PCP's office is the best resource    Protocols used: Information Only Call - No Triage-A-

## 2024-09-18 NOTE — TELEPHONE ENCOUNTER
Patient had an office visit for an unrelated condition.  She needs an ER follow up.    Glory Singleton M.D.

## 2024-09-18 NOTE — TELEPHONE ENCOUNTER
Patient called and notified, ED follow up scheduled on 9-23-24 as that was the soonest patient could be seen.      RYLEE Camp

## 2024-09-23 ENCOUNTER — OFFICE VISIT (OUTPATIENT)
Dept: FAMILY MEDICINE | Facility: CLINIC | Age: 54
End: 2024-09-23
Payer: COMMERCIAL

## 2024-09-23 VITALS
OXYGEN SATURATION: 93 % | TEMPERATURE: 99.1 F | DIASTOLIC BLOOD PRESSURE: 80 MMHG | HEIGHT: 65 IN | SYSTOLIC BLOOD PRESSURE: 132 MMHG | HEART RATE: 89 BPM | BODY MASS INDEX: 40.55 KG/M2 | WEIGHT: 243.38 LBS

## 2024-09-23 DIAGNOSIS — I28.8 ENLARGED PULMONARY ARTERY (H): ICD-10-CM

## 2024-09-23 DIAGNOSIS — F42.9 OBSESSIVE-COMPULSIVE DISORDER, UNSPECIFIED TYPE: ICD-10-CM

## 2024-09-23 DIAGNOSIS — E66.01 MORBID OBESITY (H): ICD-10-CM

## 2024-09-23 DIAGNOSIS — E27.9 ADRENAL NODULE (H): Primary | ICD-10-CM

## 2024-09-23 DIAGNOSIS — F41.1 GENERALIZED ANXIETY DISORDER: ICD-10-CM

## 2024-09-23 PROBLEM — F10.20 ALCOHOL DEPENDENCE, UNCOMPLICATED (H): Status: RESOLVED | Noted: 2021-02-01 | Resolved: 2024-09-23

## 2024-09-23 PROCEDURE — 99214 OFFICE O/P EST MOD 30 MIN: CPT | Performed by: FAMILY MEDICINE

## 2024-09-23 ASSESSMENT — PAIN SCALES - GENERAL: PAINLEVEL: NO PAIN (0)

## 2024-09-23 NOTE — PROGRESS NOTES
"  Assessment & Plan     Adrenal nodule (H24)   Dates back to 2018.    Radiologist recommended CT adrenal protocol  - CT Abdomen w/o & w Contrast; Future    Enlarged pulmonary artery (H)  No prior echocardiograms  EKG does show low voltage, possible \"pulmonary disease\"  Patient is inactive at baseline so hard to determine if she has PATIÑO, etc.   - Echocardiogram Complete; Future    Generalized anxiety disorder  [facility]    Obsessive-compulsive disorder, unspecified type  Sees psych NP    Morbid obesity (H)  Contributes to overall risk        MED REC REQUIRED  Post Medication Reconciliation Status: discharge medications reconciled and changed, per note/orders  BMI  Estimated body mass index is 40.5 kg/m  as calculated from the following:    Height as of this encounter: 1.651 m (5' 5\").    Weight as of this encounter: 110.4 kg (243 lb 6 oz).   Weight management plan: Discussed healthy diet and exercise guidelines        Mc Mtz is a 54 year old, presenting for the following health issues:  Chest Pain        9/23/2024     2:08 PM   Additional Questions   Roomed by Yun valenzuela CMA   Accompanied by      HPI       ED/UC Followup:    Facility:  [facility]  Date of visit: 9/16/2024  Reason for visit: Left sided chest pain  Current Status: Today she notes no chest pain or muscle aches. No SOB or wheezing. She notes some fatigue    Some incidental findings on CT scan while in the ER.    Review of Systems  Constitutional, HEENT, cardiovascular, pulmonary, gi and gu systems are negative, except as otherwise noted.      Objective    /80   Pulse 89   Temp 99.1  F (37.3  C) (Tympanic)   Ht 1.651 m (5' 5\")   Wt 110.4 kg (243 lb 6 oz)   LMP 08/21/2024 (Approximate)   SpO2 93%   BMI 40.50 kg/m    Body mass index is 40.5 kg/m .  Physical Exam   GENERAL: alert and no distress  RESP: lungs clear to auscultation - no rales, rhonchi or wheezes  CV: regular rates and rhythm, normal S1 " S2, no S3 or S4, grade 2/6 systolic murmur heard best over the lusb, and no peripheral edema  MS: no gross musculoskeletal defects noted, no edema        CT CHEST PULMONARY EMBOLISM W CONTRAST 9/16/2024 9:38 AM     CLINICAL HISTORY: Atraumatic left axillary, chest and scapular pain.   Elevated D-dimer.  Evaluate for pulmonary embolism versus other acute  cardiopulmonary process  TECHNIQUE: CT angiogram chest during arterial phase injection IV  contrast. 2D and 3D MIP reconstructions were performed by the CT  technologist. Dose reduction techniques were used.      CONTRAST: 91 mL Isovue 370     COMPARISON: 3/12/2019 chest x-ray chest CT on 7/17/2018     FINDINGS:  ANGIOGRAM CHEST: No pulmonary emboli. Stable mildly enlarged main  pulmonary artery measuring 3.6 cm which can be seen with pulmonary  hypertension. Thoracic aorta is negative for dissection. No CT  evidence of right heart strain.     LUNGS AND PLEURA: Mild bibasilar atelectasis. No infiltrate or pleural  effusion. Stable right upper lobe 3 mm nodule (6/77) and right middle  lobe 3 mm nodule (6/135), benign due to stability since 2018. No new  or enlarging nodules.     MEDIASTINUM/AXILLAE: No lymphadenopathy. No thoracic aortic aneurysm.  No coronary artery calcifications. Trace pericardial effusion. Small  hiatal hernia.     UPPER ABDOMEN: 3.1 x 2.5 cm nodule in the left adrenal gland has CT  attenuation of 5 Hounsfield units and is stable since 2018, likely a  benign lipid rich adenoma.     MUSCULOSKELETAL: No concerning bone lesions.                                                                      IMPRESSION:  1.  No pulmonary emboli. No acute findings in the chest.  2.  Stable mildly enlarged main pulmonary artery consistent with  pulmonary hypertension.  3.  Stable 3.1 cm left adrenal nodule, similar to 2018, likely a  benign adenoma. Follow-up nonemergent adrenal protocol CT can be  considered.     JESIKA BENEDICT MD        Signed Electronically by:  Glory Singleton MD

## 2024-10-22 ENCOUNTER — TRANSFERRED RECORDS (OUTPATIENT)
Dept: HEALTH INFORMATION MANAGEMENT | Facility: CLINIC | Age: 54
End: 2024-10-22
Payer: COMMERCIAL

## 2024-11-13 DIAGNOSIS — E78.5 HYPERLIPIDEMIA LDL GOAL <160: ICD-10-CM

## 2024-11-14 RX ORDER — ROSUVASTATIN CALCIUM 20 MG/1
20 TABLET, COATED ORAL DAILY
Qty: 90 TABLET | Refills: 2 | Status: SHIPPED | OUTPATIENT
Start: 2024-11-14

## 2024-12-29 ENCOUNTER — HEALTH MAINTENANCE LETTER (OUTPATIENT)
Age: 54
End: 2024-12-29

## 2025-01-21 ENCOUNTER — TRANSFERRED RECORDS (OUTPATIENT)
Dept: HEALTH INFORMATION MANAGEMENT | Facility: CLINIC | Age: 55
End: 2025-01-21
Payer: COMMERCIAL

## 2025-02-25 DIAGNOSIS — E03.9 HYPOTHYROIDISM, UNSPECIFIED TYPE: ICD-10-CM

## 2025-02-25 RX ORDER — LEVOTHYROXINE SODIUM 100 UG/1
TABLET ORAL
Qty: 90 TABLET | Refills: 1 | Status: SHIPPED | OUTPATIENT
Start: 2025-02-25

## 2025-03-10 ENCOUNTER — NURSE TRIAGE (OUTPATIENT)
Dept: NURSING | Facility: CLINIC | Age: 55
End: 2025-03-10

## 2025-03-10 ENCOUNTER — OFFICE VISIT (OUTPATIENT)
Dept: FAMILY MEDICINE | Facility: CLINIC | Age: 55
End: 2025-03-10
Payer: COMMERCIAL

## 2025-03-10 ENCOUNTER — ANCILLARY PROCEDURE (OUTPATIENT)
Dept: GENERAL RADIOLOGY | Facility: CLINIC | Age: 55
End: 2025-03-10
Attending: FAMILY MEDICINE
Payer: COMMERCIAL

## 2025-03-10 VITALS
RESPIRATION RATE: 23 BRPM | BODY MASS INDEX: 39.53 KG/M2 | HEIGHT: 66 IN | DIASTOLIC BLOOD PRESSURE: 78 MMHG | HEART RATE: 92 BPM | OXYGEN SATURATION: 95 % | WEIGHT: 246 LBS | SYSTOLIC BLOOD PRESSURE: 142 MMHG | TEMPERATURE: 98.1 F

## 2025-03-10 DIAGNOSIS — E78.5 HYPERLIPIDEMIA LDL GOAL <160: Primary | ICD-10-CM

## 2025-03-10 DIAGNOSIS — R05.1 ACUTE COUGH: ICD-10-CM

## 2025-03-10 LAB
FLUAV AG SPEC QL IA: NEGATIVE
FLUBV AG SPEC QL IA: NEGATIVE

## 2025-03-10 PROCEDURE — 87804 INFLUENZA ASSAY W/OPTIC: CPT | Performed by: FAMILY MEDICINE

## 2025-03-10 PROCEDURE — 71046 X-RAY EXAM CHEST 2 VIEWS: CPT | Mod: TC | Performed by: RADIOLOGY

## 2025-03-10 ASSESSMENT — PATIENT HEALTH QUESTIONNAIRE - PHQ9
10. IF YOU CHECKED OFF ANY PROBLEMS, HOW DIFFICULT HAVE THESE PROBLEMS MADE IT FOR YOU TO DO YOUR WORK, TAKE CARE OF THINGS AT HOME, OR GET ALONG WITH OTHER PEOPLE: SOMEWHAT DIFFICULT
SUM OF ALL RESPONSES TO PHQ QUESTIONS 1-9: 11
SUM OF ALL RESPONSES TO PHQ QUESTIONS 1-9: 11

## 2025-03-10 ASSESSMENT — PAIN SCALES - GENERAL: PAINLEVEL_OUTOF10: NO PAIN (0)

## 2025-03-10 NOTE — TELEPHONE ENCOUNTER
Cough started on Friday and it won't go away. Patient states that she was taking Mucinex DM and thought it was getting better but it isn't. Patient requested an appointment, declined triage and was transferred to scheduling. Patient verbalized understanding of care advice.  Leana Duarte RN on 3/10/2025 at 6:06 AM    Reason for Disposition   Health Information question, no triage required and triager able to answer question    Protocols used: Information Only Call - No Triage-A-

## 2025-03-10 NOTE — LETTER
March 10, 2025      Bibi Song  [address] [address]        To Whom It May Concern:    Bibi Song  was seen on 03/10/25 .  Please excuse her today due to illness.        Sincerely,        SULEMA FARRELL MD    Electronically signed

## 2025-03-10 NOTE — PROGRESS NOTES
"  {PROVIDER CHARTING PREFERENCE:406502}    Subjective   Bibi is a 54 year old, presenting for the following health issues:  URI        3/10/2025     9:52 AM   Additional Questions   Roomed by Sarah MORALES   Accompanied by Self     History of Present Illness       Reason for visit:  Barky cough She is missing 4 dose(s) of medications per week.      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  Physical Exam   {Exam List (Optional):512524}    {Diagnostic Test Results (Optional):340554}        Signed Electronically by: SULEMA FARRELL MD  {Email feedback regarding this note to [email or web address]   :[id number]}  "

## 2025-03-10 NOTE — LETTER
3/10/2025    Bibi Song   1970        To Whom it May Concern;    Please excuse Bibi Song from work/school for a healthcare visit on Mar 10, 2025.    Sincerely,        SULEMA FARRELL MD

## 2025-03-11 ENCOUNTER — TELEPHONE (OUTPATIENT)
Dept: FAMILY MEDICINE | Facility: CLINIC | Age: 55
End: 2025-03-11
Payer: COMMERCIAL

## 2025-03-11 DIAGNOSIS — R05.1 ACUTE COUGH: Primary | ICD-10-CM

## 2025-03-11 RX ORDER — DEXTROMETHORPHAN HBR. AND GUAIFENESIN 10; 100 MG/5ML; MG/5ML
5 SOLUTION ORAL 4 TIMES DAILY PRN
Qty: 118 ML | Refills: 1 | Status: SHIPPED | OUTPATIENT
Start: 2025-03-11

## 2025-03-11 NOTE — TELEPHONE ENCOUNTER
Sergio calls for Bibi as she is at work. States she has had a dry barky cough since Friday and Dr Nunez advised to use Mucinex which has helped a little but not much. No troubles with breathing at this time and no fever. Wondering if Dr Espinosa can prescribe something else to help with the cough like a cough syrup. The cough seems to be worse at night. Thank you!    Janice Palomo RN

## 2025-03-11 NOTE — TELEPHONE ENCOUNTER
Called and informed patient of the provider's instructions.     Patient agrees with plan and expressed understanding.    Lynn Shrestha RN on 3/11/2025 at 4:23 PM

## 2025-05-21 ENCOUNTER — PATIENT OUTREACH (OUTPATIENT)
Dept: CARE COORDINATION | Facility: CLINIC | Age: 55
End: 2025-05-21
Payer: COMMERCIAL

## 2025-07-01 ENCOUNTER — TRANSFERRED RECORDS (OUTPATIENT)
Dept: HEALTH INFORMATION MANAGEMENT | Facility: CLINIC | Age: 55
End: 2025-07-01
Payer: COMMERCIAL

## 2025-07-16 ENCOUNTER — OFFICE VISIT (OUTPATIENT)
Dept: FAMILY MEDICINE | Facility: CLINIC | Age: 55
End: 2025-07-16
Payer: COMMERCIAL

## 2025-07-16 VITALS
SYSTOLIC BLOOD PRESSURE: 120 MMHG | OXYGEN SATURATION: 94 % | RESPIRATION RATE: 16 BRPM | BODY MASS INDEX: 41.18 KG/M2 | DIASTOLIC BLOOD PRESSURE: 78 MMHG | TEMPERATURE: 97.9 F | HEART RATE: 88 BPM | WEIGHT: 256.25 LBS | HEIGHT: 66 IN

## 2025-07-16 DIAGNOSIS — Z00.00 ROUTINE GENERAL MEDICAL EXAMINATION AT A HEALTH CARE FACILITY: Primary | ICD-10-CM

## 2025-07-16 DIAGNOSIS — R06.83 SNORING: ICD-10-CM

## 2025-07-16 DIAGNOSIS — E66.813 CLASS 3 OBESITY (H): ICD-10-CM

## 2025-07-16 DIAGNOSIS — E03.9 HYPOTHYROIDISM, UNSPECIFIED TYPE: ICD-10-CM

## 2025-07-16 DIAGNOSIS — M89.9 SCAPULAR DYSFUNCTION: ICD-10-CM

## 2025-07-16 DIAGNOSIS — F42.9 OBSESSIVE-COMPULSIVE DISORDER, UNSPECIFIED TYPE: ICD-10-CM

## 2025-07-16 DIAGNOSIS — I28.8 ENLARGED PULMONARY ARTERY (H): ICD-10-CM

## 2025-07-16 DIAGNOSIS — E78.5 HYPERLIPIDEMIA LDL GOAL <160: ICD-10-CM

## 2025-07-16 DIAGNOSIS — G89.29 CHRONIC BILATERAL LOW BACK PAIN WITHOUT SCIATICA: ICD-10-CM

## 2025-07-16 DIAGNOSIS — M54.50 CHRONIC BILATERAL LOW BACK PAIN WITHOUT SCIATICA: ICD-10-CM

## 2025-07-16 DIAGNOSIS — K51.50 CHRONIC LEFT-SIDED ULCERATIVE COLITIS (H): ICD-10-CM

## 2025-07-16 DIAGNOSIS — F33.1 MODERATE RECURRENT MAJOR DEPRESSION (H): ICD-10-CM

## 2025-07-16 LAB
CHOLEST SERPL-MCNC: 151 MG/DL
EST. AVERAGE GLUCOSE BLD GHB EST-MCNC: 117 MG/DL
FASTING STATUS PATIENT QL REPORTED: YES
HBA1C MFR BLD: 5.7 % (ref 0–5.6)
HDLC SERPL-MCNC: 52 MG/DL
LDLC SERPL CALC-MCNC: 78 MG/DL
NONHDLC SERPL-MCNC: 99 MG/DL
TRIGL SERPL-MCNC: 106 MG/DL
TSH SERPL DL<=0.005 MIU/L-ACNC: 1.41 UIU/ML (ref 0.3–4.2)

## 2025-07-16 PROCEDURE — 96127 BRIEF EMOTIONAL/BEHAV ASSMT: CPT | Performed by: FAMILY MEDICINE

## 2025-07-16 PROCEDURE — 99396 PREV VISIT EST AGE 40-64: CPT | Performed by: FAMILY MEDICINE

## 2025-07-16 PROCEDURE — 1125F AMNT PAIN NOTED PAIN PRSNT: CPT | Performed by: FAMILY MEDICINE

## 2025-07-16 PROCEDURE — 80061 LIPID PANEL: CPT | Performed by: FAMILY MEDICINE

## 2025-07-16 PROCEDURE — 3074F SYST BP LT 130 MM HG: CPT | Performed by: FAMILY MEDICINE

## 2025-07-16 PROCEDURE — 3078F DIAST BP <80 MM HG: CPT | Performed by: FAMILY MEDICINE

## 2025-07-16 PROCEDURE — 99214 OFFICE O/P EST MOD 30 MIN: CPT | Mod: 25 | Performed by: FAMILY MEDICINE

## 2025-07-16 PROCEDURE — 99207 E-CONSULT TO SLEEP MEDICINE (ADULT OUTPT PROVIDER TO SPECIALIST WRITTEN QUESTION & RESPONSE): CPT | Performed by: FAMILY MEDICINE

## 2025-07-16 PROCEDURE — 83036 HEMOGLOBIN GLYCOSYLATED A1C: CPT | Performed by: FAMILY MEDICINE

## 2025-07-16 PROCEDURE — 36415 COLL VENOUS BLD VENIPUNCTURE: CPT | Performed by: FAMILY MEDICINE

## 2025-07-16 PROCEDURE — G2211 COMPLEX E/M VISIT ADD ON: HCPCS | Performed by: FAMILY MEDICINE

## 2025-07-16 PROCEDURE — 84443 ASSAY THYROID STIM HORMONE: CPT | Performed by: FAMILY MEDICINE

## 2025-07-16 RX ORDER — ROSUVASTATIN CALCIUM 20 MG/1
20 TABLET, COATED ORAL DAILY
Qty: 90 TABLET | Refills: 3 | Status: SHIPPED | OUTPATIENT
Start: 2025-07-16

## 2025-07-16 RX ORDER — LEVOTHYROXINE SODIUM 100 UG/1
100 TABLET ORAL
Qty: 90 TABLET | Refills: 3 | Status: SHIPPED | OUTPATIENT
Start: 2025-07-16

## 2025-07-16 SDOH — HEALTH STABILITY: PHYSICAL HEALTH: ON AVERAGE, HOW MANY DAYS PER WEEK DO YOU ENGAGE IN MODERATE TO STRENUOUS EXERCISE (LIKE A BRISK WALK)?: 3 DAYS

## 2025-07-16 ASSESSMENT — ANXIETY QUESTIONNAIRES
5. BEING SO RESTLESS THAT IT IS HARD TO SIT STILL: SEVERAL DAYS
1. FEELING NERVOUS, ANXIOUS, OR ON EDGE: NEARLY EVERY DAY
3. WORRYING TOO MUCH ABOUT DIFFERENT THINGS: NEARLY EVERY DAY
2. NOT BEING ABLE TO STOP OR CONTROL WORRYING: NEARLY EVERY DAY
GAD7 TOTAL SCORE: 14
6. BECOMING EASILY ANNOYED OR IRRITABLE: NOT AT ALL
GAD7 TOTAL SCORE: 14
7. FEELING AFRAID AS IF SOMETHING AWFUL MIGHT HAPPEN: NEARLY EVERY DAY

## 2025-07-16 ASSESSMENT — SOCIAL DETERMINANTS OF HEALTH (SDOH): HOW OFTEN DO YOU GET TOGETHER WITH FRIENDS OR RELATIVES?: NEVER

## 2025-07-16 ASSESSMENT — PATIENT HEALTH QUESTIONNAIRE - PHQ9
SUM OF ALL RESPONSES TO PHQ QUESTIONS 1-9: 11
5. POOR APPETITE OR OVEREATING: SEVERAL DAYS
SUM OF ALL RESPONSES TO PHQ QUESTIONS 1-9: 11
10. IF YOU CHECKED OFF ANY PROBLEMS, HOW DIFFICULT HAVE THESE PROBLEMS MADE IT FOR YOU TO DO YOUR WORK, TAKE CARE OF THINGS AT HOME, OR GET ALONG WITH OTHER PEOPLE: SOMEWHAT DIFFICULT

## 2025-07-16 ASSESSMENT — PAIN SCALES - GENERAL: PAINLEVEL_OUTOF10: SEVERE PAIN (7)

## 2025-07-16 NOTE — PATIENT INSTRUCTIONS
Patient Education   Preventive Care Advice   This is general advice given by our system to help you stay healthy. However, your care team may have specific advice just for you. Please talk to your care team about your preventive care needs.  Nutrition  Eat 5 or more servings of fruits and vegetables each day.  Try wheat bread, brown rice and whole grain pasta (instead of white bread, rice, and pasta).  Get enough calcium and vitamin D. Check the label on foods and aim for 100% of the RDA (recommended daily allowance).  Lifestyle  Exercise at least 150 minutes each week  (30 minutes a day, 5 days a week).  Do muscle strengthening activities 2 days a week. These help control your weight and prevent disease.  No smoking.  Wear sunscreen to prevent skin cancer.  Have a dental exam and cleaning every 6 months.  Yearly exams  See your health care team every year to talk about:  Any changes in your health.  Any medicines your care team has prescribed.  Preventive care, family planning, and ways to prevent chronic diseases.  Shots (vaccines)   HPV shots (up to age 26), if you've never had them before.  Hepatitis B shots (up to age 59), if you've never had them before.  COVID-19 shot: Get this shot when it's due.  Flu shot: Get a flu shot every year.  Tetanus shot: Get a tetanus shot every 10 years.  Pneumococcal, hepatitis A, and RSV shots: Ask your care team if you need these based on your risk.  Shingles shot (for age 50 and up)  General health tests  Diabetes screening:  Starting at age 35, Get screened for diabetes at least every 3 years.  If you are younger than age 35, ask your care team if you should be screened for diabetes.  Cholesterol test: At age 39, start having a cholesterol test every 5 years, or more often if advised.  Bone density scan (DEXA): At age 50, ask your care team if you should have this scan for osteoporosis (brittle bones).  Hepatitis C: Get tested at least once in your life.  STIs (sexually  transmitted infections)  Before age 24: Ask your care team if you should be screened for STIs.  After age 24: Get screened for STIs if you're at risk. You are at risk for STIs (including HIV) if:  You are sexually active with more than one person.  You don't use condoms every time.  You or a partner was diagnosed with a sexually transmitted infection.  If you are at risk for HIV, ask about PrEP medicine to prevent HIV.  Get tested for HIV at least once in your life, whether you are at risk for HIV or not.  Cancer screening tests  Cervical cancer screening: If you have a cervix, begin getting regular cervical cancer screening tests starting at age 21.  Breast cancer scan (mammogram): If you've ever had breasts, begin having regular mammograms starting at age 40. This is a scan to check for breast cancer.  Colon cancer screening: It is important to start screening for colon cancer at age 45.  Have a colonoscopy test every 10 years (or more often if you're at risk) Or, ask your provider about stool tests like a FIT test every year or Cologuard test every 3 years.  To learn more about your testing options, visit:   .  For help making a decision, visit:   https://bit.ly/vy00067.  Prostate cancer screening test: If you have a prostate, ask your care team if a prostate cancer screening test (PSA) at age 55 is right for you.  Lung cancer screening: If you are a current or former smoker ages 50 to 80, ask your care team if ongoing lung cancer screenings are right for you.  For informational purposes only. Not to replace the advice of your health care provider. Copyright   2023 [facility]. All rights reserved. Clinically reviewed by the [facility] Transitions Program. [employer] 565220 - REV 01/24.  Relationships for Good Health  Relationships are important for our health and happiness. Social isolation, loneliness and lack of support are bad for your health. Studies show that loneliness can harm health  and limit your life span as much as high blood pressure and smoking.   Take some time to reflect on your relationships. Then answer these questions:  Are there people in your life that cause you stress or drain your energy? What can you do to set limits?  ________________________________________________________________________________________________________________________________________________________________________________________________________________________________________________________________________________________________________________________________________________  Who do you enjoy spending time with? Who can you go to for support?  ________________________________________________________________________________________________________________________________________________________________________________________________________________________________________________________________________________________________________________________________________________  What can you do to improve your relationships with others?  __________________________________________________________________________________________________________________________________________________________________________________________________________________  ______________________________________________________________________________________________________________________________  What do you like most about your relationships with others?  ________________________________________________________________________________________________________________________________________________________________________________________________________________________________________________________________________________________________________________________________________________  My goal: ______________________________________________________________________  I will:  ______________________________________________________________________________________________________________________________________________________________________________________________    For informational purposes only. Not to replace the advice of your health care provider. Copyright   2018 [facility]. All rights reserved. Clinically reviewed by [facility]  [facility]. [employer] 184239 - Rev 06/24.  Learning About Stress  What is stress?     Stress is your body's response to a hard situation. Your body can have a physical, emotional, or mental response. Stress is a fact of life for most people, and it affects everyone differently. What causes stress for you may not be stressful for someone else.  A lot of things can cause stress. You may feel stress when you go on a job interview, take a test, or run a race. This kind of short-term stress is normal and even useful. It can help you if you need to work hard or react quickly. For example, stress can help you finish an important job on time.  Long-term stress is caused by ongoing stressful situations or events. Examples of long-term stress include long-term health problems, ongoing problems at work, or conflicts in your family. Long-term stress can harm your health.  How does stress affect your health?  When you are stressed, your body responds as though you are in danger. It makes hormones that speed up your heart, make you breathe faster, and give you a burst of energy. This is called the fight-or-flight stress response. If the stress is over quickly, your body goes back to normal and no harm is done.  But if stress happens too often or lasts too long, it can have bad effects. Long-term stress can make you more likely to get sick, and it can make symptoms of some diseases worse. If you tense up when you are stressed, you may develop neck, shoulder, or low back pain. Stress is linked to high blood pressure and heart disease.  Stress also  harms your emotional health. It can make you floyd, tense, or depressed. Your relationships may suffer, and you may not do well at work or school.  What can you do to manage stress?  You can try these things to help manage stress:   Do something active. Exercise or activity can help reduce stress. Walking is a great way to get started. Even everyday activities such as housecleaning or yard work can help.  Try yoga or kyra chi. These techniques combine exercise and meditation. You may need some training at first to learn them.  Do something you enjoy. For example, listen to music or go to a movie. Practice your hobby or do volunteer work.  Meditate. This can help you relax, because you are not worrying about what happened before or what may happen in the future.  Do guided imagery. Imagine yourself in any setting that helps you feel calm. You can use online videos, books, or a teacher to guide you.  Do breathing exercises. For example:  From a standing position, bend forward from the waist with your knees slightly bent. Let your arms dangle close to the floor.  Breathe in slowly and deeply as you return to a standing position. Roll up slowly and lift your head last.  Hold your breath for just a few seconds in the standing position.  Breathe out slowly and bend forward from the waist.  Let your feelings out. Talk, laugh, cry, and express anger when you need to. Talking with supportive friends or family, a counselor, or a chandrika leader about your feelings is a healthy way to relieve stress. Avoid discussing your feelings with people who make you feel worse.  Write. It may help to write about things that are bothering you. This helps you find out how much stress you feel and what is causing it. When you know this, you can find better ways to cope.  What can you do to prevent stress?  You might try some of these things to help prevent stress:  Manage your time. This helps you find time to do the things you want and need to  "do.  Get enough sleep. Your body recovers from the stresses of the day while you are sleeping.  Get support. Your family, friends, and community can make a difference in how you experience stress.  Limit your news feed. Avoid or limit time on social media or news that may make you feel stressed.  Do something active. Exercise or activity can help reduce stress. Walking is a great way to get started.  Where can you learn more?  Go to https://www.[employer].net/patiented  Enter N032 in the search box to learn more about \"Learning About Stress.\"  Current as of: October 24, 2024  Content Version: 14.5    7290-2842 [employer].   Care instructions adapted under license by your healthcare professional. If you have questions about a medical condition or this instruction, always ask your healthcare professional. [employer] disclaims any warranty or liability for your use of this information.    Recovering From Depression: Care Instructions  Overview    Sticking to your treatment plan is important as you recover from depression. It may take time for your symptoms to get better after you start treatment. Try not to give up if you don't feel better right away. Make sure you keep going to counseling and taking any prescribed medicine if they are part of your treatment plan.  Focus on things that can help you feel better, such as being with friends and family. Try to eat healthy foods, be active, and get enough sleep. Take things slowly as you begin to recover.  Follow-up care is a key part of your treatment and safety. Be sure to make and go to all appointments, and call your doctor if you are having problems. It's also a good idea to know your test results and keep a list of the medicines you take.  How can you care for yourself at home?  Be realistic  If you have a large task to do, break it up into smaller steps you can handle, and just do what you can.  You may want to put off important decisions " until your depression has lifted. If you have plans that will have a major impact on your life, such as marriage, divorce, or a job change, try to wait a bit. Talk it over with friends and loved ones who can help you look at the overall picture first.  Reaching out to people for help is important. Do not isolate yourself. Let your family and friends help you. Find someone you can trust and confide in, and talk to that person.  Be patient, and be kind to yourself. Remember that depression is not your fault and is not something you can overcome with willpower alone. Treatment is important for depression, just like for any other illness. Feeling better takes time, and your mood will improve little by little.  Stay active  Stay busy and get outside. Take a walk, or try some other light exercise.  Talk with your doctor about an exercise program. Exercise can help with mild depression.  Go to a movie or concert. Take part in a Alevism activity or other social gathering. Go to a ball game.  Ask a friend to have dinner with you.  Take care of yourself  Eat healthy foods such as fresh fruits and vegetables, whole grains, and lean protein. If you have lost your appetite, eat small snacks rather than large meals.  Avoid using marijuana and other drugs and drinking alcohol. Do not take medicines that have not been prescribed for you. They may interfere with medicines you may be taking for depression, or they may make your depression worse.  Take your medicines exactly as they are prescribed. You may start to feel better within 1 to 3 weeks of taking antidepressant medicine. But it can take as many as 6 to 8 weeks to see more improvement. If you have questions or concerns about your medicines, or if you do not notice any improvement by 3 weeks, talk to your doctor.  Continue to take your medicine after your symptoms improve. Taking your medicine for at least 6 months after you feel better can help keep you from getting depressed  again. If this isn't the first time you have been depressed, your doctor may recommend you to take medicine even longer.  If you have any side effects from your medicine, tell your doctor. Many side effects are mild and will go away on their own after you have been taking the medicine for a few weeks. Some may last longer. Talk to your doctor if side effects are bothering you too much. You might be able to try a different medicine.  Continue counseling. It may help prevent depression from returning, especially if you've had multiple episodes of depression. Talk with your counselor if you are having a hard time attending your sessions or you think the sessions aren't working. Don't just stop going.  Get enough sleep. Talk to your doctor if you are having problems sleeping.  Avoid sleeping pills unless they are prescribed by the doctor treating your depression. Sleeping pills may make you groggy during the day, and they may interact with other medicine you are taking.  If you have any other illnesses, such as diabetes, heart disease, or high blood pressure, make sure to continue with your treatment. Tell your doctor about all of the medicines you take, including those with or without a prescription.  Where to get help 24 hours a day, 7 days a week  If you or someone you know talks about suicide, self-harm, a mental health crisis, a substance use crisis, or any other kind of emotional distress, get help right away. You can:  Call the Suicide and Crisis Lifeline at 105.  Text HOME to 255814 to access the Crisis Text Line.  Consider saving these numbers in your phone.  Go to [employer].org for more information or to chat online.  Call 161 anytime you think you may need emergency care. For example, call if:  You feel like hurting yourself or someone else.  Someone you know has depression and is about to attempt or is attempting suicide.  Where to get help 24 hours a day, 7 days a week  If you or someone you know talks  "about suicide, self-harm, a mental health crisis, a substance use crisis, or any other kind of emotional distress, get help right away. You can:  Call the Suicide and Crisis Lifeline at 988.  Text HOME to 178779 to access the Crisis Text Line.  Consider saving these numbers in your phone.  Go to [employer] for more information or to chat online.  Call your doctor now or seek immediate medical care if:  You hear voices.  Someone you know has depression and:  Starts to give away possessions.  Uses illegal drugs or drinks alcohol heavily.  Talks or writes about death, including writing suicide notes or talking about guns, knives, or pills.  Starts to spend a lot of time alone.  Acts very aggressively or suddenly appears calm.  Watch closely for changes in your health, and be sure to contact your doctor if:  You do not get better as expected.  Where can you learn more?  Go to https://www.[employer].net/patiented  Enter N529 in the search box to learn more about \"Recovering From Depression: Care Instructions.\"  Current as of: July 31, 2024  Content Version: 14.5    6744-9226 [employer].   Care instructions adapted under license by your healthcare professional. If you have questions about a medical condition or this instruction, always ask your healthcare professional. [employer] disclaims any warranty or liability for your use of this information.       "

## 2025-07-16 NOTE — PROGRESS NOTES
Preventive Care Visit  [facility]  Glory Singleton MD, Family Medicine  Jul 16, 2025      Assessment & Plan     Routine general medical examination at a health care facility     - Hemoglobin A1c; Future    Hyperlipidemia LDL goal <160  Continue statin, check lipids (fasting today)  - Lipid panel reflex to direct LDL Fasting; Future  - rosuvastatin (CRESTOR) 20 MG tablet; Take 1 tablet (20 mg) by mouth daily.    Obsessive-compulsive disorder, unspecified type  Follows with psych at [facility], on geodon, wellbutrin, sertraline and gabapentin    Enlarged pulmonary artery (H)  Found incidentally on CT scan 9/2024 in ER.  Echo has been ordered, she has not done this yet due to cost.  Evaluating as well for RACIEL    Moderate recurrent major depression (H)  See above, seeing psych    Chronic left-sided ulcerative colitis (H)  Sees [facility], is on Balsalazide and has follow up with them scheduled    Class 3 obesity (H)  Contributes to risk of possible RACIEL, hyperlipidemia, etc.     Hypothyroidism, unspecified type  Check TSH, has had weight gain, but may be diet/exercise related.   - TSH WITH FREE T4 REFLEX; Future  - levothyroxine (SYNTHROID/LEVOTHROID) 100 MCG tablet; Take 1 tablet (100 mcg) by mouth every morning (before breakfast).    Scapular dysfunction  Bilaterally, L>R, has seen PT before and this was helpful  - Physical Therapy  Referral; Future    Chronic bilateral low back pain without sciatica  Currently no sciatica symptoms, she would like to see PT to avoid worsening problems.   - Physical Therapy  Referral; Future    Snoring   says she snores loudly, no witnessed apnea   She has chronic daytime fatigue but this may be multifactorial given the multiple psych meds  - Adult E-Consult to Sleep Medicine (Outpt Provider to Specialist Written Question & Response)    Patient has been advised of split billing requirements and indicates understanding:  "Yes    BMI  Estimated body mass index is 41.99 kg/m  as calculated from the following:    Height as of this encounter: 1.664 m (5' 5.5\").    Weight as of this encounter: 116.2 kg (256 lb 4 oz).       Counseling  Appropriate preventive services were addressed with this patient via screening, questionnaire, or discussion as appropriate for fall prevention, nutrition, physical activity, Tobacco-use cessation, social engagement, weight loss and cognition.  Checklist reviewing preventive services available has been given to the patient.  Reviewed patient's diet, addressing concerns and/or questions.   She is at risk for lack of exercise and has been provided with information to increase physical activity for the benefit of her well-being.   Patient is at risk for social isolation and has been provided with information about the benefit of social connection.   She is at risk for psychosocial distress and has been provided with information to reduce risk.           Mc Mtz is a 55 year old, presenting for the following:  Physical        7/16/2025     6:50 AM   Additional Questions   Roomed by joselo valenzuela CMA   Accompanied by Self          HPI     - Menopausal concerns    Hyperlipidemia Follow-Up  Rosuvastatin 20mg qd  Are you regularly taking any medication or supplement to lower your cholesterol?   Yes- statin  Are you having muscle aches or other side effects that you think could be caused by your cholesterol lowering medication?  No    Depression and Anxiety   Wellbutrin XL 150mg every day, ativan 1mg every day prn, zoloft 200mg qd  How are you doing with your depression since your last visit? 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  No change  Are you having other symptoms that might be associated with depression or anxiety? Yes:  increased appetite and sleep problems  Have you had a significant life event? No   Do you have any concerns " with your use of alcohol or other drugs? No    Social History     Tobacco Use    Smoking status: Former     Current packs/day: 0.00     Types: Cigarettes     Start date: 1988     Quit date: 1994     Years since quittin.5    Smokeless tobacco: Never   Vaping Use    Vaping status: Never Used   Substance Use Topics    Alcohol use: No     Comment: Treatment in     Drug use: No         2024     2:55 PM 3/10/2025     9:39 AM 2025     6:37 AM   PHQ   PHQ-9 Total Score 13 11  11    Q9: Thoughts of better off dead/self-harm past 2 weeks Not at all Not at all Not at all       Patient-reported         12/3/2021     5:02 AM 2022    11:20 AM 2023     7:08 AM   NARCISA-7 SCORE   Total Score 7 (mild anxiety) 5 (mild anxiety)    Total Score 7 5 6         2025     6:37 AM   Last PHQ-9   1.  Little interest or pleasure in doing things 1   2.  Feeling down, depressed, or hopeless 1   3.  Trouble falling or staying asleep, or sleeping too much 2   4.  Feeling tired or having little energy 1   5.  Poor appetite or overeating 2   6.  Feeling bad about yourself 1   7.  Trouble concentrating 2   8.  Moving slowly or restless 1   Q9: Thoughts of better off dead/self-harm past 2 weeks 0   PHQ-9 Total Score 11        Patient-reported         2025     6:58 AM   NARCISA-7    1. Feeling nervous, anxious, or on edge 3   2. Not being able to stop or control worrying 3   3. Worrying too much about different things 3   4. Trouble relaxing 1   5. Being so restless that it is hard to sit still 1   6. Becoming easily annoyed or irritable 0   7. Feeling afraid, as if something awful might happen 3   NARCISA-7 Total Score 14       Suicide Assessment Five-step Evaluation and Treatment (SAFE-T)      Hypothyroidism Follow-up  Levothyroxine 100mcg qd  Since last visit, patient describes the following symptoms: weight gain of 20 lbs, loose stools, and fatigue    TSH   Date Value Ref Range Status   2024 3.28 0.30 -  4.20 uIU/mL Final   07/01/2022 1.12 0.40 - 4.00 mU/L Final   06/24/2021 1.82 0.40 - 4.00 mU/L Final     T4 Free   Date Value Ref Range Status   06/24/2021 0.89 0.76 - 1.46 ng/dL Final     Free T4   Date Value Ref Range Status   03/01/2024 0.90 0.90 - 1.70 ng/dL Final       Advance Care Planning    Patient states has Health Care Directive and will send to [facility].        7/16/2025   General Health   How would you rate your overall physical health? (!) FAIR   Feel stress (tense, anxious, or unable to sleep) To some extent   (!) STRESS CONCERN      7/16/2025   Nutrition   Three or more servings of calcium each day? Yes   Diet: Regular (no restrictions)   How many servings of fruit and vegetables per day? (!) 0-1   How many sweetened beverages each day? (!) 3         7/16/2025   Exercise   Days per week of moderate/strenous exercise 3 days         7/16/2025   Social Factors   Frequency of gathering with friends or relatives Never   Worry food won't last until get money to buy more No   Food not last or not have enough money for food? No   Do you have housing? (Housing is defined as stable permanent housing and does not include staying outside in a car, in a tent, in an abandoned building, in an overnight shelter, or couch-surfing.) Yes   Are you worried about losing your housing? No   Lack of transportation? No   Unable to get utilities (heat,electricity)? No   (!) SOCIAL CONNECTIONS CONCERN      7/16/2025   Fall Risk   Fallen 2 or more times in the past year? No   Trouble with walking or balance? No          7/16/2025   Dental   Dentist two times every year? Yes       Today's PHQ-9 Score:       7/16/2025     6:37 AM   PHQ-9 SCORE   PHQ-9 Total Score MyChart 11 (Moderate depression)   PHQ-9 Total Score 11        Patient-reported         7/16/2025   Substance Use   Alcohol more than 3/day or more than 7/wk Not Applicable   Do you use any other substances recreationally? No     Social History     Tobacco Use     Smoking status: Former     Current packs/day: 0.00     Types: Cigarettes     Start date: 1988     Quit date: 1994     Years since quittin.5    Smokeless tobacco: Never   Vaping Use    Vaping status: Never Used   Substance Use Topics    Alcohol use: No     Comment: Treatment in     Drug use: No           2024   LAST FHS-7 RESULTS   1st degree relative breast or ovarian cancer No   Any relative bilateral breast cancer No   Any male have breast cancer No   Any ONE woman have BOTH breast AND ovarian cancer No   Any woman with breast cancer before 50yrs No   2 or more relatives with breast AND/OR ovarian cancer No   2 or more relatives with breast AND/OR bowel cancer No        Mammogram Screening - Mammogram every 1-2 years updated in Health Maintenance based on mutual decision making        2025   STI Screening   New sexual partner(s) since last STI/HIV test? No     History of abnormal Pap smear: No - age 30- 64 PAP with HPV every 5 years recommended        Latest Ref Rng & Units 2022    11:06 AM 2017    10:00 AM 2017     9:42 AM   PAP / HPV   PAP  Negative for Intraepithelial Lesion or Malignancy (NILM)      PAP (Historical)    OTHER-NIL, See Result    HPV 16 DNA Negative Negative  Negative     HPV 18 DNA Negative Negative  Negative     Other HR HPV Negative Negative  Negative       ASCVD Risk   The 10-year ASCVD risk score (Wade RAZO, et al., 2019) is: 2.1%    Values used to calculate the score:      Age: 55 years      Sex: Female      Is Non- : No      Diabetic: No      Tobacco smoker: No      Systolic Blood Pressure: 132 mmHg      Is BP treated: No      HDL Cholesterol: 58 mg/dL      Total Cholesterol: 171 mg/dL           Reviewed and updated as needed this visit by Provider                          Review of Systems  Constitutional, HEENT, cardiovascular, pulmonary, gi and gu systems are negative, except as otherwise noted.     Objective   "  Exam  /86   Pulse 88   Temp 97.9  F (36.6  C) (Tympanic)   Resp 16   Ht 1.664 m (5' 5.5\")   Wt 116.2 kg (256 lb 4 oz)   LMP  (LMP Unknown)   SpO2 94%   BMI 41.99 kg/m     Estimated body mass index is 41.99 kg/m  as calculated from the following:    Height as of this encounter: 1.664 m (5' 5.5\").    Weight as of this encounter: 116.2 kg (256 lb 4 oz).    Physical Exam  GENERAL: alert and no distress  EYES: Eyes grossly normal to inspection, PERRL and conjunctivae and sclerae normal  HENT: ear canals and TM's normal, nose and mouth without ulcers or lesions  NECK: no adenopathy, no asymmetry, masses, or scars  RESP: lungs clear to auscultation - no rales, rhonchi or wheezes  BREAST: normal without masses, tenderness or nipple discharge and no palpable axillary masses or adenopathy  CV: regular rate and rhythm, normal S1 S2, no S3 or S4, no murmur, click or rub, no peripheral edema  ABDOMEN: soft, nontender, no hepatosplenomegaly, no masses and bowel sounds normal  MS: no gross musculoskeletal defects noted, no edema  NEURO: Normal strength and tone, mentation intact and speech normal  PSYCH: mentation appears normal, affect normal/bright, anxious, judgement and insight intact, and appearance well groomed        Signed Electronically by: Glory Singleton MD    "

## 2025-07-17 ENCOUNTER — E-CONSULT (OUTPATIENT)
Dept: SLEEP MEDICINE | Facility: CLINIC | Age: 55
End: 2025-07-17
Payer: COMMERCIAL

## 2025-07-17 DIAGNOSIS — R06.83 SNORING: Primary | ICD-10-CM

## 2025-07-17 DIAGNOSIS — E66.01 MORBID OBESITY (H): ICD-10-CM

## 2025-07-17 DIAGNOSIS — R53.82 CHRONIC FATIGUE: ICD-10-CM

## 2025-07-17 PROBLEM — D12.2 BENIGN NEOPLASM OF ASCENDING COLON: Status: RESOLVED | Noted: 2020-09-23 | Resolved: 2025-07-17

## 2025-07-17 PROBLEM — D12.5 BENIGN NEOPLASM OF SIGMOID COLON: Status: RESOLVED | Noted: 2020-09-23 | Resolved: 2025-07-17

## 2025-07-17 PROBLEM — T43.014A TCA (TRICYCLIC ANTIDEPRESSANT) OVERDOSE OF UNDETERMINED INTENT: Status: RESOLVED | Noted: 2018-07-18 | Resolved: 2025-07-17

## 2025-07-17 NOTE — PROGRESS NOTES
7/17/2025     E-Consult has been accepted.    Interprofessional consultation requested by:  Glory Singleton MD      Clinical Question/Purpose: MY CLINICAL QUESTION IS: possible sleep apnea, obesity.  Daytime fatigue.    Patient assessment and information reviewed: snoring tiredness, obesity, ?history of tracheal stenosis    Recommendations: HSAT, 60 minute new sleep medicine visit to follow 3-6 weeks after. STOPBANG >=4      The recommendations provided in this E-Consult are based on a review of clinical data pertinent to the clinical question presented, without a review of the patient's complete medical record or, the benefit of a comprehensive in-person or virtual patient evaluation. This consultation should not replace the clinical judgement and evaluation of the provider ordering this E-Consult. Any new clinical issues, or changes in patient status since the filing of this E-Consult will need to be taken into account when assessing these recommendations. Please contact me if you have further questions.    My total time spent reviewing clinical information and formulating assessment was 5 minutes.        Fady Alonso MD

## (undated) DEVICE — SPONGE RAY-TEC 4X8" 7318

## (undated) DEVICE — RETR ELASTIC STAYS LONE STAR BLUNT DUAL LEAD 3550-1G

## (undated) DEVICE — GLOVE PROTEXIS POWDER FREE SMT 8.0  2D72PT80X

## (undated) DEVICE — SUCTION MANIFOLD NEPTUNE 2 SYS 4 PORT 0702-020-000

## (undated) DEVICE — LINEN TOWEL PACK X5 5464

## (undated) DEVICE — LABEL MEDICATION SYSTEM 3303-P

## (undated) DEVICE — SU VICRYL 3-0 SH 27" UND J416H

## (undated) DEVICE — SYR 03ML LL W/O NDL 309657

## (undated) DEVICE — COVER CAMERA VIDEO LASER 9X96" 04-CC227

## (undated) DEVICE — CLIP HORIZON SM RED WIDE SLOT 001201

## (undated) DEVICE — SUCTION MANIFOLD NEPTUNE 2 SYS 1 PORT 702-025-000

## (undated) DEVICE — ESU GROUND PAD ADULT W/CORD E7507

## (undated) DEVICE — WAVEGUIDE ILLUMINATOR INVUITY EIGR WIDE/FLAT 104008

## (undated) DEVICE — DRSG STERI STRIP 1/2X4" R1547

## (undated) DEVICE — SU SILK 2-0 TIE 12X30" A305H

## (undated) DEVICE — RETR BLADE LONE STAR 16X20MM 4 FINGER 3334-4G

## (undated) DEVICE — TUBING SUCTION 10'X3/16" N510

## (undated) DEVICE — SPONGE COTTONOID 1/2X1/2" 80-1400

## (undated) DEVICE — NDL BLUNT 18GA 1" W/O FILTER 305181

## (undated) DEVICE — PACK ENT ENDOSCOPY CUSTOM ASC

## (undated) DEVICE — PACK ENT ENDOSCOPY UMMC

## (undated) DEVICE — PACK NEURO MINOR UMMC SNE32MNMU4

## (undated) DEVICE — BLADE KNIFE BEAVER SICKLE EDGE 5.0MM 377300

## (undated) DEVICE — SUCTION MANIFOLD DORNOCH ULTRA CART UL-CL500

## (undated) DEVICE — ESU PENCIL SMOKE EVAC W/ROCKER SWITCH 0703-047-000

## (undated) DEVICE — SU SILK 4-0 TIE 12X30" A303H

## (undated) DEVICE — PACK SET-UP STD 9102

## (undated) DEVICE — SU ETHILON 5-0 P-3 18" BLACK 698G

## (undated) DEVICE — SU TICRON 2 GS-21DA 36" 3146-81

## (undated) DEVICE — SOL NACL 0.9% IRRIG 1000ML BOTTLE 2F7124

## (undated) DEVICE — SU SILK 0 SH 30" K834H

## (undated) DEVICE — DRAIN PENROSE 1X18" LATEX FREE GR207

## (undated) DEVICE — DRAIN PENROSE 0.25"X18" LATEX FREE GR201

## (undated) DEVICE — BAG CHEMOTHERAPY DRUGS 12X15"

## (undated) DEVICE — SU SILK 0 TIE 6X30" A306H

## (undated) DEVICE — SUCTION SLEEVE NEPTUNE 2 125MM 0703-005-125

## (undated) DEVICE — DRSG TELFA 3X8" 1238

## (undated) DEVICE — SOL NACL 0.9% IRRIG 500ML BOTTLE 2F7123

## (undated) DEVICE — DECANTER VIAL 2006S

## (undated) DEVICE — SOL WATER IRRIG 1000ML BOTTLE 07139-09

## (undated) DEVICE — SPONGE COTTONOID 1/2X1 1/2" 20-06S

## (undated) DEVICE — BLADE KNIFE SURG 15 371115

## (undated) DEVICE — SWAB PROCTO 16" 2/PK 32-046

## (undated) DEVICE — BASIN EMESIS STERILE  SSK9005A

## (undated) DEVICE — SOL WATER IRRIG 500ML BOTTLE 2F7113

## (undated) DEVICE — ENDO TOOTH QUICK GUARD CONFORMING PROTECTION

## (undated) DEVICE — SU ETHILON 3-0 PS-1 18" 1663H

## (undated) DEVICE — DRSG TELFA 3"X8" NON25720

## (undated) DEVICE — NIM PROBE PRASS INCREMENTING TIP 8225825

## (undated) DEVICE — CATH TRAY FOLEY SURESTEP 16FR W/TMP PRB STLK LATEX A319416AM

## (undated) DEVICE — SPONGE KITTNER 30-101

## (undated) DEVICE — Device

## (undated) DEVICE — LIGHT HANDLE X1 31140133

## (undated) DEVICE — NDL BUTTERFLY 25GA .75" 367298

## (undated) DEVICE — BLADE SAW STERNAL 20X30MM KM-32

## (undated) DEVICE — CATH BALLOON ATLAS 7FR 18MMX2X75CM AT75182

## (undated) DEVICE — CLIP HORIZON MED BLUE 002200

## (undated) DEVICE — SU SILK 2-0 SH CR 5X18" C0125

## (undated) DEVICE — NDL 25GA 1.5" 305127

## (undated) DEVICE — SYR PISTON URETHRAL 60ML 68000

## (undated) DEVICE — LINEN TOWEL PACK X6 WHITE 5487

## (undated) DEVICE — SU VICRYL 3-0 SH 8X18" UND J864D

## (undated) DEVICE — SPONGE COTTONOID 1/2X1/2" 20-04S

## (undated) DEVICE — PREP SKIN SCRUB TRAY 4461A

## (undated) DEVICE — DRSG GAUZE 4X4" TRAY 6939

## (undated) DEVICE — SU SILK 3-0 TIE 12X30" A304H

## (undated) DEVICE — SYR 30ML LL W/O NDL 302832

## (undated) DEVICE — DRAPE SPLIT SHEET 77X108 REINFORCED 29436

## (undated) RX ORDER — KETAMINE HCL IN 0.9 % NACL 50 MG/5 ML
SYRINGE (ML) INTRAVENOUS
Status: DISPENSED
Start: 2019-03-11

## (undated) RX ORDER — PROPOFOL 10 MG/ML
INJECTION, EMULSION INTRAVENOUS
Status: DISPENSED
Start: 2018-12-06

## (undated) RX ORDER — GABAPENTIN 300 MG/1
CAPSULE ORAL
Status: DISPENSED
Start: 2019-03-11

## (undated) RX ORDER — HYDROMORPHONE HYDROCHLORIDE 1 MG/ML
INJECTION, SOLUTION INTRAMUSCULAR; INTRAVENOUS; SUBCUTANEOUS
Status: DISPENSED
Start: 2019-03-11

## (undated) RX ORDER — GLYCOPYRROLATE 0.2 MG/ML
INJECTION, SOLUTION INTRAMUSCULAR; INTRAVENOUS
Status: DISPENSED
Start: 2018-09-07

## (undated) RX ORDER — OXYMETAZOLINE HYDROCHLORIDE 0.05 G/100ML
SPRAY NASAL
Status: DISPENSED
Start: 2018-10-05

## (undated) RX ORDER — DEXAMETHASONE SODIUM PHOSPHATE 10 MG/ML
INJECTION, SOLUTION INTRAMUSCULAR; INTRAVENOUS
Status: DISPENSED
Start: 2019-02-01

## (undated) RX ORDER — LIDOCAINE HYDROCHLORIDE 40 MG/ML
SOLUTION TOPICAL
Status: DISPENSED
Start: 2018-12-06

## (undated) RX ORDER — ONDANSETRON 2 MG/ML
INJECTION INTRAMUSCULAR; INTRAVENOUS
Status: DISPENSED
Start: 2018-09-07

## (undated) RX ORDER — FENTANYL CITRATE 50 UG/ML
INJECTION, SOLUTION INTRAMUSCULAR; INTRAVENOUS
Status: DISPENSED
Start: 2019-03-11

## (undated) RX ORDER — CEFAZOLIN SODIUM 1 G/3ML
INJECTION, POWDER, FOR SOLUTION INTRAMUSCULAR; INTRAVENOUS
Status: DISPENSED
Start: 2018-12-06

## (undated) RX ORDER — OXYCODONE HYDROCHLORIDE 5 MG/1
TABLET ORAL
Status: DISPENSED
Start: 2019-02-01

## (undated) RX ORDER — ALBUTEROL SULFATE 0.83 MG/ML
SOLUTION RESPIRATORY (INHALATION)
Status: DISPENSED
Start: 2018-09-07

## (undated) RX ORDER — PROPOFOL 10 MG/ML
INJECTION, EMULSION INTRAVENOUS
Status: DISPENSED
Start: 2019-03-11

## (undated) RX ORDER — ONDANSETRON 2 MG/ML
INJECTION INTRAMUSCULAR; INTRAVENOUS
Status: DISPENSED
Start: 2018-12-06

## (undated) RX ORDER — ACETAMINOPHEN 325 MG/1
TABLET ORAL
Status: DISPENSED
Start: 2019-02-01

## (undated) RX ORDER — ACETAMINOPHEN 325 MG/1
TABLET ORAL
Status: DISPENSED
Start: 2018-12-06

## (undated) RX ORDER — GABAPENTIN 300 MG/1
CAPSULE ORAL
Status: DISPENSED
Start: 2018-12-06

## (undated) RX ORDER — CEFAZOLIN SODIUM 2 G/100ML
INJECTION, SOLUTION INTRAVENOUS
Status: DISPENSED
Start: 2018-10-05

## (undated) RX ORDER — LIDOCAINE HYDROCHLORIDE 20 MG/ML
INJECTION, SOLUTION EPIDURAL; INFILTRATION; INTRACAUDAL; PERINEURAL
Status: DISPENSED
Start: 2018-12-06

## (undated) RX ORDER — LIDOCAINE HYDROCHLORIDE 10 MG/ML
INJECTION, SOLUTION EPIDURAL; INFILTRATION; INTRACAUDAL; PERINEURAL
Status: DISPENSED
Start: 2018-12-06

## (undated) RX ORDER — LIDOCAINE HYDROCHLORIDE AND EPINEPHRINE 10; 10 MG/ML; UG/ML
INJECTION, SOLUTION INFILTRATION; PERINEURAL
Status: DISPENSED
Start: 2019-03-11

## (undated) RX ORDER — SCOLOPAMINE TRANSDERMAL SYSTEM 1 MG/1
PATCH, EXTENDED RELEASE TRANSDERMAL
Status: DISPENSED
Start: 2019-03-11

## (undated) RX ORDER — FENTANYL CITRATE 50 UG/ML
INJECTION, SOLUTION INTRAMUSCULAR; INTRAVENOUS
Status: DISPENSED
Start: 2018-12-06

## (undated) RX ORDER — OXYCODONE HYDROCHLORIDE 5 MG/1
TABLET ORAL
Status: DISPENSED
Start: 2018-09-07

## (undated) RX ORDER — TRIAMCINOLONE ACETONIDE 40 MG/ML
INJECTION, SUSPENSION INTRA-ARTICULAR; INTRAMUSCULAR
Status: DISPENSED
Start: 2019-02-01

## (undated) RX ORDER — FENTANYL CITRATE 50 UG/ML
INJECTION, SOLUTION INTRAMUSCULAR; INTRAVENOUS
Status: DISPENSED
Start: 2018-10-05

## (undated) RX ORDER — KETAMINE HYDROCHLORIDE 10 MG/ML
INJECTION INTRAMUSCULAR; INTRAVENOUS
Status: DISPENSED
Start: 2019-02-01

## (undated) RX ORDER — CEFAZOLIN SODIUM 2 G/100ML
INJECTION, SOLUTION INTRAVENOUS
Status: DISPENSED
Start: 2018-09-07

## (undated) RX ORDER — PHENYLEPHRINE HCL IN 0.9% NACL 1 MG/10 ML
SYRINGE (ML) INTRAVENOUS
Status: DISPENSED
Start: 2018-09-07

## (undated) RX ORDER — FENTANYL CITRATE 50 UG/ML
INJECTION, SOLUTION INTRAMUSCULAR; INTRAVENOUS
Status: DISPENSED
Start: 2018-09-07

## (undated) RX ORDER — OXYCODONE HYDROCHLORIDE 5 MG/1
TABLET ORAL
Status: DISPENSED
Start: 2018-12-06

## (undated) RX ORDER — PROPOFOL 10 MG/ML
INJECTION, EMULSION INTRAVENOUS
Status: DISPENSED
Start: 2018-09-07

## (undated) RX ORDER — DEXAMETHASONE SODIUM PHOSPHATE 10 MG/ML
INJECTION, SOLUTION INTRAMUSCULAR; INTRAVENOUS
Status: DISPENSED
Start: 2018-12-06

## (undated) RX ORDER — FENTANYL CITRATE 50 UG/ML
INJECTION, SOLUTION INTRAMUSCULAR; INTRAVENOUS
Status: DISPENSED
Start: 2019-02-01

## (undated) RX ORDER — TRIAMCINOLONE ACETONIDE 40 MG/ML
INJECTION, SUSPENSION INTRA-ARTICULAR; INTRAMUSCULAR
Status: DISPENSED
Start: 2018-10-05

## (undated) RX ORDER — OXYMETAZOLINE HYDROCHLORIDE 0.05 G/100ML
SPRAY NASAL
Status: DISPENSED
Start: 2019-03-11

## (undated) RX ORDER — LIDOCAINE HYDROCHLORIDE 40 MG/ML
SOLUTION TOPICAL
Status: DISPENSED
Start: 2019-02-01

## (undated) RX ORDER — CEFAZOLIN SODIUM 1 G/3ML
INJECTION, POWDER, FOR SOLUTION INTRAMUSCULAR; INTRAVENOUS
Status: DISPENSED
Start: 2019-02-01

## (undated) RX ORDER — TRIAMCINOLONE ACETONIDE 40 MG/ML
INJECTION, SUSPENSION INTRA-ARTICULAR; INTRAMUSCULAR
Status: DISPENSED
Start: 2018-12-06

## (undated) RX ORDER — GABAPENTIN 300 MG/1
CAPSULE ORAL
Status: DISPENSED
Start: 2019-02-01

## (undated) RX ORDER — SCOLOPAMINE TRANSDERMAL SYSTEM 1 MG/1
PATCH, EXTENDED RELEASE TRANSDERMAL
Status: DISPENSED
Start: 2019-02-01

## (undated) RX ORDER — LIDOCAINE HYDROCHLORIDE 20 MG/ML
INJECTION, SOLUTION EPIDURAL; INFILTRATION; INTRACAUDAL; PERINEURAL
Status: DISPENSED
Start: 2018-09-07

## (undated) RX ORDER — CEFAZOLIN SODIUM 1 G/3ML
INJECTION, POWDER, FOR SOLUTION INTRAMUSCULAR; INTRAVENOUS
Status: DISPENSED
Start: 2018-09-07

## (undated) RX ORDER — DEXAMETHASONE SODIUM PHOSPHATE 4 MG/ML
INJECTION, SOLUTION INTRA-ARTICULAR; INTRALESIONAL; INTRAMUSCULAR; INTRAVENOUS; SOFT TISSUE
Status: DISPENSED
Start: 2018-09-07

## (undated) RX ORDER — TRIAMCINOLONE ACETONIDE 40 MG/ML
INJECTION, SUSPENSION INTRA-ARTICULAR; INTRAMUSCULAR
Status: DISPENSED
Start: 2018-09-07

## (undated) RX ORDER — REMIFENTANIL HYDROCHLORIDE 1 MG/ML
INJECTION, POWDER, LYOPHILIZED, FOR SOLUTION INTRAVENOUS
Status: DISPENSED
Start: 2018-12-06